# Patient Record
Sex: FEMALE | Race: WHITE | NOT HISPANIC OR LATINO | Employment: UNEMPLOYED | URBAN - METROPOLITAN AREA
[De-identification: names, ages, dates, MRNs, and addresses within clinical notes are randomized per-mention and may not be internally consistent; named-entity substitution may affect disease eponyms.]

---

## 2017-01-09 ENCOUNTER — AMBULATORY - HEALTHEAST (OUTPATIENT)
Dept: SURGERY | Facility: CLINIC | Age: 37
End: 2017-01-09

## 2017-01-10 ENCOUNTER — RECORDS - HEALTHEAST (OUTPATIENT)
Dept: ADMINISTRATIVE | Facility: OTHER | Age: 37
End: 2017-01-10

## 2017-01-10 ENCOUNTER — OFFICE VISIT (OUTPATIENT)
Dept: ENDOCRINOLOGY | Facility: CLINIC | Age: 37
End: 2017-01-10

## 2017-01-10 VITALS
DIASTOLIC BLOOD PRESSURE: 83 MMHG | HEART RATE: 105 BPM | HEIGHT: 60 IN | TEMPERATURE: 98.5 F | BODY MASS INDEX: 57.52 KG/M2 | SYSTOLIC BLOOD PRESSURE: 140 MMHG | WEIGHT: 293 LBS

## 2017-01-10 DIAGNOSIS — D35.2 PROLACTINOMA (H): ICD-10-CM

## 2017-01-10 DIAGNOSIS — D35.2 PITUITARY ADENOMA (H): Primary | ICD-10-CM

## 2017-01-10 DIAGNOSIS — E03.9 HYPOTHYROIDISM, UNSPECIFIED TYPE: ICD-10-CM

## 2017-01-10 DIAGNOSIS — D35.2 PITUITARY ADENOMA (H): ICD-10-CM

## 2017-01-10 LAB
FSH SERPL-ACNC: 3.9 IU/L
LH SERPL-ACNC: 4.1 IU/L
PROLACTIN SERPL-MCNC: 60 UG/L (ref 3–27)
T4 FREE SERPL-MCNC: 1.21 NG/DL (ref 0.76–1.46)
TSH SERPL DL<=0.05 MIU/L-ACNC: 3.09 MU/L (ref 0.4–4)

## 2017-01-10 PROCEDURE — 84146 ASSAY OF PROLACTIN: CPT | Performed by: INTERNAL MEDICINE

## 2017-01-10 PROCEDURE — 83002 ASSAY OF GONADOTROPIN (LH): CPT | Performed by: INTERNAL MEDICINE

## 2017-01-10 RX ORDER — CABERGOLINE 0.5 MG/1
0.5 TABLET ORAL DAILY
Qty: 8 TABLET | Refills: 5 | Status: SHIPPED | OUTPATIENT
Start: 2017-01-10 | End: 2017-05-10

## 2017-01-10 ASSESSMENT — PAIN SCALES - GENERAL: PAINLEVEL: NO PAIN (0)

## 2017-01-10 NOTE — Clinical Note
1/10/2017       RE: Bushra Buck  [address]    [place] [address]     Dear Colleague,    Thank you for referring your patient, Bushra Buck, to the [facility] ENDOCRINOLOGY at [facility]. Please see a copy of my visit note below.         - Endocrinology Initial Consultation -    Reason for visit/consult:  Hyperprolactinemia    Primary care provider: Tae Garcia    HPI: 37 yo female with let lymphedema, overweight, and hypothyroidism who was found to have pituitary tumor in 1997 at her ophthalmology office,. Her initial symptoms were HA and blurry vision and amenorrhea.  She was started on bromocriptin since 1998 till 8/2000, when [facility]  Post surgery, she mentioned she felt better for a while, and her menstrual cycles recovered.  She has been seem by Dr. Moran since 2002, when she was switched to cabergoline, se was taking 0.5 mg twice a day per patient. Around 2012, she was stopped cabergoline and now PRL levels gradually increasing.     No period for 2 years. After TSS, had periods but no more now.   No glaractorrhea. Appetite: OK, Sleep OK, Fatigue+, BW: 360 lb stable    Past Medical/Surgical History:  Past Medical History   Diagnosis Date     Pituitary adenoma (H)      S/P trans nasal resection in 2000     Bipolar disorder (H) 1992     Irritable bowel syndrome      GERD (gastroesophageal reflux disease)      Acne rosacea      Past Surgical History   Procedure Laterality Date     Septal plasty  1998     Tonsillectomy  1998     Pituitary tumor removal  2000     at the [facility]       Allergies:  Allergies   Allergen Reactions     Fiorinal [Butalbital-Aspirin-Caffeine] Anaphylaxis and Swelling     Ativan [Lorazepam]      Bee Venom      Ibuprofen Sodium      Eyes swell     Keflex [Cephalexin-Fd&C Yellow #6]      Eyes swell     Latex Swelling     Trileptal      numbness     Topamax Rash       Current Medications   Current Outpatient  Prescriptions   Medication     cabergoline (DOSTINEX) 0.5 MG tablet     levothyroxine (SYNTHROID, LEVOTHROID) 112 MCG tablet     divalproex (DEPAKOTE ER) 500 MG 24 hr tablet     doxycycline (VIBRAMYCIN) 100 MG capsule     clotrimazole (LOTRIMIN) 1 % cream     FENOFIBRATE PO     silver sulfADIAZINE (SILVADENE) 1 % cream     furosemide (LASIX) 40 MG tablet     Multiple Vitamins-Minerals (CERTAVITE/ANTIOXIDANTS PO)     cetirizine HCl 10 MG CAPS     omeprazole (PRILOSEC) 40 MG capsule     No current facility-administered medications for this visit.       Family History:  Family History   Problem Relation Age of Onset     Thyroid Disease Mother      hypothyroidism     DIABETES Mother      Coronary Artery Disease No family hx of      Hypertension No family hx of      Hyperlipidemia No family hx of      CEREBROVASCULAR DISEASE No family hx of      Breast Cancer No family hx of      Colon Cancer No family hx of      Prostate Cancer No family hx of      Other Cancer No family hx of      Depression No family hx of      Anxiety Disorder No family hx of      MENTAL ILLNESS No family hx of      Substance Abuse No family hx of      Anesthesia Reaction No family hx of      Asthma No family hx of      OSTEOPOROSIS No family hx of      Genetic Disorder No family hx of      Obesity No family hx of      Unknown/Adopted No family hx of    Mother, sister, other sister: hypothyroidism, weight issues, lymphedema    Social History:  Social History   Substance Use Topics     Smoking status: Never Smoker      Smokeless tobacco: Never Used     Alcohol Use: No   Drug: No, Lives with a cat, VNS visited her home to take care of lymphedema    ROS:  Full review of systems taken with the help of the intake sheet. Otherwise a complete 14 point review of systems was taken and is negative unless stated in the history above.    Physical Exam:   Blood pressure 140/83, pulse 105, temperature 98.5  F (36.9  C), temperature source Oral, height 1.524 m  (5'), weight 163.975 kg (361 lb 8 oz).    General: well appearing, no acute distress, pleasant and conversant,   Mental Status/neuro: alert and oriented  Face: symmetrical, normal facial color  Eyes: anicteric, PERRL,   Visual Field: intact  Neck: suppler, no lymphadenopahty  Thyroid: normal size and texture, no nodule palpable, no bruits  Heart: regular rhythm, S1S2, no murmur appreciated  Breast: no galactorrhea  Lung: clear to auscultation bilaterally  Abdomen: soft, NT/ND, no hepatomegaly  Legs: no swelling or edema  Feet: no deformities or ulcers, 2+ DP pulses, normal monofilament sensation      Labs (General):   NA      142   7/1/2015   POTASSIUM      3.9   7/1/2015  CHLORIDE      106   11/25/2011  TAWNYA      9.1   9/11/2012  CO2       22   11/25/2011  BUN       16   11/25/2011  CR     0.67   1/6/2016  GLC       82   1/6/2016  TSH     3.78   7/6/2016  T4     1.32   7/6/2016  A1C      5.7   1/6/2016 1/23/2014 11:31 1/2/2015 12:22 7/1/2015 13:43 1/6/2016 15:31 7/6/2016 17:09   Prolactin 53 (H) 46 (H) 64 (H) 47 (H) 50 (H)   FSH  5.9 4.5 4.3 4.5   TSH 3.59 4.63 (H) 3.86 5.69 (H) 3.78   T4 Free 1.09 1.25 1.24 1.23 1.32   Lutropin    5.3    Ins Growth Factor 1   78 (L) 121 125         MRI Brain:   Results for orders placed in visit on 01/14/16   MRI Brain-PITUITARY  w & w/o contrast    Narrative Brain/Pituitary MRI without and with contrast    History: Benign neoplasm of pituitary gland, Morbid (severe) obesity  due to excess calories, Other abnormal glucose.      Comparison:  MRI brain 9/12/2012     Technique: Axial diffusion and FLAIR images of the whole brain  obtained without intravenous contrast. Sagittal T1-weighted, coronal  T2-weighted, coronal T1-weighted images with focus on the sella were  obtained without intravenous contrast. Post intravenous contrast using  gadolinium coronal and sagittal T1-weighted images were obtained  focused on the sella.  Dynamic postcontrast coronal T1-weighted images  were  also obtained.    Contrast: 15 mL gadavist    Findings:  There are again postoperative changes of transnasal  transsphenoidal approach pituitary adenoma. There is thinning of the  right half of the pituitary gland. The pituitary stalk is deviated to  the left. No hypoenhancing lesion in the residual pituitary tissue on  the left of the midline. The optic chiasm appears intact and not  displaced. The major cavernous carotid vascular flow-voids appear  patent.        FLAIR images through the whole brain are unremarkable, and demonstrate  no mass effect, midline shift, or significant enlargement of the  ventricles. There is again normal variant cavum septum pellucidum. No  significant change in the T2 hyperintense focus in the right centrum  semiovale.      Impression Impression: Stable postoperative changes of pituitary adenoma  resection with thinning of the right side of the pituitary gland. No  evidence for residual adenoma in the homogenously enhancing pituitary  tissue in the left side of the sella.     I have personally reviewed the examination and initial interpretation  and I agree with the findings.    ANTONETTE GUILLORY MD                              1/2016        Assessment and Plan  36 year old female with hyperprolactinemia,    - Start cabergoline 0.5 mg weekly  - PRL, TSH, free T4, LH, FSH today  - MRI pituitary to set up  - 4 month follow up with prior blood work      I spent 45 minutes with this patient face to face and explained the conditions and plans (more than 50% of time was counseling/coordination of care) . The patient understood and is satisfied with today's visit. Return to clinic with me in 4 months.         Mandy Farfan MD  Staff Physician  Endocrinology and Metabolism  License: [id number]

## 2017-01-10 NOTE — NURSING NOTE
Chief Complaint   Patient presents with     RECHECK     f/u for pituitary adenoma- former Dr. Moran patient        Initial /83 mmHg  Pulse 105  Ht 1.524 m (5')  Wt 163.975 kg (361 lb 8 oz)  BMI 70.60 kg/m2 Estimated body mass index is 70.6 kg/(m^2) as calculated from the following:    Height as of this encounter: 1.524 m (5').    Weight as of this encounter: 163.975 kg (361 lb 8 oz).  BP completed using cuff size: leesa Ahumada CMA

## 2017-01-10 NOTE — PROGRESS NOTES
- Endocrinology Initial Consultation -    Reason for visit/consult:  Hyperprolactinemia    Primary care provider: Tae Garcia    HPI: 35 yo female with let lymphedema, overweight, and hypothyroidism who was found to have pituitary tumor in 1997 at her ophthalmology office,. Her initial symptoms were HA and blurry vision and amenorrhea.  She was started on bromocriptin since 1998 till 8/2000, when [facility]  Post surgery, she mentioned she felt better for a while, and her menstrual cycles recovered.  She has been seem by Dr. Moran since 2002, when she was switched to cabergoline, se was taking 0.5 mg twice a day per patient. Around 2012, she was stopped cabergoline and now PRL levels gradually increasing.     No period for 2 years. After TSS, had periods but no more now.   No glaractorrhea. Appetite: OK, Sleep OK, Fatigue+, BW: 360 lb stable    Past Medical/Surgical History:  Past Medical History   Diagnosis Date     Pituitary adenoma (H)      S/P trans nasal resection in 2000     Bipolar disorder (H) 1992     Irritable bowel syndrome      GERD (gastroesophageal reflux disease)      Acne rosacea      Past Surgical History   Procedure Laterality Date     Septal plasty  1998     Tonsillectomy  1998     Pituitary [facility]  2000     at [facility]       Allergies:  Allergies   Allergen Reactions     Fiorinal [Butalbital-Aspirin-Caffeine] Anaphylaxis and Swelling     Ativan [Lorazepam]      Bee Venom      Ibuprofen Sodium      Eyes swell     Keflex [Cephalexin-Fd&C Yellow #6]      Eyes swell     Latex Swelling     Trileptal      numbness     Topamax Rash       Current Medications   Current Outpatient Prescriptions   Medication     cabergoline (DOSTINEX) 0.5 MG tablet     levothyroxine (SYNTHROID, LEVOTHROID) 112 MCG tablet     divalproex (DEPAKOTE ER) 500 MG 24 hr tablet     doxycycline (VIBRAMYCIN) 100 MG capsule      clotrimazole (LOTRIMIN) 1 % cream     FENOFIBRATE PO     silver sulfADIAZINE (SILVADENE) 1 % cream     furosemide (LASIX) 40 MG tablet     Multiple Vitamins-Minerals (CERTAVITE/ANTIOXIDANTS PO)     cetirizine HCl 10 MG CAPS     omeprazole (PRILOSEC) 40 MG capsule     No current facility-administered medications for this visit.       Family History:  Family History   Problem Relation Age of Onset     Thyroid Disease Mother      hypothyroidism     DIABETES Mother      Coronary Artery Disease No family hx of      Hypertension No family hx of      Hyperlipidemia No family hx of      CEREBROVASCULAR DISEASE No family hx of      Breast Cancer No family hx of      Colon Cancer No family hx of      Prostate Cancer No family hx of      Other Cancer No family hx of      Depression No family hx of      Anxiety Disorder No family hx of      MENTAL ILLNESS No family hx of      Substance Abuse No family hx of      Anesthesia Reaction No family hx of      Asthma No family hx of      OSTEOPOROSIS No family hx of      Genetic Disorder No family hx of      Obesity No family hx of      Unknown/Adopted No family hx of    Mother, sister, other sister: hypothyroidism, weight issues, lymphedema    Social History:  Social History   Substance Use Topics     Smoking status: Never Smoker      Smokeless tobacco: Never Used     Alcohol Use: No   Drug: No, Lives with a cat, VNS visited her home to take care of lymphedema    ROS:  Full review of systems taken with the help of the intake sheet. Otherwise a complete 14 point review of systems was taken and is negative unless stated in the history above.    Physical Exam:   Blood pressure 140/83, pulse 105, temperature 98.5  F (36.9  C), temperature source Oral, height 1.524 m (5'), weight 163.975 kg (361 lb 8 oz).    General: well appearing, no acute distress, pleasant and conversant,   Mental Status/neuro: alert and oriented  Face: symmetrical, normal facial color  Eyes: anicteric, PERRL,   Visual  Field: intact  Neck: suppler, no lymphadenopahty  Thyroid: normal size and texture, no nodule palpable, no bruits  Heart: regular rhythm, S1S2, no murmur appreciated  Breast: no galactorrhea  Lung: clear to auscultation bilaterally  Abdomen: soft, NT/ND, no hepatomegaly  Legs: no swelling or edema  Feet: no deformities or ulcers, 2+ DP pulses, normal monofilament sensation      Labs (General):   NA      142   7/1/2015   POTASSIUM      3.9   7/1/2015  CHLORIDE      106   11/25/2011  TAWNYA      9.1   9/11/2012  CO2       22   11/25/2011  BUN       16   11/25/2011  CR     0.67   1/6/2016  GLC       82   1/6/2016  TSH     3.78   7/6/2016  T4     1.32   7/6/2016  A1C      5.7   1/6/2016 1/23/2014 11:31 1/2/2015 12:22 7/1/2015 13:43 1/6/2016 15:31 7/6/2016 17:09   Prolactin 53 (H) 46 (H) 64 (H) 47 (H) 50 (H)   FSH  5.9 4.5 4.3 4.5   TSH 3.59 4.63 (H) 3.86 5.69 (H) 3.78   T4 Free 1.09 1.25 1.24 1.23 1.32   Lutropin    5.3    Ins Growth Factor 1   78 (L) 121 125         MRI Brain:   Results for orders placed in visit on 01/14/16   MRI Brain-PITUITARY  w & w/o contrast    Narrative Brain/Pituitary MRI without and with contrast    History: Benign neoplasm of pituitary gland, Morbid (severe) obesity  due to excess calories, Other abnormal glucose.      Comparison:  MRI brain 9/12/2012     Technique: Axial diffusion and FLAIR images of the whole brain  obtained without intravenous contrast. Sagittal T1-weighted, coronal  T2-weighted, coronal T1-weighted images with focus on the sella were  obtained without intravenous contrast. Post intravenous contrast using  gadolinium coronal and sagittal T1-weighted images were obtained  focused on the sella.  Dynamic postcontrast coronal T1-weighted images  were also obtained.    Contrast: 15 mL gadavist    Findings:  There are again postoperative changes of transnasal  transsphenoidal approach pituitary adenoma. There is thinning of the  right half of the pituitary gland. The  pituitary stalk is deviated to  the left. No hypoenhancing lesion in the residual pituitary tissue on  the left of the midline. The optic chiasm appears intact and not  displaced. The major cavernous carotid vascular flow-voids appear  patent.        FLAIR images through the whole brain are unremarkable, and demonstrate  no mass effect, midline shift, or significant enlargement of the  ventricles. There is again normal variant cavum septum pellucidum. No  significant change in the T2 hyperintense focus in the right centrum  semiovale.      Impression Impression: Stable postoperative changes of pituitary adenoma  resection with thinning of the right side of the pituitary gland. No  evidence for residual adenoma in the homogenously enhancing pituitary  tissue in the left side of the sella.     I have personally reviewed the examination and initial interpretation  and I agree with the findings.    ANTONETTE GUILLORY MD                              1/2016        Assessment and Plan  36 year old female with hyperprolactinemia,    - Start cabergoline 0.5 mg weekly  - PRL, TSH, free T4, LH, FSH today  - MRI pituitary to set up  - 4 month follow up with prior blood work      I spent 45 minutes with this patient face to face and explained the conditions and plans (more than 50% of time was counseling/coordination of care) . The patient understood and is satisfied with today's visit. Return to clinic with me in 4 months.         Mandy Farfan MD  Staff Physician  Endocrinology and Metabolism  License: [id number]

## 2017-01-10 NOTE — MR AVS SNAPSHOT
After Visit Summary   1/10/2017    Bushra Buck    MRN: [id number]           Patient Information     Date Of Birth          1980        Visit Information        Provider Department      1/10/2017 2:30 PM Mandy Farfan MD [facility]        Today's Diagnoses     Pituitary adenoma (H)    -  1     Prolactinoma (H)         Hypothyroidism, unspecified type           Care Instructions    Blood work for prolactin and thyroid function  Start cabefgoline 0.5 mg once a week  Come back here in 4 months with blood test.        Follow-ups after your visit        Your next 10 appointments already scheduled     Ajay 10, 2017  3:00 PM   LAB with  LAB   [facility] ([facility])    [address] [place] [address] [phone]           Patient must bring picture ID.  Patient should be prepared to give a urine specimen  Please do not eat 10-12 hours before your appointment if you are coming in fasting for labs on lipids, cholesterol, or glucose (sugar).  Pregnant women should follow their Care Team instructions. Water with medications is okay. Do not drink coffee or other fluids.   If you have concerns about taking  your medications, please ask at office or if scheduling via [employer], send a message by clicking on Secure Messaging, Message Your Care Team.            Jan 14, 2017  4:00 PM   (Arrive by 3:45 PM)   MR BRAIN W/O & W CONTRAST with [address] [facility] ([facility])    [address] [place] [address] [phone]           Take your medicines as usual, unless your doctor tells you not to. Bring a list of your current medicines to your exam (including vitamins, minerals and over-the-counter drugs).  You will be given intravenous contrast for this exam. To prepare:   The day before your exam, drink extra fluids at least six 8-ounce glasses (unless your doctor tells  you to restrict your fluids).   Have a blood test (creatinine test) within 30 days of your exam. Go to your clinic or Diagnostic Imaging Department for this test.  The MRI machine uses a strong magnet. Please wear clothes without metal (snaps, zippers). A sweatsuit works well, or we may give you a hospital gown.  Please remove any body piercings and hair extensions before you arrive. You will also remove watches, jewelry, hairpins, wallets, dentures, partial dental plates and hearing aids. You may wear contact lenses, and you may be able to wear your rings. We have a safe place to keep your personal items, but it is safer to leave them at home.   **IMPORTANT** THE INSTRUCTIONS BELOW ARE ONLY FOR THOSE PATIENTS WHO HAVE BEEN TOLD THEY WILL RECEIVE SEDATION OR GENERAL ANESTHESIA DURING THEIR MRI PROCEDURE:  IF YOU WILL RECEIVE SEDATION (take medicine to help you relax during your exam):   You must get the medicine from your doctor before you arrive. Bring the medicine to the exam. Do not take it at home.   Arrive one hour early. Bring someone who can take you home after the test. Your medicine will make you sleepy. After the exam, you may not drive, take a bus or take a taxi by yourself.   No eating 8 hours before your exam. You may have clear liquids up until 4 hours before your exam. (Clear liquids include water, clear tea, black coffee and fruit juice without pulp.)  IF YOU WILL RECEIVE ANESTHESIA (be asleep for your exam):   Arrive 1 1/2 hours early. Bring someone who can take you home after the test. You may not drive, take a bus or take a taxi by yourself.   No eating 8 hours before your exam. You may have clear liquids up until 4 hours before your exam. (Clear liquids include water, clear tea, black coffee and fruit juice without pulp.)  Please call the Imaging Department at your exam site with any questions.            May 03, 2017  9:30 AM   LAB with [facility] ([facility])     [address] [id number] [phone]           Patient must bring picture ID.  Patient should be prepared to give a urine specimen  Please do not eat 10-12 hours before your appointment if you are coming in fasting for labs on lipids, cholesterol, or glucose (sugar).  Pregnant women should follow their Care Team instructions. Water with medications is okay. Do not drink coffee or other fluids.   If you have concerns about taking  your medications, please ask at office or if scheduling via [employer], send a message by clicking on Secure Messaging, Message Your Care Team.            May 10, 2017 10:30 AM   (Arrive by 10:00 AM)   RETURN ENDOCRINE with Mandy Farfan MD   [facility] ([facility])    [address] [place] [id number] [phone]              Future tests that were ordered for you today     Open Future Orders        Priority Expected Expires Ordered    TSH Routine  1/10/2018 1/10/2017    T4 free Routine  1/10/2018 1/10/2017    Prolactin Routine  1/10/2018 1/10/2017    Lutropin Routine  1/10/2018 1/10/2017    Follicle stimulating hormone Routine  1/10/2018 1/10/2017    MRI Brain-PITUITARY  w & w/o contrast Routine  8/8/2017 1/10/2017            Who to contact     Please call your clinic at [phone] to:    Ask questions about your health    Make or cancel appointments    Discuss your medicines    Learn about your test results    Speak to your doctor   If you have compliments or concerns about an experience at your clinic, or if you wish to file a complaint, please contact [facility] Patient Relations at [phone] or email us at [email or web address]         Additional Information About Your Visit        CloudBeds Information     [employer] gives you secure access to your electronic health record. If you see a primary care provider, you can also send messages to your  care team and make appointments. If you have questions, please call your primary care clinic.  If you do not have a primary care provider, please call [phone] and they will assist you.      [employer] is an electronic gateway that provides easy, online access to your medical records. With [employer], you can request a clinic appointment, read your test results, renew a prescription or communicate with your care team.     To access your existing account, please contact your [facility] or call [phone] for assistance.        Care EveryWhere ID     This is your Care EveryWhere ID. This could be used by other organizations to access your [place] medical records  [id number]        Your Vitals Were     Pulse Temperature Height BMI (Body Mass Index)          105 98.5  F (36.9  C) (Oral) 1.524 m (5') 70.60 kg/m2         Blood Pressure from Last 3 Encounters:   01/10/17 140/83   07/06/16 139/76   03/14/16 138/82    Weight from Last 3 Encounters:   01/10/17 163.975 kg (361 lb 8 oz)   07/06/16 172.095 kg (379 lb 6.4 oz)   03/14/16 173.365 kg (382 lb 3.2 oz)                 Today's Medication Changes          These changes are accurate as of: 1/10/17  2:55 PM.  If you have any questions, ask your nurse or doctor.               Start taking these medicines.        Dose/Directions    cabergoline 0.5 MG tablet   Commonly known as:  DOSTINEX   Used for:  Pituitary adenoma (H), Prolactinoma (H)   Started by:  Mandy Farfan MD        Dose:  0.5 mg   Take 1 tablet (0.5 mg) by mouth daily   Quantity:  8 tablet   Refills:  5         Stop taking these medicines if you haven't already. Please contact your care team if you have questions.     ARIPiprazole 5 MG tablet   Commonly known as:  ABILIFY   Stopped by:  Mandy Farfan MD                Where to get your medicines      These medications were sent to [facility] - [place], [place] - [address]  [address], [place]  [address]     Phone:  [phone]    - cabergoline 0.5 MG tablet             Primary Care Provider Office Phone # Fax #    Tae Garcia [phone]       [facility] [address] [address]        Thank you!     Thank you for choosing [facility]  for your care. Our goal is always to provide you with excellent care. Hearing back from our patients is one way we can continue to improve our services. Please take a few minutes to complete the written survey that you may receive in the mail after your visit with us. Thank you!             Your Updated Medication List - Protect others around you: Learn how to safely use, store and throw away your medicines at www.disposemymeds.org.          This list is accurate as of: 1/10/17  2:55 PM.  Always use your most recent med list.                   Brand Name Dispense Instructions for use    cabergoline 0.5 MG tablet    DOSTINEX    8 tablet    Take 1 tablet (0.5 mg) by mouth daily       CERTAVITE/ANTIOXIDANTS PO          cetirizine HCl 10 MG Caps          clotrimazole 1 % cream    LOTRIMIN    60 g    Apply to toes twice daily       divalproex 500 MG 24 hr tablet    DEPAKOTE ER    30 tablet    Take 2 tablets (1,000 mg) by mouth daily       doxycycline 100 MG capsule    VIBRAMYCIN     Take 1 capsule (100 mg) by mouth 2 times daily       FENOFIBRATE PO      Take 145 mg by mouth daily       furosemide 40 MG tablet    LASIX    60 tablet    Take 40 mg by mouth daily       levothyroxine 112 MCG tablet    SYNTHROID/LEVOTHROID    90 tablet    Take 1 tablet (112 mcg) by mouth daily       priLOSEC 40 MG capsule   Generic drug:  omeprazole      Take 1 capsule by mouth daily.       silver sulfADIAZINE 1 % cream    SILVADENE     Apply topically daily

## 2017-01-20 ENCOUNTER — COMMUNICATION - HEALTHEAST (OUTPATIENT)
Dept: SURGERY | Facility: CLINIC | Age: 37
End: 2017-01-20

## 2017-02-09 ENCOUNTER — OFFICE VISIT - HEALTHEAST (OUTPATIENT)
Dept: VASCULAR SURGERY | Facility: CLINIC | Age: 37
End: 2017-02-09

## 2017-02-09 ENCOUNTER — RECORDS - HEALTHEAST (OUTPATIENT)
Dept: ADMINISTRATIVE | Facility: OTHER | Age: 37
End: 2017-02-09

## 2017-02-09 DIAGNOSIS — I89.0 LYMPHEDEMA: ICD-10-CM

## 2017-02-09 DIAGNOSIS — M21.41 FLAT FEET, BILATERAL: ICD-10-CM

## 2017-02-09 DIAGNOSIS — L98.491 ULCER OF ABDOMEN WALL, LIMITED TO BREAKDOWN OF SKIN (H): ICD-10-CM

## 2017-02-09 DIAGNOSIS — L98.499 SKIN ULCER OF FEMALE BREAST (H): ICD-10-CM

## 2017-02-09 DIAGNOSIS — I87.303 VENOUS HYPERTENSION OF LOWER EXTREMITY, BILATERAL: ICD-10-CM

## 2017-02-09 DIAGNOSIS — M21.071 ACQUIRED VALGUS DEFORMITY OF BOTH ANKLES: ICD-10-CM

## 2017-02-09 DIAGNOSIS — M21.072 ACQUIRED VALGUS DEFORMITY OF BOTH ANKLES: ICD-10-CM

## 2017-02-09 DIAGNOSIS — M21.42 FLAT FEET, BILATERAL: ICD-10-CM

## 2017-02-09 DIAGNOSIS — M21.962 FOOT DEFORMITY, BILATERAL: ICD-10-CM

## 2017-02-09 DIAGNOSIS — M21.961 FOOT DEFORMITY, BILATERAL: ICD-10-CM

## 2017-02-13 ENCOUNTER — RECORDS - HEALTHEAST (OUTPATIENT)
Dept: ADMINISTRATIVE | Facility: OTHER | Age: 37
End: 2017-02-13

## 2017-03-09 ENCOUNTER — OFFICE VISIT - HEALTHEAST (OUTPATIENT)
Dept: VASCULAR SURGERY | Facility: CLINIC | Age: 37
End: 2017-03-09

## 2017-03-09 DIAGNOSIS — I87.303 VENOUS HYPERTENSION OF LOWER EXTREMITY, BILATERAL: ICD-10-CM

## 2017-03-09 DIAGNOSIS — L98.499 SKIN ULCER OF FEMALE BREAST (H): ICD-10-CM

## 2017-03-09 DIAGNOSIS — E66.01 MORBID OBESITY WITH BMI OF 70 AND OVER, ADULT (H): ICD-10-CM

## 2017-04-10 ENCOUNTER — OFFICE VISIT - HEALTHEAST (OUTPATIENT)
Dept: SURGERY | Facility: CLINIC | Age: 37
End: 2017-04-10

## 2017-04-10 DIAGNOSIS — Z71.3 WEIGHT LOSS COUNSELING, ENCOUNTER FOR: ICD-10-CM

## 2017-04-10 DIAGNOSIS — D35.2 PROLACTINOMA (H): ICD-10-CM

## 2017-04-10 DIAGNOSIS — I87.303 VENOUS HYPERTENSION OF LOWER EXTREMITY, BILATERAL: ICD-10-CM

## 2017-04-10 ASSESSMENT — MIFFLIN-ST. JEOR: SCORE: 2206.31

## 2017-04-20 ENCOUNTER — OFFICE VISIT - HEALTHEAST (OUTPATIENT)
Dept: VASCULAR SURGERY | Facility: CLINIC | Age: 37
End: 2017-04-20

## 2017-04-20 DIAGNOSIS — I89.0 LYMPHEDEMA: ICD-10-CM

## 2017-04-20 DIAGNOSIS — E66.01 MORBID OBESITY WITH BMI OF 70 AND OVER, ADULT (H): ICD-10-CM

## 2017-04-20 DIAGNOSIS — L98.499 SKIN ULCER OF FEMALE BREAST (H): ICD-10-CM

## 2017-04-20 DIAGNOSIS — I87.303 VENOUS HYPERTENSION OF LOWER EXTREMITY, BILATERAL: ICD-10-CM

## 2017-05-02 DIAGNOSIS — D35.2 PROLACTINOMA (H): Primary | ICD-10-CM

## 2017-05-03 DIAGNOSIS — D35.2 PROLACTINOMA (H): ICD-10-CM

## 2017-05-03 LAB — PROLACTIN SERPL-MCNC: 59 UG/L (ref 3–27)

## 2017-05-03 PROCEDURE — 84146 ASSAY OF PROLACTIN: CPT | Performed by: INTERNAL MEDICINE

## 2017-05-10 ENCOUNTER — OFFICE VISIT (OUTPATIENT)
Dept: ENDOCRINOLOGY | Facility: CLINIC | Age: 37
End: 2017-05-10

## 2017-05-10 ENCOUNTER — RECORDS - HEALTHEAST (OUTPATIENT)
Dept: ADMINISTRATIVE | Facility: OTHER | Age: 37
End: 2017-05-10

## 2017-05-10 VITALS
BODY MASS INDEX: 57.52 KG/M2 | HEIGHT: 60 IN | DIASTOLIC BLOOD PRESSURE: 82 MMHG | HEART RATE: 98 BPM | SYSTOLIC BLOOD PRESSURE: 133 MMHG | WEIGHT: 293 LBS

## 2017-05-10 DIAGNOSIS — D35.2 PROLACTINOMA (H): Primary | ICD-10-CM

## 2017-05-10 DIAGNOSIS — D35.2 PITUITARY ADENOMA (H): ICD-10-CM

## 2017-05-10 DIAGNOSIS — N91.2 AMENORRHEA: ICD-10-CM

## 2017-05-10 RX ORDER — CABERGOLINE 0.5 MG/1
0.5 TABLET ORAL
Qty: 8 TABLET | Refills: 5 | Status: SHIPPED | OUTPATIENT
Start: 2017-05-11 | End: 2018-06-27

## 2017-05-10 NOTE — PATIENT INSTRUCTIONS
-Take cabergolin 0.5 mg twice weekly (on Wednesday and Sunday)  -Check your prolactin in 3 months  -RTC in 6 months

## 2017-05-10 NOTE — MR AVS SNAPSHOT
After Visit Summary   5/10/2017    Bushra Buck    MRN: [id number]           Patient Information     Date Of Birth          1980        Visit Information        Provider Department      5/10/2017 10:30 AM Mandy Farfan MD [facility]        Today's Diagnoses     Prolactinoma (H)    -  1    Pituitary adenoma (H)          Care Instructions    -Take cabergolin 0.5 mg twice weekly (on Wednesday and Sunday)  -Check your prolactin in 3 months  -RTC in 6 months        Follow-ups after your visit        Follow-up notes from your care team     Return in about 6 months (around 11/10/2017).      Your next 10 appointments already scheduled     Aug 10, 2017 10:00 AM CDT   LAB with  LAB   [facility] ([facility])    [address] [place] [address] [phone]           Patient must bring picture ID.  Patient should be prepared to give a urine specimen  Please do not eat 10-12 hours before your appointment if you are coming in fasting for labs on lipids, cholesterol, or glucose (sugar).  Pregnant women should follow their Care Team instructions. Water with medications is okay. Do not drink coffee or other fluids.   If you have concerns about taking  your medications, please ask at office or if scheduling via [employer], send a message by clicking on Secure Messaging, Message Your Care Team.            Nov 14, 2017  1:30 PM CST   (Arrive by 1:15 PM)   RETURN ENDOCRINE with Mandy Farfan MD   [facility] ([facility])    [address] [place] [address] [phone]              Future tests that were ordered for you today     Open Future Orders        Priority Expected Expires Ordered    Prolactin Routine 8/10/2017 5/10/2018 5/10/2017            Who to contact     Please call your clinic at [phone] to:    Ask questions about your health    Make or cancel  appointments    Discuss your medicines    Learn about your test results    Speak to your doctor   If you have compliments or concerns about an experience at your clinic, or if you wish to file a complaint, please contact [facility] Patient Relations at [phone] or email us at [email or web address]         Additional Information About Your Visit        MyChart Information     [employer] gives you secure access to your electronic health record. If you see a primary care provider, you can also send messages to your care team and make appointments. If you have questions, please call your primary care clinic.  If you do not have a primary care provider, please call [phone] and they will assist you.      [employer] is an electronic gateway that provides easy, online access to your medical records. With [employer], you can request a clinic appointment, read your test results, renew a prescription or communicate with your care team.     To access your existing account, please contact your [facility] or call [phone] for assistance.        Care EveryWhere ID     This is your Care EveryWhere ID. This could be used by other organizations to access your [place] medical records  [id number]        Your Vitals Were     Pulse Height BMI (Body Mass Index)             98 1.524 m (5') 72.53 kg/m2          Blood Pressure from Last 3 Encounters:   05/10/17 133/82   01/10/17 140/83   07/06/16 139/76    Weight from Last 3 Encounters:   05/10/17 (!) 168.5 kg (371 lb 6.4 oz)   01/10/17 (!) 164 kg (361 lb 8 oz)   07/06/16 (!) 172.1 kg (379 lb 6.4 oz)                 Today's Medication Changes          These changes are accurate as of: 5/10/17 11:17 AM.  If you have any questions, ask your nurse or doctor.               These medicines have changed or have updated prescriptions.        Dose/Directions    cabergoline 0.5 MG tablet   Commonly known as:  DOSTINEX   This  may have changed:  when to take this   Used for:  Pituitary adenoma (H), Prolactinoma (H)        Dose:  0.5 mg   Start taking on:  5/11/2017   Take 1 tablet (0.5 mg) by mouth twice a week   Quantity:  8 tablet   Refills:  5         Stop taking these medicines if you haven't already. Please contact your care team if you have questions.     cetirizine HCl 10 MG Caps           clotrimazole 1 % cream   Commonly known as:  LOTRIMIN           silver sulfADIAZINE 1 % cream   Commonly known as:  SILVADENE                Where to get your medicines      These medications were sent to [facility] - [place], [place] - [address]  [address], [place] [address]     Phone:  [phone]     cabergoline 0.5 MG tablet                Primary Care Provider Office Phone # Fax #    Tae Garcia [phone]       [facility] [address] [facility] [address]        Thank you!     Thank you for choosing [facility]  for your care. Our goal is always to provide you with excellent care. Hearing back from our patients is one way we can continue to improve our services. Please take a few minutes to complete the written survey that you may receive in the mail after your visit with us. Thank you!             Your Updated Medication List - Protect others around you: Learn how to safely use, store and throw away your medicines at www.disposemymeds.org.          This list is accurate as of: 5/10/17 11:17 AM.  Always use your most recent med list.                   Brand Name Dispense Instructions for use    cabergoline 0.5 MG tablet   Start taking on:  5/11/2017    DOSTINEX    8 tablet    Take 1 tablet (0.5 mg) by mouth twice a week       CERTAVITE/ANTIOXIDANTS PO          divalproex 500 MG 24 hr tablet    DEPAKOTE ER    30 tablet    Take 2 tablets (1,000 mg) by mouth daily       doxycycline 100 MG capsule    VIBRAMYCIN     Take 1 capsule (100 mg) by mouth 2 times daily        FENOFIBRATE PO      Take 145 mg by mouth daily       furosemide 40 MG tablet    LASIX    60 tablet    Take 40 mg by mouth daily       levothyroxine 112 MCG tablet    SYNTHROID/LEVOTHROID    90 tablet    Take 1 tablet (112 mcg) by mouth daily       LOXAPINE SUCCINATE PO      Take by mouth 2 times daily       priLOSEC 40 MG capsule   Generic drug:  omeprazole      Take 1 capsule by mouth daily.

## 2017-05-10 NOTE — NURSING NOTE
Chief Complaint   Patient presents with     RECHECK     F/U PITUTARY ADENOMA       Initial /82 (BP Location: Right arm, Patient Position: Chair, Cuff Size: Adult Regular)  Pulse 98  Ht 1.524 m (5')  Wt (!) 168.5 kg (371 lb 6.4 oz)  BMI 72.53 kg/m2 Estimated body mass index is 72.53 kg/(m^2) as calculated from the following:    Height as of this encounter: 1.524 m (5').    Weight as of this encounter: 168.5 kg (371 lb 6.4 oz).  Medication Reconciliation: complete

## 2017-05-10 NOTE — LETTER
5/10/2017       RE: Bushra Buck  [address]    [place] [address]     Dear Colleague,    Thank you for referring your patient, Bushra Buck, to the [facility] ENDOCRINOLOGY at [facility]. Please see a copy of my visit note below.    Reason for visit/consult: F/u on prolactinoma.    Primary care provider: Tae Garcia    HPI:  36 year old female with history of pituitary tumor discovered in 1997. The patient had a surgical resection in 2000 and subsequently treated with cabergoline. However, he prolactin remained elevated at 59 on 5/3/17. Most recent brain MRI did not show recurrence of the disease. She is currently on cabergoline 0.5 mg once/week.   She also has primary hypothyroidism, which is being replaced with levothyroxine 112 mcg daily. She reports compliance with her medication and denies skipping doses.  She had not had a menstrual period in about 3 years. She had not experienced any galactorrhea. There was no history of palpitations, tremor or diarrhea. She reports significant fatigue. But she is trying to get out of her apartment to get groceries.   She has gained 10 lbs over the winter time. She admits to over eating behavior.     Past Medical/Surgical History:  Past Medical History:   Diagnosis Date     Acne rosacea      Bipolar disorder (H) 1992     GERD (gastroesophageal reflux disease)      Irritable bowel syndrome      Pituitary adenoma (H)     S/P trans nasal resection in 2000     Past Surgical History:   Procedure Laterality Date     pituitary tumor removal  2000    at the [facility]     septal plasty  1998     TONSILLECTOMY  1998       Allergies:  Allergies   Allergen Reactions     Fiorinal [Butalbital-Aspirin-Caffeine] Anaphylaxis and Swelling     Ativan [Lorazepam]      Bee Venom      Ibuprofen Sodium      Eyes swell     Keflex [Cephalexin-Fd&C Yellow #6]      Eyes swell     Latex Swelling     Trileptal      numbness     Topamax Rash        Current Medications   Current Outpatient Prescriptions   Medication     cabergoline (DOSTINEX) 0.5 MG tablet     levothyroxine (SYNTHROID, LEVOTHROID) 112 MCG tablet     divalproex (DEPAKOTE ER) 500 MG 24 hr tablet     doxycycline (VIBRAMYCIN) 100 MG capsule     clotrimazole (LOTRIMIN) 1 % cream     FENOFIBRATE PO     silver sulfADIAZINE (SILVADENE) 1 % cream     furosemide (LASIX) 40 MG tablet     Multiple Vitamins-Minerals (CERTAVITE/ANTIOXIDANTS PO)     cetirizine HCl 10 MG CAPS     omeprazole (PRILOSEC) 40 MG capsule     No current facility-administered medications for this visit.        Family History:  Family History   Problem Relation Age of Onset     Thyroid Disease Mother      hypothyroidism     DIABETES Mother      Coronary Artery Disease No family hx of      Hypertension No family hx of      Hyperlipidemia No family hx of      CEREBROVASCULAR DISEASE No family hx of      Breast Cancer No family hx of      Colon Cancer No family hx of      Prostate Cancer No family hx of      Other Cancer No family hx of      Depression No family hx of      Anxiety Disorder No family hx of      MENTAL ILLNESS No family hx of      Substance Abuse No family hx of      Anesthesia Reaction No family hx of      Asthma No family hx of      OSTEOPOROSIS No family hx of      Genetic Disorder No family hx of      Obesity No family hx of      Unknown/Adopted No family hx of        Social History:  Social History   Substance Use Topics     Smoking status: Never Smoker     Smokeless tobacco: Never Used     Alcohol use No       ROS:  10 point negative except as mentioned in HPI    Exam  There were no vitals taken for this visit.  Gen: morbidly obese female in NAD  HEENT: anicteric, EOMI, no proptosis or lid lag  Thyroid: no thyroid goiter or cervical LAD  Cardio: RRR, no m/r/g  Resp: CTAB. No wheezing or crackles  Abd: soft, NT/ND. Normal BS  Skin: Warm and dry. No rash or lesions.   Ext: no swelling or edema  Feet: no  deformities or ulcers, 2+ DP pulses  Neuro: A&Ox3, relaxation phase of reflexes normal, no tremor on outstretched arms  Psych: Normal affect and mood.    Labs/Imaging  Brain MRI 1/2017  Impression:   1. Postsurgical changes from transsphenoidal resection of pituitary  adenoma without evidence of recurrence.  2. Single hyperintensity on FLAIR in the right semiovale similar to  prior study of questionable clinical significance.  3. Stable mild dural thickening and enhancement over the convexities  which is most likely postsurgical in nature. This could also be  related to CSF leak or intracranial hypotension. Total  TSH   Date Value Ref Range Status   01/10/2017 3.09 0.40 - 4.00 mU/L Final   07/06/2016 3.78 0.40 - 4.00 mU/L Final   01/06/2016 5.69 (H) 0.40 - 4.00 mU/L Final   07/01/2015 3.86 0.40 - 4.00 mU/L Final   01/02/2015 4.63 (H) 0.40 - 4.00 mU/L Final     Comment:     Effective 7/30/2014, the reference range for this assay has changed to reflect   new instrumentation/methodology.       T4 Free   Date Value Ref Range Status   01/10/2017 1.21 0.76 - 1.46 ng/dL Final   07/06/2016 1.32 0.76 - 1.46 ng/dL Final   01/06/2016 1.23 0.76 - 1.46 ng/dL Final   07/01/2015 1.24 0.76 - 1.46 ng/dL Final   01/02/2015 1.25 0.76 - 1.46 ng/dL Final     Comment:     Effective 7/30/2014, the reference range for this assay has changed to reflect   new instrumentation/methodology.     ]  Lab Results   Component Value Date    A1C 5.7 01/06/2016         Assessment and Plan  37 year old female with history of prolactinoma s/p resection and subsequent treatment of cabergoline. She was off of cabergoline therapy for many years and was recently restarted on it. She still has elevated level of prolactin, and still compalins amenorrhea.     -We discussed about increasing the frequency of cabergoline 0.5 mg to twice/week  -We will repeat her prolactin in 3 months  -Continue with the same dose of levothyroxine  - Significant weight gain  (several pounds) since last visit, we discussed about weight loss  -RTC in 6 months    Patient seen and examined with staff endocrinologist Dr Adolph Thapa MD  Diabetes, Metabolism and Endocrinology Fellow  Pager: [phone]    --- Addendum----  I saw the patient with endocrine fellow Dr. Thapa and directly examined patient and discussed. Agree above note and plan. Impression, possible residual PRL-luz, PRL used to be normal range with cabergoline TIW, but off cabergoline, persitent mild elevation of PRL and amenorrhea     We spent 45 minutes with this patient face to face and explained the conditions and plans (more than 50% of time was counseling/coordination of care, treatment plan of elevated prolactin) . The patient understood and is satisfied with today's visit. Return to clinic with me in 6 months.     Mandy Farfan MD  Staff Physician  Endocrinology and Metabolism  [facility]  License: [id number]  Pager: [phone]

## 2017-05-10 NOTE — PROGRESS NOTES
Reason for visit/consult: F/u on prolactinoma.    Primary care provider: Tae Garcia    HPI:  36 year old female with history of pituitary tumor discovered in 1997. The patient had a surgical resection in 2000 and subsequently treated with cabergoline. However, he prolactin remained elevated at 60 on 5/3/17. Most recent brain MRI did not show recurrence of the disease. She is currently on cabergoline 0.5 mg once/week.   She also has primary hypothyroidism, which is being replaced with levothyroxine 112 mcg daily. She reports compliance with her medication and denies skipping doses.  She had not had a menstrual period in about 3 years. She had not experienced any galactorrhea. There was no history of palpitations, tremor or diarrhea. She reports significant fatigue. But she is trying to get out of her apartment to get groceries.   She has gained 10 lbs over the winter time. She admits to over eating behavior.     Past Medical/Surgical History:  Past Medical History:   Diagnosis Date     Acne rosacea      Bipolar disorder (H) 1992     GERD (gastroesophageal reflux disease)      Irritable bowel syndrome      Pituitary adenoma (H)     S/P trans nasal resection in 2000     Past Surgical History:   Procedure Laterality Date     pituitary tumor removal  2000    at the [facility]     septal plasty  1998     TONSILLECTOMY  1998       Allergies:  Allergies   Allergen Reactions     Fiorinal [Butalbital-Aspirin-Caffeine] Anaphylaxis and Swelling     Ativan [Lorazepam]      Bee Venom      Ibuprofen Sodium      Eyes swell     Keflex [Cephalexin-Fd&C Yellow #6]      Eyes swell     Latex Swelling     Trileptal      numbness     Topamax Rash       Current Medications   Current Outpatient Prescriptions   Medication     cabergoline (DOSTINEX) 0.5 MG tablet     levothyroxine (SYNTHROID, LEVOTHROID) 112 MCG tablet     divalproex (DEPAKOTE ER) 500 MG 24 hr tablet     doxycycline (VIBRAMYCIN) 100 MG capsule     clotrimazole (LOTRIMIN) 1 %  cream     FENOFIBRATE PO     silver sulfADIAZINE (SILVADENE) 1 % cream     furosemide (LASIX) 40 MG tablet     Multiple Vitamins-Minerals (CERTAVITE/ANTIOXIDANTS PO)     cetirizine HCl 10 MG CAPS     omeprazole (PRILOSEC) 40 MG capsule     No current facility-administered medications for this visit.        Family History:  Family History   Problem Relation Age of Onset     Thyroid Disease Mother      hypothyroidism     DIABETES Mother      Coronary Artery Disease No family hx of      Hypertension No family hx of      Hyperlipidemia No family hx of      CEREBROVASCULAR DISEASE No family hx of      Breast Cancer No family hx of      Colon Cancer No family hx of      Prostate Cancer No family hx of      Other Cancer No family hx of      Depression No family hx of      Anxiety Disorder No family hx of      MENTAL ILLNESS No family hx of      Substance Abuse No family hx of      Anesthesia Reaction No family hx of      Asthma No family hx of      OSTEOPOROSIS No family hx of      Genetic Disorder No family hx of      Obesity No family hx of      Unknown/Adopted No family hx of        Social History:  Social History   Substance Use Topics     Smoking status: Never Smoker     Smokeless tobacco: Never Used     Alcohol use No       ROS:  10 point negative except as mentioned in HPI    Exam  There were no vitals taken for this visit.  Gen: morbidly obese female in NAD  HEENT: anicteric, EOMI, no proptosis or lid lag  Thyroid: no thyroid goiter or cervical LAD  Cardio: RRR, no m/r/g  Resp: CTAB. No wheezing or crackles  Abd: soft, NT/ND. Normal BS  Skin: Warm and dry. No rash or lesions.   Ext: no swelling or edema  Feet: no deformities or ulcers, 2+ DP pulses  Neuro: A&Ox3, relaxation phase of reflexes normal, no tremor on outstretched arms  Psych: Normal affect and mood.    Labs/Imaging  Brain MRI 1/2017  Impression:   1. Postsurgical changes from transsphenoidal resection of pituitary  adenoma without evidence of  recurrence.  2. Single hyperintensity on FLAIR in the right semiovale similar to  prior study of questionable clinical significance.  3. Stable mild dural thickening and enhancement over the convexities  which is most likely postsurgical in nature. This could also be  related to CSF leak or intracranial hypotension. Total  TSH   Date Value Ref Range Status   01/10/2017 3.09 0.40 - 4.00 mU/L Final   07/06/2016 3.78 0.40 - 4.00 mU/L Final   01/06/2016 5.69 (H) 0.40 - 4.00 mU/L Final   07/01/2015 3.86 0.40 - 4.00 mU/L Final   01/02/2015 4.63 (H) 0.40 - 4.00 mU/L Final     Comment:     Effective 7/30/2014, the reference range for this assay has changed to reflect   new instrumentation/methodology.       T4 Free   Date Value Ref Range Status   01/10/2017 1.21 0.76 - 1.46 ng/dL Final   07/06/2016 1.32 0.76 - 1.46 ng/dL Final   01/06/2016 1.23 0.76 - 1.46 ng/dL Final   07/01/2015 1.24 0.76 - 1.46 ng/dL Final   01/02/2015 1.25 0.76 - 1.46 ng/dL Final     Comment:     Effective 7/30/2014, the reference range for this assay has changed to reflect   new instrumentation/methodology.     ]  Lab Results   Component Value Date    A1C 5.7 01/06/2016         Assessment and Plan  37 year old female with history of prolactinoma s/p resection and subsequent treatment of cabergoline. She was off of cabergoline therapy for many years and was recently restarted on it. She still has elevated level of prolactin, and still compalins amenorrhea.     -We discussed about increasing the frequency of cabergoline 0.5 mg to twice/week  -We will repeat her prolactin in 3 months  -Continue with the same dose of levothyroxine  - Significant weight gain (several pounds) since last visit, we discussed about weight loss  -RTC in 6 months    Patient seen and examined with staff endocrinologist Dr Adolph Thapa MD  Diabetes, Metabolism and Endocrinology Fellow  Pager: [phone]    --- Addendum----  I saw the patient with endocrine fellow  Dr. Thapa and directly examined patient and discussed. Agree above note and plan. Impression, possible residual PRL-luz, PRL used to be normal range with cabergoline TIW, but off cabergoline, persitent mild elevation of PRL and amenorrhea     We spent 45 minutes with this patient face to face and explained the conditions and plans (more than 50% of time was counseling/coordination of care, treatment plan of elevated prolactin) . The patient understood and is satisfied with today's visit. Return to clinic with me in 6 months.     Mandy Farfan MD  Staff Physician  Endocrinology and Metabolism  [facility]  License: [id number]  Pager: [phone]

## 2017-05-31 DIAGNOSIS — D35.2 PITUITARY ADENOMA (H): ICD-10-CM

## 2017-06-01 RX ORDER — LEVOTHYROXINE SODIUM 112 UG/1
112 TABLET ORAL DAILY
Qty: 90 TABLET | Refills: 3 | Status: SHIPPED | OUTPATIENT
Start: 2017-06-01 | End: 2018-06-27

## 2017-06-01 NOTE — TELEPHONE ENCOUNTER
levothyroxine       Last Written Prescription Date:  11/25/16  Last Fill Quantity: 90,   # refills: 1  Last Office Visit : 5/10/17  Future Office visit:  11/14/17

## 2017-06-10 ENCOUNTER — HEALTH MAINTENANCE LETTER (OUTPATIENT)
Age: 37
End: 2017-06-10

## 2017-06-19 ENCOUNTER — RECORDS - HEALTHEAST (OUTPATIENT)
Dept: LAB | Facility: CLINIC | Age: 37
End: 2017-06-19

## 2017-06-19 LAB
CHOLEST SERPL-MCNC: 197 MG/DL
FASTING STATUS PATIENT QL REPORTED: ABNORMAL
HDLC SERPL-MCNC: 28 MG/DL
LDLC SERPL CALC-MCNC: 109 MG/DL
LDLC SERPL CALC-MCNC: ABNORMAL MG/DL
TRIGL SERPL-MCNC: 520 MG/DL

## 2017-07-24 ENCOUNTER — OFFICE VISIT - HEALTHEAST (OUTPATIENT)
Dept: SURGERY | Facility: CLINIC | Age: 37
End: 2017-07-24

## 2017-07-24 DIAGNOSIS — R73.03 PREDIABETES: ICD-10-CM

## 2017-07-24 DIAGNOSIS — T30.0 BURN: ICD-10-CM

## 2017-07-24 ASSESSMENT — MIFFLIN-ST. JEOR: SCORE: 2210.84

## 2017-07-25 ENCOUNTER — AMBULATORY - HEALTHEAST (OUTPATIENT)
Dept: SURGERY | Facility: CLINIC | Age: 37
End: 2017-07-25

## 2017-07-25 DIAGNOSIS — E66.01 MORBID OBESITY WITH BMI OF 70 AND OVER, ADULT (H): ICD-10-CM

## 2017-08-10 DIAGNOSIS — D35.2 PROLACTINOMA (H): ICD-10-CM

## 2017-08-10 LAB — PROLACTIN SERPL-MCNC: 38 UG/L (ref 3–27)

## 2017-08-10 PROCEDURE — 84146 ASSAY OF PROLACTIN: CPT | Performed by: INTERNAL MEDICINE

## 2017-08-18 ENCOUNTER — OFFICE VISIT - HEALTHEAST (OUTPATIENT)
Dept: SURGERY | Facility: CLINIC | Age: 37
End: 2017-08-18

## 2017-08-18 ENCOUNTER — AMBULATORY - HEALTHEAST (OUTPATIENT)
Dept: SURGERY | Facility: CLINIC | Age: 37
End: 2017-08-18

## 2017-08-18 DIAGNOSIS — R73.03 PREDIABETES: ICD-10-CM

## 2017-08-18 DIAGNOSIS — E66.01 OBESITY, MORBID, BMI 50 OR HIGHER (H): ICD-10-CM

## 2017-08-18 DIAGNOSIS — Z71.3 NUTRITIONAL COUNSELING: ICD-10-CM

## 2017-08-18 ASSESSMENT — MIFFLIN-ST. JEOR: SCORE: 2210.84

## 2017-08-21 ENCOUNTER — OFFICE VISIT - HEALTHEAST (OUTPATIENT)
Dept: SURGERY | Facility: CLINIC | Age: 37
End: 2017-08-21

## 2017-08-21 DIAGNOSIS — R73.03 PREDIABETES: ICD-10-CM

## 2017-08-21 ASSESSMENT — MIFFLIN-ST. JEOR: SCORE: 2206.31

## 2017-09-08 ENCOUNTER — COMMUNICATION - HEALTHEAST (OUTPATIENT)
Dept: SURGERY | Facility: CLINIC | Age: 37
End: 2017-09-08

## 2017-09-08 ENCOUNTER — AMBULATORY - HEALTHEAST (OUTPATIENT)
Dept: SURGERY | Facility: CLINIC | Age: 37
End: 2017-09-08

## 2017-09-08 DIAGNOSIS — R73.03 PREDIABETES: ICD-10-CM

## 2017-09-18 ENCOUNTER — RECORDS - HEALTHEAST (OUTPATIENT)
Dept: LAB | Facility: CLINIC | Age: 37
End: 2017-09-18

## 2017-09-18 LAB
CHOLEST SERPL-MCNC: 207 MG/DL
FASTING STATUS PATIENT QL REPORTED: ABNORMAL
HDLC SERPL-MCNC: 25 MG/DL
LDLC SERPL CALC-MCNC: 137 MG/DL
LDLC SERPL CALC-MCNC: ABNORMAL MG/DL
TRIGL SERPL-MCNC: 405 MG/DL

## 2017-10-25 ENCOUNTER — OFFICE VISIT - HEALTHEAST (OUTPATIENT)
Dept: VASCULAR SURGERY | Facility: CLINIC | Age: 37
End: 2017-10-25

## 2017-10-25 DIAGNOSIS — M21.072 ACQUIRED VALGUS DEFORMITY OF BOTH ANKLES: ICD-10-CM

## 2017-10-25 DIAGNOSIS — I87.303 VENOUS HYPERTENSION OF LOWER EXTREMITY, BILATERAL: ICD-10-CM

## 2017-10-25 DIAGNOSIS — M21.41 FLAT FEET, BILATERAL: ICD-10-CM

## 2017-10-25 DIAGNOSIS — M21.962 FOOT DEFORMITY, BILATERAL: ICD-10-CM

## 2017-10-25 DIAGNOSIS — M21.42 FLAT FEET, BILATERAL: ICD-10-CM

## 2017-10-25 DIAGNOSIS — M21.071 ACQUIRED VALGUS DEFORMITY OF BOTH ANKLES: ICD-10-CM

## 2017-10-25 DIAGNOSIS — I87.2 VENOUS INSUFFICIENCY OF BOTH LOWER EXTREMITIES: ICD-10-CM

## 2017-10-25 DIAGNOSIS — E66.01 MORBID OBESITY WITH BMI OF 70 AND OVER, ADULT (H): ICD-10-CM

## 2017-10-25 DIAGNOSIS — I89.0 LYMPHEDEMA: ICD-10-CM

## 2017-10-25 DIAGNOSIS — M21.961 FOOT DEFORMITY, BILATERAL: ICD-10-CM

## 2017-10-25 ASSESSMENT — MIFFLIN-ST. JEOR: SCORE: 2206.31

## 2017-10-26 ENCOUNTER — OFFICE VISIT - HEALTHEAST (OUTPATIENT)
Dept: SURGERY | Facility: CLINIC | Age: 37
End: 2017-10-26

## 2017-10-26 DIAGNOSIS — Z71.3 WEIGHT LOSS COUNSELING, ENCOUNTER FOR: ICD-10-CM

## 2017-10-26 ASSESSMENT — MIFFLIN-ST. JEOR: SCORE: 2199.5

## 2017-10-30 ENCOUNTER — OFFICE VISIT - HEALTHEAST (OUTPATIENT)
Dept: SURGERY | Facility: CLINIC | Age: 37
End: 2017-10-30

## 2017-10-30 DIAGNOSIS — E66.01 OBESITY, MORBID, BMI 50 OR HIGHER (H): ICD-10-CM

## 2017-10-30 DIAGNOSIS — Z71.3 NUTRITIONAL COUNSELING: ICD-10-CM

## 2017-10-30 ASSESSMENT — MIFFLIN-ST. JEOR: SCORE: 2201.77

## 2017-11-13 ENCOUNTER — COMMUNICATION - HEALTHEAST (OUTPATIENT)
Dept: VASCULAR SURGERY | Facility: CLINIC | Age: 37
End: 2017-11-13

## 2017-11-14 ENCOUNTER — OFFICE VISIT (OUTPATIENT)
Dept: ENDOCRINOLOGY | Facility: CLINIC | Age: 37
End: 2017-11-14

## 2017-11-14 ENCOUNTER — RECORDS - HEALTHEAST (OUTPATIENT)
Dept: ADMINISTRATIVE | Facility: OTHER | Age: 37
End: 2017-11-14

## 2017-11-14 VITALS
DIASTOLIC BLOOD PRESSURE: 83 MMHG | HEART RATE: 98 BPM | WEIGHT: 293 LBS | SYSTOLIC BLOOD PRESSURE: 126 MMHG | HEIGHT: 60 IN | BODY MASS INDEX: 57.52 KG/M2

## 2017-11-14 DIAGNOSIS — E28.319 EARLY MENOPAUSE: Primary | ICD-10-CM

## 2017-11-14 DIAGNOSIS — E03.9 ACQUIRED HYPOTHYROIDISM: ICD-10-CM

## 2017-11-14 DIAGNOSIS — D35.2 PROLACTINOMA (H): ICD-10-CM

## 2017-11-14 DIAGNOSIS — D35.2 PITUITARY ADENOMA (H): ICD-10-CM

## 2017-11-14 ASSESSMENT — PAIN SCALES - GENERAL: PAINLEVEL: NO PAIN (0)

## 2017-11-14 NOTE — PROGRESS NOTES
Reason for visit/consult: F/u on prolactinoma.    Primary care provider: Tae Garcia    HPI:  A 37 year old female with follow up prolactinoma, original diagnosis was made in 1997. Today is the third visit. The patient had a surgical resection in 2000 and subsequently treated with cabergoline.   Cabergoline was discontinued and her PRL became mildly elevated, and she has had amenorrhea for 3 years. We assumed possible small residual, we resumed cabergoline 0.5 mg BIW, which she is currenlty taking. Most recent PRL is trending down to 38.     She also has psychiatry issues, however,currently has been improving, with tapering psychiatry medications. No recent  Hospitalization for a year. Last admission was in 9/2016 for 3 weeks, psychiatry unit, abiliy to depakote. Lexapine will be tapering now.  Dr. Mohamud Simpson [facility] has been close following her and she is quite happy for the care.     She also has primary hypothyroidism, which is being replaced with levothyroxine 112 mcg daily.     Of note, she lost 22 lb. She is on OT started 3 moth, switcehd breakfast cut down mild, 2 eggs, and fruits. Also she was started on victoza.   [facility] bariatric clinic. Non surgeical dieat for 2 3- years now. Recently sent back to nutrition twice.     ENDO VITALS-[facility] 11/14/2017 5/10/2017 1/10/2017 7/6/2016   Weight 349 lb 9.6 oz 371 lb 6.4 oz 361 lb 8 oz      [facility]ALS[facility] 7/6/2016 3/14/2016   Weight 379 lb 6.4 oz 382 lb 3.2 oz       Past Medical/Surgical History:  Past Medical History:   Diagnosis Date     Acne rosacea      Bipolar disorder (H) 1992     GERD (gastroesophageal reflux disease)      Irritable bowel syndrome      Pituitary adenoma (H)     S/P trans nasal resection in 2000     Past Surgical History:   Procedure Laterality Date     pituitary tumor removal  2000    at the [facility]     septal plasty  1998     TONSILLECTOMY  1998       Allergies:  Allergies   Allergen Reactions     Fiorinal  [Butalbital-Aspirin-Caffeine] Anaphylaxis and Swelling     Ativan [Lorazepam]      Bee Venom      Ibuprofen Sodium      Eyes swell     Keflex [Cephalexin-Fd&C Yellow #6]      Eyes swell     Latex Swelling     Trileptal      numbness     Topamax Rash       Current Medications   Current Outpatient Prescriptions   Medication     levothyroxine (SYNTHROID/LEVOTHROID) 112 MCG tablet     LOXAPINE SUCCINATE PO     cabergoline (DOSTINEX) 0.5 MG tablet     divalproex (DEPAKOTE ER) 500 MG 24 hr tablet     doxycycline (VIBRAMYCIN) 100 MG capsule     FENOFIBRATE PO     furosemide (LASIX) 40 MG tablet     Multiple Vitamins-Minerals (CERTAVITE/ANTIOXIDANTS PO)     omeprazole (PRILOSEC) 40 MG capsule     No current facility-administered medications for this visit.        Family History:  Family History   Problem Relation Age of Onset     Thyroid Disease Mother      hypothyroidism     DIABETES Mother      Coronary Artery Disease No family hx of      Hypertension No family hx of      Hyperlipidemia No family hx of      CEREBROVASCULAR DISEASE No family hx of      Breast Cancer No family hx of      Colon Cancer No family hx of      Prostate Cancer No family hx of      Other Cancer No family hx of      Depression No family hx of      Anxiety Disorder No family hx of      MENTAL ILLNESS No family hx of      Substance Abuse No family hx of      Anesthesia Reaction No family hx of      Asthma No family hx of      OSTEOPOROSIS No family hx of      Genetic Disorder No family hx of      Obesity No family hx of      Unknown/Adopted No family hx of        Social History:  Social History   Substance Use Topics     Smoking status: Never Smoker     Smokeless tobacco: Never Used     Alcohol use No       ROS:  10 point negative except as mentioned in HPI    Exam  Blood pressure 126/83, pulse 98, height 1.524 m (5'), weight (!) 158.6 kg (349 lb 9.6 oz).  Gen: morbidly obese female in NAD  HEENT: anicteric, EOMI, no proptosis or lid lag  Thyroid: no  thyroid goiter or cervical LAD  Cardio: RRR, no m/r/g  Resp: CTAB. No wheezing or crackles  Abd: soft, NT/ND. Normal BS  Skin: Warm and dry. No rash or lesions.   Ext: no swelling or edema  Feet: no deformities or ulcers, 2+ DP pulses  Neuro: A&Ox3, relaxation phase of reflexes normal, no tremor on outstretched arms  Psych: Normal affect and mood.    Labs/Imaging  Brain MRI 1/2017  Impression:   1. Postsurgical changes from transsphenoidal resection of pituitary  adenoma without evidence of recurrence.  2. Single hyperintensity on FLAIR in the right semiovale similar to  prior study of questionable clinical significance.  3. Stable mild dural thickening and enhancement over the convexities  which is most likely postsurgical in nature. This could also be  related to CSF leak or intracranial hypotension. Total  TSH   Date Value Ref Range Status   01/10/2017 3.09 0.40 - 4.00 mU/L Final   07/06/2016 3.78 0.40 - 4.00 mU/L Final   01/06/2016 5.69 (H) 0.40 - 4.00 mU/L Final   07/01/2015 3.86 0.40 - 4.00 mU/L Final   01/02/2015 4.63 (H) 0.40 - 4.00 mU/L Final     Comment:     Effective 7/30/2014, the reference range for this assay has changed to reflect   new instrumentation/methodology.       T4 Free   Date Value Ref Range Status   01/10/2017 1.21 0.76 - 1.46 ng/dL Final   07/06/2016 1.32 0.76 - 1.46 ng/dL Final   01/06/2016 1.23 0.76 - 1.46 ng/dL Final   07/01/2015 1.24 0.76 - 1.46 ng/dL Final   01/02/2015 1.25 0.76 - 1.46 ng/dL Final     Comment:     Effective 7/30/2014, the reference range for this assay has changed to reflect   new instrumentation/methodology.     ]  Lab Results   Component Value Date    A1C 5.7 01/06/2016         Assessment and Plan  37 year old female with history of prolactinoma s/p resection and subsequent treatment of cabergoline. She was off of cabergoline therapy for many years and was recently restarted on it. She still has elevated level of prolactin, and still compalins amenorrhea.     -  Continue current cabergloin 0.5 mg BIW  - Repeat PRL, TSH, free T4 in 2/2018  - Continue current LT4 112 mcg  - Agree with victoza for weight loss (continue)  - Exercise to continue  - Still amenorrhea, will start calcium citrate plus D (630 mg Ca, D 500ID) for bone health  - RTC with me in 6 monh    We spent 45 minutes with this patient face to face and explained the conditions and plans (more than 50% of time was counseling/coordination of care, treatment plan of elevated prolactin) . The patient understood and is satisfied with today's visit. Return to clinic with me in 6 months.     Mandy Farfan MD  Staff Physician  Endocrinology and Metabolism  [facility]  License: [id number]  Pager: [phone]

## 2017-11-14 NOTE — LETTER
11/14/2017       RE: Bushra Buck  [address]     [place] [address]     Dear Colleague,    Thank you for referring your patient, Bushra Buck, to the [facility]. Please see a copy of my visit note below.    Reason for visit/consult: F/u on prolactinoma.    Primary care provider: Tae Garcia    HPI:  A 37 year old female with follow up prolactinoma, original diagnosis was made in 1997. Today is the third visit. The patient had a surgical resection in 2000 and subsequently treated with cabergoline.   Cabergoline was discontinued and her PRL became mildly elevated, and she has had amenorrhea for 3 years. We assumed possible small residual, we resumed cabergoline 0.5 mg BIW, which she is currenlty taking. Most recent PRL is trending down to 38.     She also has psychiatry issues, however,currently has been improving, with tapering psychiatry medications. No recent  Hospitalization for a year. Last admission was in 9/2016 for 3 weeks, psychiatry unit, abilify to depakote. Lexapine will be tapering now.  [facility] her and she is quite happy for the care.     She also has primary hypothyroidism, which is being replaced with levothyroxine 112 mcg daily.     Of note, she lost 22 lb. She is on OT started 3 moth, switcehd breakfast cut down mild, 2 eggs, and fruits. Also she was started on victoza.   [facility] bariatric clinic. Non surgeical dieat for 2 3- years now. Recently sent back to nutrition twice.     ENDO VITALS-[facility] 11/14/2017 5/10/2017 1/10/2017 7/6/2016   Weight 349 lb 9.6 oz 371 lb 6.4 oz 361 lb 8 oz      ENDO V[facility]ALS-[facility] 7/6/2016 3/14/2016   Weight 379 lb 6.4 oz 382 lb 3.2 oz       Past Medical/Surgical History:  Past Medical History:   Diagnosis Date     Acne rosacea      Bipolar disorder (H) 1992     GERD (gastroesophageal reflux disease)      Irritable  bowel syndrome      Pituitary adenoma (H)     S/P trans nasal resection in 2000     Past Surgical History:   Procedure Laterality Date     pituitary tumor removal  2000    at the [facility]     septal plasty  1998     TONSILLECTOMY  1998       Allergies:  Allergies   Allergen Reactions     Fiorinal [Butalbital-Aspirin-Caffeine] Anaphylaxis and Swelling     Ativan [Lorazepam]      Bee Venom      Ibuprofen Sodium      Eyes swell     Keflex [Cephalexin-Fd&C Yellow #6]      Eyes swell     Latex Swelling     Trileptal      numbness     Topamax Rash       Current Medications   Current Outpatient Prescriptions   Medication     levothyroxine (SYNTHROID/LEVOTHROID) 112 MCG tablet     LOXAPINE SUCCINATE PO     cabergoline (DOSTINEX) 0.5 MG tablet     divalproex (DEPAKOTE ER) 500 MG 24 hr tablet     doxycycline (VIBRAMYCIN) 100 MG capsule     FENOFIBRATE PO     furosemide (LASIX) 40 MG tablet     Multiple Vitamins-Minerals (CERTAVITE/ANTIOXIDANTS PO)     omeprazole (PRILOSEC) 40 MG capsule     No current facility-administered medications for this visit.        Family History:  Family History   Problem Relation Age of Onset     Thyroid Disease Mother      hypothyroidism     DIABETES Mother      Coronary Artery Disease No family hx of      Hypertension No family hx of      Hyperlipidemia No family hx of      CEREBROVASCULAR DISEASE No family hx of      Breast Cancer No family hx of      Colon Cancer No family hx of      Prostate Cancer No family hx of      Other Cancer No family hx of      Depression No family hx of      Anxiety Disorder No family hx of      MENTAL ILLNESS No family hx of      Substance Abuse No family hx of      Anesthesia Reaction No family hx of      Asthma No family hx of      OSTEOPOROSIS No family hx of      Genetic Disorder No family hx of      Obesity No family hx of      Unknown/Adopted No family hx of        Social History:  Social History   Substance Use Topics     Smoking status: Never Smoker     Smokeless  tobacco: Never Used     Alcohol use No       ROS:  10 point negative except as mentioned in HPI    Exam  Blood pressure 126/83, pulse 98, height 1.524 m (5'), weight (!) 158.6 kg (349 lb 9.6 oz).  Gen: morbidly obese female in NAD  HEENT: anicteric, EOMI, no proptosis or lid lag  Thyroid: no thyroid goiter or cervical LAD  Cardio: RRR, no m/r/g  Resp: CTAB. No wheezing or crackles  Abd: soft, NT/ND. Normal BS  Skin: Warm and dry. No rash or lesions.   Ext: no swelling or edema  Feet: no deformities or ulcers, 2+ DP pulses  Neuro: A&Ox3, relaxation phase of reflexes normal, no tremor on outstretched arms  Psych: Normal affect and mood.    Labs/Imaging  Brain MRI 1/2017  Impression:   1. Postsurgical changes from transsphenoidal resection of pituitary  adenoma without evidence of recurrence.  2. Single hyperintensity on FLAIR in the right semiovale similar to  prior study of questionable clinical significance.  3. Stable mild dural thickening and enhancement over the convexities  which is most likely postsurgical in nature. This could also be  related to CSF leak or intracranial hypotension. Total  TSH   Date Value Ref Range Status   01/10/2017 3.09 0.40 - 4.00 mU/L Final   07/06/2016 3.78 0.40 - 4.00 mU/L Final   01/06/2016 5.69 (H) 0.40 - 4.00 mU/L Final   07/01/2015 3.86 0.40 - 4.00 mU/L Final   01/02/2015 4.63 (H) 0.40 - 4.00 mU/L Final     Comment:     Effective 7/30/2014, the reference range for this assay has changed to reflect   new instrumentation/methodology.       T4 Free   Date Value Ref Range Status   01/10/2017 1.21 0.76 - 1.46 ng/dL Final   07/06/2016 1.32 0.76 - 1.46 ng/dL Final   01/06/2016 1.23 0.76 - 1.46 ng/dL Final   07/01/2015 1.24 0.76 - 1.46 ng/dL Final   01/02/2015 1.25 0.76 - 1.46 ng/dL Final     Comment:     Effective 7/30/2014, the reference range for this assay has changed to reflect   new instrumentation/methodology.     ]  Lab Results   Component Value Date    A1C 5.7 01/06/2016          Assessment and Plan  37 year old female with history of prolactinoma s/p resection and subsequent treatment of cabergoline. She was off of cabergoline therapy for many years and was recently restarted on it. She still has elevated level of prolactin, and still compalins amenorrhea.     - Continue current cabergloin 0.5 mg BIW  - Repeat PRL, TSH, free T4 in 2/2018  - Continue current LT4 112 mcg  - Agree with victoza for weight loss (continue)  - Exercise to continue  - Still amenorrhea, will start calcium citrate plus D (630 mg Ca, D 500ID) for bone health  - RTC with me in 6 monh    We spent 45 minutes with this patient face to face and explained the conditions and plans (more than 50% of time was counseling/coordination of care, treatment plan of elevated prolactin) . The patient understood and is satisfied with today's visit. Return to clinic with me in 6 months.     Mandy Farfan MD  Staff Physician  Endocrinology and Metabolism  [facility]  License: [place] [id number]  Pager: [phone]

## 2017-11-14 NOTE — MR AVS SNAPSHOT
After Visit Summary   11/14/2017    Bushra Buck    MRN: [id number]           Patient Information     Date Of Birth          1980        Visit Information        Provider Department      11/14/2017 1:30 PM Mandy Farfan MD [facility]        Today's Diagnoses     Early menopause    -  1    Pituitary adenoma (H)        Prolactinoma (H)           Follow-ups after your visit        Your next 10 appointments already scheduled     Feb 05, 2018 10:00 AM CST   LAB with  LAB   [facility] ([facility])    [address] [place] [id number] [phone]           Please do not eat 10-12 hours before your appointment if you are coming in fasting for labs on lipids, cholesterol, or glucose (sugar). This does not apply to pregnant women. Water, hot tea and black coffee (with nothing added) are okay. Do not drink other fluids, diet soda or chew gum.            May 15, 2018  2:00 PM CDT   (Arrive by 1:45 PM)   RETURN ENDOCRINE with Mandy Farfan MD   [facility] ([facility])    [address] [place] [address] [phone]              Who to contact     Please call your clinic at [phone] to:    Ask questions about your health    Make or cancel appointments    Discuss your medicines    Learn about your test results    Speak to your doctor   If you have compliments or concerns about an experience at your clinic, or if you wish to file a complaint, please contact [facility] Patient Relations at [phone] or email us at [email or web address]         Additional Information About Your Visit        MyChart Information     [facility] [employer] gives you secure access to your electronic health record. If you see a primary care provider, you can also send messages to your care team and make appointments. If you have questions, please call your primary  [facility] clinic.  If you do not have a primary care provider, please call [phone] and they will assist you.      [employer] is an electronic gateway that provides easy, online access to your medical records. With [employer], you can request a clinic appointment, read your test results, renew a prescription or communicate with your care team.     To access your existing account, please contact your [facility] or call [phone] for assistance.        Care EveryWhere ID     This is your Care EveryWhere ID. This could be used by other organizations to access your [place] medical records  [id number]        Your Vitals Were     Pulse Height BMI (Body Mass Index)             98 1.524 m (5') 68.28 kg/m2          Blood Pressure from Last 3 Encounters:   11/14/17 126/83   05/10/17 133/82   01/10/17 140/83    Weight from Last 3 Encounters:   11/14/17 (!) 158.6 kg (349 lb 9.6 oz)   05/10/17 (!) 168.5 kg (371 lb 6.4 oz)   01/10/17 (!) 164 kg (361 lb 8 oz)              Today, you had the following     No orders found for display         Today's Medication Changes          These changes are accurate as of: 11/14/17  2:21 PM.  If you have any questions, ask your nurse or doctor.               Start taking these medicines.        Dose/Directions    calcium citrate-vitamin D 315-250 MG-UNIT Tabs per tablet   Commonly known as:  CALCIUM CITRATE + D   Used for:  Early menopause        Dose:  2 tablet   Take 2 tablets by mouth daily   Quantity:  180 tablet   Refills:  3            Where to get your medicines      These medications were sent to [facility] - [place], [place] - [address]  [address], [place] [address]     Phone:  [phone]     calcium citrate-vitamin D 315-250 MG-UNIT Tabs per tablet                Primary Care Provider Office Phone # Fax #    Tae Garcia [phone]       [facility] [address] [facility] [address]         Equal Access to Services     [facility]byterian[facility]ATM[facility] : Hadii aad ku hadoliviadeangelo Vernacatherine, wadonaldda luqthad, qaybta cliffyulianagi kapoor. So [facility] [phone].    ATENCIÓN: Si habla español, tiene a murdock disposición servicios gratuitos de asistencia lingüística. Windyame al [phone].    We comply with applicable federal civil rights laws and [place] laws. We do not discriminate on the basis of race, color, national origin, age, disability, sex, sexual orientation, or gender identity.            Thank you!     Thank you for choosing [facility]  for your care. Our goal is always to provide you with excellent care. Hearing back from our patients is one way we can continue to improve our services. Please take a few minutes to complete the written survey that you may receive in the mail after your visit with us. Thank you!             Your Updated Medication List - Protect others around you: Learn how to safely use, store and throw away your medicines at www.disposemymeds.org.          This list is accurate as of: 11/14/17  2:21 PM.  Always use your most recent med list.                   Brand Name Dispense Instructions for use Diagnosis    cabergoline 0.5 MG tablet    DOSTINEX    8 tablet    Take 1 tablet (0.5 mg) by mouth twice a week    Pituitary adenoma (H), Prolactinoma (H)       calcium citrate-vitamin D 315-250 MG-UNIT Tabs per tablet    CALCIUM CITRATE + D    180 tablet    Take 2 tablets by mouth daily    Early menopause       CERTAVITE/ANTIOXIDANTS PO       Prolactinoma (H), Obesity, Abnormal weight gain       divalproex 500 MG 24 hr tablet    DEPAKOTE ER    30 tablet    Take 2 tablets (1,000 mg) by mouth daily    Prolactinoma (H)       doxycycline 100 MG capsule    VIBRAMYCIN     Take 1 capsule (100 mg) by mouth 2 times daily        FENOFIBRATE PO      Take 145 mg by mouth daily    Pituitary adenoma (H), Obesity       furosemide 40 MG tablet    LASIX    60 tablet    Take  40 mg by mouth daily    Pituitary adenoma (H), Obesity       levothyroxine 112 MCG tablet    SYNTHROID/LEVOTHROID    90 tablet    Take 1 tablet (112 mcg) by mouth daily    Pituitary adenoma (H)       LOXAPINE SUCCINATE PO      Take by mouth 2 times daily        priLOSEC 40 MG capsule   Generic drug:  omeprazole      Take 1 capsule by mouth daily.

## 2017-11-27 ENCOUNTER — OFFICE VISIT - HEALTHEAST (OUTPATIENT)
Dept: SURGERY | Facility: CLINIC | Age: 37
End: 2017-11-27

## 2017-11-27 DIAGNOSIS — E66.01 OBESITY, MORBID, BMI 50 OR HIGHER (H): ICD-10-CM

## 2017-11-27 DIAGNOSIS — Z71.3 NUTRITIONAL COUNSELING: ICD-10-CM

## 2017-11-27 ASSESSMENT — MIFFLIN-ST. JEOR: SCORE: 2224.45

## 2017-12-04 ENCOUNTER — MEDICAL CORRESPONDENCE (OUTPATIENT)
Dept: HEALTH INFORMATION MANAGEMENT | Facility: CLINIC | Age: 37
End: 2017-12-04

## 2017-12-11 ENCOUNTER — OFFICE VISIT - HEALTHEAST (OUTPATIENT)
Dept: SURGERY | Facility: CLINIC | Age: 37
End: 2017-12-11

## 2017-12-11 DIAGNOSIS — E66.01 MORBID OBESITY WITH BMI OF 60.0-69.9, ADULT (H): ICD-10-CM

## 2017-12-11 DIAGNOSIS — I89.0 LYMPHEDEMA: ICD-10-CM

## 2017-12-11 DIAGNOSIS — Z71.3 WEIGHT LOSS COUNSELING, ENCOUNTER FOR: ICD-10-CM

## 2017-12-11 ASSESSMENT — MIFFLIN-ST. JEOR: SCORE: 2210.84

## 2017-12-21 ENCOUNTER — RECORDS - HEALTHEAST (OUTPATIENT)
Dept: LAB | Facility: CLINIC | Age: 37
End: 2017-12-21

## 2017-12-21 LAB
CHOLEST SERPL-MCNC: 153 MG/DL
FASTING STATUS PATIENT QL REPORTED: ABNORMAL
HDLC SERPL-MCNC: 27 MG/DL
LDLC SERPL CALC-MCNC: 76 MG/DL
TRIGL SERPL-MCNC: 248 MG/DL

## 2017-12-31 ENCOUNTER — COMMUNICATION - HEALTHEAST (OUTPATIENT)
Dept: INTERNAL MEDICINE | Facility: CLINIC | Age: 37
End: 2017-12-31

## 2018-01-04 ENCOUNTER — COMMUNICATION - HEALTHEAST (OUTPATIENT)
Dept: VASCULAR SURGERY | Facility: CLINIC | Age: 38
End: 2018-01-04

## 2018-01-08 ENCOUNTER — OFFICE VISIT - HEALTHEAST (OUTPATIENT)
Dept: VASCULAR SURGERY | Facility: CLINIC | Age: 38
End: 2018-01-08

## 2018-01-08 ENCOUNTER — COMMUNICATION - HEALTHEAST (OUTPATIENT)
Dept: SURGERY | Facility: CLINIC | Age: 38
End: 2018-01-08

## 2018-01-08 DIAGNOSIS — I89.0 LYMPHEDEMA: ICD-10-CM

## 2018-01-08 DIAGNOSIS — I87.2 VENOUS INSUFFICIENCY OF BOTH LOWER EXTREMITIES: ICD-10-CM

## 2018-01-08 DIAGNOSIS — E66.01 MORBID OBESITY WITH BMI OF 60.0-69.9, ADULT (H): ICD-10-CM

## 2018-01-08 DIAGNOSIS — L29.9 ITCHING: ICD-10-CM

## 2018-02-22 ENCOUNTER — OFFICE VISIT - HEALTHEAST (OUTPATIENT)
Dept: SURGERY | Facility: CLINIC | Age: 38
End: 2018-02-22

## 2018-02-22 DIAGNOSIS — R55 FAINTING SPELL: ICD-10-CM

## 2018-02-22 DIAGNOSIS — I10 ESSENTIAL HYPERTENSION: ICD-10-CM

## 2018-02-22 DIAGNOSIS — F31.2 BIPOLAR AFFECTIVE DISORDER, CURRENT EPISODE MANIC WITH PSYCHOTIC SYMPTOMS (H): ICD-10-CM

## 2018-02-22 ASSESSMENT — MIFFLIN-ST. JEOR: SCORE: 2011.26

## 2018-02-28 ENCOUNTER — RECORDS - HEALTHEAST (OUTPATIENT)
Dept: LAB | Facility: CLINIC | Age: 38
End: 2018-02-28

## 2018-02-28 LAB
PROLACTIN SERPL-MCNC: 27.9 NG/ML (ref 0–20)
PROLACTIN: 27.9 MCG/L (ref 0–20)
T4 FREE SERPL-MCNC: 1.4 NG/DL (ref 0.7–1.8)
T4 FREE SERPL-MCNC: 1.4 NG/DL (ref 0.7–1.8)
TSH SERPL DL<=0.005 MIU/L-ACNC: 2.8 UIU/ML (ref 0.3–5)
TSH SERPL-ACNC: 2.8 MCU/ML (ref 0.3–5)

## 2018-03-05 ENCOUNTER — COMMUNICATION - HEALTHEAST (OUTPATIENT)
Dept: SURGERY | Facility: CLINIC | Age: 38
End: 2018-03-05

## 2018-03-05 DIAGNOSIS — E11.9 DIABETES (H): ICD-10-CM

## 2018-03-08 ENCOUNTER — RECORDS - HEALTHEAST (OUTPATIENT)
Dept: LAB | Facility: CLINIC | Age: 38
End: 2018-03-08

## 2018-03-08 LAB — PROLACTIN SERPL-MCNC: 30.1 NG/ML (ref 0–20)

## 2018-03-21 ENCOUNTER — TRANSFERRED RECORDS (OUTPATIENT)
Dept: HEALTH INFORMATION MANAGEMENT | Facility: CLINIC | Age: 38
End: 2018-03-21

## 2018-03-21 ENCOUNTER — RECORDS - HEALTHEAST (OUTPATIENT)
Dept: LAB | Facility: CLINIC | Age: 38
End: 2018-03-21

## 2018-03-24 LAB — BACTERIA SPEC CULT: ABNORMAL

## 2018-04-05 ENCOUNTER — RECORDS - HEALTHEAST (OUTPATIENT)
Dept: LAB | Facility: CLINIC | Age: 38
End: 2018-04-05

## 2018-04-05 LAB
ANION GAP SERPL CALCULATED.3IONS-SCNC: 10 MMOL/L (ref 5–18)
BUN SERPL-MCNC: 23 MG/DL (ref 8–22)
CALCIUM SERPL-MCNC: 9.5 MG/DL (ref 8.5–10.5)
CHLORIDE BLD-SCNC: 108 MMOL/L (ref 98–107)
CO2 SERPL-SCNC: 22 MMOL/L (ref 22–31)
CREAT SERPL-MCNC: 0.85 MG/DL (ref 0.6–1.1)
GFR SERPL CREATININE-BSD FRML MDRD: >60 ML/MIN/1.73M2
GLUCOSE BLD-MCNC: 77 MG/DL (ref 70–125)
MAGNESIUM SERPL-MCNC: 1.8 MG/DL (ref 1.8–2.6)
POTASSIUM BLD-SCNC: 4.3 MMOL/L (ref 3.5–5)
SODIUM SERPL-SCNC: 140 MMOL/L (ref 136–145)

## 2018-04-11 ENCOUNTER — OFFICE VISIT - HEALTHEAST (OUTPATIENT)
Dept: VASCULAR SURGERY | Facility: CLINIC | Age: 38
End: 2018-04-11

## 2018-04-11 DIAGNOSIS — E66.01 MORBID OBESITY WITH BMI OF 60.0-69.9, ADULT (H): ICD-10-CM

## 2018-04-11 DIAGNOSIS — F25.9 SCHIZOPHRENIA, SCHIZOAFFECTIVE, CHRONIC WITH ACUTE EXACERBATION (H): ICD-10-CM

## 2018-04-11 DIAGNOSIS — I87.303 VENOUS HYPERTENSION OF LOWER EXTREMITY, BILATERAL: ICD-10-CM

## 2018-04-11 DIAGNOSIS — M21.072 ACQUIRED VALGUS DEFORMITY OF BOTH ANKLES: ICD-10-CM

## 2018-04-11 DIAGNOSIS — I89.0 LYMPHEDEMA: ICD-10-CM

## 2018-04-11 DIAGNOSIS — I87.2 VENOUS INSUFFICIENCY OF BOTH LOWER EXTREMITIES: ICD-10-CM

## 2018-04-11 DIAGNOSIS — M21.071 ACQUIRED VALGUS DEFORMITY OF BOTH ANKLES: ICD-10-CM

## 2018-04-17 ENCOUNTER — OFFICE VISIT (OUTPATIENT)
Dept: ENDOCRINOLOGY | Facility: CLINIC | Age: 38
End: 2018-04-17
Payer: MEDICARE

## 2018-04-17 ENCOUNTER — RECORDS - HEALTHEAST (OUTPATIENT)
Dept: ADMINISTRATIVE | Facility: OTHER | Age: 38
End: 2018-04-17

## 2018-04-17 VITALS
SYSTOLIC BLOOD PRESSURE: 137 MMHG | DIASTOLIC BLOOD PRESSURE: 63 MMHG | HEART RATE: 107 BPM | WEIGHT: 293 LBS | BODY MASS INDEX: 63.45 KG/M2

## 2018-04-17 DIAGNOSIS — D35.2 PROLACTINOMA (H): Primary | ICD-10-CM

## 2018-04-17 DIAGNOSIS — N91.2 AMENORRHEA: ICD-10-CM

## 2018-04-17 DIAGNOSIS — E03.9 HYPOTHYROIDISM, UNSPECIFIED TYPE: ICD-10-CM

## 2018-04-17 DIAGNOSIS — E66.01 MORBID OBESITY (H): ICD-10-CM

## 2018-04-17 RX ORDER — SOD CHLOR,BICARB/SQUEEZ BOTTLE
1 PACKET, WITH RINSE DEVICE NASAL DAILY
Qty: 200 EACH | Refills: 3 | Status: SHIPPED | OUTPATIENT
Start: 2018-04-17 | End: 2023-11-21

## 2018-04-17 ASSESSMENT — PAIN SCALES - GENERAL: PAINLEVEL: NO PAIN (0)

## 2018-04-17 NOTE — PATIENT INSTRUCTIONS
We will call you in May to schedule your follow up for November, 2018.        To expedite your medication refill(s), please contact your pharmacy and have them fax a refill request to: [phone].  *Please allow 3 business days for routine medication refills.  *Please allow 5 business days for controlled substance medication refills.  --------------------  For scheduling appointments (including lab work), please request an appointment through [employer], or call: [phone].    For questions for your provider or the endocrine nurse, please send a [employer] message.  For after-hours urgent issues, please dial [phone], and ask to speak with the Endocrinologist On-Call.  --------------------  Please Note: If you are active on [employer], all future test results will be sent by [employer] message only and will no longer be sent by mail. You may also receive communication directly from your physician.

## 2018-04-17 NOTE — PROGRESS NOTES
--------------   Endocrinology Follow up ------------    Reason for visit/consult: F/u on prolactinoma.    Primary care provider: Tae Garcia    HPI: A 38 year old female with follow up prolactinoma, original diagnosis was made in 1997. Today is the 4th visit. The patient had a surgical resection in 2000 and subsequently treated with cabergoline.   Patient had been seen by Dr. CLINTON Moran before (since 2002). In 2012, cabergoline was discontinued and her PRL became mildly elevated (PRL 50-60s), and she has had amenorrhea for 3 years. Although official radiology report did not mention residual tumor, we assumed possible small residual, we resumed cabergoline 0.5 mg BIW, which she is currenlty taking. Most recent PRL is trending down since then and most recent level was 27.9.     She also has psychiatry issues, however,currently has been improving, with tapering psychiatry medications. No recent  Hospitalization for a year.     She also has primary hypothyroidism, which is being replaced with levothyroxine 112 mcg daily.     Of note, she lost 35 lb since started Victoza, which was statrted at [facility] bariatric clinic. She is on OT started 3 moth, switcehd breakfast cut down mild, 2 eggs, and fruits.     Denied HA, dizziness, breast tenderness, galactorrhea.   She is still amenorrhea.     ENDO VITALS-[facility] 4/17/2018 11/14/2017 5/10/2017 1/10/2017   Weight 324 lb 14.4 oz 349 lb 9.6 oz 371 lb 6.4 oz 361 lb 8 oz           Past Medical/Surgical History:  Past Medical History:   Diagnosis Date     Acne rosacea      Bipolar disorder (H) 1992     GERD (gastroesophageal reflux disease)      Irritable bowel syndrome      Pituitary adenoma (H)     S/P trans nasal resection in 2000     Past Surgical History:   Procedure Laterality Date     pituitary tumor removal  2000    at the [facility]     septal plasty  1998     TONSILLECTOMY  1998       Allergies:  Allergies   Allergen Reactions      Fiorinal [Butalbital-Aspirin-Caffeine] Anaphylaxis and Swelling     Ativan [Lorazepam]      Bee Venom      Ibuprofen Sodium      Eyes swell     Keflex [Cephalexin-Fd&C Yellow #6]      Eyes swell     Latex Swelling     Trileptal      numbness     Topamax Rash       Current Medications   Current Outpatient Prescriptions   Medication     calcium citrate-vitamin D (CALCIUM CITRATE + D) 315-250 MG-UNIT TABS per tablet     levothyroxine (SYNTHROID/LEVOTHROID) 112 MCG tablet     LOXAPINE SUCCINATE PO     cabergoline (DOSTINEX) 0.5 MG tablet     divalproex (DEPAKOTE ER) 500 MG 24 hr tablet     doxycycline (VIBRAMYCIN) 100 MG capsule     FENOFIBRATE PO     furosemide (LASIX) 40 MG tablet     Multiple Vitamins-Minerals (CERTAVITE/ANTIOXIDANTS PO)     omeprazole (PRILOSEC) 40 MG capsule     No current facility-administered medications for this visit.        Family History:  Family History   Problem Relation Age of Onset     Thyroid Disease Mother      hypothyroidism     DIABETES Mother      Coronary Artery Disease No family hx of      Hypertension No family hx of      Hyperlipidemia No family hx of      CEREBROVASCULAR DISEASE No family hx of      Breast Cancer No family hx of      Colon Cancer No family hx of      Prostate Cancer No family hx of      Other Cancer No family hx of      Depression No family hx of      Anxiety Disorder No family hx of      MENTAL ILLNESS No family hx of      Substance Abuse No family hx of      Anesthesia Reaction No family hx of      Asthma No family hx of      OSTEOPOROSIS No family hx of      Genetic Disorder No family hx of      Obesity No family hx of      Unknown/Adopted No family hx of        Social History:  Social History   Substance Use Topics     Smoking status: Never Smoker     Smokeless tobacco: Never Used     Alcohol use No       ROS:  10 point negative except as mentioned in HPI    Exam    ENDO VITALS-[facility] 4/17/2018 11/14/2017 5/10/2017 1/10/2017   Weight 147.374 kg 158.578 kg  168.466 kg 163.975 kg     ENDO VITALS-[facility] 7/6/2016 7/6/2016 3/14/2016   Weight  172.095 kg 173.365 kg     Blood pressure 137/63, pulse 107, weight 147.4 kg (324 lb 14.4 oz).  Gen: morbidly obese female in NAD  HEENT: anicteric, EOMI, no proptosis or lid lag  Visual field: intact  Occular motor: full  Thyroid: no thyroid goiter or cervical LAD  Cardio: RRR, no m/r/g  Resp: CTAB. No wheezing or crackles  Abd: soft, NT/ND. Normal BS  Skin: Warm and dry. No rash or lesions.   Legs: bilateral swelling/enlarged, with soft skins, non-pitting.  Feet: no deformities or ulcers, 2+ DP pulses  Neuro: A&Ox3, relaxation phase of reflexes normal, no tremor on outstretched arms  Psych: Normal affect and mood.    Labs/Imaging  Brain MRI 1/2017  Impression:   1. Postsurgical changes from transsphenoidal resection of pituitary  adenoma without evidence of recurrence.  2. Single hyperintensity on FLAIR in the right semiovale similar to  prior study of questionable clinical significance.  3. Stable mild dural thickening and enhancement over the convexities  which is most likely postsurgical in nature. This could also be  related to CSF leak or intracranial hypotension. Total  TSH   Date Value Ref Range Status   02/28/2018 2.80 0.30 - 5.00 mcU/mL Final   01/10/2017 3.09 0.40 - 4.00 mU/L Final   07/06/2016 3.78 0.40 - 4.00 mU/L Final   01/06/2016 5.69 (H) 0.40 - 4.00 mU/L Final   07/01/2015 3.86 0.40 - 4.00 mU/L Final     T4 Free   Date Value Ref Range Status   02/28/2018 1.4 0.7 - 1.8 ng/dL Final   01/10/2017 1.21 0.76 - 1.46 ng/dL Final   07/06/2016 1.32 0.76 - 1.46 ng/dL Final   01/06/2016 1.23 0.76 - 1.46 ng/dL Final   07/01/2015 1.24 0.76 - 1.46 ng/dL Final   ]  Lab Results   Component Value Date    A1C 5.7 01/06/2016         Assessment and Plan  37 year old female with history of prolactinoma s/p resection 2000 and subsequent treatment of cabergoline, restarted in 2017 for persistent levels, amenorrhea, fatigue, weight gain.  Currently decreasing levels. Still has amenorrhea, but fatigue resolved, and losing weight.    # Prolactinoma  Restarted cabergoline, decreasing levels. Still has amenorrhhea but fatigue resolved and losing weight, not worsening psychiatric symptoms.     - Continue current cabergloin 0.5 mg BIW  - Repeat PRL, TSH, free T4 prior to next visit.    # Amenorrhea  Still not resolved for the past 3 years, will continue to watch with PRL level for now, since she does not have actual plan of fertility for now.     # Hypothyroidism  Currently euthyroid with LT4 112 mcg (TSH 2.8, free T4 1.4).  - Continue current LT4 112 mcg    # Obesity  Significantly decreasing weight, likely due to Victoza and amybe effects of improving high PRL.  - Continue Victoza   - Encouraged exercise/walking    # Bone health  - calcium citrate plus D (630 mg Ca, D 500ID) for bone health    - RTC with me in 7 monh    We spent 45 minutes with this patient face to face and explained the conditions and plans (more than 50% of time was counseling/coordination of care, treatment plan of elevated prolactin, went over the trends of prolactin levels and made summary for patient to understand) . The patient understood and is satisfied with today's visit. Return to clinic with me in 7 months.     Mandy Farfan MD  Staff Physician  Endocrinology and Metabolism  [facility]  License: [place] [id number]  Pager: [phone]

## 2018-04-17 NOTE — LETTER
4/17/2018       RE: Bushra Buck  [address]    [place] [address]     Dear Colleague,    Thank you for referring your patient, Bushra Buck, to the [facility] ENDOCRINOLOG[facility] at [facility]. Please see a copy of my visit note below.                                        --------------   Endocrinology Follow up ------------    Reason for visit/consult: F/u on prolactinoma.    Primary care provider: Tae Garcia    HPI: A 38 year old female with follow up prolactinoma, original diagnosis was made in 1997. Today is the 4th visit. The patient had a surgical resection in 2000 and subsequently treated with cabergoline.   Patient had been seen by Dr. CLINTON Moran before (since 2002). In 2012, cabergoline was discontinued and her PRL became mildly elevated (PRL 50-60s), and she has had amenorrhea for 3 years. Although official radiology report did not mention residual tumor, we assumed possible small residual, we resumed cabergoline 0.5 mg BIW, which she is currenlty taking. Most recent PRL is trending down since then and most recent level was 27.9.     She also has psychiatry issues, however,currently has been improving, with tapering psychiatry medications. No recent  Hospitalization for a year.     She also has primary hypothyroidism, which is being replaced with levothyroxine 112 mcg daily.     Of note, she lost 35 lb since started Victoza, which was statrted at [facility] bariatric clinic. She is on OT started 3 moth, switcehd breakfast cut down mild, 2 eggs, and fruits.     Denied HA, dizziness, breast tenderness, galactorrhea.   She is still amenorrhea.     ENDO VITALS-[facility] 4/17/2018 11/14/2017 5/10/2017 1/10/2017   Weight 324 lb 14.4 oz 349 lb 9.6 oz 371 lb 6.4 oz 361 lb 8 oz           Past Medical/Surgical History:  Past Medical History:   Diagnosis Date     Acne rosacea      Bipolar disorder (H) 1992     GERD (gastroesophageal reflux disease)       Irritable bowel syndrome      Pituitary adenoma (H)     S/P trans nasal resection in 2000     Past Surgical History:   Procedure Laterality Date     pituitary tumor removal  2000    at the [facility]     septal plasty  1998     TONSILLECTOMY  1998       Allergies:  Allergies   Allergen Reactions     Fiorinal [Butalbital-Aspirin-Caffeine] Anaphylaxis and Swelling     Ativan [Lorazepam]      Bee Venom      Ibuprofen Sodium      Eyes swell     Keflex [Cephalexin-Fd&C Yellow #6]      Eyes swell     Latex Swelling     Trileptal      numbness     Topamax Rash       Current Medications   Current Outpatient Prescriptions   Medication     calcium citrate-vitamin D (CALCIUM CITRATE + D) 315-250 MG-UNIT TABS per tablet     levothyroxine (SYNTHROID/LEVOTHROID) 112 MCG tablet     LOXAPINE SUCCINATE PO     cabergoline (DOSTINEX) 0.5 MG tablet     divalproex (DEPAKOTE ER) 500 MG 24 hr tablet     doxycycline (VIBRAMYCIN) 100 MG capsule     FENOFIBRATE PO     furosemide (LASIX) 40 MG tablet     Multiple Vitamins-Minerals (CERTAVITE/ANTIOXIDANTS PO)     omeprazole (PRILOSEC) 40 MG capsule     No current facility-administered medications for this visit.        Family History:  Family History   Problem Relation Age of Onset     Thyroid Disease Mother      hypothyroidism     DIABETES Mother      Coronary Artery Disease No family hx of      Hypertension No family hx of      Hyperlipidemia No family hx of      CEREBROVASCULAR DISEASE No family hx of      Breast Cancer No family hx of      Colon Cancer No family hx of      Prostate Cancer No family hx of      Other Cancer No family hx of      Depression No family hx of      Anxiety Disorder No family hx of      MENTAL ILLNESS No family hx of      Substance Abuse No family hx of      Anesthesia Reaction No family hx of      Asthma No family hx of      OSTEOPOROSIS No family hx of      Genetic Disorder No family hx of      Obesity No family hx of      Unknown/Adopted No family hx of        Social  History:  Social History   Substance Use Topics     Smoking status: Never Smoker     Smokeless tobacco: Never Used     Alcohol use No       ROS:  10 point negative except as mentioned in HPI    Exam    ENDO VITALS[facility] 4/17/2018 11/14/2017 5/10/2017 1/10/2017   Weight 147.374 kg 158.578 kg 168.466 kg 163.975 kg     ENDO VITALS[facility] 7/6/2016 7/6/2016 3/14/2016   Weight  172.095 kg 173.365 kg     Blood pressure 137/63, pulse 107, weight 147.4 kg (324 lb 14.4 oz).  Gen: morbidly obese female in NAD  HEENT: anicteric, EOMI, no proptosis or lid lag  Visual field: intact  Occular motor: full  Thyroid: no thyroid goiter or cervical LAD  Cardio: RRR, no m/r/g  Resp: CTAB. No wheezing or crackles  Abd: soft, NT/ND. Normal BS  Skin: Warm and dry. No rash or lesions.   Legs: bilateral swelling/enlarged, with soft skins, non-pitting.  Feet: no deformities or ulcers, 2+ DP pulses  Neuro: A&Ox3, relaxation phase of reflexes normal, no tremor on outstretched arms  Psych: Normal affect and mood.    Labs/Imaging  Brain MRI 1/2017  Impression:   1. Postsurgical changes from transsphenoidal resection of pituitary  adenoma without evidence of recurrence.  2. Single hyperintensity on FLAIR in the right semiovale similar to  prior study of questionable clinical significance.  3. Stable mild dural thickening and enhancement over the convexities  which is most likely postsurgical in nature. This could also be  related to CSF leak or intracranial hypotension. Total  TSH   Date Value Ref Range Status   02/28/2018 2.80 0.30 - 5.00 mcU/mL Final   01/10/2017 3.09 0.40 - 4.00 mU/L Final   07/06/2016 3.78 0.40 - 4.00 mU/L Final   01/06/2016 5.69 (H) 0.40 - 4.00 mU/L Final   07/01/2015 3.86 0.40 - 4.00 mU/L Final     T4 Free   Date Value Ref Range Status   02/28/2018 1.4 0.7 - 1.8 ng/dL Final   01/10/2017 1.21 0.76 - 1.46 ng/dL Final   07/06/2016 1.32 0.76 - 1.46 ng/dL Final   01/06/2016 1.23 0.76 - 1.46 ng/dL Final   07/01/2015 1.24 0.76 - 1.46  ng/dL Final   ]  Lab Results   Component Value Date    A1C 5.7 01/06/2016         Assessment and Plan  37 year old female with history of prolactinoma s/p resection 2000 and subsequent treatment of cabergoline, restarted in 2017 for persistent levels, amenorrhea, fatigue, weight gain. Currently decreasing levels. Still has amenorrhea, but fatigue resolved, and losing weight.    # Prolactinoma  Restarted cabergoline, decreasing levels. Still has amenorrhhea but fatigue resolved and losing weight, not worsening psychiatric symptoms.     - Continue current cabergloin 0.5 mg BIW  - Repeat PRL, TSH, free T4 prior to next visit.    # Amenorrhea  Still not resolved for the past 3 years, will continue to watch with PRL level for now, since she does not have actual plan of fertility for now.     # Hypothyroidism  Currently euthyroid with LT4 112 mcg (TSH 2.8, free T4 1.4).  - Continue current LT4 112 mcg    # Obesity  Significantly decreasing weight, likely due to Victoza and amybe effects of improving high PRL.  - Continue Victoza   - Encouraged exercise/walking    # Bone health  - calcium citrate plus D (630 mg Ca, D 500ID) for bone health    - RTC with me in 7 monh    We spent 45 minutes with this patient face to face and explained the conditions and plans (more than 50% of time was counseling/coordination of care, treatment plan of elevated prolactin, went over the trends of prolactin levels and made summary for patient to understand) . The patient understood and is satisfied with today's visit. Return to clinic with me in 7 months.     Mandy Farfan MD  Staff Physician  Endocrinology and Metabolism  [facility]  License: [place] [id number]  Pager: [phone]

## 2018-04-17 NOTE — MR AVS SNAPSHOT
After Visit Summary   4/17/2018    Bushra Buck    MRN: [id number]           Patient Information     Date Of Birth          1980        Visit Information        Provider Department      4/17/2018 4:30 PM Mandy Farfan MD [facility]        Today's Diagnoses     Chronic sinusitis, unspecified location    -  1    Prolactinoma (H)          Care Instructions    We will call you in May to schedule your follow up for November, 2018.        To expedite your medication refill(s), please contact your pharmacy and have them fax a refill request to: [phone].  *Please allow 3 business days for routine medication refills.  *Please allow 5 business days for controlled substance medication refills.  --------------------  For scheduling appointments (including lab work), please request an appointment through [employer], or call: [phone].    For questions for your provider or the endocrine nurse, please send a [employer] message.  For after-hours urgent issues, please dial [phone], and ask to speak with the Endocrinologist On-Call.  --------------------  Please Note: If you are active on [employer], all future test results will be sent by [employer] message only and will no longer be sent by mail. You may also receive communication directly from your physician.            Follow-ups after your visit        Your next 10 appointments already scheduled     May 15, 2018  2:00 PM CDT   (Arrive by 1:45 PM)   RETURN ENDOCRINE with Mandy Farfan MD   [facility] ([facility])    [address] [place] [id number] [phone]              Future tests that were ordered for you today     Open Future Orders        Priority Expected Expires Ordered    Prolactin Routine  4/17/2019 4/17/2018            Who to contact     Please call your clinic at [phone] to:    Ask questions about your health    Make or cancel appointments    Discuss  your medicines    Learn about your test results    Speak to your doctor            Additional Information About Your Visit        [email or web address] Information     [employer] gives you secure access to your electronic health record. If you see a primary care provider, you can also send messages to your care team and make appointments. If you have questions, please call your primary care clinic.  If you do not have a primary care provider, please call [phone] and they will assist you.      [employer] is an electronic gateway that provides easy, online access to your medical records. With [employer], you can request a clinic appointment, read your test results, renew a prescription or communicate with your care team.     To access your existing account, please contact your [facility] or call [phone] for assistance.        Care EveryWhere ID     This is your Care EveryWhere ID. This could be used by other organizations to access your [facility] medical records  [id number]        Your Vitals Were     Pulse BMI (Body Mass Index)                107 63.45 kg/m2           Blood Pressure from Last 3 Encounters:   04/17/18 137/63   11/14/17 126/83   05/10/17 133/82    Weight from Last 3 Encounters:   04/17/18 147.4 kg (324 lb 14.4 oz)   11/14/17 (!) 158.6 kg (349 lb 9.6 oz)   05/10/17 (!) 168.5 kg (371 lb 6.4 oz)                 Today's Medication Changes          These changes are accurate as of 4/17/18  5:20 PM.  If you have any questions, ask your nurse or doctor.               Start taking these medicines.        Dose/Directions    SINUS RINSE KIT Pack   Used for:  Chronic sinusitis, unspecified location   Started by:  Mandy Farfan MD        Dose:  1 packet   Spray 1 packet in nostril daily   Quantity:  200 each   Refills:  3            Where to get your medicines      These medications were sent to [address]  [address] [place] [address]  [address], [facility] [address]    Hours:  TRANSPLANT PHONE NUMBER [phone] Phone:  [phone]     SINUS RINSE KIT Pack                Primary Care Provider Office Phone # Fax #    Tae Garcia [phone]       [facility] [address] [facility] [address]        Equal Access to Services     WILL OCONNELL : Hadii aad ku hadasho Soomaali, waaxda luqadaha, qaybta kaalmada adeegyada, waxay katherinein hayjudahn ron navarrocoltjose a snow . So [facility] [phone].    ATENCIÓN: Si habla español, tiene a murdock disposición servicios gratuitos de asistencia lingüística. Vanda al [phone].    We comply with applicable federal civil rights laws and [place] laws. We do not discriminate on the basis of race, color, national origin, age, disability, sex, sexual orientation, or gender identity.            Thank you!     Thank you for choosing [facility]  for your care. Our goal is always to provide you with excellent care. Hearing back from our patients is one way we can continue to improve our services. Please take a few minutes to complete the written survey that you may receive in the mail after your visit with us. Thank you!             Your Updated Medication List - Protect others around you: Learn how to safely use, store and throw away your medicines at www.disposemymeds.org.          This list is accurate as of 4/17/18  5:20 PM.  Always use your most recent med list.                   Brand Name Dispense Instructions for use Diagnosis    cabergoline 0.5 MG tablet    DOSTINEX    8 tablet    Take 1 tablet (0.5 mg) by mouth twice a week    Pituitary adenoma (H), Prolactinoma (H)       calcium citrate-vitamin D 315-250 MG-UNIT Tabs per tablet    CALCIUM CITRATE + D    180 tablet    Take 2 tablets by mouth daily    Early menopause       CERTAVITE/ANTIOXIDANTS PO       Prolactinoma (H), Obesity, Abnormal weight gain       divalproex sodium extended-release 500 MG 24 hr tablet     DEPAKOTE ER    30 tablet    Take 2 tablets (1,000 mg) by mouth daily    Prolactinoma (H)       doxycycline 100 MG capsule    VIBRAMYCIN     Take 1 capsule (100 mg) by mouth 2 times daily        FENOFIBRATE PO      Take 145 mg by mouth daily    Pituitary adenoma (H), Obesity       furosemide 40 MG tablet    LASIX    60 tablet    Take 40 mg by mouth daily    Pituitary adenoma (H), Obesity       levothyroxine 112 MCG tablet    SYNTHROID/LEVOTHROID    90 tablet    Take 1 tablet (112 mcg) by mouth daily    Pituitary adenoma (H)       LOXAPINE SUCCINATE PO      Take by mouth 2 times daily        priLOSEC 40 MG capsule   Generic drug:  omeprazole      Take 1 capsule by mouth daily.        SINUS RINSE KIT Pack     200 each    Spray 1 packet in nostril daily    Chronic sinusitis, unspecified location

## 2018-05-04 ENCOUNTER — COMMUNICATION - HEALTHEAST (OUTPATIENT)
Dept: VASCULAR SURGERY | Facility: CLINIC | Age: 38
End: 2018-05-04

## 2018-05-04 ENCOUNTER — AMBULATORY - HEALTHEAST (OUTPATIENT)
Dept: VASCULAR SURGERY | Facility: CLINIC | Age: 38
End: 2018-05-04

## 2018-05-04 DIAGNOSIS — I87.2 VENOUS INSUFFICIENCY OF BOTH LOWER EXTREMITIES: ICD-10-CM

## 2018-05-07 ENCOUNTER — COMMUNICATION - HEALTHEAST (OUTPATIENT)
Dept: VASCULAR SURGERY | Facility: CLINIC | Age: 38
End: 2018-05-07

## 2018-05-07 ENCOUNTER — OFFICE VISIT - HEALTHEAST (OUTPATIENT)
Dept: SURGERY | Facility: CLINIC | Age: 38
End: 2018-05-07

## 2018-05-07 DIAGNOSIS — Z71.3 NUTRITIONAL COUNSELING: ICD-10-CM

## 2018-05-07 DIAGNOSIS — Z87.898 HISTORY OF PREDIABETES: ICD-10-CM

## 2018-05-07 DIAGNOSIS — I89.0 LYMPHEDEMA: ICD-10-CM

## 2018-05-07 DIAGNOSIS — I87.2 VENOUS INSUFFICIENCY OF BOTH LOWER EXTREMITIES: ICD-10-CM

## 2018-05-07 DIAGNOSIS — E66.01 MORBID OBESITY WITH BMI OF 60.0-69.9, ADULT (H): ICD-10-CM

## 2018-05-07 DIAGNOSIS — E66.01 OBESITY, MORBID, BMI 50 OR HIGHER (H): ICD-10-CM

## 2018-05-07 ASSESSMENT — MIFFLIN-ST. JEOR: SCORE: 2058.89

## 2018-05-31 ENCOUNTER — RECORDS - HEALTHEAST (OUTPATIENT)
Dept: LAB | Facility: CLINIC | Age: 38
End: 2018-05-31

## 2018-05-31 LAB — POTASSIUM BLD-SCNC: 4.1 MMOL/L (ref 3.5–5)

## 2018-06-04 ENCOUNTER — DOCUMENTATION ONLY (OUTPATIENT)
Dept: OTHER | Facility: CLINIC | Age: 38
End: 2018-06-04

## 2018-06-04 PROBLEM — Z71.89 ADVANCED DIRECTIVES, COUNSELING/DISCUSSION: Chronic | Status: ACTIVE | Noted: 2018-06-04

## 2018-06-06 ENCOUNTER — ANESTHESIA - HEALTHEAST (OUTPATIENT)
Dept: SURGERY | Facility: CLINIC | Age: 38
End: 2018-06-06

## 2018-06-07 ENCOUNTER — SURGERY - HEALTHEAST (OUTPATIENT)
Dept: SURGERY | Facility: CLINIC | Age: 38
End: 2018-06-07

## 2018-06-07 ASSESSMENT — MIFFLIN-ST. JEOR: SCORE: 2097.44

## 2018-06-27 ENCOUNTER — TELEPHONE (OUTPATIENT)
Dept: ENDOCRINOLOGY | Facility: CLINIC | Age: 38
End: 2018-06-27

## 2018-06-27 DIAGNOSIS — D35.2 PITUITARY ADENOMA (H): ICD-10-CM

## 2018-06-27 DIAGNOSIS — D35.2 PROLACTINOMA (H): ICD-10-CM

## 2018-06-27 NOTE — TELEPHONE ENCOUNTER
[facility]    Phone Message    May a detailed message be left on voicemail: yes    Reason for Call: Other: Other: Margaret from [facility] pharmacy needs clarification on Levothyroxine Sodium and Cabergoline.  Please follow up with Margaret at [phone].      Action Taken: Message routed to:  [facility] ([facility] Endo     Pharmacy called, reason for call to clinic unknown, rx's look ok, pharmacy will call back if there is still a question

## 2018-06-28 RX ORDER — LEVOTHYROXINE SODIUM 112 UG/1
112 TABLET ORAL DAILY
Qty: 90 TABLET | Refills: 3 | Status: SHIPPED | OUTPATIENT
Start: 2018-06-28 | End: 2019-07-20

## 2018-06-28 RX ORDER — CABERGOLINE 0.5 MG/1
0.5 TABLET ORAL
Qty: 8 TABLET | Refills: 5 | Status: SHIPPED | OUTPATIENT
Start: 2018-06-28 | End: 2018-06-28

## 2018-06-28 RX ORDER — CABERGOLINE 0.5 MG/1
0.5 TABLET ORAL
Qty: 8 TABLET | Refills: 5 | Status: SHIPPED | OUTPATIENT
Start: 2018-06-28 | End: 2018-12-23

## 2018-06-28 NOTE — TELEPHONE ENCOUNTER
cabergoline (DOSTINEX) 0.5 MG tablet   Last Written Prescription Date: 5/11/17  Last Fill Quantity: 8,   # refills: 5  Last Office Visit : 4/17/18  Future Office visit: none    Routing refill request to provider for review/approval because:   not on protocol. Med end date  6/27/18

## 2018-07-04 ENCOUNTER — COMMUNICATION - HEALTHEAST (OUTPATIENT)
Dept: SCHEDULING | Facility: CLINIC | Age: 38
End: 2018-07-04

## 2018-07-09 ENCOUNTER — RECORDS - HEALTHEAST (OUTPATIENT)
Dept: ADMINISTRATIVE | Facility: OTHER | Age: 38
End: 2018-07-09

## 2018-08-13 ENCOUNTER — OFFICE VISIT - HEALTHEAST (OUTPATIENT)
Dept: SURGERY | Facility: CLINIC | Age: 38
End: 2018-08-13

## 2018-08-13 DIAGNOSIS — E66.01 OBESITY, MORBID, BMI 50 OR HIGHER (H): ICD-10-CM

## 2018-08-13 DIAGNOSIS — I87.303 VENOUS HYPERTENSION OF LOWER EXTREMITY, BILATERAL: ICD-10-CM

## 2018-08-13 DIAGNOSIS — I89.0 LYMPHEDEMA: ICD-10-CM

## 2018-08-13 DIAGNOSIS — E66.01 MORBID OBESITY WITH BMI OF 60.0-69.9, ADULT (H): ICD-10-CM

## 2018-08-13 DIAGNOSIS — Z71.3 NUTRITIONAL COUNSELING: ICD-10-CM

## 2018-08-13 ASSESSMENT — MIFFLIN-ST. JEOR
SCORE: 2124.66
SCORE: 2124.66

## 2018-09-06 ENCOUNTER — AMBULATORY - HEALTHEAST (OUTPATIENT)
Dept: VASCULAR SURGERY | Facility: CLINIC | Age: 38
End: 2018-09-06

## 2018-09-06 ENCOUNTER — COMMUNICATION - HEALTHEAST (OUTPATIENT)
Dept: VASCULAR SURGERY | Facility: CLINIC | Age: 38
End: 2018-09-06

## 2018-09-06 DIAGNOSIS — I87.2 VENOUS INSUFFICIENCY OF BOTH LOWER EXTREMITIES: ICD-10-CM

## 2018-09-06 DIAGNOSIS — I89.0 LYMPHEDEMA: ICD-10-CM

## 2018-09-13 ENCOUNTER — RECORDS - HEALTHEAST (OUTPATIENT)
Dept: LAB | Facility: CLINIC | Age: 38
End: 2018-09-13

## 2018-09-13 LAB
ANION GAP SERPL CALCULATED.3IONS-SCNC: 11 MMOL/L (ref 5–18)
BUN SERPL-MCNC: 20 MG/DL (ref 8–22)
CALCIUM SERPL-MCNC: 9.7 MG/DL (ref 8.5–10.5)
CHLORIDE BLD-SCNC: 109 MMOL/L (ref 98–107)
CO2 SERPL-SCNC: 21 MMOL/L (ref 22–31)
CREAT SERPL-MCNC: 0.77 MG/DL (ref 0.6–1.1)
GFR SERPL CREATININE-BSD FRML MDRD: >60 ML/MIN/1.73M2
GLUCOSE BLD-MCNC: 102 MG/DL (ref 70–125)
POTASSIUM BLD-SCNC: 3.8 MMOL/L (ref 3.5–5)
SODIUM SERPL-SCNC: 141 MMOL/L (ref 136–145)

## 2018-09-14 LAB — 25(OH)D3 SERPL-MCNC: 22.8 NG/ML (ref 30–80)

## 2018-09-17 ENCOUNTER — COMMUNICATION - HEALTHEAST (OUTPATIENT)
Dept: VASCULAR SURGERY | Facility: CLINIC | Age: 38
End: 2018-09-17

## 2018-10-21 ENCOUNTER — COMMUNICATION - HEALTHEAST (OUTPATIENT)
Dept: SCHEDULING | Facility: CLINIC | Age: 38
End: 2018-10-21

## 2018-10-22 ENCOUNTER — RECORDS - HEALTHEAST (OUTPATIENT)
Dept: LAB | Facility: CLINIC | Age: 38
End: 2018-10-22

## 2018-10-23 LAB
VARICELLA ZOSTER DNA COMMENT: NORMAL
VZV DNA SPEC QL NAA+PROBE: NORMAL
VZV PCR SPECIMEN: NORMAL

## 2018-11-01 ENCOUNTER — OFFICE VISIT - HEALTHEAST (OUTPATIENT)
Dept: SURGERY | Facility: CLINIC | Age: 38
End: 2018-11-01

## 2018-11-01 DIAGNOSIS — Z71.3 NUTRITIONAL COUNSELING: ICD-10-CM

## 2018-11-01 DIAGNOSIS — I89.0 LYMPHEDEMA: ICD-10-CM

## 2018-11-01 DIAGNOSIS — E66.01 OBESITY, MORBID, BMI 50 OR HIGHER (H): ICD-10-CM

## 2018-11-01 DIAGNOSIS — R79.89 LOW VITAMIN D LEVEL: ICD-10-CM

## 2018-11-01 DIAGNOSIS — Z87.898 HISTORY OF PREDIABETES: ICD-10-CM

## 2018-11-01 DIAGNOSIS — E66.01 MORBID OBESITY WITH BMI OF 60.0-69.9, ADULT (H): ICD-10-CM

## 2018-11-01 DIAGNOSIS — M54.50 LOW BACK PAIN: ICD-10-CM

## 2018-11-01 ASSESSMENT — MIFFLIN-ST. JEOR
SCORE: 2156.41
SCORE: 2156.41

## 2018-11-06 ENCOUNTER — RECORDS - HEALTHEAST (OUTPATIENT)
Dept: LAB | Facility: CLINIC | Age: 38
End: 2018-11-06

## 2018-11-07 ENCOUNTER — OFFICE VISIT - HEALTHEAST (OUTPATIENT)
Dept: PHYSICAL THERAPY | Facility: REHABILITATION | Age: 38
End: 2018-11-07

## 2018-11-07 DIAGNOSIS — M54.50 LOW BACK PAIN: ICD-10-CM

## 2018-11-07 DIAGNOSIS — I89.0 LYMPHEDEMA: ICD-10-CM

## 2018-11-07 DIAGNOSIS — M54.50 ACUTE MIDLINE LOW BACK PAIN WITHOUT SCIATICA: ICD-10-CM

## 2018-11-07 DIAGNOSIS — E66.01 MORBID OBESITY (H): ICD-10-CM

## 2018-11-07 LAB
ALBUMIN SERPL-MCNC: 3.8 G/DL (ref 3.5–5)
ALP SERPL-CCNC: 35 U/L (ref 45–120)
ALT SERPL W P-5'-P-CCNC: 44 U/L (ref 0–45)
ANION GAP SERPL CALCULATED.3IONS-SCNC: 12 MMOL/L (ref 5–18)
AST SERPL W P-5'-P-CCNC: 32 U/L (ref 0–40)
BILIRUB SERPL-MCNC: 0.4 MG/DL (ref 0–1)
BUN SERPL-MCNC: 18 MG/DL (ref 8–22)
CALCIUM SERPL-MCNC: 9.4 MG/DL (ref 8.5–10.5)
CHLORIDE BLD-SCNC: 108 MMOL/L (ref 98–107)
CHOLEST SERPL-MCNC: 140 MG/DL
CO2 SERPL-SCNC: 21 MMOL/L (ref 22–31)
CREAT SERPL-MCNC: 0.76 MG/DL (ref 0.6–1.1)
FASTING STATUS PATIENT QL REPORTED: ABNORMAL
GFR SERPL CREATININE-BSD FRML MDRD: >60 ML/MIN/1.73M2
GLUCOSE BLD-MCNC: 103 MG/DL (ref 70–125)
HDLC SERPL-MCNC: 36 MG/DL
LDLC SERPL CALC-MCNC: 75 MG/DL
POTASSIUM BLD-SCNC: 3.8 MMOL/L (ref 3.5–5)
PROT SERPL-MCNC: 6.1 G/DL (ref 6–8)
SODIUM SERPL-SCNC: 141 MMOL/L (ref 136–145)
TRIGL SERPL-MCNC: 145 MG/DL
TSH SERPL DL<=0.005 MIU/L-ACNC: 3.01 UIU/ML (ref 0.3–5)

## 2018-11-08 ENCOUNTER — COMMUNICATION - HEALTHEAST (OUTPATIENT)
Dept: VASCULAR SURGERY | Facility: CLINIC | Age: 38
End: 2018-11-08

## 2018-11-08 LAB — 25(OH)D3 SERPL-MCNC: 24.2 NG/ML (ref 30–80)

## 2018-11-09 ENCOUNTER — OFFICE VISIT - HEALTHEAST (OUTPATIENT)
Dept: PHYSICAL THERAPY | Facility: REHABILITATION | Age: 38
End: 2018-11-09

## 2018-11-09 DIAGNOSIS — E66.01 MORBID OBESITY WITH BMI OF 60.0-69.9, ADULT (H): ICD-10-CM

## 2018-11-09 DIAGNOSIS — R53.81 PHYSICAL DECONDITIONING: ICD-10-CM

## 2018-11-09 DIAGNOSIS — Q87.11 PRADER-WILLI SYNDROME: ICD-10-CM

## 2018-11-09 DIAGNOSIS — F79 INTELLECTUAL DISABILITY: ICD-10-CM

## 2018-11-09 DIAGNOSIS — M62.81 MUSCLE WEAKNESS (GENERALIZED): ICD-10-CM

## 2018-11-09 DIAGNOSIS — I89.0 LYMPHEDEMA: ICD-10-CM

## 2018-11-09 DIAGNOSIS — I87.303 VENOUS HYPERTENSION OF LOWER EXTREMITY, BILATERAL: ICD-10-CM

## 2018-11-09 DIAGNOSIS — F31.2 BIPOLAR AFFECTIVE DISORDER, CURRENT EPISODE MANIC WITH PSYCHOTIC SYMPTOMS (H): ICD-10-CM

## 2018-11-12 ENCOUNTER — OFFICE VISIT - HEALTHEAST (OUTPATIENT)
Dept: PHYSICAL THERAPY | Facility: REHABILITATION | Age: 38
End: 2018-11-12

## 2018-11-12 DIAGNOSIS — R53.81 PHYSICAL DECONDITIONING: ICD-10-CM

## 2018-11-12 DIAGNOSIS — M54.50 CHRONIC BILATERAL LOW BACK PAIN WITHOUT SCIATICA: ICD-10-CM

## 2018-11-12 DIAGNOSIS — G89.29 CHRONIC BILATERAL LOW BACK PAIN WITHOUT SCIATICA: ICD-10-CM

## 2018-11-12 DIAGNOSIS — Q87.11 PRADER-WILLI SYNDROME: ICD-10-CM

## 2018-11-12 DIAGNOSIS — M62.81 MUSCLE WEAKNESS (GENERALIZED): ICD-10-CM

## 2018-11-14 ENCOUNTER — OFFICE VISIT - HEALTHEAST (OUTPATIENT)
Dept: PHYSICAL THERAPY | Facility: REHABILITATION | Age: 38
End: 2018-11-14

## 2018-11-14 DIAGNOSIS — I89.0 LYMPHEDEMA: ICD-10-CM

## 2018-11-14 DIAGNOSIS — I87.303 VENOUS HYPERTENSION OF LOWER EXTREMITY, BILATERAL: ICD-10-CM

## 2018-11-14 DIAGNOSIS — Q87.11 PRADER-WILLI SYNDROME: ICD-10-CM

## 2018-11-14 DIAGNOSIS — E66.01 MORBID OBESITY WITH BMI OF 60.0-69.9, ADULT (H): ICD-10-CM

## 2018-11-15 ENCOUNTER — OFFICE VISIT - HEALTHEAST (OUTPATIENT)
Dept: PHYSICAL THERAPY | Facility: REHABILITATION | Age: 38
End: 2018-11-15

## 2018-11-15 DIAGNOSIS — E66.01 MORBID OBESITY (H): ICD-10-CM

## 2018-11-15 DIAGNOSIS — I87.303 VENOUS HYPERTENSION OF LOWER EXTREMITY, BILATERAL: ICD-10-CM

## 2018-11-15 DIAGNOSIS — Q87.11 PRADER-WILLI SYNDROME: ICD-10-CM

## 2018-11-15 DIAGNOSIS — F31.2 BIPOLAR AFFECTIVE DISORDER, CURRENT EPISODE MANIC WITH PSYCHOTIC SYMPTOMS (H): ICD-10-CM

## 2018-11-15 DIAGNOSIS — R53.81 PHYSICAL DECONDITIONING: ICD-10-CM

## 2018-11-15 DIAGNOSIS — M54.50 CHRONIC BILATERAL LOW BACK PAIN WITHOUT SCIATICA: ICD-10-CM

## 2018-11-15 DIAGNOSIS — E66.01 MORBID OBESITY WITH BMI OF 60.0-69.9, ADULT (H): ICD-10-CM

## 2018-11-15 DIAGNOSIS — G89.29 CHRONIC BILATERAL LOW BACK PAIN WITHOUT SCIATICA: ICD-10-CM

## 2018-11-15 DIAGNOSIS — M62.81 MUSCLE WEAKNESS (GENERALIZED): ICD-10-CM

## 2018-11-15 DIAGNOSIS — F79 INTELLECTUAL DISABILITY: ICD-10-CM

## 2018-11-15 DIAGNOSIS — I89.0 LYMPHEDEMA: ICD-10-CM

## 2018-11-15 DIAGNOSIS — M54.50 LOW BACK PAIN: ICD-10-CM

## 2018-11-15 DIAGNOSIS — M54.50 ACUTE MIDLINE LOW BACK PAIN WITHOUT SCIATICA: ICD-10-CM

## 2018-11-30 ENCOUNTER — COMMUNICATION - HEALTHEAST (OUTPATIENT)
Dept: VASCULAR SURGERY | Facility: CLINIC | Age: 38
End: 2018-11-30

## 2018-12-03 ENCOUNTER — OFFICE VISIT - HEALTHEAST (OUTPATIENT)
Dept: PHYSICAL THERAPY | Facility: REHABILITATION | Age: 38
End: 2018-12-03

## 2018-12-03 ENCOUNTER — OFFICE VISIT - HEALTHEAST (OUTPATIENT)
Dept: VASCULAR SURGERY | Facility: CLINIC | Age: 38
End: 2018-12-03

## 2018-12-03 DIAGNOSIS — F31.2 BIPOLAR AFFECTIVE DISORDER, CURRENT EPISODE MANIC WITH PSYCHOTIC SYMPTOMS (H): ICD-10-CM

## 2018-12-03 DIAGNOSIS — R73.03 PREDIABETES: ICD-10-CM

## 2018-12-03 DIAGNOSIS — Q87.11 PRADER-WILLI SYNDROME: ICD-10-CM

## 2018-12-03 DIAGNOSIS — I89.0 LYMPHEDEMA: ICD-10-CM

## 2018-12-03 DIAGNOSIS — E66.01 MORBID OBESITY WITH BMI OF 60.0-69.9, ADULT (H): ICD-10-CM

## 2018-12-03 DIAGNOSIS — I87.303 VENOUS HYPERTENSION OF LOWER EXTREMITY, BILATERAL: ICD-10-CM

## 2018-12-03 DIAGNOSIS — I83.019 VENOUS STASIS ULCERS OF BOTH LOWER EXTREMITIES (H): ICD-10-CM

## 2018-12-03 DIAGNOSIS — I83.029 VENOUS STASIS ULCERS OF BOTH LOWER EXTREMITIES (H): ICD-10-CM

## 2018-12-03 DIAGNOSIS — I87.2 VENOUS INSUFFICIENCY OF BOTH LOWER EXTREMITIES: ICD-10-CM

## 2018-12-03 DIAGNOSIS — L97.929 VENOUS STASIS ULCERS OF BOTH LOWER EXTREMITIES (H): ICD-10-CM

## 2018-12-03 DIAGNOSIS — M21.072 ACQUIRED VALGUS DEFORMITY OF BOTH ANKLES: ICD-10-CM

## 2018-12-03 DIAGNOSIS — M21.071 ACQUIRED VALGUS DEFORMITY OF BOTH ANKLES: ICD-10-CM

## 2018-12-03 DIAGNOSIS — E28.319 EARLY MENOPAUSE: ICD-10-CM

## 2018-12-03 DIAGNOSIS — L97.919 VENOUS STASIS ULCERS OF BOTH LOWER EXTREMITIES (H): ICD-10-CM

## 2018-12-03 ASSESSMENT — MIFFLIN-ST. JEOR: SCORE: 2093.81

## 2018-12-03 NOTE — TELEPHONE ENCOUNTER
calcium citrate-vitamin D (CALCIUM CITRATE + D) 315-250 MG-UNIT TABS per tablet      Last Written Prescription Date:  11/14/17  Last Fill Quantity: 180,   # refills: 3  Last Office Visit : 4/17/18  Future Office visit:  12/31/18    Routing refill request to provider for review/approval because:  Drug not on the Endocrine refill protocol.

## 2018-12-05 ENCOUNTER — OFFICE VISIT - HEALTHEAST (OUTPATIENT)
Dept: PHYSICAL THERAPY | Facility: REHABILITATION | Age: 38
End: 2018-12-05

## 2018-12-05 DIAGNOSIS — M54.50 ACUTE MIDLINE LOW BACK PAIN WITHOUT SCIATICA: ICD-10-CM

## 2018-12-05 DIAGNOSIS — M54.50 CHRONIC BILATERAL LOW BACK PAIN WITHOUT SCIATICA: ICD-10-CM

## 2018-12-05 DIAGNOSIS — R53.81 PHYSICAL DECONDITIONING: ICD-10-CM

## 2018-12-05 DIAGNOSIS — I87.303 VENOUS HYPERTENSION OF LOWER EXTREMITY, BILATERAL: ICD-10-CM

## 2018-12-05 DIAGNOSIS — F31.2 BIPOLAR AFFECTIVE DISORDER, CURRENT EPISODE MANIC WITH PSYCHOTIC SYMPTOMS (H): ICD-10-CM

## 2018-12-05 DIAGNOSIS — M62.81 MUSCLE WEAKNESS (GENERALIZED): ICD-10-CM

## 2018-12-05 DIAGNOSIS — Q87.11 PRADER-WILLI SYNDROME: ICD-10-CM

## 2018-12-05 DIAGNOSIS — M54.50 LOW BACK PAIN: ICD-10-CM

## 2018-12-05 DIAGNOSIS — G89.29 CHRONIC BILATERAL LOW BACK PAIN WITHOUT SCIATICA: ICD-10-CM

## 2018-12-05 DIAGNOSIS — F79 INTELLECTUAL DISABILITY: ICD-10-CM

## 2018-12-05 DIAGNOSIS — I89.0 LYMPHEDEMA: ICD-10-CM

## 2018-12-05 DIAGNOSIS — E66.01 MORBID OBESITY (H): ICD-10-CM

## 2018-12-05 DIAGNOSIS — E66.01 MORBID OBESITY WITH BMI OF 60.0-69.9, ADULT (H): ICD-10-CM

## 2018-12-10 ENCOUNTER — OFFICE VISIT - HEALTHEAST (OUTPATIENT)
Dept: PHYSICAL THERAPY | Facility: REHABILITATION | Age: 38
End: 2018-12-10

## 2018-12-10 DIAGNOSIS — E66.01 MORBID OBESITY WITH BMI OF 60.0-69.9, ADULT (H): ICD-10-CM

## 2018-12-10 DIAGNOSIS — I89.0 LYMPHEDEMA: ICD-10-CM

## 2018-12-10 DIAGNOSIS — I87.303 VENOUS HYPERTENSION OF LOWER EXTREMITY, BILATERAL: ICD-10-CM

## 2018-12-10 DIAGNOSIS — M62.81 MUSCLE WEAKNESS (GENERALIZED): ICD-10-CM

## 2018-12-10 DIAGNOSIS — Q87.11 PRADER-WILLI SYNDROME: ICD-10-CM

## 2018-12-12 ENCOUNTER — OFFICE VISIT - HEALTHEAST (OUTPATIENT)
Dept: PHYSICAL THERAPY | Facility: REHABILITATION | Age: 38
End: 2018-12-12

## 2018-12-12 DIAGNOSIS — I87.2 VENOUS INSUFFICIENCY OF BOTH LOWER EXTREMITIES: ICD-10-CM

## 2018-12-12 DIAGNOSIS — M21.072 ACQUIRED VALGUS DEFORMITY OF BOTH ANKLES: ICD-10-CM

## 2018-12-12 DIAGNOSIS — M21.071 ACQUIRED VALGUS DEFORMITY OF BOTH ANKLES: ICD-10-CM

## 2018-12-12 DIAGNOSIS — I87.303 VENOUS HYPERTENSION OF LOWER EXTREMITY, BILATERAL: ICD-10-CM

## 2018-12-12 DIAGNOSIS — Q87.11 PRADER-WILLI SYNDROME: ICD-10-CM

## 2018-12-12 DIAGNOSIS — I89.0 LYMPHEDEMA: ICD-10-CM

## 2018-12-12 DIAGNOSIS — E66.01 MORBID OBESITY WITH BMI OF 60.0-69.9, ADULT (H): ICD-10-CM

## 2018-12-21 DIAGNOSIS — D35.2 PROLACTINOMA (H): ICD-10-CM

## 2018-12-21 DIAGNOSIS — D35.2 PITUITARY ADENOMA (H): ICD-10-CM

## 2018-12-23 RX ORDER — CABERGOLINE 0.5 MG/1
0.5 TABLET ORAL
Qty: 8 TABLET | Refills: 5 | Status: SHIPPED | OUTPATIENT
Start: 2018-12-24 | End: 2019-07-21

## 2018-12-23 NOTE — TELEPHONE ENCOUNTER
cabergoline (DOSTINEX) 0.5 MG tablet  Last Written Prescription Date:  6/28/18  Last Fill Quantity: 8 tab,   # refills: 5  Last Office Visit :4/17/18  Future Office visit:  12/31/18    Routing refill request to provider for review/approval because:  Not on protocol

## 2018-12-31 ENCOUNTER — OFFICE VISIT (OUTPATIENT)
Dept: ENDOCRINOLOGY | Facility: CLINIC | Age: 38
End: 2018-12-31
Payer: MEDICARE

## 2018-12-31 ENCOUNTER — RECORDS - HEALTHEAST (OUTPATIENT)
Dept: ADMINISTRATIVE | Facility: OTHER | Age: 38
End: 2018-12-31

## 2018-12-31 VITALS
DIASTOLIC BLOOD PRESSURE: 72 MMHG | HEIGHT: 60 IN | SYSTOLIC BLOOD PRESSURE: 130 MMHG | BODY MASS INDEX: 57.52 KG/M2 | WEIGHT: 293 LBS | HEART RATE: 79 BPM

## 2018-12-31 DIAGNOSIS — R73.03 PREDIABETES: ICD-10-CM

## 2018-12-31 DIAGNOSIS — D35.2 PROLACTINOMA (H): ICD-10-CM

## 2018-12-31 DIAGNOSIS — E66.01 CLASS 3 SEVERE OBESITY WITH BODY MASS INDEX (BMI) OF 60.0 TO 69.9 IN ADULT, UNSPECIFIED OBESITY TYPE, UNSPECIFIED WHETHER SERIOUS COMORBIDITY PRESENT (H): ICD-10-CM

## 2018-12-31 DIAGNOSIS — E66.813 CLASS 3 SEVERE OBESITY WITH BODY MASS INDEX (BMI) OF 60.0 TO 69.9 IN ADULT, UNSPECIFIED OBESITY TYPE, UNSPECIFIED WHETHER SERIOUS COMORBIDITY PRESENT (H): ICD-10-CM

## 2018-12-31 DIAGNOSIS — D35.2 PROLACTINOMA (H): Primary | ICD-10-CM

## 2018-12-31 DIAGNOSIS — E03.9 HYPOTHYROIDISM, UNSPECIFIED TYPE: ICD-10-CM

## 2018-12-31 LAB
ALBUMIN SERPL-MCNC: 3.4 G/DL (ref 3.4–5)
ALP SERPL-CCNC: 31 U/L (ref 40–150)
ALT SERPL W P-5'-P-CCNC: 42 U/L (ref 0–50)
ANION GAP SERPL CALCULATED.3IONS-SCNC: 8 MMOL/L (ref 3–14)
AST SERPL W P-5'-P-CCNC: 26 U/L (ref 0–45)
BILIRUB SERPL-MCNC: 0.4 MG/DL (ref 0.2–1.3)
BUN SERPL-MCNC: 20 MG/DL (ref 7–30)
CALCIUM SERPL-MCNC: 8.8 MG/DL (ref 8.5–10.1)
CHLORIDE SERPL-SCNC: 108 MMOL/L (ref 94–109)
CO2 SERPL-SCNC: 22 MMOL/L (ref 20–32)
CREAT SERPL-MCNC: 0.9 MG/DL (ref 0.52–1.04)
GFR SERPL CREATININE-BSD FRML MDRD: 81 ML/MIN/{1.73_M2}
GLUCOSE SERPL-MCNC: 98 MG/DL (ref 70–99)
HBA1C MFR BLD: 5.4 % (ref 0–5.6)
POTASSIUM SERPL-SCNC: 4 MMOL/L (ref 3.4–5.3)
PROLACTIN SERPL-MCNC: 45 UG/L (ref 3–27)
PROT SERPL-MCNC: 6.6 G/DL (ref 6.8–8.8)
SODIUM SERPL-SCNC: 138 MMOL/L (ref 133–144)
T4 FREE SERPL-MCNC: 1.37 NG/DL (ref 0.76–1.46)
TSH SERPL DL<=0.005 MIU/L-ACNC: 3.22 MU/L (ref 0.4–4)

## 2018-12-31 PROCEDURE — 84146 ASSAY OF PROLACTIN: CPT | Performed by: INTERNAL MEDICINE

## 2018-12-31 RX ORDER — MEDROXYPROGESTERONE ACETATE 10 MG
TABLET ORAL
Refills: 0 | COMMUNITY
Start: 2018-10-03

## 2018-12-31 ASSESSMENT — MIFFLIN-ST. JEOR: SCORE: 2154.75

## 2018-12-31 ASSESSMENT — PAIN SCALES - GENERAL: PAINLEVEL: NO PAIN (0)

## 2018-12-31 NOTE — PROGRESS NOTES
--------------   Endocrinology Follow up ------------    Reason for visit/consult: F/u on prolactinoma.    Primary care provider: Tae Garcia      Assessment and Plan  38 year old female with history of prolactinoma s/p resection 2000 and subsequent treatment of cabergoline, restarted in 2017 for persistent levels, amenorrhea, fatigue, weight gain. Currently decreasing levels. Still has amenorrhea, but fatigue resolved, and losing weight.    # Prolactinoma  Restarted cabergoline, decreasing levels. Still has amenorrhhea but fatigue resolved and losing weight, not worsening psychiatric symptoms.     - PRL level today  - Meanwhile ontinue current cabergloin 0.5 mg BIW      # Amenorrhea  Still not resolved for the past 3 years,  Started Provera 5 days a motnh by OB GYN Dr. Khan, and started to have flows    # Hypothyroidism  Last visit euthryoid  - TSH, free T4 today  - Continue current LT4 112 mcg    # Obesity BMI66  Significantly decreasing weight, likely due to Victoza and amybe effects of improving high PRL.  Switched from Victoza to Trulicity 1.5 mg weekly since summer 2018   - Continue Trulicity 1.5 mg once a week  - Encouraged exercise/walking (current OT twice a week)    # Bone health  - calcium citrate plus D (630 mg Ca, D 500ID) for bone health    # Pre DM  5.6 in 2016, fasting range .   - A1C to check, (may consider metformin)    - RTC with me in 7 month    We spent 30 minutes with this patient face to face and explained the conditions and plans (more than 50% of time was counseling/coordination of care, treatment plan of elevated prolactin, went over the trends of prolactin levels and made summary for patient to understand) . The patient understood and is satisfied with today's visit. Return to clinic with me in 7 months.     Mandy Farfan MD  Staff Physician  Endocrinology and Metabolism  [facility]  License: [id number]  Pager:  [phone]    Invretval History: Patient has been doing well.  Gained 12 pounds but is still better than before.  Switched from Victoza to Trulicity at weight management clinic in [facility].  Currently taking a.m. fasting glucose which is range from .  Compliant and tolerated to cabergoline 0.5 mg twice a week.  Went to OB started to Provera 5 days a months started to have menstrual  flow.   HPI: A 38 year old female with follow up prolactinoma, original diagnosis was made in 1997. Today is the 4th visit. The patient had a surgical resection in 2000 and subsequently treated with cabergoline.   Patient had been seen by Dr. CLINTON Moran before (since 2002). In 2012, cabergoline was discontinued and her PRL became mildly elevated (PRL 50-60s), and she has had amenorrhea for 3 years. Although official radiology report did not mention residual tumor, we assumed possible small residual, we resumed cabergoline 0.5 mg BIW, which she is currenlty taking. Most recent PRL is trending down since then and most recent level was 27.9.     She also has psychiatry issues, however,currently has been improving, with tapering psychiatry medications. No recent  Hospitalization for a year.     She also has primary hypothyroidism, which is being replaced with levothyroxine 112 mcg daily.     Of note, she lost 35 lb since started Victoza, which was statrted at [facility] bariatric clinic. She is on OT started 3 moth, switcehd breakfast cut down mild, 2 eggs, and fruits.     Denied HA, dizziness, breast tenderness, galactorrhea.   She is still amenorrhea.     ENDO VITALS-UMP 12/31/2018 4/17/2018 11/14/2017 5/10/2017   Weight 340 lb 8 oz 324 lb 14.4 oz 349 lb 9.6 oz 371 lb 6.4 oz     ENDO VITALS-UMP 1/10/2017 7/6/2016 7/6/2016 3/14/2016   Weight 361 lb 8 oz  379 lb 6.4 oz 382 lb 3.2 oz     ENDO VITALS-UMP 1/6/2016 1/6/2016   Weight  368 lb 11.2 oz         Past Medical/Surgical History:  Past Medical History:   Diagnosis Date     Acne  rosacea      Bipolar disorder (H) 1992     GERD (gastroesophageal reflux disease)      Irritable bowel syndrome      Pituitary adenoma (H)     S/P trans nasal resection in 2000     Past Surgical History:   Procedure Laterality Date     pituitary tumor removal  2000    at the [facility]     septal plasty  1998     TONSILLECTOMY  1998       Allergies:  Allergies   Allergen Reactions     Fiorinal [Butalbital-Aspirin-Caffeine] Anaphylaxis and Swelling     Ativan [Lorazepam]      Bee Venom      Ibuprofen Sodium      Eyes swell     Keflex [Cephalexin-Fd&C Yellow #6]      Eyes swell     Latex Swelling     Trileptal      numbness     Topamax Rash       Current Medications   Current Outpatient Medications   Medication     cabergoline (DOSTINEX) 0.5 MG tablet     calcium citrate-vitamin D (CALCIUM CITRATE + D) 315-250 MG-UNIT TABS per tablet     doxycycline (VIBRAMYCIN) 100 MG capsule     FENOFIBRATE PO     furosemide (LASIX) 40 MG tablet     Hypertonic Nasal Wash (SINUS RINSE KIT) PACK     levothyroxine (SYNTHROID/LEVOTHROID) 112 MCG tablet     medroxyPROGESTERone (PROVERA) 10 MG tablet     Multiple Vitamins-Minerals (CERTAVITE/ANTIOXIDANTS PO)     omeprazole (PRILOSEC) 40 MG capsule     No current facility-administered medications for this visit.        Family History:  Family History   Problem Relation Age of Onset     Thyroid Disease Mother         hypothyroidism     Diabetes Mother      Coronary Artery Disease No family hx of      Hypertension No family hx of      Hyperlipidemia No family hx of      Cerebrovascular Disease No family hx of      Breast Cancer No family hx of      Colon Cancer No family hx of      Prostate Cancer No family hx of      Other Cancer No family hx of      Depression No family hx of      Anxiety Disorder No family hx of      Mental Illness No family hx of      Substance Abuse No family hx of      Anesthesia Reaction No family hx of      Asthma No family hx of      Osteoporosis No family hx of       "Genetic Disorder No family hx of      Obesity No family hx of      Unknown/Adopted No family hx of        Social History:  Social History     Tobacco Use     Smoking status: Never Smoker     Smokeless tobacco: Never Used   Substance Use Topics     Alcohol use: No       ROS:  10 point negative except as mentioned in HPI    Exam    ENDO VITALS-UMP 4/17/2018 11/14/2017 5/10/2017 1/10/2017   Weight 147.374 kg 158.578 kg 168.466 kg 163.975 kg     ENDO VITALS-UMP 7/6/2016 7/6/2016 3/14/2016   Weight  172.095 kg 173.365 kg     Blood pressure 130/72, pulse 79, height 1.53 m (5' 0.24\"), weight (!) 154.4 kg (340 lb 8 oz).  Gen: morbidly obese female in NAD  HEENT: anicteric, EOMI, no proptosis or lid lag  Visual field: intact  Occular motor: full  Thyroid: no thyroid goiter or cervical LAD  Cardio: RRR, no m/r/g  Resp: CTAB. No wheezing or crackles  Abd: soft, NT/ND. Normal BS  Skin: Warm and dry. No rash or lesions.   Legs: bilateral swelling/enlarged, with soft skins, non-pitting.  Feet: no deformities or ulcers, 2+ DP pulses  Neuro: A&Ox3, relaxation phase of reflexes normal, no tremor on outstretched arms  Psych: Normal affect and mood.    Labs/Imaging  Brain MRI 1/2017  Impression:   1. Postsurgical changes from transsphenoidal resection of pituitary  adenoma without evidence of recurrence.  2. Single hyperintensity on FLAIR in the right semiovale similar to  prior study of questionable clinical significance.  3. Stable mild dural thickening and enhancement over the convexities  which is most likely postsurgical in nature. This could also be  related to CSF leak or intracranial hypotension. Total  TSH   Date Value Ref Range Status   02/28/2018 2.80 0.30 - 5.00 mcU/mL Final   01/10/2017 3.09 0.40 - 4.00 mU/L Final   07/06/2016 3.78 0.40 - 4.00 mU/L Final   01/06/2016 5.69 (H) 0.40 - 4.00 mU/L Final   07/01/2015 3.86 0.40 - 4.00 mU/L Final     T4 Free   Date Value Ref Range Status   02/28/2018 1.4 0.7 - 1.8 ng/dL Final "   01/10/2017 1.21 0.76 - 1.46 ng/dL Final   07/06/2016 1.32 0.76 - 1.46 ng/dL Final   01/06/2016 1.23 0.76 - 1.46 ng/dL Final   07/01/2015 1.24 0.76 - 1.46 ng/dL Final   ]  Lab Results   Component Value Date    A1C 5.7 01/06/2016

## 2018-12-31 NOTE — LETTER
12/31/2018       RE: Bushra Buck  [address]  [address]  [place] [address]     Dear Colleague,    Thank you for referring your patient, Bushra Buck, to the [facility] ENDOCRINOLOGY at [facility]. Please see a copy of my visit note below.                                        --------------   Endocrinology Follow up ------------    Reason for visit/consult: F/u on prolactinoma.    Primary care provider: Tae Garcia      Assessment and Plan  38 year old female with history of prolactinoma s/p resection 2000 and subsequent treatment of cabergoline, restarted in 2017 for persistent levels, amenorrhea, fatigue, weight gain. Currently decreasing levels. Still has amenorrhea, but fatigue resolved, and losing weight.    # Prolactinoma  Restarted cabergoline, decreasing levels. Still has amenorrhhea but fatigue resolved and losing weight, not worsening psychiatric symptoms.     - PRL level today  - Meanwhile ontinue current cabergloin 0.5 mg BIW      # Amenorrhea  Still not resolved for the past 3 years,  Started Provera 5 days a motnh by OB GYN Dr. Khan, and started to have flows    # Hypothyroidism  Last visit euthryoid  - TSH, free T4 today  - Continue current LT4 112 mcg    # Obesity BMI66  Significantly decreasing weight, likely due to Victoza and amybe effects of improving high PRL.  Switched from Victoza to Trulicity 1.5 mg weekly since summer 2018   - Continue Trulicity 1.5 mg once a week  - Encouraged exercise/walking (current OT twice a week)    # Bone health  - calcium citrate plus D (630 mg Ca, D 500ID) for bone health    # Pre DM  5.6 in 2016, fasting range .   - A1C to check, (may consider metformin)    - RTC with me in 7 month    We spent 30 minutes with this patient face to face and explained the conditions and plans (more than 50% of time was counseling/coordination of care, treatment plan of elevated prolactin, went over the trends of  prolactin levels and made summary for patient to understand) . The patient understood and is satisfied with today's visit. Return to clinic with me in 7 months.     Mandy Farfan MD  Staff Physician  Endocrinology and Metabolism  [facility]  License: [id number]  Pager: [phone]    Invretval History: Patient has been doing well.  Gained 12 pounds but is still better than before.  Switched from Victoza to Trulicity at weight management clinic in [facility].  Currently taking a.m. fasting glucose which is range from .  Compliant and tolerated to cabergoline 0.5 mg twice a week.  Went to OB started to Provera 5 days a months started to have menstrual  flow.   HPI: A 38 year old female with follow up prolactinoma, original diagnosis was made in 1997. Today is the 4th visit. The patient had a surgical resection in 2000 and subsequently treated with cabergoline.   Patient had been seen by Dr. CLINTON Moran before (since 2002). In 2012, cabergoline was discontinued and her PRL became mildly elevated (PRL 50-60s), and she has had amenorrhea for 3 years. Although official radiology report did not mention residual tumor, we assumed possible small residual, we resumed cabergoline 0.5 mg BIW, which she is currenlty taking. Most recent PRL is trending down since then and most recent level was 27.9.     She also has psychiatry issues, however,currently has been improving, with tapering psychiatry medications. No recent  Hospitalization for a year.     She also has primary hypothyroidism, which is being replaced with levothyroxine 112 mcg daily.     Of note, she lost 35 lb since started Victoza, which was statrted at [facility] bariatric clinic. She is on OT started 3 moth, switcehd breakfast cut down mild, 2 eggs, and fruits.     Denied HA, dizziness, breast tenderness, galactorrhea.   She is still amenorrhea.     ENDO VITALS-UMP 12/31/2018 4/17/2018 11/14/2017 5/10/2017   Weight 340 lb 8 oz 324 lb 14.4 oz  349 lb 9.6 oz 371 lb 6.4 oz     ENDO VITALS-UMP 1/10/2017 7/6/2016 7/6/2016 3/14/2016   Weight 361 lb 8 oz  379 lb 6.4 oz 382 lb 3.2 oz     ENDO VITALS-UMP 1/6/2016 1/6/2016   Weight  368 lb 11.2 oz         Past Medical/Surgical History:  Past Medical History:   Diagnosis Date     Acne rosacea      Bipolar disorder (H) 1992     GERD (gastroesophageal reflux disease)      Irritable bowel syndrome      Pituitary adenoma (H)     S/P trans nasal resection in 2000     Past Surgical History:   Procedure Laterality Date     pituitary tumor removal  2000    at the [facility]     septal plasty  1998     TONSILLECTOMY  1998       Allergies:  Allergies   Allergen Reactions     Fiorinal [Butalbital-Aspirin-Caffeine] Anaphylaxis and Swelling     Ativan [Lorazepam]      Bee Venom      Ibuprofen Sodium      Eyes swell     Keflex [Cephalexin-Fd&C Yellow #6]      Eyes swell     Latex Swelling     Trileptal      numbness     Topamax Rash       Current Medications   Current Outpatient Medications   Medication     cabergoline (DOSTINEX) 0.5 MG tablet     calcium citrate-vitamin D (CALCIUM CITRATE + D) 315-250 MG-UNIT TABS per tablet     doxycycline (VIBRAMYCIN) 100 MG capsule     FENOFIBRATE PO     furosemide (LASIX) 40 MG tablet     Hypertonic Nasal Wash (SINUS RINSE KIT) PACK     levothyroxine (SYNTHROID/LEVOTHROID) 112 MCG tablet     medroxyPROGESTERone (PROVERA) 10 MG tablet     Multiple Vitamins-Minerals (CERTAVITE/ANTIOXIDANTS PO)     omeprazole (PRILOSEC) 40 MG capsule     No current facility-administered medications for this visit.        Family History:  Family History   Problem Relation Age of Onset     Thyroid Disease Mother         hypothyroidism     Diabetes Mother      Coronary Artery Disease No family hx of      Hypertension No family hx of      Hyperlipidemia No family hx of      Cerebrovascular Disease No family hx of      Breast Cancer No family hx of      Colon Cancer No family hx of      Prostate Cancer No family hx of   "    Other Cancer No family hx of      Depression No family hx of      Anxiety Disorder No family hx of      Mental Illness No family hx of      Substance Abuse No family hx of      Anesthesia Reaction No family hx of      Asthma No family hx of      Osteoporosis No family hx of      Genetic Disorder No family hx of      Obesity No family hx of      Unknown/Adopted No family hx of        Social History:  Social History     Tobacco Use     Smoking status: Never Smoker     Smokeless tobacco: Never Used   Substance Use Topics     Alcohol use: No       ROS:  10 point negative except as mentioned in HPI    Exam    ENDO VITALS-UMP 4/17/2018 11/14/2017 5/10/2017 1/10/2017   Weight 147.374 kg 158.578 kg 168.466 kg 163.975 kg     ENDO VITALS-UMP 7/6/2016 7/6/2016 3/14/2016   Weight  172.095 kg 173.365 kg     Blood pressure 130/72, pulse 79, height 1.53 m (5' 0.24\"), weight (!) 154.4 kg (340 lb 8 oz).  Gen: morbidly obese female in NAD  HEENT: anicteric, EOMI, no proptosis or lid lag  Visual field: intact  Occular motor: full  Thyroid: no thyroid goiter or cervical LAD  Cardio: RRR, no m/r/g  Resp: CTAB. No wheezing or crackles  Abd: soft, NT/ND. Normal BS  Skin: Warm and dry. No rash or lesions.   Legs: bilateral swelling/enlarged, with soft skins, non-pitting.  Feet: no deformities or ulcers, 2+ DP pulses  Neuro: A&Ox3, relaxation phase of reflexes normal, no tremor on outstretched arms  Psych: Normal affect and mood.    Labs/Imaging  Brain MRI 1/2017  Impression:   1. Postsurgical changes from transsphenoidal resection of pituitary  adenoma without evidence of recurrence.  2. Single hyperintensity on FLAIR in the right semiovale similar to  prior study of questionable clinical significance.  3. Stable mild dural thickening and enhancement over the convexities  which is most likely postsurgical in nature. This could also be  related to CSF leak or intracranial hypotension. Total  TSH   Date Value Ref Range Status "   02/28/2018 2.80 0.30 - 5.00 mcU/mL Final   01/10/2017 3.09 0.40 - 4.00 mU/L Final   07/06/2016 3.78 0.40 - 4.00 mU/L Final   01/06/2016 5.69 (H) 0.40 - 4.00 mU/L Final   07/01/2015 3.86 0.40 - 4.00 mU/L Final     T4 Free   Date Value Ref Range Status   02/28/2018 1.4 0.7 - 1.8 ng/dL Final   01/10/2017 1.21 0.76 - 1.46 ng/dL Final   07/06/2016 1.32 0.76 - 1.46 ng/dL Final   01/06/2016 1.23 0.76 - 1.46 ng/dL Final   07/01/2015 1.24 0.76 - 1.46 ng/dL Final   ]  Lab Results   Component Value Date    A1C 5.7 01/06/2016         Mandy Farfan MD

## 2019-01-03 ENCOUNTER — OFFICE VISIT - HEALTHEAST (OUTPATIENT)
Dept: SURGERY | Facility: CLINIC | Age: 39
End: 2019-01-03

## 2019-01-03 DIAGNOSIS — Z71.3 NUTRITIONAL COUNSELING: ICD-10-CM

## 2019-01-03 DIAGNOSIS — K21.9 GASTROESOPHAGEAL REFLUX DISEASE, ESOPHAGITIS PRESENCE NOT SPECIFIED: ICD-10-CM

## 2019-01-03 DIAGNOSIS — E66.01 OBESITY, MORBID, BMI 50 OR HIGHER (H): ICD-10-CM

## 2019-01-03 ASSESSMENT — MIFFLIN-ST. JEOR: SCORE: 2100.16

## 2019-01-10 ENCOUNTER — OFFICE VISIT - HEALTHEAST (OUTPATIENT)
Dept: SURGERY | Facility: CLINIC | Age: 39
End: 2019-01-10

## 2019-01-10 DIAGNOSIS — R73.03 PREDIABETES: ICD-10-CM

## 2019-01-10 DIAGNOSIS — E66.01 MORBID OBESITY WITH BMI OF 60.0-69.9, ADULT (H): ICD-10-CM

## 2019-01-10 DIAGNOSIS — I87.2 VENOUS INSUFFICIENCY OF BOTH LOWER EXTREMITIES: ICD-10-CM

## 2019-01-10 ASSESSMENT — MIFFLIN-ST. JEOR: SCORE: 2079.3

## 2019-02-27 DIAGNOSIS — E28.319 EARLY MENOPAUSE: ICD-10-CM

## 2019-02-28 NOTE — TELEPHONE ENCOUNTER
calcium citrate-vitamin D (CALCIUM CITRATE + D) 315-250 MG-UNIT TABS per tablet  Take 2 tablets by mouth daily - Oral  Last Written Prescription Date:  12/4/2018  Last Fill Quantity: 180,   # refills: 0     Last Office Visit : 12/31/2018  Future Office visit:  None    Routing refill request to provider for review/approval because:  Drug not on refill protocol.

## 2019-04-08 ENCOUNTER — OFFICE VISIT - HEALTHEAST (OUTPATIENT)
Dept: VASCULAR SURGERY | Facility: CLINIC | Age: 39
End: 2019-04-08

## 2019-04-08 ENCOUNTER — OFFICE VISIT - HEALTHEAST (OUTPATIENT)
Dept: SURGERY | Facility: CLINIC | Age: 39
End: 2019-04-08

## 2019-04-08 DIAGNOSIS — I83.029 VENOUS STASIS ULCERS OF BOTH LOWER EXTREMITIES (H): ICD-10-CM

## 2019-04-08 DIAGNOSIS — L97.929 VENOUS STASIS ULCERS OF BOTH LOWER EXTREMITIES (H): ICD-10-CM

## 2019-04-08 DIAGNOSIS — E66.01 OBESITY, MORBID, BMI 50 OR HIGHER (H): ICD-10-CM

## 2019-04-08 DIAGNOSIS — I83.019 VENOUS STASIS ULCERS OF BOTH LOWER EXTREMITIES (H): ICD-10-CM

## 2019-04-08 DIAGNOSIS — M21.071 ACQUIRED VALGUS DEFORMITY OF BOTH ANKLES: ICD-10-CM

## 2019-04-08 DIAGNOSIS — Q87.11 PRADER-WILLI SYNDROME: ICD-10-CM

## 2019-04-08 DIAGNOSIS — I87.303 VENOUS HYPERTENSION OF LOWER EXTREMITY, BILATERAL: ICD-10-CM

## 2019-04-08 DIAGNOSIS — Z71.3 NUTRITIONAL COUNSELING: ICD-10-CM

## 2019-04-08 DIAGNOSIS — I89.0 LYMPHEDEMA: ICD-10-CM

## 2019-04-08 DIAGNOSIS — I87.2 VENOUS INSUFFICIENCY OF BOTH LOWER EXTREMITIES: ICD-10-CM

## 2019-04-08 DIAGNOSIS — L97.919 VENOUS STASIS ULCERS OF BOTH LOWER EXTREMITIES (H): ICD-10-CM

## 2019-04-08 DIAGNOSIS — E66.01 MORBID OBESITY WITH BMI OF 60.0-69.9, ADULT (H): ICD-10-CM

## 2019-04-08 DIAGNOSIS — M21.072 ACQUIRED VALGUS DEFORMITY OF BOTH ANKLES: ICD-10-CM

## 2019-04-08 ASSESSMENT — MIFFLIN-ST. JEOR
SCORE: 2263.01
SCORE: 2256.2

## 2019-04-09 ENCOUNTER — RECORDS - HEALTHEAST (OUTPATIENT)
Dept: LAB | Facility: CLINIC | Age: 39
End: 2019-04-09

## 2019-04-10 ENCOUNTER — COMMUNICATION - HEALTHEAST (OUTPATIENT)
Dept: VASCULAR SURGERY | Facility: CLINIC | Age: 39
End: 2019-04-10

## 2019-04-10 LAB
ANION GAP SERPL CALCULATED.3IONS-SCNC: 11 MMOL/L (ref 5–18)
BUN SERPL-MCNC: 19 MG/DL (ref 8–22)
CALCIUM SERPL-MCNC: 9.4 MG/DL (ref 8.5–10.5)
CHLORIDE BLD-SCNC: 111 MMOL/L (ref 98–107)
CO2 SERPL-SCNC: 20 MMOL/L (ref 22–31)
CREAT SERPL-MCNC: 1.05 MG/DL (ref 0.6–1.1)
GFR SERPL CREATININE-BSD FRML MDRD: 59 ML/MIN/1.73M2
GLUCOSE BLD-MCNC: 99 MG/DL (ref 70–125)
POTASSIUM BLD-SCNC: 4.3 MMOL/L (ref 3.5–5)
SODIUM SERPL-SCNC: 142 MMOL/L (ref 136–145)

## 2019-04-11 ENCOUNTER — COMMUNICATION - HEALTHEAST (OUTPATIENT)
Dept: OTHER | Facility: CLINIC | Age: 39
End: 2019-04-11

## 2019-04-11 LAB — HBA1C MFR BLD: 5.2 % (ref 4.2–6.1)

## 2019-04-12 ENCOUNTER — AMBULATORY - HEALTHEAST (OUTPATIENT)
Dept: OTHER | Facility: CLINIC | Age: 39
End: 2019-04-12

## 2019-04-18 ENCOUNTER — COMMUNICATION - HEALTHEAST (OUTPATIENT)
Dept: OTHER | Facility: CLINIC | Age: 39
End: 2019-04-18

## 2019-04-19 ENCOUNTER — COMMUNICATION - HEALTHEAST (OUTPATIENT)
Dept: OTHER | Facility: CLINIC | Age: 39
End: 2019-04-19

## 2019-04-22 ENCOUNTER — OFFICE VISIT - HEALTHEAST (OUTPATIENT)
Dept: VASCULAR SURGERY | Facility: CLINIC | Age: 39
End: 2019-04-22

## 2019-04-22 ENCOUNTER — COMMUNICATION - HEALTHEAST (OUTPATIENT)
Dept: VASCULAR SURGERY | Facility: CLINIC | Age: 39
End: 2019-04-22

## 2019-04-22 ENCOUNTER — AMBULATORY - HEALTHEAST (OUTPATIENT)
Dept: OTHER | Facility: CLINIC | Age: 39
End: 2019-04-22

## 2019-04-22 DIAGNOSIS — L03.90 CELLULITIS: ICD-10-CM

## 2019-04-25 ENCOUNTER — COMMUNICATION - HEALTHEAST (OUTPATIENT)
Dept: VASCULAR SURGERY | Facility: CLINIC | Age: 39
End: 2019-04-25

## 2019-05-06 ENCOUNTER — OFFICE VISIT - HEALTHEAST (OUTPATIENT)
Dept: VASCULAR SURGERY | Facility: CLINIC | Age: 39
End: 2019-05-06

## 2019-05-06 ENCOUNTER — RECORDS - HEALTHEAST (OUTPATIENT)
Dept: ADMINISTRATIVE | Facility: OTHER | Age: 39
End: 2019-05-06

## 2019-05-06 DIAGNOSIS — L03.119 CELLULITIS IN DIABETIC FOOT (H): ICD-10-CM

## 2019-05-06 DIAGNOSIS — M79.89 SWELLING OF LIMB: ICD-10-CM

## 2019-05-06 DIAGNOSIS — E11.628 CELLULITIS IN DIABETIC FOOT (H): ICD-10-CM

## 2019-05-08 ENCOUNTER — RECORDS - HEALTHEAST (OUTPATIENT)
Dept: LAB | Facility: CLINIC | Age: 39
End: 2019-05-08

## 2019-05-08 LAB
ANION GAP SERPL CALCULATED.3IONS-SCNC: 11 MMOL/L (ref 5–18)
BUN SERPL-MCNC: 18 MG/DL (ref 8–22)
CALCIUM SERPL-MCNC: 9.9 MG/DL (ref 8.5–10.5)
CHLORIDE BLD-SCNC: 107 MMOL/L (ref 98–107)
CO2 SERPL-SCNC: 25 MMOL/L (ref 22–31)
CREAT SERPL-MCNC: 0.86 MG/DL (ref 0.6–1.1)
GFR SERPL CREATININE-BSD FRML MDRD: >60 ML/MIN/1.73M2
GLUCOSE BLD-MCNC: 84 MG/DL (ref 70–125)
POTASSIUM BLD-SCNC: 3.8 MMOL/L (ref 3.5–5)
SODIUM SERPL-SCNC: 143 MMOL/L (ref 136–145)

## 2019-05-16 ENCOUNTER — COMMUNICATION - HEALTHEAST (OUTPATIENT)
Dept: VASCULAR SURGERY | Facility: CLINIC | Age: 39
End: 2019-05-16

## 2019-05-22 ENCOUNTER — RECORDS - HEALTHEAST (OUTPATIENT)
Dept: ADMINISTRATIVE | Facility: OTHER | Age: 39
End: 2019-05-22

## 2019-06-04 ENCOUNTER — DOCUMENTATION ONLY (OUTPATIENT)
Dept: OTHER | Facility: CLINIC | Age: 39
End: 2019-06-04

## 2019-06-04 ENCOUNTER — AMBULATORY - HEALTHEAST (OUTPATIENT)
Dept: OTHER | Facility: CLINIC | Age: 39
End: 2019-06-04

## 2019-06-04 PROBLEM — Z71.89 ADVANCED DIRECTIVES, COUNSELING/DISCUSSION: Chronic | Status: RESOLVED | Noted: 2018-06-04 | Resolved: 2019-06-04

## 2019-06-10 ENCOUNTER — OFFICE VISIT - HEALTHEAST (OUTPATIENT)
Dept: SURGERY | Facility: CLINIC | Age: 39
End: 2019-06-10

## 2019-06-10 DIAGNOSIS — E66.01 OBESITY, MORBID, BMI 50 OR HIGHER (H): ICD-10-CM

## 2019-06-10 DIAGNOSIS — E11.9 TYPE 2 DIABETES MELLITUS WITHOUT COMPLICATION, WITHOUT LONG-TERM CURRENT USE OF INSULIN (H): ICD-10-CM

## 2019-06-10 DIAGNOSIS — Z71.3 NUTRITIONAL COUNSELING: ICD-10-CM

## 2019-06-10 ASSESSMENT — MIFFLIN-ST. JEOR: SCORE: 2158.68

## 2019-06-11 ENCOUNTER — OFFICE VISIT - HEALTHEAST (OUTPATIENT)
Dept: VASCULAR SURGERY | Facility: CLINIC | Age: 39
End: 2019-06-11

## 2019-06-11 DIAGNOSIS — I83.893 SYMPTOMATIC VARICOSE VEINS OF BOTH LOWER EXTREMITIES: ICD-10-CM

## 2019-06-11 DIAGNOSIS — I87.2 VENOUS INSUFFICIENCY OF BOTH LOWER EXTREMITIES: ICD-10-CM

## 2019-06-11 ASSESSMENT — MIFFLIN-ST. JEOR: SCORE: 2170.02

## 2019-06-13 ENCOUNTER — AMBULATORY - HEALTHEAST (OUTPATIENT)
Dept: OTHER | Facility: CLINIC | Age: 39
End: 2019-06-13

## 2019-06-13 ENCOUNTER — COMMUNICATION - HEALTHEAST (OUTPATIENT)
Dept: VASCULAR SURGERY | Facility: CLINIC | Age: 39
End: 2019-06-13

## 2019-06-18 ENCOUNTER — AMBULATORY - HEALTHEAST (OUTPATIENT)
Dept: OTHER | Facility: CLINIC | Age: 39
End: 2019-06-18

## 2019-06-18 ENCOUNTER — DOCUMENTATION ONLY (OUTPATIENT)
Dept: OTHER | Facility: CLINIC | Age: 39
End: 2019-06-18

## 2019-06-20 ENCOUNTER — COMMUNICATION - HEALTHEAST (OUTPATIENT)
Dept: OTHER | Facility: CLINIC | Age: 39
End: 2019-06-20

## 2019-06-24 ENCOUNTER — OFFICE VISIT - HEALTHEAST (OUTPATIENT)
Dept: SURGERY | Facility: CLINIC | Age: 39
End: 2019-06-24

## 2019-06-24 DIAGNOSIS — E11.9 TYPE 2 DIABETES MELLITUS WITHOUT COMPLICATION, WITHOUT LONG-TERM CURRENT USE OF INSULIN (H): ICD-10-CM

## 2019-06-24 DIAGNOSIS — E66.01 MORBID OBESITY WITH BMI OF 60.0-69.9, ADULT (H): ICD-10-CM

## 2019-06-24 DIAGNOSIS — I87.2 VENOUS INSUFFICIENCY OF BOTH LOWER EXTREMITIES: ICD-10-CM

## 2019-06-24 ASSESSMENT — MIFFLIN-ST. JEOR: SCORE: 2083.83

## 2019-06-25 ENCOUNTER — COMMUNICATION - HEALTHEAST (OUTPATIENT)
Dept: OTHER | Facility: CLINIC | Age: 39
End: 2019-06-25

## 2019-06-26 ENCOUNTER — AMBULATORY - HEALTHEAST (OUTPATIENT)
Dept: OTHER | Facility: CLINIC | Age: 39
End: 2019-06-26

## 2019-07-03 ENCOUNTER — COMMUNICATION - HEALTHEAST (OUTPATIENT)
Dept: SCHEDULING | Facility: CLINIC | Age: 39
End: 2019-07-03

## 2019-07-09 ENCOUNTER — COMMUNICATION - HEALTHEAST (OUTPATIENT)
Dept: VASCULAR SURGERY | Facility: CLINIC | Age: 39
End: 2019-07-09

## 2019-07-18 DIAGNOSIS — D35.2 PITUITARY ADENOMA (H): ICD-10-CM

## 2019-07-18 DIAGNOSIS — D35.2 PROLACTINOMA (H): ICD-10-CM

## 2019-07-20 RX ORDER — LEVOTHYROXINE SODIUM 112 UG/1
112 TABLET ORAL DAILY
Qty: 90 TABLET | Refills: 1 | Status: SHIPPED | OUTPATIENT
Start: 2019-07-20 | End: 2020-01-03

## 2019-07-20 NOTE — TELEPHONE ENCOUNTER
cabergoline (DOSTINEX) 0.5 MG tablet     Last Written Prescription Date:  12/24/18  Last Fill Quantity: 8,   # refills: 5  Last Office Visit : 12/31/18  Future Office visit:  none    Routing refill request to provider for review/approval because:   cabergoline (DOSTINEX   Not on protocol

## 2019-07-21 RX ORDER — CABERGOLINE 0.5 MG/1
0.5 TABLET ORAL
Qty: 8 TABLET | Refills: 1 | Status: SHIPPED | OUTPATIENT
Start: 2019-07-22 | End: 2019-09-16

## 2019-08-15 ENCOUNTER — RECORDS - HEALTHEAST (OUTPATIENT)
Dept: LAB | Facility: CLINIC | Age: 39
End: 2019-08-15

## 2019-08-16 LAB — BACTERIA SPEC CULT: NO GROWTH

## 2019-08-19 ENCOUNTER — RECORDS - HEALTHEAST (OUTPATIENT)
Dept: LAB | Facility: CLINIC | Age: 39
End: 2019-08-19

## 2019-08-19 LAB
ANION GAP SERPL CALCULATED.3IONS-SCNC: 9 MMOL/L (ref 5–18)
BUN SERPL-MCNC: 17 MG/DL (ref 8–22)
CALCIUM SERPL-MCNC: 9.8 MG/DL (ref 8.5–10.5)
CHLORIDE BLD-SCNC: 107 MMOL/L (ref 98–107)
CHOLEST SERPL-MCNC: 163 MG/DL
CO2 SERPL-SCNC: 24 MMOL/L (ref 22–31)
CREAT SERPL-MCNC: 0.97 MG/DL (ref 0.6–1.1)
FASTING STATUS PATIENT QL REPORTED: ABNORMAL
GFR SERPL CREATININE-BSD FRML MDRD: >60 ML/MIN/1.73M2
GLUCOSE BLD-MCNC: 82 MG/DL (ref 70–125)
HDLC SERPL-MCNC: 33 MG/DL
LDLC SERPL CALC-MCNC: 77 MG/DL
POTASSIUM BLD-SCNC: 3.8 MMOL/L (ref 3.5–5)
SODIUM SERPL-SCNC: 140 MMOL/L (ref 136–145)
TRIGL SERPL-MCNC: 263 MG/DL
TSH SERPL DL<=0.005 MIU/L-ACNC: 1.91 UIU/ML (ref 0.3–5)

## 2019-08-20 LAB — 25(OH)D3 SERPL-MCNC: 26.1 NG/ML (ref 30–80)

## 2019-08-26 ENCOUNTER — AMBULATORY - HEALTHEAST (OUTPATIENT)
Dept: LAB | Facility: CLINIC | Age: 39
End: 2019-08-26

## 2019-08-26 ENCOUNTER — OFFICE VISIT - HEALTHEAST (OUTPATIENT)
Dept: VASCULAR SURGERY | Facility: CLINIC | Age: 39
End: 2019-08-26

## 2019-08-26 DIAGNOSIS — R73.03 PREDIABETES: ICD-10-CM

## 2019-08-26 DIAGNOSIS — I87.2 VENOUS INSUFFICIENCY OF BOTH LOWER EXTREMITIES: ICD-10-CM

## 2019-08-26 DIAGNOSIS — I89.0 LYMPHEDEMA: ICD-10-CM

## 2019-08-26 LAB — HBA1C MFR BLD: 5.4 % (ref 3.5–6)

## 2019-08-26 ASSESSMENT — MIFFLIN-ST. JEOR: SCORE: 2086.1

## 2019-09-16 DIAGNOSIS — D35.2 PROLACTINOMA (H): ICD-10-CM

## 2019-09-16 DIAGNOSIS — D35.2 PITUITARY ADENOMA (H): ICD-10-CM

## 2019-09-17 ENCOUNTER — COMMUNICATION - HEALTHEAST (OUTPATIENT)
Dept: SURGERY | Facility: CLINIC | Age: 39
End: 2019-09-17

## 2019-09-17 DIAGNOSIS — E66.01 MORBID OBESITY WITH BMI OF 60.0-69.9, ADULT (H): ICD-10-CM

## 2019-09-17 DIAGNOSIS — E11.9 TYPE 2 DIABETES MELLITUS WITHOUT COMPLICATION, WITHOUT LONG-TERM CURRENT USE OF INSULIN (H): ICD-10-CM

## 2019-09-18 RX ORDER — CABERGOLINE 0.5 MG/1
0.5 TABLET ORAL
Qty: 8 TABLET | Refills: 1 | Status: SHIPPED | OUTPATIENT
Start: 2019-09-19 | End: 2019-11-16

## 2019-09-18 NOTE — TELEPHONE ENCOUNTER
cabergoline (DOSTINEX) 0.5 MG tablet      Last Written Prescription Date:  7-22-19  Last Fill Quantity: 8,   # refills: 1  Last Office Visit : 12-31-18  Future Office visit:  none    Routing refill request to provider for review/approval because:  Not on protocol.      Kathleen M Doege RN

## 2019-09-30 ENCOUNTER — RECORDS - HEALTHEAST (OUTPATIENT)
Dept: LAB | Facility: CLINIC | Age: 39
End: 2019-09-30

## 2019-09-30 ENCOUNTER — RECORDS - HEALTHEAST (OUTPATIENT)
Dept: ADMINISTRATIVE | Facility: OTHER | Age: 39
End: 2019-09-30

## 2019-09-30 LAB
ANION GAP SERPL CALCULATED.3IONS-SCNC: 10 MMOL/L (ref 5–18)
BUN SERPL-MCNC: 18 MG/DL (ref 8–22)
CALCIUM SERPL-MCNC: 9.8 MG/DL (ref 8.5–10.5)
CHLORIDE BLD-SCNC: 105 MMOL/L (ref 98–107)
CO2 SERPL-SCNC: 24 MMOL/L (ref 22–31)
CREAT SERPL-MCNC: 0.87 MG/DL (ref 0.6–1.1)
GFR SERPL CREATININE-BSD FRML MDRD: >60 ML/MIN/1.73M2
GLUCOSE BLD-MCNC: 103 MG/DL (ref 70–125)
POTASSIUM BLD-SCNC: 3.6 MMOL/L (ref 3.5–5)
SODIUM SERPL-SCNC: 139 MMOL/L (ref 136–145)

## 2019-10-05 ENCOUNTER — COMMUNICATION - HEALTHEAST (OUTPATIENT)
Dept: SCHEDULING | Facility: CLINIC | Age: 39
End: 2019-10-05

## 2019-10-10 ASSESSMENT — MIFFLIN-ST. JEOR: SCORE: 2048.68

## 2019-10-13 ENCOUNTER — ANESTHESIA - HEALTHEAST (OUTPATIENT)
Dept: SURGERY | Facility: HOSPITAL | Age: 39
End: 2019-10-13

## 2019-10-14 ENCOUNTER — SURGERY - HEALTHEAST (OUTPATIENT)
Dept: SURGERY | Facility: HOSPITAL | Age: 39
End: 2019-10-14

## 2019-10-28 ENCOUNTER — OFFICE VISIT - HEALTHEAST (OUTPATIENT)
Dept: VASCULAR SURGERY | Facility: CLINIC | Age: 39
End: 2019-10-28

## 2019-10-28 DIAGNOSIS — E11.9 TYPE 2 DIABETES MELLITUS WITHOUT COMPLICATION, WITHOUT LONG-TERM CURRENT USE OF INSULIN (H): ICD-10-CM

## 2019-10-28 DIAGNOSIS — Q87.11 PRADER-WILLI SYNDROME: ICD-10-CM

## 2019-10-28 DIAGNOSIS — M79.89 SWELLING OF LIMB: ICD-10-CM

## 2019-10-28 DIAGNOSIS — M21.962 FOOT DEFORMITY, BILATERAL: ICD-10-CM

## 2019-10-28 DIAGNOSIS — I89.0 LYMPHEDEMA: ICD-10-CM

## 2019-10-28 DIAGNOSIS — E66.01 MORBID OBESITY WITH BMI OF 60.0-69.9, ADULT (H): ICD-10-CM

## 2019-10-28 DIAGNOSIS — I87.303 VENOUS HYPERTENSION OF LOWER EXTREMITY, BILATERAL: ICD-10-CM

## 2019-10-28 DIAGNOSIS — M21.41 FLAT FEET, BILATERAL: ICD-10-CM

## 2019-10-28 DIAGNOSIS — M21.072 ACQUIRED VALGUS DEFORMITY OF BOTH ANKLES: ICD-10-CM

## 2019-10-28 DIAGNOSIS — M21.961 FOOT DEFORMITY, BILATERAL: ICD-10-CM

## 2019-10-28 DIAGNOSIS — M21.071 ACQUIRED VALGUS DEFORMITY OF BOTH ANKLES: ICD-10-CM

## 2019-10-28 DIAGNOSIS — M21.42 FLAT FEET, BILATERAL: ICD-10-CM

## 2019-10-28 DIAGNOSIS — I83.893 SYMPTOMATIC VARICOSE VEINS OF BOTH LOWER EXTREMITIES: ICD-10-CM

## 2019-10-28 DIAGNOSIS — I87.2 VENOUS INSUFFICIENCY OF BOTH LOWER EXTREMITIES: ICD-10-CM

## 2019-10-28 ASSESSMENT — MIFFLIN-ST. JEOR: SCORE: 2084.97

## 2019-10-29 ENCOUNTER — COMMUNICATION - HEALTHEAST (OUTPATIENT)
Dept: OTHER | Facility: CLINIC | Age: 39
End: 2019-10-29

## 2019-11-11 ENCOUNTER — OFFICE VISIT - HEALTHEAST (OUTPATIENT)
Dept: SURGERY | Facility: CLINIC | Age: 39
End: 2019-11-11

## 2019-11-11 DIAGNOSIS — R06.09 DYSPNEA ON EXERTION: ICD-10-CM

## 2019-11-11 ASSESSMENT — MIFFLIN-ST. JEOR: SCORE: 2103.11

## 2019-11-16 DIAGNOSIS — D35.2 PROLACTINOMA (H): ICD-10-CM

## 2019-11-16 DIAGNOSIS — D35.2 PITUITARY ADENOMA (H): ICD-10-CM

## 2019-11-18 ENCOUNTER — SURGERY - HEALTHEAST (OUTPATIENT)
Dept: SURGERY | Facility: CLINIC | Age: 39
End: 2019-11-18

## 2019-11-18 ENCOUNTER — ANESTHESIA - HEALTHEAST (OUTPATIENT)
Dept: SURGERY | Facility: CLINIC | Age: 39
End: 2019-11-18

## 2019-11-18 RX ORDER — CABERGOLINE 0.5 MG/1
0.5 TABLET ORAL
Qty: 8 TABLET | Refills: 1 | Status: SHIPPED | OUTPATIENT
Start: 2019-11-18 | End: 2020-01-27

## 2019-11-18 ASSESSMENT — MIFFLIN-ST. JEOR: SCORE: 2094.89

## 2019-11-18 NOTE — TELEPHONE ENCOUNTER
cabergoline (DOSTINEX) 0.5 MG tablet      Last Written Prescription Date:  9/19/19  Last Fill Quantity: 8,   # refills: 1  Last Office Visit : 12/31/18  Future Office visit:  1/15/20    Routing refill request to provider for review/approval because:  Drug not on the [facility], [facility] or [facility] refill protocol or controlled substance

## 2019-11-24 ENCOUNTER — COMMUNICATION - HEALTHEAST (OUTPATIENT)
Dept: SCHEDULING | Facility: CLINIC | Age: 39
End: 2019-11-24

## 2019-12-02 ENCOUNTER — AMBULATORY - HEALTHEAST (OUTPATIENT)
Dept: OTHER | Facility: CLINIC | Age: 39
End: 2019-12-02

## 2019-12-23 ENCOUNTER — AMBULATORY - HEALTHEAST (OUTPATIENT)
Dept: OTHER | Facility: CLINIC | Age: 39
End: 2019-12-23

## 2019-12-27 ENCOUNTER — RECORDS - HEALTHEAST (OUTPATIENT)
Dept: ADMINISTRATIVE | Facility: OTHER | Age: 39
End: 2019-12-27

## 2019-12-30 DIAGNOSIS — D35.2 PITUITARY ADENOMA (H): Primary | ICD-10-CM

## 2020-01-03 RX ORDER — LEVOTHYROXINE SODIUM 112 UG/1
112 TABLET ORAL DAILY
Qty: 90 TABLET | Refills: 0 | Status: SHIPPED | OUTPATIENT
Start: 2020-01-03 | End: 2020-08-14

## 2020-01-06 ENCOUNTER — RECORDS - HEALTHEAST (OUTPATIENT)
Dept: LAB | Facility: CLINIC | Age: 40
End: 2020-01-06

## 2020-01-06 ENCOUNTER — AMBULATORY - HEALTHEAST (OUTPATIENT)
Dept: OTHER | Facility: CLINIC | Age: 40
End: 2020-01-06

## 2020-01-07 LAB
25(OH)D3 SERPL-MCNC: 32.3 NG/ML (ref 30–80)
HBA1C MFR BLD: 5 % (ref 4.2–6.1)
HPV SOURCE: NORMAL
HUMAN PAPILLOMA VIRUS 16 DNA: NEGATIVE
HUMAN PAPILLOMA VIRUS 18 DNA: NEGATIVE
HUMAN PAPILLOMA VIRUS FINAL DIAGNOSIS: NORMAL
HUMAN PAPILLOMA VIRUS OTHER HR: NEGATIVE
SPECIMEN DESCRIPTION: NORMAL

## 2020-01-13 LAB
BKR LAB AP ABNORMAL BLEEDING: NO
BKR LAB AP BIRTH CONTROL/HORMONES: NORMAL
BKR LAB AP CERVICAL APPEARANCE: NO
BKR LAB AP GYN ADEQUACY: NORMAL
BKR LAB AP GYN INTERPRETATION: NORMAL
BKR LAB AP HPV REFLEX: NORMAL
BKR LAB AP LMP: NORMAL
BKR LAB AP PATIENT STATUS: NO
BKR LAB AP PREVIOUS ABNORMAL: NO
BKR LAB AP PREVIOUS NORMAL: NORMAL
HIGH RISK?: NO
PATH REPORT.COMMENTS IMP SPEC: NORMAL
RESULT FLAG (HE HISTORICAL CONVERSION): NORMAL

## 2020-01-25 ENCOUNTER — COMMUNICATION - HEALTHEAST (OUTPATIENT)
Dept: SCHEDULING | Facility: CLINIC | Age: 40
End: 2020-01-25

## 2020-01-25 DIAGNOSIS — D35.2 PROLACTINOMA (H): ICD-10-CM

## 2020-01-25 DIAGNOSIS — D35.2 PITUITARY ADENOMA (H): ICD-10-CM

## 2020-01-28 NOTE — TELEPHONE ENCOUNTER
CABERGOLINE 0.5 MG TABS 0.5 TAB      Last Written Prescription Date:  11/18/19  Last Fill Quantity: 8 tab,   # refills: 1  Last Office Visit : 12/31/18 with recommended 7 month follow up  Future Office visit:  3/23/20    Routing refill request to provider for review/approval because:  Drug not on the [facility], [facility] or [facility] refill protocol or controlled substance

## 2020-01-29 RX ORDER — CABERGOLINE 0.5 MG/1
0.5 TABLET ORAL
Qty: 8 TABLET | Refills: 1 | Status: SHIPPED | OUTPATIENT
Start: 2020-01-30 | End: 2020-05-15

## 2020-03-15 ENCOUNTER — HEALTH MAINTENANCE LETTER (OUTPATIENT)
Age: 40
End: 2020-03-15

## 2020-03-16 ENCOUNTER — COMMUNICATION - HEALTHEAST (OUTPATIENT)
Dept: SCHEDULING | Facility: CLINIC | Age: 40
End: 2020-03-16

## 2020-03-23 ENCOUNTER — RECORDS - HEALTHEAST (OUTPATIENT)
Dept: ADMINISTRATIVE | Facility: OTHER | Age: 40
End: 2020-03-23

## 2020-03-23 ENCOUNTER — OFFICE VISIT (OUTPATIENT)
Dept: ENDOCRINOLOGY | Facility: CLINIC | Age: 40
End: 2020-03-23
Payer: MEDICARE

## 2020-03-23 VITALS
DIASTOLIC BLOOD PRESSURE: 82 MMHG | HEART RATE: 99 BPM | SYSTOLIC BLOOD PRESSURE: 128 MMHG | HEIGHT: 60 IN | BODY MASS INDEX: 57.52 KG/M2 | WEIGHT: 293 LBS

## 2020-03-23 DIAGNOSIS — D35.2 PROLACTINOMA (H): Primary | ICD-10-CM

## 2020-03-23 DIAGNOSIS — E03.9 HYPOTHYROIDISM, UNSPECIFIED TYPE: ICD-10-CM

## 2020-03-23 DIAGNOSIS — E66.01 MORBID OBESITY (H): ICD-10-CM

## 2020-03-23 DIAGNOSIS — R73.03 PRE-DIABETES: ICD-10-CM

## 2020-03-23 DIAGNOSIS — D35.2 PROLACTINOMA (H): ICD-10-CM

## 2020-03-23 LAB
ALBUMIN SERPL-MCNC: 3.5 G/DL (ref 3.4–5)
ALP SERPL-CCNC: 46 U/L (ref 40–150)
ALT SERPL W P-5'-P-CCNC: 37 U/L (ref 0–50)
ANION GAP SERPL CALCULATED.3IONS-SCNC: 5 MMOL/L (ref 3–14)
AST SERPL W P-5'-P-CCNC: 20 U/L (ref 0–45)
BILIRUB SERPL-MCNC: 0.3 MG/DL (ref 0.2–1.3)
BUN SERPL-MCNC: 16 MG/DL (ref 7–30)
CALCIUM SERPL-MCNC: 8.9 MG/DL (ref 8.5–10.1)
CHLORIDE SERPL-SCNC: 112 MMOL/L (ref 94–109)
CO2 SERPL-SCNC: 22 MMOL/L (ref 20–32)
CREAT SERPL-MCNC: 0.7 MG/DL (ref 0.52–1.04)
GFR SERPL CREATININE-BSD FRML MDRD: >90 ML/MIN/{1.73_M2}
GLUCOSE SERPL-MCNC: 102 MG/DL (ref 70–99)
HBA1C MFR BLD: 5.3 % (ref 0–5.6)
POTASSIUM SERPL-SCNC: 4 MMOL/L (ref 3.4–5.3)
PROLACTIN SERPL-MCNC: 61 UG/L (ref 3–27)
PROT SERPL-MCNC: 7.1 G/DL (ref 6.8–8.8)
SODIUM SERPL-SCNC: 140 MMOL/L (ref 133–144)
TSH SERPL DL<=0.005 MIU/L-ACNC: 2.96 MU/L (ref 0.4–4)

## 2020-03-23 PROCEDURE — 84146 ASSAY OF PROLACTIN: CPT | Performed by: INTERNAL MEDICINE

## 2020-03-23 RX ORDER — OLANZAPINE 20 MG/1
20 TABLET ORAL AT BEDTIME
COMMUNITY
End: 2021-07-12

## 2020-03-23 RX ORDER — ZOLPIDEM TARTRATE 10 MG/1
10 TABLET ORAL
COMMUNITY
End: 2021-07-12

## 2020-03-23 ASSESSMENT — PAIN SCALES - GENERAL: PAINLEVEL: NO PAIN (0)

## 2020-03-23 ASSESSMENT — MIFFLIN-ST. JEOR: SCORE: 2088.84

## 2020-03-23 NOTE — PROGRESS NOTES
--------------   Endocrinology Follow up ------------    Reason for visit/consult: F/u on prolactinoma.    Primary care provider: Tae Garcia      Assessment and Plan  A 39 year old female with history of prolactinoma s/p resection 2000 and subsequent treatment of cabergoline, restarted in 2017 for persistent levels, amenorrhea, fatigue, weight gain. Currently decreasing levels. Still has amenorrhea, but fatigue resolved, and losing weight.    # Prolactinoma  Post surgical no recurrence, last MRI 2017  Taking cabergoline 0.5 mg Wednesday and Saturday for almost 2 years, PRL level bordeline but stable.    - PRL level today  - Meanwhile ontinue current cabergloin 0.5 mg BIW      # Amenorrhea  Started Provera 5 days a motnh by OB GYN Dr. Khan since 2018, and started to have flows    # Hypothyroidism  Last visit euthryoid    - TSH, free T4 today  - Continue current LT4 112 mcg    # Obesity BMI66  Significantly decreasing weight, likely due to Victoza and amybe effects of improving high PRL.  Switched from Victoza to Trulicity 1.5 mg weekly since summer 2018   - Continue Trulicity 1.5 mg once a week  - Encouraged exercise/walking (current OT twice a week)    # Bone health  - calcium citrate plus D (630 mg Ca, D 500ID) for bone health    # Pre DM  5.4 in 2016, improving, fasting glucose 100-110 range     - A1C to check    # Communication    Call or send letter she prefers    - RTC with me in 1 year    We spent 30 minutes with this patient face to face and explained the conditions and plans (more than 50% of time was counseling/coordination of care, treatment plan of elevated prolactin, went over the trends of prolactin levels and made summary for patient to understand) . The patient understood and is satisfied with today's visit. Return to clinic with me in 1 year.     Mandy Farfan MD  Staff Physician  Endocrinology and Metabolism  [facility]  License: [place]  [id number]  Pager: [phone]    Invretval History: Patient has been doing well.  Gained 12 pounds but is still better than before.  Switched from Victoza to Trulicity at weight management clinic in [facility].  Currently taking a.m. fasting glucose which is range from .  Compliant and tolerated to cabergoline 0.5 mg twice a week.  Went to OB started to Provera 5 days a months started to have menstrual  flow.   HPI: A 38 year old female with follow up prolactinoma, original diagnosis was made in 1997. Today is the 4th visit. The patient had a surgical resection in 2000 and subsequently treated with cabergoline.   Patient had been seen by Dr. CLINTON Moran before (since 2002). In 2012, cabergoline was discontinued and her PRL became mildly elevated (PRL 50-60s), and she has had amenorrhea for 3 years. Although official radiology report did not mention residual tumor, we assumed possible small residual, we resumed cabergoline 0.5 mg BIW, which she is currenlty taking. Most recent PRL is trending down since then and most recent level was 27.9.     She also has psychiatry issues, however,currently has been improving, with tapering psychiatry medications. No recent  Hospitalization for a year.     She also has primary hypothyroidism, which is being replaced with levothyroxine 112 mcg daily.     Of note, she lost 35 lb since started Victoza, which was statrted at [facility] bariatric clinic. She is on OT started 3 moth, switcehd breakfast cut down mild, 2 eggs, and fruits.     Denied HA, dizziness, breast tenderness, galactorrhea.   She is still amenorrhea.     ENDO VITALS-UMP 12/31/2018 4/17/2018 11/14/2017 5/10/2017   Weight 340 lb 8 oz 324 lb 14.4 oz 349 lb 9.6 oz 371 lb 6.4 oz     ENDO VITALS-UMP 1/10/2017 7/6/2016 7/6/2016 3/14/2016   Weight 361 lb 8 oz  379 lb 6.4 oz 382 lb 3.2 oz     ENDO VITALS-UMP 1/6/2016 1/6/2016   Weight  368 lb 11.2 oz         Past Medical/Surgical History:  Past Medical History:   Diagnosis  Date     Acne rosacea      Bipolar disorder (H) 1992     GERD (gastroesophageal reflux disease)      Irritable bowel syndrome      Pituitary adenoma (H)     S/P trans nasal resection in 2000     Past Surgical History:   Procedure Laterality Date     pituitary tumor removal  2000    at the [facility]     septal plasty  1998     TONSILLECTOMY  1998       Allergies:  Allergies   Allergen Reactions     Fiorinal [Butalbital-Aspirin-Caffeine] Anaphylaxis and Swelling     Ativan [Lorazepam]      Bee Venom      Ibuprofen Sodium      Eyes swell     Keflex [Cephalexin-Fd&C Yellow #6]      Eyes swell     Latex Swelling     Trileptal      numbness     Topamax Rash       Current Medications   Current Outpatient Medications   Medication     cabergoline (DOSTINEX) 0.5 MG tablet     calcium citrate-vitamin D (CALCIUM CITRATE + D) 315-250 MG-UNIT TABS per tablet     doxycycline (VIBRAMYCIN) 100 MG capsule     FENOFIBRATE PO     furosemide (LASIX) 40 MG tablet     Hypertonic Nasal Wash (SINUS RINSE KIT) PACK     levothyroxine (SYNTHROID/LEVOTHROID) 112 MCG tablet     medroxyPROGESTERone (PROVERA) 10 MG tablet     Multiple Vitamins-Minerals (CERTAVITE/ANTIOXIDANTS PO)     OLANZapine (ZYPREXA) 20 MG tablet     omeprazole (PRILOSEC) 40 MG capsule     zolpidem (AMBIEN) 10 MG tablet     No current facility-administered medications for this visit.        Family History:  Family History   Problem Relation Age of Onset     Thyroid Disease Mother         hypothyroidism     Diabetes Mother      Coronary Artery Disease No family hx of      Hypertension No family hx of      Hyperlipidemia No family hx of      Cerebrovascular Disease No family hx of      Breast Cancer No family hx of      Colon Cancer No family hx of      Prostate Cancer No family hx of      Other Cancer No family hx of      Depression No family hx of      Anxiety Disorder No family hx of      Mental Illness No family hx of      Substance Abuse No family hx of      Anesthesia Reaction  No family hx of      Asthma No family hx of      Osteoporosis No family hx of      Genetic Disorder No family hx of      Obesity No family hx of      Unknown/Adopted No family hx of        Social History:  Social History     Tobacco Use     Smoking status: Never Smoker     Smokeless tobacco: Never Used   Substance Use Topics     Alcohol use: No       ROS:  10 point negative except as mentioned in HPI    Exam    ENDO VITALS-UMP 4/17/2018 11/14/2017 5/10/2017 1/10/2017   Weight 147.374 kg 158.578 kg 168.466 kg 163.975 kg     ENDO VITALS-UMP 7/6/2016 7/6/2016 3/14/2016   Weight  172.095 kg 173.365 kg     Blood pressure 128/82, pulse 99, height 1.524 m (5'), weight 148.7 kg (327 lb 14.4 oz).  Gen: morbidly obese female in NAD  HEENT: anicteric, EOMI, no proptosis or lid lag  Visual field: intact  Occular motor: full  Thyroid: no thyroid goiter or cervical LAD  Cardio: RRR, no m/r/g  Resp: CTAB. No wheezing or crackles  Abd: soft, NT/ND. Normal BS  Skin: Warm and dry. No rash or lesions.   Legs: bilateral swelling/enlarged, with soft skins, non-pitting.  Feet: no deformities or ulcers, 2+ DP pulses  Neuro: A&Ox3, relaxation phase of reflexes normal, no tremor on outstretched arms  Psych: Normal affect and mood.    Labs/Imaging  Brain MRI 1/2017  Impression:   1. Postsurgical changes from transsphenoidal resection of pituitary  adenoma without evidence of recurrence.  2. Single hyperintensity on FLAIR in the right semiovale similar to  prior study of questionable clinical significance.  3. Stable mild dural thickening and enhancement over the convexities  which is most likely postsurgical in nature. This could also be  related to CSF leak or intracranial hypotension. Total  TSH   Date Value Ref Range Status   12/31/2018 3.22 0.40 - 4.00 mU/L Final   02/28/2018 2.80 0.30 - 5.00 mcU/mL Final   01/10/2017 3.09 0.40 - 4.00 mU/L Final   07/06/2016 3.78 0.40 - 4.00 mU/L Final   01/06/2016 5.69 (H) 0.40 - 4.00 mU/L Final     T4  Free   Date Value Ref Range Status   12/31/2018 1.37 0.76 - 1.46 ng/dL Final   02/28/2018 1.4 0.7 - 1.8 ng/dL Final   01/10/2017 1.21 0.76 - 1.46 ng/dL Final   07/06/2016 1.32 0.76 - 1.46 ng/dL Final   01/06/2016 1.23 0.76 - 1.46 ng/dL Final   ]  Lab Results   Component Value Date    A1C 5.7 01/06/2016

## 2020-03-23 NOTE — LETTER
3/23/2020       RE: Bushra Buck  [address]  [place] [address]     Dear Colleague,    Thank you for referring your patient, Bushra Buck, to the [facility] ENDOCRINOLOGY at [facility]. Please see a copy of my visit note below.                                        --------------   Endocrinology Follow up ------------    Reason for visit/consult: F/u on prolactinoma.    Primary care provider: Tae Garcia      Assessment and Plan  A 39 year old female with history of prolactinoma s/p resection 2000 and subsequent treatment of cabergoline, restarted in 2017 for persistent levels, amenorrhea, fatigue, weight gain. Currently decreasing levels. Still has amenorrhea, but fatigue resolved, and losing weight.    # Prolactinoma  Post surgical no recurrence, last MRI 2017  Taking cabergoline 0.5 mg Wednesday and Saturday for almost 2 years, PRL level bordeline but stable.    - PRL level today  - Meanwhile ontinue current cabergloin 0.5 mg BIW      # Amenorrhea  Started Provera 5 days a motnh by OB GYN Dr. Khan since 2018, and started to have flows    # Hypothyroidism  Last visit euthryoid    - TSH, free T4 today  - Continue current LT4 112 mcg    # Obesity BMI66  Significantly decreasing weight, likely due to Victoza and amybe effects of improving high PRL.  Switched from Victoza to Trulicity 1.5 mg weekly since summer 2018   - Continue Trulicity 1.5 mg once a week  - Encouraged exercise/walking (current OT twice a week)    # Bone health  - calcium citrate plus D (630 mg Ca, D 500ID) for bone health    # Pre DM  5.4 in 2016, improving, fasting glucose 100-110 range     - A1C to check    # Communication    Call or send letter she prefers    - RTC with me in 1 year    We spent 30 minutes with this patient face to face and explained the conditions and plans (more than 50% of time was counseling/coordination of care, treatment plan of elevated prolactin, went  over the trends of prolactin levels and made summary for patient to understand) . The patient understood and is satisfied with today's visit. Return to clinic with me in 1 year.     Mandy Farfan MD  Staff Physician  Endocrinology and Metabolism  [facility]  License: [id number]  Pager: [phone]    Invretval History: Patient has been doing well.  Gained 12 pounds but is still better than before.  Switched from Victoza to Trulicity at weight management clinic in [facility].  Currently taking a.m. fasting glucose which is range from .  Compliant and tolerated to cabergoline 0.5 mg twice a week.  Went to OB started to Provera 5 days a months started to have menstrual  flow.   HPI: A 38 year old female with follow up prolactinoma, original diagnosis was made in 1997. Today is the 4th visit. The patient had a surgical resection in 2000 and subsequently treated with cabergoline.   Patient had been seen by Dr. CLINTON Moran before (since 2002). In 2012, cabergoline was discontinued and her PRL became mildly elevated (PRL 50-60s), and she has had amenorrhea for 3 years. Although official radiology report did not mention residual tumor, we assumed possible small residual, we resumed cabergoline 0.5 mg BIW, which she is currenlty taking. Most recent PRL is trending down since then and most recent level was 27.9.     She also has psychiatry issues, however,currently has been improving, with tapering psychiatry medications. No recent  Hospitalization for a year.     She also has primary hypothyroidism, which is being replaced with levothyroxine 112 mcg daily.     Of note, she lost 35 lb since started Victoza, which was statrted at [facility] bariatric clinic. She is on OT started 3 moth, switcehd breakfast cut down mild, 2 eggs, and fruits.     Denied HA, dizziness, breast tenderness, galactorrhea.   She is still amenorrhea.     ENDO VITALS-[facility] 12/31/2018 4/17/2018 11/14/2017 5/10/2017   Weight 340 lb 8  oz 324 lb 14.4 oz 349 lb 9.6 oz 371 lb 6.4 oz     [facility] 1/10/2017 7/6/2016 7/6/2016 3/14/2016   Weight 361 lb 8 oz  379 lb 6.4 oz 382 lb 3.2 oz     ENDO VITALS-[facility] 1/6/2016 1/6/2016   Weight  368 lb 11.2 oz         Past Medical/Surgical History:  Past Medical History:   Diagnosis Date     Acne rosacea      Bipolar disorder (H) 1992     GERD (gastroesophageal reflux disease)      Irritable bowel syndrome      Pituitary adenoma (H)     S/P trans nasal resection in 2000     Past Surgical History:   Procedure Laterality Date     pituitary tumor removal  2000    at [facility]     septal plasty  1998     TONSILLECTOMY  1998       Allergies:  Allergies   Allergen Reactions     Fiorinal [Butalbital-Aspirin-Caffeine] Anaphylaxis and Swelling     Ativan [Lorazepam]      Bee Venom      Ibuprofen Sodium      Eyes swell     Keflex [Cephalexin-Fd&C Yellow #6]      Eyes swell     Latex Swelling     Trileptal      numbness     Topamax Rash       Current Medications   Current Outpatient Medications   Medication     cabergoline (DOSTINEX) 0.5 MG tablet     calcium citrate-vitamin D (CALCIUM CITRATE + D) 315-250 MG-UNIT TABS per tablet     doxycycline (VIBRAMYCIN) 100 MG capsule     FENOFIBRATE PO     furosemide (LASIX) 40 MG tablet     Hypertonic Nasal Wash (SINUS RINSE KIT) PACK     levothyroxine (SYNTHROID/LEVOTHROID) 112 MCG tablet     medroxyPROGESTERone (PROVERA) 10 MG tablet     Multiple Vitamins-Minerals (CERTAVITE/ANTIOXIDANTS PO)     OLANZapine (ZYPREXA) 20 MG tablet     omeprazole (PRILOSEC) 40 MG capsule     zolpidem (AMBIEN) 10 MG tablet     No current facility-administered medications for this visit.        Family History:  Family History   Problem Relation Age of Onset     Thyroid Disease Mother         hypothyroidism     Diabetes Mother      Coronary Artery Disease No family hx of      Hypertension No family hx of      Hyperlipidemia No family hx of      Cerebrovascular Disease No family hx of      Breast  Cancer No family hx of      Colon Cancer No family hx of      Prostate Cancer No family hx of      Other Cancer No family hx of      Depression No family hx of      Anxiety Disorder No family hx of      Mental Illness No family hx of      Substance Abuse No family hx of      Anesthesia Reaction No family hx of      Asthma No family hx of      Osteoporosis No family hx of      Genetic Disorder No family hx of      Obesity No family hx of      Unknown/Adopted No family hx of        Social History:  Social History     Tobacco Use     Smoking status: Never Smoker     Smokeless tobacco: Never Used   Substance Use Topics     Alcohol use: No       ROS:  10 point negative except as mentioned in HPI    Exam    [facility] VITALS-[facility] 4/17/2018 11/14/2017 5/10/2017 1/10/2017   Weight 147.374 kg 158.578 kg 168.466 kg 163.975 kg     [facility] V[facility]ALS[facility] 7/6/2016 7/6/2016 3/14/2016   Weight  172.095 kg 173.365 kg     Blood pressure 128/82, pulse 99, height 1.524 m (5'), weight 148.7 kg (327 lb 14.4 oz).  Gen: morbidly obese female in NAD  HEENT: anicteric, EOMI, no proptosis or lid lag  Visual field: intact  Occular motor: full  Thyroid: no thyroid goiter or cervical LAD  Cardio: RRR, no m/r/g  Resp: CTAB. No wheezing or crackles  Abd: soft, NT/ND. Normal BS  Skin: Warm and dry. No rash or lesions.   Legs: bilateral swelling/enlarged, with soft skins, non-pitting.  Feet: no deformities or ulcers, 2+ DP pulses  Neuro: A&Ox3, relaxation phase of reflexes normal, no tremor on outstretched arms  Psych: Normal affect and mood.    Labs/Imaging  Brain MRI 1/2017  Impression:   1. Postsurgical changes from transsphenoidal resection of pituitary  adenoma without evidence of recurrence.  2. Single hyperintensity on FLAIR in the right semiovale similar to  prior study of questionable clinical significance.  3. Stable mild dural thickening and enhancement over the convexities  which is most likely postsurgical in nature. This could also be  related to  CSF leak or intracranial hypotension. Total  TSH   Date Value Ref Range Status   12/31/2018 3.22 0.40 - 4.00 mU/L Final   02/28/2018 2.80 0.30 - 5.00 mcU/mL Final   01/10/2017 3.09 0.40 - 4.00 mU/L Final   07/06/2016 3.78 0.40 - 4.00 mU/L Final   01/06/2016 5.69 (H) 0.40 - 4.00 mU/L Final     T4 Free   Date Value Ref Range Status   12/31/2018 1.37 0.76 - 1.46 ng/dL Final   02/28/2018 1.4 0.7 - 1.8 ng/dL Final   01/10/2017 1.21 0.76 - 1.46 ng/dL Final   07/06/2016 1.32 0.76 - 1.46 ng/dL Final   01/06/2016 1.23 0.76 - 1.46 ng/dL Final   ]  Lab Results   Component Value Date    A1C 5.7 01/06/2016         Again, thank you for allowing me to participate in the care of your patient.      Sincerely,    Mandy Farfan MD

## 2020-04-10 DIAGNOSIS — E28.319 EARLY MENOPAUSE: ICD-10-CM

## 2020-05-13 ENCOUNTER — COMMUNICATION - HEALTHEAST (OUTPATIENT)
Dept: SURGERY | Facility: CLINIC | Age: 40
End: 2020-05-13

## 2020-05-13 DIAGNOSIS — D35.2 PROLACTINOMA (H): ICD-10-CM

## 2020-05-13 DIAGNOSIS — E11.9 TYPE 2 DIABETES MELLITUS WITHOUT COMPLICATION, WITHOUT LONG-TERM CURRENT USE OF INSULIN (H): ICD-10-CM

## 2020-05-13 DIAGNOSIS — D35.2 PITUITARY ADENOMA (H): ICD-10-CM

## 2020-05-13 DIAGNOSIS — E66.01 MORBID OBESITY WITH BMI OF 60.0-69.9, ADULT (H): ICD-10-CM

## 2020-05-15 RX ORDER — CABERGOLINE 0.5 MG/1
0.5 TABLET ORAL
Qty: 8 TABLET | Refills: 1 | Status: SHIPPED | OUTPATIENT
Start: 2020-05-18 | End: 2020-07-07

## 2020-05-15 NOTE — TELEPHONE ENCOUNTER
cabergoline (DOSTINEX) 0.5 MG tablet   Take 1 tablet (0.5 mg) by mouth twice a week      Last Written Prescription Date:  1/30/20  Last Fill Quantity: 8,   # refills: 1  Last Office Visit : 3/23/20  Future Office visit:  none    Routing refill request to provider for review/approval because:  Drug not on refill protocol

## 2020-05-29 ENCOUNTER — COMMUNICATION - HEALTHEAST (OUTPATIENT)
Dept: VASCULAR SURGERY | Facility: CLINIC | Age: 40
End: 2020-05-29

## 2020-06-01 ENCOUNTER — HOME CARE/HOSPICE - HEALTHEAST (OUTPATIENT)
Dept: HOME HEALTH SERVICES | Facility: HOME HEALTH | Age: 40
End: 2020-06-01

## 2020-06-01 ENCOUNTER — OFFICE VISIT - HEALTHEAST (OUTPATIENT)
Dept: VASCULAR SURGERY | Facility: CLINIC | Age: 40
End: 2020-06-01

## 2020-06-01 DIAGNOSIS — E66.01 MORBID OBESITY WITH BMI OF 60.0-69.9, ADULT (H): ICD-10-CM

## 2020-06-01 DIAGNOSIS — I87.2 VENOUS INSUFFICIENCY OF BOTH LOWER EXTREMITIES: ICD-10-CM

## 2020-06-01 DIAGNOSIS — I89.0 LYMPHEDEMA: ICD-10-CM

## 2020-06-01 DIAGNOSIS — E11.9 TYPE 2 DIABETES MELLITUS WITHOUT COMPLICATION, WITHOUT LONG-TERM CURRENT USE OF INSULIN (H): ICD-10-CM

## 2020-06-01 DIAGNOSIS — I87.303 VENOUS HYPERTENSION OF LOWER EXTREMITY, BILATERAL: ICD-10-CM

## 2020-06-01 DIAGNOSIS — L97.221 ULCER OF LEFT CALF, LIMITED TO BREAKDOWN OF SKIN (H): ICD-10-CM

## 2020-06-01 DIAGNOSIS — L97.211 ULCER OF RIGHT CALF, LIMITED TO BREAKDOWN OF SKIN (H): ICD-10-CM

## 2020-06-01 ASSESSMENT — MIFFLIN-ST. JEOR: SCORE: 2170.02

## 2020-06-03 ENCOUNTER — HOME CARE/HOSPICE - HEALTHEAST (OUTPATIENT)
Dept: HOME HEALTH SERVICES | Facility: HOME HEALTH | Age: 40
End: 2020-06-03

## 2020-06-03 ENCOUNTER — COMMUNICATION - HEALTHEAST (OUTPATIENT)
Dept: HOME HEALTH SERVICES | Facility: HOME HEALTH | Age: 40
End: 2020-06-03

## 2020-06-08 ENCOUNTER — RECORDS - HEALTHEAST (OUTPATIENT)
Dept: LAB | Facility: CLINIC | Age: 40
End: 2020-06-08

## 2020-06-09 LAB — TSH SERPL DL<=0.005 MIU/L-ACNC: 3.75 UIU/ML (ref 0.3–5)

## 2020-06-10 ENCOUNTER — COMMUNICATION - HEALTHEAST (OUTPATIENT)
Dept: SURGERY | Facility: CLINIC | Age: 40
End: 2020-06-10

## 2020-06-10 DIAGNOSIS — E11.9 TYPE 2 DIABETES MELLITUS WITHOUT COMPLICATION, WITHOUT LONG-TERM CURRENT USE OF INSULIN (H): ICD-10-CM

## 2020-06-10 DIAGNOSIS — E66.01 MORBID OBESITY WITH BMI OF 60.0-69.9, ADULT (H): ICD-10-CM

## 2020-06-11 ENCOUNTER — OFFICE VISIT - HEALTHEAST (OUTPATIENT)
Dept: SURGERY | Facility: CLINIC | Age: 40
End: 2020-06-11

## 2020-06-11 DIAGNOSIS — E11.9 TYPE 2 DIABETES MELLITUS WITHOUT COMPLICATION, WITHOUT LONG-TERM CURRENT USE OF INSULIN (H): ICD-10-CM

## 2020-06-11 DIAGNOSIS — E66.01 MORBID OBESITY WITH BMI OF 60.0-69.9, ADULT (H): ICD-10-CM

## 2020-06-11 ASSESSMENT — MIFFLIN-ST. JEOR: SCORE: 2191.5

## 2020-06-13 LAB — ANA SER QL: 2.2 U

## 2020-07-06 ENCOUNTER — NURSE TRIAGE (OUTPATIENT)
Dept: NURSING | Facility: CLINIC | Age: 40
End: 2020-07-06

## 2020-07-07 ENCOUNTER — TELEPHONE (OUTPATIENT)
Dept: ENDOCRINOLOGY | Facility: CLINIC | Age: 40
End: 2020-07-07

## 2020-07-07 DIAGNOSIS — D35.2 PITUITARY ADENOMA (H): ICD-10-CM

## 2020-07-07 DIAGNOSIS — D35.2 PROLACTINOMA (H): ICD-10-CM

## 2020-07-07 RX ORDER — CABERGOLINE 0.5 MG/1
0.5 TABLET ORAL
Qty: 8 TABLET | Refills: 5 | Status: SHIPPED | OUTPATIENT
Start: 2020-07-09 | End: 2020-09-18

## 2020-07-07 NOTE — TELEPHONE ENCOUNTER
Patient says she was seen by her doctor at [facility] today for cellulitis in right leg.  Patient says her doctor told her to go to the ED if swelling goes pass the area where he rehan a line.  Patient says she was started on Augmentin and says she started feeling nauseas after eating and had  diarrhea twice.  First dose was tonight at 5 pm.  FNA advised if her doctor told her to go to the ED then she should.  Patient says she will check to see if she can get a ride there if not then will go tomorrow.   Reviewed care advice with caller per RN triage protocol guideline.  Caller verbalized understanding and agrees with plan.          Reason for Disposition    [1] Taking antibiotic < 24 hours AND [2] cellulitis symptoms are WORSE (e.g., spreading redness, pain, swelling, drainage) AND [3] no fever    Additional Information    Negative: Shock suspected (e.g., cold/pale/clammy skin, too weak to stand, low BP, rapid pulse)    Negative: Sounds like a life-threatening emergency to the triager    Negative: SEVERE pain    Negative: [1] SEVERE pain with bending of finger (or toe) AND [2] cellulitis on hand (or foot)    Negative: Fever > 104 F (40 C)    Negative: [1] Widespread rash AND [2] bright red, sunburn-like    Negative: Black (necrotic), dark purple, or blisters develop in area of cellulitis    Negative: Patient sounds very sick or weak to the triager    Negative: [1] Taking antibiotic > 24 hours AND [2] fever > 100.5 F (38.1 C)    Negative: [1] Taking antibiotic > 24 hours AND [2] red streak (or line) runs from area of infection    Negative: [1] Fever > 100.0 F (37.8 C) AND [2] new onset    Negative: [1] Red streak runs from area of infection AND [2] new onset    Negative: [1] Caller has URGENT question AND [2] triager unable to answer question    Negative: [1] Taking antibiotic > 24 hours AND [2] cellulitis symptoms are WORSE (e.g., spreading redness, pain, swelling)    Negative: [1] Finished taking antibiotic AND [2]  cellulitis symptoms are WORSE (e.g., redness, pain  drainage, swelling)    Negative: [1] Red streak (or line) runs from area of infection AND [2] longer than 4 inches (10 cm)    Protocols used: CELLULITIS ON ANTIBIOTIC FOLLOW-UP CALL-A-AH

## 2020-07-07 NOTE — TELEPHONE ENCOUNTER
[facility] Call Center    Phone Message    May a detailed message be left on voicemail: yes     Reason for Call: Medication Refill Request    Has the patient contacted the pharmacy for the refill? Yes   Name of medication being requested: cabergoline (DOSTINEX) 0.5 MG tablet   Provider who prescribed the medication: Adolph  Pharmacy: [facility] [phone]  Date medication is needed: 7/2020     Pt needing next months Rx      Action Taken: Message routed to:  [facility] ([facility]): jill Shea    Travel Screening: Not Applicable

## 2020-07-07 NOTE — TELEPHONE ENCOUNTER
Centralized Medication Refill Team note:   cabergoline (DOSTINEX) 0.5 MG tablet     Last Written Prescription Date:  5/18/20  Last Fill Quantity: 8,   # refills: 1  Last Office Visit : 3/23/20  Future Office visit:  None/ recommended one year    Routing refill request to provider for review/approval because:  Drug not on the [facility], [facility] or [facility] refill protocol             Notified per MyChart: Your refill request has been entered and sent to your physician for authorization. Please call your pharmacy prior to going in to pick it up.

## 2020-07-10 ENCOUNTER — COMMUNICATION - HEALTHEAST (OUTPATIENT)
Dept: VASCULAR SURGERY | Facility: CLINIC | Age: 40
End: 2020-07-10

## 2020-07-13 ENCOUNTER — OFFICE VISIT - HEALTHEAST (OUTPATIENT)
Dept: VASCULAR SURGERY | Facility: CLINIC | Age: 40
End: 2020-07-13

## 2020-07-13 DIAGNOSIS — E11.9 TYPE 2 DIABETES MELLITUS WITHOUT COMPLICATION, WITHOUT LONG-TERM CURRENT USE OF INSULIN (H): ICD-10-CM

## 2020-07-13 DIAGNOSIS — M79.89 SWELLING OF LIMB: ICD-10-CM

## 2020-07-13 DIAGNOSIS — I89.0 LYMPHEDEMA: ICD-10-CM

## 2020-07-13 DIAGNOSIS — I87.2 VENOUS INSUFFICIENCY OF BOTH LOWER EXTREMITIES: ICD-10-CM

## 2020-07-13 DIAGNOSIS — L97.221 ULCER OF LEFT CALF, LIMITED TO BREAKDOWN OF SKIN (H): ICD-10-CM

## 2020-07-13 DIAGNOSIS — L97.211 ULCER OF RIGHT CALF, LIMITED TO BREAKDOWN OF SKIN (H): ICD-10-CM

## 2020-07-13 DIAGNOSIS — E66.01 MORBID OBESITY WITH BMI OF 60.0-69.9, ADULT (H): ICD-10-CM

## 2020-07-13 DIAGNOSIS — Q87.11 PRADER-WILLI SYNDROME: ICD-10-CM

## 2020-07-13 DIAGNOSIS — I87.303 VENOUS HYPERTENSION OF LOWER EXTREMITY, BILATERAL: ICD-10-CM

## 2020-07-13 ASSESSMENT — MIFFLIN-ST. JEOR: SCORE: 2224.45

## 2020-07-15 ENCOUNTER — COMMUNICATION - HEALTHEAST (OUTPATIENT)
Dept: OTHER | Facility: CLINIC | Age: 40
End: 2020-07-15

## 2020-07-22 ENCOUNTER — COMMUNICATION - HEALTHEAST (OUTPATIENT)
Dept: OTHER | Facility: CLINIC | Age: 40
End: 2020-07-22

## 2020-07-29 ENCOUNTER — AMBULATORY - HEALTHEAST (OUTPATIENT)
Dept: OTHER | Facility: CLINIC | Age: 40
End: 2020-07-29

## 2020-07-29 ENCOUNTER — COMMUNICATION - HEALTHEAST (OUTPATIENT)
Dept: VASCULAR SURGERY | Facility: CLINIC | Age: 40
End: 2020-07-29

## 2020-08-11 DIAGNOSIS — D35.2 PITUITARY ADENOMA (H): ICD-10-CM

## 2020-08-14 RX ORDER — LEVOTHYROXINE SODIUM 112 UG/1
112 TABLET ORAL DAILY
Qty: 90 TABLET | Refills: 1 | Status: SHIPPED | OUTPATIENT
Start: 2020-08-14 | End: 2021-01-29

## 2020-08-26 ENCOUNTER — COMMUNICATION - HEALTHEAST (OUTPATIENT)
Dept: OTHER | Facility: CLINIC | Age: 40
End: 2020-08-26

## 2020-09-02 ENCOUNTER — RECORDS - HEALTHEAST (OUTPATIENT)
Dept: LAB | Facility: CLINIC | Age: 40
End: 2020-09-02

## 2020-09-03 LAB
ALBUMIN SERPL-MCNC: 4.2 G/DL (ref 3.5–5)
ALP SERPL-CCNC: 64 U/L (ref 45–120)
ALT SERPL W P-5'-P-CCNC: 57 U/L (ref 0–45)
ANION GAP SERPL CALCULATED.3IONS-SCNC: 13 MMOL/L (ref 5–18)
AST SERPL W P-5'-P-CCNC: 53 U/L (ref 0–40)
BILIRUB SERPL-MCNC: 0.3 MG/DL (ref 0–1)
BUN SERPL-MCNC: 14 MG/DL (ref 8–22)
CALCIUM SERPL-MCNC: 9.5 MG/DL (ref 8.5–10.5)
CHLORIDE BLD-SCNC: 107 MMOL/L (ref 98–107)
CHOLEST SERPL-MCNC: 178 MG/DL
CO2 SERPL-SCNC: 19 MMOL/L (ref 22–31)
CREAT SERPL-MCNC: 0.83 MG/DL (ref 0.6–1.1)
FASTING STATUS PATIENT QL REPORTED: ABNORMAL
GFR SERPL CREATININE-BSD FRML MDRD: >60 ML/MIN/1.73M2
GLUCOSE BLD-MCNC: 135 MG/DL (ref 70–125)
HDLC SERPL-MCNC: 31 MG/DL
LDLC SERPL CALC-MCNC: 106 MG/DL
LDLC SERPL CALC-MCNC: ABNORMAL MG/DL
POTASSIUM BLD-SCNC: 3.9 MMOL/L (ref 3.5–5)
PROT SERPL-MCNC: 7.6 G/DL (ref 6–8)
SODIUM SERPL-SCNC: 139 MMOL/L (ref 136–145)
TRIGL SERPL-MCNC: 509 MG/DL

## 2020-09-08 DIAGNOSIS — D35.2 PROLACTINOMA (H): ICD-10-CM

## 2020-09-08 DIAGNOSIS — D35.2 PITUITARY ADENOMA (H): ICD-10-CM

## 2020-09-12 RX ORDER — CABERGOLINE 0.5 MG/1
TABLET ORAL
Qty: 10 TABLET | Refills: 1 | OUTPATIENT
Start: 2020-09-12

## 2020-09-15 DIAGNOSIS — D35.2 PROLACTINOMA (H): ICD-10-CM

## 2020-09-15 DIAGNOSIS — D35.2 PITUITARY ADENOMA (H): ICD-10-CM

## 2020-09-18 RX ORDER — CABERGOLINE 0.5 MG/1
0.5 TABLET ORAL
Qty: 24 TABLET | Refills: 3 | Status: SHIPPED | OUTPATIENT
Start: 2020-09-21 | End: 2021-08-13

## 2020-09-18 NOTE — TELEPHONE ENCOUNTER
CABERGOLINE 0.5 MG TABS 0.5 TAB    Last Written Prescription Date:  7/9/2020  Last Fill Quantity: 8,   # refills: 5  Last Office Visit : 3/23/2020  Future Office visit:  None    Routing refill request to provider for review/approval because:  Drug not on the [facility], [facility] or [facility] refill protocol or controlled substance      Maribel Pat RN  Central Triage Red Flags/Med Refills

## 2020-10-14 ENCOUNTER — RECORDS - HEALTHEAST (OUTPATIENT)
Dept: LAB | Facility: CLINIC | Age: 40
End: 2020-10-14

## 2020-10-14 LAB
ANION GAP SERPL CALCULATED.3IONS-SCNC: 11 MMOL/L (ref 5–18)
BUN SERPL-MCNC: 13 MG/DL (ref 8–22)
CALCIUM SERPL-MCNC: 9.1 MG/DL (ref 8.5–10.5)
CHLORIDE BLD-SCNC: 108 MMOL/L (ref 98–107)
CO2 SERPL-SCNC: 22 MMOL/L (ref 22–31)
CREAT SERPL-MCNC: 0.79 MG/DL (ref 0.6–1.1)
GFR SERPL CREATININE-BSD FRML MDRD: >60 ML/MIN/1.73M2
GLUCOSE BLD-MCNC: 124 MG/DL (ref 70–125)
POTASSIUM BLD-SCNC: 4.1 MMOL/L (ref 3.5–5)
SODIUM SERPL-SCNC: 141 MMOL/L (ref 136–145)

## 2020-10-20 ENCOUNTER — COMMUNICATION - HEALTHEAST (OUTPATIENT)
Dept: VASCULAR SURGERY | Facility: CLINIC | Age: 40
End: 2020-10-20

## 2020-10-21 ENCOUNTER — OFFICE VISIT - HEALTHEAST (OUTPATIENT)
Dept: SURGERY | Facility: CLINIC | Age: 40
End: 2020-10-21

## 2020-10-21 DIAGNOSIS — E11.9 TYPE 2 DIABETES MELLITUS WITHOUT COMPLICATION, WITHOUT LONG-TERM CURRENT USE OF INSULIN (H): ICD-10-CM

## 2020-10-21 ASSESSMENT — MIFFLIN-ST. JEOR: SCORE: 2206.31

## 2020-10-30 ENCOUNTER — COMMUNICATION - HEALTHEAST (OUTPATIENT)
Dept: SCHEDULING | Facility: CLINIC | Age: 40
End: 2020-10-30

## 2020-11-24 ENCOUNTER — COMMUNICATION - HEALTHEAST (OUTPATIENT)
Dept: SCHEDULING | Facility: CLINIC | Age: 40
End: 2020-11-24

## 2020-12-03 ENCOUNTER — OFFICE VISIT - HEALTHEAST (OUTPATIENT)
Dept: VASCULAR SURGERY | Facility: CLINIC | Age: 40
End: 2020-12-03

## 2020-12-03 DIAGNOSIS — M21.071 ACQUIRED VALGUS DEFORMITY OF BOTH ANKLES: ICD-10-CM

## 2020-12-03 DIAGNOSIS — I87.333 CHRONIC VENOUS HYPERTENSION (IDIOPATHIC) WITH ULCER AND INFLAMMATION OF BILATERAL LOWER EXTREMITY (H): ICD-10-CM

## 2020-12-03 DIAGNOSIS — Q87.11 PRADER-WILLI SYNDROME: ICD-10-CM

## 2020-12-03 DIAGNOSIS — M21.962 FOOT DEFORMITY, BILATERAL: ICD-10-CM

## 2020-12-03 DIAGNOSIS — E11.622: ICD-10-CM

## 2020-12-03 DIAGNOSIS — L97.909: ICD-10-CM

## 2020-12-03 DIAGNOSIS — E66.01 MORBID OBESITY WITH BMI OF 60.0-69.9, ADULT (H): ICD-10-CM

## 2020-12-03 DIAGNOSIS — E11.9 TYPE 2 DIABETES MELLITUS WITHOUT COMPLICATION, WITHOUT LONG-TERM CURRENT USE OF INSULIN (H): ICD-10-CM

## 2020-12-03 DIAGNOSIS — M21.072 ACQUIRED VALGUS DEFORMITY OF BOTH ANKLES: ICD-10-CM

## 2020-12-03 DIAGNOSIS — M79.89 SWELLING OF LIMB: ICD-10-CM

## 2020-12-03 DIAGNOSIS — I87.2 VENOUS INSUFFICIENCY OF BOTH LOWER EXTREMITIES: ICD-10-CM

## 2020-12-03 DIAGNOSIS — M21.961 FOOT DEFORMITY, BILATERAL: ICD-10-CM

## 2020-12-03 ASSESSMENT — MIFFLIN-ST. JEOR: SCORE: 2219.91

## 2020-12-21 ENCOUNTER — AMBULATORY - HEALTHEAST (OUTPATIENT)
Dept: OTHER | Facility: CLINIC | Age: 40
End: 2020-12-21

## 2021-01-22 ENCOUNTER — COMMUNICATION - HEALTHEAST (OUTPATIENT)
Dept: SURGERY | Facility: CLINIC | Age: 41
End: 2021-01-22

## 2021-01-22 DIAGNOSIS — E66.01 MORBID OBESITY WITH BMI OF 60.0-69.9, ADULT (H): ICD-10-CM

## 2021-01-22 DIAGNOSIS — E11.9 TYPE 2 DIABETES MELLITUS WITHOUT COMPLICATION, WITHOUT LONG-TERM CURRENT USE OF INSULIN (H): ICD-10-CM

## 2021-01-26 DIAGNOSIS — D35.2 PITUITARY ADENOMA (H): ICD-10-CM

## 2021-01-29 RX ORDER — LEVOTHYROXINE SODIUM 112 UG/1
TABLET ORAL
Qty: 90 TABLET | Refills: 0 | Status: SHIPPED | OUTPATIENT
Start: 2021-01-29 | End: 2021-04-29

## 2021-03-03 ENCOUNTER — RECORDS - HEALTHEAST (OUTPATIENT)
Dept: LAB | Facility: CLINIC | Age: 41
End: 2021-03-03

## 2021-03-03 LAB
C DIFF TOX B STL QL: NEGATIVE
RIBOTYPE 027/NAP1/BI: NORMAL
WBC STL QL MICRO: NORMAL

## 2021-03-04 LAB
O+P STL MICRO: NORMAL
SHIGA TOXIN 1: NEGATIVE
SHIGA TOXIN 2: NEGATIVE

## 2021-03-06 LAB — BACTERIA SPEC CULT: NORMAL

## 2021-03-15 ENCOUNTER — RECORDS - HEALTHEAST (OUTPATIENT)
Dept: LAB | Facility: CLINIC | Age: 41
End: 2021-03-15

## 2021-03-16 LAB
ANION GAP SERPL CALCULATED.3IONS-SCNC: 12 MMOL/L (ref 5–18)
BUN SERPL-MCNC: 18 MG/DL (ref 8–22)
CALCIUM SERPL-MCNC: 9.5 MG/DL (ref 8.5–10.5)
CHLORIDE BLD-SCNC: 107 MMOL/L (ref 98–107)
CO2 SERPL-SCNC: 22 MMOL/L (ref 22–31)
CREAT SERPL-MCNC: 0.66 MG/DL (ref 0.6–1.1)
GFR SERPL CREATININE-BSD FRML MDRD: >60 ML/MIN/1.73M2
GLUCOSE BLD-MCNC: 97 MG/DL (ref 70–125)
POTASSIUM BLD-SCNC: 3.7 MMOL/L (ref 3.5–5)
SARS-COV-2 PCR COMMENT: NORMAL
SARS-COV-2 RNA SPEC QL NAA+PROBE: NEGATIVE
SARS-COV-2 VIRUS SPECIMEN SOURCE: NORMAL
SODIUM SERPL-SCNC: 141 MMOL/L (ref 136–145)

## 2021-03-17 ENCOUNTER — VIRTUAL VISIT (OUTPATIENT)
Dept: ENDOCRINOLOGY | Facility: CLINIC | Age: 41
End: 2021-03-17
Payer: MEDICARE

## 2021-03-17 ENCOUNTER — RECORDS - HEALTHEAST (OUTPATIENT)
Dept: ADMINISTRATIVE | Facility: OTHER | Age: 41
End: 2021-03-17

## 2021-03-17 ENCOUNTER — COMMUNICATION - HEALTHEAST (OUTPATIENT)
Dept: VASCULAR SURGERY | Facility: CLINIC | Age: 41
End: 2021-03-17

## 2021-03-17 DIAGNOSIS — E11.65 TYPE 2 DIABETES MELLITUS WITH HYPERGLYCEMIA, WITHOUT LONG-TERM CURRENT USE OF INSULIN (H): Primary | ICD-10-CM

## 2021-03-17 DIAGNOSIS — D35.2 PROLACTINOMA (H): ICD-10-CM

## 2021-03-17 DIAGNOSIS — E03.9 HYPOTHYROIDISM, UNSPECIFIED TYPE: ICD-10-CM

## 2021-03-17 DIAGNOSIS — E66.01 MORBID OBESITY (H): ICD-10-CM

## 2021-03-17 PROCEDURE — 99215 OFFICE O/P EST HI 40 MIN: CPT | Mod: 95 | Performed by: INTERNAL MEDICINE

## 2021-03-17 RX ORDER — GLIMEPIRIDE 2 MG/1
2 TABLET ORAL
Qty: 90 TABLET | Refills: 3 | Status: SHIPPED | OUTPATIENT
Start: 2021-03-17 | End: 2023-11-21

## 2021-03-17 NOTE — LETTER
3/17/2021       RE: Bushra Buck  [address]  [place] [address]     Dear Colleague,    Thank you for referring your patient, Bushra Buck, to the [facility] [place] [facility]. Please see a copy of my visit note below.    Error.     acquried hypothyroidism  Kj Ruelas, ELSA    Bushra is a 40 year old who is being evaluated via a billable telephone visit.      What phone number would you like to be contacted at? [phone]  How would you like to obtain your AVS? Mail a copy      Visit completed via [employer].   Start: 12:00pm  End: 12:32pm  Total time for this session including review lab, arranging test with cordinating with nursing staffs: 45 minutes      Endocrinology follow up      Subjective:   A 38 year old female with follow up prolactinoma, original diagnosis was made in 1997. The patient had a surgical resection in 2000 and subsequently treated with cabergoline.   Patient had been seen by Dr. CLINTON Moran before (since 2002). In 2012, cabergoline was discontinued and her PRL became mildly elevated (PRL 50-60s), and she has had amenorrhea for 4-5 years. She is taking Provera, but this is no longer causing bleeding. Although official radiology report did not mention residual tumor, we assumed possible small residual, we resumed cabergoline 0.5 mg BIW, which she is currenlty taking. Most recent PRL level was 61 (3/2020).      She also has psychiatry issues, however, currently has been improving, with tapering psychiatry medications. No recent hospitalization.      She also has primary hypothyroidism, which is being replaced with levothyroxine 112 mcg daily.      Interval history   She was diagnosed with type 2 diabetes in the summer of 2020, and she is currently taking Trulicity 3mg once a week. She has also gained about 50 pounds since last March and currently weighs 376. She is having daily diarrhea  for the last four months, but she is seeing her primary doctor about this condition tomorrow. She is experiencing no breast tenderness, no galactorrhea, no headaches, but is experiencing ongoing amenorrhea despite Provera therapy. She has tried to incorporate more walking into her life, but she admits to still overeating at times.     Active diagnoses this visit:  Data Unavailable    Review Of Systems  Current Outpatient Medications   Medication     cabergoline (DOSTINEX) 0.5 MG tablet     calcium citrate-vitamin D (CALCIUM CITRATE + D) 315-250 MG-UNIT TABS per tablet     doxycycline (VIBRAMYCIN) 100 MG capsule     dulaglutide (TRULICITY) 0.75 MG/0.5ML pen     FENOFIBRATE PO     furosemide (LASIX) 40 MG tablet     Hypertonic Nasal Wash (SINUS RINSE KIT) PACK     levothyroxine (SYNTHROID/LEVOTHROID) 112 MCG tablet     medroxyPROGESTERone (PROVERA) 10 MG tablet     Multiple Vitamins-Minerals (CERTAVITE/ANTIOXIDANTS PO)     omeprazole (PRILOSEC) 40 MG capsule     zolpidem (AMBIEN) 10 MG tablet     OLANZapine (ZYPREXA) 20 MG tablet     No current facility-administered medications for this visit.      Family history: mother has history of diabetes and thyroid disease    Objective:  Exam:  Exam completed via video visit.   GENERAL: Patient is actively engaged in conversation, alert, no acute distress    ASSESSMENT / PLAN:  No diagnosis found.  A 40 year old female with history of prolactinoma s/p resection 2000 and subsequent treatment of cabergoline, restarted in 2017 for persistent levels, amenorrhea, fatigue, weight gain. She is still having amenorrhea and significant weight gain and has also developed type 2 diabetes.     # Prolactinoma  Post surgical no recurrence, last MRI 2017. Not currently experiencing any headaches or galactorrhea, is experiencing amenorrhea refractory to hormone stimulation  Taking cabergoline 0.5 mg Wednesday and Saturday for almost 3 years, last PRL 61 (3/2020).  - PRL level today  -  Meanwhile ontinue current cabergoline 0.5 mg BIW     # Amenorrhea  Started Provera 5 days a month by OB GYN Dr. Khan since 2018, and initially had flows, but now has not for last year.   -Recommend follow-up with OB/GYN     # Hypothyroidism  On levothyroxine 112mcg. Last visit euthryoid. Not experiencing any hypo or hyperthyroid symptoms other than weight gain.  -TSH/T4 today  -Continue levothyroxine     # Obesity BMI 71  #Type 2 diabetes  Has gained a significant amount of weight and has now been diagnosed with type 2 diabetes. Is currently taking Trulicity 3mg/week and is trying to increase exercise, but has still gained a large amount of weight. Although our records do not have a recent A1C, recent blood sugars indicate that she needs more help with her diabetes management.   -Continue Trulicity 3mg weekly  -Start glimepiride 2mg daily  -Check A1C  -Comprehensive metabolic panel  -Continue to watch diet and increase exercise     # Bone health  - calcium citrate plus D (630 mg Ca, D 500ID) for bone health    #Hypertriglyceridemia  Triglyceride levels >500 in September. Has started taking fenofibrate.  -Continue fenofibrate  -Check lipid panel   - RTC with me in 1 year    Elis Palomino, MS4                                        --------------   Endocrinology Follow up ------------    Reason for visit/consult: F/u on prolactinoma.    Primary care provider: Tae Garcia      Assessment and Plan  A 39 year old female with history of prolactinoma s/p resection 2000 and subsequent treatment of cabergoline, restarted in 2017 for persistent levels, amenorrhea, fatigue, weight gain. Currently decreasing levels. Still has amenorrhea, but fatigue resolved, and losing weight.    # Prolactinoma  Post surgical no recurrence, last MRI 2017. Not currently experiencing any headaches or galactorrhea, is experiencing amenorrhea refractory to hormone stimulation  Taking cabergoline 0.5 mg Wednesday and Saturday for almost 3  years, last PRL 61 (3/2020).  - PRL level to repeat this year    - Meanwhile ontinue current cabergoline 0.5 mg BIW     # Amenorrhea  Started Provera 5 days a month by OB GYN Dr. Khan since 2018, and initially had flows, but now has not for last year.        # Hypothyroidism  On levothyroxine 112mcg. Last visit euthryoid. Not experiencing any hypo or hyperthyroid symptoms other than weight gain.  -TSH/T4 to repeat   -Continue levothyroxine 112 mcg       # Obesity BMI 71    #Type 2 diabetes (New)     Has gained a significant amount of weight and has now been diagnosed with type 2 diabetes. Is currently taking Trulicity 3mg/week and is trying to increase exercise, but has still gained a large amount of weight. Although our records do not have a recent A1C, recent blood sugars indicate that she needs more help with her diabetes management.     -Continue Trulicity 3mg weekly  -Start glimepiride 2mg daily (I will communicate with her PMD who is managing her DM)  -Check A1C  -Comprehensive metabolic panel  -Continue to watch diet and increase exercise     # Bone health  - calcium citrate plus D (630 mg Ca, D 500ID) for bone health    #Hypertriglyceridemia  Triglyceride levels >500 in September. Has started taking fenofibrate.  -Continue fenofibrate  -Check lipid panel      RTC with me in 1 year      Mandy Farfan MD  Staff Physician  Endocrinology and Metabolism  [facility] [employer]  License: [id number]  Pager: [phone]    Invretval History: Patient has been doing well.  Gained 12 pounds but is still better than before.  Switched from Victoza to Trulicity at weight management clinic in [facility].  Currently taking a.m. fasting glucose which is range from .  Compliant and tolerated to cabergoline 0.5 mg twice a week.  Went to OB started to Provera 5 days a months started to have menstrual  flow.   HPI: A 38 year old female with follow up prolactinoma, original diagnosis was made in 1997. Today is the  4th visit. The patient had a surgical resection in 2000 and subsequently treated with cabergoline.   Patient had been seen by Dr. CLINTON Moran before (since 2002). In 2012, cabergoline was discontinued and her PRL became mildly elevated (PRL 50-60s), and she has had amenorrhea for 3 years. Although official radiology report did not mention residual tumor, we assumed possible small residual, we resumed cabergoline 0.5 mg BIW, which she is currenlty taking. Most recent PRL is trending down since then and most recent level was 27.9.     She also has psychiatry issues, however,currently has been improving, with tapering psychiatry medications. No recent  Hospitalization for a year.     She also has primary hypothyroidism, which is being replaced with levothyroxine 112 mcg daily.     Of note, she lost 35 lb since started Victoza, which was statrted at [facility] bariatric clinic. She is on OT started 3 moth, switcehd breakfast cut down mild, 2 eggs, and fruits.     Denied HA, dizziness, breast tenderness, galactorrhea.   She is still amenorrhea.     [facility]ALS[facility] 12/31/2018 4/17/2018 11/14/2017 5/10/2017   Weight 340 lb 8 oz 324 lb 14.4 oz 349 lb 9.6 oz 371 lb 6.4 oz     [facility] 1/10/2017 7/6/2016 7/6/2016 3/14/2016   Weight 361 lb 8 oz  379 lb 6.4 oz 382 lb 3.2 oz     [facility]ALS[facility] 1/6/2016 1/6/2016   Weight  368 lb 11.2 oz         Past Medical/Surgical History:  Past Medical History:   Diagnosis Date     Acne rosacea      Bipolar disorder (H) 1992     GERD (gastroesophageal reflux disease)      Irritable bowel syndrome      Pituitary adenoma (H)     S/P trans nasal resection in 2000     Past Surgical History:   Procedure Laterality Date     pituitary tumor removal  2000    at the [facility]     septal plasty  1998     TONSILLECTOMY  1998       Allergies:  Allergies   Allergen Reactions     Fiorinal [Butalbital-Aspirin-Caffeine] Anaphylaxis and Swelling     Ativan [Lorazepam]      Bee Venom      Ibuprofen Sodium       Eyes swell     Keflex [Cephalexin-Fd&C Yellow #6]      Eyes swell     Latex Swelling     Trileptal      numbness     Topamax Rash       Current Medications   Current Outpatient Medications   Medication     cabergoline (DOSTINEX) 0.5 MG tablet     calcium citrate-vitamin D (CALCIUM CITRATE + D) 315-250 MG-UNIT TABS per tablet     doxycycline (VIBRAMYCIN) 100 MG capsule     dulaglutide (TRULICITY) 0.75 MG/0.5ML pen     FENOFIBRATE PO     furosemide (LASIX) 40 MG tablet     glimepiride (AMARYL) 2 MG tablet     Hypertonic Nasal Wash (SINUS RINSE KIT) PACK     levothyroxine (SYNTHROID/LEVOTHROID) 112 MCG tablet     medroxyPROGESTERone (PROVERA) 10 MG tablet     Multiple Vitamins-Minerals (CERTAVITE/ANTIOXIDANTS PO)     omeprazole (PRILOSEC) 40 MG capsule     zolpidem (AMBIEN) 10 MG tablet     OLANZapine (ZYPREXA) 20 MG tablet     No current facility-administered medications for this visit.        Family History:  Family History   Problem Relation Age of Onset     Thyroid Disease Mother         hypothyroidism     Diabetes Mother      Coronary Artery Disease No family hx of      Hypertension No family hx of      Hyperlipidemia No family hx of      Cerebrovascular Disease No family hx of      Breast Cancer No family hx of      Colon Cancer No family hx of      Prostate Cancer No family hx of      Other Cancer No family hx of      Depression No family hx of      Anxiety Disorder No family hx of      Mental Illness No family hx of      Substance Abuse No family hx of      Anesthesia Reaction No family hx of      Asthma No family hx of      Osteoporosis No family hx of      Genetic Disorder No family hx of      Obesity No family hx of      Unknown/Adopted No family hx of        Social History:  Social History     Tobacco Use     Smoking status: Never Smoker     Smokeless tobacco: Never Used   Substance Use Topics     Alcohol use: No       ROS:  10 point negative except as mentioned in HPI    Exam    [facility] 4/17/2018  11/14/2017 5/10/2017 1/10/2017   Weight 147.374 kg 158.578 kg 168.466 kg 163.975 kg     [facility]ALS[facility] 7/6/2016 7/6/2016 3/14/2016   Weight  172.095 kg 173.365 kg     There were no vitals taken for this visit.  Gen: morbidly obese female in NAD  HEENT: anicteric, no proptosis or lid lag  Visual field: intact  Resp: no acute distress  Neuro: A&Ox3, relaxation phase of reflexes normal, no tremor on outstretched arms  Psych: Normal affect and mood.    Labs/Imaging  Brain MRI 1/2017  Impression:   1. Postsurgical changes from transsphenoidal resection of pituitary  adenoma without evidence of recurrence.  2. Single hyperintensity on FLAIR in the right semiovale similar to  prior study of questionable clinical significance.  3. Stable mild dural thickening and enhancement over the convexities  which is most likely postsurgical in nature. This could also be  related to CSF leak or intracranial hypotension. Total  TSH   Date Value Ref Range Status   03/23/2020 2.96 0.40 - 4.00 mU/L Final   12/31/2018 3.22 0.40 - 4.00 mU/L Final   02/28/2018 2.80 0.30 - 5.00 mcU/mL Final   01/10/2017 3.09 0.40 - 4.00 mU/L Final   07/06/2016 3.78 0.40 - 4.00 mU/L Final     T4 Free   Date Value Ref Range Status   12/31/2018 1.37 0.76 - 1.46 ng/dL Final   02/28/2018 1.4 0.7 - 1.8 ng/dL Final   01/10/2017 1.21 0.76 - 1.46 ng/dL Final   07/06/2016 1.32 0.76 - 1.46 ng/dL Final   01/06/2016 1.23 0.76 - 1.46 ng/dL Final   ]  Lab Results   Component Value Date    A1C 5.7 01/06/2016

## 2021-03-17 NOTE — PROGRESS NOTES
acquried hypothyroidism  Kj RuelasELSA is a 40 year old who is being evaluated via a billable telephone visit.      What phone number would you like to be contacted at? [phone]  How would you like to obtain your AVS? Mail a copy      Visit completed via [employer] [facility].   Start: 12:00pm  End: 12:32pm  Total time for this session including review lab, arranging test with cordinating with nursing staffs: 45 minutes      Endocrinology follow up      Subjective:   A 38 year old female with follow up prolactinoma, original diagnosis was made in 1997. The patient had a surgical resection in 2000 and subsequently treated with cabergoline.   Patient had been seen by Dr. CLINOTN Moran before (since 2002). In 2012, cabergoline was discontinued and her PRL became mildly elevated (PRL 50-60s), and she has had amenorrhea for 4-5 years. She is taking Provera, but this is no longer causing bleeding. Although official radiology report did not mention residual tumor, we assumed possible small residual, we resumed cabergoline 0.5 mg BIW, which she is currenlty taking. Most recent PRL level was 61 (3/2020).      She also has psychiatry issues, however, currently has been improving, with tapering psychiatry medications. No recent hospitalization.      She also has primary hypothyroidism, which is being replaced with levothyroxine 112 mcg daily.      Interval history   She was diagnosed with type 2 diabetes in the summer of 2020, and she is currently taking Trulicity 3mg once a week. She has also gained about 50 pounds since last March and currently weighs 376. She is having daily diarrhea for the last four months, but she is seeing her primary doctor about this condition tomorrow. She is experiencing no breast tenderness, no galactorrhea, no headaches, but is experiencing ongoing amenorrhea despite Provera therapy. She has tried to incorporate more walking into her life, but she admits to still overeating at times.      Active diagnoses this visit:  Data Unavailable    Review Of Systems  Current Outpatient Medications   Medication     cabergoline (DOSTINEX) 0.5 MG tablet     calcium citrate-vitamin D (CALCIUM CITRATE + D) 315-250 MG-UNIT TABS per tablet     doxycycline (VIBRAMYCIN) 100 MG capsule     dulaglutide (TRULICITY) 0.75 MG/0.5ML pen     FENOFIBRATE PO     furosemide (LASIX) 40 MG tablet     Hypertonic Nasal Wash (SINUS RINSE KIT) PACK     levothyroxine (SYNTHROID/LEVOTHROID) 112 MCG tablet     medroxyPROGESTERone (PROVERA) 10 MG tablet     Multiple Vitamins-Minerals (CERTAVITE/ANTIOXIDANTS PO)     omeprazole (PRILOSEC) 40 MG capsule     zolpidem (AMBIEN) 10 MG tablet     OLANZapine (ZYPREXA) 20 MG tablet     No current facility-administered medications for this visit.      Family history: mother has history of diabetes and thyroid disease    Objective:  Exam:  Exam completed via video visit.   GENERAL: Patient is actively engaged in conversation, alert, no acute distress    ASSESSMENT / PLAN:  No diagnosis found.  A 40 year old female with history of prolactinoma s/p resection 2000 and subsequent treatment of cabergoline, restarted in 2017 for persistent levels, amenorrhea, fatigue, weight gain. She is still having amenorrhea and significant weight gain and has also developed type 2 diabetes.     # Prolactinoma  Post surgical no recurrence, last MRI 2017. Not currently experiencing any headaches or galactorrhea, is experiencing amenorrhea refractory to hormone stimulation  Taking cabergoline 0.5 mg Wednesday and Saturday for almost 3 years, last PRL 61 (3/2020).  - PRL level today  - Meanwhile ontinue current cabergoline 0.5 mg BIW     # Amenorrhea  Started Provera 5 days a month by OB GYN Dr. Khan since 2018, and initially had flows, but now has not for last year.   -Recommend follow-up with OB/GYN     # Hypothyroidism  On levothyroxine 112mcg. Last visit euthryoid. Not experiencing any hypo or hyperthyroid symptoms  other than weight gain.  -TSH/T4 today  -Continue levothyroxine     # Obesity BMI 71  #Type 2 diabetes  Has gained a significant amount of weight and has now been diagnosed with type 2 diabetes. Is currently taking Trulicity 3mg/week and is trying to increase exercise, but has still gained a large amount of weight. Although our records do not have a recent A1C, recent blood sugars indicate that she needs more help with her diabetes management.   -Continue Trulicity 3mg weekly  -Start glimepiride 2mg daily  -Check A1C  -Comprehensive metabolic panel  -Continue to watch diet and increase exercise     # Bone health  - calcium citrate plus D (630 mg Ca, D 500ID) for bone health    #Hypertriglyceridemia  Triglyceride levels >500 in September. Has started taking fenofibrate.  -Continue fenofibrate  -Check lipid panel   - RTC with me in 1 year    Elis Palomino, MS4                                        --------------   Endocrinology Follow up ------------    Reason for visit/consult: F/u on prolactinoma.    Primary care provider: Tae Garcia      Assessment and Plan  A 39 year old female with history of prolactinoma s/p resection 2000 and subsequent treatment of cabergoline, restarted in 2017 for persistent levels, amenorrhea, fatigue, weight gain. Currently decreasing levels. Still has amenorrhea, but fatigue resolved, and losing weight.    # Prolactinoma  Post surgical no recurrence, last MRI 2017. Not currently experiencing any headaches or galactorrhea, is experiencing amenorrhea refractory to hormone stimulation  Taking cabergoline 0.5 mg Wednesday and Saturday for almost 3 years, last PRL 61 (3/2020).  - PRL level to repeat this year    - Meanwhile ontinue current cabergoline 0.5 mg BIW     # Amenorrhea  Started Provera 5 days a month by OB GYN Dr. Khan since 2018, and initially had flows, but now has not for last year.        # Hypothyroidism  On levothyroxine 112mcg. Last visit euthryoid. Not experiencing  any hypo or hyperthyroid symptoms other than weight gain.  -TSH/T4 to repeat   -Continue levothyroxine 112 mcg       # Obesity BMI 71    #Type 2 diabetes ([place])     Has gained a significant amount of weight and has now been diagnosed with type 2 diabetes. Is currently taking Trulicity 3mg/week and is trying to increase exercise, but has still gained a large amount of weight. Although our records do not have a recent A1C, recent blood sugars indicate that she needs more help with her diabetes management.     -Continue Trulicity 3mg weekly  -Start glimepiride 2mg daily (I will communicate with her PMD who is managing her DM)  -Check A1C  -Comprehensive metabolic panel  -Continue to watch diet and increase exercise     # Bone health  - calcium citrate plus D (630 mg Ca, D 500ID) for bone health    #Hypertriglyceridemia  Triglyceride levels >500 in September. Has started taking fenofibrate.  -Continue fenofibrate  -Check lipid panel      RTC with me in 1 year      Mandy Farfan MD  Staff Physician  Endocrinology and Metabolism  [facility]  License: [place] [id number]  Pager: [phone]    Invretval History: Patient has been doing well.  Gained 12 pounds but is still better than before.  Switched from Victoza to Trulicity at weight management clinic in [facility].  Currently taking a.m. fasting glucose which is range from .  Compliant and tolerated to cabergoline 0.5 mg twice a week.  Went to OB started to Provera 5 days a months started to have menstrual  flow.   HPI: A 38 year old female with follow up prolactinoma, original diagnosis was made in 1997. Today is the 4th visit. The patient had a surgical resection in 2000 and subsequently treated with cabergoline.   Patient had been seen by Dr. CLINTON Moran before (since 2002). In 2012, cabergoline was discontinued and her PRL became mildly elevated (PRL 50-60s), and she has had amenorrhea for 3 years. Although official radiology report did not mention  residual tumor, we assumed possible small residual, we resumed cabergoline 0.5 mg BIW, which she is currenlty taking. Most recent PRL is trending down since then and most recent level was 27.9.     She also has psychiatry issues, however,currently has been improving, with tapering psychiatry medications. No recent  Hospitalization for a year.     She also has primary hypothyroidism, which is being replaced with levothyroxine 112 mcg daily.     Of note, she lost 35 lb since started Victoza, which was statrted at [facility]. She is on OT started 3 moth, switcehd breakfast cut down mild, 2 eggs, and fruits.     Denied HA, dizziness, breast tenderness, galactorrhea.   She is still amenorrhea.     ENDO VITALS-[facility] 12/31/2018 4/17/2018 11/14/2017 5/10/2017   Weight 340 lb 8 oz 324 lb 14.4 oz 349 lb 9.6 oz 371 lb 6.4 oz     ENDO VITALS-[facility] 1/10/2017 7/6/2016 7/6/2016 3/14/2016   Weight 361 lb 8 oz  379 lb 6.4 oz 382 lb 3.2 oz     ENDO VITALS-[facility] 1/6/2016 1/6/2016   Weight  368 lb 11.2 oz         Past Medical/Surgical History:  Past Medical History:   Diagnosis Date     Acne rosacea      Bipolar disorder (H) 1992     GERD (gastroesophageal reflux disease)      Irritable bowel syndrome      Pituitary adenoma (H)     S/P trans nasal resection in 2000     Past Surgical History:   Procedure Laterality Date     pituitary tumor removal  2000    at the [facility]     septal plasty  1998     TONSILLECTOMY  1998       Allergies:  Allergies   Allergen Reactions     Fiorinal [Butalbital-Aspirin-Caffeine] Anaphylaxis and Swelling     Ativan [Lorazepam]      Bee Venom      Ibuprofen Sodium      Eyes swell     Keflex [Cephalexin-Fd&C Yellow #6]      Eyes swell     Latex Swelling     Trileptal      numbness     Topamax Rash       Current Medications   Current Outpatient Medications   Medication     cabergoline (DOSTINEX) 0.5 MG tablet     calcium citrate-vitamin D (CALCIUM CITRATE + D) 315-250 MG-UNIT TABS per tablet      doxycycline (VIBRAMYCIN) 100 MG capsule     dulaglutide (TRULICITY) 0.75 MG/0.5ML pen     FENOFIBRATE PO     furosemide (LASIX) 40 MG tablet     glimepiride (AMARYL) 2 MG tablet     Hypertonic Nasal Wash (SINUS RINSE KIT) PACK     levothyroxine (SYNTHROID/LEVOTHROID) 112 MCG tablet     medroxyPROGESTERone (PROVERA) 10 MG tablet     Multiple Vitamins-Minerals (CERTAVITE/ANTIOXIDANTS PO)     omeprazole (PRILOSEC) 40 MG capsule     zolpidem (AMBIEN) 10 MG tablet     OLANZapine (ZYPREXA) 20 MG tablet     No current facility-administered medications for this visit.        Family History:  Family History   Problem Relation Age of Onset     Thyroid Disease Mother         hypothyroidism     Diabetes Mother      Coronary Artery Disease No family hx of      Hypertension No family hx of      Hyperlipidemia No family hx of      Cerebrovascular Disease No family hx of      Breast Cancer No family hx of      Colon Cancer No family hx of      Prostate Cancer No family hx of      Other Cancer No family hx of      Depression No family hx of      Anxiety Disorder No family hx of      Mental Illness No family hx of      Substance Abuse No family hx of      Anesthesia Reaction No family hx of      Asthma No family hx of      Osteoporosis No family hx of      Genetic Disorder No family hx of      Obesity No family hx of      Unknown/Adopted No family hx of        Social History:  Social History     Tobacco Use     Smoking status: Never Smoker     Smokeless tobacco: Never Used   Substance Use Topics     Alcohol use: No       ROS:  10 point negative except as mentioned in HPI    Exam    ENDO VITALS-[facility] 4/17/2018 11/14/2017 5/10/2017 1/10/2017   Weight 147.374 kg 158.578 kg 168.466 kg 163.975 kg     ENDO VITALS-[facility] 7/6/2016 7/6/2016 3/14/2016   Weight  172.095 kg 173.365 kg     There were no vitals taken for this visit.  Gen: morbidly obese female in NAD  HEENT: anicteric, no proptosis or lid lag  Visual field: intact  Resp: no acute  distress  Neuro: A&Ox3, relaxation phase of reflexes normal, no tremor on outstretched arms  Psych: Normal affect and mood.    Labs/Imaging  Brain MRI 1/2017  Impression:   1. Postsurgical changes from transsphenoidal resection of pituitary  adenoma without evidence of recurrence.  2. Single hyperintensity on FLAIR in the right semiovale similar to  prior study of questionable clinical significance.  3. Stable mild dural thickening and enhancement over the convexities  which is most likely postsurgical in nature. This could also be  related to CSF leak or intracranial hypotension. Total  TSH   Date Value Ref Range Status   03/23/2020 2.96 0.40 - 4.00 mU/L Final   12/31/2018 3.22 0.40 - 4.00 mU/L Final   02/28/2018 2.80 0.30 - 5.00 mcU/mL Final   01/10/2017 3.09 0.40 - 4.00 mU/L Final   07/06/2016 3.78 0.40 - 4.00 mU/L Final     T4 Free   Date Value Ref Range Status   12/31/2018 1.37 0.76 - 1.46 ng/dL Final   02/28/2018 1.4 0.7 - 1.8 ng/dL Final   01/10/2017 1.21 0.76 - 1.46 ng/dL Final   07/06/2016 1.32 0.76 - 1.46 ng/dL Final   01/06/2016 1.23 0.76 - 1.46 ng/dL Final   ]  Lab Results   Component Value Date    A1C 5.7 01/06/2016

## 2021-03-17 NOTE — LETTER
March 18, 2021      Bushra KONSTANTIN Buck  [address]  [place] [address]        Dear Dr. Azul,    I hope everything is well with you. I would like to update our mutual patient. Her A1C is 7.4, I agree with your plan with Trulicity, I also recommended patient to add on glimepiride 2 mg daily.     Regarding her prolactinoma, seems stable.         If you have any problems or concerns, please call myself or my nurse at the number above.    Sincerely,      Mandy Farfan MD  Staff Physician  Endocrinology and Metabolism  [facility]

## 2021-03-19 ENCOUNTER — TELEPHONE (OUTPATIENT)
Dept: ENDOCRINOLOGY | Facility: CLINIC | Age: 41
End: 2021-03-19

## 2021-03-19 NOTE — TELEPHONE ENCOUNTER
Geo of 03/17/2021 from [place] mailed to LES Jackson via [employer].  Erasto Azul MD  [facility]  [address] [place] [address]  Appointments:  [phone]    Shell Howell RN on 3/19/2021 at 9:06 AM       RE    Clinical Letter Summary    Letter from: Mandy Farfan      Could you send this letter to LES Daley his office is in [place].

## 2021-03-22 ENCOUNTER — OFFICE VISIT - HEALTHEAST (OUTPATIENT)
Dept: VASCULAR SURGERY | Facility: CLINIC | Age: 41
End: 2021-03-22

## 2021-03-22 DIAGNOSIS — I87.333 CHRONIC VENOUS HYPERTENSION (IDIOPATHIC) WITH ULCER AND INFLAMMATION OF BILATERAL LOWER EXTREMITY (H): ICD-10-CM

## 2021-03-22 DIAGNOSIS — M79.89 SWELLING OF LIMB: ICD-10-CM

## 2021-03-22 DIAGNOSIS — I89.0 LYMPHEDEMA: ICD-10-CM

## 2021-03-22 DIAGNOSIS — I87.2 VENOUS INSUFFICIENCY OF BOTH LOWER EXTREMITIES: ICD-10-CM

## 2021-03-22 DIAGNOSIS — L03.116 LEFT LEG CELLULITIS: ICD-10-CM

## 2021-03-22 DIAGNOSIS — L97.922 CHRONIC ULCER OF LEFT LOWER EXTREMITY WITH FAT LAYER EXPOSED (H): ICD-10-CM

## 2021-03-22 DIAGNOSIS — E66.01 MORBID OBESITY WITH BMI OF 70 AND OVER, ADULT (H): ICD-10-CM

## 2021-03-22 DIAGNOSIS — E11.622: ICD-10-CM

## 2021-03-22 DIAGNOSIS — L97.909: ICD-10-CM

## 2021-03-25 ENCOUNTER — COMMUNICATION - HEALTHEAST (OUTPATIENT)
Dept: VASCULAR SURGERY | Facility: CLINIC | Age: 41
End: 2021-03-25

## 2021-03-25 ENCOUNTER — AMBULATORY - HEALTHEAST (OUTPATIENT)
Dept: VASCULAR SURGERY | Facility: CLINIC | Age: 41
End: 2021-03-25

## 2021-03-25 DIAGNOSIS — L03.116 LEFT LEG CELLULITIS: ICD-10-CM

## 2021-03-25 LAB
BACTERIA SPEC CULT: ABNORMAL
BACTERIA SPEC CULT: ABNORMAL
GRAM STAIN RESULT: ABNORMAL

## 2021-04-07 DIAGNOSIS — D35.2 PROLACTINOMA (H): ICD-10-CM

## 2021-04-07 DIAGNOSIS — E11.65 TYPE 2 DIABETES MELLITUS WITH HYPERGLYCEMIA, WITHOUT LONG-TERM CURRENT USE OF INSULIN (H): ICD-10-CM

## 2021-04-07 LAB
CHOLEST SERPL-MCNC: 115 MG/DL
HDLC SERPL-MCNC: 35 MG/DL
LDLC SERPL CALC-MCNC: 40 MG/DL
NONHDLC SERPL-MCNC: 80 MG/DL
PROLACTIN SERPL-MCNC: 54 UG/L (ref 3–27)
T4 FREE SERPL-MCNC: 1.39 NG/DL (ref 0.76–1.46)
TRIGL SERPL-MCNC: 199 MG/DL
TSH SERPL DL<=0.005 MIU/L-ACNC: 3.51 MU/L (ref 0.4–4)

## 2021-04-07 PROCEDURE — 84443 ASSAY THYROID STIM HORMONE: CPT | Performed by: INTERNAL MEDICINE

## 2021-04-07 PROCEDURE — 36415 COLL VENOUS BLD VENIPUNCTURE: CPT

## 2021-04-07 PROCEDURE — 84146 ASSAY OF PROLACTIN: CPT | Performed by: INTERNAL MEDICINE

## 2021-04-07 PROCEDURE — 80061 LIPID PANEL: CPT | Performed by: INTERNAL MEDICINE

## 2021-04-07 PROCEDURE — 84439 ASSAY OF FREE THYROXINE: CPT | Performed by: INTERNAL MEDICINE

## 2021-04-14 ENCOUNTER — TELEPHONE (OUTPATIENT)
Dept: ENDOCRINOLOGY | Facility: CLINIC | Age: 41
End: 2021-04-14

## 2021-04-14 NOTE — TELEPHONE ENCOUNTER
[facility]    Phone Message    May a detailed message be left on voicemail: no- not set up yet, per pt    Reason for Call: Requesting Results   Name/type of test: Bloodwork  Date of test: 4/7/21  Was test done at a location other than [facility] (Please fill in the location if not [facility])?: No    Pt would like a call back to review test results.  Please call.     Action Taken: Message routed to:  [facility] endo    Travel Screening: Not Applicable

## 2021-04-14 NOTE — TELEPHONE ENCOUNTER
"Spoke w/ Pt: States \"this is good news\" has been working with a friend,\" walking every day, celebrating her birthday today.   Confirms understanding of lab review.   Shell Howell RN on 4/14/2021 at 12:33 PM       Mandy Guzmán MD   4/9/2021  2:53 PM CDT      Dear Bushra      Here is your results. Sable prpolactin. Triglyceride improved.       Please call nursing line, if you are [facility] patient at [phone], if you are [facility] patient at [phone],  if you have any questions.     Please take care  Mandy Farfan MD       "

## 2021-04-19 ENCOUNTER — OFFICE VISIT - HEALTHEAST (OUTPATIENT)
Dept: SURGERY | Facility: CLINIC | Age: 41
End: 2021-04-19

## 2021-04-19 ENCOUNTER — DOCUMENTATION ONLY (OUTPATIENT)
Dept: OTHER | Facility: CLINIC | Age: 41
End: 2021-04-19

## 2021-04-19 ENCOUNTER — OFFICE VISIT - HEALTHEAST (OUTPATIENT)
Dept: VASCULAR SURGERY | Facility: CLINIC | Age: 41
End: 2021-04-19

## 2021-04-19 ENCOUNTER — COMMUNICATION - HEALTHEAST (OUTPATIENT)
Dept: VASCULAR SURGERY | Facility: CLINIC | Age: 41
End: 2021-04-19

## 2021-04-19 ENCOUNTER — AMBULATORY - HEALTHEAST (OUTPATIENT)
Dept: OTHER | Facility: CLINIC | Age: 41
End: 2021-04-19

## 2021-04-19 DIAGNOSIS — Q87.11 PRADER-WILLI SYNDROME: ICD-10-CM

## 2021-04-19 DIAGNOSIS — E11.622: ICD-10-CM

## 2021-04-19 DIAGNOSIS — E66.01 MORBID OBESITY WITH BMI OF 70 AND OVER, ADULT (H): ICD-10-CM

## 2021-04-19 DIAGNOSIS — I87.333 CHRONIC VENOUS HYPERTENSION (IDIOPATHIC) WITH ULCER AND INFLAMMATION OF BILATERAL LOWER EXTREMITY (H): ICD-10-CM

## 2021-04-19 DIAGNOSIS — I89.0 LYMPHEDEMA: ICD-10-CM

## 2021-04-19 DIAGNOSIS — I87.2 VENOUS INSUFFICIENCY OF BOTH LOWER EXTREMITIES: ICD-10-CM

## 2021-04-19 DIAGNOSIS — M79.89 SWELLING OF LIMB: ICD-10-CM

## 2021-04-19 DIAGNOSIS — L97.909: ICD-10-CM

## 2021-04-19 DIAGNOSIS — E11.9 TYPE 2 DIABETES MELLITUS WITHOUT COMPLICATION, WITHOUT LONG-TERM CURRENT USE OF INSULIN (H): ICD-10-CM

## 2021-04-21 ENCOUNTER — COMMUNICATION - HEALTHEAST (OUTPATIENT)
Dept: SCHEDULING | Facility: CLINIC | Age: 41
End: 2021-04-21

## 2021-04-27 DIAGNOSIS — D35.2 PITUITARY ADENOMA (H): ICD-10-CM

## 2021-04-28 ENCOUNTER — TELEPHONE (OUTPATIENT)
Dept: ENDOCRINOLOGY | Facility: CLINIC | Age: 41
End: 2021-04-28

## 2021-04-28 NOTE — TELEPHONE ENCOUNTER
[facility] Call Center    Phone Message    May a detailed message be left on voicemail: yes     Reason for Call:    pt requesting current lab results by Dr Farfan fax to Dr Erasto Azul. Fax . Provider clinic # [phone]     Action Taken: Message routed to:  [facility] endo    Travel Screening: Not Applicable

## 2021-04-29 RX ORDER — LEVOTHYROXINE SODIUM 112 UG/1
TABLET ORAL
Qty: 90 TABLET | Refills: 3 | Status: SHIPPED | OUTPATIENT
Start: 2021-04-29 | End: 2023-11-21

## 2021-04-29 NOTE — TELEPHONE ENCOUNTER
3/17/2021Last visit Dr Farfan with thyroid function labs 4/7/21 reviewed by MD.  [facility] [place]

## 2021-05-03 ENCOUNTER — AMBULATORY - HEALTHEAST (OUTPATIENT)
Dept: NURSING | Facility: CLINIC | Age: 41
End: 2021-05-03

## 2021-05-03 ENCOUNTER — OFFICE VISIT - HEALTHEAST (OUTPATIENT)
Dept: VASCULAR SURGERY | Facility: CLINIC | Age: 41
End: 2021-05-03

## 2021-05-03 DIAGNOSIS — I89.0 LYMPHEDEMA: ICD-10-CM

## 2021-05-03 DIAGNOSIS — M79.89 SWELLING OF LIMB: ICD-10-CM

## 2021-05-03 DIAGNOSIS — I87.333 CHRONIC VENOUS HYPERTENSION (IDIOPATHIC) WITH ULCER AND INFLAMMATION OF BILATERAL LOWER EXTREMITY (H): ICD-10-CM

## 2021-05-03 DIAGNOSIS — E66.01 MORBID OBESITY WITH BMI OF 60.0-69.9, ADULT (H): ICD-10-CM

## 2021-05-03 DIAGNOSIS — Q87.11 PRADER-WILLI SYNDROME: ICD-10-CM

## 2021-05-03 DIAGNOSIS — E11.9 TYPE 2 DIABETES MELLITUS WITHOUT COMPLICATION, WITHOUT LONG-TERM CURRENT USE OF INSULIN (H): ICD-10-CM

## 2021-05-09 ENCOUNTER — HEALTH MAINTENANCE LETTER (OUTPATIENT)
Age: 41
End: 2021-05-09

## 2021-05-11 ENCOUNTER — AMBULATORY - HEALTHEAST (OUTPATIENT)
Dept: OTHER | Facility: CLINIC | Age: 41
End: 2021-05-11

## 2021-05-18 DIAGNOSIS — E28.319 EARLY MENOPAUSE: ICD-10-CM

## 2021-05-19 RX ORDER — THIAMINE HCL 100 MG
TABLET ORAL
Qty: 60 TABLET | Refills: 10 | Status: SHIPPED | OUTPATIENT
Start: 2021-05-19 | End: 2023-11-21

## 2021-05-19 NOTE — TELEPHONE ENCOUNTER
CALCIUM CIT-VIT D 315/6.25 315-6.25 Tablet      Last Written Prescription Date:  4-14-20  Last Fill Quantity: 180,   # refills: 3  Last Office Visit : 3-17-21  Future Office visit:  none    Routing refill request to provider for review/approval because:  Med not on Endo protocol

## 2021-05-21 ENCOUNTER — COMMUNICATION - HEALTHEAST (OUTPATIENT)
Dept: VASCULAR SURGERY | Facility: CLINIC | Age: 41
End: 2021-05-21

## 2021-05-24 ENCOUNTER — AMBULATORY - HEALTHEAST (OUTPATIENT)
Dept: NURSING | Facility: CLINIC | Age: 41
End: 2021-05-24

## 2021-05-25 ENCOUNTER — RECORDS - HEALTHEAST (OUTPATIENT)
Dept: ADMINISTRATIVE | Facility: CLINIC | Age: 41
End: 2021-05-25

## 2021-05-27 NOTE — TELEPHONE ENCOUNTER
Sharon called clinic wondering what she should do. Her short stretch bandanging on her right leg is starting to fall down and she is wondering if she should take it off. She has appointment for a velcro compression wrap fitting tomorrow. Her old velcro compression is worn out. She thinks she has it pulled up adequately, but she will remove it if it falls down again and use her old velcros until fitting tomorrow. Reviewed with Bushra why it is important she not leave the short stretch bunched up on her legs as this can cause a tourniquet effect and create more wounds. She is understanding and will call clinic with further questions.

## 2021-05-27 NOTE — PROGRESS NOTES
S: Patient seen in [place] to be measured for custom Medi Circaid Juxtafit Premium calf pieces that come along with custom Juxtafit Ankle Foot Wraps.  She has an Rx from Dr. Odell to treat her bilateral Lymphedema, Venous Hypertension, Venous Insufficiency, and Venous Stasis Ulcers (all of both lower extremities).  She reports her swelling to be increased.    O: Goal is to evaluate and measure patient for a compression garment that will help control her Lymphedema and maintain reduction levels achieved by therapy sessions. Observations show there is swelling present from lymphatic fluid. The expected outcome with compliant use is to reduce swelling and control the patient's condition.      A: I took measurements for a custom Medi Circaid Juxtafit Premium calf pieces. These are what Bushra presented in which she received from [employer] awhile back. They were much too tight now as she admits she has had a significant increase of fluid in her legs. She did not have any foot wraps on her feet or compression anklets. She says she typically does not wear anything on her feet but it is visible that she has swelling to both feet, more so to her left than right. The patient requires custom Juxtafit Premium calf pieces because her significant sizes will not fit into any [employer] Circaid Juxtafit Premium calf pieces. This strong Velcro will be best for her significant swelling and size of her legs. They will help to maintain volume and shape of both limbs, as well as help to inhibit further fluid accumulation.  Order submitted for fabrication to [employer].    P: The patient is scheduled for one and a half weeks out. I told her I would give her a call if her Velcro pieces still are not in by that time.

## 2021-05-27 NOTE — TELEPHONE ENCOUNTER
Sharon called me on 4/18/19 asking if her compression Velcro pieces were in. I called her back and let her know that her Velcro pieces are not in yet. I let her know I would call her on Monday morning to let her know if they are in or not since we set up a delivery appnt for that afternoon. She likes this plan.

## 2021-05-27 NOTE — PROGRESS NOTES
Date of Service:  04/08/19    Date last seen:  12/03/18    PCP: Erasto Azul MD    Impression:   1. Leg swelling bilaterally-increased on the right most likely due to ill fitting compression garments.  2. Venous stasis/insufficiency with history of ulcerations-doing well small ulcer right ant shin/excoriation  3. Acquired lymphedema   4. Recurrent cellulitis of legs- (3/5/15, 2/17/15, 5/15, 12/14/15, 3/16, 7/12/16, 8/18/2016)-no recurrence.  5. Bilateral foot deformities  6. Complicated by morbid obesity   7. Prediabetic  8. Leg itching    Plan:   1. Questions were answered.   2. Continue Flexitouch.  Doing very well.    3. Continue compression.  Now not fitting well.  Weight gain.  New Velcro compression ordered.  4. Lymphedema exercises to continue.  5. Continue to work with bariatrics.     6. Will use Kenalog ointment for itching.  Discussed with her need to use this sparingly and not put it on open ulcers.  7. Follow up 6 months, or when needed.      Time spent with patient 15 minutes with greater than 50% time in consultation, education and coordination of care.   ---------------------------------------------------------------------------------------------------------------------     Chief Complaint: Bilateral leg swelling     History of Present Illness:   Bushra Buck returns to the [facility] for follow up of her bilateral leg swelling due to severe venous hypertension, insufficiency with secondary lymphedema and recurrent cellulitis resulting from morbid obesity.  She does the flexitouch pump 5/7 weekdays and it continues to help greatly.  She continues to work with Dr. Bowling of bariatrics.  She wears her velcro compression daily.  She has a PCA helping her 5 days a week.  She has not had an infection since August 2016. She continues on doxycycline daily. There has been no new numbness, tingling, weakness, masses, rashes, shortness of breath or  chest pain.   She has not had any pain.  She has not run a fever.  She is wearing her new insoles and shoes now.  These are working well.  Her velcro compression is wearing down, not staying secured and falling down.  Her right leg swelling was going up.  She went to the emergency room and ultrasound was done which was negative for blood clot..  She sees Dr. Azul as her PCP.   She continues to work with her counselor and psychiatric professionals for her schizophrenia.        Past Medical History:   Diagnosis Date     Allergic rhinitis      Bipolar 1 disorder (H)     followed by psych     Cellulitis, leg 2016     Dyslipidemia      Ganglion, left wrist      GERD (gastroesophageal reflux disease)      Herpes zoster      Hypertension      Hypothyroid      Lymphedema of lower extremity 2008     Morbid obesity (H)      Nightmares      Post traumatic stress disorder (PTSD)      Prader-Willi syndrome      Prolactinoma (H) transsphenoid approach 2000.     PTSD (post-traumatic stress disorder)      Schizoaffective disorder, bipolar type (H)      Venous stasis      Vitamin D deficiency        Past Surgical History:   Procedure Laterality Date     ABDOMINAL SURGERY  2000    fat removed tp plug dura when had a pituitary surgery.     anorectal fissure repair  2004, 2005     BRAIN SURGERY  2000    pituitary surgery     CARPAL TUNNEL RELEASE Bilateral      GANGLION CYST EXCISION  2010    left arm     GANGLION CYST EXCISION Left 6/7/2018    Procedure: REMOVE RECURRENT GANGLION CYST LEFT VOLAR RADIAL WRIST;  Surgeon: Angel Lopez MD;  Location: [facility] OR;  Service:      PITUITARY SURGERY  2014     SEPTOPLASTY      x 2     SINUS SURGERY Right 8/21/2015    Procedure: RIGHT MICHELLE BULLOSA;  Surgeon: Daniel Landeros MD;  Location: [facility] OR;  Service:      TONSILLECTOMY         Current Outpatient Medications   Medication Sig Dispense Refill     ARIPiprazole (ABILIFY) 15 MG tablet Take 15 mg by mouth at  "bedtime.       blood glucose meter (GLUCOMETER) Use 1 each As Directed as needed. Dispense glucometer brand per patient's insurance at pharmacy discretion.  0     blood glucose test strips Use 1 each As Directed as needed. Dispense brand per patient's insurance at pharmacy discretion.  0     cabergoline (DOSTINEX) 0.5 mg tablet Take 0.5 mg by mouth 2 (two) times a week.       calcium citrate-vitamin D3 (CITRACAL + D) 315-250 mg-unit per tablet Take 2 tablets by mouth daily.  3     CHOLECALCIFEROL 1,000 unit tablet 2,000 Units.  1     doxycycline (VIBRAMYCIN) 100 MG capsule Take 100 mg by mouth 2 (two) times a day.       dulaglutide (TRULICITY) 1.5 mg/0.5 mL PnIj Inject 1.5 mg under the skin every 7 days. 4 Syringe 6     EPINEPHrine (EPIPEN) 0.3 mg/0.3 mL atIn Inject 0.6 mL into the shoulder, thigh, or buttocks as needed.   0     fenofibrate (TRICOR) 145 MG tablet Take 1 tablet by mouth daily.  3     fluticasone (FLONASE) 50 mcg/actuation nasal spray Apply 2 sprays into each nostril daily.  6     fluticasone-vilanterol (BREO ELLIPTA) 100-25 mcg/dose DsDv inhaler Inhale 1 puff.       furosemide (LASIX) 40 MG tablet Take 1 tablet (40 mg total) by mouth daily. For chronic edema  0     generic lancets Use 1 each As Directed as needed. Dispense brand per patient's insurance at pharmacy discretion.  0     gentamicin (GARAMYCIN) 0.1 % ointment APPLY TO WOUND DAILY WITH DRESSING CHANGES  0     levothyroxine (SYNTHROID, LEVOTHROID) 112 MCG tablet Take 1 tablet (112 mcg total) by mouth daily. 30 tablet 0     meclizine (ANTIVERT) 12.5 mg tablet Take 1-2 tablets by mouth as needed.  0     MULTIVITAMIN tablet Take 1 tablet by mouth daily.  3     mupirocin (BACTROBAN) 2 % ointment APPLY TO WOUND EVERY DAY AFTER DRESSING CHANGES  0     NYAMYC powder Apply 1 application topically 2 (two) times a day.  2     omeprazole (PRILOSEC) 40 MG capsule Take 40 mg by mouth daily.  5     pen needle, diabetic 31 gauge x 5/16\" Ndle Use 1 each As " Directed daily. With daily Victoza injections. 100 each PRN     valACYclovir (VALTREX) 1000 MG tablet TAKE 2 TABLETS BY MOUTH TWICE DAILY FOR ONE DAY AT ONSET OF COLD SORE  2     dulaglutide 1.5 mg/0.5 mL PnIj Inject 1.5 mg under the skin once a week.       triamcinolone (KENALOG) 0.5 % cream Apply topically 2 (two) times a day as needed. Do not put on open wounds.  Use for itching as needed. 15 g 2     No current facility-administered medications for this visit.        Allergies   Allergen Reactions     Fiorinal [Butalbital-Aspirin-Caffeine] Anaphylaxis and Swelling     Clindamycin Rash     Rash on neck - not raised . Heartburn     Ancef [Cefazolin] Swelling     Arm swells     Aspirin (Tartrazine Only) Other (See Comments)     Eyes swell shut     Ativan [Lorazepam] Swelling     arms     Carbamazepine Other (See Comments)      (tegretol) Arm swelling     Codeine Swelling     Delsym Hives     Ibuprofen Other (See Comments)     Swelling, eyes swell shut     Latex Swelling     Lithium Analogues Swelling     Red/swollen arms     Oxcarbazepine Other (See Comments)     (trileptal) Arm swelling     Venom-Honey Bee Swelling     Unknown     Augmentin [Amoxicillin-Pot Clavulanate] Rash     Rash on neck - not raised     Cefdinir Swelling            Topiramate Swelling and Rash     topamax       Social History     Socioeconomic History     Marital status: Single     Spouse name: Not on file     Number of children: 0     Years of education: Not on file     Highest education level: Not on file   Occupational History     Occupation: Disability   Social Needs     Financial resource strain: Not on file     Food insecurity:     Worry: Not on file     Inability: Not on file     Transportation needs:     Medical: Not on file     Non-medical: Not on file   Tobacco Use     Smoking status: Never Smoker     Smokeless tobacco: Never Used   Substance and Sexual Activity     Alcohol use: No     Drug use: No     Sexual activity: Never      Partners: Male     Birth control/protection: Abstinence   Lifestyle     Physical activity:     Days per week: Not on file     Minutes per session: Not on file     Stress: Not on file   Relationships     Social connections:     Talks on phone: Not on file     Gets together: Not on file     Attends Nondenominational service: Not on file     Active member of club or organization: Not on file     Attends meetings of clubs or organizations: Not on file     Relationship status: Not on file     Intimate partner violence:     Fear of current or ex partner: Not on file     Emotionally abused: Not on file     Physically abused: Not on file     Forced sexual activity: Not on file   Other Topics Concern     Not on file   Social History Narrative        .  Not working.   No kids.       Family History   Problem Relation Age of Onset     Diabetes Mother      Hypothyroidism Mother      Cancer Mother      Sleep apnea Mother      Snoring Mother      Heart disease Father      Diabetes Sister      Edema Sister      No Medical Problems Sister      Diabetes Maternal Grandfather        Review of Systems:    Bushra Buck no new numbess, tingling or weakness, redness or rashes, fevers, new masses, abdominal bloating or discomfort, unexplained weight loss, increased pain, new ulcers, shortness of breath and chest pain  Full 12 point review of systems was completed.    Imaging:    I personally reviewed the following imaging today and those on care everywhere, if indicated    EXAM: US VENOUS LEG RIGHT  LOCATION: [facility]  DATE/TIME: 4/6/2019 11:18 PM     INDICATION: Leg swelling, right  COMPARISON: None.  TECHNIQUE: Routine exam without and with compression, augmentation, and duplex utilizing 2D gray-scale imaging, Doppler interrogation with color-flow and spectral waveform analysis.     FINDINGS: The common femoral, femoral, popliteal, and segmentally visualized calf veins were evaluated. The opposite CFV was also included  in the evaluation.     Right leg veins are negative for deep venous thrombosis. No popliteal cysts.     IMPRESSION:   CONCLUSION:  1.  Right leg veins are negative for DVT.      Labs:    I personally reviewed the following labs today and those on care everywhere, if indicated    Lab Results   Component Value Date    SEDRATE 54 (H) 08/01/2015         Lab Results   Component Value Date    CRP 0.5 08/18/2016           Lab Results   Component Value Date    CREATININE 0.76 11/06/2018      Lab Results   Component Value Date    HGBA1C 5.4 09/20/2016           Lab Results   Component Value Date    BUN 18 11/06/2018              Lab Results   Component Value Date    ALBUMIN 3.8 11/06/2018       Vitamin D, Total (25-Hydroxy)   Date Value Ref Range Status   11/06/2018 24.2 (L) 30.0 - 80.0 ng/mL Final       Lab Results   Component Value Date    TSH 3.01 11/06/2018     Lab Results   Component Value Date    WBC 12.2 (H) 02/05/2018    HGB 14.0 02/05/2018    HCT 41.6 02/05/2018     (H) 02/05/2018     02/05/2018       Physical Exam:  Vitals:    04/08/19 1408   BP: 110/88   Pulse: 76   Resp: 12   Temp: 98.9  F (37.2  C)     BMI 71.67 (increased) weight 367 pounds    Circumferential measures:    Vasc Edema 10/25/2017 1/8/2018 4/11/2018 12/3/2018 4/8/2019   Right just above MTP 27.5 27 26 27.5 26   Right Ankle 36 36 36 34.5 35.7   Right Widest Calf 65 64.5 63.5 63.5 73.8   Right Thigh Up 10cm 74 74 - 72.7 73.2   Left - just above MTP 32 30.5 34 30.5 27.8   Left Ankle 37 36.5 62.5 35 38   Left Widest Calf 69 65 67.5 65 76.3   Left Thigh Up 10cm 74 74 - 72 71     Measures are increased with weight gain.  It is noted that measures are extremely variable due to shape of legs.    General:  38 y.o. female in no apparent distress.     Psych:  Alert and oriented x 3.  Cooperative.     HEENT: Atraumatic, normocephalic    Integumentary: Legs show normal skin with significant continued softening and decreased fibrosis. No rubor or  calor.  Small excoriation on anterior upper right shin.  This is superficial and there is no periwound maceration.  No discharge or odor.   No pain to palpation. + Stemmer's sign in the feet bilaterally.      Sensation: Intact to pinprick and light touch in the legs bilaterally.    Strength:  Normal strength in knee flexion/knee extension, ankle dorsiflexion and great toe extension bilaterally.     Vascular: Dorsalis pedis and posterior tib pulses strong and equal bilaterally.  Slight venous varicosities noted in both anterior shins.    Temitope Odell MD, ABWMS, FACCWS, [facility]  Medical Director Wound Care and Lymphedema  [facility]  [phone]

## 2021-05-27 NOTE — PROGRESS NOTES
Non-surgical Weight Loss Follow Up Diet Evaluation    Assessment:  This patient is a 38 y.o. female is being seen today for follow-up non-surgical nutritional evaluation. Today we reviewed the patients current eating habits and level of physical activity, and instructed on the changes that are required for successful weight loss outcomes.    Pt's Initial Weight: 350 lbs  Weight: (!) 368 lb 8 oz (167.2 kg)  Weight loss from initial: -18.5  % Weight loss: -5.29 %    BMI: Body mass index is 71.97 kg/m .  IBW: 105 lbs    Personal goal weight: 150 lb      Pt Active Problem List Diagnosis:     Patient Active Problem List   Diagnosis     Hypertension     History of pituitary surgery     Schizophrenia, schizoaffective, chronic with acute exacerbation (H)     Lymphedema     Venous hypertension of lower extremity, bilateral     Hypothyroidism, unspecified hypothyroidism type     GERD (gastroesophageal reflux disease)     Prolactinoma (H)     Psychosis (H)     Perianal irritation     Acquired valgus deformity of both ankles     Flat feet, bilateral     Foot deformity, bilateral     Venous insufficiency of both lower extremities     Itching     Morbid obesity with BMI of 60.0-69.9, adult (H)     Prader-Willi syndrome     Intellectual disability     Nightmares     Bipolar affective disorder, current episode manic with psychotic symptoms (H)     Venous stasis ulcers of both lower extremities (H)     Prediabetes     Estimated RMR (Harlan-St Jeor equation): 2233 calories  Protein requirements (.5grams to .9grams per pound IBW, 20-30% of calories, minimum of 60-80gm per day):  50-90 grams    Progress made since last visit: 40 lb weight gain since last RD visit. Pt went into ED two days ago for ulcer off the right lower extremity and increased lymphedema. Pt has scheduled appointment with vascular today. Pt states weight loss has been difficult due change in health, recent purchased of a new puppy, and family stress of having to  "help take care of her mom.     Concerns: Large portions, limited movement, high sodium intake from meals and soda.     Diabetes  Pt brought in glucometer.  Average blood sugar: 110-209 mg/dL     Diet Recall/Time: Pt wakes up 5:00am  Breakfast: 8:00am- 2 eggs, fruit (14g)   Am Snack: none   Lunch: 11am- [employer] meal- sandwich w/ fruit (20g)   Pm snack:cheese crackers   Dinner: 3 cups chili, sour cream (40g)   HS Snack: \"not that I know of\"     Protein: 60-80 grams    Beverages (Type/Oz. per day)  Water: 43-48 oz   Diet soda: 1-2 cans   Juice: 4 oz from [employer]     Discussed the importance of adequate hydration and the goal of 64+ oz of fluid daily.   The patient understands the importance of  avoiding all sweetened and alcoholic drinks, and instead choosing 64 oz plain water.    Exercise  Finished OT in December. Nothing now routine established.     Pt's understands that 45-60 minutes of daily activity is an important part of weight loss success.   Encouraged pt to incorporate  strength training exercise in addition to cardiovascular exercise most days of the week.    PES statement:     1. (NI-1.3)Excessive energy intake related to Not ready for diet/lifestyle change as evidenced by Intake of high caloric density foods at meals and/or snacks; large portion; frequent grazing; Estimated intake that exceeds estimated daily energy intake; and BMI 71.         Intervention:  Discussion:  1. Educated pt on portion sizes and importance of documenting food intake.   2. Educated pt on hydration; sticking with water and cutting out soda.   3. Encouraged daily exercise.   4. Carbohydrate Countin carbohydrate choice/serving = 15-20gm total carbohydrate   5. Gave food journal homework, to be completed for f/u appointment.  6. Plate Method: The patient and I discussed the importance of including lean/low  fat protein at each meal and limiting carbohydrate intake to less  than 25% of plate volume.  Instructions/Goals:   1. Include "  protein at each meal.  2. Increase vegetable/fruit intake, by having a vegetable or fruit with each meal daily. Recommended pt to increase vegetable/fruit intake to 4-5 servings daily.  3. Increase fluid intake to 64oz daily: choose plain or calorie/alcohol-free beverages.  4. Incorporate daily structured activity.  5. Fill out food journal and bring to next month's visit.  6. Practice plate method: 1/2 plate lean/low fat protein source, vegetable/fruit, <25% of plate complex carbohydrates.  7. Carbohydrates from grain sources at meal times to be no more than 1 Carb Choice, ie: 15-20 gm total carbohydrate per serving  8. Practice eating off of smaller plates/bowls, chewing to applesauce consistency, taking 20-30 minutes to eat in a calm/relaxed environment without distractions of tv/email/cell phone.    Handouts Provided:  Plate Method  Food journal      Monitor/Evaluation:    Pt will f/u in one month with bariatrician and RD    Plan for next visit with RD:    Review plate method and food journal homework.  Review carbohydrates/fiber  Exercise      Time In: 10:30am  Time Out: 11:00am      ABN signed: Yes

## 2021-05-28 NOTE — PROGRESS NOTES
Vascular Medicine Progress note    Dr David Woo MD, Vascular Medicine      Bushra Buck    Medical Record #:  [id number]    Date of Service: 04/22/2019     Date last seen:  Visit date not found    PRIMARY CARE PROVIDER: Erasto Azul MD      IMPRESSION/PLAN: Patient was upstairs for trial of a new compression stockings, concern was raised regarding redness in patient's legs bilaterally more pronounced on the right lower extremity, patient was complaining of increased pain which started over the weekend pain was accompanied by tenderness, redness, and pain to touch    Probably the beginning or start of leg cellulitis, patient has been on doxycycline for the past year on a daily basis    We will start the patient on Levaquin 750 mg p.o. daily for the coming 10 days could not start the patient on Augmentin as the patient is allergic to amoxicillin    DISPOSITION: Patient will be discharged home and will follow-up in 2 weeks from now      ______________________________________________________________________    Subjective:    ALLERGIES:  Fiorinal [butalbital-aspirin-caffeine]; Clindamycin; Ancef [cefazolin]; Aspirin (tartrazine only); Ativan [lorazepam]; Carbamazepine; Codeine; Delsym; Ibuprofen; Latex; Lithium analogues; Oxcarbazepine; Venom-honey bee; Augmentin [amoxicillin-pot clavulanate]; Cefdinir; and Topiramate    MEDS:    Current Outpatient Medications:      ARIPiprazole (ABILIFY) 15 MG tablet, Take 15 mg by mouth at bedtime., Disp: , Rfl:      blood glucose meter (GLUCOMETER), Use 1 each As Directed as needed. Dispense glucometer brand per patient's insurance at pharmacy discretion., Disp: , Rfl: 0     blood glucose test strips, Use 1 each As Directed as needed. Dispense brand per patient's insurance at pharmacy discretion., Disp: , Rfl: 0     cabergoline (DOSTINEX) 0.5 mg tablet, Take 0.5 mg by mouth 2 (two) times a week., Disp: , Rfl:      calcium citrate-vitamin D3 (CITRACAL + D) 949-535  "mg-unit per tablet, Take 2 tablets by mouth daily., Disp: , Rfl: 3     CHOLECALCIFEROL 1,000 unit tablet, 2,000 Units., Disp: , Rfl: 1     dulaglutide (TRULICITY) 1.5 mg/0.5 mL PnIj, Inject 1.5 mg under the skin every 7 days., Disp: 4 Syringe, Rfl: 6     EPINEPHrine (EPIPEN) 0.3 mg/0.3 mL atIn, Inject 0.6 mL into the shoulder, thigh, or buttocks as needed. , Disp: , Rfl: 0     fenofibrate (TRICOR) 145 MG tablet, Take 1 tablet by mouth daily., Disp: , Rfl: 3     fluticasone (FLONASE) 50 mcg/actuation nasal spray, Apply 2 sprays into each nostril daily., Disp: , Rfl: 6     fluticasone-vilanterol (BREO ELLIPTA) 100-25 mcg/dose DsDv inhaler, Inhale 1 puff., Disp: , Rfl:      furosemide (LASIX) 40 MG tablet, Take 1 tablet (40 mg total) by mouth daily. For chronic edema, Disp: , Rfl: 0     generic lancets, Use 1 each As Directed as needed. Dispense brand per patient's insurance at pharmacy discretion., Disp: , Rfl: 0     gentamicin (GARAMYCIN) 0.1 % ointment, APPLY TO WOUND DAILY WITH DRESSING CHANGES, Disp: , Rfl: 0     levothyroxine (SYNTHROID, LEVOTHROID) 112 MCG tablet, Take 1 tablet (112 mcg total) by mouth daily., Disp: 30 tablet, Rfl: 0     meclizine (ANTIVERT) 12.5 mg tablet, Take 1-2 tablets by mouth as needed., Disp: , Rfl: 0     MULTIVITAMIN tablet, Take 1 tablet by mouth daily., Disp: , Rfl: 3     mupirocin (BACTROBAN) 2 % ointment, APPLY TO WOUND EVERY DAY AFTER DRESSING CHANGES, Disp: , Rfl: 0     NYAMYC powder, Apply 1 application topically 2 (two) times a day., Disp: , Rfl: 2     omeprazole (PRILOSEC) 40 MG capsule, Take 40 mg by mouth daily., Disp: , Rfl: 5     pen needle, diabetic 31 gauge x 5/16\" Ndle, Use 1 each As Directed daily. With daily Victoza injections., Disp: 100 each, Rfl: PRN     triamcinolone (KENALOG) 0.5 % cream, Apply topically 2 (two) times a day as needed. Do not put on open wounds.  Use for itching as needed., Disp: 15 g, Rfl: 2     valACYclovir (VALTREX) 1000 MG tablet, TAKE 2 " TABLETS BY MOUTH TWICE DAILY FOR ONE DAY AT ONSET OF COLD SORE, Disp: , Rfl: 2     dulaglutide 1.5 mg/0.5 mL PnIj, Inject 1.5 mg under the skin once a week., Disp: , Rfl:      levoFLOXacin (LEVAQUIN) 500 MG tablet, Take 1.5 tablets (750 mg total) by mouth daily for 11 doses. Take 2 tablets day one the one tablet for 9 days., Disp: 11 tablet, Rfl: 0    REVIEW OF SYSTEMS:    A 12 point ROS was reviewed and except for what is listed in the HPI above, all others are negative    Objective:    PE:  BP 98/62   Pulse 87   Temp 98.6  F (37  C)   Resp 24   Wt (!) 390 lb 9.6 oz (177.2 kg)   BMI 76.28 kg/m    Wt Readings from Last 1 Encounters:   04/22/19 (!) 390 lb 9.6 oz (177.2 kg)     Body mass index is 76.28 kg/m .    EXAM:  GENERAL: This is a well-developed 39 y.o. female who appears her stated age  EYES: Grossly normal.  MOUTH: Buccal mucosa normal   CARDIAC:  Not assessed  CHEST/LUNG:  Not Assessed  GASTROINTESINAL (ABDOMEN):Not Assessed     MUSCULOSKELETAL: Grossly normal and both lower extremities are intact.  HEME/LYMPH: No lymphedema  NEUROLOGIC: Focally intact, Alert and oriented x 3.   PSYCH: appropriate affect  INTEGUMENT: No open lesions or ulcers  Pulse Exam: Regular  Circumferential measures:  Vasc Edema 1/8/2018 4/11/2018 12/3/2018 4/8/2019 4/22/2019   Right just above MTP 27 26 27.5 26 31.4   Right Ankle 36 36 34.5 35.7 62.5   Right Widest Calf 64.5 63.5 63.5 73.8 81.4   Right Thigh Up 10cm 74 - 72.7 73.2 -   Left - just above MTP 30.5 34 30.5 27.8 28.2   Left Ankle 36.5 62.5 35 38 53.4   Left Widest Calf 65 67.5 65 76.3 89.4   Left Thigh Up 10cm 74 - 72 71 -     Wound 02/09/17 L breast (Active)       Wound Non-pressure related ulcer Breast Left (Active)       Wound Non-pressure related ulcer Abdomen Mid (Active)       Wound Non-pressure related ulcer Abdomen Left (Active)       Incision 08/21/15 Nose Bilateral (Active)       Incision 06/07/18 Hand Left (Active)        DIAGNOSTIC STUDIES:     Us Venous  Leg Right    Result Date: 4/6/2019  EXAM: US VENOUS LEG RIGHT LOCATION: [facility] DATE/TIME: 4/6/2019 11:18 PM INDICATION: Leg swelling, right COMPARISON: None. TECHNIQUE: Routine exam without and with compression, augmentation, and duplex utilizing 2D gray-scale imaging, Doppler interrogation with color-flow and spectral waveform analysis. FINDINGS: The common femoral, femoral, popliteal, and segmentally visualized calf veins were evaluated. The opposite CFV was also included in the evaluation. Right leg veins are negative for deep venous thrombosis. No popliteal cysts.     CONCLUSION: 1.  Right leg veins are negative for DVT.    I personally reviewed the following imaging today and those on care everywhere, if indicated    LABS:      Sodium   Date Value Ref Range Status   04/09/2019 142 136 - 145 mmol/L Final   11/06/2018 141 136 - 145 mmol/L Final   09/13/2018 141 136 - 145 mmol/L Final     Potassium   Date Value Ref Range Status   04/09/2019 4.3 3.5 - 5.0 mmol/L Final   11/06/2018 3.8 3.5 - 5.0 mmol/L Final   09/13/2018 3.8 3.5 - 5.0 mmol/L Final     Chloride   Date Value Ref Range Status   04/09/2019 111 (H) 98 - 107 mmol/L Final   11/06/2018 108 (H) 98 - 107 mmol/L Final   09/13/2018 109 (H) 98 - 107 mmol/L Final     BUN   Date Value Ref Range Status   04/09/2019 19 8 - 22 mg/dL Final   11/06/2018 18 8 - 22 mg/dL Final   09/13/2018 20 8 - 22 mg/dL Final     Creatinine   Date Value Ref Range Status   04/09/2019 1.05 0.60 - 1.10 mg/dL Final   11/06/2018 0.76 0.60 - 1.10 mg/dL Final   09/13/2018 0.77 0.60 - 1.10 mg/dL Final     Hemoglobin   Date Value Ref Range Status   02/05/2018 14.0 12.0 - 16.0 g/dL Final   01/22/2018 13.6 12.0 - 16.0 g/dL Final   01/09/2018 14.8 12.0 - 16.0 g/dL Final     Platelets   Date Value Ref Range Status   02/05/2018 291 140 - 440 thou/uL Final   02/01/2018 280 140 - 440 thou/uL Final   01/22/2018 289 140 - 440 thou/uL Final     BNP   Date Value Ref Range Status    04/03/2016 <10 0 - 64 pg/mL Final     INR   Date Value Ref Range Status   04/03/2016 0.99 0.90 - 1.10 Final   05/25/2015 1.02 0.90 - 1.10 Final   01/27/2013 0.95 0.90 - 1.10 Final     Comment:                                                     INR Therapeutic Ranges                                                  Mech. Valve 2.5-3.5                                                  Post surg.  2.0-3.0                                                  DVT/PE      2.0-3.0       Total time spent 15 (15,25,35) minutes face to face with patient with more than 50% time spent in counseling and coordination of care.    David Woo MD  VASCULAR MEDICINE

## 2021-05-28 NOTE — PROGRESS NOTES
S: I have received Caitie's custom [employer] Juxtafit Premium calf pieces with foot pieces. Rx on file is current.    A: I assisted Bushra in donning her compression garments. Once donned, the garments appeared to be fitting on their last stretch but were still able to give the adequate compression level ordered per the compression guide barrett. Bushra said she has had a significant increase in swelling over the weekend to the point where she was planning to go into the ER. She also pointed out the redness to the bottom of both her legs above her ankles. The left side felt slightly warm to the touch and she appears to have new open sores on both legs that were not here last time that I saw her. For all these above reasons, I recommended she call her primary care provider or Dr. Odell's office to see if she can be seen. She was able to get into Dr. Odell's office to be seen by one of her partners on the second level of the [facility]. Bushra has been wearing custom Juxtafit Premium calf pieces so is familiar with the use and care of the items, as well as how they should look and feel on her legs. We discussed that as her swelling goes down, her Velcro straps should be able to pull tighter. She was happy with this plan.     P: She is to call with any further needs.  Goal is to maintain a home program.

## 2021-05-28 NOTE — PROGRESS NOTES
Vascular Medicine Progress note    Dr David Woo MD, Vascular Medicine      Bushra Buck    Medical Record #:  [id number]    Date of Service: 05/06/2019     Date last seen:  4/22/2019    PRIMARY CARE PROVIDER: Erasto Azul MD      IMPRESSION/PLAN: Bilateral lower extremity lymphedema, status post discharge from the hospital for bilateral leg cellulitis  Patient was in hospital for IV antibiotics also she was on Bumex IV total weight loss was around 40 pounds  Patient is using her compression treatment/lymphedema treatment  No evidence of residual cellulitis    DISPOSITION: Patient is to be discharged home,  Patient is to continue with compression treatment,  Patient is to continue with Bumex for now  Just for short.  Then decision has to be made regarding continuous chronic use of diuretics      ______________________________________________________________________    Subjective:    ALLERGIES:  Fiorinal [butalbital-aspirin-caffeine]; Clindamycin; Ancef [cefazolin]; Aspirin (tartrazine only); Ativan [lorazepam]; Carbamazepine; Codeine; Delsym; Ibuprofen; Latex; Lithium analogues; Oxcarbazepine; Venom-honey bee; Augmentin [amoxicillin-pot clavulanate]; Cefdinir; and Topiramate    MEDS:    Current Outpatient Medications:      ARIPiprazole (ABILIFY) 15 MG tablet, Take 15 mg by mouth at bedtime., Disp: , Rfl:      blood glucose meter (GLUCOMETER), Use 1 each As Directed as needed. Dispense glucometer brand per patient's insurance at pharmacy discretion., Disp: , Rfl: 0     blood glucose test strips, Use 1 each As Directed as needed. Dispense brand per patient's insurance at pharmacy discretion., Disp: , Rfl: 0     bumetanide (BUMEX) 2 MG tablet, Take 1 tablet (2 mg total) by mouth 2 (two) times a day at 9am and 6pm., Disp: 60 tablet, Rfl: 0     cabergoline (DOSTINEX) 0.5 mg tablet, Take 0.5 mg by mouth 2 (two) times a week., Disp: , Rfl:      calcium citrate-vitamin D3 (CITRACAL + D) 315-250 mg-unit per  "tablet, Take 2 tablets by mouth daily., Disp: , Rfl: 3     CHOLECALCIFEROL 1,000 unit tablet, Take 2,000 Units by mouth daily.    , Disp: , Rfl: 1     dulaglutide (TRULICITY) 1.5 mg/0.5 mL PnIj, Inject 1.5 mg under the skin every 7 days., Disp: 4 Syringe, Rfl: 6     EPINEPHrine (EPIPEN) 0.3 mg/0.3 mL atIn, Inject 0.6 mL into the shoulder, thigh, or buttocks as needed. , Disp: , Rfl: 0     fenofibrate (TRICOR) 145 MG tablet, Take 1 tablet by mouth daily., Disp: , Rfl: 3     fluticasone (FLONASE) 50 mcg/actuation nasal spray, Apply 2 sprays into each nostril daily., Disp: , Rfl: 6     generic lancets, Use 1 each As Directed as needed. Dispense brand per patient's insurance at pharmacy discretion., Disp: , Rfl: 0     gentamicin (GARAMYCIN) 0.1 % ointment, APPLY TO WOUND DAILY WITH DRESSING CHANGES, Disp: , Rfl: 0     levothyroxine (SYNTHROID, LEVOTHROID) 112 MCG tablet, Take 1 tablet (112 mcg total) by mouth daily., Disp: 30 tablet, Rfl: 0     loperamide (IMODIUM) 2 mg capsule, Take 1 capsule (2 mg total) by mouth 4 (four) times a day as needed for diarrhea., Disp: 10 capsule, Rfl: 0     meclizine (ANTIVERT) 12.5 mg tablet, Take 1-2 tablets by mouth as needed., Disp: , Rfl: 0     MULTIVITAMIN tablet, Take 1 tablet by mouth daily., Disp: , Rfl: 3     mupirocin (BACTROBAN) 2 % ointment, APPLY TO WOUND EVERY DAY AFTER DRESSING CHANGES, Disp: , Rfl: 0     nitrofurantoin, macrocrystal-monohydrate, (MACROBID) 100 MG capsule, Take 1 capsule (100 mg total) by mouth 2 (two) times a day for 5 days., Disp: 10 capsule, Rfl: 0     NYAMYC powder, Apply 1 application topically 2 (two) times a day., Disp: , Rfl: 2     omeprazole (PRILOSEC) 40 MG capsule, Take 40 mg by mouth daily., Disp: , Rfl: 5     pen needle, diabetic 31 gauge x 5/16\" Ndle, Use 1 each As Directed daily. With daily Victoza injections., Disp: 100 each, Rfl: PRN     potassium chloride (K-DUR,KLOR-CON) 20 MEQ tablet, Take 1 tablet (20 mEq total) by mouth daily., Disp: " 20 tablet, Rfl: 0     triamcinolone (KENALOG) 0.5 % cream, Apply topically 2 (two) times a day as needed. Do not put on open wounds.  Use for itching as needed., Disp: 15 g, Rfl: 2     TRULICITY 1.5 mg/0.5 mL PnIj, INJECT 1 PEN (1.5 MG) UNDER THE SKIN ONCE EVERY 7 DAYS., Disp: , Rfl: 6     valACYclovir (VALTREX) 1000 MG tablet, TAKE 2 TABLETS BY MOUTH TWICE DAILY FOR ONE DAY AT ONSET OF COLD SORE, Disp: , Rfl: 2    REVIEW OF SYSTEMS:    A 12 point ROS was reviewed and except for what is listed in the HPI above, all others are negative    Objective:    PE:  Pulse 96   Temp 98.5  F (36.9  C) (Oral)   Resp 18   Wt (!) 341 lb 9.6 oz (154.9 kg)   BMI 66.71 kg/m    Wt Readings from Last 1 Encounters:   05/06/19 (!) 341 lb 9.6 oz (154.9 kg)     Body mass index is 66.71 kg/m .    EXAM:  GENERAL: This is a well-developed 39 y.o. female who appears her stated age  EYES: Grossly normal.  MOUTH: Buccal mucosa normal   CARDIAC:  Normal S1 and S2, no Murmur  CHEST/LUNG:  Clear lung fields bilaterally  GASTROINTESINAL (ABDOMEN):Soft, non-tender, B/S present, no pulsatile mass     MUSCULOSKELETAL: Grossly normal and both lower extremities are intact.  HEME/LYMPH: No lymphedema  NEUROLOGIC: Focally intact, Alert and oriented x 3.   PSYCH: appropriate affect  INTEGUMENT: No open lesions or ulcers  Pulse Exam: Intact  Circumferential measures:  Vasc Edema 4/11/2018 12/3/2018 4/8/2019 4/22/2019 5/6/2019   Right just above MTP 26 27.5 26 31.4 27   Right Ankle 36 34.5 35.7 62.5 37   Right Widest Calf 63.5 63.5 73.8 81.4 63.8   Right Thigh Up 10cm - 72.7 73.2 - 81   Left - just above MTP 34 30.5 27.8 28.2 28.6   Left Ankle 62.5 35 38 53.4 36.9   Left Widest Calf 67.5 65 76.3 89.4 69.5   Left Thigh Up 10cm - 72 71 - 79.2     Wound 02/09/17 L breast (Active)       Wound Non-pressure related ulcer Breast Left (Active)       Wound Non-pressure related ulcer Abdomen Mid (Active)       Wound Non-pressure related ulcer Abdomen Left (Active)        Incision 08/21/15 Nose Bilateral (Active)       Incision 06/07/18 Hand Left (Active)        DIAGNOSTIC STUDIES:     Xr Chest 2 Views    Result Date: 4/27/2019  EXAM: XR CHEST 2 VIEWS LOCATION: [facility] DATE/TIME: 4/27/2019 9:14 AM INDICATION: Dyspnea on exertion COMPARISON: 03/03/2016 FINDINGS: No pleural fluid or pneumothorax. No airspace disease. Vascular engorgement and interstitial edema are present. The cardiomediastinal silhouette is normal in size. Degenerative osseous changes are present.     Us Venous Legs Bilateral    Result Date: 4/25/2019  EXAM: US VENOUS LEGS BILATERAL LOCATION: [facility] DATE/TIME: 4/25/2019 7:52 PM INDICATION: Bilateral leg swelling COMPARISON: Right 04/06/2019, no comparison for the left TECHNIQUE: Routine exam with compression, augmentation, and duplex utilizing 2D gray-scale imaging, Doppler interrogation with color-flow and spectral waveform analysis. FINDINGS: The common femoral, femoral, popliteal, and segmentally visualized calf veins were evaluated. Right leg veins are negative for deep venous thrombosis. Left leg veins are negative for deep venous thrombosis. No popliteal cysts. Nonspecific subcutaneous edema right medial ankle.     CONCLUSION: 1.  Bilateral leg veins are negative for DVT.    Us Venous Leg Right    Result Date: 4/6/2019  EXAM: US VENOUS LEG RIGHT LOCATION: [facility] DATE/TIME: 4/6/2019 11:18 PM INDICATION: Leg swelling, right COMPARISON: None. TECHNIQUE: Routine exam without and with compression, augmentation, and duplex utilizing 2D gray-scale imaging, Doppler interrogation with color-flow and spectral waveform analysis. FINDINGS: The common femoral, femoral, popliteal, and segmentally visualized calf veins were evaluated. The opposite CFV was also included in the evaluation. Right leg veins are negative for deep venous thrombosis. No popliteal cysts.     CONCLUSION: 1.  Right leg veins are negative for DVT.    Ct  Abdomen Pelvis Without Oral With Without Iv Contrast    Result Date: 4/30/2019  EXAM: CT ABDOMEN PELVIS WO ORAL W WO IV CONTRAST LOCATION: [facility] DATE/TIME: 4/30/2019 3:45 PM INDICATION: Hematuria. COMPARISON: None. TECHNIQUE: Helical thin-section CT scan of the abdomen and pelvis was performed without contrast. Images were then obtained during and after injection of IV contrast, with delayed images through the renal collecting systems. Multiplanar reformats were obtained. Dose reduction techniques were used.? CONTRAST: Iohexol (Omni) 100 mL FINDINGS: LUNG BASES: Negative. ABDOMEN: Both kidneys are negative with no renal stones and no hydronephrosis. Tiny cysts mid right kidney laterally. Kidneys and renal collecting systems otherwise negative. No significant findings in the liver, spleen, pancreas, and adrenal glands. No lymphadenopathy. PELVIS: Negative. No adnexal masses or free fluid. MUSCULOSKELETAL: Negative.     CONCLUSION: 1.  No significant findings in the kidneys or renal collecting systems. 2.  No significant incidental findings.    I personally reviewed the following imaging today and those on care everywhere, if indicated    LABS:      Sodium   Date Value Ref Range Status   05/01/2019 139 136 - 145 mmol/L Final   04/30/2019 139 136 - 145 mmol/L Final   04/29/2019 140 136 - 145 mmol/L Final     Potassium   Date Value Ref Range Status   05/01/2019 3.7 3.5 - 5.0 mmol/L Final   04/30/2019 3.7 3.5 - 5.0 mmol/L Final   04/29/2019 3.6 3.5 - 5.0 mmol/L Final     Chloride   Date Value Ref Range Status   05/01/2019 106 98 - 107 mmol/L Final   04/30/2019 102 98 - 107 mmol/L Final   04/29/2019 104 98 - 107 mmol/L Final     BUN   Date Value Ref Range Status   05/01/2019 19 8 - 22 mg/dL Final   04/30/2019 19 8 - 22 mg/dL Final   04/29/2019 16 8 - 22 mg/dL Final     Creatinine   Date Value Ref Range Status   05/01/2019 0.74 0.60 - 1.10 mg/dL Final   04/30/2019 0.90 0.60 - 1.10 mg/dL Final   04/29/2019  0.81 0.60 - 1.10 mg/dL Final     Hemoglobin   Date Value Ref Range Status   04/30/2019 14.1 12.0 - 16.0 g/dL Final   04/28/2019 12.5 12.0 - 16.0 g/dL Final   04/26/2019 11.9 (L) 12.0 - 16.0 g/dL Final     Platelets   Date Value Ref Range Status   05/01/2019 312 140 - 440 thou/uL Final   04/30/2019 346 140 - 440 thou/uL Final   04/29/2019 315 140 - 440 thou/uL Final     BNP   Date Value Ref Range Status   04/25/2019 16 0 - 64 pg/mL Final   04/03/2016 <10 0 - 64 pg/mL Final     INR   Date Value Ref Range Status   04/30/2019 0.95 0.90 - 1.10 Final   04/03/2016 0.99 0.90 - 1.10 Final   05/25/2015 1.02 0.90 - 1.10 Final       Total time spent 20  minutes face to face with patient with more than 50% time spent in counseling and coordination of care.    David Woo MD  VASCULAR MEDICINE

## 2021-05-28 NOTE — TELEPHONE ENCOUNTER
I called Bushra on 4/19/19 and left a VM letting her know that I got her Velcro calf pieces in so we are all set for our appnt on Monday.

## 2021-05-28 NOTE — TELEPHONE ENCOUNTER
Pt wanted to let the [facility] know her weight is back up to 370lb since her office visit weight of 341 lb.  Pt is not short of breath, she is taking her BUMEX daily.  She has not had her lymphedema therapy for several days due to scheduling conflicts between her and the therapist.  The therapist is scheduled to come to her house on 5/17/19.  The patient has been informed to call 911 or go directly to the hospital if she starts to feel short of breath.    Patient stated understanding and had no additional questions.

## 2021-05-28 NOTE — PROGRESS NOTES
Pt was upstairs for new compression stockings and they called with concerns with redness in pts legs bilaterally. Pt complaining of increased pain that started over the weekend and that if feels like she is walking something on her feet. Pt legs are slightly reddended and weight is up 23lb and measurements are up bilaterally.

## 2021-05-29 ENCOUNTER — RECORDS - HEALTHEAST (OUTPATIENT)
Dept: ADMINISTRATIVE | Facility: CLINIC | Age: 41
End: 2021-05-29

## 2021-05-29 NOTE — PROGRESS NOTES
Former patient who had an endovenous closure of bilateral lower extremity done 12/2014. Having more issues with her left leg, pain rated 5 out of 10.  Current pt of Dr. Woo and has had recurrent cellulitis infections of left leg. Just finished antibiotic yesterday. Wears compression stockings daily and is elevating when able. Woking with bariatrics.

## 2021-05-29 NOTE — PATIENT INSTRUCTIONS - HE
"We are prescribing some compression garments for you. Below are some locations where they can help you get measured and fitting to ensure proper fitting. You should wear compression socks as much as you can. It is especially important to wear them with long periods of sitting/standing, long car rides or if you will be flying. Compression socks should get refilled every 4-6 months. They do not need to be worn at night while in bed. We can refill your sock prescription for 1 year otherwise your primary is able to refill them for you. Call us with any problems or questions.If you do a lot of standing it is good to do calf raises to help keep the blood pumping. If you sit a lot at work it is good to get up periodically to walk around. Elevation of the foot of your bed 4-6\" helps the blood return back to where it is needed.       [facility]    [facility]                                             [address]                              [place] [address]  Phone: [phone]                                                     [facility]   [address] ([facility])  [place] [address]  Phone: [phone]    [facility]  [address]   [place] [address]   Phone: [phone]    [facility]  [address]              [address]  [place] [address]                    [place] [address]  Phone: [phone]                      Phone: [phone]  Fax: [phone]    [facility]   [address]             [address]                 [address]     [place] [address]      [place] [address]         [place] [address]  Phone: [phone]      Phone: [phone]            Phone: [phone]  Fax: [phone]          Fax:[phone]                 Fax: " [phone]                                     Mary Hinkle  [phone]  [email or web address]

## 2021-05-29 NOTE — TELEPHONE ENCOUNTER
Payton calling in stating that she is with patient for compression stockings. Pt has a small wound that had just opened up overnight. Pt does not know what to do. PCP has prescribed her antibiotics. Dior states that it does not appear infected. Writer advised to cover wound up with bandage and to continue applying compression to legs. Advised to have pt make appt with provider-has seen Dr. Woo and Mehreen in past.

## 2021-05-29 NOTE — PATIENT INSTRUCTIONS - HE
" 1.Diet: continue aiming for 3 meals daily with at least 20-30g protein. Mindful avoidance of treats too often.   2. Exercise: continue walking  3. Medication: Trulicity ongoing and well tolerated. Will look to use Plenity if affordable this fall  4.  Stress Reduction:  Doing well.   5. Goals:  Next goal is under 300 lbs.   6. Diabetes, continue Trulicity and weight loss for improved control.   7. Continue low sodium focus as you appear to have removed the fluid weight off from earlier this year.      What makes a person succeed with dramatic and sustained weight loss?    It's being at the right point in your life where you feel the need to lose the weight, not because anyone told you so but because of a voice inside of you that says, \"I am ready for this\".  You're now at a point where you may be feeling anxious, irritable and when you look in the mirror you do not recognize the person looking back.  Your true self is buried somewhere in that reflexion and you want to free it again.  This is the sort of motivation that leads to success, and it comes from you.    Because the only person that can lose that extra weight is you, I like my patients to focus on the mindset of being in Weight Loss Season.  This gives you permission to make the changes necessary to be consistent with the diet/activity and behavior changes that lead to successful, healthy weight loss.  Nearly any diet plan can work for weight loss, but keeping it healthy and nutrient based to prevent deficiencies/hair loss/fatigue or irritability is vital.  If you have a plan you want to try, we'll work with you to make sure no adjustments are needed to keep you healthy through your weight loss season and working with our Bariatric Nutritionists you'll be given expert guidance to customize your diet plan to suit your particular needs. If you don't have your own ideas in mind, we are always happy to suggest well researched and validated plans that provide " "enough food to prevent hunger but still tap into your excess fat reserves and lose weight in a sustainable fashion.  There is great evidence that lean protein/healthy fat intake with good fiber intake while minimizing simple starches/carbs produces reliable/sustainable weight loss in most people. But some feel more connected to an intermittent fasting/fast mimicking or ketogenic diet.  These protocols can be hard for many to stick with and that's why we prefer the protein/healthy fat focused diets but if these alternative strategies appeal to you, we can work with you to optimize your knowledge and results with these tools.    Losing weight is a temporary commitment, but you need to be \"All In\" to have a good weight loss season.  To avoid frustration, you have to be willing to be nearly perfect 19 out of 20 days or even better than that. But, weight loss season is generally only 4-8 months in length. After that length of time, it can be hard to maintain a negative calorie balance and our brain, motivations and metabolism will usually bring you to a plateau that cannot be broken in this modern world where other commitments start to take priority. That's when we look to stabilize the weight loss you've achieved.  If you've reached your goal by that time, fantastic, and job well done.  If there is more to go, then after a few months of stabilization, we can usually attack that previous plateau and break into new territory.    Because of this time limitation, we want to really get to work right away and get into a sustainable routine ASAP.  One of the best predictors of how much weight you're going to lose throughout the season is how much you lose in the first 6 weeks, so prepare well and jump in with both feet.      Occasionally, people may feel like they cannot commit fully to the changes necessary and may want to change one thing at a time and \"get used to\" the idea of losing weight.  That is OK because that is " "where they are in their life, and they cannot fully commit for any number of reasons.  It's part of that internal motivation and they just haven't reached PRIORTY NUMBER ONE status yet. It's possible that what they need is more time to reach that point and I am always willing to work with people that want to \"dabble\", but understand, the amount of success obtained with half measures, is much less than half results. But Behavior Change cannot occur until a person goes through the contemplation and preparation phases necessary to have successful action and we are more than willing to give you targets to move along the spectrum and get to that point where you feel ready to  commit fully to the weight loss season.      As you go through your plan, look for things to keep your motivation rolling.  The most successful people have a goal or target/reward that they are working towards.  Having a reward that celebrates your new fitness, mobility and energy is the best sort because it will encourage you to do well with the weight maintenance phase and long term lifestyle changes that promotes keeping the weight off for the long term.  Usually, \"getting healthier\" or improving blood tests or losing weight so your clothes fit better is not as internally motivating as having a tangible reward.  A more motivating reward is one that isn't food based, is affordable, but something special:  Something you won't be getting unless you achieve your goal.   It s important to keep to the rule of success:  in order to get the reward, your goal MUST be achieved. Write this reward down, where you can look at it daily and keep it in the front of your mind as you go through your weight loss season and it will help keep you on track.    Tools that help change behavior are vital for success. The most studied and most supported tool for weight loss is nothing more than writing down your food and weight every day.  Every Day.  Accurately and " completely.  When you commit to weight loss season, this information tells you whether you're getting ENOUGH food to fuel your weight loss properly as well as teaches you the interaction between different foods you eat and how your body responds with weight loss.  You'll see that sometimes after a heavy workout you don't see the scale move until 2-3 days later.  How saltier meals (chili for example) may make you retain water for 4-5 days before you see the weight come off, you'll get used to the mini-plateaus that develop after a good 3-8 lb drop in weight as well as how you break through if you keep working the diet as you should.    Weight loss is not a linear process, there are mini ups and downs.  Learning how your body loses weight and getting comfortable with that is very rewarding. The act of writing words on paper also solidifies your will power and commitment to the season of weight loss and that by itself changes your brain chemistry/appetite, motivation and prepares you for maximal success.      Behavior change is all about getting into a new routine.  The old habits and routine have to change because without changing the circumstances of how you gained your weight, it's unlikely you'll enjoy satisfying results. If you have snacking habits, like every time you walk through the kitchen you grab a little something, well, that habit has to change and be replaced by a new habit.  It can be something as simple as keeping a doodle pad on the counter that you make a few scribbles and then walk through the kitchen having not opened the cupboard, or starting with a glass of water and leaving the kitchen without anything else, or checking your food journal to see how many calories you have left for the day.  Boredom is the enemy as are the old habits. Break new ground and try to push those old habits into a deep hole.      Finally, exercise always helps.  While not mandatory to lose weight, every little bit helps  "and exercise has so many other benefits that to not work it into your plan is to miss out on all the mood, sleep, stress and general health benefits that come from making yourself a little short of breath and sweaty at least 3-4 days out of the week.  The metabolism and calorie burn benefits aside, almost every chronic ailment in medicine gets better with proper, aerobic exercise.  Allow yourself to start slow and let your body prepare itself to accept harder training 4-6 weeks down the road, but start now and commit to a plan.  Whether you have the means to hire a , join a gym or just walk out your front door or go down to your basement for a video workout, get into a exercise routine and  after 3 weeks of at least 3 times a week exercise you should be at a point where you can slowly start ramping up 10% each week to our goal of at least 150-300minutes weekly of aerobic exercise and at least twice weekly resistance training/strenghtening with weights/bands or body weight exercises.     I am a big fan of modifying the free training plan, \"Couch to 5k\" for almost all of my patients. Just type it into [employer] or look it up on your smart phone sandip store.  To modify the plan,  you can use the training plan for whatever aerobic activity you do (bike/treadmill/elliptical/rower/pool/etc). During the \"jog\" intervals you just move a little faster or harder, or increase the tension or incline.  You use those little intervals to switch up the workout and recruit more muscles and pump the blood a little more and then recover again in the \"walk\" intervals by slowing down, decreasing the incline or turning down the tension.  3-4 days a week is not that much to ask and the benefits are enormous.  Start slow and develop the base from which you can then build on and reduce the risk of injury.  It's much more important 2-4 months from now to be enjoying your exercise then it is to over exert yourself at the start and hurting " "yourself.  Starting slowly allows your body to accept the training better down the road when the exercise becomes crucial for weight maintenance.  Without exercise down the road during your maintenance phase, all this hard work you are about to put in can be undone. It usually takes about 100-300 calories a day of exercise to maintain a weight loss and our focus during weight loss season is to generate the routine/activities and hobbies that make that enjoyable/sustainable.    Thanks for taking this first and most important step in your weight loss season.  Commit to it and we will cheerlead you all the way to success.  When things get tough or off track we'll offer guidance and analysis and when you reach your goal we'll celebrate your success.  In the end, it is all about your success and what you do with it.      Carlos Bowling MD  [facility]  [phone]      Weight Loss Shopping list:    Having the proper food on hand and ready to go is very important in losing weight.  Everybody has their own preferences/tastes so modify this list as you need but the following are foods that have been found to be helpful in following a calorie restricted diet.   If you are a person that doesn't \"like to eat my vegetables\", I recommend making smoothies and throwing the veggies into the  with some fruit and protein powder.      Fruits:  Generally limit to 2-3 whole fruits a day.  Do not buy Juice.  We depend on the fiber and chewing to slow us down and get phytonutrients/antioxidants available in the skins of fruits for health benefits.  Chewing also signals our stomach to send less hunger signals to our brains:    Apples (1-2 daily), Bananas (no more than one full banana a day), Grapes (2 handfuls a day), Clementines/oranges (one daily), berries (blue/rasp/straw:  2 handfuls a day). Watermelon (cubed for easy access, small bowl).    Vegetables:  Eating raw gets most nutrient and fiber " "benefits but cooked veggies are fine as well, using frozen for cooking or in smoothies is fine as well.  The more chewing/crunchiness the better the hunger control.  No limit to how many a day, but you should at least get 4 handfuls a day or more minimum.  Wash and cut up your vegetables into easy to carry, on the go sizes that are easy to put in plastic bags/pyrex and carry to work/school/etc.  Frozen veggies are just fine as well.     Broccoli, Carrots (no more than 3 a day large carrots or 2 handfuls of baby carrots, as they are very sweet), celery, cucumbers, green peppers, green beans (canned or fresh/frozen), Lettuce(all types), Beets(for roasting, will likely turn urine and stool a bit purple), asparagus.  Go crazy with the veggies, just wash well prior to eating.    Protein:  Women need at least  grams of protein a day and men at least  grams a day, more is fine.  Protein is our \"brain food\" and hunger suppressor and getting enough ensures maintenance of muscle mass during weight loss.  Vegetarians should look to balance their protein intake to get all essential amino acids and discuss with dietician if help is needed (mix of beans/soy/etc).  Protein powders or drinks count and can be a great way to control appetite during the day and easy to grab and go/carry to work/etc.  Premier Protein, BiPro, TarasWhey are all brands with high quality whey protein. For whey sensitive people, rice protein is an option as well.    Canned tuna/sardines or anchovies.  Fresh fish/salmon the day you plan to make it.  Chicken breasts/thighs (skinless while losing weight), filet mignon steak (leaner but ) or marinade sirloin (wipe off before cooking). Eggs cooked in olive oil for breakfast is a good way to get protein and good fat in the a.m. (cook on low heat to prevent transformation of olive oil into less healthy fat).  Greek Yogurt with at least 20 grams of protein per serving and less than 11 grams " of carbs/sugars.  String Cheese  Cottage Cheese: 2% or fat free per your preference.

## 2021-05-29 NOTE — PROGRESS NOTES
Medical  Weight Loss Follow-Up Diet Evaluation  Assessment:  Bushra is presenting today for a follow up weight management nutrition consultation. Pt has had an initial appointment with Bariatrician Dr. Bowling.  Pt's Initial Weight: 350 lbs  Weight: (!) 345 lb 8 oz (156.7 kg)  Weight loss from initial: 4.5  % Weight loss: 1.29 %    BMI: Body mass index is 67.48 kg/m .  IBW: 105 lbs    Estimated RMR (Kincaid-St Jeor equation): 2233 kcals   Estimated protein needs (0.6-0.8 grams per IBW): 50-90 grams  Patient Active Problem List:     Patient Active Problem List   Diagnosis     Hypertension     History of pituitary surgery     Schizophrenia, schizoaffective, chronic with acute exacerbation (H)     Lymphedema     Venous hypertension of lower extremity, bilateral     Hypothyroidism, unspecified hypothyroidism type     Hyperlipidemia, unspecified hyperlipidemia type     GERD (gastroesophageal reflux disease)     Prolactinoma (H)     Psychosis (H)     Perianal irritation     Acquired valgus deformity of both ankles     Flat feet, bilateral     Foot deformity, bilateral     Venous insufficiency of both lower extremities     Itching     Morbid obesity with BMI of 60.0-69.9, adult (H)     Prader-Willi syndrome     Intellectual disability     Nightmares     Bipolar affective disorder, current episode manic with psychotic symptoms (H)     Venous stasis ulcers of both lower extremities (H)     Prediabetes     Cellulitis     Dyspnea on exertion     Type 2 diabetes mellitus without complication, without long-term current use of insulin (H)     Acute pulmonary edema (H)     Obstructive sleep apnea     Gross hematuria     Diabetes: Yes     Progress on goals from last visit: Pt reports increasing her water intake, but struggles eating lower sodium foods. Pt did not keep a food journal. Pt desires to want to know about low salt food and fluid options. Pt's living skill coordinator present at visit, whom reports going through bushra's  kitchen to take out high sodium foods.     Dietary Recall:  Breakfast: 2 eggs w/ mrs.Abrahan seasoning, juice, apple (14g)   Snack: none   Lunch:[employer]- baked chicken, mashed potatoes, vegetable, milk (30g)   Snack: none   Dinner: 2 tacos, salsa (15g)   Snack: ice cream sandwich   Overnight eating: No  Eating out (frequency/week): 0-1x/week   Hydration (type/oz. per day):  Water: 24 oz   Caffeine:soda 16 oz/day   Carbonation: none   Juice: 4 oz daily   Alcohol : none   Exercise:  Routine exercise established: No  Pt tries to go walking.      Nutrition Diagnosis:    NI-5.10.2 Excessive mineral intake (sodium) related to Food and nutrition related knowledge deficit concerning food and supplemental sources of minerals as evidenced by diet recall reporting high sodium sources daily.      Intervention:  1. Recommend pt to follow <2,000 mg sodium diet  2. Reviewed high sodium food sources, with healthier options.  3. Discussed choosing fresh/frozen foods versus pre-packed and canned goods.  4. Encouraged cutting out juice and soda and sticking with water.  5. Discussed healthier meals to order from [employer] meal program.     Monitoring/Evaluation:    Goals:  1. Read food label, choose foods with less than 5% DV of salt.  2. Cut out soda/juice     Follow up:  Pt will follow up in 1 month(s) with bariatrician and PRN with dietitian.     Time spent with patient: 15 minutes  Roxanne Castellano RD     ABN signed: Yes

## 2021-05-29 NOTE — TELEPHONE ENCOUNTER
I called Bushra on 6/20/19 and left a VM to let her know that her Velcro calf pieces are in. I asked her to call me back and let me know if she wants me to ship them out to her home address on Monday when I am back, or if she would rather pick them up and have me ready them for that on Monday. I left my phone number.

## 2021-05-29 NOTE — PROGRESS NOTES
S: Patient was seen in [place] to be measured for. Bushra has an RX from Dr. Verde for knee high stockings 20-30 mmHg.    O: Goal is to evaluate and measure patient for a compression garment that will help control her lymphedema. Observations show there is swelling present from lymphatic fluid, as well as a new open sore to her left lateral calf. Bushra and her PCA present let me know that this opened up last night. She let her primary care provider know today and she was prescribed Levaquin. Bushra let me know that she has not been wearing her compression garments.     A: I explained to Bushra that even if we do custom compression knee highs in a flat knit material, I am very nervous that her leg shape will not support the garments and that they will cut off circulation at her ankles. Bushar has narrow feet and ankles with overhanging soft tissue right above her malleoli. I explained that the stockings would definitely have a tendency to bind into that area right below her calves. We decided that the best option for her is to continue with her custom [employer] Juxtafit Premium calf pieces, especially now that she has an open sore on her calf. [facility] and spoke with the triage RN, Pat, who let me know that there are no availabilities this afternoon for Bushra to have her leg looked at. The advice provided was to put a bandage on over her sore, then wear her compression Velcro pieces as long as she can. I passed this along to Bushra and told her to set up an appnt with the [facility] if she wants her leg to be looked at in the next week or so, especially if the wound does not heal, or if it gets worse. She understands to wear her Velcro compression until she is told otherwise, or unless she experiences pain with the Velcro. I took measurements of her legs to compare to the last measurements obtained on 4/11/19. Her size has slightly increased (likely due to her open sore and not wearing  her compression garments). Because the increase in size is not significant (over 3 cm at any given circumference), we will use the same measurements as last time to reorder a second pair of custom Juxtafit Premium calf pieces. I will see if there is an order for a second pair from Dr. Odell, or else I will request an order from Dr. Verde.    P: I will call Bushra once her garments are in and most likely ship the Velcro out to her home address which was verified.

## 2021-05-29 NOTE — PROGRESS NOTES
Bariatric Clinic Follow-Up Visit:    Bushra Buck is a 39 y.o.  female with Body mass index is 64.25 kg/m .  presenting here today for follow-up on non-surgical efforts for weight loss. Original Intake visit occurred on 9/17/15 with a weight of 350 lbs (max of 401 lbs) lbs.  Along with diet and behavior changes, she has been using Trulicity to assist her weight loss goals.  See her intake visit notes for details on identified contributors to weight gain in the past.    Weight:   Wt Readings from Last 2 Encounters:   06/24/19 (!) 329 lb (149.2 kg)   06/11/19 (!) 348 lb (157.9 kg)    pounds  Height: 5' (1.524 m) (6/24/2019 12:29 PM)  Initial Weight: 350 lbs (6/10/2019 11:00 AM)  Weight: (!) 329 lb (149.2 kg) (6/24/2019 12:29 PM)  Weight loss from initial: 4.5 (6/10/2019 11:00 AM)  % Weight loss: 1.29 % (6/10/2019 11:00 AM)  BMI (Calculated): 64.3 (6/24/2019 12:29 PM)  SpO2: 96 % (6/3/2019  2:48 PM)      Comorbidities:  Patient Active Problem List   Diagnosis     Hypertension     History of pituitary surgery     Schizophrenia, schizoaffective, chronic with acute exacerbation (H)     Lymphedema     Venous hypertension of lower extremity, bilateral     Hypothyroidism, unspecified hypothyroidism type     Hyperlipidemia, unspecified hyperlipidemia type     GERD (gastroesophageal reflux disease)     Prolactinoma (H)     Psychosis (H)     Perianal irritation     Acquired valgus deformity of both ankles     Flat feet, bilateral     Foot deformity, bilateral     Venous insufficiency of both lower extremities     Itching     Morbid obesity with BMI of 60.0-69.9, adult (H)     Prader-Willi syndrome     Intellectual disability     Nightmares     Bipolar affective disorder, current episode manic with psychotic symptoms (H)     Venous stasis ulcers of both lower extremities (H)     Prediabetes     Cellulitis     Dyspnea on exertion     Type 2 diabetes mellitus without complication, without long-term current use of insulin  (H)     Acute pulmonary edema (H)     Obstructive sleep apnea     Gross hematuria       Current Outpatient Medications:      ARIPiprazole (ABILIFY) 15 MG tablet, Take 15 mg by mouth at bedtime., Disp: , Rfl:      blood glucose meter (GLUCOMETER), Use 1 each As Directed as needed. Dispense glucometer brand per patient's insurance at pharmacy discretion., Disp: , Rfl: 0     blood glucose test strips, Use 1 each As Directed as needed. Dispense brand per patient's insurance at pharmacy discretion., Disp: , Rfl: 0     bumetanide (BUMEX) 2 MG tablet, Take 1 tablet (2 mg total) by mouth 2 (two) times a day at 9am and 6pm., Disp: 60 tablet, Rfl: 0     cabergoline (DOSTINEX) 0.5 mg tablet, Take 0.5 mg by mouth 2 (two) times a week., Disp: , Rfl:      calcium citrate-vitamin D3 (CITRACAL + D) 315-250 mg-unit per tablet, Take 2 tablets by mouth daily., Disp: , Rfl: 3     CHOLECALCIFEROL 1,000 unit tablet, Take 2,000 Units by mouth daily.    , Disp: , Rfl: 1     dulaglutide (TRULICITY) 1.5 mg/0.5 mL PnIj, Inject 1.5 mg under the skin every 7 days., Disp: 4 Syringe, Rfl: 6     EPINEPHrine (EPIPEN) 0.3 mg/0.3 mL atIn, Inject 0.6 mL into the shoulder, thigh, or buttocks as needed. , Disp: , Rfl: 0     fenofibrate (TRICOR) 145 MG tablet, Take 1 tablet by mouth daily., Disp: , Rfl: 3     fluticasone (FLONASE) 50 mcg/actuation nasal spray, Apply 2 sprays into each nostril daily., Disp: , Rfl: 6     generic lancets, Use 1 each As Directed as needed. Dispense brand per patient's insurance at pharmacy discretion., Disp: , Rfl: 0     gentamicin (GARAMYCIN) 0.1 % ointment, APPLY TO WOUND DAILY WITH DRESSING CHANGES, Disp: , Rfl: 0     levothyroxine (SYNTHROID, LEVOTHROID) 112 MCG tablet, Take 1 tablet (112 mcg total) by mouth daily., Disp: 30 tablet, Rfl: 0     loperamide (IMODIUM) 2 mg capsule, Take 1 capsule (2 mg total) by mouth 4 (four) times a day as needed for diarrhea., Disp: 10 capsule, Rfl: 0     meclizine (ANTIVERT) 12.5 mg  "tablet, Take 1-2 tablets by mouth as needed., Disp: , Rfl: 0     MULTIVITAMIN tablet, Take 1 tablet by mouth daily., Disp: , Rfl: 3     mupirocin (BACTROBAN) 2 % ointment, APPLY TO WOUND EVERY DAY AFTER DRESSING CHANGES, Disp: , Rfl: 0     NYAMYC powder, Apply 1 application topically 2 (two) times a day., Disp: , Rfl: 2     omeprazole (PRILOSEC) 40 MG capsule, Take 40 mg by mouth daily., Disp: , Rfl: 5     pen needle, diabetic 31 gauge x 5/16\" Ndle, Use 1 each As Directed daily. With daily Victoza injections., Disp: 100 each, Rfl: PRN     potassium chloride (K-DUR,KLOR-CON) 20 MEQ tablet, Take 1 tablet (20 mEq total) by mouth daily., Disp: 20 tablet, Rfl: 0     triamcinolone (KENALOG) 0.5 % cream, Apply topically 2 (two) times a day as needed. Do not put on open wounds.  Use for itching as needed., Disp: 15 g, Rfl: 2     TRULICITY 1.5 mg/0.5 mL PnIj, INJECT 1 PEN (1.5 MG) UNDER THE SKIN ONCE EVERY 7 DAYS., Disp: , Rfl: 6     valACYclovir (VALTREX) 1000 MG tablet, TAKE 2 TABLETS BY MOUTH TWICE DAILY FOR ONE DAY AT ONSET OF COLD SORE, Disp: , Rfl: 2      Interim: Since our last visit, she has followed up with the dietician, had a CT this spring of the abdomen and pelvis on chart review with no pathology.  Her Trulicity use has been good, blood sugars running in mid 100s on her report. Her weight is stable since our last visit in January (328 lbs).  She'd had a large weight gain from January to April and since has lost that 20 lbs that she'd gained. Future plan to trial Plenity when available this fall was discussed. Snacking behaviors that were problematic at her last dietician visit (ice cream sandwiches/soda and juice) are decreasing. Discussed avoiding juices.   Has a new PCA this month and is \"determined to get me up more\". Going out a few times weekly w/ her worker and may walk up to a block.  Plan:   1.  Diet: continue aiming for 3 meals daily with at least 20-30g protein. Mindful avoidance of treats too often. "   2. Exercise: continue walking  3. Medication: Trulicity ongoing and well tolerated. Will look to use Plenity if affordable this fall  4.  Stress Reduction:  Doing well.   5. Goals:  Next goal is under 300 lbs.   6. Diabetes, continue Trulicity and weight loss for improved control.   7. Lymphedema and venous insufficiency followed by Dr. Odell, compressive therapy and weight loss along w/ diuretic use to improve.           We discussed [facility] Bariatric Basics including:  -eating 3 meals daily  -eating protein first  -eating slowly, chewing food well  -avoiding/limiting calorie containing beverages  -We discussed the importance of restorative sleep and stress management in maintaining a healthy weight.  -We discussed the National Weight Control Registry healthy weight maintenance strategies and ways to optimize metabolism.  -We discussed the importance of physical activity including cardiovascular and strength training in maintaining a healthier weight and explored viable options.    Most recent labs:  Lab Results   Component Value Date    WBC 10.4 06/03/2019    HGB 12.7 06/03/2019    HCT 37.8 06/03/2019     (H) 06/03/2019     06/03/2019     Lab Results   Component Value Date    CHOL 140 11/06/2018     Lab Results   Component Value Date    HDL 36 (L) 11/06/2018     Lab Results   Component Value Date    LDLCALC 75 11/06/2018     Lab Results   Component Value Date    TRIG 145 11/06/2018     No components found for: CHOLHDL  Lab Results   Component Value Date    ALT 60 (H) 04/30/2019    AST 47 (H) 04/30/2019    ALKPHOS 37 (L) 04/30/2019    BILITOT 0.6 04/30/2019     Lab Results   Component Value Date    HGBA1C 5.2 04/09/2019     Lab Results   Component Value Date    [id number] 623 09/17/2015     Lab Results   Component Value Date    UOCFQWOX44UW 24.2 (L) 11/06/2018     No results found for: FERRITIN  No results found for: PTH  Lab Results   Component Value Date    58967 218 (H) 09/17/2015     No  results found for: 7597  Lab Results   Component Value Date    TSH 3.01 11/06/2018     No results found for: TESTOSTERONE    DIETARY HISTORY    Positive Changes Since Last Visit: weight down back to levels in January  Struggling With: snacking some    Knowledgeable in Reading Food Labels: improving  Getting Adequate Protein: more often  Sleeping 7-8 hours/day good  Stress management good    PHYSICAL ACTIVITY PATTERNS:  Cardiovascular: walking some,   Strength Training: none.     REVIEW OF SYSTEMS  GENERAL/CONSTITUTIONAL:  No illness lately. bumex working well  HEENT:   na  CARDIOVASCULAR:   no pain  PULMONARY:   no cough  GASTROINTESTINAL:  No pain  UROLOGIC:  Urinates well, had UTI this spring.  NEUROLOGIC:  na  PSYCHIATRIC:  Stable mood, no med changes  MUSCULOSKELETAL/RHEUMATOLOGIC   no new leg issues, lymphedema stable.   ENDOCRINE:  Blood sugars  DERMATOLOGIC:  No cellulitis.    PHYSICAL EXAM:  Vitals: /79   Pulse (!) 101   Temp 98.1  F (36.7  C)   Resp 16   Ht 5' (1.524 m)   Wt (!) 329 lb (149.2 kg)   BMI 64.25 kg/m    Height: 5' (1.524 m) (6/24/2019 12:29 PM)  Initial Weight: 350 lbs (6/10/2019 11:00 AM)  Weight: (!) 329 lb (149.2 kg) (6/24/2019 12:29 PM)  Weight loss from initial: 4.5 (6/10/2019 11:00 AM)  % Weight loss: 1.29 % (6/10/2019 11:00 AM)  BMI (Calculated): 64.3 (6/24/2019 12:29 PM)  SpO2: 96 % (6/3/2019  2:48 PM)      GEN: Pleasant, well groomed, in no acute distress  HEENT: PEERL, EOMI, airway clear .  NECK: No swelling.  HEART: Rhythm regular, rate regular, no murmur   LUNGS: Clear without crackles or wheezes. No cough.  ABDOMEN: super morbid obesity, non distended.  EXTREMITIES: 2 plus palpable peripheral pulses, radial. Small scratch without infection left forearm. .  NEURO: Alert and Oriented X3, normal gait and speech.  SKIN: No visible rashes/pallor or jaundice..        25 minutes was spent in direct consultation, with over 50% of it spent in counseling regarding their plan for  excess weight reduction and health modification.  Carlos Bowling MD  [facility]  12:23 PM

## 2021-05-29 NOTE — PROGRESS NOTES
[facility] Surgery Follow up    HPI:    39 y.o. year old female who returns for a follow up.  She has severe lymphedema issues and morbid obesity.  Her BMI is 67.9 she is followed by Dr. Odell and bariatric clinic..  Visit today about her concern for a vein on her lateral leg and left side.  She can see a small little branch she is worried about this and here for us to be get it    Allergies:Fiorinal [butalbital-aspirin-caffeine]; Clindamycin; Ancef [cefazolin]; Aspirin (tartrazine only); Ativan [lorazepam]; Carbamazepine; Codeine; Delsym; Ibuprofen; Latex; Lithium analogues; Oxcarbazepine; Venom-honey bee; Augmentin [amoxicillin-pot clavulanate]; Cefdinir; and Topiramate    Past Medical History:   Diagnosis Date     Allergic rhinitis      Bipolar 1 disorder (H)     followed by psych     Cellulitis, leg 2016     Dyslipidemia      Ganglion, left wrist      GERD (gastroesophageal reflux disease)      Herpes zoster      Hypertension      Hypothyroid      Lymphedema of lower extremity 2008     Morbid obesity (H)      Nightmares      Obstructive sleep apnea      Post traumatic stress disorder (PTSD)      Prader-Willi syndrome      Prolactinoma (H) transsphenoid approach 2000.     PTSD (post-traumatic stress disorder)      Schizoaffective disorder, bipolar type (H)      Type 2 diabetes mellitus without complication, with long-term current use of insulin (H)      Venous stasis      Vitamin D deficiency        Past Surgical History:   Procedure Laterality Date     ABDOMINAL SURGERY  2000    fat removed tp plug dura when had a pituitary surgery.     anorectal fissure repair  2004, 2005     BRAIN SURGERY  2000    pituitary surgery     CARPAL TUNNEL RELEASE Bilateral      GANGLION CYST EXCISION  2010    left arm     GANGLION CYST EXCISION Left 6/7/2018    Procedure: REMOVE RECURRENT GANGLION CYST LEFT VOLAR RADIAL WRIST;  Surgeon: Angel Lopez MD;  Location: [facility];  Service:      PITUITARY SURGERY  2014      SEPTOPLASTY      x 2     SINUS SURGERY Right 8/21/2015    Procedure: RIGHT MICHELLE BULLOSA;  Surgeon: Daniel Landeros MD;  Location: [facility];  Service:      TONSILLECTOMY         CURRENT MEDS:  Current Outpatient Medications on File Prior to Visit   Medication Sig Dispense Refill     ARIPiprazole (ABILIFY) 15 MG tablet Take 15 mg by mouth at bedtime.       blood glucose meter (GLUCOMETER) Use 1 each As Directed as needed. Dispense glucometer brand per patient's insurance at pharmacy discretion.  0     blood glucose test strips Use 1 each As Directed as needed. Dispense brand per patient's insurance at pharmacy discretion.  0     bumetanide (BUMEX) 2 MG tablet Take 1 tablet (2 mg total) by mouth 2 (two) times a day at 9am and 6pm. 60 tablet 0     cabergoline (DOSTINEX) 0.5 mg tablet Take 0.5 mg by mouth 2 (two) times a week.       calcium citrate-vitamin D3 (CITRACAL + D) 315-250 mg-unit per tablet Take 2 tablets by mouth daily.  3     CHOLECALCIFEROL 1,000 unit tablet Take 2,000 Units by mouth daily.         1     EPINEPHrine (EPIPEN) 0.3 mg/0.3 mL atIn Inject 0.6 mL into the shoulder, thigh, or buttocks as needed.   0     fenofibrate (TRICOR) 145 MG tablet Take 1 tablet by mouth daily.  3     fluticasone (FLONASE) 50 mcg/actuation nasal spray Apply 2 sprays into each nostril daily.  6     generic lancets Use 1 each As Directed as needed. Dispense brand per patient's insurance at pharmacy discretion.  0     levoFLOXacin (LEVAQUIN) 500 MG tablet Take 1 tablet (500 mg total) by mouth daily for 10 days. 7 tablet 0     levothyroxine (SYNTHROID, LEVOTHROID) 112 MCG tablet Take 1 tablet (112 mcg total) by mouth daily. 30 tablet 0     loperamide (IMODIUM) 2 mg capsule Take 1 capsule (2 mg total) by mouth 4 (four) times a day as needed for diarrhea. 10 capsule 0     meclizine (ANTIVERT) 12.5 mg tablet Take 1-2 tablets by mouth as needed.  0     MULTIVITAMIN tablet Take 1 tablet by mouth daily.  3      "NYAMYC powder Apply 1 application topically 2 (two) times a day.  2     omeprazole (PRILOSEC) 40 MG capsule Take 40 mg by mouth daily.  5     pen needle, diabetic 31 gauge x 5/16\" Ndle Use 1 each As Directed daily. With daily Victoza injections. 100 each PRN     potassium chloride (K-DUR,KLOR-CON) 20 MEQ tablet Take 1 tablet (20 mEq total) by mouth daily. 20 tablet 0     TRULICITY 1.5 mg/0.5 mL PnIj INJECT 1 PEN (1.5 MG) UNDER THE SKIN ONCE EVERY 7 DAYS.  6     valACYclovir (VALTREX) 1000 MG tablet TAKE 2 TABLETS BY MOUTH TWICE DAILY FOR ONE DAY AT ONSET OF COLD SORE  2     dulaglutide (TRULICITY) 1.5 mg/0.5 mL PnIj Inject 1.5 mg under the skin every 7 days. 4 Syringe 6     gentamicin (GARAMYCIN) 0.1 % ointment APPLY TO WOUND DAILY WITH DRESSING CHANGES  0     mupirocin (BACTROBAN) 2 % ointment APPLY TO WOUND EVERY DAY AFTER DRESSING CHANGES  0     triamcinolone (KENALOG) 0.5 % cream Apply topically 2 (two) times a day as needed. Do not put on open wounds.  Use for itching as needed. 15 g 2     No current facility-administered medications on file prior to visit.        Family History   Problem Relation Age of Onset     Diabetes Mother      Hypothyroidism Mother      Cancer Mother      Sleep apnea Mother      Snoring Mother      Heart disease Father      Diabetes Sister      Edema Sister      No Medical Problems Sister      Diabetes Maternal Grandfather         reports that she has never smoked. She has never used smokeless tobacco. She reports that she does not drink alcohol or use drugs.    Review of Systems:  Negative except localized branch in the left lateral to posterior thigh region, severe leg swelling severe issues with lymphedema    OBJECTIVE:  Vitals:    06/11/19 1000   BP: 124/78   Pulse: 84   Resp: 18   Temp: 98  F (36.7  C)   TempSrc: Oral   Weight: (!) 348 lb (157.9 kg)   Height: 5' (1.524 m)     Body mass index is 67.96 kg/m .    EXAM:  GENERAL: This is a well-developed 39 y.o. female who appears her " stated age  HEAD: normocephalic  HEENT: Pupils equal and reactive bilaterally  CARDIAC: RRR without murmur  CHEST/LUNG:  Clear to auscultation  ABDOMEN: Soft, nontender, nondistended, no masses    NEUROLOGIC: Focally intact, nonfocal  VASCULAR: Pulses intact, symmetrical upper and lower extremities.  Small vein left lateral leg.        LABS:  Lab Results   Component Value Date    WBC 10.4 06/03/2019    WBC 10.7 09/17/2015    HGB 12.7 06/03/2019    HCT 37.8 06/03/2019     (H) 06/03/2019     06/03/2019     INR/Prothrombin Time      Lab Results   Component Value Date    HGBA1C 5.2 04/09/2019     Lab Results   Component Value Date    ALT 60 (H) 04/30/2019    AST 47 (H) 04/30/2019    ALKPHOS 37 (L) 04/30/2019    BILITOT 0.6 04/30/2019            Assessment/Plan:   1. Venous insufficiency of both lower extremities  Is a small vein about 2 cm in length I do not think this is a major issue for her I would not recommend any imaging or work-up she is being cared for from lymphedema standpoint working on weight loss which is leg is a biggest thing she can do to help herself.  She wears compression on a regular basis and from my standpoint I would not recommend any other further work-up  - Compression stockings    2. Symptomatic varicose veins of both lower extremities  same  - Compression stockings      Return if symptoms worsen or fail to improve.     Shiva Verde MD  [facility] Department of Surgery

## 2021-05-30 VITALS — HEIGHT: 60 IN | WEIGHT: 293 LBS | BODY MASS INDEX: 57.52 KG/M2

## 2021-05-30 NOTE — PROGRESS NOTES
S: I have received Bushra's custom [employer]i [employer] Juxtafit Premium calf pieces. Rx on file is current.    A: No assessment was made. I will be shipping the garments to her home address, along with proper compliance paperwork to be signed, dated, and returned.    P: She is to call with any further needs.  Goal is to maintain a home program.

## 2021-05-30 NOTE — TELEPHONE ENCOUNTER
Patient calling - states she had 2-3 episodes of diarrhea today and then she vomited after dinner.  No fever.  Last temperature = 97.4 oral temperature.    No chest pain. No abdominal pain. No headache.    Triaged to disposition of Home Care.  Home care advice given per protocol:  Reassurance and education, try to sip small amounts (1 tablespoon) of liquid frequently for 8 hours, after 4 hours without vomiting increase the amount, begin eating bland foods after 8 hours without vomiting, start with saltine crackers, white bread, rice, mashed potatoes or cereal.  Discussed expected course.      Advised to call back if vomiting lasts more than 2 days, vomit contains bile, diarrhea lasts more than 7 days, constant stomach pain lats more than 2 hours, signs of dehydration occur or she becomes worse.    Leonila Simmons RN  Triage Nurse Advisor    Reason for Disposition    MILD vomiting with diarrhea    Protocols used: VOMITING-A-AH

## 2021-05-30 NOTE — TELEPHONE ENCOUNTER
Bushra and I spoke and she would like for me to ship her Velcro pieces out to her. I let her know I would ship them at the latest by this Thursday and I told her I would call her when I do ship them out.

## 2021-05-30 NOTE — TELEPHONE ENCOUNTER
Patient called with questions. She has recently been in hospital for sepsis related cellulitis of lower extremity. She wants to have test to prove she doesn't have MRSA. I explained we will see here in f/u 8/5/19 with Dr Woo. Patient is at home and has no open wounds. I explained we will not treat her any differently.I will forward this to Dr Woo's team.

## 2021-05-31 VITALS — HEIGHT: 60 IN | WEIGHT: 293 LBS | BODY MASS INDEX: 57.52 KG/M2

## 2021-05-31 VITALS — BODY MASS INDEX: 57.52 KG/M2 | WEIGHT: 293 LBS | HEIGHT: 60 IN

## 2021-05-31 VITALS — WEIGHT: 293 LBS | BODY MASS INDEX: 57.52 KG/M2 | HEIGHT: 60 IN

## 2021-05-31 VITALS — BODY MASS INDEX: 57.52 KG/M2 | HEIGHT: 60 IN | WEIGHT: 293 LBS

## 2021-05-31 VITALS — WEIGHT: 293 LBS | HEIGHT: 60 IN | BODY MASS INDEX: 57.52 KG/M2

## 2021-05-31 NOTE — PROGRESS NOTES
Vascular Medicine Progress note    Dr David Woo MD, Vascular Medicine      Bushra Buck    Medical Record #:  [id number]    Date of Service: 08/26/2019     Date last seen:  5/6/2019    PRIMARY CARE PROVIDER: Erasto Azul MD      IMPRESSION/PLAN: Bilateral lower extremity venous insufficiency, secondary lymphedema/lipedema    Patient is wearing her Velcro compression garment, swelling is much better, patient is capable of ambulating more, her weight went down from 342 pounds to 329, she is more engaged and she is willing to exercise more    DISPOSITION:  1- continue with Velcro compression garment  2- encourage walking and to be involved more and exercise program if possible  3-will renew the order for compression garments  4-follow-up with the patient in 6 months      ______________________________________________________________________    Subjective:    ALLERGIES:  Fiorinal [butalbital-aspirin-caffeine]; Clindamycin; Ancef [cefazolin]; Aspirin (tartrazine only); Ativan [lorazepam]; Carbamazepine; Codeine; Delsym; Ibuprofen; Latex; Lithium analogues; Oxcarbazepine; Venom-honey bee; Augmentin [amoxicillin-pot clavulanate]; Cefdinir; and Topiramate    MEDS:    Current Outpatient Medications:      acetaminophen (TYLENOL) 500 MG tablet, TAKE 2 TABLETS BY MOUTH TWICE DAILY; MAY USE ANOTHER 1000MG DAILY AS NEEDED IF PAIN, Disp: , Rfl: 11     ARIPiprazole (ABILIFY) 15 MG tablet, Take 15 mg by mouth at bedtime., Disp: , Rfl:      azelastine (ASTELIN) 137 mcg (0.1 %) nasal spray, Apply 1 spray into each nostril every evening. Use in each nostril as directed, Disp: , Rfl:      blood glucose meter (GLUCOMETER), Use 1 each As Directed as needed. Dispense glucometer brand per patient's insurance at pharmacy discretion., Disp: , Rfl: 0     blood glucose test strips, Use 1 each As Directed as needed. Dispense brand per patient's insurance at pharmacy discretion., Disp: , Rfl: 0     bumetanide (BUMEX) 2 MG tablet,  "Take 1 tablet (2 mg total) by mouth 2 (two) times a day at 9am and 6pm., Disp: 60 tablet, Rfl: 0     cabergoline (DOSTINEX) 0.5 mg tablet, Take 0.5 mg by mouth 2 (two) times a week. Takes on Wednesday and Sunday   , Disp: , Rfl:      calcium citrate-vitamin D3 (CITRACAL + D) 315-250 mg-unit per tablet, Take 2 tablets by mouth daily., Disp: , Rfl: 3     CHOLECALCIFEROL 1,000 unit tablet, Take 3,000 Units by mouth daily.    , Disp: , Rfl: 1     EPINEPHrine (EPIPEN) 0.3 mg/0.3 mL atIn, Inject 0.6 mL into the shoulder, thigh, or buttocks as needed. , Disp: , Rfl: 0     fenofibrate (TRICOR) 145 MG tablet, Take 145 mg by mouth daily.    , Disp: , Rfl: 3     fluticasone (FLONASE) 50 mcg/actuation nasal spray, Apply 2 sprays into each nostril every evening.    , Disp: , Rfl: 6     generic lancets, Use 1 each As Directed as needed. Dispense brand per patient's insurance at pharmacy discretion., Disp: , Rfl: 0     levothyroxine (SYNTHROID, LEVOTHROID) 112 MCG tablet, Take 1 tablet (112 mcg total) by mouth daily., Disp: 30 tablet, Rfl: 0     loperamide (IMODIUM) 2 mg capsule, Take 1 capsule (2 mg total) by mouth 4 (four) times a day as needed for diarrhea., Disp: 10 capsule, Rfl: 0     meclizine (ANTIVERT) 12.5 mg tablet, Take 1-2 tablets (12.5-25 mg total) by mouth 3 (three) times a day as needed for dizziness., Disp: , Rfl: 0     medroxyPROGESTERone (PROVERA) 10 MG tablet, TAKE 1 TABLET BY MOUTH ONCE DAILY FOR 5 DAYS EACH MONTH, Disp: , Rfl: 3     MULTIVITAMIN tablet, Take 1 tablet by mouth daily., Disp: , Rfl: 3     NYAMYC powder, Apply 1 application topically 2 (two) times a day as needed. Under breasts for rash   , Disp: , Rfl: 2     omeprazole (PRILOSEC) 40 MG capsule, Take 40 mg by mouth daily., Disp: , Rfl: 5     pen needle, diabetic 31 gauge x 5/16\" Ndle, Use 1 each As Directed daily. With daily Victoza injections., Disp: 100 each, Rfl: PRN     potassium chloride (K-DUR,KLOR-CON) 20 MEQ tablet, Take 1 tablet (20 mEq " total) by mouth daily., Disp: 20 tablet, Rfl: 0     sodium chloride (OCEAN) 0.65 % nasal spray, Apply 4 sprays into each nostril every evening. Prior to Flonase, Disp: , Rfl:      triamcinolone (KENALOG) 0.5 % cream, Apply topically 2 (two) times a day as needed. Do not put on open wounds.  Use for itching as needed., Disp: 15 g, Rfl: 2     TRULICITY 1.5 mg/0.5 mL PnIj, INJECT 1 PEN (1.5 MG) UNDER THE SKIN ONCE EVERY 7 DAYS., Disp: , Rfl: 6     valACYclovir (VALTREX) 1000 MG tablet, TAKE 2 TABLETS BY MOUTH TWICE DAILY FOR ONE DAY AT ONSET OF COLD SORE, Disp: , Rfl: 2     dulaglutide (TRULICITY) 1.5 mg/0.5 mL PnIj, Inject 1.5 mg under the skin every 7 days., Disp: 4 Syringe, Rfl: 6    REVIEW OF SYSTEMS:    A 12 point ROS was reviewed and except for what is listed in the HPI above, all others are negative    Objective:    PE:  /78 (Patient Site: Right Arm, Patient Position: Sitting, Cuff Size: Adult Regular)   Pulse 88   Temp 99  F (37.2  C) (Oral)   Resp 18   Ht 5' (1.524 m)   Wt (!) 329 lb 8 oz (149.5 kg)   BMI 64.35 kg/m    Wt Readings from Last 1 Encounters:   08/26/19 (!) 329 lb 8 oz (149.5 kg)     Body mass index is 64.35 kg/m .    EXAM:  GENERAL: This is a well-developed 39 y.o. female who appears her stated age  EYES: Grossly normal.  MOUTH: Buccal mucosa normal   CARDIAC:  Normal S1 and S2, no Murmur  CHEST/LUNG:  Clear lung fields bilaterally  GASTROINTESINAL (ABDOMEN):Soft, non-tender, B/S present, no pulsatile mass     MUSCULOSKELETAL: Grossly normal and both lower extremities are intact.  HEME/LYMPH: No lymphedema  NEUROLOGIC: Focally intact, Alert and oriented x 3.   PSYCH: appropriate affect  INTEGUMENT: No open lesions or ulcers  Pulse Exam: Intact  No evidence of cellulitis  Leg swelling is much better  Evidence of lipedema  Circumferential measures:  Vasc Edema 12/3/2018 4/8/2019 4/22/2019 5/6/2019 8/26/2019   Right just above MTP 27.5 26 31.4 27 27.1   Right Ankle 34.5 35.7 62.5 37 32.7    Right Widest Calf 63.5 73.8 81.4 63.8 66   Right Thigh Up 10cm 72.7 73.2 - 81 76.5   Left - just above MTP 30.5 27.8 28.2 28.6 28   Left Ankle 35 38 53.4 36.9 33   Left Widest Calf 65 76.3 89.4 69.5 71.1   Left Thigh Up 10cm 72 71 - 79.2 38     Wound 02/09/17 L breast (Active)       Wound Non-pressure related ulcer Breast Left (Active)       Wound Non-pressure related ulcer Abdomen Mid (Active)       Wound Non-pressure related ulcer Abdomen Left (Active)       Incision 08/21/15 Nose Bilateral (Active)       Incision 06/07/18 Hand Left (Active)        DIAGNOSTIC STUDIES:     No results found.  I personally reviewed the following imaging today and those on care everywhere, if indicated    LABS:      Sodium   Date Value Ref Range Status   08/19/2019 140 136 - 145 mmol/L Final   07/06/2019 137 136 - 145 mmol/L Final   06/03/2019 141 136 - 145 mmol/L Final     Potassium   Date Value Ref Range Status   08/19/2019 3.8 3.5 - 5.0 mmol/L Final   07/06/2019 3.4 (L) 3.5 - 5.0 mmol/L Final   06/03/2019 3.5 3.5 - 5.0 mmol/L Final     Chloride   Date Value Ref Range Status   08/19/2019 107 98 - 107 mmol/L Final   07/06/2019 103 98 - 107 mmol/L Final   06/03/2019 108 (H) 98 - 107 mmol/L Final     BUN   Date Value Ref Range Status   08/19/2019 17 8 - 22 mg/dL Final   07/06/2019 15 8 - 22 mg/dL Final   06/03/2019 12 8 - 22 mg/dL Final     Creatinine   Date Value Ref Range Status   08/19/2019 0.97 0.60 - 1.10 mg/dL Final   07/06/2019 0.87 0.60 - 1.10 mg/dL Final   06/03/2019 0.80 0.60 - 1.10 mg/dL Final     Hemoglobin   Date Value Ref Range Status   07/07/2019 12.3 12.0 - 16.0 g/dL Final   07/06/2019 12.6 12.0 - 16.0 g/dL Final   06/03/2019 12.7 12.0 - 16.0 g/dL Final     Platelets   Date Value Ref Range Status   07/08/2019 300 140 - 440 thou/uL Final   07/07/2019 303 140 - 440 thou/uL Final   07/06/2019 317 140 - 440 thou/uL Final     BNP   Date Value Ref Range Status   04/25/2019 16 0 - 64 pg/mL Final   04/03/2016 <10 0 - 64 pg/mL  Final     INR   Date Value Ref Range Status   04/30/2019 0.95 0.90 - 1.10 Final   04/03/2016 0.99 0.90 - 1.10 Final   05/25/2015 1.02 0.90 - 1.10 Final       Total time spent 20  minutes face to face with patient with more than 50% time spent in counseling and coordination of care.    David Woo MD  VASCULAR MEDICINE

## 2021-05-31 NOTE — PATIENT INSTRUCTIONS - HE
Swelling and Compression Therapy    Swelling in the legs can be caused by many reasons. No matter what the reason, treatment usually includes some type of compression. This may be done with a support sock, dressing, ace wrap, or layered wraps.     It is important to treat the swelling for many reasons. If the swelling is not treated you may develop blisters that can lead to ulcerations. This is caused when extra fluid goes into tissue causing damage and blocking blood flow to the tissue.     It is important that you wear your compression every day, including days that you will be seen in clinic.     Compression is often the most important part of treating leg wounds. Without controlling the swelling it is often not possible to heal wounds.     Going without compression for even brief periods of time can be damaging to your legs and your health.  Your compression should be put on first thing in the morning. Take the compression off at night only when instructed by your care provider to do so. Sometimes wearing compression 24 hours a day will be recommended.       If you are having difficulty wearing your compression it is important to notify your care provider so that other options may be reviewed.    Please call us if you have any questions [phone].    Thank you for choosing [facility].
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
## 2021-06-01 VITALS — WEIGHT: 293 LBS | HEIGHT: 60 IN | BODY MASS INDEX: 57.52 KG/M2

## 2021-06-01 VITALS — HEIGHT: 60 IN | BODY MASS INDEX: 57.52 KG/M2 | WEIGHT: 293 LBS

## 2021-06-01 VITALS — WEIGHT: 293 LBS | BODY MASS INDEX: 57.52 KG/M2 | HEIGHT: 60 IN

## 2021-06-01 VITALS — HEIGHT: 60 IN | WEIGHT: 293 LBS | BODY MASS INDEX: 57.52 KG/M2

## 2021-06-01 VITALS — BODY MASS INDEX: 57.52 KG/M2 | HEIGHT: 60 IN | WEIGHT: 293 LBS

## 2021-06-02 VITALS — BODY MASS INDEX: 57.52 KG/M2 | WEIGHT: 293 LBS | HEIGHT: 60 IN

## 2021-06-02 VITALS — WEIGHT: 293 LBS | HEIGHT: 60 IN | BODY MASS INDEX: 57.52 KG/M2

## 2021-06-02 VITALS — BODY MASS INDEX: 57.52 KG/M2 | HEIGHT: 60 IN | WEIGHT: 293 LBS

## 2021-06-02 VITALS — WEIGHT: 293 LBS | BODY MASS INDEX: 57.52 KG/M2 | HEIGHT: 60 IN

## 2021-06-02 NOTE — ANESTHESIA CARE TRANSFER NOTE
Last vitals:   Vitals:    10/14/19 1423   BP: 130/60   Pulse: 73   Resp: 16   Temp: 36.6  C (97.8  F)   SpO2: 100%     Patient's level of consciousness is drowsy  Spontaneous respirations: yes  Maintains airway independently: yes  Dentition unchanged: yes  Oropharynx: oropharynx clear of all foreign objects    QCDR Measures:  ASA# 20 - Surgical Safety Checklist: WHO surgical safety checklist completed prior to induction    PQRS# 430 - Adult PONV Prevention: 4558F - Pt received => 2 anti-emetic agents (different classes) preop & intraop  ASA# 8 - Peds PONV Prevention: NA - Not pediatric patient, not GA or 2 or more risk factors NOT present  PQRS# 424 - [place]-op Temp Management: 4559F - At least one body temp DOCUMENTED => 35.5C or 95.9F within required timeframe  PQRS# 426 - PACU Transfer Protocol: - Transfer of care checklist used  ASA# 14 - Acute Post-op Pain: ASA14B - Patient did NOT experience pain >= 7 out of 10

## 2021-06-02 NOTE — ANESTHESIA POSTPROCEDURE EVALUATION
Patient: Bushra Buck  COLONOSCOPY  Anesthesia type: MAC    Patient location: Phase II Recovery  Last vitals:   Vitals Value Taken Time   /58 10/14/2019  2:58 PM   Temp 36.7  C (98.1  F) 10/14/2019  2:58 PM   Pulse 75 10/14/2019  3:00 PM   Resp 18 10/14/2019  2:58 PM   SpO2 93 % 10/14/2019  3:00 PM     Post vital signs: stable  Level of consciousness: awake, alert and responds to simple questions  Post-anesthesia pain: pain controlled  Post-anesthesia nausea and vomiting: no  Pulmonary: unassisted, return to baseline  Cardiovascular: stable and blood pressure at baseline  Hydration: adequate  Anesthetic events: no    QCDR Measures:  ASA# 11 - [place]-op Cardiac Arrest: ASA11B - Patient did NOT experience unanticipated cardiac arrest  ASA# 12 - [place]-op Mortality Rate: ASA12B - Patient did NOT die  ASA# 13 - PACU Re-Intubation Rate: NA - No ETT / LMA used for case  ASA# 10 - Composite Anes Safety: ASA10A - No serious adverse event    Additional Notes:

## 2021-06-02 NOTE — PATIENT INSTRUCTIONS - HE
Please call one of the [place] locations below to schedule an appointment. If you received a prescription please bring it with you to your appointment. Some locations are limited to what they carry.    Office Locations    [facility]  [address] [place] [id number]  [facility], [address]   Phone: [phone]   Orthotics and Prosthetics, [address]   Phone: [phone]

## 2021-06-02 NOTE — PROGRESS NOTES
Pt continues to wear bilateral velcro compression. No pain or new concern. She is requesting prescription for  Diabetic shoes. Pt is in therapy twice week through [facility].

## 2021-06-02 NOTE — PROGRESS NOTES
Date of Service:  10/28/19    Date last seen:  04/18/19    PCP: Erasto Azul MD    Impression:   1. Leg swelling bilaterally-doing well  2. Venous stasis/insufficiency with history of ulcerations-doing well  3. Acquired lymphedema   4. Recurrent cellulitis of legs- (3/5/15, 2/17/15, 5/15, 12/14/15, 3/16, 7/12/16, 8/18/2016)-no recurrence.  5. Bilateral foot deformities with valgus ankles  6. Complicated by morbid obesity Prader Willi.   7. Type 2 DM    Plan:   1. Questions were answered.   2. Continue Flexitouch.  Doing very well.    3. Continue compression and exercise.  New compression written for.  4. Lymphedema exercises to continue.  5. Continue to work with bariatrics.     6. Needs new diabetic shoes and insoles.  Written for.  7. Follow up 6 months, or when needed.      Time spent with patient 15 minutes with greater than 50% time in consultation, education and coordination of care.   ---------------------------------------------------------------------------------------------------------------------     Chief Complaint: Bilateral leg swelling     History of Present Illness:   Bushra Buck returns to the [facility] for follow up of her bilateral leg swelling due to severe venous hypertension, insufficiency with secondary lymphedema and recurrent cellulitis resulting from morbid obesity.  She uses the flexitouch pump 5/7 weekdays wears her compression daily.  She has a newer compression which fits much better.  It is Velcro.  She has not had an infection since August 2016. She has been discontinued on the doxycycline daily. There has been no new numbness, tingling, weakness, masses, rashes, shortness of breath or chest pain.   She has not had any pain.  She has not run a fever.  She needs new insoles and shoes now and is also interested in getting new Velcro compression before it wears out too much as it has done previously.  She has not had any new  ulcerations.  She sees Dr. Azul as her PCP.   She continues to work with her counselor and psychiatric professionals for her schizophrenia.  Continues to work on her diet and exercise.  Vitamin D replacement for deficiency.    Past Medical History:   Diagnosis Date     Allergic rhinitis      Bipolar 1 disorder (H)     followed by psych     Cellulitis, leg 2016     Dyslipidemia      Ganglion, left wrist      GERD (gastroesophageal reflux disease)      Herpes zoster      Hypertension      Hypothyroid      Lymphedema of lower extremity 2008     Morbid obesity (H)      Nightmares      Obstructive sleep apnea      Post traumatic stress disorder (PTSD)      Prader-Willi syndrome      Prolactinoma (H) transsphenoid approach 2000.     PTSD (post-traumatic stress disorder)      Schizoaffective disorder, bipolar type (H)      Type 2 diabetes mellitus without complication, with long-term current use of insulin (H)      Venous stasis      Vitamin D deficiency        Past Surgical History:   Procedure Laterality Date     ABDOMINAL SURGERY  2000    fat removed tp plug dura when had a pituitary surgery.     anorectal fissure repair  2004, 2005     BRAIN SURGERY  2000    pituitary surgery     CARPAL TUNNEL RELEASE Bilateral      COLONOSCOPY N/A 10/14/2019    Procedure: COLONOSCOPY;  Surgeon: Hugo Mattson MD;  Location: [facility] OR;  Service: Gastroenterology     GANGLION CYST EXCISION  2010    left arm     GANGLION CYST EXCISION Left 6/7/2018    Procedure: REMOVE RECURRENT GANGLION CYST LEFT VOLAR RADIAL WRIST;  Surgeon: Angel Lopez MD;  Location: [facility] OR;  Service:      PITUITARY SURGERY  2014     SEPTOPLASTY      x 2     SINUS SURGERY Right 8/21/2015    Procedure: RIGHT MICHELLE BULLOSA;  Surgeon: Daniel Landeros MD;  Location: [facility] OR;  Service:      TONSILLECTOMY         Current Outpatient Medications   Medication Sig Dispense Refill     acetaminophen (TYLENOL) 500 MG tablet Take 500  mg by mouth 2 (two) times a day. May use another 500mg if needed for pain.        11     ARIPiprazole (ABILIFY) 15 MG tablet Take 15 mg by mouth at bedtime.       azelastine (ASTELIN) 137 mcg (0.1 %) nasal spray Apply 1 spray into each nostril every evening. Use in each nostril as directed       blood glucose meter (GLUCOMETER) Use 1 each As Directed as needed. Dispense glucometer brand per patient's insurance at pharmacy discretion.  0     blood glucose test strips Use 1 each As Directed as needed. Dispense brand per patient's insurance at pharmacy discretion.  0     bumetanide (BUMEX) 2 MG tablet Take 1 tablet (2 mg total) by mouth 2 (two) times a day at 9am and 6pm. (Patient taking differently: Take 2 mg by mouth daily.       ) 60 tablet 0     cabergoline (DOSTINEX) 0.5 mg tablet Take 0.5 mg by mouth 2 (two) times a week. Takes on Wednesday and Sunday             calcium citrate-vitamin D3 (CITRACAL + D) 315-250 mg-unit per tablet Take 3 tablets by mouth daily.         3     CHOLECALCIFEROL 1,000 unit tablet Take 3,000 Units by mouth daily.         1     diphenhydrAMINE (BENADRYL) 25 mg tablet Take 25 mg by mouth every 6 (six) hours as needed for itching.       EPINEPHrine (EPIPEN) 0.3 mg/0.3 mL atIn Inject 0.6 mL into the shoulder, thigh, or buttocks as needed.   0     fenofibrate (TRICOR) 145 MG tablet Take 145 mg by mouth daily.         3     fluticasone (FLONASE) 50 mcg/actuation nasal spray Apply 2 sprays into each nostril every evening.         6     generic lancets Use 1 each As Directed as needed. Dispense brand per patient's insurance at pharmacy discretion.  0     hydrocortisone 2.5 % cream Apply 1 application topically 3 (three) times a day as needed.       levothyroxine (SYNTHROID, LEVOTHROID) 112 MCG tablet Take 1 tablet (112 mcg total) by mouth daily. 30 tablet 0     loperamide (IMODIUM) 2 mg capsule Take 1 capsule (2 mg total) by mouth 4 (four) times a day as needed for diarrhea. 10 capsule 0      "meclizine (ANTIVERT) 12.5 mg tablet Take 1-2 tablets (12.5-25 mg total) by mouth 3 (three) times a day as needed for dizziness.  0     medroxyPROGESTERone (PROVERA) 10 MG tablet TAKE 1 TABLET BY MOUTH ONCE DAILY FOR 5 DAYS EACH MONTH  3     MULTIVITAMIN tablet Take 1 tablet by mouth daily.  3     omeprazole (PRILOSEC) 40 MG capsule Take 40 mg by mouth daily.  5     ondansetron (ZOFRAN-ODT) 4 MG disintegrating tablet Take 4 mg by mouth every 4 (four) hours as needed for nausea.       pen needle, diabetic 31 gauge x 5/16\" Ndle Use 1 each As Directed daily. With daily Victoza injections. 100 each PRN     potassium chloride (K-DUR,KLOR-CON) 20 MEQ tablet Take 1 tablet (20 mEq total) by mouth daily. 20 tablet 0     sodium chloride (OCEAN) 0.65 % nasal spray Apply 4 sprays into each nostril every morning.              TRULICITY 1.5 mg/0.5 mL PnIj Inject 1.5 mg under the skin every 7 days. (Patient taking differently: Inject 1.5 mg under the skin every 7 days. On wednesday      ) 4 Syringe 6     valACYclovir (VALTREX) 1000 MG tablet TAKE 2 TABLETS BY MOUTH TWICE DAILY FOR ONE DAY AT ONSET OF COLD SORE  2     No current facility-administered medications for this visit.        Allergies   Allergen Reactions     Fiorinal [Butalbital-Aspirin-Caffeine] Anaphylaxis and Swelling     Clindamycin Rash     Rash on neck - not raised . Heartburn     Ancef [Cefazolin] Swelling     Arm swells     Aspirin (Tartrazine Only) Other (See Comments)     Eyes swell shut     Ativan [Lorazepam] Swelling     arms     Butalbital      Caffeine      Carbamazepine Other (See Comments)      (tegretol) Arm swelling     Codeine Swelling     Delsym Hives     Ibuprofen Other (See Comments)     Swelling, eyes swell shut     Latex Swelling     Lithium Analogues Diarrhea, Nausea And Vomiting and Swelling     Red/swollen arms     Oxcarbazepine Other (See Comments)     (trileptal) Arm swelling     Venom-Honey Bee Swelling     Unknown     Augmentin " [Amoxicillin-Pot Clavulanate] Rash     Rash on neck - not raised     Cefdinir Swelling            Topiramate Swelling and Rash     topamax       Social History     Socioeconomic History     Marital status: Single     Spouse name: Not on file     Number of children: 0     Years of education: Not on file     Highest education level: Not on file   Occupational History     Occupation: Disability   Social Needs     Financial resource strain: Not on file     Food insecurity:     Worry: Not on file     Inability: Not on file     Transportation needs:     Medical: Not on file     Non-medical: Not on file   Tobacco Use     Smoking status: Never Smoker     Smokeless tobacco: Never Used   Substance and Sexual Activity     Alcohol use: No     Drug use: No     Sexual activity: Never     Partners: Male     Birth control/protection: Abstinence   Lifestyle     Physical activity:     Days per week: Not on file     Minutes per session: Not on file     Stress: Not on file   Relationships     Social connections:     Talks on phone: Not on file     Gets together: Not on file     Attends Protestant service: Not on file     Active member of club or organization: Not on file     Attends meetings of clubs or organizations: Not on file     Relationship status: Not on file     Intimate partner violence:     Fear of current or ex partner: Not on file     Emotionally abused: Not on file     Physically abused: Not on file     Forced sexual activity: Not on file   Other Topics Concern     Not on file   Social History Narrative        .  Not working.   No kids.       Family History   Problem Relation Age of Onset     Diabetes Mother      Hypothyroidism Mother      Cancer Mother      Sleep apnea Mother      Snoring Mother      Heart disease Father      Diabetes Sister      Edema Sister      No Medical Problems Sister      Diabetes Maternal Grandfather        Review of Systems:    Bushra Buck no new numbess, tingling or weakness,  redness or rashes, fevers, new masses, abdominal bloating or discomfort, unexplained weight loss, increased pain, new ulcers, shortness of breath and chest pain  Full 12 point review of systems was completed.    Imaging:    I personally reviewed the following imaging today and those on care everywhere, if indicated    EXAM: US VENOUS LEG RIGHT  LOCATION: [facility]  DATE/TIME: 4/6/2019 11:18 PM     INDICATION: Leg swelling, right  COMPARISON: None.  TECHNIQUE: Routine exam without and with compression, augmentation, and duplex utilizing 2D gray-scale imaging, Doppler interrogation with color-flow and spectral waveform analysis.     FINDINGS: The common femoral, femoral, popliteal, and segmentally visualized calf veins were evaluated. The opposite CFV was also included in the evaluation.     Right leg veins are negative for deep venous thrombosis. No popliteal cysts.     IMPRESSION:   CONCLUSION:  1.  Right leg veins are negative for DVT.    EXAM: CT ABDOMEN PELVIS WO ORAL W WO IV CONTRAST  LOCATION: [facility]  DATE/TIME: 4/30/2019 3:45 PM     INDICATION: Hematuria.  COMPARISON: None.  TECHNIQUE: Helical thin-section CT scan of the abdomen and pelvis was performed without contrast. Images were then obtained during and after injection of IV contrast, with delayed images through the renal collecting systems. Multiplanar reformats were   obtained. Dose reduction techniques were used.?  CONTRAST: Iohexol (Omni) 100 mL     FINDINGS:   LUNG BASES: Negative.     ABDOMEN: Both kidneys are negative with no renal stones and no hydronephrosis. Tiny cysts mid right kidney laterally. Kidneys and renal collecting systems otherwise negative.      No significant findings in the liver, spleen, pancreas, and adrenal glands. No lymphadenopathy.     PELVIS: Negative. No adnexal masses or free fluid.     MUSCULOSKELETAL: Negative.     IMPRESSION:   CONCLUSION:  1.  No significant findings in the kidneys or renal  collecting systems.     2.  No significant incidental findings.    Labs:    I personally reviewed the following labs today and those on care everywhere, if indicated    Lab Results   Component Value Date    SEDRATE 54 (H) 08/01/2015         Lab Results   Component Value Date    CRP 0.4 04/26/2019           Lab Results   Component Value Date    CREATININE 0.87 09/30/2019      Lab Results   Component Value Date    HGBA1C 5.4 08/26/2019           Lab Results   Component Value Date    BUN 18 09/30/2019              Lab Results   Component Value Date    ALBUMIN 4.2 04/30/2019       Vitamin D, Total (25-Hydroxy)   Date Value Ref Range Status   08/19/2019 26.1 (L) 30.0 - 80.0 ng/mL Final       Lab Results   Component Value Date    TSH 1.91 08/19/2019     Lab Results   Component Value Date    WBC 12.0 (H) 07/07/2019    HGB 12.3 07/07/2019    HCT 37.6 07/07/2019     (H) 07/07/2019     07/08/2019       Physical Exam:  Vitals:    10/28/19 1505   BP: 142/70   Pulse: 84   Resp: 24   Temp: 98.4  F (36.9  C)     BMI 65.74(increased) weight 331 (-36) pounds    Circumferential measures:    Vasc Edema 4/8/2019 4/22/2019 5/6/2019 8/26/2019 10/28/2019   Right just above MTP 26 31.4 27 27.1 26.5   Right Ankle 35.7 62.5 37 32.7 36.5   Right Widest Calf 73.8 81.4 63.8 66 68.2   Right Thigh Up 10cm 73.2 - 81 76.5 72.5   Left - just above MTP 27.8 28.2 28.6 28 28.5   Left Ankle 38 53.4 36.9 33 36.5   Left Widest Calf 76.3 89.4 69.5 71.1 71.3   Left Thigh Up 10cm 71 - 79.2 38 79.5     Measures are increased with weight gain.  It is noted that measures are extremely variable due to shape of legs.    General:  39 y.o. female in no apparent distress.     Psych:  Alert and oriented x 3.  Cooperative.     HEENT: Atraumatic, normocephalic    Integumentary: Legs show skin with softening and minimal fibrosis now. No rubor or calor.  Excoriations near healed. No discharge or odor.   No pain to palpation. + Stemmer's sign in the feet  bilaterally.      Sensation: Intact to pinprick and light touch in the legs bilaterally.    Strength:  Normal strength in hip flexion, knee flexion/knee extension, ankle dorsiflexion and great toe extension bilaterally.     Vascular: Dorsalis pedis and posterior tib pulses strong and equal bilaterally.  Venous varicosities noted in both anterior shins.    Temitope Odell MD, ABWMS, FACCWS, [facility]  Medical Director Wound Care and Lymphedema  [facility]  [phone]

## 2021-06-02 NOTE — ANESTHESIA PREPROCEDURE EVALUATION
Anesthesia Evaluation        Airway   Mallampati: III  Neck ROM: full   Pulmonary     breath sounds clear to auscultation  (+) sleep apnea,                          Cardiovascular   (+) hypertension, , hypercholesterolemia,     ECG reviewed  Rhythm: regular  Rate: normal,      ROS comment: Lymphedema of lower extremity     Neuro/Psych      Comments: PTSD  Schizoaffective disorder, bipolar type   Prader-Willi Syndrome  Mild mental retardation    Endo/Other    (+) diabetes mellitus type 2, hypothyroidism, obesity (BMI 64),      Comments: Prolactinoma  H/o pituitary surgery    GI/Hepatic/Renal    (+) GERD well controlled,        Other findings: 4/27/19 Echo  Summary       Normal left ventricular size and systolic function.The calculated left ventricular ejection fraction is 60%. This represents a normal ejection fraction. The left ventricular wall thickness is normal. Normal left ventricle diastolic function.    Normal right ventricular size and systolic function.    Left atrium of normal size.    No pulmonary hypertension present. The estimated systolic pulmonary artery pressure is 38 mmhg    Estimated central venous pressure equal to 15 mmHg.    No hemodynamically significant valvular heart abnormalities.    No previous study for comparison.          Dental                         Anesthesia Plan  Planned anesthetic: MAC  Midazolam premed. Precedex infusion. Gentle fentanyl.     ASA 4   Induction: intravenous   Anesthetic plan and risks discussed with: patient and parent/guardian  Anesthesia plan special considerations: dexmedetomidine  Post-op plan: routine recovery

## 2021-06-02 NOTE — TELEPHONE ENCOUNTER
Rash on left rib cage, left hip, left butt cheek, one on back  Tiny welts and blisters     Hurts, itches and stings     Feels better when they break open    Seen primary and he gave her keflex    Possibly has a fever, found thermometer 97.1    Worried that it is due to her abilify prescription. Read online that it can cause rashes and skin conditions.     Reason for Disposition    [1] Shingles rash AND [2] spots start appearing other places on body    Protocols used: SHINGLMIK-A-JOHN Butcher RN Triage Nurse Advisor

## 2021-06-03 VITALS — BODY MASS INDEX: 57.52 KG/M2 | HEIGHT: 60 IN | WEIGHT: 293 LBS

## 2021-06-03 VITALS
RESPIRATION RATE: 24 BRPM | BODY MASS INDEX: 57.52 KG/M2 | TEMPERATURE: 98.4 F | SYSTOLIC BLOOD PRESSURE: 142 MMHG | HEART RATE: 84 BPM | DIASTOLIC BLOOD PRESSURE: 70 MMHG | WEIGHT: 293 LBS | HEIGHT: 60 IN

## 2021-06-03 VITALS — WEIGHT: 293 LBS | HEIGHT: 60 IN | BODY MASS INDEX: 57.52 KG/M2

## 2021-06-03 VITALS — HEIGHT: 60 IN | BODY MASS INDEX: 57.52 KG/M2 | WEIGHT: 293 LBS

## 2021-06-03 VITALS — WEIGHT: 293 LBS | BODY MASS INDEX: 76.28 KG/M2

## 2021-06-03 VITALS — WEIGHT: 293 LBS | BODY MASS INDEX: 57.52 KG/M2 | HEIGHT: 60 IN

## 2021-06-03 VITALS — BODY MASS INDEX: 66.71 KG/M2 | WEIGHT: 293 LBS

## 2021-06-03 NOTE — PROGRESS NOTES
"Bariatric Clinic Follow-Up Visit:    Bushra Buck is a 39 y.o.  female with Body mass index is 66.53 kg/m .  presenting here today for follow-up on non-surgical efforts for weight loss. Original Intake visit occurred on 9/17/15 with a weight of 350 lbs and BMI of 68.4.  Along with diet and behavior changes, she has been using Trulicity to assist her weight loss goals.  See her intake visit notes for details on identified contributors to weight gain in the past.    Weight:   Wt Readings from Last 2 Encounters:   11/11/19 (!) 335 lb (152 kg)   10/28/19 (!) 331 lb (150.1 kg)    pounds  Height: 4' 11.5\" (1.511 m) (11/11/2019  1:58 PM)  Initial Weight: 350 lbs (6/10/2019 11:00 AM)  Weight: (!) 335 lb (152 kg) (11/11/2019  1:58 PM)  Weight loss from initial: 4.5 (6/10/2019 11:00 AM)  % Weight loss: 1.29 % (6/10/2019 11:00 AM)  BMI (Calculated): 66.6 (11/11/2019  1:58 PM)  SpO2: 93 % (10/14/2019  3:00 PM)      Comorbidities:  Patient Active Problem List   Diagnosis     Hypertension     History of pituitary surgery     Schizophrenia, schizoaffective, chronic with acute exacerbation (H)     Lymphedema     Swelling of limb     Venous hypertension of lower extremity, bilateral     Hypothyroidism, unspecified hypothyroidism type     Hyperlipidemia, unspecified hyperlipidemia type     GERD (gastroesophageal reflux disease)     Prolactinoma (H)     Psychosis (H)     Perianal irritation     Acquired valgus deformity of both ankles     Flat feet, bilateral     Foot deformity, bilateral     Venous insufficiency of both lower extremities     Itching     Morbid obesity with BMI of 60.0-69.9, adult (H)     Prader-Willi syndrome     Intellectual disability     Nightmares     Bipolar affective disorder, current episode manic with psychotic symptoms (H)     Venous stasis ulcers of both lower extremities (H)     Prediabetes     Cellulitis     Dyspnea on exertion     Type 2 diabetes mellitus without complication, without long-term " current use of insulin (H)     Acute pulmonary edema (H)     Obstructive sleep apnea     Gross hematuria     Symptomatic varicose veins of both lower extremities       Current Outpatient Medications:      acetaminophen (TYLENOL) 500 MG tablet, Take 500 mg by mouth 2 (two) times a day. May use another 500mg if needed for pain.   , Disp: , Rfl: 11     ARIPiprazole (ABILIFY) 15 MG tablet, Take 15 mg by mouth at bedtime., Disp: , Rfl:      azelastine (ASTELIN) 137 mcg (0.1 %) nasal spray, Apply 1 spray into each nostril every evening. Use in each nostril as directed, Disp: , Rfl:      blood glucose meter (GLUCOMETER), Use 1 each As Directed as needed. Dispense glucometer brand per patient's insurance at pharmacy discretion., Disp: , Rfl: 0     blood glucose test strips, Use 1 each As Directed as needed. Dispense brand per patient's insurance at pharmacy discretion., Disp: , Rfl: 0     bumetanide (BUMEX) 2 MG tablet, Take 1 tablet (2 mg total) by mouth 2 (two) times a day at 9am and 6pm. (Patient taking differently: Take 2 mg by mouth daily.    ), Disp: 60 tablet, Rfl: 0     cabergoline (DOSTINEX) 0.5 mg tablet, Take 0.5 mg by mouth 2 (two) times a week. Takes on Wednesday and Sunday   , Disp: , Rfl:      calcium citrate-vitamin D3 (CITRACAL + D) 315-250 mg-unit per tablet, Take 3 tablets by mouth daily.    , Disp: , Rfl: 3     cetirizine (ZYRTEC) 10 MG tablet, Take 10 mg by mouth daily. As directed, Disp: , Rfl: 6     CHOLECALCIFEROL 1,000 unit tablet, Take 3,000 Units by mouth daily.    , Disp: , Rfl: 1     diphenhydrAMINE (BENADRYL) 25 mg tablet, Take 25 mg by mouth every 6 (six) hours as needed for itching., Disp: , Rfl:      EPINEPHrine (EPIPEN) 0.3 mg/0.3 mL atIn, Inject 0.6 mL into the shoulder, thigh, or buttocks as needed. , Disp: , Rfl: 0     fenofibrate (TRICOR) 145 MG tablet, Take 145 mg by mouth daily.    , Disp: , Rfl: 3     fluticasone (FLONASE) 50 mcg/actuation nasal spray, Apply 2 sprays into each  "nostril every evening.    , Disp: , Rfl: 6     generic lancets, Use 1 each As Directed as needed. Dispense brand per patient's insurance at pharmacy discretion., Disp: , Rfl: 0     hydrocortisone 2.5 % cream, Apply 1 application topically 3 (three) times a day as needed., Disp: , Rfl:      levothyroxine (SYNTHROID, LEVOTHROID) 112 MCG tablet, Take 1 tablet (112 mcg total) by mouth daily., Disp: 30 tablet, Rfl: 0     loperamide (IMODIUM) 2 mg capsule, Take 1 capsule (2 mg total) by mouth 4 (four) times a day as needed for diarrhea., Disp: 10 capsule, Rfl: 0     meclizine (ANTIVERT) 12.5 mg tablet, Take 1-2 tablets (12.5-25 mg total) by mouth 3 (three) times a day as needed for dizziness., Disp: , Rfl: 0     medroxyPROGESTERone (PROVERA) 10 MG tablet, TAKE 1 TABLET BY MOUTH ONCE DAILY FOR 5 DAYS EACH MONTH, Disp: , Rfl: 3     MULTIVITAMIN tablet, Take 1 tablet by mouth daily., Disp: , Rfl: 3     omeprazole (PRILOSEC) 40 MG capsule, Take 40 mg by mouth daily., Disp: , Rfl: 5     ondansetron (ZOFRAN-ODT) 4 MG disintegrating tablet, Take 4 mg by mouth every 4 (four) hours as needed for nausea., Disp: , Rfl:      pen needle, diabetic 31 gauge x 5/16\" Ndle, Use 1 each As Directed daily. With daily Victoza injections., Disp: 100 each, Rfl: PRN     potassium chloride (K-DUR,KLOR-CON) 20 MEQ tablet, Take 1 tablet (20 mEq total) by mouth daily., Disp: 20 tablet, Rfl: 0     sodium chloride (OCEAN) 0.65 % nasal spray, Apply 4 sprays into each nostril every morning.    , Disp: , Rfl:      TRULICITY 1.5 mg/0.5 mL PnIj, Inject 1.5 mg under the skin every 7 days. (Patient taking differently: Inject 1.5 mg under the skin every 7 days. On wednesday   ), Disp: 4 Syringe, Rfl: 6     valACYclovir (VALTREX) 1000 MG tablet, TAKE 2 TABLETS BY MOUTH TWICE DAILY FOR ONE DAY AT ONSET OF COLD SORE, Disp: , Rfl: 2      Interim: Since our last visit, she has been very steady in weight, down 15 lbs from starting weight, 65 lbs from heaviest " weight. Stand up/sit down w/ PT and kicks.  Walking hallway. Will be moving to a new apartment without a clear ramp. Will be moving to a first or second floor apartment. Blood sugars reviewed from log: max of 202, low 88. Highests was during sinus infection. otherw sugars show trend between 90 and 120mg/dl. Excellent control.     Plan:   1.  Continue the good progress and keep aiming for 20-30 grams of protein per meal. Eggs/tuna/chicken.  1-2 servings of skim milk is preferable to the orange juice.  2. Continue working with PT on your walking/strength [place] continuing your home exercises.  3. Continue Trulicity, blood sugars looking excellent continue the 1.5 mg/week dose.  We'll consider Plenity when available this next year (not yet due for release due to manufacturing issues).         We discussed [facility] Bariatric Basics including:  -eating 3 meals daily  -eating protein first  -eating slowly, chewing food well  -avoiding/limiting calorie containing beverages  -We discussed the importance of restorative sleep and stress management in maintaining a healthy weight.  -We discussed the National Weight Control Registry healthy weight maintenance strategies and ways to optimize metabolism.  -We discussed the importance of physical activity including cardiovascular and strength training in maintaining a healthier weight and explored viable options.    Most recent labs:  Lab Results   Component Value Date    WBC 12.0 (H) 07/07/2019    HGB 12.3 07/07/2019    HCT 37.6 07/07/2019     (H) 07/07/2019     07/08/2019     Lab Results   Component Value Date    CHOL 163 08/19/2019     Lab Results   Component Value Date    HDL 33 (L) 08/19/2019     Lab Results   Component Value Date    LDLCALC 77 08/19/2019     Lab Results   Component Value Date    TRIG 263 (H) 08/19/2019     No components found for: CHOLHDL  Lab Results   Component Value Date    ALT 60 (H)  "04/30/2019    AST 47 (H) 04/30/2019    ALKPHOS 37 (L) 04/30/2019    BILITOT 0.6 04/30/2019     Lab Results   Component Value Date    HGBA1C 5.4 08/26/2019     Lab Results   Component Value Date    LUYXKAHC10 623 09/17/2015     Lab Results   Component Value Date    PIQIMBZR68SM 26.1 (L) 08/19/2019     No results found for: FERRITIN  No results found for: PTH  Lab Results   Component Value Date    24493 218 (H) 09/17/2015     No results found for: 7597  Lab Results   Component Value Date    TSH 1.91 08/19/2019     No results found for: TESTOSTERONE    DIETARY HISTORY    Positive Changes Since Last Visit: continuing PT. Will be moving out of somewhat toxic apartment soon  Struggling With: depends on food shelf but shopping more there w/ higher quality protein sources recently.    Knowledgeable in [place] [employer]: often  Getting Adequate Protein: often,   Sleeping 7-8 hours/day often  Stress management good    PHYSICAL ACTIVITY PATTERNS:  Cardiovascular: PT twice weekly and home exercises/walking but low volume. New walker.  Strength Training: PT    REVIEW OF SYSTEMS  GENERAL/CONSTITUTIONAL:  Doing well mentally  HEENT:   na  CARDIOVASCULAR:   no chest pains  PULMONARY:   no cough  GASTROINTESTINAL:  No pain  UROLOGIC:  na  NEUROLOGIC:  No headaches  PSYCHIATRIC:  Stable mood \"most days\".   MUSCULOSKELETAL/RHEUMATOLOGIC   using new walker regularly  ENDOCRINE:  Diabetes log reviewed today: most sugars looking excellent. Only one above 150 (202) when she was sick.   DERMATOLOGIC:  No rash issues    PHYSICAL EXAM:  Vitals: /77   Pulse 87   Temp 98.7  F (37.1  C)   Resp 20   Ht 4' 11.5\" (1.511 m)   Wt (!) 335 lb (152 kg)   BMI 66.53 kg/m    Height: 4' 11.5\" (1.511 m) (11/11/2019  1:58 PM)  Initial Weight: 350 lbs (6/10/2019 11:00 AM)  Weight: (!) 335 lb (152 kg) (11/11/2019  1:58 PM)  Weight loss from initial: 4.5 (6/10/2019 11:00 AM)  % Weight loss: 1.29 % (6/10/2019 11:00 AM)  BMI (Calculated): 66.6 " (11/11/2019  1:58 PM)  SpO2: 93 % (10/14/2019  3:00 PM)      GEN: Pleasant, well groomed, in no acute distress  HEENT: PEERL, EOMI, airway clear .  NECK: No swelling.  HEART: Rhythm regular, rate regular, no murmur   LUNGS: Clear without crackles or wheezes. No cough..  ABDOMEN: obese.  EXTREMITIES: 2 plus palpable peripheral pulses, radail.  NEURO: Alert and Oriented X3, normal gait and speech.  SKIN: No visible rashes.        25 minutes was spent in direct consultation, with over 50% of it spent in counseling regarding their plan for excess weight reduction and health modification.  Carlos Bowling MD  [facility]  2:17 PM

## 2021-06-03 NOTE — PROGRESS NOTES
S: Patient was seen in [place] to be measured for a new pair of custom Medi Circaid Jutafit Premium calf pieces. RX on-file is current.    O: Goal is to re-measure Bushra for new Velcro pieces. She informed me today that she lost her last pair during a recent move so has not been wearing anything on her legs.    A: I took new measurements for the same custom garments she has been wearing. Even though she has been going without compression, her circumferences are down significantly. She says she has been walking more so this may be why. She asked if I could make them a little bit longer so I added 2 cm on each side in length. Order submitted for fabrication to [employer].    P: I have the patient scheduled for a fitting appointment on Monday, 12/23 at 3:00 pm in [place].

## 2021-06-03 NOTE — TELEPHONE ENCOUNTER
"She is having significant pain under the splint on her left wrist.  It feels too tight  She has tried unwrapping the dressing and even removing the splint for a while.  She did not realize that [facility] has on-call doctors.  She is going to call there now.  Transportation is an issue.  She does have transportation set up for her Monday appointment at [place].  Going to the ED today would be problematic.  She will call back if further issues.  Joy Aj RN, BAN, Nurse Advisor, [place] 11p-7a    Reason for Disposition    [1] Post-op pain AND [2] not controlled with pain medications    Answer Assessment - Initial Assessment Questions  1. SYMPTOM: \"What's the main symptom you're concerned about?\" (e.g., pain, fever, vomiting)      There is significant pain, bruising and swelling under the splint on her left wrist from her surgery on 11/18 to remove a ganglion cyst  2. ONSET: \"When did 11/21 or 11/22  start?\"      She has tried loosing the wrapping but that did not help  3. SURGERY: \"What surgery was performed?\"  Removal of a ganglion cyst  4. DATE of SURGERY: \"When was surgery performed?\"      11/18  5. ANESTHESIA: \" What type of anesthesia did you have?\" (e.g., general, spinal, epidural, local)      Not asked  6. PAIN: \"Is there any pain?\" If so, ask: \"How bad is it?\"  (Scale 1-10; or mild, moderate, severe)      Yes, moderate to severe depending on position  7. FEVER: \"Do you have a fever?\" If so, ask: \"What is your temperature, how was it measured, and when did it start?\"      No  8. VOMITING: \"Is there any vomiting?\" If yes, ask: \"How many times?\"      No  9. BLEEDING: \"Is there any bleeding?\" If so, ask: \"How much?\" and \"Where?\"      No  10. OTHER SYMPTOMS: \"Do you have any other symptoms?\" (e.g., drainage from wound, painful urination, constipation)        No other symptoms    Answer Assessment - Initial Assessment Questions  1. SYMPTOM: \"What's the main symptom you're concerned about?\" (e.g., redness, " "pain, drainage)      Pain, swelling and bruising under the splint  2. ONSET: \"When did  Pain start?\"      About two days ago  3. SURGERY: \"What surgery was performed?\"      Removal of a ganglion cyst left wrist  4. DATE of SURGERY: \"When was surgery performed?\"       11/18  5. INCISION SITE: \"Where is the incision located?\"       Left wrist  6. REDNESS: \"Is there any redness at the incision site?\" If yes, ask: \"How wide across is the redness?\" (Inches, centimeters)       She noted some redness when she unwrapped it but there is more bruising than redness.  She would guess that the redness is about an inch  7. PAIN: \"Is there any pain?\" If so, ask: \"How bad is it?\"  (Scale 1-10; or mild, moderate, severe)      Yes; moderate to severe.  Enough to make her take the splint off for some relief  8. BLEEDING: \"Is there any bleeding?\" If so, ask: \"How much?\" and \"Where?\"      No  9. DRAINAGE: \"Is there any drainage from the incision site?\" If yes, ask: \"What color and how much?\" (e.g., red, cloudy, pus; drops, teaspoon)      No  10. FEVER: \"Do you have a fever?\" If so, ask: \"What is your temperature, how was it measured, and when did it start?\"        No  11. OTHER SYMPTOMS: \"Do you have any other symptoms?\" (e.g., shaking chills, weakness, rash elsewhere on body)        No other symptoms    Protocols used: POST-OP INCISION SYMPTOMS-A-AH, POST-OP SYMPTOMS AND DUBYTXPHS-V-AY    "

## 2021-06-03 NOTE — ANESTHESIA PROCEDURE NOTES
Peripheral Block    Patient location during procedure: pre-op  Start time: 11/18/2019 12:20 PM  End time: 11/18/2019 12:27 PM  surgical anesthesia  Staffing:  Performing  Anesthesiologist: Dao Hudson MD  Preanesthetic Checklist  Completed: patient identified, site marked, risks, benefits, and alternatives discussed, timeout performed, consent obtained, airway assessed, oxygen available, suction available, emergency drugs available and hand hygiene performed  Peripheral Block  Block type: brachial plexus, axillary  Prep: ChloraPrep  Patient position: supine  Patient monitoring: cardiac monitor, continuous pulse oximetry, heart rate and blood pressure  Laterality: left  Injection technique: ultrasound guided    Ultrasound used to visualize needle placement in proximity to nerve being blocked: yes   US used to visualize anesthetic spread  Visualized anatomic structures normal  No Pathological Findings  Permanent ultrasound image captured for medical record    Needle  Needle type: Stimuplex   Needle gauge: 20G  Needle length: 4 in  no peripheral nerve catheter placed  Assessment  Injection assessment: no difficulty with injection, negative aspiration for heme, no paresthesia on injection and incremental injection

## 2021-06-03 NOTE — ANESTHESIA CARE TRANSFER NOTE
Last vitals:   Vitals:    11/18/19 1419   BP: 116/56   Pulse: 84   Resp: 16   Temp:    SpO2: 100%     Patient's level of consciousness is awake and drowsy  Spontaneous respirations: yes  Maintains airway independently: yes  Dentition unchanged: yes  Oropharynx: oropharynx clear of all foreign objects    QCDR Measures:  ASA# 20 - Surgical Safety Checklist: WHO surgical safety checklist completed prior to induction    PQRS# 430 - Adult PONV Prevention: NA - Not adult patient, not GA or 3 or more risk factors NOT present  ASA# 8 - Peds PONV Prevention: NA - Not pediatric patient, not GA or 2 or more risk factors NOT present  PQRS# 424 - Laverne-op Temp Management: NA - MAC anesthesia or case < 60 minutes  PQRS# 426 - PACU Transfer Protocol: - Transfer of care checklist used  ASA# 14 - Acute Post-op Pain: ASA14B - Patient did NOT experience pain >= 7 out of 10

## 2021-06-03 NOTE — ANESTHESIA POSTPROCEDURE EVALUATION
Patient: Buhsra Buck  EXCISION LEFT WRIST RECURRENT GANGLION CYST  Anesthesia type: regional    Patient location: Phase II Recovery  Last vitals:   Vitals Value Taken Time   /56 11/18/2019  2:19 PM   Temp  11/18/2019  2:45 PM   Pulse 89 11/18/2019  2:43 PM   Resp 22 11/18/2019  2:43 PM   SpO2 97 % 11/18/2019  2:43 PM   Vitals shown include unvalidated device data.  Post vital signs: stable  Level of consciousness: awake and responds to simple questions  Post-anesthesia pain: pain controlled  Post-anesthesia nausea and vomiting: no  Pulmonary: unassisted, return to baseline  Cardiovascular: stable and blood pressure at baseline  Hydration: adequate  Anesthetic events: no    QCDR Measures:  ASA# 11 - Laverne-op Cardiac Arrest: ASA11B - Patient did NOT experience unanticipated cardiac arrest  ASA# 12 - Laverne-op Mortality Rate: ASA12B - Patient did NOT die  ASA# 13 - PACU Re-Intubation Rate: NA - No ETT / LMA used for case  ASA# 10 - Composite Anes Safety: ASA10A - No serious adverse event    Additional Notes:

## 2021-06-03 NOTE — ANESTHESIA PREPROCEDURE EVALUATION
Anesthesia Evaluation      Patient summary reviewed   No history of anesthetic complications     Airway   Mallampati: III  Neck ROM: full   Pulmonary - normal exam    breath sounds clear to auscultation  (+) sleep apnea,                          Cardiovascular - normal exam  (+) hypertension, , hypercholesterolemia,     ECG reviewed  Rhythm: regular  Rate: normal,      ROS comment: Lymphedema of lower extremity     Neuro/Psych      Comments: PTSD  Schizoaffective disorder, bipolar type   Prader-Willi Syndrome  Mild mental retardation    Endo/Other    (+) diabetes mellitus type 2, hypothyroidism, obesity (BMI 64),      Comments: Prolactinoma  H/o pituitary surgery    GI/Hepatic/Renal    (+) GERD well controlled,        Other findings: 4/27/19 Echo  Summary       Normal left ventricular size and systolic function.The calculated left ventricular ejection fraction is 60%. This represents a normal ejection fraction. The left ventricular wall thickness is normal. Normal left ventricle diastolic function.    Normal right ventricular size and systolic function.    Left atrium of normal size.    No pulmonary hypertension present. The estimated systolic pulmonary artery pressure is 38 mmhg    Estimated central venous pressure equal to 15 mmHg.    No hemodynamically significant valvular heart abnormalities.    No previous study for comparison.          Dental - normal exam                          Anesthesia Plan  Planned anesthetic: peripheral nerve block  Axillary block as primary anesthetic.   Minimal fent/versed/propofol for sedation.   Ketamine PRN.  ASA 4   Induction: intravenous   Anesthetic plan and risks discussed with: patient and parent/guardian  Anesthesia plan special considerations: antiemetics,   Post-op plan: routine recovery         Results for orders placed or performed during the hospital encounter of 11/18/19   Pregnancy, urine   Result Value Ref Range    Pregnancy Test, Urine Negative Negative   POCT  Glucose   Result Value Ref Range    Glucose 89 70 - 139 mg/dL       Chemistry        Component Value Date/Time     09/30/2019 1031    K 3.6 09/30/2019 1031     09/30/2019 1031    CO2 24 09/30/2019 1031    BUN 18 09/30/2019 1031    CREATININE 0.87 09/30/2019 1031     09/30/2019 1031        Component Value Date/Time    CALCIUM 9.8 09/30/2019 1031    ALKPHOS 37 (L) 04/30/2019 1219    AST 47 (H) 04/30/2019 1219    ALT 60 (H) 04/30/2019 1219    BILITOT 0.6 04/30/2019 1219        Lab Results   Component Value Date    WBC 12.0 (H) 07/07/2019    HGB 12.3 07/07/2019    HCT 37.6 07/07/2019     (H) 07/07/2019     07/08/2019

## 2021-06-03 NOTE — PROGRESS NOTES
"S: Patient is a 38 y/o female, 4'11\", 333 lbs, seen at our [facility] for the evaluation, measurement and casting for custom diabetic foot orthotics and diabetic shoes on orders from Dr. 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    O: Patient arrived wearing new balance shoes that are ill-fitting and reports that her previous diabetic shoes are worn out to the point of being unusable. Patient has rather severe lymphedema bilaterally in her lower limbs and feet. Patient displays severe ankle valgus and pes planus bilaterally upon standing that worsens with gait. Patient ROM and MMT scores for the foot and ankle are all WNL.    G: Patient's diabetic shoes will provide support and protection of the diabetic feet minimizing the risk of ulceration development in both of her feet. Patient will require extra depth shoes due to the lymphedema present in her lower legs and feet. Patient can also benefit from the custom diabetic foot orthoses because it will provide support of the medial longitudinal arches through total contact of patient s foot and neutralizing of the heel with intrinsic posting. Patient requires custom option due to the need to correct angular deformities of foot and ankle.    A: I took bilateral biofoam casts of the patient's feet for the fabrication of custom diabetic FOs. Patient's FO swill be fabricated using a base of cork and top cover of diabetic trilam. Patient was also measured using a female Qwaq device and found to have size 10 XW feet. Patient chose the Spirit x design from [facility] in black.     P: Patient was asked to schedule her appointment for two weeks time as she left our office.   "

## 2021-06-04 VITALS
WEIGHT: 293 LBS | DIASTOLIC BLOOD PRESSURE: 60 MMHG | HEART RATE: 108 BPM | TEMPERATURE: 99 F | BODY MASS INDEX: 57.52 KG/M2 | RESPIRATION RATE: 28 BRPM | HEIGHT: 60 IN | SYSTOLIC BLOOD PRESSURE: 148 MMHG

## 2021-06-04 VITALS
SYSTOLIC BLOOD PRESSURE: 138 MMHG | TEMPERATURE: 98.7 F | BODY MASS INDEX: 57.52 KG/M2 | HEIGHT: 60 IN | HEART RATE: 87 BPM | RESPIRATION RATE: 20 BRPM | WEIGHT: 293 LBS | DIASTOLIC BLOOD PRESSURE: 77 MMHG

## 2021-06-04 VITALS
RESPIRATION RATE: 28 BRPM | HEART RATE: 92 BPM | BODY MASS INDEX: 57.52 KG/M2 | WEIGHT: 293 LBS | DIASTOLIC BLOOD PRESSURE: 88 MMHG | TEMPERATURE: 97.9 F | SYSTOLIC BLOOD PRESSURE: 112 MMHG | HEIGHT: 60 IN

## 2021-06-04 VITALS — HEIGHT: 60 IN | WEIGHT: 293 LBS | BODY MASS INDEX: 57.52 KG/M2

## 2021-06-04 VITALS — WEIGHT: 293 LBS | HEIGHT: 60 IN | BODY MASS INDEX: 57.52 KG/M2

## 2021-06-04 NOTE — PROGRESS NOTES
S: I have been hanging onto Bushra's new custom Medi Circaid Juxtafit Premium calf pieces. RX is on-file from Dr. Odell.    A: I assisted Bushra in donning her compression garments. Once donned, the garments appeared to be fitting well and she stated they were comfortable. She was instructed on the donning and doffing process, the uses of the garments, and how to care for the items. She went home with two custom Juxtafit Premium calf pieces.    P: She is to call with any further needs. She will call me in a few days of washing and wearing to let me know if they are working out so I can order a second pair.  Goal is to maintain a home program.

## 2021-06-05 VITALS
BODY MASS INDEX: 72.46 KG/M2 | SYSTOLIC BLOOD PRESSURE: 110 MMHG | OXYGEN SATURATION: 97 % | RESPIRATION RATE: 24 BRPM | TEMPERATURE: 98 F | HEART RATE: 94 BPM | DIASTOLIC BLOOD PRESSURE: 80 MMHG | WEIGHT: 293 LBS

## 2021-06-05 VITALS
WEIGHT: 293 LBS | TEMPERATURE: 98.1 F | SYSTOLIC BLOOD PRESSURE: 104 MMHG | BODY MASS INDEX: 68.94 KG/M2 | DIASTOLIC BLOOD PRESSURE: 62 MMHG | RESPIRATION RATE: 22 BRPM | HEART RATE: 94 BPM

## 2021-06-05 VITALS
RESPIRATION RATE: 20 BRPM | HEART RATE: 76 BPM | TEMPERATURE: 97.2 F | BODY MASS INDEX: 71.44 KG/M2 | DIASTOLIC BLOOD PRESSURE: 70 MMHG | WEIGHT: 293 LBS | SYSTOLIC BLOOD PRESSURE: 120 MMHG

## 2021-06-05 VITALS
TEMPERATURE: 96.2 F | RESPIRATION RATE: 32 BRPM | HEART RATE: 104 BPM | SYSTOLIC BLOOD PRESSURE: 120 MMHG | WEIGHT: 293 LBS | BODY MASS INDEX: 57.52 KG/M2 | DIASTOLIC BLOOD PRESSURE: 80 MMHG | HEIGHT: 60 IN

## 2021-06-05 VITALS — BODY MASS INDEX: 57.52 KG/M2 | WEIGHT: 293 LBS | HEIGHT: 60 IN

## 2021-06-05 NOTE — TELEPHONE ENCOUNTER
Patient called and states that when she got home tonight she Took off her lymphedema compression stockings and is concerned about the coloring of her right foot.     States she had socks on for 48 hours +. States she knows she is not supposed to wear them for that long, but that it is difficult for her to get them off and on and so she often times will leave them on for a few days.     On the top of her right foot it is purple (almost black), and the tips of her toes are black.     Top of foot and toes are cold to the touch    That whole foot is not painful, numb or tingling but she states that it Doesn't feel right    Patient is pre-diabetic, checks blood sugar once daily. Blood sugar today 112    Triaged to a disposition of Go to ED Now. Patient is agreeable. Is going to try and call the [employer] mobile transport team for a ride, but thinks she will likely need to call EMS     Reason for Disposition    Entire foot is cool or blue in comparison to other side    Protocols used: LEG SWELLING AND EDEMA-A-JOHN Butcher RN Triage Nurse Advisor

## 2021-06-05 NOTE — PROGRESS NOTES
"S: Patient is a 38 y/o female, 4'11\", 333 lbs, seen at our [facility] for the fitting and delivery of her custom diabetic foot orthotics and diabetic shoes on orders from Dr. 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    O: Patient arrived wearing new balance shoes that are ill-fitting and reports that her previous diabetic shoes are worn out to the point of being unusable. Patient has rather severe lymphedema bilaterally in her lower limbs and feet. Patient displays severe ankle valgus and pes planus bilaterally upon standing that worsens with gait. Patient ROM and MMT scores for the foot and ankle are all WNL.    G: Patient's diabetic shoes will provide support and protection of the diabetic feet minimizing the risk of ulceration development in both of her feet. Patient will require extra depth shoes due to the lymphedema present in her lower legs and feet. Patient can also benefit from the custom diabetic foot orthoses because it will provide support of the medial longitudinal arches through total contact of patient s foot and neutralizing of the heel with intrinsic posting. Patient requires custom option due to the need to correct angular deformities of foot and ankle.    A: I fit the patient with her new shoes and her custom FOs that were previously ground to fit at her last appointment. patient fit well into the surgical opening shoes during our appointment and reported that all of her orthotic items felt comfortable. Patient and I discussed care, use, and break in period necessary for her new shoe and FOs. Patient reported she understood all instructions and had no further questions at the end of our appointment.     P: " Patient was asked to call our office if any issues arise with her orthotic items in the future.

## 2021-06-06 NOTE — TELEPHONE ENCOUNTER
Was in the hospital at the beginning of the month and now is vomiting and has nausea. Advised that she should call her pcp at [facility]    States that she will do that.    Renetta Monique, RN   [facility] Medication Refill and Triage Nurse  3:56 AM  3/16/2020    Reason for Disposition    Nursing judgment    Protocols used: NO GUIDELINE OR REFERENCE [id number]

## 2021-06-08 NOTE — TELEPHONE ENCOUNTER
Wellness Screening Tool  Symptom Screening:  Do you have a:    Fever?  no    Cough?  no    Shortness of breath?  no  ? If yes, is this a change? NA    Skin rash?  no  Within the past 14 days, have you been in contact with someone who:    Is currently being ruled out for COVID-19 (novel coronavirus)?  no    Has tested positive for COVID-19?  no    Has symptoms of a respiratory illness (fever, cough, shortness of breath)?  no  Have you recently traveled to an area with COVID-19 areas: no    Refer to the [facility] Coronavirus webpage for COVID-19 areas:    Within the past 3 weeks, have you been exposed to the following:    Pertussis?  no    Chicken pox?  no    Measles? no  Patient's appointment status: Pt to come to clinic for visit  Patient reminded that no visitors are allowed at this time due to COVID-19 concerns. If determined pt has symptoms and is still needed to be seen pt notified they must wear a mask upon entering the clinic.

## 2021-06-08 NOTE — PROGRESS NOTES
Pt called today, notes she never was contacted by [facility] for endoluminal balloon therapy for her super morbid obesity. Will place [facility] referral as we have no endoluminal program here at [facility] at this time and she is not a candidate for bariatric surgery as she has significant mental health issues. A temporary and removable therapy such as endoluminal therapy may have safety benefits that would be better suited for her mental illness should the interventionalist be comfortable with her functional state.  Carlos Bowling MD  [facility] Bariatric care center.

## 2021-06-08 NOTE — TELEPHONE ENCOUNTER
Verified that [facility] has lymphedema therapy. Faxed over orders for therapy and wound care.

## 2021-06-08 NOTE — PATIENT INSTRUCTIONS - HE
Plan:  1. Follow up with dietician to help get back on track. Stop drinking juice and increase water intake to 80 oz dailiy.  2. Aiming for 25-30 grams of lean protein at 3 meals daily, about every 4-5 hours. Add to vegetables/salads and keep fruit to 1-2 servings daily.  3. Use your arms/strength exercises daily until legs healing well enough for PT again.   4. When Plenity becomes available I'll let you know about availability/cost and if you're interested we'll set a follow up then.  5. Trulicity refilled, once weekly injections as long as no mid abdominal pains/nausea/vomiting.        Example Meal Plan for a 5952-0459 Calorie Diet:    In order to fuel your weight loss properly and avoid hunger-induced overeating later in the day, for your height and weight, you will enjoy the most success by following the diet below or similar with adjustments based on your particular tastes and preferences.  Exercise may influence speed, amount of weight loss further.     I recommend getting into a meal routine and keeping it similar day to day in the beginning so you don t have to think too hard about what you re going to make/eat.  Keep snacks healthy, ideally containing protein and some vegetables.  Non-processed food is preferable to packaged items.  Eat at least a few crunchy green vegetables if having a snack, which should be 2-3 hours after your mealtimes(prepare these ahead of time for ease of use).  Drink 64 oz -80 oz of water daily for most, some of you will need more and we'll discuss it at your visit if that is the case.  When changing our diet and reducing our intake,  we can often mistake thirst for hunger or just have some grazing habits we have to break ('bored/mindless eating') and a glass of water and reconsideration of our hunger is often all that is needed.  Having the urge is not the problem, but watching it pass by without acting on it is the goal.    If you re having hunger problems, add a protein  drink/snack to your morning hours or afternoon snack with at least 20grams of protein and not too much sugar (under 10g).  A carton of higher protein/low sugar yogurt can work as well.  If the urge to snack is overwhelming and not satiated, try going for a 10 minute walk/exercise, come home and drink a glass of water and if still hungry, have a  calorie snack (handful of raw/sprouted nuts, veggies and string cheese, protein bar, etc).  Savor it.    It is better to have a large breakfast, a moderate lunch and a smaller dinner to fuel your day.  People lose 10-15% more weight during their weight loss season with this strategy. Optimizing your protein intake at each meal will further keep you more satisfied while eating less food overall.  Getting exercise in early has also been shown to offer the best results (before breakfast ideally but anytime is the right time to exercise if that is not an option for you).    To make sure you re getting adequate vitamins and minerals during weight loss, I recommend one complete multivitamin a day of your choice.  Consider a probiotic and taking some vitamin D 2000 IU daily.    Let supper be your last meal of the day and ideally try to have at least 12 hours between supper and breakfast the next day to tap into some beneficial overnight fasting dynamics.  Water in the evening is fine, unsweetened, non caffeinated herbal tea is helpful as well.  Consolidating your meals within a 8-12 hour period of your day will help tap into these additional metabolic benefits and tends to keep your appetite up for breakfast, further helping to stay on track.  For most of my patients I don't recommend an intermittent fasting style diet (many find it hard to fit in their lifestyle) but an overnight fast is very doable for most patients and helps regulate our hunger drives a little better.  This makes it very important to nail good intake at all three meals to feel satisfied/energized and still  lose weight.  If evening snacking desires are high, consider a glass of fiber supplement for some additional fullness (metamucil or similar). Most of us don't get the 25-30 grams per day of fiber that promotes good gut health/satiety.  Benefiber, metamucil, citrucel are reasonable/affordable options for most people.  Inulin, chicory, psyllium husk are reasonable options but start slow and low in the dose to avoid gas/bloating until your gut gets acclimated (ramping up to 5-10 grams per day of supplemental fiber after 3-4 weeks if needed).      Example Meal Plan:  Breakfast: 450-475 Calories  1 egg cooked on low in olive oil:   calories.  5oz Greek Yogurt (Fage plain classic: ~150 alexsandra)  Handful of Berries of your choice (about a calorie per berry or 20-40cal per handful)    cup(cooked) of  old fashioned oatmeal or 1/2 cup(cooked) steel cut oats. (150 alexsandra)  Sprinkle amount of brown sugar and a pat of butter. (40 alexsandra)  Glass of  Water  Black coffee or unsweetened Tea (0calories).      2-3 hours Later Snack: (195 calories).  Glass of water  One string Cheese (80 calories) or 4 oz creamed cottage cheese (115 calories) with  Crunchy Celery sticks (less than 10 calories per large stalk) 2 stalks. (20 calories)    of a  Large Banana or   of a Large Apple (60 calories):  eat second half at lunch or afternoon snack.     Lunch:300 -350 calories   Chicken Breast  (baked/broiled/roasted/grilled)  4-6 oz.  (125-180 alexsandra), BBQ sauce/hot sauce/mustard/seasoning is free. Just use a reasonable amount. Or a can of tuna with 1 tablespoon mayonnaise.  Salad: lettuce, any other veggies (cucumbers, green peppers/celery you like and a small drizzle of dressing to just flavor.  Go as big on the veggies as you like,  as they are practically calorie free.   A whole, 8 inch cucumber is 45 calories, a whole green pepper is 23 calories, a stalk of celery is 9 calories.  [place] Dressing is 60 calories per tablespoon..so moderate  your desired dressing or do a drizzle of olive oil and splash of balsamic vinegar on top,  Total calories unlikely to be over 150 even with dressing.  Glass of Water.    Option for lunch is meal replacement protein drink/smoothie.  Need at least 20 grams of protein and eat the rest of your apple/banana from the morning snack.      Afternoon Snack: 150-200 calories   Cheese Stick or cottage cheese again  and a fresh fruit OR  Granola Bar (protein Bar acceptable if under 200 calories OR  Homemade smoothies:  8oz skim milk,  a handful of berries (fresh or frozen and a serving of protein powder such as BiPro or Sofia sWhey for example.  If you don't like dairy, make with 8oz water, one small banana, handful of berries and the protein powder, add any veggies you want as well:  roughly 200 calories.   Glass of Water    Dinner: 325 calories  4oz of fresh, [place] salmon.  Broiled (salt/pepper/dill) for about 8-8.5 minutes (200calories) or  4oz filet mignon steak or sirloin steak  Salad or vegetable sautéed lightly in olive oil or   Broccoli 1.5  cups chopped and steamed  or micro-waved in a little water (75 calories)  Glass of Water,    Cup of herbal tea (unsweetened, caffeine free)      Herbs and seasonings are encouraged to flavor your foods/vegetables.  Make your food delicious.      Tips for Success:  1.  Prepare proteins ahead of time (broil chicken breasts in bulk so you can grab and go), steel cut oats/lentils can be stored in casserole dish/bowl in the fridge for quick scoop in the morning and rewarm in microwave, make use of crock pot recipes (watch salt content).  Making meals that cover 3-4 future meals is an easy way to stay on track.  2.  Drink a 8-12 oz glass of water every 2-3 hours when awake.  We often mistake hunger for thirst, especially when losing weight.  3. Remember your Reward and Motivation when things get hard.  4.  Weigh yourself every morning and record, you'll stay on track better and learn how  "our biorhythms, diet and elimination patterns show up on the scale. Don't worry about 1 or 2 day patterns, but when on track you'll notice good trend downward of weight over 3-4 day segments.  Plateaus tend to resolve after 4-8 days in most cases if you stay consistent with your plan.  These are natural and part of weight loss, even if you're perfect with your plan execution.  5. Call if problems/concerns.  [employer] is a great tool to stay in touch and provide weekly outside accountability. Check in with questions or if you want to brag.  6.  Find a handful of meals/foods that keep you on track and feeling good and get into a routine that is sustainable for you.  It's OK to have a routine that works for you.  7.  Consider taking a complete multivitamin just to make sure all micronutrients are adequate during weight loss.  8. If losing hair/brittle nails it usually means you are not taking enough protein.  Minimum goal is 60 grams daily of protein for smaller women, 80 grams a day for men. Consider taking Biotin as supplement or a \"Hair and Nail\" multivitamin.    Weight Loss Shopping list:    Having the proper food on hand and ready to go is very important in losing weight.  Everybody has their own preferences/tastes so modify this list as you need but the following are foods that have been found to be helpful in following a calorie restricted diet.   If you are a person that doesn't \"like to eat my vegetables\", I recommend making smoothies and throwing the veggies into the  with some fruit and protein powder.      Fruits:  Generally limit to 2-3 whole fruits a day.  Do not buy Juice.  We depend on the fiber and chewing to slow us down and get phytonutrients/antioxidants available in the skins of fruits for health benefits.  Chewing also signals our stomach to send less hunger signals to our brains:    Apples (1-2 daily), Bananas (no more than one full banana a day), Grapes (2 handfuls a day), " "Clementines/oranges (one daily), berries (blue/rasp/straw:  2 handfuls a day). Watermelon (cubed for easy access, small bowl).    Vegetables:  Eating raw gets most nutrient and fiber benefits but cooked veggies are fine as well, using frozen for cooking or in smoothies is fine as well.  The more chewing/crunchiness the better the hunger control.  No limit to how many a day, but you should at least get 4 handfuls a day or more minimum.  Wash and cut up your vegetables into easy to carry, on the go sizes that are easy to put in plastic bags/pyrex and carry to work/school/etc.  Frozen veggies are just fine as well.     Broccoli, Carrots (no more than 3 a day large carrots or 2 handfuls of baby carrots, as they are very sweet), celery, cucumbers, green peppers, green beans (canned or fresh/frozen), Lettuce(all types), Beets(for roasting, will likely turn urine and stool a bit purple), asparagus.  Go crazy with the veggies, just wash well prior to eating.    Protein:  Women need at least  grams of protein a day and men at least  grams a day, more is fine.  Protein is our \"brain food\" and hunger suppressor and getting enough ensures maintenance of muscle mass during weight loss.  Vegetarians should look to balance their protein intake to get all essential amino acids and discuss with dietician if help is needed (mix of beans/soy/etc).  Protein powders or drinks count and can be a great way to control appetite during the day and easy to grab and go/carry to work/etc.  Premier Protein, BiPro, TarasWhey are all brands with high quality whey protein. For whey sensitive people, rice protein is an option as well.    Canned tuna/sardines or anchovies.  Fresh fish/salmon the day you plan to make it.  Chicken breasts/thighs (skinless while losing weight), filet mignon steak (leaner but ) or marinade sirloin (wipe off before cooking). Eggs cooked in olive oil for breakfast is a good way to get protein and " "good fat in the a.m. (cook on low heat to prevent transformation of olive oil into less healthy fat).  Greek Yogurt with at least 20 grams of protein per serving and less than 11 grams of carbs/sugars.  String Cheese  Cottage Cheese: 2% or fat free per your preference.        The Frozen Food Diet  For those on the go who may have relied heavily on fast food in the past, we want to break away from that routine.  But life sometimes may limit our time for shopping/cooking or perhaps your skills in the kitchen are limited.  To help get on the right food, here are some options for using frozen food/reheatable food that may keep you on track with protein/caloric goals and keep things simple as you first jump into weight loss season or look to a break from your current meal routine.     The listed foods are examples of what may help keep most on track but your stores may carry different options.  Start reading labels!  As long as your frozen meals have 18-30 grams of protein per meal they should do the job. Experience suggests most of these meals will be around 280-400 calories.   The protein content is the most improtant. For those who are salt sensitive, looking at sodium content is important as well and most of us would do well to keep our daily sodium intake under 2000-2300mg anyway.   My suggestion is to find your favorite 2 frozen meals that fit the protein goal, load up, keep it simple and use them regularly 2 meals daily. A piece of fruit (apple/berries/banana) in the AM between breakfast and lunch and trying not to go more than about 5 hours between your meals should help keep things on track. For heavy exercisers or people with higher resting metabolic rates, you may feel better w/ a protein supplement in mid afternoon.  Try to let supper be your last food of the day and stick to water otherwise. If you absolutely need bubbly beverages, carbonated thomas such as [place]/Bubbly/etc (\"essenced water\" " without sugar/artificial sweetener) may be refreshing options as well (much better if cold).     Your grocery may carry better/different options then what is listed,  most of those are decent options, just look at the labels to make sure.      EXAMPLES of quick/frozen meals/sides:  Healthy Choice Power Bowls:  Chicken Feta and Farro:  ~$4.00. 310 kcal. Protein 23 grams. 600mg sodium, Fiber 6g. Sugar 2g.   Evol Fire Grilled steak bowls: $4.49. 400 kcal, 20g protein, 520mg sodium  fiber 8, sugar 3.    Evol Chicken Enchilada bake bowl: $4.49. 350 kcal, 21 g protein, 610mg sodium, fiber 7, sugar 3    Frontera Chicken Fajita bowl: $4.99 on sale $3.59. 260 kcal, 22 g protein, 700mg sodium, fiber 8, sugar 8.    Frontera, Green chili grilled chicken taco bowl $4.99 on sale $3.59. 240 kcal, protein 20g, sodium 710, fiber 6g, sugar 6g.    Frontera chicken and chorizo taco bowl. $4.99 on sale $3.59. 290 kcal, 19g protein. 660 mg sodium, 7g fiber, 6 g sugar.    Zo Formanzarina, Chicken: soy based food. $4.99 for 4 patties. If using for protein source, encourage 2 patties, 280 kcal, 24 g protein 740mg sodium, fiber 6g, sugar 4g. (add to veggie mix for meal gets 310 kcal if 3/4 cup of mediterranean veggie mix).  Feel free to use condiments like ketchup/mustard or salsa.    Lu Alonso, chicken ware: $4.99, $3.59 on sale. 320 kcal, 20g protein, 750mg sodium, 3g fiber, 4 g sugar    eggs: $2.99-$6.99 per dozen. 70 kcal per egg, 6 -7g protein per egg.    365 [place] blend frozen veggies (5 servings of 3/4 cup): $2.69. 25kcal    365 organic broccoli florets. $2.69 4.5 servings of 1 cup. 30 kcal.      Mozarella cheese: sticks 1 oz (6 pack is $3.60 on sale if organic) 80 kcal, 8 g protein,     Salsa: 365 salsa campbell méndez chil: $2.99 per bottle, 15 servings) 2 tablespoons 10 kcal, 0 protein, 200mg sodium. fiber 0, sugar 1    Water, coffee/unsweetened tea only.  Get at least 64 oz daily. Maximum safe amount is generally up to one half  of your body weight in pounds in most cases (unless you're on water restriction for cardiac issues).  Fruit: apples, berries, no more than one banana daily, grapes, watermelon/cantaloupe. Keep it to 1-2 servings daily. Serving of berries is a generous handful. Same with grapes.  Melon:  2 good sized wedges.  Wash your berries/grapes/apples before eating.  Depending on your protein and caloric needs, a person could use frozen meals 2-5 times daily. If you're 6 feet 300 lb man,  you'll likely need an extra serving or two compared to a 5 foot, 200 lb women.  Follow your appetite, hunger and energy levels and how they relate to your intake and timing of meals. Weigh yourself daily if possible.  Have a great season!    50 Things to do Instead of Snacking  We'll all have urges to snack sometimes. Hunger, mood, stress, boredom and distraction are common triggers so being mindful and thinking about what is driving a particular urge to snack at a particular time can be helpful for reducing the urge in the future.  Remember, the urge to snack doesn't have to be obeyed. The urge exists, but you can watch it pass. Over time, you will get good at the exercise of experiencing an urge, acknowledging it, but letting it pass you by and float away.  Until you get there, here are some activities to pass the 3-5 minutes that most urges last. Good hydration is always helpful.  If you do struggle with impulse/urge control, consider some therapy based on cognitive behavioral therapy or ACT (Acceptance and Commitment Therapy).  Apps/subscriptions like [employer] emphasize some of these tools and can be of help for those able to afford the sandip/subscription.  1. Imagine the new healthier you   2. Walk around the block   3. Call a friend   4. Make a list of your Top Ten Reasons to Lose Weight   5. Make a To Do list   6. Turn on music and dance   7. Jot a thank you note to someone   8. Go to bed early or take a nap  9. Read a book   10. Blog or  journal  11. Give yourself a manicure or pedicure   12. Plan a healthy meal for your family   13. Surf the Internet   14. Finish an unfinished project   15. Walk your dog, pet your cat, feed your fish  16. Brush your teeth   17. Balance your checkbook   18. Say a prayer   19. Chop veggies for later use.  20. Give a massage   21. Clean out a junk drawer   22. Play a game with your kids   23. Try a new route on your walk     24. Drink a glass of water   25. Kiss someone   26. Try on some of your clothes   27. Look at old pictures   28. Rent a video   29. Wash your car   30. Take a hot, soothing bath   31. Update your calendar   32. Work in your yard   33. Start your holiday shopping list   34. Count your blessings   35. Write a letter   36. Fold some laundry   37. Check your e-mail   38. Give your dog a bath   39. Send a birthday card   40. Meditate   41. Hug someone   42. Rearrange some furniture   43. Light a fire or some candles   44. Put your pictures in an album   45. Plan a trip (real or imaginary)  46. Straighten a closet   47. Clean out a files   48. Visit a friend  49. Clean out your trunk  50. Do something nice for someone       LEAN PROTEIN SOURCES  Getting 20-30 grams of protein, 3 meals daily, is appropriate for most people, some need more but more than about 40 grams per meal is not useful.  General rule is drinking one ounce of water per gram of protein eaten over the course of the day:  70 grams of protein each day, drink 70 oz of water.  Protein Source Portion Calories Grams of Protein                           Nonfat, plain Greek yogurt    (10 grams sugar or less) 3/4 cup (6 oz)  12-17   Light Yogurt (10 grams sugar or less) 3/4 cup (6 oz)  6-8   Protein Shake 1 shake 110-180 15-30   Skim/1% Milk or lactose-free milk 1 cup ( 8 oz)  8   Plain or light, flavored soymilk 1 cup  7-8   Plain or light, hemp milk 1 cup 110 6   Fat Free or 1% Cottage Cheese 1/2 cup 90 15   Part skim  ricotta cheese 1/2 cup 100 14   Part skim or reduced fat cheese slices 1 ounce 65-80 8     Mozzarella String Cheese 1 80 8   Canned tuna, chicken, crab or salmon  (canned in water)  1/2 cup 100 15-20   White fish (broiled, grilled, baked) 3 ounces 100 21   [place]/Tuna (broiled, grilled, baked) 3 ounces 150-180 21   Shrimp, Scallops, Lobster, Crab 3 ounces 100 21   Pork loin, Pork Tenderloin 3 ounces 150 21   Boneless, skinless chicken /turkey breast                          (broiled, grilled, baked) 3 ounces 120 21   [place], [place], [place], and Venison 3 ounces 120 21   Lean cuts of red meat and pork (sirloin,   round, tenderloin, flank, ground 93%-96%) 3 ounces 170 21   Lean or Extra Lean Ground Turkey 1/2 cup 150 20   90-95% Lean [place] Burger 1 abby 140-180 21   Low-fat casserole with lean meat 3/4 cup 200 17   Luncheon Meats                                                        (turkey, lean ham, roast beef, chicken) 3 ounces 100 21   Egg (boiled, poached, scrambled) 1 Egg 60 7   Egg Substitute 1/2 cup 70 10   Nuts (limit to 1 serving per day)  3 Tbsp. 150 7   Nut [place] (peanut, almond)  Limit to 1 serving or less daily 1 Tbsp. 90 4   Soy Burger (varies) 1  15   Garbanzo, Black, [place] Beans 1/2 cup 110 7   Refried Beans 1/2 cup 100 7   Kidney and Lima beans 1/2 cup 110 7   Tempeh 3 oz 175 18   Vegan crumbles 1/2 cup 100 14   Tofu 1/2 cup 110 14   Chili (beans and extra lean beef or turkey) 1 cup 200 23   Lentil Stew/Soup 1 cup 150 12   Black Bean Soup 1 cup 175 12

## 2021-06-08 NOTE — TELEPHONE ENCOUNTER
Pt called regarding weight went up 15 lbs within a week; she is 353.6 lbs , and left leg more swollen and she has blisters/ weepping . Pt states that she has not been able to wear her compression stocking, because she does not have a helper. Writer asked Pt if she does have fever,burning sensation or redness or feels hot to touch left leg; she denied. Writer advice Pt to call her primary doctor and updated them on gained 15 lbs and weakness. Pt was adviced also to go to the ER if she does have any of this symptoms, redness, fever and increasing I pain and fever . Pt is schedule to come to clinic Monday June 1st at 1115 to see Dr Odell.

## 2021-06-08 NOTE — TELEPHONE ENCOUNTER
"FYI:  Home care attempted to do assessment for wound care and therapy as per referral from Dr Odell today 6/3/20.  Pt indicated within a few minutes of assessment that she has another home care agency already with a nurse and PCA services.  SN spoke to the RN \"Carrie\" with [facility] Tel: [phone] [phone].  She indicated that they plan to perform the wound care and have PT/OT that can also perform the lymph cares, and requested that [facility]/Dr Odell call/fax order to them to begin.   informed the other home care RN and pt re the orders from [facility] note for woundcares and leg wraps/lymph therapy, and told them she would need a visit this week to address the leg wraps that were placed at OV on 6/1/20.  [facility] did not admit pt to service today as she currently has another home care agency involved in her care and requests that they continue.  Please let me know if you have any other additional questions.  Thank you,  Jessi RN-Clinical   [facility]  "
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
## 2021-06-08 NOTE — PATIENT INSTRUCTIONS - HE
Wound care: hibiclens wash then alginate with silver then abd then two layer.  Change twice a week.   Therapy can incorporate their wraps when they start. Home care ordered.    You should expect a call from [facility] within the next 2 business days. They will want to schedule a time with you to come out to your home. If you need to reach [facility] please call them at [phone].      - Please call your home care if your compression wraps fall more than 1-2 inches below the bend of the knee. Remove the wraps if you experience any shortness of breathe or notice your toes turning blue/purple and then give us a call. Call if they are too painful. Call if they get wet. If it is a weekend and the wraps fall down, are too painful, or get wet take the wraps off and put on another form of compression. Compression such as velcro wraps, compression stockings, short stretch bandages, or tubular compression. Apply one of these until you can be seen in clinic. Please call us if you have any questions [phone].    - Treatment:  Layered Compression Bandaging (2-layer)    What is it?  The layered compression bandaging has a layer of absorbent material that will soak up drainage.     Why we do it.   This is done to treat swelling, wounds, or both.  This will in turn help circulation and healing.    How to care for your bandages.  The wraps need to be kept dry. If  the wraps become wet, remove them and call the clinic to have another wrap applied.    What to expect.  It is common for the wraps to be uncomfortable at the beginning. The first two days are usually the hardest; then they will become more comfortable.       Elevating your legs will help the discomfort. Try to elevate your legs as much as possible.    If rest and elevation does not help your discomfort, call your provider.  If your provider is not available you can remove the wrap and leave a message for further instructions.    - Swelling and  Compression Therapy    Swelling in the legs can be caused by many reasons. No matter what the reason, treatment usually includes some type of compression. This may be done with a support sock, dressing, ace wrap, or layered wraps.     It is important to treat the swelling for many reasons. If the swelling is not treated you may develop blisters that can lead to ulcerations. This is caused when extra fluid goes into tissue causing damage and blocking blood flow to the tissue.     It is important that you wear your compression every day, including days that you will be seen in clinic.     Compression is often the most important part of treating leg wounds. Without controlling the swelling it is often not possible to heal wounds.     Going without compression for even brief periods of time can be damaging to your legs and your health.  Your compression should be put on first thing in the morning. Take the compression off at night only when instructed by your care provider to do so. Sometimes wearing compression 24 hours a day will be recommended.       If you are having difficulty wearing your compression it is important to notify your care provider so that other options may be reviewed.    Thank you for choosing [facility]. Please call us if you have any questions [phone].

## 2021-06-08 NOTE — PROGRESS NOTES
"Bushra Buck is a 40 y.o. female who is being evaluated via a billable telephone visit.      The patient has been notified of following:     \"This telephone visit will be conducted via a call between you and your physician/provider. We have found that certain health care needs can be provided without the need for a physical exam.  This service lets us provide the care you need with a short phone conversation.  If a prescription is necessary we can send it directly to your pharmacy.  If lab work is needed we can place an order for that and you can then stop by our lab to have the test done at a later time.    Telephone visits are billed at different rates depending on your insurance coverage. During this emergency period, for some insurers they may be billed the same as an in-person visit.  Please reach out to your insurance provider with any questions.    If during the course of the call the physician/provider feels a telephone visit is not appropriate, you will not be charged for this service.\"    Patient has given verbal consent to a Telephone visit? Yes    What phone number would you like to be contacted at? [phone]    Patient would like to receive their AVS by AVS Preference: Mail a copy.  Follow up   Grocery shoppping.  Some relapse in juice drinking (cranberry).  Legs acted up again.  chornic super morbid obesity complicated by psychiatric illness and medications that can affect appetite and satiety, currently on Trulicity to help metabolic disease and appetite w/ recent mental health relapse that now has her on high dose injectable Abilify with some mild sedation sounding voice on phone call today.  Has a medical power of  now.      Plan:  1. Follow up with dietician to help get back on track. Stop drinking juice and increase water intake to 80 oz dailiy.  2. Aiming for 25-30 grams of lean protein at 3 meals daily, about every 4-5 hours. Add to vegetables/salads and keep fruit to 1-2 " servings daily.  3. Use your arms/strength exercises daily until legs healing well enough for PT again.   4. When Plenity becomes available I'll let you know about availability/cost and if you're interested we'll set a follow up then.  5. Trulicity refilled, once weekly injections as long as no mid abdominal pains/nausea/vomiting.  Phone call duration: 14 minutes    ELSA Kwan MD

## 2021-06-09 NOTE — PATIENT INSTRUCTIONS - HE
"[facility] (Please call to make an appointment)    [facility]  [address], [address]  [place], [place].  Phone: [phone]    [facility]  [address]  [place] [address] [phone]      Swelling in the legs can be caused by many reasons. No matter what the reason, treatment usually includes some type of compression. You should wear your compression socks as much as you can. Your compression should be put on first thing in the morning. Take the compression off at night. It is especially important to wear them with long periods of sitting/standing, long car rides or if you will be flying. Going without compression for even brief periods of time can be damaging to your legs and your health.  Compression socks should get replaced usually every 4-6 months. They do not need to be worn at night while in bed. If we have seen you in the last year, we can refill your sock prescription, otherwise your primary care provider is able to refill them for you. Call us with any problems or questions.   Compression Velcro may need to be replaced every 9-12 months.  Often the liners and socks need to be replaced every three or four months.  The neoprene velcro may last up to 2 years.  If the velcro becomes worn a tailor may be able to repair it for you inexpensively.    If you do a lot of standing, it is good to do calf raises to help keep the blood pumping. If you sit a lot at work, it is good to get up periodically to walk around. Elevation of the foot of your bed 4-6\" helps the blood return back to where it is needed.   Please call us if you have any questions [phone]    Thank you for choosing [facility].          "

## 2021-06-09 NOTE — PROGRESS NOTES
"Date of Service:  07/13/20    Date last seen:  06/01/2020    PCP: Erasto Azul MD    Impression:   1. Leg swelling bilaterally-improved  2. Venous stasis/insufficiency with history of ulcerations-closed  3. Acquired lymphedema   4. Recurrent cellulitis of legs- (3/5/15, 2/17/15, 5/15, 12/14/15, 3/16, 7/12/16, 8/18/2016, 7/2020)  5. Bilateral foot deformities with valgus ankles  6. Complicated by morbid obesity Prader Willi.   7. Type 2 DM    Plan:   1. Questions were answered.   2. Continue compression and exercise. .  3. Continue to use diabetic shoes and insoles.    4. New compression written for.   5. Follow up in 6 months, or when needed.  The patient will watch for any increased redness, pain, temperature, drainage and/or any new ulcerations in the leg(s).  If they have any questions or concerns they are to contact the clinic.    Time spent with patient 15 minutes with greater than 50% time in consultation, education and coordination of care.   ---------------------------------------------------------------------------------------------------------------------     Chief Complaint: Bilateral leg swelling     History of Present Illness:   Bushra Buck returns to the [facility] for follow up of her bilateral leg swelling due to severe venous hypertension, insufficiency with secondary lymphedema and recurrent cellulitis resulting from morbid obesity with care is complicated by underlying schizophrenia and recurrent hospitalizations.  At her last visit she developed some wounds and her swelling was up.  The wounds were debrided.  Wound care ordered was \" hibiclens wash then alginate with silver then abd then two layer.  Change twice a week.   Therapy can incorporate their wraps when they start. \"  She was to continue doing her exercises, wearing her diabetic shoes and insoles and using the Flexitouch as needed.  She started following bariatrics again.  She sees " .  Therapy was ordered to better control her swelling.  She returns today saying she is doing well.  She had a recent cellulitis with pain in the right leg and started Augmentin with excellent results.  There is no new numbness, tingling or weakness.  She has not had any fevers.  She has had no new shortness of breath or chest pain.  There is been no irregular bleeding.    Past Medical History:   Diagnosis Date     Allergic rhinitis      Bipolar 1 disorder (H)     followed by psych     Cellulitis, leg 2016     Dyslipidemia      Ganglion, left wrist      GERD (gastroesophageal reflux disease)      Growth retardation, mild developmental delay, and chronic hepatitis syndrome (H)      Herpes zoster      Hypertension      Hypothyroid      Lymphedema of lower extremity 2008     Morbid obesity (H)      Nightmares      Obstructive sleep apnea      Post traumatic stress disorder      Post traumatic stress disorder      Post traumatic stress disorder (PTSD)      Prader-Willi syndrome      Prolactinoma (H) transsphenoid approach 2000.     PTSD (post-traumatic stress disorder)      Schizoaffective disorder, bipolar type (H)      Type 2 diabetes mellitus without complication, with long-term current use of insulin (H)      Venous stasis      Vitamin D deficiency        Past Surgical History:   Procedure Laterality Date     ABDOMINAL SURGERY  2000    fat removed tp plug dura when had a pituitary surgery.     anorectal fissure repair  2004, 2005     BRAIN SURGERY  2000    pituitary surgery     CARPAL TUNNEL RELEASE Bilateral      COLONOSCOPY N/A 10/14/2019    Procedure: COLONOSCOPY;  Surgeon: Hugo Mattson MD;  Location: [facility];  Service: Gastroenterology     GANGLION CYST EXCISION  2010    left arm     GANGLION CYST EXCISION Left 6/7/2018    Procedure: REMOVE RECURRENT GANGLION CYST LEFT VOLAR RADIAL WRIST;  Surgeon: Angel Lopez MD;  Location: Health system OR;  Service:      GANGLION CYST  EXCISION Left 11/18/2019    Procedure: EXCISION LEFT WRIST RECURRENT GANGLION CYST;  Surgeon: Angel Lopez MD;  Location: [facility];  Service: Hand     pansinus revision Bilateral      PITUITARY SURGERY  2014     SEPTOPLASTY      x 2     SINUS SURGERY Right 8/21/2015    Procedure: RIGHT MICHELLE BULLOSA;  Surgeon: Daniel Landeros MD;  Location: [facility];  Service:      TONSILLECTOMY         Current Outpatient Medications   Medication Sig Dispense Refill     amoxicillin-clavulanate (AUGMENTIN) 875-125 mg per tablet Take 1 tablet by mouth 2 (two) times a day.       ARIPiprazole (ABILIFY MAINTENA) 400 mg SERR Inject 400 mg under the skin every 30 (thirty) days.       blood glucose meter (GLUCOMETER) Use 1 each As Directed as needed. Dispense glucometer brand per patient's insurance at pharmacy discretion.  0     bumetanide (BUMEX) 2 MG tablet Take 1 tablet (2 mg total) by mouth daily. 30 tablet 0     cabergoline (DOSTINEX) 0.5 mg tablet Take 0.5 mg by mouth 2 (two) times a week. WED, SAT        calcium citrate-vitamin D3 (CITRACAL + D) 315 mg- 250 unit per tablet Take 2 tablets by mouth daily. 60 tablet 3     carBAMazepine (TEGRETOL  XR) 200 MG 12 hr tablet Take 1 tablet (200 mg total) by mouth 2 (two) times a day with meals. 60 tablet 0     cetirizine (ZYRTEC) 10 MG tablet Take 10 mg by mouth daily.       CHOLECALCIFEROL 25 mcg (1,000 unit) tablet Take 3 tablets (3,000 Units total) by mouth daily. 90 tablet 1     EPINEPHrine (EPIPEN/ADRENACLICK/AUVI-Q) 0.3 mg/0.3 mL injection Inject 0.6 mL (0.6 mg total) into the shoulder, thigh, or buttocks as needed. 2 Pre-filled Pen Syringe 0     fenofibrate (TRICOR) 145 MG tablet Take 1 tablet (145 mg total) by mouth daily. 30 tablet 3     hydrOXYzine pamoate (VISTARIL) 25 MG capsule Take 1 capsule (25 mg total) by mouth 2 (two) times a day as needed for itching or other (additional pain control). Anxiety 30 capsule 0     levothyroxine (SYNTHROID,  LEVOTHROID) 125 MCG tablet Take 1 tablet (125 mcg total) by mouth Daily at 6:00 am. 30 tablet 0     medroxyPROGESTERone (PROVERA) 10 MG tablet Take 10 mg by mouth daily. Take for the first five days of each month  3     melatonin 3 mg Tab tablet Take 1 tablet (3 mg total) by mouth at bedtime as needed. 30 tablet 0     MULTIVITAMIN tablet Take 1 tablet by mouth daily.  3     OLANZapine (ZYPREXA) 20 MG tablet Take 1 tablet (20 mg total) by mouth at bedtime. 30 tablet 0     omeprazole (PRILOSEC) 40 MG capsule Take 40 mg by mouth daily.  5     polyethylene glycol (MIRALAX) 17 gram packet Take 1 packet (17 g total) by mouth daily as needed.  0     potassium chloride 20 mEq TbER Take 20 mEq by mouth daily. 30 tablet 0     TRULICITY 1.5 mg/0.5 mL PnIj Inject 1.5 mg under the skin every 7 days. 4 Syringe 6     No current facility-administered medications for this visit.        Allergies   Allergen Reactions     Fiorinal [Butalbital-Aspirin-Caffeine] Anaphylaxis and Swelling     Clindamycin Rash     Rash on neck - not raised . Heartburn     Ancef [Cefazolin] Swelling     Arm swells     Aspirin (Tartrazine Only) Other (See Comments)     Eyes swell shut     Ativan [Lorazepam] Swelling     arms     Butalbital      Caffeine      Carbamazepine Other (See Comments)      (tegretol) Arm swelling     Codeine Swelling     Delsym Hives     Ibuprofen Other (See Comments)     Swelling, eyes swell shut     Latex Swelling     Lithium Analogues Diarrhea, Nausea And Vomiting and Swelling     Red/swollen arms     Oxcarbazepine Other (See Comments)     (trileptal) Arm swelling     Venom-Honey Bee Swelling     Unknown     Augmentin [Amoxicillin-Pot Clavulanate] Rash     Rash on neck - not raised     Cefdinir Swelling            Topiramate Swelling and Rash     topamax       Social History     Socioeconomic History     Marital status: Single     Spouse name: Not on file     Number of children: 0     Years of education: Not on file     Highest  education level: Not on file   Occupational History     Occupation: Disability   Social Needs     Financial resource strain: Not on file     Food insecurity     Worry: Not on file     Inability: Not on file     Transportation needs     Medical: Not on file     Non-medical: Not on file   Tobacco Use     Smoking status: Never Smoker     Smokeless tobacco: Never Used   Substance and Sexual Activity     Alcohol use: No     Drug use: No     Sexual activity: Never     Partners: Male     Birth control/protection: Abstinence   Lifestyle     Physical activity     Days per week: Not on file     Minutes per session: Not on file     Stress: Not on file   Relationships     Social connections     Talks on phone: Not on file     Gets together: Not on file     Attends Quaker service: Not on file     Active member of club or organization: Not on file     Attends meetings of clubs or organizations: Not on file     Relationship status: Not on file     Intimate partner violence     Fear of current or ex partner: Not on file     Emotionally abused: Not on file     Physically abused: Not on file     Forced sexual activity: Not on file   Other Topics Concern     Not on file   Social History Narrative        .  Not working.   No kids.       Family History   Problem Relation Age of Onset     Diabetes Mother      Hypothyroidism Mother      Cancer Mother      Sleep apnea Mother      Snoring Mother      Heart disease Father      Diabetes Sister      Edema Sister      No Medical Problems Sister      Diabetes Maternal Grandfather        Review of Systems:    See HPI    Imaging:    I personally reviewed the following imaging results today and those on care everywhere, if indicated    EXAM: US VENOUS LEGS BILATERAL  LOCATION: [facility]  DATE/TIME: 2/16/2020 12:43 PM     INDICATION: bilaterl leg pain and edema  COMPARISON: 04/25/2019  TECHNIQUE: Venous Duplex ultrasound of bilateral lower extremities with and without  compression, augmentation and duplex. Color flow and spectral Doppler with waveform analysis performed.     FINDINGS: Exam includes the common femoral, femoral, popliteal veins as well as segmentally visualized deep calf veins and greater saphenous vein.      RIGHT: No deep vein thrombosis. No superficial thrombophlebitis. No popliteal cyst.     LEFT: No deep vein thrombosis. No superficial thrombophlebitis. No popliteal cyst.     IMPRESSION:   1.  No deep venous thrombosis in the bilateral lower extremities.    EXAM: CT ABDOMEN PELVIS WO ORAL W WO IV CONTRAST  LOCATION: [facility]  DATE/TIME: 4/30/2019 3:45 PM     INDICATION: Hematuria.  COMPARISON: None.  TECHNIQUE: Helical thin-section CT scan of the abdomen and pelvis was performed without contrast. Images were then obtained during and after injection of IV contrast, with delayed images through the renal collecting systems. Multiplanar reformats were   obtained. Dose reduction techniques were used.?  CONTRAST: Iohexol (Omni) 100 mL     FINDINGS:   LUNG BASES: Negative.     ABDOMEN: Both kidneys are negative with no renal stones and no hydronephrosis. Tiny cysts mid right kidney laterally. Kidneys and renal collecting systems otherwise negative.      No significant findings in the liver, spleen, pancreas, and adrenal glands. No lymphadenopathy.     PELVIS: Negative. No adnexal masses or free fluid.     MUSCULOSKELETAL: Negative.     IMPRESSION:   CONCLUSION:  1.  No significant findings in the kidneys or renal collecting systems.     2.  No significant incidental findings.    Nothing new to review    Labs:    I personally reviewed the following lab results today and those on care everywhere, if indicated    Lab Results   Component Value Date    SEDRATE 54 (H) 08/01/2015         Lab Results   Component Value Date    CRP 0.4 04/26/2019           Lab Results   Component Value Date    CREATININE 0.83 04/13/2020      Lab Results   Component Value Date     HGBA1C 5.0 01/06/2020           Lab Results   Component Value Date    BUN 17 04/13/2020              Lab Results   Component Value Date    ALBUMIN 3.1 (L) 02/28/2020       Vitamin D, Total (25-Hydroxy)   Date Value Ref Range Status   01/06/2020 32.3 30.0 - 80.0 ng/mL Final       Lab Results   Component Value Date    TSH 3.75 06/08/2020     Lab Results   Component Value Date    WBC 5.5 03/02/2020    HGB 11.8 (L) 03/02/2020    HCT 36.4 03/02/2020     (H) 03/02/2020     03/02/2020     Nothing new to review.    Physical Exam:  Vitals:    07/13/20 1408   BP: 112/88   Pulse: 92   Resp: 28   Temp: 97.9  F (36.6  C)     BMI 70.31 (increased) weight 360 (+12) pounds    Circumferential measures:    Vasc Edema 5/6/2019 8/26/2019 10/28/2019 6/1/2020 7/13/2020   Right just above MTP 27 27.1 26.5 26.5 27   Right Ankle 37 32.7 36.5 39.5 37   Right Widest Calf 63.8 66 68.2 67.3 69.5   Right Thigh Up 10cm 81 76.5 72.5 77.6 77   Left - just above MTP 28.6 28 28.5 29.6 30.4   Left Ankle 36.9 33 36.5 39 38   Left Widest Calf 69.5 71.1 71.3 71 75   Left Thigh Up 10cm 79.2 38 79.5 77 76     Measures are stable.    General:  40 y.o. female in no apparent distress.     Psych:  Alert and oriented x 3.  Cooperative.     HEENT: Atraumatic, normocephalic    Integumentary: Legs with resolution of erythema and ulcerations.  + Stemmer's sign bilaterally continues with distal calf ledges.  Skin is much softer with no odor, pain to palpation or drainage.     Vascular: Dorsalis pedis and posterior tib pulses strong and equal bilaterally.  Venous varicosities noted in both anterior shins.    Temitope Odell MD, Diplomate ABWMS, FACCWS, FAAPMR  Medical Director Wound Care and Lymphedema  [facility]  [phone]

## 2021-06-09 NOTE — TELEPHONE ENCOUNTER
In the past 14 days have you been in contact with anyone with Covid 19 or Suspected of Covid 19?  no    Have you been experiencing any of the following symptoms?     Fever?  no    Cough?  no    Shortness of breath?  no  ? If yes, is this a change? no    Skin rash?  no  In the past 30 days have you  traveled internationally: no

## 2021-06-10 NOTE — TELEPHONE ENCOUNTER
Patient called in with new left leg swelling concern. Left leg has been swelling more than the right for the last week.  It is hard to get her compression on. She denies pain, redness, or warmth, but states it is swollen and firm, with a couple blisters that are not draining.    [facility] new velcro compression. She is wondering if she should start using short stretch on the left leg.

## 2021-06-10 NOTE — PROGRESS NOTES
Here for f/u non-surgical weight loss.  See flowsheet.    Ayesha Martinez RN, CBN  [facility]  P [phone]  F [phone]

## 2021-06-10 NOTE — PROGRESS NOTES
Date of Service:  04/20/17    Date last seen:   03/09/17    PCP: Tae Garcia MD    Impression:   1. Leg swelling bilaterally   2. Venous stasis/insufficiency (ulcerations healed)  3. Acquired lymphedema   4. Recurrent cellulitis of legs- (3/5/15, 2/17/15, 5/15, 12/14/15, 3/16, 7/12/16, 8/18/2016)  5. Bilateral foot deformities  6. Complicated by morbid obesity   7.  Small abdominal skin wall ulceration-healed  8.  Left breast ulcer-resolved/healed    Plan:   1.  Questions were answered.   2.  Continue Flexitouch.  Doing very well.  3.  Continue compression.  Doing well.   4.  Lymphedema exercises reviewed.   5.  Follow up 6 months or when needed.    Time spent with patient 15 minutes with greater than 50% time in consultation, education and coordination of care.   ---------------------------------------------------------------------------------------------------------------------     Chief Complaint: Bilateral leg swelling     History of Present Illness:   Bushra Buck returns to the [facility] [place] for follow up of left breast ulcer.  I have previously seeing her for bilateral leg swelling due to severe venous hypertension, insufficieny with secondary lymphedema and recurrent cellulitis resulting from morbid obesity.  She developed a recurring left breast ulcer.  I started her on new dressing with bacitracin and foam with silicone border.  She is pleased to report the left breast ulcer has healed.   She does the flexitouch pump 5/7 weekdays and it has helped greatly.  She has lost closed to 50 pounds.  She continues to work with bariatrics.  There has been no new numbness, tingling, weakness, masses, rashes, shortness of breath or chest pain. She continues on the daily doxycycline.   Pain is rated a 0 on a 10 point scale. She has not run a fever.  She has had no new ulcerations on her legs and no signs of infections.  She is looking at working on increasing her exercises.      Past Medical  History:   Diagnosis Date     Bipolar 1 disorder      Dyslipidemia      GERD (gastroesophageal reflux disease)      Hypothyroid      Lymphedema of lower extremity 2008     Post traumatic stress disorder (PTSD)      Prolactinoma transsphenoid approach 2000.     Schizoaffective disorder, bipolar type        Past Surgical History:   Procedure Laterality Date     ABDOMINAL SURGERY  2000    fat removed tp plug dura when had a pituitary surgery.     anorectal fissure repair  2004, 2005     BRAIN SURGERY  2000    pituitary surgery     CARPAL TUNNEL RELEASE Bilateral      GANGLION CYST EXCISION      left arm     PITUITARY SURGERY  2014     SEPTOPLASTY      x 2     SINUS SURGERY Right 8/21/2015    Procedure: RIGHT MICHELLE BULLOSA;  Surgeon: Daniel Landeros MD;  Location: [facility];  Service:      TONSILLECTOMY         Current Outpatient Prescriptions   Medication Sig Dispense Refill     cabergoline (DOSTINEX) 0.5 mg tablet Take 0.5 mg by mouth once a week.        divalproex (DEPAKOTE) 500 MG 24 hr tablet Take 1,000 mg by mouth daily.   2     doxycycline (VIBRA-TABS) 100 MG tablet Take 1 tablet by mouth daily.  1     fenofibrate (TRICOR) 145 MG tablet Take 1 tablet by mouth daily.  3     furosemide (LASIX) 40 MG tablet Take 1 tablet (40 mg total) by mouth daily. For chronic edema  0     levothyroxine (SYNTHROID, LEVOTHROID) 125 MCG tablet Take 1 tablet (125 mcg total) by mouth bedtime. For hypothyroidism  0     loxapine (LOXITANE) 25 MG capsule Take 1 capsule (25 mg total) by mouth 2 (two) times a day. 60 capsule 0     MULTIVITAMIN tablet Take 1 tablet by mouth daily.  3     omeprazole (PRILOSEC) 20 MG capsule Take 1 capsule (20 mg total) by mouth Daily before breakfast. For GERD  0     OYSTER SHELL CALCIUM 500 500 mg calcium (1,250 mg) tablet Take 1 tablet by mouth daily.  11     No current facility-administered medications for this visit.        Allergies   Allergen Reactions     Fiorinal  [Butalbital-Aspirin-Caffeine] Anaphylaxis and Swelling     Clindamycin Rash     Rash on neck - not raised      Ancef [Cefazolin]      Aspirin (Tartrazine Only) Other (See Comments)     Eyes swell shut     Ativan [Lorazepam] Swelling     arms     Carbamazepine Other (See Comments)      (tegretol) Arm swelling     Codeine Swelling     Delsym Hives     Ibuprofen Other (See Comments)     Swelling, eyes swell shut     Latex Swelling     Lithium Analogues Swelling     Red/swollen arms     Oxcarbazepine Other (See Comments)     (trileptal) Arm swelling     Venom-Honey Bee Swelling     Unknown     Augmentin [Amoxicillin-Pot Clavulanate] Rash     Rash on neck - not raised     Cefdinir Swelling     (omnicef) Arm swelling  Tolerated cefazolin 5/25/15, and tolerated a course of Keflex on discharge       Topiramate Swelling and Rash     topamax       Social History     Social History     Marital status: Single     Spouse name: N/A     Number of children: N/A     Years of education: N/A     Occupational History     Not on file.     Social History Main Topics     Smoking status: Never Smoker     Smokeless tobacco: Never Used     Alcohol use No     Drug use: No     Sexual activity: No     Other Topics Concern     Not on file     Social History Narrative        .  Not working.   No kids.       Family History   Problem Relation Age of Onset     Diabetes Mother      Hypothyroidism Mother      Cancer Mother      Sleep apnea Mother      Snoring Mother      Heart disease Father      Diabetes Sister      Edema Sister      No Medical Problems Sister      Diabetes Maternal Grandfather        Review of Systems:  Bushra Buck no new numbess, tingling or weakness, redness or rashes, fevers, new masses, abdominal bloating or discomfort, unexplained weight loss, increased pain, new ulcers, shortness of breath and chest pain  Full 12 point review of systems was completed.    Imaging:  US VENOUS LEG RIGHT  4/3/2016 1:58  AM  INDICATION: Increasing peripheral leg pain and edema  TECHNIQUE: Routine exam without and with compression, augmentation, and duplex utilizing 2D gray-scale imaging, Doppler interrogation with color-flow and spectral waveform analysis.  COMPARISON: None.  FINDINGS: The common femoral, femoral, popliteal, and segmentally visualized calf veins were evaluated. The opposite CFV was also included in the evaluation.  Right leg veins are negative for deep venous thrombosis. No popliteal cysts.  IMPRESSION:   CONCLUSION:  1. Right leg veins are negative for DVT although exam is limited due to body habitus and edema.    XR FOOT RIGHT 3 OR MORE VWS  4/3/2016 2:04 AM  INDICATION: Foot pain.   COMPARISON: None.  FINDINGS: Hammertoe deformity. Plantar heel spur. No fracture.    Physical Exam:  Vitals:    04/20/17 1327   BP: 120/67   Pulse: 68   Resp: 20   Temp: 98.6  F (37  C)    There is no height or weight on file to calculate BMI.    Circumferential measures:    Vasc Edema 8/17/2016 8/29/2016 11/10/2016 2/9/2017 4/20/2017   Right just above MTP 27.5 27.2 27 26.8 27.5   Right Ankle 45 38.3 36.4 36.4 37.5   Right Widest Calf 90 78.8 71 66 66   Right Thigh Up 10cm - 74 74 74 74   Left - just above MTP 31.5 29.7 31.5 30.5 32.6   Left Ankle 42.5 41.5 39 36 35   Left Widest Calf 85 75 73 65 65   Left Thigh Up 10cm - 74.5 74 74 74     Measures are stable.     General:  37 y.o. female in no apparent distress.  Alert and oriented x 3.  Cooperative. Affect normal.     Integumentary: The ulceration on the medial upper quadrant of the left breast has healed.  Legs show normal skin with significant softening and decreased fibrosis. No rubor or calor.  No open ulcerations.  No pain to palpation.       Temitope Odell MD, ABWMS, FACCWS, [facility]  Medical Director Wound Care and Lymphedema  [facility]  [phone]

## 2021-06-10 NOTE — TELEPHONE ENCOUNTER
I called Bushra on 08/26 to check in and see how her legs were doing. She let me know that her legs are red again and feel hot to the touch. She is going to call her doctor to get it checked out because she believes she has another infection. An in-home lymphedema therapist is stopping by as well today that she will show her legs to. I let her know I would follow up with her again in a few weeks to see how she is doing then.

## 2021-06-10 NOTE — TELEPHONE ENCOUNTER
Updated [facility] ED that Dr. Estrella recommended patient go to the ED due to increased leg swelling and shortness of breath. Patient showed up to the clinic lobby after seeing Dior at [facility]. Dior said her left leg is measuring 7 inches bigger than the last time.

## 2021-06-10 NOTE — PROGRESS NOTES
History of present illness:  Bushra Buck presents for follow-up on her weight loss assistance.  She had entered our program last year and was not deemed a good bariatric surgical candidate due to her recurrent psychoses/mental health problems that have required periodic hospitalization and heavy medication.  Her lymphedema was such that her specialist was hopeful that additional weight loss would be helpful, she's been working very diligently over the last several months with her compression pneumatic device, using it 2 hours daily and combined with getting off some of her previous psychiatric medications last fall she successfully dropped 45 pounds over the last several months with these interventions.  She is at the mercy of her sister who prepares the food for her but she's been trying to get good protein sources, reaching for chicken mostly along with vegetables and trying to limit her sweets whenever possible.  She is pretty limited in her exercise capacity at this point in time with her lymphedema problems but notes that recently she is no longer needed cane or walker to get around as the weight has come off.  This far as medications to assist or weight loss she's not a good candidate for most things either because of her underlying mental illness or due to potential drug interactions.  At her last visit we had mentioned following up with bariatric programs that offer endoscopic balloon therapies but she was not contacted as expected by either the [facility] program or [place] [facility] and she requested phone numbers today so she could call them to investigate further.      Plan:  1.  She was applauded on her successful weight loss of 45 pounds which I suspect is mostly related to her increased compliance with her lymphedema care.  Dietary goals were reviewed with her and her basal metabolic rate of roughly 2300 alexsandra daily should allow her to lose good amounts of weight with a  [id number] diet.    2. Follow up if interested with [facility] or [facility] to see if endoscopic balloon interventions may be appropriate.    3. If not following up with outside program can recheck with me in 3 months.    4. Continuing to follow up with her prolactinoma history outside [facility]. Follow up brain MRI 1/17 showed:Impression:    1. Postsurgical changes from transsphenoidal resection of pituitary  adenoma without evidence of recurrence.  2. Single hyperintensity on FLAIR in the right semiovale similar to  prior study of questionable clinical significance.  3. Stable mild dural thickening and enhancement over the convexities  which is most likely postsurgical in nature. This could also be  related to CSF leak or intracranial hypotension.   From [facility].      Past Medical History:   Diagnosis Date     Bipolar 1 disorder      Dyslipidemia      GERD (gastroesophageal reflux disease)      Hypothyroid      Lymphedema of lower extremity 2008     Post traumatic stress disorder (PTSD)      Prolactinoma transsphenoid approach 2000.     Schizoaffective disorder, bipolar type        Current Outpatient Prescriptions:      cabergoline (DOSTINEX) 0.5 mg tablet, Take 0.5 mg by mouth once a week. , Disp: , Rfl:      divalproex (DEPAKOTE) 500 MG 24 hr tablet, Take 1,000 mg by mouth daily. , Disp: , Rfl: 2     doxycycline (VIBRA-TABS) 100 MG tablet, Take 1 tablet by mouth daily., Disp: , Rfl: 1     fenofibrate (TRICOR) 145 MG tablet, Take 1 tablet by mouth daily., Disp: , Rfl: 3     furosemide (LASIX) 40 MG tablet, Take 1 tablet (40 mg total) by mouth daily. For chronic edema, Disp: , Rfl: 0     levothyroxine (SYNTHROID, LEVOTHROID) 125 MCG tablet, Take 1 tablet (125 mcg total) by mouth bedtime. For hypothyroidism, Disp: , Rfl: 0     loxapine (LOXITANE) 25 MG capsule, Take 1 capsule (25 mg total) by mouth 2 (two) times a day., Disp: 60 capsule, Rfl: 0     MULTIVITAMIN tablet, Take 1 tablet by mouth  daily., Disp: , Rfl: 3     omeprazole (PRILOSEC) 20 MG capsule, Take 1 capsule (20 mg total) by mouth Daily before breakfast. For GERD, Disp: , Rfl: 0     OYSTER SHELL CALCIUM 500 500 mg calcium (1,250 mg) tablet, Take 1 tablet by mouth daily., Disp: , Rfl: 11     /59  Pulse 94  Temp 98.8  F (37.1  C)  Resp 20  Ht 5' (1.524 m)  Wt (!) 356 lb (161.5 kg)  BMI 69.53 kg/m2  EXAM:  In general she is ambulating much more easily than on previous visits, no longer using a cane or walker.  She has her compression wraps on her lower extremities.  These were not removed.  Cardiovascular exam reveals regular rate and rhythm without audible murmurs, lung exam is clear to auscultation with good excursion.  Her abdomen is nondistended.  Extremities are without tremor.  Her psychological exam reveals a pleasant happy and cooperative, energetic affect without manic symptoms.    Review of systems:, No recent illnesses, no headaches or chest pain, she is following up with endocrinology regarding her hyperprolactinemia and has restarted her Dostinex in the last several months.

## 2021-06-10 NOTE — PROGRESS NOTES
Bushra came in on 7/29/2020 after Luciana Luke in the [facility] told me she would not be coming in today and needed to reschedule due to the swelling in her left leg. Bushra wanted to show me the swelling. I took a measurement and her leg is 88 cm around the widest calf which is about 20cm larger than the last time I measured her leg. There is significant edema to this leg and her Velcro is bunched at her ankles because it cannot fit at the top. I recommended she stop in the vascular center to see if they have short stretch for her to try to wear for now. I told her if they don't have any other advice or recommendations that she should go to the urgent care on the first floor due to her reported SOB that she says happened to her last time she had cellulitis. There is no redness or warmth to the area though unlike her previous cellulitis infections.    I will follow up with her in a few weeks to check in and see if her swelling is down so we can reschedule for new measurements.

## 2021-06-10 NOTE — TELEPHONE ENCOUNTER
Advised patient to monitor leg closely for signs of infection and call the clinic right away if leg worsens, or go to urgent care if after hours. Patient will do short stretch bandaging on leg for now and reschedule her appointment with [facility].

## 2021-06-12 NOTE — PROGRESS NOTES
"Non-surgical Weight Loss Initial Diet Evaluation     Assessment:  Pt is a 37 y.o. female being seen today for non-surgical RD nutritional evaluation. Today we reviewed current eating habits and level of physical activity, and instructed on the changes that are required for successful weight loss outcomes.    Personal Goals: Pt is pre-diabetic and desires to prevent DM prevent from happening. Pt's cousin present during visit. Pt originally interested in bariatric surgery, however not deemed a good candidate from psychology. Pt reports having thyroid issues, which she attributes to weight gain. Pt doesn't want to gain the amount of weight, like her mother, to the point where she can't walk.   Personal goal weight: 160 lb.     Phentermine: Pt uses victoza    Pt's initial weight: 350 lb  Today's weight: 357 lb  IBW: 102 lbs    Estimated RMR ([place]-St Jeor equation): 2233 calories  Protein requirements (.5grams to .9grams per pound IBW, 20-30% of calories, minimum of 60-80gm per day):  50-90 grams     Food allergies, intolerances, Hinduism customs: none     Vitamins/Mineral Supplementation:  MVI, calcium.      Biggest struggle with weight loss: lack of movement.     Who does the grocery shopping for your household? Pt's cousin and wife   Who prepares your meals at home? Pt's cousin and wife  -Pt reports struggling with finances and can't afford \"healthy\" food.     Diet Recall/Time:   Breakfast: bowl of sugared cereal, glass of chocolate milk  Am Snack: none   Lunch: [employer] meal delivered to home (meal, vegetable, bread, fruit, milk)   Pm snack: pear   Dinner: pizza, salad (at restaurant)    HS Snack: none     Fried Foods: 1 times per week    Meals per week away from home: 0-2x/week    Sit down: occassionally   Fast Food: a few times a week   Take Out: pizza  Delivery: none  Buffet: none   Cafeteria: none  Specialty Coffee: none   Ice Cream/FrozenYogurt/Bakery: doughnut 1x/week   Comments: none     Recommended limiting " eating out to no more than 2x/week.  Patient and I reviewed the importance of eating three consistent meals per day; as well as meal timing to be spaced 4-5 hours apart.  Snack choices: 100-150 calories (1-2x/day if physically hungry), incorporating a fruit/vegetable w/ protein source.    Portion Sizes problematic? yes per patient/diet recall  Encouraged slowing meal times down, 20-30 minutes, chewing to applesauce consistency.   To aid in proper portion control and slow meal time down discussed consuming meals off smaller plates, use toddler/children utensils and set utensils down after each bite.    Protein, vegetables/fruits, carbohydrates:   Reviewed lean protein sources today. Recommended consuming 20gm protein at 3 meals daily.  The patient and I discussed the importance of including lean/low fat protein at each meal and limiting carbohydrate intake to less than 25% of plate volume.     Beverages (Type/Oz. per day)  Water: 0-8 oz (pt only likes purified water- not from faucet)  Coffee: none   Tea: none   Milk: skim, chocolate (1-2 cups)   Regular soda: 1 bottle/day   Diet soda: 1 bottle/day   Juice: none   Chirag-Aid/lemonade/etc: none   Alcohol: none     Discussed the importance of adequate hydration and the goal of 64+ oz of fluid daily.   The patient understands the importance of avoiding all alcoholic and sweetened drinks, and instead choosing 64 oz plain water.    Exercise  Pt is trying to get up and walk.     Pt's understands that 45-60 minutes of daily activity is an important part of weight loss success.   Encouraged pt to incorporate upper body strength training exercise, even if its lifting soup cans while watching tv at night, doing push ups/sit-ups, and abdominal work.    PES statement:    1. (NI-1.3)Excessive energy intake related to Food and nutrition related knowledge deficit concerning excessive energy/oral intake as evidenced by Intake of high caloric density foods/beverages (juice, soda) at  meals and/or snacks; Estimated intake that exceeds estimated daily energy intake;  and BMI 69.     Intervention  Discussion:  1. Educated pt on Eat Better, Move More, Live Well: Non-surgical Weight Loss Handout  2. Recommended to consume 20-30 gm protein at 3 meals daily. 60-80 grams daily total.  3. Educated pt on food labels: keeping total fat grams <10, total sugar grams <10, fiber >3gm per serving.   4. Reviewed Plate Method   5. Discussed the benefits of staying hydrated and aiming for 64 oz/da.y  6. Discussed the benefits of exercise for extra weight loss.     Instructions/Goals:   1. Include 20-30 gm protein at each meal.  2. Increase vegetable/fruit intake, by having a vegetable or fruit with each meal daily. Recommended pt to increase vegetable/fruit intake to 4-5 servings daily.  3. Increase fluid intake to 64oz daily: choose plain or calorie/alcohol-free beverages.  4. Incorporate daily structured activity, 45-60 minutes most days of the week  5. Read food labels more consistently: keeping total fat grams <10, total sugar grams <10, fiber >3gm per serving.  6. Practice plate method: 1/2 plate lean/low fat protein source, vegetable/fruit, <25% of plate complex carbohydrates.  7. Practice eating off of smaller plates/bowls, chewing to applesauce consistency, taking 20-30 minutes to eat in a calm/relaxed environment without distractions of tv/email/cell phone.    Handouts Provided:  Eat Better, Move More, Live Well: Non-surgical Weight Loss Handout    Monitor/Evaluation:    Pt will f/u in one month with bariatrician and RD.    Plan for next visit with RD:  Review protein sources  Discuss supplement use  Review carbohydrates/fiber  Exercise      Time In: 2:00pm  Time Out: 3:00pm      ABN signed: Yes

## 2021-06-12 NOTE — PROGRESS NOTES
"Bushra Buck is a 40 y.o. female who is being evaluated via a billable telephone visit.      The patient has been notified of following:     \"This telephone visit will be conducted via a call between you and your physician/provider. We have found that certain health care needs can be provided without the need for a physical exam.  This service lets us provide the care you need with a short phone conversation.  If a prescription is necessary we can send it directly to your pharmacy.  If lab work is needed we can place an order for that and you can then stop by our lab to have the test done at a later time.    Telephone visits are billed at different rates depending on your insurance coverage. During this emergency period, for some insurers they may be billed the same as an in-person visit.  Please reach out to your insurance provider with any questions.    If during the course of the call the physician/provider feels a telephone visit is not appropriate, you will not be charged for this service.\"    Patient has given verbal consent to a Telephone visit? Yes    What phone number would you like to be contacted at? [phone]    Patient would like to receive their AVS by AVS Preference: Mail a copy.    Follow up visit: non surgical weight loss.  Dx w/ type II diabetes 1-2 months ago, working with diabetic dietician.  Reports blood sugars around 200mg/dl. Started lipitor, metformin, baby asa. Still using the Trulicity every week.  She reports the metformin. Noticed pandemic weight gain of 30-40 lbs.     Plan:  1. Aiming for 25 grams of protein at 3 meals daily, eating a meal about every 5 hours and let your supper be your last food of the day.     2. To reduce some of the loose stools with your metformin, take your metformin at the end of the your breakfast and supper.     3. Continue following up with your diabetes educator and remember that food can be medicine or food can be poison. Aiming for good protein " intake and fibrous vegetables at 2 meals or more daily will help your blood sugars. Eat your protein first, vegetables second and starches at the end of the meal and that may further help reduce your after meal blood sugars.        Phone call duration: 15 minutes    Georgina Brandt, CMA

## 2021-06-12 NOTE — TELEPHONE ENCOUNTER
Pt called in ask medication question.  The question is answered.  No there concern at this time.      Romeo Gomes RN, [facility] Triage/Med Refill 10/30/2020 9:17 PM

## 2021-06-12 NOTE — PROGRESS NOTES
Here for non-surgical f/u and med check.  See flowsheet.    Ayesha Martinez RN, CBN  [facility]  P [phone]  F [phone]

## 2021-06-12 NOTE — PROGRESS NOTES
Bariatric Clinic Follow-Up Visit:    Bushra Buck is a 37 y.o.  female with Body mass index is 69.53 kg/(m^2).  presenting here today for follow-up on non-surgical efforts for weight loss. Original Intake visit occurred on 7/25/16 with a weight of 350 lbs.  Along with diet and behavior changes, she has been using Victoza since last month to assist her weight loss goals.  See her intake visit notes for details on identified contributors to weight gain in the past.    Weight:   Wt Readings from Last 2 Encounters:   08/21/17 (!) 356 lb (161.5 kg)   07/24/17 (!) 357 lb (161.9 kg)    pounds  Height: 5' (1.524 m) (8/21/2017 12:36 PM)  Initial Weight: 250 lbs (8/18/2017  2:00 PM)  Weight: 356 lb (161.5 kg) (8/21/2017 12:36 PM)  Weight loss from initial: -7 (7/24/2017  1:35 PM)  % Weight loss: -2 % (7/24/2017  1:35 PM)  BMI (Calculated): 69.5 (8/21/2017 12:36 PM)  SpO2: 96 % (10/4/2016  8:10 AM)    Comorbidities:  Patient Active Problem List   Diagnosis     Severe bipolar affective disorder with psychosis     Hypertension     History of pituitary surgery     Schizophrenia, schizoaffective, chronic with acute exacerbation     Schizoaffective disorder, bipolar type     Morbid obesity with BMI of 70 and over, adult     Lymphedema     Recurrent cellulitis of lower leg     Venous hypertension of lower extremity, bilateral     Hypothyroidism, unspecified hypothyroidism type     Complex care coordination     GERD (gastroesophageal reflux disease)     Prolactinoma     Delusional disorder     Psychosis     Perianal irritation     Acquired valgus deformity of both ankles     Flat feet, bilateral     Foot deformity, bilateral     Ulcer of abdomen wall, limited to breakdown of skin       Current Outpatient Prescriptions:      cabergoline (DOSTINEX) 0.5 mg tablet, Take 0.5 mg by mouth 2 (two) times a week., Disp: , Rfl:      diphenhydrAMINE (BENADRYL) 25 mg capsule, Take 25 mg by mouth as needed., Disp: , Rfl:      divalproex  "(DEPAKOTE) 500 MG 24 hr tablet, Take 1,000 mg by mouth daily. , Disp: , Rfl: 2     doxycycline (VIBRA-TABS) 100 MG tablet, Take 1 tablet by mouth daily., Disp: , Rfl: 1     EPINEPHrine (EPIPEN) 0.3 mg/0.3 mL atIn, Inject 0.6 mL into the shoulder, thigh, or buttocks as needed. , Disp: , Rfl: 0     fenofibrate (TRICOR) 145 MG tablet, Take 1 tablet by mouth daily., Disp: , Rfl: 3     furosemide (LASIX) 40 MG tablet, Take 1 tablet (40 mg total) by mouth daily. For chronic edema, Disp: , Rfl: 0     levothyroxine (SYNTHROID, LEVOTHROID) 112 MCG tablet, Take 1 tablet by mouth daily., Disp: , Rfl: 3     liraglutide (VICTOZA) 0.6 mg/0.1 mL (18 mg/3 mL) PnIj injection, Start 0.1ml injected daily for one week, then increase to 0.2ml (1.2mg) daily for a week and then 0.3 ml daily (1.8mg) daily if tolerated., Disp: 3 mL, Rfl: 3     loxapine (LOXITANE) 10 MG capsule, Take 1 capsule by mouth at bedtime., Disp: , Rfl: 2     MULTIVITAMIN tablet, Take 1 tablet by mouth daily., Disp: , Rfl: 3     omeprazole (PRILOSEC) 20 MG capsule, Take 1 capsule (20 mg total) by mouth Daily before breakfast. For GERD, Disp: , Rfl: 0     OYSTER SHELL CALCIUM 500 500 mg calcium (1,250 mg) tablet, Take 1 tablet by mouth daily., Disp: , Rfl: 11     pen needle, diabetic 33 gauge x 5/32\" Ndle, Inject 1 each under the skin daily. For use with Victoza injections, Disp: 90 each, Rfl: 0      Interim: Since our last visit, she has essentially maintained her weight. She's tolerating the Victoza without ill effects, met with our dietician but is still having problems finding quality protein and timing of meals.     Plan:   1.  Diet: [id number] kcal per day goal, try to optmize protein intake to about 100 oz daily  2. Exercise: limited capacity due to lymphedema and transportation options  3. Medication: victoza going well, refilled Rx 3 days ago, will check on progress/efficacy in 2 months.  4.  Stress Reduction:  Doing well.   5. Goals: 10% weight loss is 35 " lbs, 315 lbs.      We discussed [facility] Bariat[facility] Basics including:  -eating 3 meals daily  -eating protein first  -eating slowly, chewing food well  -avoiding/limiting calorie containing beverages  -We discussed the importance of restorative sleep and stress management in maintaining a healthy weight.  -We discussed the National Weight Control Registry healthy weight maintenance strategies and ways to optimize metabolism.  -We discussed the importance of physical activity including cardiovascular and strength training in maintaining a healthier weight and explored viable options.    Most recent labs:  Lab Results   Component Value Date    WBC 10.7 09/16/2016    HGB 14.1 09/16/2016    HCT 42.9 09/16/2016     (H) 09/16/2016     09/16/2016     Lab Results   Component Value Date    CHOL 197 06/19/2017     Lab Results   Component Value Date    HDL 28 (L) 06/19/2017     Lab Results   Component Value Date    LDLCALC  06/19/2017      Comment:      Invalid, Triglycerides >400     Lab Results   Component Value Date    TRIG 520 (H) 06/19/2017     No components found for: CHOLHDL  Lab Results   Component Value Date    ALT 81 (H) 06/19/2017    AST 73 (H) 06/19/2017    ALKPHOS 63 06/19/2017    BILITOT 0.4 06/19/2017     Lab Results   Component Value Date    HGBA1C 5.4 09/20/2016     Lab Results   Component Value Date    ZPYGMYMZ33 623 09/17/2015     Lab Results   Component Value Date    KERHTVWM57TY 10.2 (L) 09/20/2016     No results found for: FERRITIN  No results found for: PTH  Lab Results   Component Value Date    34372 218 (H) 09/17/2015     No results found for: 7597  Lab Results   Component Value Date    TSH 5.66 (H) 12/27/2016     No results found for: TESTOSTERONE    DIETARY HISTORY    Positive Changes Since Last Visit: met with dietician  Struggling With: consistency in meals, stability in her care staff (cousin now cooking for her)    Knowledgeable in [place] [employer]: slowly improving, not able to  afford a lot of quality proteins  Getting Adequate Protein: no  Sleeping 7-8 hours/day na  Stress management na    PHYSICAL ACTIVITY PATTERNS:  Cardiovascular: no  Strength Training: no    REVIEW OF SYSTEMS  GENERAL/CONSTITUTIONAL:  No illness recent  HEENT:   na  CARDIOVASCULAR:   no pain  PULMONARY:   no medina  GASTROINTESTINAL:  No abdominal pains  UROLOGIC:  na  NEUROLOGIC:    PSYCHIATRIC:  Stable moods  MUSCULOSKELETAL/RHEUMATOLOGIC   lymphedema wraps getting a little loose  ENDOCRINE:  na  DERMATOLOGIC:  na    PHYSICAL EXAM:  Vitals: Ht 5' (1.524 m)  Wt (!) 356 lb (161.5 kg)  BMI 69.53 kg/m2  Height: 5' (1.524 m) (8/21/2017 12:36 PM)  Initial Weight: 250 lbs (8/18/2017  2:00 PM)  Weight: 356 lb (161.5 kg) (8/21/2017 12:36 PM)  Weight loss from initial: -7 (7/24/2017  1:35 PM)  % Weight loss: -2 % (7/24/2017  1:35 PM)  BMI (Calculated): 69.5 (8/21/2017 12:36 PM)  SpO2: 96 % (10/4/2016  8:10 AM)    GEN: Pleasant, well groomed, in no acute distress  HEENT: PEERLA, EOMI, airway patent .  NECK:  no anterior/supraclavicular lymphadenopathy, thyroid normal   HEART: Rhythm regular, rate regular, no murmur   LUNGS: Clear without crackles or wheezes. No cough.  ABDOMEN: morbid obesity.  EXTREMITIES: lymphedema wraps..  NEURO: Alert and Oriented X3, normal gait and speech.  SKIN: No visible rashes.        25 minutes was spent in direct consultation, with over 50% of it spent in counseling regarding their plan for excess weight reduction and health modification.  Carlos Bowling MD  [facility]  12:39 PM

## 2021-06-12 NOTE — PROGRESS NOTES
Here for f/u non-surgical weight loss.  See flowsheet.    Ayesha Martinez RN, CBN  [facility]  P [phone]  F [phone]

## 2021-06-12 NOTE — PROGRESS NOTES
Bariatric Clinic Follow-Up Visit:    Bushra Buck is a 37 y.o.  female with Body mass index is 69.72 kg/(m^2).  presenting here today for follow-up on non-surgical efforts for weight loss. Original Intake visit occurred on 9/17/15 when she entered the surgical weight loss program, she was not able to pass the psychosocial aspects of the program, she had an intake with a weight of 365 lbs and BMI of 68.3 and hit her maximum of 401 lbs, last year.  She's maintaining 44 lbs of weight loss but her weight has crept up a few pounds since our last visit and she's now open to a trial of weight loss medication.  Given her psychiatric history, there are not many options but there is some research showing mitigation of some of the weight gain potential of psychiatric medications with 3mg of qhs melatonin and GLP 1 agonists.  Along with diet and behavior changes, she has been using no medication at this time to assist her weight loss goals.  See her intake visit notes for details on identified contributors to weight gain in the past.    Weight:   Wt Readings from Last 2 Encounters:   07/24/17 (!) 357 lb (161.9 kg)   04/10/17 (!) 356 lb (161.5 kg)    pounds  Height: 5' (1.524 m) (7/24/2017  1:35 PM)  Initial Weight: 350 lbs (7/24/2017  1:35 PM)  Weight: 357 lb (161.9 kg) (7/24/2017  1:35 PM)  Weight loss from initial: -7 (7/24/2017  1:35 PM)  % Weight loss: -2 % (7/24/2017  1:35 PM)  BMI (Calculated): 69.7 (7/24/2017  1:35 PM)  SpO2: 96 % (10/4/2016  8:10 AM)    Comorbidities:  Patient Active Problem List   Diagnosis     Severe bipolar affective disorder with psychosis     Hypertension     History of pituitary surgery     Schizophrenia, schizoaffective, chronic with acute exacerbation     Schizoaffective disorder, bipolar type     Morbid obesity with BMI of 70 and over, adult     Lymphedema     Recurrent cellulitis of lower leg     Venous hypertension of lower extremity, bilateral     Hypothyroidism, unspecified  hypothyroidism type     Complex care coordination     GERD (gastroesophageal reflux disease)     Prolactinoma     Delusional disorder     Psychosis     Perianal irritation     Acquired valgus deformity of both ankles     Flat feet, bilateral     Foot deformity, bilateral     Ulcer of abdomen wall, limited to breakdown of skin       Current Outpatient Prescriptions:      cabergoline (DOSTINEX) 0.5 mg tablet, Take 0.5 mg by mouth 2 (two) times a week., Disp: , Rfl:      diphenhydrAMINE (BENADRYL) 25 mg capsule, Take 25 mg by mouth as needed., Disp: , Rfl:      divalproex (DEPAKOTE) 500 MG 24 hr tablet, Take 1,000 mg by mouth daily. , Disp: , Rfl: 2     doxycycline (VIBRA-TABS) 100 MG tablet, Take 1 tablet by mouth daily., Disp: , Rfl: 1     fenofibrate (TRICOR) 145 MG tablet, Take 1 tablet by mouth daily., Disp: , Rfl: 3     furosemide (LASIX) 40 MG tablet, Take 1 tablet (40 mg total) by mouth daily. For chronic edema, Disp: , Rfl: 0     levothyroxine (SYNTHROID, LEVOTHROID) 112 MCG tablet, Take 1 tablet by mouth daily., Disp: , Rfl: 3     loxapine (LOXITANE) 10 MG capsule, Take 1 capsule by mouth at bedtime., Disp: , Rfl: 2     MULTIVITAMIN tablet, Take 1 tablet by mouth daily., Disp: , Rfl: 3     omeprazole (PRILOSEC) 20 MG capsule, Take 1 capsule (20 mg total) by mouth Daily before breakfast. For GERD, Disp: , Rfl: 0     OYSTER SHELL CALCIUM 500 500 mg calcium (1,250 mg) tablet, Take 1 tablet by mouth daily., Disp: , Rfl: 11     EPINEPHrine (EPIPEN) 0.3 mg/0.3 mL atIn, Inject 0.6 mL into the shoulder, thigh, or buttocks as needed. , Disp: , Rfl: 0      Interim: Since our last visit, she has gained a few lobs, is dealing with a hot potato burn to her right breast (ointment rx'd today as wound looks mildly irritated). She investigated endoluminal balloon therapy but she couldn't afford the $8000 pricetag.  She hasn't been hospitalized since last September.    Plan:   1.  Diet: 1500-1600kcal per day goal  2. Exercise:  walking some  3. Medication: we'll see if we can get GLP1 agonist approved for her, rx for Victoza provided given her prediabetes (BS 150s on nonfasting level last month) and BMI of nearly 70 with contraindications for most other weight loss drugs. She has come down in dose recently with some of her psychiatric drugs which is helpful.  4.  Stress Reduction:  Doing well.  5. Goals: Under 325 lbs would be about a 10% weight loss.  6. Bacitracin rx sent for right breast burn, advised to follow up with PCP if any worsening/cellulitis. Takes prophylactic doxycycline already.  7. Portion control continues to be a struggle. Will refer her to dietician to work on identifying portion sizes.      We discussed [facility]ariat[facility] Basics including:  -eating 3 meals daily  -eating protein first  -eating slowly, chewing food well  -avoiding/limiting calorie containing beverages  -We discussed the importance of restorative sleep and stress management in maintaining a healthy weight.  -We discussed the National Weight Control Registry healthy weight maintenance strategies and ways to optimize metabolism.  -We discussed the importance of physical activity including cardiovascular and strength training in maintaining a healthier weight and explored viable options.    Most recent labs:  Lab Results   Component Value Date    WBC 10.7 09/16/2016    HGB 14.1 09/16/2016    HCT 42.9 09/16/2016     (H) 09/16/2016     09/16/2016     Lab Results   Component Value Date    CHOL 197 06/19/2017     Lab Results   Component Value Date    HDL 28 (L) 06/19/2017     Lab Results   Component Value Date    LDLCALC  06/19/2017      Comment:      Invalid, Triglycerides >400     Lab Results   Component Value Date    TRIG 520 (H) 06/19/2017     No components found for: CHOLHDL  Lab Results   Component Value Date    ALT 81 (H) 06/19/2017    AST 73 (H) 06/19/2017    ALKPHOS 63 06/19/2017    BILITOT 0.4 06/19/2017     Lab Results   Component  Value Date    HGBA1C 5.4 09/20/2016     Lab Results   Component Value Date    NCGBPBBZ82 623 09/17/2015     Lab Results   Component Value Date    ABKHGGTI57PR 10.2 (L) 09/20/2016     No results found for: FERRITIN  No results found for: PTH  Lab Results   Component Value Date    91915 218 (H) 09/17/2015     No results found for: 7597  Lab Results   Component Value Date    TSH 5.66 (H) 12/27/2016     No results found for: TESTOSTERONE    DIETARY HISTORY    Positive Changes Since Last Visit: reaching point where she knows she's struggling with portion control, open to visit with dietician.  Struggling With: portion control    Knowledgeable in [place] [employer]: no  Getting Adequate Protein: no  Sleeping 7-8 hours/day often  Stress management doing well    PHYSICAL ACTIVITY PATTERNS:  Cardiovascular: some walking  Strength Training: na    REVIEW OF SYSTEMS  GENERAL/CONSTITUTIONAL:  No illness  HEENT:   na  CARDIOVASCULAR:   no chest pain  PULMONARY:   na  GASTROINTESTINAL:  na  UROLOGIC:  na  NEUROLOGIC:  na  PSYCHIATRIC:  stable  MUSCULOSKELETAL/RHEUMATOLOGIC   na  ENDOCRINE:  Long fam hx of DMII, last nonfasting glucose in prediabetic range.  DERMATOLOGIC:  Burn after hot mashed potatoes fell on right breast a few days ago.    PHYSICAL EXAM:  Vitals: /56  Pulse 92  Resp 18  Ht 5' (1.524 m)  Wt (!) 357 lb (161.9 kg)  BMI 69.72 kg/m2  Height: 5' (1.524 m) (7/24/2017  1:35 PM)  Initial Weight: 350 lbs (7/24/2017  1:35 PM)  Weight: 357 lb (161.9 kg) (7/24/2017  1:35 PM)  Weight loss from initial: -7 (7/24/2017  1:35 PM)  % Weight loss: -2 % (7/24/2017  1:35 PM)  BMI (Calculated): 69.7 (7/24/2017  1:35 PM)  SpO2: 96 % (10/4/2016  8:10 AM)    GEN: Pleasant, well groomed, in no acute distress  HEENT: PEERLA, EOMI, airway patient. .  NECK:  no anterior/supraclavicular lymphadenopathy, thyroid normal   HEART: Rhythm regular, rate regular, no murmur. Right breast has quarter sized burn with granulation tissue  centrally c/w recent superficial 2nd degree burn.  LUNGS: Clear without crackles or wheezes. No cough.  ABDOMEN: obese..  EXTREMITIES: walks slowly under her own power..  NEURO: Alert and Oriented X3, normal gait and baseline slightly halting speech..  SKIN: No visible rash other than small burn noted above.        25 minutes was spent in direct consultation, with over 50% of it spent in counseling regarding their plan for excess weight reduction and health modification.  Carlos Bowling MD  [facility]  1:43 PM

## 2021-06-12 NOTE — TELEPHONE ENCOUNTER
Patient called to update Dr. Odell that she was recently diagnosed with Type II Diabetes by PCP Dr. Azul. She has been placed on Metformin and atorvastatin.

## 2021-06-12 NOTE — PATIENT INSTRUCTIONS - HE
Plan:  1. Aiming for 25 grams of protein at 3 meals daily, eating a meal about every 5 hours and let your supper be your last food of the day.     2. To reduce some of the loose stools with your metformin, take your metformin at the end of the your breakfast and supper.     3. Continue following up with your diabetes educator and remember that food can be medicine or food can be poison. Aiming for good protein intake and fibrous vegetables at 2 meals or more daily will help your blood sugars. Eat your protein first, vegetables second and starches at the end of the meal and that may further help reduce your after meal blood sugars.        LEAN PROTEIN SOURCES  Getting 20-30 grams of protein, 3 meals daily, is appropriate for most people, some need more but more than about 40 grams per meal is not useful.  General rule is drinking one ounce of water per gram of protein eaten over the course of the day:  70 grams of protein each day, drink 70 oz of water.  Protein Source Portion Calories Grams of Protein                           Nonfat, plain Greek yogurt    (10 grams sugar or less) 3/4 cup (6 oz)  12-17   Light Yogurt (10 grams sugar or less) 3/4 cup (6 oz)  6-8   Protein Shake 1 shake 110-180 15-30   Skim/1% Milk or lactose-free milk 1 cup ( 8 oz)  8   Plain or light, flavored soymilk 1 cup  7-8   Plain or light, hemp milk 1 cup 110 6   Fat Free or 1% Cottage Cheese 1/2 cup 90 15   Part skim ricotta cheese 1/2 cup 100 14   Part skim or reduced fat cheese slices 1 ounce 65-80 8     Mozzarella String Cheese 1 80 8   Canned tuna, chicken, crab or salmon  (canned in water)  1/2 cup 100 15-20   White fish (broiled, grilled, baked) 3 ounces 100 21   [place]/Tuna (broiled, grilled, baked) 3 ounces 150-180 21   Shrimp, Scallops, Lobster, Crab 3 ounces 100 21   Pork loin, Pork Tenderloin 3 ounces 150 21   Boneless, skinless chicken /turkey breast                          (broiled, grilled, baked) 3  ounces 120 21   [place], [place], [place], and Venison 3 ounces 120 21   Lean cuts of red meat and pork (sirloin,   round, tenderloin, flank, ground 93%-96%) 3 ounces 170 21   Lean or Extra Lean Ground Turkey 1/2 cup 150 20   90-95% Lean [place] Burger 1 abby 140-180 21   Low-fat casserole with lean meat 3/4 cup 200 17   Luncheon Meats                                                        (turkey, lean ham, roast beef, chicken) 3 ounces 100 21   Egg (boiled, poached, scrambled) 1 Egg 60 7   Egg Substitute 1/2 cup 70 10   Nuts (limit to 1 serving per day)  3 Tbsp. 150 7   Nut [place] (peanut, almond)  Limit to 1 serving or less daily 1 Tbsp. 90 4   Soy Burger (varies) 1  15   Garbanzo, Black, [place] Beans 1/2 cup 110 7   Refried Beans 1/2 cup 100 7   Kidney and Lima beans 1/2 cup 110 7   Tempeh 3 oz 175 18   Vegan crumbles 1/2 cup 100 14   Tofu 1/2 cup 110 14   Chili (beans and extra lean beef or turkey) 1 cup 200 23   Lentil Stew/Soup 1 cup 150 12   Black Bean Soup 1 cup 175 12         Example Meal Plan for a 2731-8229 Calorie Diet:    In order to fuel your weight loss properly and avoid hunger-induced overeating later in the day, for your height and weight, you will enjoy the most success by following the diet below or similar with adjustments based on your particular tastes and preferences.  Exercise may influence speed, amount of weight loss further.     I recommend getting into a meal routine and keeping it similar day to day in the beginning so you don t have to think too hard about what you re going to make/eat.  Keep snacks healthy, ideally containing protein and some vegetables.  Non-processed food is preferable to packaged items.  Eat at least a few crunchy green vegetables if having a snack, which should be 2-3 hours after your mealtimes(prepare these ahead of time for ease of use).  Drink 64 oz -80 oz of water daily for most, some of you will need more and we'll discuss it at your visit if that is the  case.  When changing our diet and reducing our intake,  we can often mistake thirst for hunger or just have some grazing habits we have to break ('bored/mindless eating') and a glass of water and reconsideration of our hunger is often all that is needed.  Having the urge is not the problem, but watching it pass by without acting on it is the goal.    If you re having hunger problems, add a protein drink/snack to your morning hours or afternoon snack with at least 20grams of protein and not too much sugar (under 10g).  A carton of higher protein/low sugar yogurt can work as well.  If the urge to snack is overwhelming and not satiated, try going for a 10 minute walk/exercise, come home and drink a glass of water and if still hungry, have a  calorie snack (handful of raw/sprouted nuts, veggies and string cheese, protein bar, etc).  Savor it.    It is better to have a large breakfast, a moderate lunch and a smaller dinner to fuel your day.  People lose 10-15% more weight during their weight loss season with this strategy. Optimizing your protein intake at each meal will further keep you more satisfied while eating less food overall.  Getting exercise in early has also been shown to offer the best results (before breakfast ideally but anytime is the right time to exercise if that is not an option for you).    To make sure you re getting adequate vitamins and minerals during weight loss, I recommend one complete multivitamin a day of your choice.  Consider a probiotic and taking some vitamin D 2000 IU daily.    Let supper be your last meal of the day and ideally try to have at least 12 hours between supper and breakfast the next day to tap into some beneficial overnight fasting dynamics.  Water in the evening is fine, unsweetened, non caffeinated herbal tea is helpful as well.  Consolidating your meals within a 8-12 hour period of your day will help tap into these additional metabolic benefits and tends to keep your  appetite up for breakfast, further helping to stay on track.  For most of my patients I don't recommend an intermittent fasting style diet (many find it hard to fit in their lifestyle) but an overnight fast is very doable for most patients and helps regulate our hunger drives a little better.  This makes it very important to nail good intake at all three meals to feel satisfied/energized and still lose weight.  If evening snacking desires are high, consider a glass of fiber supplement for some additional fullness (metamucil or similar). Most of us don't get the 25-30 grams per day of fiber that promotes good gut health/satiety.  Benefiber, metamucil, citrucel are reasonable/affordable options for most people.  Inulin, chicory, psyllium husk are reasonable options but start slow and low in the dose to avoid gas/bloating until your gut gets acclimated (ramping up to 5-10 grams per day of supplemental fiber after 3-4 weeks if needed).      Example Meal Plan:  Breakfast: 450-475 Calories  1 egg cooked on low in olive oil:   calories.  5oz Greek Yogurt ([place] plain classic: ~150 alexsandra)  Handful of Berries of your choice (about a calorie per berry or 20-40cal per handful)    cup(cooked) of  old fashioned oatmeal or 1/2 cup(cooked) steel cut oats. (150 alexsandra)  Sprinkle amount of brown sugar and a pat of butter. (40 alexsandra)  Glass of  Water  Black coffee or unsweetened Tea (0calories).      2-3 hours Later Snack: (195 calories).  Glass of water  One string Cheese (80 calories) or 4 oz creamed cottage cheese (115 calories) with  Crunchy Celery sticks (less than 10 calories per large stalk) 2 stalks. (20 calories)    of a  Large Banana or   of a Large Apple (60 calories):  eat second half at lunch or afternoon snack.     Lunch:300 -350 calories   Chicken Breast  (baked/broiled/roasted/grilled)  4-6 oz.  (125-180 alexsandra), BBQ sauce/hot sauce/mustard/seasoning is free. Just use a reasonable amount. Or a can of tuna with 1 tablespoon  mayonnaise.  Salad: lettuce, any other veggies (cucumbers, green peppers/celery you like and a small drizzle of dressing to just flavor.  Go as big on the veggies as you like,  as they are practically calorie free.   A whole, 8 inch cucumber is 45 calories, a whole green pepper is 23 calories, a stalk of celery is 9 calories.  [place] Dressing is 60 calories per tablespoon..so moderate your desired dressing or do a drizzle of olive oil and splash of balsamic vinegar on top,  Total calories unlikely to be over 150 even with dressing.  Glass of Water.    Option for lunch is meal replacement protein drink/smoothie.  Need at least 20 grams of protein and eat the rest of your apple/banana from the morning snack.      Afternoon Snack: 150-200 calories   Cheese Stick or cottage cheese again  and a fresh fruit OR  Granola Bar (protein Bar acceptable if under 200 calories OR  Homemade smoothies:  8oz skim milk,  a handful of berries (fresh or frozen and a serving of protein powder such as BiPro or Sofia sWhey for example.  If you don't like dairy, make with 8oz water, one small banana, handful of berries and the protein powder, add any veggies you want as well:  roughly 200 calories.   Glass of Water    Dinner: 325 calories  4oz of fresh, [place] salmon.  Broiled (salt/pepper/dill) for about 8-8.5 minutes (200calories) or  4oz filet mignon steak or sirloin steak  Salad or vegetable sautéed lightly in olive oil or   Broccoli 1.5  cups chopped and steamed  or micro-waved in a little water (75 calories)  Glass of Water,    Cup of herbal tea (unsweetened, caffeine free)      Herbs and seasonings are encouraged to flavor your foods/vegetables.  Make your food delicious.      Tips for Success:  1.  Prepare proteins ahead of time (broil chicken breasts in bulk so you can grab and go), steel cut oats/lentils can be stored in casserole dish/bowl in the fridge for quick scoop in the morning and rewarm in microwave, make use of  "crock pot recipes (watch salt content).  Making meals that cover 3-4 future meals is an easy way to stay on track.  2.  Drink a 8-12 oz glass of water every 2-3 hours when awake.  We often mistake hunger for thirst, especially when losing weight.  3. Remember your Reward and Motivation when things get hard.  4.  Weigh yourself every morning and record, you'll stay on track better and learn how our biorhythms, diet and elimination patterns show up on the scale. Don't worry about 1 or 2 day patterns, but when on track you'll notice good trend downward of weight over 3-4 day segments.  Plateaus tend to resolve after 4-8 days in most cases if you stay consistent with your plan.  These are natural and part of weight loss, even if you're perfect with your plan execution.  5. Call if problems/concerns.  [employer] is a great tool to stay in touch and provide weekly outside accountability. Check in with questions or if you want to brag.  6.  Find a handful of meals/foods that keep you on track and feeling good and get into a routine that is sustainable for you.  It's OK to have a routine that works for you.  7.  Consider taking a complete multivitamin just to make sure all micronutrients are adequate during weight loss.  8. If losing hair/brittle nails it usually means you are not taking enough protein.  Minimum goal is 60 grams daily of protein for smaller women, 80 grams a day for men. Consider taking Biotin as supplement or a \"Hair and Nail\" multivitamin.    50 Things to do Instead of Snacking  We'll all have urges to snack sometimes. Hunger, mood, stress, boredom and distraction are common triggers so being mindful and thinking about what is driving a particular urge to snack at a particular time can be helpful for reducing the urge in the future.  Remember, the urge to snack doesn't have to be obeyed. The urge exists, but you can watch it pass. Over time, you will get good at the exercise of experiencing an urge, " acknowledging it, but letting it pass you by and float away.  Until you get there, here are some activities to pass the 3-5 minutes that most urges last. Good hydration is always helpful.  If you do struggle with impulse/urge control, consider some therapy based on cognitive behavioral therapy or ACT (Acceptance and Commitment Therapy).  Apps/subscriptions like [employer] emphasize some of these tools and can be of help for those able to afford the sandip/subscription.  1. Imagine the new healthier you   2. Walk around the block   3. Call a friend   4. Make a list of your Top Ten Reasons to Lose Weight   5. Make a To Do list   6. Turn on music and dance   7. Jot a thank you note to someone   8. Go to bed early or take a nap  9. Read a book   10. Blog or journal  11. Give yourself a manicure or pedicure   12. Plan a healthy meal for your family   13. Surf the Internet   14. Finish an unfinished project   15. Walk your dog, pet your cat, feed your fish  16. Brush your teeth   17. Balance your checkbook   18. Say a prayer   19. Chop veggies for later use.  20. Give a massage   21. Clean out a junk drawer   22. Play a game with your kids   23. Try a new route on your walk     24. Drink a glass of water   25. Kiss someone   26. Try on some of your clothes   27. Look at old pictures   28. Rent a video   29. Wash your car   30. Take a hot, soothing bath   31. Update your calendar   32. Work in your yard   33. Start your holiday shopping list   34. Count your blessings   35. Write a letter   36. Fold some laundry   37. Check your e-mail   38. Give your dog a bath   39. Send a birthday card   40. Meditate   41. Hug someone   42. Rearrange some furniture   43. Light a fire or some candles   44. Put your pictures in an album   45. Plan a trip (real or imaginary)  46. Straighten a closet   47. Clean out a files   48. Visit a friend  49. Clean out your trunk  50. Do something nice for someone       Process for obtaining Plenity as of  Fall of 2020:    Currently, Plenity is only available through a specialty pharmacy and there are a few steps to go through before you can obtain the prescription. Here's the flowsheet provided to me by the supplier, so you can have the proper expectations for how to obtain/refill/cancel your Plenity.    Initial fill    Once we have received the Plentiy fax form from you with your patients' prescription it begins a journey through our workflow process as follows:      The pharmacy technician enters the prescription into our software system.      Our Pharmacists will review the prescription and reach out to you if there are any issues.      Your patient can expect to receive a notification that we have received their prescription within 24 to 48 hours after the prescription has been verified. We will send a text and/or an email to your patient containing a link. To ensure that there is no delay in processing the prescription you must provide an accurate/valid email and/or cell phone number to us via the Plenity fax form.       Please encourage your patient to respond to the text and/or email as soon as possible so that there is no delay in getting their prescription filled and shipped. The link will direct your patient to create an account, review the prescription order, and then continue through the checkout process by providing payment and shipping information.       If the electronic attempt(s) to reach your patient are unsuccessful we will manually call your patient and place their prescription order for them.     Please let your patients know they can contact us at [phone] or email us at [email or web address] if they have any questions or would need any further assistance.    Refills    Your patients will be enrolled in an automatic refill program for their prescription, but they have the option to opt out or be contacted before generating a refill order. We will send a text and/or email reminder letting them  know that the refill is being processed and the amount that is being charge to the credit card we have on file. We strongly encourage patients to remain on the automatic refill program through the entirety of their prescription. This will ensure there is no lapse in medication coverage.    Cancellation    Patients can cancel anytime by calling [phone] or emailing [email or web address]

## 2021-06-13 NOTE — PROGRESS NOTES
S: Bushra came in on 12/21/2020 to have new measurements taken for custom Medi Circaid Juxtafit Premium calf pieces. RX on-file is current from Dr. Odell.    A: New measurements were taken compared to last time. She has gone up significantly in size and confirms she has gained weight over the past year and had more fluid retention due to a more sedentary lifestyle during the COVID-19 pandemic. New measurements will be submitted to [employer] for fabrication. Today is the last cut-off date for the items to ship out by 12/31/2020 and they will be drop shipped directly to her home address (which she verified today).    P: She is to call with any further needs.  Goal is to maintain a home program.

## 2021-06-13 NOTE — PROGRESS NOTES
"Date of Service:  10/25/17    Date last seen:   04/20/17    PCP: Tae Garcia MD    Impression:   1. Leg swelling bilaterally   2. Venous stasis/insufficiency with history of ulcerations-slight ulcerations still present  3. Acquired lymphedema   4. Recurrent cellulitis of legs- (3/5/15, 2/17/15, 5/15, 12/14/15, 3/16, 7/12/16, 8/18/2016)  5. Bilateral foot deformities  6. Complicated by morbid obesity     Plan:   1.  Questions were answered.   2.  Continue Flexitouch.  Doing very well.  3.  Continue compression.  Doing well.  Needs new compression-written for.  4.  Lymphedema exercises to continue.  5.  New shoes and insoles written for.  6.  Follow up 8 months or when needed.    Time spent with patient 15 minutes with greater than 50% time in consultation, education and coordination of care.   ---------------------------------------------------------------------------------------------------------------------     Chief Complaint: Bilateral leg swelling     History of Present Illness:   Bushra Buck returns to the [facility] for follow up of her bilateral leg swelling due to severe venous hypertension, insufficieny with secondary lymphedema and recurrent cellulitis resulting from morbid obesity.  She does the flexitouch pump 5/7 weekdays and it has helped greatly.  She continues to work with Dr. Bowling of bariatrics.  Her velcro compression is wearing out and she needs new compression.  She is having similar problems with her flexitouch pump. There has been no new numbness, tingling, weakness, masses, rashes, shortness of breath or chest pain. She continues on the daily doxycycline.   She has not had any pain.  She has not run a fever.  She has had very small ulcerations on her legs and she admits she scratches them as they \"itch\", but no signs of infections. Her shoes are wearing down and she needs new ones.     Past Medical History:   Diagnosis Date     Bipolar 1 disorder      " Dyslipidemia      GERD (gastroesophageal reflux disease)      Hypothyroid      Lymphedema of lower extremity 2008     Post traumatic stress disorder (PTSD)      Prolactinoma transsphenoid approach 2000.     Schizoaffective disorder, bipolar type        Past Surgical History:   Procedure Laterality Date     ABDOMINAL SURGERY  2000    fat removed tp plug dura when had a pituitary surgery.     anorectal fissure repair  2004, 2005     BRAIN SURGERY  2000    pituitary surgery     CARPAL TUNNEL RELEASE Bilateral      GANGLION CYST EXCISION      left arm     PITUITARY SURGERY  2014     SEPTOPLASTY      x 2     SINUS SURGERY Right 8/21/2015    Procedure: RIGHT MICHELLE BULLOSA;  Surgeon: Daniel Landeros MD;  Location: [facility];  Service:      TONSILLECTOMY         Current Outpatient Prescriptions   Medication Sig Dispense Refill     cabergoline (DOSTINEX) 0.5 mg tablet Take 0.5 mg by mouth 2 (two) times a week.       diphenhydrAMINE (BENADRYL) 25 mg capsule Take 25 mg by mouth as needed.       divalproex (DEPAKOTE) 500 MG 24 hr tablet Take 1,000 mg by mouth daily.   2     doxycycline (VIBRA-TABS) 100 MG tablet Take 1 tablet by mouth daily.  1     EPINEPHrine (EPIPEN) 0.3 mg/0.3 mL atIn Inject 0.6 mL into the shoulder, thigh, or buttocks as needed.   0     fenofibrate (TRICOR) 145 MG tablet Take 1 tablet by mouth daily.  3     furosemide (LASIX) 40 MG tablet Take 1 tablet (40 mg total) by mouth daily. For chronic edema  0     levothyroxine (SYNTHROID, LEVOTHROID) 112 MCG tablet Take 1 tablet by mouth daily.  3     liraglutide (VICTOZA) 0.6 mg/0.1 mL (18 mg/3 mL) PnIj injection 0.3 ml injected daily (1.8mg). 9 mL 3     loxapine (LOXITANE) 10 MG capsule Take 1 capsule by mouth daily.   2     MULTIVITAMIN tablet Take 1 tablet by mouth daily.  3     omeprazole (PRILOSEC) 20 MG capsule Take 1 capsule (20 mg total) by mouth Daily before breakfast. For GERD  0     OYSTER SHELL CALCIUM 500 500 mg calcium (1,250 mg)  tablet Take 1 tablet by mouth daily.  11     No current facility-administered medications for this visit.        Allergies   Allergen Reactions     Fiorinal [Butalbital-Aspirin-Caffeine] Anaphylaxis and Swelling     Clindamycin Rash     Rash on neck - not raised      Ancef [Cefazolin]      Aspirin (Tartrazine Only) Other (See Comments)     Eyes swell shut     Ativan [Lorazepam] Swelling     arms     Carbamazepine Other (See Comments)      (tegretol) Arm swelling     Codeine Swelling     Delsym Hives     Ibuprofen Other (See Comments)     Swelling, eyes swell shut     Latex Swelling     Lithium Analogues Swelling     Red/swollen arms     Oxcarbazepine Other (See Comments)     (trileptal) Arm swelling     Venom-Honey Bee Swelling     Unknown     Augmentin [Amoxicillin-Pot Clavulanate] Rash     Rash on neck - not raised     Cefdinir Swelling     (omnicef) Arm swelling  Tolerated cefazolin 5/25/15, and tolerated a course of Keflex on discharge       Topiramate Swelling and Rash     topamax       Social History     Social History     Marital status: Single     Spouse name: N/A     Number of children: 0     Years of education: N/A     Occupational History     Disability      Social History Main Topics     Smoking status: Never Smoker     Smokeless tobacco: Never Used     Alcohol use No     Drug use: No     Sexual activity: No     Other Topics Concern     Not on file     Social History Narrative        .  Not working.   No kids.       Family History   Problem Relation Age of Onset     Diabetes Mother      Hypothyroidism Mother      Cancer Mother      Sleep apnea Mother      Snoring Mother      Heart disease Father      Diabetes Sister      Edema Sister      No Medical Problems Sister      Diabetes Maternal Grandfather        Review of Systems:  Bushra Buck no new numbess, tingling or weakness, redness or rashes, fevers, new masses, abdominal bloating or discomfort, unexplained weight loss, increased pain,  new ulcers, shortness of breath and chest pain  Full 12 point review of systems was completed.    Imaging:  US VENOUS LEG RIGHT  4/3/2016 1:58 AM  INDICATION: Increasing peripheral leg pain and edema  TECHNIQUE: Routine exam without and with compression, augmentation, and duplex utilizing 2D gray-scale imaging, Doppler interrogation with color-flow and spectral waveform analysis.  COMPARISON: None.  FINDINGS: The common femoral, femoral, popliteal, and segmentally visualized calf veins were evaluated. The opposite CFV was also included in the evaluation.  Right leg veins are negative for deep venous thrombosis. No popliteal cysts.  IMPRESSION:   CONCLUSION:  1. Right leg veins are negative for DVT although exam is limited due to body habitus and edema.    XR FOOT RIGHT 3 OR MORE VWS  4/3/2016 2:04 AM  INDICATION: Foot pain.   COMPARISON: None.  FINDINGS: Hammertoe deformity. Plantar heel spur. No fracture.    Physical Exam:  Vitals:    10/25/17 1223   BP: 129/75   Pulse: 89   Resp: 20   Temp: 98.4  F (36.9  C)    Body mass index is 69.53 kg/(m^2).    Circumferential measures:    Vasc Edema 8/29/2016 11/10/2016 2/9/2017 4/20/2017 10/25/2017   Right just above MTP 27.2 27 26.8 27.5 27.5   Right Ankle 38.3 36.4 36.4 37.5 36   Right Widest Calf 78.8 71 66 66 65   Right Thigh Up 10cm 74 74 74 74 74   Left - just above MTP 29.7 31.5 30.5 32.6 32   Left Ankle 41.5 39 36 35 37   Left Widest Calf 75 73 65 65 69   Left Thigh Up 10cm 74.5 74 74 74 74     Measures are stable.     General:  37 y.o. female in no apparent distress.  Alert and oriented x 3.  Cooperative. Affect normal.     Integumentary: Legs show normal skin with significant softening and decreased fibrosis. No rubor or calor.  No open ulcerations.  Slight areas of excoriations on both legs with eschar.  No discharge or odor.   No pain to palpation.       Temitope Odell MD, ABWMS, FACCWS, [facility]  Medical Director Wound Care and Lymphedema  [facility]  [place]  [phone]

## 2021-06-13 NOTE — PROGRESS NOTES
"Non-surgical Weight Loss Follow Up Diet Evaluation    Assessment:  This patient is a 37 y.o. female is being seen today for follow-up non-surgical nutritional evaluation. Today we reviewed the patients current eating habits and level of physical activity, and instructed on the changes that are required for successful weight loss outcomes.  *Pt shows signs of a learning/cognitive disability     Phentermine:Taking victoza daily     Pt's Initial Weight: 350 lbs  Weight: 355 lb (161 kg)  Weight loss from initial: -5  % Weight loss: -1.43 %  BMI: Body mass index is 69.33 kg/(m^2).  IBW: 102 lbs    Personal goal weight: 160 lb    Estimated RMR ([place]-St Jeor equation): 2233 calories  Protein requirements (.5grams to .9grams per pound IBW, 20-30% of calories, minimum of 60-80gm per day):  50-90 grams    Progress made since last visit: Pt choosing \"plainer\" types of cereal and limiting portion sizes.   Concerns:  Low protein intake at breakfast, poor fluid intake, and no exercise routine established.       Diet Recall/Time:   Breakfast: bowl of rice crispies (0g)   Am Snack: none   Lunch: packaged meal with meat, vegetable, and carbohydrate from optage meal delivered (20-30g)   Pm snack:none   Dinner: rib, macaroni cheese, carrots - PCA helps. (10-20g)   HS Snack: none     Per Diet recall estimated protein: 40-50 grams    Typical Snacks: Pt may eat the dessert that comes with meal later in the evening   Meals per week away from home: none     Recommended limiting eating out to no more than 2x/week.  Patient and I reviewed the importance of eating three consistent meals per day; as well as meal timing to be spaced 4-5 hours apart.  Snack choices: 100-150 calories (1-2x/day if physically hungry), incorporating a fruit/vegetable w/ protein source.    Meal Duration: not discussed    Portion Sizes problematic? YES per patient/diet recall  Encouraged slowing meal times down, 20-30 minutes, chewing to applesauce consistency. "   To aid in proper portion control and slow meal time down discussed consuming meals off smaller plates, use toddler/children utensils and set utensils down after each bite.    Protein, vegetables/fruits, carbohydrates:   The patient and I discussed the importance of including lean/low fat protein at each meal and limiting carbohydrate intake to less than 25% of plate volume.       Vitamins/Mineral Supplementation: not discussed at this vist     Beverages (Type/Oz. per day)  Water: 32 oz  Coffee: none   Tea: plain tea   Milk: w/ cereal   Regular soda: none   Diet soda: none   Juice: none   Chirag-Aid/lemonade/etc: none   Alcohol: none     Discussed the importance of adequate hydration and the goal of 64+ oz of fluid daily.   The patient understands the importance of  avoiding all sweetened and alcoholic drinks, and instead choosing 64 oz plain water.    Exercise  Pt walks down to apartment complex, however nothing routine established.     Pt's understands that 45-60 minutes of daily activity is an important part of weight loss success.   Encouraged pt to incorporate  strength training exercise in addition to cardiovascular exercise most days of the week.    PES statement:     1. (NI-1.3)Excessive energy intake related to Not ready for diet/lifestyle change as evidenced by Intake of high caloric density foods at meals and/or snacks; large portion;  Estimated intake that exceeds estimated daily energy intake; and BMI 69.      Intervention:  Discussion:  1. Reviewed lean protein sources.  20-30gm protein at 3 meals daily. 60-80 grams daily total.  2. Reviewed Plate Method   3. Discussed portion sizes of foods  4. Encouraged exercise for increased weight loss   5. Plate Method: The patient and I discussed the importance of including lean/low  fat protein at each meal and limiting carbohydrate intake to less  than 25% of plate volume.    Instructions/Goals:   1. Include 20-30gm protein at each meal.  2. Increase  vegetable/fruit intake, by having a vegetable or fruit with each meal daily. Recommended pt to increase vegetable/fruit intake to 4-5 servings daily.  3. Increase fluid intake to 64oz daily: choose plain or calorie/alcohol-free beverages.  4. Incorporate daily structured activity, 45-60 minutes most days of the week  5. Read food labels more consistently: keeping total fat grams <10, total sugar grams <10, fiber >3gm per serving.   6. Practice plate method: 1/2 plate lean/low fat protein source, vegetable/fruit, <25% of plate complex carbohydrates.  7. Carbohydrates from grain sources at meal times to be no more than 1 Carb Choice, ie: 15-20 gm total carbohydrate per serving  8. Practice eating off of smaller plates/bowls, chewing to applesauce consistency, taking 20-30 minutes to eat in a calm/relaxed environment without distractions of tv/email/cell phone.    Goals set by patient:  1. Go walking in the afternoon for 15 minutes 2x/week   2. Cut out cereal with two eggs and piece of fruit   3. Use protein shake as a meal 1x/week     Handouts Provided:  Plate method    Monitor/Evaluation:    Pt will f/u in one month with bariatrician and RD    Plan for next visit with RD:    Review plate method   Educate pt on good sources of carbohydrate   Exercise      Time In: 1:50am  Time Out: 2:05am      ABN signed: Yes

## 2021-06-13 NOTE — PROGRESS NOTES
Reviewed instructions with patient and taught wound care, sent supplies. She will call if wounds do not heal in 1 month.

## 2021-06-13 NOTE — PATIENT INSTRUCTIONS - HE
Continue compression, Flexitouch and exercise.    Continue to use diabetic shoes and insoles.  New insoles, shoes and compression orders send to:  [facility] (Please call to make an appointment)    formerly [facility]  [facility], [address]  [place], [place].  Phone: [phone]      Continue to work with bariatrics.     Wound Care:    Both legs - Wash with hibiclens wash (30mls hibiclens mixed with 500mls normal saline)  Rinse with saline and pat dry.  Apply comfeel to open areas.  Change every 2-3 days until closed.    Continue velcro for compression.       Follow up in 6 months.  Watch for any increased redness, pain, temperature, drainage and/or any new ulcerations in the leg(s).   If wounds don't heal in about 1 month then call the clinic at [phone].     If any questions or concerns they are to contact the clinic.

## 2021-06-13 NOTE — PROGRESS NOTES
Bariatric Clinic Follow-Up Visit:    Bushra Buck is a 37 y.o.  female with Body mass index is 69.23 kg/(m^2).  presenting here today for follow-up on non-surgical efforts for weight loss. Original Intake visit occurred on 9/17/15 with a weight of 358lbs  and BMI of 68.35 and briefly gained up to 401lb max weight..  Along with diet and behavior changes, she has been using Victoza most recently to assist her weight loss goals.  See her intake visit notes for details on identified contributors to weight gain in the past.  Her weight last visit in August was 356 lbs.     Weight:   Wt Readings from Last 2 Encounters:   10/26/17 (!) 354 lb 8 oz (160.8 kg)   10/25/17 (!) 356 lb (161.5 kg)    pounds  Height: 5' (1.524 m) (10/26/2017  1:58 PM)  Initial Weight: 350 lbs (8/21/2017 12:36 PM)  Weight: 354 lb 8 oz (160.8 kg) (10/26/2017  1:58 PM)  Weight loss from initial: -6 (8/21/2017 12:36 PM)  % Weight loss: -1.71 % (8/21/2017 12:36 PM)  BMI (Calculated): 69.2 (10/26/2017  1:58 PM)    Comorbidities:  Patient Active Problem List   Diagnosis     Severe bipolar affective disorder with psychosis     Hypertension     History of pituitary surgery     Schizophrenia, schizoaffective, chronic with acute exacerbation     Schizoaffective disorder, bipolar type     Morbid obesity with BMI of 70 and over, adult     Lymphedema     Recurrent cellulitis of lower leg     Venous hypertension of lower extremity, bilateral     Hypothyroidism, unspecified hypothyroidism type     Complex care coordination     GERD (gastroesophageal reflux disease)     Prolactinoma     Delusional disorder     Psychosis     Perianal irritation     Acquired valgus deformity of both ankles     Flat feet, bilateral     Foot deformity, bilateral     Venous insufficiency of both lower extremities     Chronic venous hypertension w ulceration, bilateral       Current Outpatient Prescriptions:      cabergoline (DOSTINEX) 0.5 mg tablet, Take 0.5 mg by mouth 2 (two)  times a week., Disp: , Rfl:      diphenhydrAMINE (BENADRYL) 25 mg capsule, Take 25 mg by mouth as needed., Disp: , Rfl:      divalproex (DEPAKOTE) 500 MG 24 hr tablet, Take 1,000 mg by mouth daily. , Disp: , Rfl: 2     doxycycline (VIBRA-TABS) 100 MG tablet, Take 1 tablet by mouth daily., Disp: , Rfl: 1     EPINEPHrine (EPIPEN) 0.3 mg/0.3 mL atIn, Inject 0.6 mL into the shoulder, thigh, or buttocks as needed. , Disp: , Rfl: 0     fenofibrate (TRICOR) 145 MG tablet, Take 1 tablet by mouth daily., Disp: , Rfl: 3     furosemide (LASIX) 40 MG tablet, Take 1 tablet (40 mg total) by mouth daily. For chronic edema, Disp: , Rfl: 0     levothyroxine (SYNTHROID, LEVOTHROID) 112 MCG tablet, Take 1 tablet by mouth daily., Disp: , Rfl: 3     liraglutide (VICTOZA) 0.6 mg/0.1 mL (18 mg/3 mL) PnIj injection, 0.3 ml injected daily (1.8mg)., Disp: 9 mL, Rfl: 3     loxapine (LOXITANE) 5 MG capsule, Take 5 mg by mouth daily., Disp: , Rfl:      MULTIVITAMIN tablet, Take 1 tablet by mouth daily., Disp: , Rfl: 3     omeprazole (PRILOSEC) 20 MG capsule, Take 1 capsule (20 mg total) by mouth Daily before breakfast. For GERD, Disp: , Rfl: 0     OYSTER SHELL CALCIUM 500 500 mg calcium (1,250 mg) tablet, Take 1 tablet by mouth daily., Disp: , Rfl: 11      Interim: Since our last visit, she has lost 2 lbs, tolerating her Victoza.     Plan:   1.  Diet: 1500kcal   2. Exercise: demonstrated body weight exercises she could start  3. Medication: victoza going well.  4.  Stress Reduction:  Doing well  5. Goals: looking to get under 300 lbs over the long term. Steady in the 350s for awhile now but down from her max of 401 lbs.      We discussed [facility] Bariatric Basics including:  -eating 3 meals daily  -eating protein first  -eating slowly, chewing food well  -avoiding/limiting calorie containing beverages  -We discussed the importance of restorative sleep and stress management in maintaining a healthy weight.  -We discussed the National Weight  Control Registry healthy weight maintenance strategies and ways to optimize metabolism.  -We discussed the importance of physical activity including cardiovascular and strength training in maintaining a healthier weight and explored viable options.    Most recent labs:  Lab Results   Component Value Date    WBC 10.7 09/16/2016    HGB 14.1 09/16/2016    HCT 42.9 09/16/2016     (H) 09/16/2016     09/16/2016     Lab Results   Component Value Date    CHOL 207 (H) 09/18/2017     Lab Results   Component Value Date    HDL 25 (L) 09/18/2017     Lab Results   Component Value Date    LDLCALC  09/18/2017      Comment:      Invalid, Triglycerides >400     Lab Results   Component Value Date    TRIG 405 (H) 09/18/2017     No components found for: CHOLHDL  Lab Results   Component Value Date    ALT 66 (H) 09/18/2017    AST 55 (H) 09/18/2017    ALKPHOS 57 09/18/2017    BILITOT 0.4 09/18/2017     Lab Results   Component Value Date    HGBA1C 5.4 09/20/2016     Lab Results   Component Value Date    QFNIGNGW45 623 09/17/2015     Lab Results   Component Value Date    LNNNHTNA35WZ 10.2 (L) 09/20/2016     No results found for: FERRITIN  No results found for: PTH  Lab Results   Component Value Date    63763 218 (H) 09/17/2015     No results found for: 7597  Lab Results   Component Value Date    TSH 3.36 09/18/2017     No results found for: TESTOSTERONE    DIETARY HISTORY    Positive Changes Since Last Visit: eating breakfast  Struggling With: protein intak    Knowledgeable in Reading Food Labels: no  Getting Adequate Protein: no  Sleeping 7-8 hours/day yes  Stress management good    PHYSICAL ACTIVITY PATTERNS:  Cardiovascular: walking some  Strength Training: none    REVIEW OF SYSTEMS  GENERAL/CONSTITUTIONAL:  No illness  HEENT:   no  CARDIOVASCULAR:   no pain  PULMONARY:   no cough  GASTROINTESTINAL:  No pain/vomiting.  UROLOGIC:  na  NEUROLOGIC:  No headaches  PSYCHIATRIC:  Coming down on cymbalta planning to be off in  November.  MUSCULOSKELETAL/RHEUMATOLOGIC   na  ENDOCRINE:  na  DERMATOLOGIC:  Edema doing better. Few sores but no cellulitis.    PHYSICAL EXAM:  Vitals: /61  Pulse 86  Temp 98.3  F (36.8  C)  Resp 18  Ht 5' (1.524 m)  Wt (!) 354 lb 8 oz (160.8 kg)  BMI 69.23 kg/m2  Height: 5' (1.524 m) (10/26/2017  1:58 PM)  Initial Weight: 350 lbs (8/21/2017 12:36 PM)  Weight: 354 lb 8 oz (160.8 kg) (10/26/2017  1:58 PM)  Weight loss from initial: -6 (8/21/2017 12:36 PM)  % Weight loss: -1.71 % (8/21/2017 12:36 PM)  BMI (Calculated): 69.2 (10/26/2017  1:58 PM)    GEN: Pleasant, well groomed, in no acute distress  HEENT: PEERLA, EOMI, airway clear. .  NECK:  no anterior/supraclavicular lymphadenopathy, thyroid normal   HEART: Rhythm regular, rate regular, no murmur   LUNGS: Clear without crackles or wheezes. No cough.  ABDOMEN: obese. Non tender..  EXTREMITIES: legs stable, no cellulitis.  NEURO: Alert and Oriented X3.  SKIN: No visible rashes. Few sores on shins without signs of infection..        25 minutes was spent in direct consultation, with over 50% of it spent in counseling regarding their plan for excess weight reduction and health modification.  Carlos Bowling MD  [facility]  1:59 PM

## 2021-06-13 NOTE — TELEPHONE ENCOUNTER
Pt called in states her left leg is swelling.  The swelling started tonight.  It is on her calf.  It is red the area..  Tender to touch.  The pain 5/10 on the scale.  No chest pain.  Pt states she feel shortness of breath when she walk.  The disposition is to be seen with in the next 4 hours.  Care advice given per protocol.  Patient agrees with care advice given.   Agreed to call back if he has additional symptoms or questions.      Romeo Gomes RN, [facility] Triage/Med Refill 11/24/2020 9:39 PM      Additional Information    Negative: Severe difficulty breathing (e.g., struggling for each breath, speaks in single words)    Negative: Looks like a broken bone or dislocated joint (e.g., crooked or deformed)    Negative: Sounds like a life-threatening emergency to the triager    Negative: Chest pain    Negative: Followed a leg injury    Negative: [1] Small area of swelling AND [2] followed an insect bite to the area    Negative: Swelling of one ankle joint    Negative: Swelling of knee is main symptom    Negative: Pregnant    Negative: Postpartum (from 0 to 6 weeks after delivery)    Negative: Difficulty breathing at rest    Negative: Entire foot is cool or blue in comparison to other side    Negative: [1] Can't walk or can barely walk AND [2] new onset    Negative: [1] Difficulty breathing with exertion (e.g., walking) AND [2] new onset or worsening    Negative: [1] Red area or streak AND [2] fever    Negative: [1] Swelling is painful to touch AND [2] fever    Negative: [1] Cast on leg or ankle AND [2] now increased pain    Negative: Patient sounds very sick or weak to the triager    [1] Thigh or calf pain AND [2] only 1 side AND [3] present > 1 hour    Negative: [1] Red area or streak [2] large (> 2 in. or 5 cm)    Negative: SEVERE leg swelling (e.g., swelling extends above knee, entire leg is swollen, weeping fluid)    Protocols used: LEG SWELLING AND EDEMA-A-AH

## 2021-06-14 NOTE — PROGRESS NOTES
Non-surgical Weight Loss Follow Up Diet Evaluation    Assessment:  This patient is a 37 y.o. female is being seen today for follow-up non-surgical nutritional evaluation. Today we reviewed the patients current eating habits and level of physical activity, and instructed on the changes that are required for successful weight loss outcomes.    Pt's Initial Weight: 350 lbs  Weight: 360 lb (163.3 kg)  Weight loss from initial: -10  % Weight loss: -2.86 %  BMI: Body mass index is 70.31 kg/(m^2).  IBW: 102 lb    Personal goal weight: 160 lb    Estimated RMR (Monterey-St Jeor equation): 2233 calories  Protein requirements (.5grams to .9grams per pound IBW, 20-30% of calories, minimum of 60-80gm per day):  50-90 grams    Progress made since last visit: Pt reports holiday eating affected weight gain, in addition to lymphedema. Pt switched to eggs instead of cereal for breakfast and reports being more mindful of limiting dessert intake. Pt tried protein shakes, but reports it was too expensive.     Concerns: High salt intake, soda intake.     Diet Recall/Time:   Breakfast: 2 eggs, fruit, 1 glass milk (20g)    Am Snack: none   Lunch: optage meal- meat, vegetable, rice/potato (20g)   Pm snack:none   Dinner: turkey, gravy, fruit salad, vegetable salad (20g)   HS Snack: none or pie (from holiday)     Per Diet recall estimated protein: 80 grams    Meals per week away from home: none      Recommended limiting eating out to no more than 2x/week.  Patient and I reviewed the importance of eating three consistent meals per day; as well as meal timing to be spaced 4-5 hours apart.  Snack choices: 100-150 calories (1-2x/day if physically hungry), incorporating a fruit/vegetable w/ protein source.    Meal Duration: 20 minutes    Portion Sizes problematic? YES per patient/diet recall  Encouraged slowing meal times down, 20-30 minutes, chewing to applesauce consistency.   To aid in proper portion control and slow meal time down discussed  consuming meals off smaller plates, use toddler/children utensils and set utensils down after each bite.    Protein, vegetables/fruits, carbohydrates:   The patient and I discussed the importance of including lean/low fat protein at each meal and limiting carbohydrate intake to less than 25% of plate volume.       Vitamins/Mineral Supplementation:not discussed     Beverages (Type/Oz. per day)  Water: pt doesn't like taste of city water   Coffee: none   Tea: none   Milk: 1 glass   Regular soda: 1 can of coke in the morning   Diet soda: none   Juice: none   Chirag-Aid/lemonade/etc: none   Alcohol: none     Discussed the importance of adequate hydration and the goal of 64+ oz of fluid daily.   The patient understands the importance of  avoiding all sweetened and alcoholic drinks, and instead choosing 64 oz plain water.    Exercise  Pt uses stationary bike and walks more.     Pt's understands that 45-60 minutes of daily activity is an important part of weight loss success.   Encouraged pt to incorporate  strength training exercise in addition to cardiovascular exercise most days of the week.    PES statement:     1. (NI-1.3)Excessive energy intake related to Food and nutrition related knowledge deficit concerning excessive energy/oral intake as evidenced by Intake of high caloric density foods/beverages (soda) at meals and/or snacks; large portion; Estimated intake that exceeds estimated daily energy intake; and BMI 70.      Intervention:  Discussion:  1. Educated pt on low salt diet <1800 mg/day   2. Reviewed lean protein sources.  20-30gm protein at 3 meals daily. 60-80 grams daily total.  3. Discussed benefits of eliminating soda from diet.  4. Plate Method: The patient and I discussed the importance of including lean/low  fat protein at each meal and limiting carbohydrate intake to less  than 25% of plate volume.    Instructions/Goals:   1. Include 20-30 gm protein at each meal.  2. Increase vegetable/fruit intake, by  having a vegetable or fruit with each meal daily. Recommended pt to increase vegetable/fruit intake to 4-5 servings daily.  3. Increase fluid intake to 64oz daily: choose plain or calorie/alcohol-free beverages.  4. Incorporate daily structured activity, 45-60 minutes most days of the week  5. Read food labels more consistently: keeping total fat grams <10, total sugar grams <10, fiber >3gm per serving.   6. Practice plate method: 1/2 plate lean/low fat protein source, vegetable/fruit, <25% of plate complex carbohydrates.  7. Carbohydrates from grain sources at meal times to be no more than 1 Carb Choice, ie: 15-20 gm total carbohydrate per serving  8. Practice eating off of smaller plates/bowls, chewing to applesauce consistency, taking 20-30 minutes to eat in a calm/relaxed environment without distractions of tv/email/cell phone.    Goals set by patient:  1. Purchase protein drinks and use to replace lunch meal.   2. High protein ensure from doctor?   3. Cut out soda and drink tea instead in the morning.     Handouts Provided:  Shake the Salt diet handout     Monitor/Evaluation:    Pt will f/u in one month with bariatrician, and f/u in two months with RD.    Plan for next visit with RD:    Review plate method   Educated pt on food labels  Review carbohydrates/fiber  Exercise      Time In: 1:50pm  Time Out: 2:15pm      ABN signed: Yes

## 2021-06-14 NOTE — PROGRESS NOTES
S: Patient is a 39 y/o female, 5', 359 lbs, seen at our [facility] for the evaluation, measurement and casting for custom diabetic foot orthotics and diabetic shoes on orders from Dr. Temitope Odell MD for the treatment of Dx: Type 2 diabetes mellitus without complication, without long-term current use of insulin (H) [E11.9], Prader-Willi syndrome [Q87.11], Acquired valgus deformity of both ankles [M21.071, M21.072], and Foot deformity, bilateral [M21.961, M21.962].    O: Patient arrived wearing her previously provided diabetic shoes and FOs to her appointment. Patient has rather severe lymphedema bilaterally in her lower limbs and feet. Patient displays severe ankle valgus and pes planus bilaterally upon standing that worsens with gait. Patient ROM and MMT scores for the foot and ankle are all WNL.    G: Patient's diabetic shoes will provide support and protection of the diabetic feet minimizing the risk of ulceration development in both of her feet. Patient will require extra depth shoes due to the lymphedema present in her lower legs and feet. Patient can also benefit from the custom diabetic foot orthoses because it will provide support of the medial longitudinal arches through total contact of patient s foot and neutralizing of the heel with intrinsic posting. Patient requires custom option due to the need to correct angular deformities of foot and ankle.    A: I took bilateral biofoam casts of the patient's feet for the fabrication of custom diabetic FOs. Patient's FO swill be fabricated using a base of cork and top cover of diabetic trilam. Patient was also measured using a female Edtrips device and found to have size 10 XW feet. Patient chose the Spirit x design from [facility] in black.     P: Patient was asked to schedule her appointment for two weeks time as she left our office.

## 2021-06-14 NOTE — PROGRESS NOTES
Bariatric Clinic Follow-Up Visit:    Bushra Buck is a 37 y.o.  female with Body mass index is 69.72 kg/(m^2).  presenting here today for follow-up on non-surgical efforts for weight loss. Original Intake visit occurred on 9/17/15 with a weight of  358 lbs and BMI of 68.3.  Along with diet and behavior changes, she has been using Victoza recently to assist her weight loss goals.  See her intake visit notes for details on identified contributors to weight gain in the past.    Weight:   Wt Readings from Last 2 Encounters:   12/11/17 (!) 357 lb (161.9 kg)   11/27/17 (!) 360 lb (163.3 kg)    pounds  Height: 5' (1.524 m) (12/11/2017  1:40 PM)  Initial Weight: 350 lbs (12/11/2017  1:40 PM)  Weight: 357 lb (161.9 kg) (12/11/2017  1:40 PM)  Weight loss from initial: -7 (12/11/2017  1:40 PM)  % Weight loss: -2 % (12/11/2017  1:40 PM)  BMI (Calculated): 69.7 (12/11/2017  1:40 PM)  SpO2: 100 % (12/11/2017  1:40 PM)    Comorbidities:  Patient Active Problem List   Diagnosis     Severe bipolar affective disorder with psychosis     Hypertension     History of pituitary surgery     Schizophrenia, schizoaffective, chronic with acute exacerbation     Schizoaffective disorder, bipolar type     Lymphedema     Recurrent cellulitis of lower leg     Venous hypertension of lower extremity, bilateral     Hypothyroidism, unspecified hypothyroidism type     Complex care coordination     GERD (gastroesophageal reflux disease)     Prolactinoma     Delusional disorder     Psychosis     Perianal irritation     Acquired valgus deformity of both ankles     Flat feet, bilateral     Foot deformity, bilateral     Venous insufficiency of both lower extremities     Chronic venous hypertension w ulceration, bilateral       Current Outpatient Prescriptions:      cabergoline (DOSTINEX) 0.5 mg tablet, Take 0.5 mg by mouth 2 (two) times a week., Disp: , Rfl:      diphenhydrAMINE (BENADRYL) 25 mg capsule, Take 25 mg by mouth as needed., Disp: , Rfl:       divalproex (DEPAKOTE) 500 MG 24 hr tablet, Take 1,000 mg by mouth daily. , Disp: , Rfl: 2     doxycycline (VIBRA-TABS) 100 MG tablet, Take 1 tablet by mouth 2 (two) times a day. , Disp: , Rfl: 1     EPINEPHrine (EPIPEN) 0.3 mg/0.3 mL atIn, Inject 0.6 mL into the shoulder, thigh, or buttocks as needed. , Disp: , Rfl: 0     fenofibrate (TRICOR) 145 MG tablet, Take 1 tablet by mouth daily., Disp: , Rfl: 3     furosemide (LASIX) 40 MG tablet, Take 1 tablet (40 mg total) by mouth daily. For chronic edema, Disp: , Rfl: 0     levothyroxine (SYNTHROID, LEVOTHROID) 112 MCG tablet, Take 1 tablet by mouth daily., Disp: , Rfl: 3     liraglutide (VICTOZA) 0.6 mg/0.1 mL (18 mg/3 mL) PnIj injection, 0.3 ml injected daily (1.8mg)., Disp: 9 mL, Rfl: 3     loxapine (LOXITANE) 5 MG capsule, Take 5 mg by mouth daily., Disp: , Rfl:      MULTIVITAMIN tablet, Take 1 tablet by mouth daily., Disp: , Rfl: 3     omeprazole (PRILOSEC) 20 MG capsule, Take 1 capsule (20 mg total) by mouth Daily before breakfast. For GERD, Disp: , Rfl: 0     OYSTER SHELL CALCIUM 500 500 mg calcium (1,250 mg) tablet, Take 1 tablet by mouth daily., Disp: , Rfl: 11      Interim: Since our last visit, she has maintained weight, doing OT once weekly and riding the bike during those session for 5 minutes, She's making a footstool and has been sanding it a lot.    Plan:   1.  Diet: continue with dietician. Higher protein diet would be helpful, working with her PCP to get her protein drinks covered.  2. Exercise: OT once weekly and doing some crafting.  3. Medication: victoza going well for her no sx/side effects. Is having her physical on the 28th of December and plans to get fasting labs done. Other meds stable.  4.  Stress Reduction:  Doing well.  5. Goals: stuck in the 350s for awhile now.        We discussed [facility] Bariatric Basics including:  -eating 3 meals daily  -eating protein first  -eating slowly, chewing food well  -avoiding/limiting calorie  containing beverages  -We discussed the importance of restorative sleep and stress management in maintaining a healthy weight.  -We discussed the National Weight Control Registry healthy weight maintenance strategies and ways to optimize metabolism.  -We discussed the importance of physical activity including cardiovascular and strength training in maintaining a healthier weight and explored viable options.    Most recent labs:  Lab Results   Component Value Date    WBC 10.7 09/16/2016    HGB 14.1 09/16/2016    HCT 42.9 09/16/2016     (H) 09/16/2016     09/16/2016     Lab Results   Component Value Date    CHOL 207 (H) 09/18/2017     Lab Results   Component Value Date    HDL 25 (L) 09/18/2017     Lab Results   Component Value Date    LDLCALC  09/18/2017      Comment:      Invalid, Triglycerides >400     Lab Results   Component Value Date    TRIG 405 (H) 09/18/2017     No components found for: CHOLHDL  Lab Results   Component Value Date    ALT 66 (H) 09/18/2017    AST 55 (H) 09/18/2017    ALKPHOS 57 09/18/2017    BILITOT 0.4 09/18/2017     Lab Results   Component Value Date    HGBA1C 5.4 09/20/2016     Lab Results   Component Value Date    VTVXDHLN54 623 09/17/2015     Lab Results   Component Value Date    GFEIPMDA74RB 10.2 (L) 09/20/2016     No results found for: FERRITIN  No results found for: PTH  Lab Results   Component Value Date    96438 218 (H) 09/17/2015     No results found for: 7597  Lab Results   Component Value Date    TSH 3.36 09/18/2017     No results found for: TESTOSTERONE    DIETARY HISTORY    Positive Changes Since Last Visit: doing OT weekly/exercise bike and making a foot stool, now nearly done.  Struggling With: weight loss.    Knowledgeable in Reading Food Labels: continuing to work with dietician.  Getting Adequate Protein: not always.  Sleeping 7-8 hours/day often  Stress management doing well    PHYSICAL ACTIVITY PATTERNS:  Cardiovascular: exercise bike.  Strength Training:  minimal.    REVIEW OF SYSTEMS  GENERAL/CONSTITUTIONAL:  No illness, feeling well. Tolerating victoza  HEENT:   na  CARDIOVASCULAR:   no chest pains  PULMONARY:   no SANDS  GASTROINTESTINAL:  No abdominal pains.  UROLOGIC:  No dysuria  NEUROLOGIC:  No headaches  PSYCHIATRIC:  Stable moods  MUSCULOSKELETAL/RHEUMATOLOGIC   is getting new lymphedema wraps in a couple weeks, not using currently.   ENDOCRINE:  No low blood sugar sx. Doesn't check, doesn't want a meter. Victoza should not produce hypoglycemia.  Last A1c was 5.4%.  Discussed only continuing Victoza in the Winter if weight is demonstrating help with appetite control, thus far pretty limited result.  DERMATOLOGIC:  No cellulitic changes recently.     PHYSICAL EXAM:  Vitals: /57  Pulse 91  Resp 19  Ht 5' (1.524 m)  Wt (!) 357 lb (161.9 kg)  SpO2 100%  BMI 69.72 kg/m2  Height: 5' (1.524 m) (12/11/2017  1:40 PM)  Initial Weight: 350 lbs (12/11/2017  1:40 PM)  Weight: 357 lb (161.9 kg) (12/11/2017  1:40 PM)  Weight loss from initial: -7 (12/11/2017  1:40 PM)  % Weight loss: -2 % (12/11/2017  1:40 PM)  BMI (Calculated): 69.7 (12/11/2017  1:40 PM)  SpO2: 100 % (12/11/2017  1:40 PM)    GEN: Pleasant, well groomed, in no acute distress  HEENT: PEERL, EOMI, airway clear .  NECK: No swelling.  HEART: Rhythm regular, rate regular, no murmur   LUNGS: Clear without crackles or wheezes. No cough.  ABDOMEN: obese.  EXTREMITIES: 2 plus palpable peripheral pulses, radial..  NEURO: Alert and Oriented X3, normal gait and speech.  SKIN: No visible rashes. Lymphedema/redundant skin in legs, no active cellulitis evident.        25 minutes was spent in direct consultation, with over 50% of it spent in counseling regarding their plan for excess weight reduction and health modification.  Carlos Bowling MD  [facility]  2:14 PM

## 2021-06-15 NOTE — PROGRESS NOTES
Date of Service:  01/08/18    Date last seen:   10/25/17    PCP: Erasto Azul MD    Impression:   1. Leg swelling bilaterally-looks great   2. Venous stasis/insufficiency with history of ulcerations-slight ulcerations present on arms and legs  3. Acquired lymphedema   4. Recurrent cellulitis of legs- (3/5/15, 2/17/15, 5/15, 12/14/15, 3/16, 7/12/16, 8/18/2016)-no recurrence  5. Bilateral foot deformities  6. Complicated by morbid obesity   7.  Itching generalized    Plan:   1.  Questions were answered.   2.  Continue Flexitouch.  Doing very well.  3.  Continue compression.  Doing well.    4.  Lymphedema exercises to continue.  5.  Much improved with PCA and cares with no recurrent infections and has been able to significantly decrease hospitalizations.  6.  Has good shoes and insoles. Continue to use.    7.  With generalized itching and with small papular erythematous pruritic lesions in arms and legs will ask Dr. Azul to address when she sees him on Wednesday.  Will prescribe Kenalog to get her more comfortable fornow.    8.  Follow up in 3 months or when needed.    Time spent with patient 15 minutes with greater than 50% time in consultation, education and coordination of care.   ---------------------------------------------------------------------------------------------------------------------     Chief Complaint: Bilateral leg swelling     History of Present Illness:   Bushra Buck returns to the [facility] for follow up of her bilateral leg swelling due to severe venous hypertension, insufficieny with secondary lymphedema and recurrent cellulitis resulting from morbid obesity.  She does the flexitouch pump 5/7 weekdays and it has helped greatly.  She continues to work with Dr. Bowling of bariatrics.  She wears her  velcro compression is wearing o.  This is working well. She has a PCA helping her 5 days a week.  She has not had an infection for over 1.5 years.  She had not been  "hospitalized for over two years.  There has been no new numbness, tingling, weakness, masses, rashes, shortness of breath or chest pain. She continues on the daily doxycycline.   She has not had any pain.  She has not run a fever.  She has had very small ulcerations on her legs and arms now  and she admits she scratches them as they \"itch\", but no signs of infections. This continues to be a problem.  She is wearing her new shoes and insoles.  These are working well.  She is scheduled to see Dr. Azul on Wednesday.   She continues to work with her counselor and psychiatric professionals and states she recently saw them.      Past Medical History:   Diagnosis Date     Bipolar 1 disorder      Dyslipidemia      GERD (gastroesophageal reflux disease)      Hypothyroid      Lymphedema of lower extremity 2008     Post traumatic stress disorder (PTSD)      Prolactinoma transsphenoid approach 2000.     Schizoaffective disorder, bipolar type        Past Surgical History:   Procedure Laterality Date     ABDOMINAL SURGERY  2000    fat removed tp plug dura when had a pituitary surgery.     anorectal fissure repair  2004, 2005     BRAIN SURGERY  2000    pituitary surgery     CARPAL TUNNEL RELEASE Bilateral      GANGLION CYST EXCISION      left arm     PITUITARY SURGERY  2014     SEPTOPLASTY      x 2     SINUS SURGERY Right 8/21/2015    Procedure: RIGHT MICHELLE BULLOSA;  Surgeon: Daniel Landeros MD;  Location: [facility];  Service:      TONSILLECTOMY         Current Outpatient Prescriptions   Medication Sig Dispense Refill     cabergoline (DOSTINEX) 0.5 mg tablet Take 0.5 mg by mouth 2 (two) times a week.       diphenhydrAMINE (BENADRYL) 25 mg capsule Take 25 mg by mouth as needed.       divalproex (DEPAKOTE) 500 MG 24 hr tablet Take 1,000 mg by mouth daily.   2     doxycycline (VIBRA-TABS) 100 MG tablet Take 1 tablet by mouth 2 (two) times a day.   1     EPINEPHrine (EPIPEN) 0.3 mg/0.3 mL atIn Inject 0.6 mL into the " shoulder, thigh, or buttocks as needed.   0     fenofibrate (TRICOR) 145 MG tablet Take 1 tablet by mouth daily.  3     furosemide (LASIX) 40 MG tablet Take 1 tablet (40 mg total) by mouth daily. For chronic edema  0     levothyroxine (SYNTHROID, LEVOTHROID) 112 MCG tablet Take 1 tablet by mouth daily.  3     liraglutide (VICTOZA) 0.6 mg/0.1 mL (18 mg/3 mL) PnIj injection 0.3 ml injected daily (1.8mg). 9 mL 3     loxapine (LOXITANE) 5 MG capsule Take 5 mg by mouth daily.       MULTIVITAMIN tablet Take 1 tablet by mouth daily.  3     omeprazole (PRILOSEC) 20 MG capsule Take 1 capsule (20 mg total) by mouth Daily before breakfast. For GERD  0     OYSTER SHELL CALCIUM 500 500 mg calcium (1,250 mg) tablet Take 1 tablet by mouth daily.  11     No current facility-administered medications for this visit.        Allergies   Allergen Reactions     Fiorinal [Butalbital-Aspirin-Caffeine] Anaphylaxis and Swelling     Clindamycin Rash     Rash on neck - not raised      Ancef [Cefazolin]      Aspirin (Tartrazine Only) Other (See Comments)     Eyes swell shut     Ativan [Lorazepam] Swelling     arms     Carbamazepine Other (See Comments)      (tegretol) Arm swelling     Codeine Swelling     Delsym Hives     Ibuprofen Other (See Comments)     Swelling, eyes swell shut     Latex Swelling     Lithium Analogues Swelling     Red/swollen arms     Oxcarbazepine Other (See Comments)     (trileptal) Arm swelling     Venom-Honey Bee Swelling     Unknown     Augmentin [Amoxicillin-Pot Clavulanate] Rash     Rash on neck - not raised     Cefdinir Swelling     (omnicef) Arm swelling  Tolerated cefazolin 5/25/15, and tolerated a course of Keflex on discharge       Topiramate Swelling and Rash     topamax       Social History     Social History     Marital status: Single     Spouse name: N/A     Number of children: 0     Years of education: N/A     Occupational History     Disability      Social History Main Topics     Smoking status: Never  Smoker     Smokeless tobacco: Never Used     Alcohol use No     Drug use: No     Sexual activity: No     Other Topics Concern     Not on file     Social History Narrative        .  Not working.   No kids.       Family History   Problem Relation Age of Onset     Diabetes Mother      Hypothyroidism Mother      Cancer Mother      Sleep apnea Mother      Snoring Mother      Heart disease Father      Diabetes Sister      Edema Sister      No Medical Problems Sister      Diabetes Maternal Grandfather        Review of Systems:  Bushra Buck no new numbess, tingling or weakness, redness or rashes, fevers, new masses, abdominal bloating or discomfort, unexplained weight loss, increased pain, new ulcers, shortness of breath and chest pain  Full 12 point review of systems was completed.    Imaging:  US VENOUS LEG RIGHT  4/3/2016 1:58 AM  INDICATION: Increasing peripheral leg pain and edema  TECHNIQUE: Routine exam without and with compression, augmentation, and duplex utilizing 2D gray-scale imaging, Doppler interrogation with color-flow and spectral waveform analysis.  COMPARISON: None.  FINDINGS: The common femoral, femoral, popliteal, and segmentally visualized calf veins were evaluated. The opposite CFV was also included in the evaluation.  Right leg veins are negative for deep venous thrombosis. No popliteal cysts.  IMPRESSION:   CONCLUSION:  1. Right leg veins are negative for DVT although exam is limited due to body habitus and edema.    XR FOOT RIGHT 3 OR MORE VWS  4/3/2016 2:04 AM  INDICATION: Foot pain.   COMPARISON: None.  FINDINGS: Hammertoe deformity. Plantar heel spur. No fracture.    Physical Exam:  Vitals:    01/08/18 0903   BP: 126/70   Pulse: (!) 103   Temp: 99  F (37.2  C)   SpO2: 96%     BMI 69.72    Circumferential measures:    Vasc Edema 11/10/2016 2/9/2017 4/20/2017 10/25/2017 1/8/2018   Right just above MTP 27 26.8 27.5 27.5 27   Right Ankle 36.4 36.4 37.5 36 36   Right Widest Calf 71 66  66 65 64.5   Right Thigh Up 10cm 74 74 74 74 74   Left - just above MTP 31.5 30.5 32.6 32 30.5   Left Ankle 39 36 35 37 36.5   Left Widest Calf 73 65 65 69 65   Left Thigh Up 10cm 74 74 74 74 74     Measures are decreasing nicely.     General:  37 y.o. female in no apparent distress.  Alert and oriented x 3.  Cooperative.   More garrulous today.       Integumentary: Legs show normal skin with significant continued softening and decreased fibrosis. No rubor or calor.   Slight areas of excoriations on both legs with eschar also noted on arms.  These areas appear to be in areas of papular small erythematous lesions on arms and legs.  No discharge or odor.   No pain to palpation. + Stemmer's sign in the feet bilaterally.      Sensation: Intact to pinprick and light touch in the legs bilaterally.    Strength:  Normal knee flexion/knee extension, ankle dorsiflexion and great toe extension bilaterally.     Temitope Odell MD, ABWMS, FACCWS, [facility]  Medical Director Wound Care and Lymphedema  [facility]  [phone]

## 2021-06-16 NOTE — PATIENT INSTRUCTIONS - HE
Plan:  1. Great work with more mindful eating and walking.  You're down about 36 lbs from your heaviest weight.  2. Aiming for 30grams of lean protein at 3 meals daily, every 4.5-5.5 hours to reduce the risk of low blood sugar. Avoid sugary snacks/treats and hydrate well with water, 64-80oz/day is a good goal.  3. Continue to follow up with your lymphedema and endocrine clinic as usual.         LEAN PROTEIN SOURCES  Getting 20-30 grams of protein, 3 meals daily, is appropriate for most people, some need more but more than about 40 grams per meal is not useful.  General rule is drinking one ounce of water per gram of protein eaten over the course of the day:  70 grams of protein each day, drink 70 oz of water.  Protein Source Portion Calories Grams of Protein                           Nonfat, plain Greek yogurt    (10 grams sugar or less) 3/4 cup (6 oz)  12-17   Light Yogurt (10 grams sugar or less) 3/4 cup (6 oz)  6-8   Protein Shake 1 shake 110-180 15-30   Skim/1% Milk or lactose-free milk 1 cup ( 8 oz)  8   Plain or light, flavored soymilk 1 cup  7-8   Plain or light, hemp milk 1 cup 110 6   Fat Free or 1% Cottage Cheese 1/2 cup 90 15   Part skim ricotta cheese 1/2 cup 100 14   Part skim or reduced fat cheese slices 1 ounce 65-80 8     Mozzarella String Cheese 1 80 8   Canned tuna, chicken, crab or salmon  (canned in water)  1/2 cup 100 15-20   White fish (broiled, grilled, baked) 3 ounces 100 21   [place]/Tuna (broiled, grilled, baked) 3 ounces 150-180 21   Shrimp, Scallops, Lobster, Crab 3 ounces 100 21   Pork loin, Pork Tenderloin 3 ounces 150 21   Boneless, skinless chicken /turkey breast                          (broiled, grilled, baked) 3 ounces 120 21   [place], [place], [place], and Venison 3 ounces 120 21   Lean cuts of red meat and pork (sirloin,   round, tenderloin, flank, ground 93%-96%) 3 ounces 170 21   Lean or Extra Lean Ground Turkey 1/2 cup 150 20   90-95% Lean Turkey Burger 1  abby 140-180 21   Low-fat casserole with lean meat 3/4 cup 200 17   Luncheon Meats                                                        (turkey, lean ham, roast beef, chicken) 3 ounces 100 21   Egg (boiled, poached, scrambled) 1 Egg 60 7   Egg Substitute 1/2 cup 70 10   Nuts (limit to 1 serving per day)  3 Tbsp. 150 7   Nut [place] (peanut, almond)  Limit to 1 serving or less daily 1 Tbsp. 90 4   Soy Burger (varies) 1  15   Garbanzo, Black, [place] Beans 1/2 cup 110 7   Refried Beans 1/2 cup 100 7   Kidney and Lima beans 1/2 cup 110 7   Tempeh 3 oz 175 18   Vegan crumbles 1/2 cup 100 14   Tofu 1/2 cup 110 14   Chili (beans and extra lean beef or turkey) 1 cup 200 23   Lentil Stew/Soup 1 cup 150 12   Black Bean Soup 1 cup 175 12             Example Meal Plan for a 6906-8752 Calorie Diet:    In order to fuel your weight loss properly and avoid hunger-induced overeating later in the day, for your height and weight, you will enjoy the most success by following the diet below or similar with adjustments based on your particular tastes and preferences.  Exercise may influence speed, amount of weight loss further.     I recommend getting into a meal routine and keeping it similar day to day in the beginning so you don t have to think too hard about what you re going to make/eat.  Keep snacks healthy, ideally containing protein and some vegetables.  Non-processed food is preferable to packaged items.  Eat at least a few crunchy green vegetables if having a snack, which should be 2-3 hours after your mealtimes(prepare these ahead of time for ease of use).  Drink 64 oz -80 oz of water daily for most, some of you will need more and we'll discuss it at your visit if that is the case.  When changing our diet and reducing our intake,  we can often mistake thirst for hunger or just have some grazing habits we have to break ('bored/mindless eating') and a glass of water and reconsideration of our hunger is often all that is  needed.  Having the urge is not the problem, but watching it pass by without acting on it is the goal.    If you re having hunger problems, add a protein drink/snack to your morning hours or afternoon snack with at least 20grams of protein and not too much sugar (under 10g).  A carton of higher protein/low sugar yogurt can work as well.  If the urge to snack is overwhelming and not satiated, try going for a 10 minute walk/exercise, come home and drink a glass of water and if still hungry, have a  calorie snack (handful of raw/sprouted nuts, veggies and string cheese, protein bar, etc).  Savor it.    It is better to have a large breakfast, a moderate lunch and a smaller dinner to fuel your day.  People lose 10-15% more weight during their weight loss season with this strategy. Optimizing your protein intake at each meal will further keep you more satisfied while eating less food overall.  Getting exercise in early has also been shown to offer the best results (before breakfast ideally but anytime is the right time to exercise if that is not an option for you).    To make sure you re getting adequate vitamins and minerals during weight loss, I recommend one complete multivitamin a day of your choice.  Consider a probiotic and taking some vitamin D 2000 IU daily.    Let supper be your last meal of the day and ideally try to have at least 12 hours between supper and breakfast the next day to tap into some beneficial overnight fasting dynamics.  Water in the evening is fine, unsweetened, non caffeinated herbal tea is helpful as well.  Consolidating your meals within a 8-12 hour period of your day will help tap into these additional metabolic benefits and tends to keep your appetite up for breakfast, further helping to stay on track.  For most of my patients I don't recommend an intermittent fasting style diet (many find it hard to fit in their lifestyle) but an overnight fast is very doable for most patients and  helps regulate our hunger drives a little better.  This makes it very important to nail good intake at all three meals to feel satisfied/energized and still lose weight.  If evening snacking desires are high, consider a glass of fiber supplement for some additional fullness (metamucil or similar). Most of us don't get the 25-30 grams per day of fiber that promotes good gut health/satiety.  Benefiber, metamucil, citrucel are reasonable/affordable options for most people.  Inulin, chicory, psyllium husk are reasonable options but start slow and low in the dose to avoid gas/bloating until your gut gets acclimated (ramping up to 5-10 grams per day of supplemental fiber after 3-4 weeks if needed).      Example Meal Plan:  Breakfast: 450-475 Calories  1 egg cooked on low in olive oil:   calories.  5oz Greek Yogurt ([place]age plain classic: ~150 alexsandra)  Handful of Berries of your choice (about a calorie per berry or 20-40cal per handful)    cup(cooked) of  old fashioned oatmeal or 1/2 cup(cooked) steel cut oats. (150 alexsandra)  Sprinkle amount of brown sugar and a pat of butter. (40 alexsandra)  Glass of  Water  Black coffee or unsweetened Tea (0calories).      2-3 hours Later Snack: (195 calories).  Glass of water  One string Cheese (80 calories) or 4 oz creamed cottage cheese (115 calories) with  Crunchy Celery sticks (less than 10 calories per large stalk) 2 stalks. (20 calories)    of a  Large Banana or   of a Large Apple (60 calories):  eat second half at lunch or afternoon snack.     Lunch:300 -350 calories   Chicken Breast  (baked/broiled/roasted/grilled)  4-6 oz.  (125-180 alexsandra), BBQ sauce/hot sauce/mustard/seasoning is free. Just use a reasonable amount. Or a can of tuna with 1 tablespoon mayonnaise.  Salad: lettuce, any other veggies (cucumbers, green peppers/celery you like and a small drizzle of dressing to just flavor.  Go as big on the veggies as you like,  as they are practically calorie free.   A whole, 8 inch cucumber  is 45 calories, a whole green pepper is 23 calories, a stalk of celery is 9 calories.  [place] Dressing is 60 calories per tablespoon..so moderate your desired dressing or do a drizzle of olive oil and splash of balsamic vinegar on top,  Total calories unlikely to be over 150 even with dressing.  Glass of Water.    Option for lunch is meal replacement protein drink/smoothie.  Need at least 20 grams of protein and eat the rest of your apple/banana from the morning snack.      Afternoon Snack: 150-200 calories   Cheese Stick or cottage cheese again  and a fresh fruit OR  Granola Bar (protein Bar acceptable if under 200 calories OR  Homemade smoothies:  8oz skim milk,  a handful of berries (fresh or frozen and a serving of protein powder such as BiPro or Sofia sWhey for example.  If you don't like dairy, make with 8oz water, one small banana, handful of berries and the protein powder, add any veggies you want as well:  roughly 200 calories.   Glass of Water    Dinner: 325 calories  4oz of fresh, [place] salmon.  Broiled (salt/pepper/dill) for about 8-8.5 minutes (200calories) or  4oz filet mignon steak or sirloin steak  Salad or vegetable sautéed lightly in olive oil or   Broccoli 1.5  cups chopped and steamed  or micro-waved in a little water (75 calories)  Glass of Water,    Cup of herbal tea (unsweetened, caffeine free)      Herbs and seasonings are encouraged to flavor your foods/vegetables.  Make your food delicious.      Tips for Success:  1.  Prepare proteins ahead of time (broil chicken breasts in bulk so you can grab and go), steel cut oats/lentils can be stored in casserole dish/bowl in the fridge for quick scoop in the morning and rewarm in microwave, make use of crock pot recipes (watch salt content).  Making meals that cover 3-4 future meals is an easy way to stay on track.  2.  Drink a 8-12 oz glass of water every 2-3 hours when awake.  We often mistake hunger for thirst, especially when losing  "weight.  3. Remember your Reward and Motivation when things get hard.  4.  Weigh yourself every morning and record, you'll stay on track better and learn how our biorhythms, diet and elimination patterns show up on the scale. Don't worry about 1 or 2 day patterns, but when on track you'll notice good trend downward of weight over 3-4 day segments.  Plateaus tend to resolve after 4-8 days in most cases if you stay consistent with your plan.  These are natural and part of weight loss, even if you're perfect with your plan execution.  5. Call if problems/concerns.  [employer] is a great tool to stay in touch and provide weekly outside accountability. Check in with questions or if you want to brag.  6.  Find a handful of meals/foods that keep you on track and feeling good and get into a routine that is sustainable for you.  It's OK to have a routine that works for you.  7.  Consider taking a complete multivitamin just to make sure all micronutrients are adequate during weight loss.  8. If losing hair/brittle nails it usually means you are not taking enough protein.  Minimum goal is 60 grams daily of protein for smaller women, 80 grams a day for men. Consider taking Biotin as supplement or a \"Hair and Nail\" multivitamin.      "

## 2021-06-16 NOTE — TELEPHONE ENCOUNTER
Caller: Patient    Provider: MD Temitope Odell    Detailed reason for call: Patient has an increase in swelling of the left leg along with some drainage    Best phone number to contact: [phone]    Best time to contact: Any time    Ok to leave a detailed message: Yes    Ok to speak to authorized person if needed: N/A      (Noted to patient if reason is related to wound or incision, to please send a photo via email or [employer].)

## 2021-06-16 NOTE — PROGRESS NOTES
"Compression Applied to Bilateral  Tubigrip Size J/K  Compression Applied to Bilateral  Velcro\", Compression Stockings    "

## 2021-06-16 NOTE — PROGRESS NOTES
"Bushra Buck is a 41 y.o. female who is being evaluated via a billable telephone visit.      The patient has been notified of following:     \"This telephone visit will be conducted via a call between you and your physician/provider. We have found that certain health care needs can be provided without the need for a physical exam.  This service lets us provide the care you need with a short phone conversation.  If a prescription is necessary we can send it directly to your pharmacy.  If lab work is needed we can place an order for that and you can then stop by our lab to have the test done at a later time.    Telephone visits are billed at different rates depending on your insurance coverage. During this emergency period, for some insurers they may be billed the same as an in-person visit.  Please reach out to your insurance provider with any questions.    If during the course of the call the physician/provider feels a telephone visit is not appropriate, you will not be charged for this service.\"    Patient has given verbal consent to a Telephone visit? Yes    What phone number  would you like to be contacted at? [phone]     Patient would like to receive their AVS by AVS Preference: Mail a copy.    Additional provider notes: some weight gain in March to 370, down to 365 lbs and some inflammatory changes and edema retention again.Doing some walks, some fluctuating blood sugars. On amaryl now. Trulicity now up to 3.0mg/week. Friend Elizabeth is helping her out more, may be moving in the future.Walking.    Plan:  1. Great work with more mindful eating and walking.  You're down about 36 lbs from your heaviest weight.  2. Aiming for 30grams of lean protein at 3 meals daily, every 4.5-5.5 hours to reduce the risk of low blood sugar. Avoid sugary snacks/treats and hydrate well with water, 64-80oz/day is a good goal.  3. Continue to follow up with your lymphedema and endocrine clinic as usual.         Phone call " duration: 20 minutes    Gabby Herrera LPN

## 2021-06-16 NOTE — TELEPHONE ENCOUNTER
Dr. Odell had cancellation on Monday at 8:45am. Left message for Bushra we are saving the time slot for her, and to call back if this time does not work for her.
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
## 2021-06-16 NOTE — PATIENT INSTRUCTIONS - HE
Please follow up with Bariatric Care [phone].    Wound Care:  Left leg-hibiclens wash then NS rinse, then alginate with silver then ABD then 4 layer wrap.  Change twice a week with your home care.    - Please call your homecare if your compression wraps fall more than 1-2 inches below the bend of the knee. Remove the wraps if you experience any shortness of breathe or notice your toes turning blue/purple and then give us a call. Call if they are too painful. Call if they get wet. If it is a weekend and the wraps fall down, are too painful, or get wet take the wraps off and put on another form of compression. Compression such as velcro wraps, compression stockings, short stretch bandages, or tubular compression. Apply one of these until you can be seen in clinic. Please call us if you have any questions [phone].    - Treatment:  Layered Compression Bandaging (4-layer)    What is it?  The layered compression bandaging has a layer of absorbent material that will soak up drainage.     Why we do it.   This is done to treat swelling, wounds, or both.  This will in turn help circulation and healing.    How to care for your bandages.  The wraps need to be kept dry. If  the wraps become wet, remove them and call the clinic to have another wrap applied.    What to expect.  It is common for the wraps to be uncomfortable at the beginning. The first two days are usually the hardest; then they will become more comfortable.       Elevating your legs will help the discomfort. Try to elevate your legs as much as possible.    If rest and elevation does not help your discomfort, call your provider.  If your provider is not available you can remove the wrap and leave a message for further instructions.    - Swelling and Compression Therapy    Swelling in the legs can be caused by many reasons. No matter what the reason, treatment usually includes some type of compression. This may be done with a support sock, dressing, ace  wrap, or layered wraps.     It is important to treat the swelling for many reasons. If the swelling is not treated you may develop blisters that can lead to ulcerations. This is caused when extra fluid goes into tissue causing damage and blocking blood flow to the tissue.     It is important that you wear your compression every day, including days that you will be seen in clinic.     Compression is often the most important part of treating leg wounds. Without controlling the swelling it is often not possible to heal wounds.     Going without compression for even brief periods of time can be damaging to your legs and your health.  Your compression should be put on first thing in the morning. Take the compression off at night only when instructed by your care provider to do so. Sometimes wearing compression 24 hours a day will be recommended.       If you are having difficulty wearing your compression it is important to notify your care provider so that other options may be reviewed.    Thank you for choosing [facility]. Please call us if you have any questions [phone].

## 2021-06-16 NOTE — TELEPHONE ENCOUNTER
Called and spoke with patient regarding changing abx sulfa to Doxyxycline. Patient did not have any question and was going to  new antibiotic.    ----- Message from Temitope Odell MD sent at 3/25/2021  5:15 PM CDT -----  Please call patient and let her know that based on her cultures she should stop the Sulfa medication and I want her to switch to doxycycline.  This script has been sent.

## 2021-06-16 NOTE — PATIENT INSTRUCTIONS - HE
Sent prescription for doxycyline 100mg two times a day for 10 days; avoid dairy when taking this medication    Sent referral for new shoes and insoles    Sent referral for new velcro wraps    Sent order for biotab lymphedema pump      Continue to wear your compression stockings or velcro wraps every day; put them on first thing in the morning and remove at bedtime    Replace your compression stockings every 3-4 months; these garments will lose their elasticity and become ineffective    Replace velcro wraps every 1-2 years    Elevate your legs periodically throughout the day, 30-60 minutes 1-3 times per day    Apply lotion to your legs 1-2 times per day; some good name brands are Cetaphil, Sarna, Aveeno, VaniCream, CeraVe    Continue to walk and exercise    If you are taking a diuretic continue to do so at the direction of your primary care provider    Make an appointment at the vascular clinic again if you have worsening swelling, need a prescription for new compression garments; and/or develop new wounds

## 2021-06-17 NOTE — PATIENT INSTRUCTIONS - HE
Patient Instructions by Luciana Luke RN at 4/8/2019  2:00 PM     Author: Luciana Luke RN Service: -- Author Type: Registered Nurse    Filed: 4/8/2019  3:27 PM Encounter Date: 4/8/2019 Status: Addendum    : Luciana Luke RN (Registered Nurse)    Related Notes: Original Note by Luciana Luke RN (Registered Nurse) filed at 4/8/2019  2:58 PM       For Velcro Compression:  [facility]   [facility]  [facility], [address]  [place], [place].  Phone: [phone]    Swelling in the legs can be caused by many reasons.  Treatment usually includes some type of compression. We are prescribing some velcro compression garments. Your compression should be put on first thing in the morning and taken off at night. It is  important to wear them with long periods of sitting/standing, long car rides or if you will be flying. Going without compression for even brief periods of time can be damaging to your legs and your health.  Compression Velcro may need to be replaced every 9-12 months.  Often the liners and socks need to be replaced every three or four months.  The neoprene velcro may last up to 2 years.  If the velcro becomes worn a tailor may be able to repair it for you inexpensively.  They do not need to be worn at night while in bed.     If we have seen you within the year we can refill your compression.  Otherwise, your primary care provider can refill them. Call us with any problems or questions.     Velcro Compression Wraps Instructions    1) Slide leg liner or Tubigrip onto the leg before applying the velcro warp.     2) Apply the velcro wrap over the leg liner. Pull bands to tighten for compression.     3) Start at the tightening from bottom of the velcro wrap.  It should start just above the ankle bone and the top should reach to just below the knee crease.    4) Angle each band individually to achieve a snug and wrinkle-free fit. Do  not tuck the bands and the velcro should never touch the skin.    5) Lastly apply the anklet sock over the velcro wrap if instructed to do so. This should be worn over the velcro wrap due to sensitivity from the ribbed edge.    6) To remove the velcro wrap, undo each band and fold it back on itself. If done properly the garment should be ready for easy application.    7) To extend the life of velcro wraps, hand wash and hang dry.         DO NOT STRAP VELCRO TO LEG LINER OR TUBRIGRIP!!!  1.2.        3.4.        Patient Education     Triamcinolone skin cream, ointment, lotion, or aerosol  Brand Names: Aristocort, Aristocort A, Aristocort HP, Cinalog, Cinolar, DERMASORB TA Complete, Flutex, Kenalog, Pediaderm TA, Triacet, Trianex, Triderm  What is this medicine?  TRIAMCINOLONE (trye am SIN oh lone) is a corticosteroid. It is used on the skin to reduce swelling, redness, itching, and allergic reactions.  How should I use this medicine?  This medicine is for external use only. Do not take by mouth. Follow the directions on the prescription label. Wash your hands before and after use. Apply a thin film of medicine to the affected area. Do not cover with a bandage or dressing unless your doctor or health care professional tells you to. Do not use on healthy skin or over large areas of skin. Do not get this medicine in your eyes. If you do, rinse out with plenty of cool tap water. It is important not to use more medicine than prescribed. Do not use your medicine more often than directed.  Talk to your pediatrician regarding the use of this medicine in children. Special care may be needed.  Elderly patients are more likely to have damaged skin through aging, and this may increase side effects. This medicine should only be used for brief periods and infrequently in older patients.  What side effects may I notice from receiving this medicine?  Side effects that you should report to your doctor or health care professional as  soon as possible:    burning or itching of the skin    dark red spots on the skin    infection    painful, red, pus filled blisters in hair follicles    thinning of the skin, sunburn more likely especially on the face  Side effects that usually do not require medical attention (report to your doctor or health care professional if they continue or are bothersome):    dry skin, irritation    unusual increased growth of hair on the face or body  What may interact with this medicine?  Interactions are not expected.  What if I miss a dose?  If you miss a dose, use it as soon as you can. If it is almost time for your next dose, use only that dose. Do not use double or extra doses.  Where should I keep my medicine?  Keep out of the reach of children.  Store at room temperature between 15 and 30 degrees C (59 and 86 degrees F). Do not freeze. Throw away any unused medicine after the expiration date.  What should I tell my health care provider before I take this medicine?  They need to know if you have any of these conditions:    diabetes    infection, like tuberculosis, herpes, or fungal infection    large areas of burned or damaged skin    skin wasting or thinning    an unusual or allergic reaction to triamcinolone, corticosteroids, other medicines, foods, dyes, or preservatives    pregnant or trying to get pregnant    breast-feeding  What should I watch for while using this medicine?  Tell your doctor or health care professional if your symptoms do not start to get better within one week. Do not use for more than 14 days. Do not use on healthy skin or over large areas of skin. Tell your doctor or health care professional if you are exposed to anyone with measles or chickenpox, or if you develop sores or blisters that do not heal properly.  Do not use an airtight bandage to cover the affected area unless your doctor or health care professional tells you to. If you are to cover the area, follow the instructions carefully.  Covering the area where the medicine is applied can increase the amount that passes through the skin and increases the risk of side effects.  If treating the diaper area of a child, avoid covering the treated area with tight-fitting diapers or plastic pants. This may increase the amount of medicine that passes through the skin and increase the risk of serious side effects.  NOTE:This sheet is a summary. It may not cover all possible information. If you have questions about this medicine, talk to your doctor, pharmacist, or health care provider. Copyright  2018 [employer]

## 2021-06-17 NOTE — TELEPHONE ENCOUNTER
Bushra states she does not have a tape , wanting to know if company will come out to measure her. Writer will call [employer]

## 2021-06-17 NOTE — TELEPHONE ENCOUNTER
Bushra states that she has a friend who might have a tapemeasure this Wednesday and will call the vascular clinic if she shows up with one and we can direct them on how to measure then.

## 2021-06-17 NOTE — PROGRESS NOTES
S: Patient was seen in [place] to be remeasured for a new pair of custom Medi Circaid Juxtafit Premium calf pieces. RX on-file is current.    O: Goal is to remeasure Bushra for a compression garment that will help control her lymphedema. Observations show there is swelling present from lymphatic fluid. The expected outcome with compliant use is to reduce swelling and control the patient s condition.     A: New measurements were taken for custom Medi Circaid Juxtafit Premium calf pieces like she has been wearing. Her measurements have gone down slightly. The new garments will drop ship directly from [facility] to her home address like has been done for previous orders.    P: Bushra is to call with any further needs.  Goal is to maintain a home program.

## 2021-06-17 NOTE — PATIENT INSTRUCTIONS - HE
Patient Instructions by Kelly Barrera RN at 4/22/2019  1:40 PM     Author: Kelly Barrera RN Service: -- Author Type: --    Filed: 4/22/2019  2:12 PM Encounter Date: 4/22/2019 Status: Signed    : Kelly Barrera RN (Registered Nurse)       Patient Education     Discharge Instructions for Cellulitis  You have been diagnosed with cellulitis. This is an infection in the deepest layer of the skin. In some cases, the infection also affects the muscle. Cellulitis is caused by bacteria. The bacteria can enter the body through broken skin. This can happen with a cut, scratch, animal bite, or an insect bite that has been scratched. You may have been treated in the hospital with antibiotics and fluids. You will likely be given a prescription for antibiotics to take at home. This sheet will help you take care of yourself at home.  Home care  When you are home:    Take the prescribed antibiotic medicine you are given as directed until it is gone. Take it even if you feel better. It treats the infection and stops it from returning. Not taking all the medicine can make future infections hard to treat.    Keep the infected area clean.    When possible, raise the infected area above the level of your heart. This helps keep swelling down.    Talk with your healthcare provider if you are in pain. Ask what kind of over-the-counter medicine you can take for pain.    Apply clean bandages as advised.    Take your temperature once a day for a week.    Wash your hands often to prevent spreading the infection.  In the future, wash your hands before and after you touch cuts, scratches, or bandages. This will help prevent infection.   When to call your healthcare provider  Call your healthcare provider immediately if you have any of the following:    Difficulty or pain when moving the joints above or below the infected area    Discharge or pus draining from the area    Fever of 100.4 F (38 C) or higher, or as directed by your  healthcare provider    Pain that gets worse in or around the infected     Redness that gets worse in or around the infected area, particularly if the area of redness expands to a wider area    Shaking chills    Swelling of the infected area    Vomiting   Date Last Reviewed: 8/1/2016 2000-2017 [employer]. [address] [place] [address]. All rights reserved. This information is not intended as a substitute for professional medical care. Always follow your healthcare professional's instructions.

## 2021-06-17 NOTE — PATIENT INSTRUCTIONS - HE
Wound Care:  For leg excoriations use bleach baths dilute and then comfeel then Velcro compression. After healed, just continue Velcro.      - Dilute Bleach Baths    TO MAKE  Use household bleach (such as Clorox ). Check the bottle to make sure that the concentration of bleach is about 6%. The bleach concentration may be labeled as sodium hypochlorite.   Fill up the tub with lukewarm water (about 40 gallons in a normal bathtub).  Pour   to   cup of bleach into the bath water for a normal full bathtub.  For smaller tubs, use two teaspoons of bleach per gallon of water.  Completely mix the added bleach in the water.    TO USE  For adults and children, soak in the water for about 10 minutes.  Thoroughly rinse the skin with fresh, clean, lukewarm water after the bleach bath.   Pat the skin dry gently and then put on medicine and/or moisturizer.   Repeat bleach baths two to three times a week.     PRECAUTIONS  Do not use concentrated bleach directly on the skin.  Do not let bleach mixture come in contact with eyes or scalp.  Do not drink dilute bleach mixture.  Drain unused portion right after each use.  Keep bleach bottle out of reach of children.

## 2021-06-17 NOTE — PROGRESS NOTES
Here for f/u non-surgical weight loss.  See flowsheet.     Ayesha Martinez RN, CBN  [facility]  P [phone]  F [phone]

## 2021-06-17 NOTE — PROGRESS NOTES
Bariatric Clinic Follow-Up Visit:    Bushra Buck is a 38 y.o.  female with Body mass index is 63.18 kg/(m^2).  presenting here today for follow-up on non-surgical efforts for weight loss. Original Intake visit occurred on 9/17/15 with a weight of 350lbs.  Along with diet and behavior changes, she has been using Victoza to assist her weight loss goals.  See her intake visit notes for details on identified contributors to weight gain in the past.    Weight:   Wt Readings from Last 2 Encounters:   05/07/18 (!) 323 lb 8 oz (146.7 kg)   02/22/18 (!) 313 lb (142 kg)    pounds  Height: 5' (1.524 m) (5/7/2018 12:50 PM)  Initial Weight: 350 lbs (5/7/2018 12:50 PM)  Weight: 323 lb 8 oz (146.7 kg) (5/7/2018 12:50 PM)  Weight loss from initial: 26.5 (5/7/2018 12:50 PM)  % Weight loss: 7.57 % (5/7/2018 12:50 PM)  BMI (Calculated): 63.2 (5/7/2018 12:50 PM)  SpO2: 100 % (2/22/2018  2:12 PM)    Comorbidities:  Patient Active Problem List   Diagnosis     Hypertension     History of pituitary surgery     Schizophrenia, schizoaffective, chronic with acute exacerbation     Lymphedema     Venous hypertension of lower extremity, bilateral     Hypothyroidism, unspecified hypothyroidism type     GERD (gastroesophageal reflux disease)     Prolactinoma     Psychosis     Perianal irritation     Acquired valgus deformity of both ankles     Flat feet, bilateral     Foot deformity, bilateral     Venous insufficiency of both lower extremities     Itching     Morbid obesity with BMI of 60.0-69.9, adult     Prader-Willi syndrome     Intellectual disability     Nightmares     Bipolar affective disorder, current episode manic with psychotic symptoms       Current Outpatient Prescriptions:      ARIPiprazole (ABILIFY) 15 MG tablet, Take 15 mg by mouth at bedtime., Disp: , Rfl:      blood glucose meter (GLUCOMETER), Use 1 each As Directed as needed. Dispense glucometer brand per patient's insurance at pharmacy discretion., Disp: , Rfl: 0      "blood glucose test strips, Use 1 each As Directed as needed. Dispense brand per patient's insurance at pharmacy discretion., Disp: , Rfl: 0     cabergoline (DOSTINEX) 0.5 mg tablet, Take 0.5 mg by mouth 2 (two) times a week., Disp: , Rfl:      calcium citrate-vitamin D3 (CITRACAL + D) 315-250 mg-unit per tablet, Take 2 tablets by mouth daily., Disp: , Rfl: 3     fenofibrate (TRICOR) 145 MG tablet, Take 1 tablet by mouth daily., Disp: , Rfl: 3     furosemide (LASIX) 40 MG tablet, Take 1 tablet (40 mg total) by mouth daily. For chronic edema, Disp: , Rfl: 0     generic lancets, Use 1 each As Directed as needed. Dispense brand per patient's insurance at pharmacy discretion., Disp: , Rfl: 0     levothyroxine (SYNTHROID, LEVOTHROID) 112 MCG tablet, Take 1 tablet (112 mcg total) by mouth daily., Disp: 30 tablet, Rfl: 0     MULTIVITAMIN tablet, Take 1 tablet by mouth daily., Disp: , Rfl: 3     omeprazole (PRILOSEC) 20 MG capsule, Take 1 capsule (20 mg total) by mouth Daily before breakfast. For GERD, Disp: , Rfl: 0     pen needle, diabetic 31 gauge x 5/16\" Ndle, Use 1 each As Directed daily. With daily Victoza injections., Disp: 100 each, Rfl: PRN     QUEtiapine (SEROQUEL) 100 MG tablet, Take 100 mg by mouth at bedtime., Disp: , Rfl:      dulaglutide (TRULICITY) 1.5 mg/0.5 mL PnIj, Inject 1.5 mg under the skin every 7 days., Disp: 4 Syringe, Rfl: 6     EPINEPHrine (EPIPEN) 0.3 mg/0.3 mL atIn, Inject 0.6 mL into the shoulder, thigh, or buttocks as needed. , Disp: , Rfl: 0      Interim: Since our last visit, she has gained 10 lbs, she's been tapering down on her Seroquel.   No other new psych meds that she recalls and is interested in switching her Victoza to Trulicity.  She's had one or two blood sugars in the high 100s (170-185mg/dl). Grandfather  last week. Working with OT once weekly, exercise bike.  Plan:   1.  Diet: following up with her dietician today.  2. Exercise: continue to work with OT and will look into " [facility] discounted membership.  3. Medication: will try to get Trulicity approved  4.  Stress Reduction:  Had very stressful/busy month  5. Goals:  Is right around 10% weight loss reduction, will continue working towards 300 lbs.  6. Insulin resistant:  Continue GLP1 agonist. Will try Trulicity. Discussed warning signs of pancreatitis.  7. Mental health: much more stable and tapering down on seroquel should help appetite control.  8. Lymphedema, stable. Some excoriations without cellulitis today to right leg.      We discussed [facility] Bariatric Basics including:  -eating 3 meals daily  -eating protein first  -eating slowly, chewing food well  -avoiding/limiting calorie containing beverages  -We discussed the importance of restorative sleep and stress management in maintaining a healthy weight.  -We discussed the National Weight Control Registry healthy weight maintenance strategies and ways to optimize metabolism.  -We discussed the importance of physical activity including cardiovascular and strength training in maintaining a healthier weight and explored viable options.    Most recent labs:  Lab Results   Component Value Date    WBC 12.2 (H) 02/05/2018    HGB 14.0 02/05/2018    HCT 41.6 02/05/2018     (H) 02/05/2018     02/05/2018     Lab Results   Component Value Date    CHOL 153 12/21/2017     Lab Results   Component Value Date    HDL 27 (L) 12/21/2017     Lab Results   Component Value Date    LDLCALC 76 12/21/2017     Lab Results   Component Value Date    TRIG 248 (H) 12/21/2017     No components found for: CHOLHDL  Lab Results   Component Value Date    ALT 42 02/05/2018    AST 38 02/05/2018    ALKPHOS 34 (L) 02/05/2018    BILITOT 0.5 02/05/2018     Lab Results   Component Value Date    HGBA1C 5.4 09/20/2016     Lab Results   Component Value Date    GEKFAXWJ57 623 09/17/2015     Lab Results   Component Value Date    DBZUZKWW64AC 10.2 (L) 09/20/2016     No results found for: FERRITIN  No results  found for: PTH  Lab Results   Component Value Date    28645 218 (H) 2015     No results found for: 7597  Lab Results   Component Value Date    TSH 2.80 2018     No results found for: TESTOSTERONE    DIETARY HISTORY    Positive Changes Since Last Visit: continuing with therapy and coming down in Seroquel dose.  Struggling With: exercise and consistency w/ diet.    Knowledgeable in Reading Food Labels: continuing to work with dietician  Getting Adequate Protein: errratically  Sleeping 7-8 hours/day na  Stress management , grandfather just , she's handling it well    PHYSICAL ACTIVITY PATTERNS:  Cardiovascular: some OT once weekly on exercise bike  Strength Training: na    REVIEW OF SYSTEMS  GENERAL/CONSTITUTIONAL:  No fever  HEENT:   na  CARDIOVASCULAR:   no chest pain  PULMONARY:   no SANDS  GASTROINTESTINAL:  No pain  UROLOGIC:  na  NEUROLOGIC:  na  PSYCHIATRIC:  Improved mental health.  MUSCULOSKELETAL/RHEUMATOLOGIC   na  ENDOCRINE:  na  DERMATOLOGIC:  Some picking spots on legs    PHYSICAL EXAM:  Vitals: /63  Pulse 97  Resp 20  Ht 5' (1.524 m)  Wt (!) 323 lb 8 oz (146.7 kg)  BMI 63.18 kg/m2  Height: 5' (1.524 m) (2018 12:50 PM)  Initial Weight: 350 lbs (2018 12:50 PM)  Weight: 323 lb 8 oz (146.7 kg) (2018 12:50 PM)  Weight loss from initial: 26.5 (2018 12:50 PM)  % Weight loss: 7.57 % (2018 12:50 PM)  BMI (Calculated): 63.2 (2018 12:50 PM)  SpO2: 100 % (2018  2:12 PM)    GEN: Pleasant, well groomed, in no acute distress  HEENT: PEERL, EOMI, airway clear .  NECK: No swelling.  HEART: Rhythm regular, rate regular, no murmur   LUNGS: Clear without crackles or wheezes. No cough.  ABDOMEN: obese.  EXTREMITIES: 2 plus palpable peripheral pulses, radial. Lymphedema wraps on, she took off the right one to show me some picking/scratching type wounds. Superficial.no erythema/cellultis.   NEURO: Alert and Oriented X3, normal gait and speech..  SKIN: no pallor or jaundice,  see above re: right leg.        25 minutes was spent in direct consultation, with over 50% of it spent in counseling regarding their plan for excess weight reduction and health modification.  Carlos Bowling MD  [facility]  12:58 PM

## 2021-06-17 NOTE — PROGRESS NOTES
Date of Service:  04/11/18    Date last seen:  01/08/18    PCP: Erasto Azul MD    Impression:   1. Leg swelling bilaterally-looks great   2. Venous stasis/insufficiency with history of ulcerations-doing very well  3. Acquired lymphedema   4. Recurrent cellulitis of legs- (3/5/15, 2/17/15, 5/15, 12/14/15, 3/16, 7/12/16, 8/18/2016)-no recurrence-infections under excellent control.  5. Bilateral foot deformities  6. Complicated by morbid obesity   7. Prediabetic    Plan:   1.  Questions were answered.   2.  Continue Flexitouch.  Doing very well.    3.  Continue compression.  Doing well.    4.  Lymphedema exercises to continue.  5.  Has good shoes and insoles. Continue to use.  6.  Continue to work with bariatrics.     7.  Follow up 8 months or when needed.      Time spent with patient 15 minutes with greater than 50% time in consultation, education and coordination of care.   ---------------------------------------------------------------------------------------------------------------------     Chief Complaint: Bilateral leg swelling     History of Present Illness:   Bushra Buck returns to the [facility] for follow up of her bilateral leg swelling due to severe venous hypertension, insufficieny with secondary lymphedema and recurrent cellulitis resulting from morbid obesity.  She does the flexitouch pump 5/7 weekdays and it continues to help greatly.  She continues to work with Dr. Bowling of bariatrics. She continues to slowly loose weight. She wears her velcro compression daily.  This is working well. She has a PCA helping her 5 days a week.  She has not had an infection since August 2016.  There has been no new numbness, tingling, weakness, masses, rashes, shortness of breath or chest pain.  She has been off the daily doxycycline since February 2018.   She has not had any pain.  She has not run a fever.  She is wearing her new shoes and insoles.  These are working well.  She sees   Latha as her PCP.   She continues to work with her counselor and psychiatric professionals for her schizophrenia.        Past Medical History:   Diagnosis Date     Bipolar 1 disorder      Dyslipidemia      GERD (gastroesophageal reflux disease)      Hypothyroid      Lymphedema of lower extremity 2008     Post traumatic stress disorder (PTSD)      Prolactinoma transsphenoid approach 2000.     Schizoaffective disorder, bipolar type        Past Surgical History:   Procedure Laterality Date     ABDOMINAL SURGERY  2000    fat removed tp plug dura when had a pituitary surgery.     anorectal fissure repair  2004, 2005     BRAIN SURGERY  2000    pituitary surgery     CARPAL TUNNEL RELEASE Bilateral      GANGLION CYST EXCISION      left arm     PITUITARY SURGERY  2014     SEPTOPLASTY      x 2     SINUS SURGERY Right 8/21/2015    Procedure: RIGHT MICHELLE BULLOSA;  Surgeon: Daniel Landeros MD;  Location: [facility];  Service:      TONSILLECTOMY         Current Outpatient Prescriptions   Medication Sig Dispense Refill     ARIPiprazole (ABILIFY) 15 MG tablet Take 15 mg by mouth at bedtime.       blood glucose meter (GLUCOMETER) Use 1 each As Directed as needed. Dispense glucometer brand per patient's insurance at pharmacy discretion.  0     blood glucose test strips Use 1 each As Directed as needed. Dispense brand per patient's insurance at pharmacy discretion.  0     cabergoline (DOSTINEX) 0.5 mg tablet Take 0.5 mg by mouth 2 (two) times a week.       calcium-vitamin D (CALCIUM-VITAMIN D) 500 mg(1,250mg) -200 unit per tablet Take 1 tablet by mouth 2 (two) times a day with meals.       fenofibrate (TRICOR) 145 MG tablet Take 1 tablet by mouth daily.  3     furosemide (LASIX) 40 MG tablet Take 1 tablet (40 mg total) by mouth daily. For chronic edema  0     generic lancets Use 1 each As Directed as needed. Dispense brand per patient's insurance at pharmacy discretion.  0     levothyroxine (SYNTHROID, LEVOTHROID) 112  "MCG tablet Take 1 tablet (112 mcg total) by mouth daily. 30 tablet 0     liraglutide (VICTOZA 2-DELANEY) 0.6 mg/0.1 mL (18 mg/3 mL) PnIj injection Inject 1.8 mg under the skin daily.       MULTIVITAMIN tablet Take 1 tablet by mouth daily.  3     omeprazole (PRILOSEC) 20 MG capsule Take 1 capsule (20 mg total) by mouth Daily before breakfast. For GERD  0     pen needle, diabetic 31 gauge x 5/16\" Ndle Use 1 each As Directed daily. With daily Victoza injections. 100 each PRN     QUEtiapine (SEROQUEL) 300 MG tablet Take 200 mg by mouth at bedtime.        EPINEPHrine (EPIPEN) 0.3 mg/0.3 mL atIn Inject 0.6 mL into the shoulder, thigh, or buttocks as needed.   0     hydrOXYzine (VISTARIL) 50 MG capsule Take 50 mg by mouth daily as needed.       triamcinolone (KENALOG) 0.5 % cream Apply topically 2 (two) times a day. Use as needed for itching. (Patient taking differently: Apply 1 application topically 2 (two) times a day. Use as needed for itching on legs) 15 g 2     No current facility-administered medications for this visit.        Allergies   Allergen Reactions     Fiorinal [Butalbital-Aspirin-Caffeine] Anaphylaxis and Swelling     Clindamycin Rash     Rash on neck - not raised      Ancef [Cefazolin]      Aspirin (Tartrazine Only) Other (See Comments)     Eyes swell shut     Ativan [Lorazepam] Swelling     arms     Carbamazepine Other (See Comments)      (tegretol) Arm swelling     Codeine Swelling     Delsym Hives     Ibuprofen Other (See Comments)     Swelling, eyes swell shut     Latex Swelling     Lithium Analogues Swelling     Red/swollen arms     Oxcarbazepine Other (See Comments)     (trileptal) Arm swelling     Venom-Honey Bee Swelling     Unknown     Augmentin [Amoxicillin-Pot Clavulanate] Rash     Rash on neck - not raised     Cefdinir Swelling     (omnicef) Arm swelling  Tolerated cefazolin 5/25/15, and tolerated a course of Keflex on discharge       Topiramate Swelling and Rash     topamax       Social History "     Social History     Marital status: Single     Spouse name: N/A     Number of children: 0     Years of education: N/A     Occupational History     Disability      Social History Main Topics     Smoking status: Never Smoker     Smokeless tobacco: Never Used     Alcohol use No     Drug use: No     Sexual activity: No     Other Topics Concern     Not on file     Social History Narrative        .  Not working.   No kids.       Family History   Problem Relation Age of Onset     Diabetes Mother      Hypothyroidism Mother      Cancer Mother      Sleep apnea Mother      Snoring Mother      Heart disease Father      Diabetes Sister      Edema Sister      No Medical Problems Sister      Diabetes Maternal Grandfather        Review of Systems:  Bushra Buck no new numbess, tingling or weakness, redness or rashes, fevers, new masses, abdominal bloating or discomfort, unexplained weight loss, increased pain, new ulcers, shortness of breath and chest pain  Full 12 point review of systems was completed.    Imaging:  US VENOUS LEG RIGHT  4/3/2016 1:58 AM  INDICATION: Increasing peripheral leg pain and edema  TECHNIQUE: Routine exam without and with compression, augmentation, and duplex utilizing 2D gray-scale imaging, Doppler interrogation with color-flow and spectral waveform analysis.  COMPARISON: None.  FINDINGS: The common femoral, femoral, popliteal, and segmentally visualized calf veins were evaluated. The opposite CFV was also included in the evaluation.  Right leg veins are negative for deep venous thrombosis. No popliteal cysts.  IMPRESSION:   CONCLUSION:  1. Right leg veins are negative for DVT although exam is limited due to body habitus and edema.    XR FOOT RIGHT 3 OR MORE VWS  4/3/2016 2:04 AM  INDICATION: Foot pain.   COMPARISON: None.  FINDINGS: Hammertoe deformity. Plantar heel spur. No fracture.    Physical Exam:  Vitals:    04/11/18 1228   BP: 118/74   Pulse: 100   Resp: 18   Temp: 98.6  F (37   C)     BMI 69.72    Circumferential measures:    Vasc Edema 2/9/2017 4/20/2017 10/25/2017 1/8/2018 4/11/2018   Right just above MTP 26.8 27.5 27.5 27 26   Right Ankle 36.4 37.5 36 36 36   Right Widest Calf 66 66 65 64.5 63.5   Right Thigh Up 10cm 74 74 74 74 -   Left - just above MTP 30.5 32.6 32 30.5 34   Left Ankle 36 35 37 36.5 62.5   Left Widest Calf 65 65 69 65 67.5   Left Thigh Up 10cm 74 74 74 74 -     Measures are continuing to decrease nicely.     General:  37 y.o. female in no apparent distress.  Alert and oriented x 3.  Cooperative.      Integumentary: Legs show normal skin with significant continued softening and decreased fibrosis. No rubor or calor.   No excoriations on skin of arms or legs. No discharge or odor.   No pain to palpation. + Stemmer's sign in the feet bilaterally.      Sensation: Intact to pinprick and light touch in the legs bilaterally.    Strength:  Normal hip flexion, knee flexion/knee extension, ankle dorsiflexion and great toe extension bilaterally.     Temitope Odell MD, ABWMS, FACCWS, [facility]  Medical Director Wound Care and Lymphedema  [facility]  [phone]

## 2021-06-17 NOTE — PROGRESS NOTES
Non-surgical Weight Loss Follow Up Diet Evaluation    Assessment:  This patient is a 38 y.o. female is being seen today for follow-up non-surgical nutritional evaluation. Today we reviewed the patients current eating habits and level of physical activity, and instructed on the changes that are required for successful weight loss outcomes.    Pt's Initial Weight: 350 lbs  Weight: 323 lb 8 oz (146.7 kg)  Weight loss from initial: 26.5  % Weight loss: 7.57 %  BMI: There is no height or weight on file to calculate BMI.  IBW: 102 lbs    Personal goal weight: 160 lb    Estimated RMR ([place]-St Jeor equation): 2233 calories  Protein requirements (.5grams to .9grams per pound IBW, 20-30% of calories, minimum of 60-80gm per day):  50-90 grams    Progress made since last visit: Pt drinking protein shake once a week for lunch meal. Pt reports cutting back on portion sizes, however is concerned her weight is going up.   Concerns: High caloric intake predicted from meal delivery program in addition to large portion sizes at dinner meal. Furthermore, poor fluid intake and carbonation intake.     Diet Recall/Time:   Breakfast: 2 eggs, apple, glass of skim milk (20g)   Am Snack: none   Lunch: Optage meal- lasagna, mixed, (15g)   Pm snack: SF pudding   Dinner: shrimp, french fries, 1 slice of bread (20g)   HS Snack: none     Per Diet recall estimated protein: 55-65 grams    Meals per week away from home: 1x/month      Recommended limiting eating out to no more than 2x/week.  Patient and I reviewed the importance of eating three consistent meals per day; as well as meal timing to be spaced 4-5 hours apart.  Snack choices: 100-150 calories (1-2x/day if physically hungry), incorporating a fruit/vegetable w/ protein source.    Meal Duration: 30 minutes    Portion Sizes problematic? YES per patient/diet recall - Pt reports dinner meal is the hardest.   Encouraged slowing meal times down, 20-30 minutes, chewing to applesauce consistency.    To aid in proper portion control and slow meal time down discussed consuming meals off smaller plates, use toddler/children utensils and set utensils down after each bite.    Protein, vegetables/fruits, carbohydrates:   The patient and I discussed the importance of including lean/low fat protein at each meal and limiting carbohydrate intake to less than 25% of plate volume.       Vitamins/Mineral Supplementation: MVI, calcium     Beverages (Type/Oz. per day)  Water: 32 oz   Coffee: none   Tea: none   Milk: 1 glass   Regular soda: none   Diet soda: 1-2 cans diet mountain dew   Juice: none   Chirag-Aid/lemonade/etc: none   Alcohol: none     Discussed the importance of adequate hydration and the goal of 64+ oz of fluid daily.   The patient understands the importance of  avoiding all sweetened and alcoholic drinks, and instead choosing 64 oz plain water.    Exercise  Pt goes on exercise bike 1x/week with OT, however no exercise routine established.     Pt's understands that 45-60 minutes of daily activity is an important part of weight loss success.   Encouraged pt to incorporate  strength training exercise in addition to cardiovascular exercise most days of the week.    PES statement:     1. (NC-3.3.5) Morbid Obese, class IV, BMI ?50 related to physical inactivity as evidenced by Infrequent, low-duration and or low intensity physical activity; and Large amounts of sedentary activities; no structured physical activity regimen.     Intervention:  Discussion:  1. Discussed the benefits of exercise for weight loss.   2. Reviewed lean protein sources. Aim for 3 oz of meat at lunch and dinner.   3. Educated pt on how to use hand to determine portion size   4. Plate Method: The patient and I discussed the importance of including lean/low  fat protein at each meal and limiting carbohydrate intake to less  than 25% of plate volume.    Instructions/Goals:   1. Include 20-30 gm protein at each meal.  2. Increase vegetable/fruit  intake, by having a vegetable or fruit with each meal daily. Recommended pt to increase vegetable/fruit intake to 4-5 servings daily.  3. Increase fluid intake to 64oz daily: choose plain or calorie/alcohol-free beverages.  4. Incorporate daily structured activity, 45-60 minutes most days of the week  5. Practice plate method: 1/2 plate lean/low fat protein source, vegetable/fruit, <25% of plate complex carbohydrates.  6. Carbohydrates from grain sources at meal times to be no more than 1 Carb Choice, ie: 15-20 gm total carbohydrate per serving  7. Practice eating off of smaller plates/bowls, chewing to applesauce consistency, taking 20-30 minutes to eat in a calm/relaxed environment without distractions of tv/email/cell phone.    Goals set by patient:  1. Walk park 2 times a week   2. Cut down on portion of pasta.     Handouts Provided:  Plate method  Goal sheet  Hand for portion size    Monitor/Evaluation:    Pt will f/u in 3 months with bariatrician and RD     Plan for next visit with RD:    Review plate method   Discuss hydration   Review carbohydrates/fiber  Exercise      Time In: 1:30pm  Time Out: 1:45pm      ABN signed: Yes

## 2021-06-17 NOTE — TELEPHONE ENCOUNTER
CHICHITCJANINE to see if she can get ABD measures.     ----- Message -----  From: Luciana Neal NP  Sent: 5/21/2021   8:02 AM CDT  To: [facility] Support Pool  Subject: abdominal measures                               Can you call the patient and get abdominal measures; you could mail her a measuring device is she does not have a tape measure needs to be 5cm above the navel and 25cm below the navel; let me know the measures and I can add to my note thanks    in order to get the upgraded pump sleeves they need abdominal measures as well. Just spoke to the [employer] rep today.     BORA

## 2021-06-18 NOTE — ANESTHESIA PREPROCEDURE EVALUATION
Anesthesia Evaluation      Patient summary reviewed   No history of anesthetic complications     Airway   Mallampati: III  Neck ROM: full   Pulmonary - normal exam    breath sounds clear to auscultation                         Cardiovascular - normal exam  Exercise tolerance: > or = 4 METS  (+) hypertension, , hypercholesterolemia,     Rhythm: regular  Rate: normal,         Neuro/Psych      Comments: Multiple.  See below    Endo/Other    (+) hypothyroidism, obesity,      GI/Hepatic/Renal    (+) GERD,        Other findings: Hypertension     History of pituitary surgery    Schizophrenia, schizoaffective, chronic with acute exacerbation    Lymphedema    Venous hypertension of lower extremity, bilateral    Hypothyroidism, unspecified hypothyroidism type    GERD (gastroesophageal reflux disease)    Prolactinoma    Psychosis    Perianal irritation    Acquired valgus deformity of both ankles    Flat feet, bilateral    Foot deformity, bilateral    Venous insufficiency of both lower extremities    Itching    Morbid obesity with BMI of 60.0-69.9, adult    Prader-Willi syndrome    Intellectual disability    Nightmares    Bipolar affective disorder, current episode manic with psychotic symptoms             Dental    (+) poor dentition and chipped                       Anesthesia Plan  Planned anesthetic: general mask  pnb for post op pain  ASA 3     Anesthetic plan and risks discussed with: patient    Post-op plan: routine recovery

## 2021-06-18 NOTE — ANESTHESIA CARE TRANSFER NOTE
Last vitals:   Vitals:    06/07/18 0851   BP: 114/66   Pulse: 70   Resp: 22   Temp: 36.4  C (97.6  F)   SpO2: 100%     Patient's level of consciousness is awake and drowsy  Spontaneous respirations: yes  Maintains airway independently: yes  Dentition unchanged: yes  Oropharynx: oropharynx clear of all foreign objects    QCDR Measures:  ASA# 20 - Surgical Safety Checklist: WHO surgical safety checklist completed prior to induction  PQRS# 430 - Adult PONV Prevention: NA - Not adult patient, not GA or 3 or more risk factors NOT present  ASA# 8 - Peds PONV Prevention: NA - Not pediatric patient, not GA or 2 or more risk factors NOT present  PQRS# 424 - [place]ne-op Temp Management: 4559F - At least one body temp DOCUMENTED => 35.5C or 95.9F within required timeframe  PQRS# 426 - PACU Transfer Protocol: - Transfer of care checklist used  ASA# 14 - Acute Post-op Pain: ASA14B - Patient did NOT experience pain >= 7 out of 10

## 2021-06-18 NOTE — ANESTHESIA POSTPROCEDURE EVALUATION
Patient: Bushra Buck  REMOVE RECURRENT GANGLION CYST LEFT VOLAR RADIAL WRIST  Anesthesia type: general    Patient location: phase 2  Last vitals:   Vitals:    06/07/18 0930   BP: 130/66   Pulse: 68   Resp: 10   Temp:    SpO2: 97%     Post vital signs: stable  Level of consciousness: awake and responds to simple questions  Post-anesthesia pain: pain controlled  Post-anesthesia nausea and vomiting: no  Pulmonary: unassisted, return to baseline  Cardiovascular: stable and blood pressure at baseline  Hydration: adequate  Anesthetic events: no    QCDR Measures:  ASA# 11 - [place]-op Cardiac Arrest: ASA11B - Patient did NOT experience unanticipated cardiac arrest  ASA# 12 - [place]-op Mortality Rate: ASA12B - Patient did NOT die  ASA# 13 - PACU Re-Intubation Rate: NA - No ETT / LMA used for case  ASA# 10 - Composite Anes Safety: ASA10A - No serious adverse event    Additional Notes:

## 2021-06-18 NOTE — ANESTHESIA PROCEDURE NOTES
Peripheral Block    Patient location during procedure: pre-op  Start time: 6/7/2018 7:38 AM  End time: 6/7/2018 7:41 AM  post-op analgesia per surgeon order as noted in medical record  Staffing:  Performing  Anesthesiologist: LINH PRICE  Performing CRNA: PADMAJA DIXON  Preanesthetic Checklist  Completed: patient identified, site marked, risks, benefits, and alternatives discussed, timeout performed, consent obtained, at patient's request, airway assessed, oxygen available, suction available, emergency drugs available and hand hygiene performed  Peripheral Block  Block type: brachial plexus, supraclavicular  Prep: ChloraPrep  Patient position: right lateral decubitus  Patient monitoring: cardiac monitor, heart rate, blood pressure and continuous pulse oximetry  Laterality: left  Injection technique: ultrasound guided    Ultrasound used to visualize needle placement in proximity to nerve being blocked: yes   Permanent ultrasound image captured for medical record  Sterile gel and probe cover used for ultrasound.    Needle  Needle type: Stimuplex   Needle gauge: 20G  Needle length: 4 in  no peripheral nerve catheter placed  Assessment  Injection assessment: negative aspiration for heme, no paresthesia on injection, incremental injection and no difficulty with injection  Additional Notes  No issues.  Block setting up nicely.  No signs of LAST.

## 2021-06-19 NOTE — PROGRESS NOTES
Here for f/u non-surgical weight loss.  See flowsheet.    Ayesha Martinez RN, CBN  [facility]  P [phone]  F [phone]

## 2021-06-19 NOTE — LETTER
Letter by Shiva Verde MD at      Author: Shiva Verde MD Service: -- Author Type: --    Filed:  Encounter Date: 6/11/2019 Status: (Other)         Erasto Azul MD  [address] [facility] [place] [address]                                  June 11, 2019    Patient: Bushra Buck   MR Number: [id number]   YOB: 1980   Date of Visit: 6/11/2019     Dear Dr. Latha MD:    Thank you for referring Bushra Buck to me for evaluation. Below are the relevant portions of my assessment and plan of care.    If you have questions, please do not hesitate to call me. I look forward to following Bushra along with you.    Sincerely,        Shiva Verde MD          CC  No Recipients  Shiva Verde MD  6/11/2019 10:52 AM  Sign at close encounter  [facility] Follow up    HPI:    39 y.o. year old female who returns for a follow up.  She has severe lymphedema issues and morbid obesity.  Her BMI is 67.9 she is followed by Dr. Odell and bariatric clinic..  Visit today about her concern for a vein on her lateral leg and left side.  She can see a small little branch she is worried about this and here for us to be get it    Allergies:Fiorinal [butalbital-aspirin-caffeine]; Clindamycin; Ancef [cefazolin]; Aspirin (tartrazine only); Ativan [lorazepam]; Carbamazepine; Codeine; Delsym; Ibuprofen; Latex; Lithium analogues; Oxcarbazepine; Venom-honey bee; Augmentin [amoxicillin-pot clavulanate]; Cefdinir; and Topiramate    Past Medical History:   Diagnosis Date   ? Allergic rhinitis    ? Bipolar 1 disorder (H)     followed by psych   ? Cellulitis, leg 2016   ? Dyslipidemia    ? Ganglion, left wrist    ? GERD (gastroesophageal reflux disease)    ? Herpes zoster    ? Hypertension    ? Hypothyroid    ? Lymphedema of lower extremity 2008   ? Morbid obesity (H)    ? Nightmares    ? Obstructive sleep apnea    ? Post traumatic stress disorder (PTSD)    ? Prader-Willi syndrome    ?  Prolactinoma (H) transsphenoid approach 2000.   ? PTSD (post-traumatic stress disorder)    ? Schizoaffective disorder, bipolar type (H)    ? Type 2 diabetes mellitus without complication, with long-term current use of insulin (H)    ? Venous stasis    ? Vitamin D deficiency        Past Surgical History:   Procedure Laterality Date   ? ABDOMINAL SURGERY  2000    fat removed tp plug dura when had a pituitary surgery.   ? anorectal fissure repair  2004, 2005   ? BRAIN SURGERY  2000    pituitary surgery   ? CARPAL TUNNEL RELEASE Bilateral    ? GANGLION CYST EXCISION  2010    left arm   ? GANGLION CYST EXCISION Left 6/7/2018    Procedure: REMOVE RECURRENT GANGLION CYST LEFT VOLAR RADIAL WRIST;  Surgeon: Angel Lopez MD;  Location: [facility];  Service:    ? PITUITARY SURGERY  2014   ? SEPTOPLASTY      x 2   ? SINUS SURGERY Right 8/21/2015    Procedure: RIGHT MICHELLE BULLOSA;  Surgeon: Daniel Landeros MD;  Location: [facility];  Service:    ? TONSILLECTOMY         CURRENT MEDS:  Current Outpatient Medications on File Prior to Visit   Medication Sig Dispense Refill   ? ARIPiprazole (ABILIFY) 15 MG tablet Take 15 mg by mouth at bedtime.     ? blood glucose meter (GLUCOMETER) Use 1 each As Directed as needed. Dispense glucometer brand per patient's insurance at pharmacy discretion.  0   ? blood glucose test strips Use 1 each As Directed as needed. Dispense brand per patient's insurance at pharmacy discretion.  0   ? bumetanide (BUMEX) 2 MG tablet Take 1 tablet (2 mg total) by mouth 2 (two) times a day at 9am and 6pm. 60 tablet 0   ? cabergoline (DOSTINEX) 0.5 mg tablet Take 0.5 mg by mouth 2 (two) times a week.     ? calcium citrate-vitamin D3 (CITRACAL + D) 315-250 mg-unit per tablet Take 2 tablets by mouth daily.  3   ? CHOLECALCIFEROL 1,000 unit tablet Take 2,000 Units by mouth daily.         1   ? EPINEPHrine (EPIPEN) 0.3 mg/0.3 mL atIn Inject 0.6 mL into the shoulder, thigh, or buttocks as  "needed.   0   ? fenofibrate (TRICOR) 145 MG tablet Take 1 tablet by mouth daily.  3   ? fluticasone (FLONASE) 50 mcg/actuation nasal spray Apply 2 sprays into each nostril daily.  6   ? generic lancets Use 1 each As Directed as needed. Dispense brand per patient's insurance at pharmacy discretion.  0   ? levoFLOXacin (LEVAQUIN) 500 MG tablet Take 1 tablet (500 mg total) by mouth daily for 10 days. 7 tablet 0   ? levothyroxine (SYNTHROID, LEVOTHROID) 112 MCG tablet Take 1 tablet (112 mcg total) by mouth daily. 30 tablet 0   ? loperamide (IMODIUM) 2 mg capsule Take 1 capsule (2 mg total) by mouth 4 (four) times a day as needed for diarrhea. 10 capsule 0   ? meclizine (ANTIVERT) 12.5 mg tablet Take 1-2 tablets by mouth as needed.  0   ? MULTIVITAMIN tablet Take 1 tablet by mouth daily.  3   ? NYAMYC powder Apply 1 application topically 2 (two) times a day.  2   ? omeprazole (PRILOSEC) 40 MG capsule Take 40 mg by mouth daily.  5   ? pen needle, diabetic 31 gauge x 5/16\" Ndle Use 1 each As Directed daily. With daily Victoza injections. 100 each PRN   ? potassium chloride (K-DUR,KLOR-CON) 20 MEQ tablet Take 1 tablet (20 mEq total) by mouth daily. 20 tablet 0   ? TRULICITY 1.5 mg/0.5 mL PnIj INJECT 1 PEN (1.5 MG) UNDER THE SKIN ONCE EVERY 7 DAYS.  6   ? valACYclovir (VALTREX) 1000 MG tablet TAKE 2 TABLETS BY MOUTH TWICE DAILY FOR ONE DAY AT ONSET OF COLD SORE  2   ? dulaglutide (TRULICITY) 1.5 mg/0.5 mL PnIj Inject 1.5 mg under the skin every 7 days. 4 Syringe 6   ? gentamicin (GARAMYCIN) 0.1 % ointment APPLY TO WOUND DAILY WITH DRESSING CHANGES  0   ? mupirocin (BACTROBAN) 2 % ointment APPLY TO WOUND EVERY DAY AFTER DRESSING CHANGES  0   ? triamcinolone (KENALOG) 0.5 % cream Apply topically 2 (two) times a day as needed. Do not put on open wounds.  Use for itching as needed. 15 g 2     No current facility-administered medications on file prior to visit.        Family History   Problem Relation Age of Onset   ? Diabetes " Mother    ? Hypothyroidism Mother    ? Cancer Mother    ? Sleep apnea Mother    ? Snoring Mother    ? Heart disease Father    ? Diabetes Sister    ? Edema Sister    ? No Medical Problems Sister    ? Diabetes Maternal Grandfather         reports that she has never smoked. She has never used smokeless tobacco. She reports that she does not drink alcohol or use drugs.    Review of Systems:  Negative except localized branch in the left lateral to posterior thigh region, severe leg swelling severe issues with lymphedema    OBJECTIVE:  Vitals:    06/11/19 1000   BP: 124/78   Pulse: 84   Resp: 18   Temp: 98  F (36.7  C)   TempSrc: Oral   Weight: (!) 348 lb (157.9 kg)   Height: 5' (1.524 m)     Body mass index is 67.96 kg/m .    EXAM:  GENERAL: This is a well-developed 39 y.o. female who appears her stated age  HEAD: normocephalic  HEENT: Pupils equal and reactive bilaterally  CARDIAC: RRR without murmur  CHEST/LUNG:  Clear to auscultation  ABDOMEN: Soft, nontender, nondistended, no masses    NEUROLOGIC: Focally intact, nonfocal  VASCULAR: Pulses intact, symmetrical upper and lower extremities.  Small vein left lateral leg.        LABS:  Lab Results   Component Value Date    WBC 10.4 06/03/2019    WBC 10.7 09/17/2015    HGB 12.7 06/03/2019    HCT 37.8 06/03/2019     (H) 06/03/2019     06/03/2019     INR/Prothrombin Time      Lab Results   Component Value Date    HGBA1C 5.2 04/09/2019     Lab Results   Component Value Date    ALT 60 (H) 04/30/2019    AST 47 (H) 04/30/2019    ALKPHOS 37 (L) 04/30/2019    BILITOT 0.6 04/30/2019            Assessment/Plan:   1. Venous insufficiency of both lower extremities  Is a small vein about 2 cm in length I do not think this is a major issue for her I would not recommend any imaging or work-up she is being cared for from lymphedema standpoint working on weight loss which is leg is a biggest thing she can do to help herself.  She wears compression on a regular basis and from  my standpoint I would not recommend any other further work-up  - Compression stockings    2. Symptomatic varicose veins of both lower extremities  same  - Compression stockings      Return if symptoms worsen or fail to improve.     Shiva Verde MD  [facility] Department of Surgery

## 2021-06-19 NOTE — PROGRESS NOTES
Non-surgical Weight Loss Follow Up Diet Evaluation    Assessment:  This patient is a 38 y.o. female is being seen today for follow-up non-surgical nutritional evaluation. Today we reviewed the patients current eating habits and level of physical activity, and instructed on the changes that are required for successful weight loss outcomes.    Pt's Initial Weight: 350 lbs  Weight: 338 lb (153.3 kg)  Weight loss from initial: 12  % Weight loss: 3.43 %  BMI: Body mass index is 66.01 kg/(m^2).  IBW: 102 lbs    Personal goal weight: 150 lb      Pt Active Problem List Diagnosis:     Patient Active Problem List   Diagnosis     Hypertension     History of pituitary surgery     Schizophrenia, schizoaffective, chronic with acute exacerbation (H)     Lymphedema     Venous hypertension of lower extremity, bilateral     Hypothyroidism, unspecified hypothyroidism type     GERD (gastroesophageal reflux disease)     Prolactinoma (H)     Psychosis     Perianal irritation     Acquired valgus deformity of both ankles     Flat feet, bilateral     Foot deformity, bilateral     Venous insufficiency of both lower extremities     Itching     Morbid obesity with BMI of 60.0-69.9, adult (H)     Prader-Willi syndrome     Intellectual disability     Nightmares     Bipolar affective disorder, current episode manic with psychotic symptoms (H)       Estimated RMR (Challis-St Jeor equation): 2233 calories  Protein requirements (.5grams to .9grams per pound IBW, 20-30% of calories, minimum of 60-80gm per day):  50-90 grams    Progress made since last visit: Pt reports having multiple infections (one leading to hospitalization), which led to unintentional weight loss. Pt reports eating a lot of cabbage soup, but being aware of protein. Pt switched to diabetic meals for optage to help reduce sugar intake.     Concerns: high carbohydrate intake from juice/soda intake.     Diet Recall/Time:   Breakfast: 2 eggs, 1 piece of fruit (15g)   Am Snack: none or  SF jello  Lunch: hamburger with bun, glass of low sugar Chirag-aid (20g)   Pm snack: cucumbers   Dinner: chicken, potato, vegetable (20g)   HS Snack: none     Protein: 55 grams    Meals per week away from home: 1x/week      Recommended limiting eating out to no more than 2x/week.  Patient and I reviewed the importance of eating three consistent meals per day; as well as meal timing to be spaced 4-5 hours apart.  Snack choices: 100-150 calories (1-2x/day if physically hungry), incorporating a fruit/vegetable w/ protein source.    Meal Duration: 30 minutes    Portion Sizes problematic? YES per patient/diet recall  Encouraged slowing meal times down, 20-30 minutes, chewing to applesauce consistency.   To aid in proper portion control and slow meal time down discussed consuming meals off smaller plates, use toddler/children utensils and set utensils down after each bite.    Protein, vegetables/fruits, carbohydrates:   The patient and I discussed the importance of including lean/low fat protein at each meal and limiting carbohydrate intake to less than 25% of plate volume.       Vitamins/Mineral Supplementation: MVI, calcium.     Beverages (Type/Oz. per day)  Water: 48 oz   Coffee: none   Tea: none   Milk: 1 carton skim   Regular soda: none   Diet soda: periodically   Juice: none   Chirag-Aid/lemonade/etc: low sugar Chirag-aid  Alcohol: none     Discussed the importance of adequate hydration and the goal of 64+ oz of fluid daily.   The patient understands the importance of  avoiding all sweetened and alcoholic drinks, and instead choosing 64 oz plain water.    Exercise  Walking a few days a week.     Pt's understands that 45-60 minutes of daily activity is an important part of weight loss success.   Encouraged pt to incorporate  strength training exercise in addition to cardiovascular exercise most days of the week.    PES statement:     1.  (NB-1.7) Undesirable food choices related to lack of motivation and/or readiness to  apply change as evidenced by Intake of high caloric density foods/beverages (juice, soda) at meals and/or snacks; large portion; frequent grazing; Estimated intake that exceeds estimated daily energy intake; and BMI 66.       Intervention:  Discussion:  1. Educated pt on benefit of switching soda and juice for sugar free crystal light   2. Reviewed ways to incorporate strength exercises into workout routine.  3. Carbohydrate Countin carbohydrate choice/serving = 15-20gm total carbohydrate   4. Plate Method: The patient and I discussed the importance of including lean/low  fat protein at each meal and limiting carbohydrate intake to less  than 25% of plate volume.    Instructions/Goals:   1. Include 20-30 gm protein at each meal.  2. Increase vegetable/fruit intake, by having a vegetable or fruit with each meal daily. Recommended pt to increase vegetable/fruit intake to 4-5 servings daily.  3. Increase fluid intake to 64oz daily: choose plain or calorie/alcohol-free beverages.  4. Incorporate daily structured activity, 45-60 minutes most days of the week  5. Read food labels more consistently: keeping total fat grams <10, total sugar grams <10, fiber >3gm per serving.   6. Practice plate method: 1/2 plate lean/low fat protein source, vegetable/fruit, <25% of plate complex carbohydrates.  7. Carbohydrates from grain sources at meal times to be no more than 1 Carb Choice, ie: 15-20 gm total carbohydrate per serving  8. Practice eating off of smaller plates/bowls, chewing to applesauce consistency, taking 20-30 minutes to eat in a calm/relaxed environment without distractions of tv/email/cell phone.    Goals set by patient:  1. Cut out soda and juice, switch to crystal light  2. Have protein supplement as lunch meal replacement 2x/week  3. Try home exercises.     Handouts Provided:  Home strength exercises  Goal sheet     Monitor/Evaluation:    Pt will f/u in one month with bariatrician, and f/u in 3 months with  RD.    Plan for next visit with RD:    Review plate method and goals  Review carbohydrates/fiber  Exercise      Time In: 1:30pm  Time Out: 2:00pm      ABN signed: Yes

## 2021-06-19 NOTE — PROGRESS NOTES
Bariatric Clinic Follow-Up Visit:    Bushra Buck is a 38 y.o.  female with Body mass index is 66.01 kg/(m^2).  presenting here today for follow-up on non-surgical efforts for weight loss. Original Intake visit occurred on 9/17/15 with a weight of 350 lbslbs and BMI of 68.35.  Along with diet and behavior changes, she has been using no meds but is on Trulicity to assist her weight loss goals.  See her intake visit notes for details on identified contributors to weight gain in the past.    Weight:   Wt Readings from Last 2 Encounters:   08/13/18 (!) 338 lb (153.3 kg)   07/08/18 (!) 330 lb (149.7 kg)    pounds  Height: 5' (1.524 m) (8/13/2018 12:56 PM)  Initial Weight: 350 lbs (8/13/2018 12:56 PM)  Weight: 338 lb (153.3 kg) (8/13/2018 12:56 PM)  Weight loss from initial: 12 (8/13/2018 12:56 PM)  % Weight loss: 3.43 % (8/13/2018 12:56 PM)  BMI (Calculated): 66 (8/13/2018 12:56 PM)  SpO2: 97 % (8/11/2018 10:38 PM)    Comorbidities:  Patient Active Problem List   Diagnosis     Hypertension     History of pituitary surgery     Schizophrenia, schizoaffective, chronic with acute exacerbation (H)     Lymphedema     Venous hypertension of lower extremity, bilateral     Hypothyroidism, unspecified hypothyroidism type     GERD (gastroesophageal reflux disease)     Prolactinoma (H)     Psychosis     Perianal irritation     Acquired valgus deformity of both ankles     Flat feet, bilateral     Foot deformity, bilateral     Venous insufficiency of both lower extremities     Itching     Morbid obesity with BMI of 60.0-69.9, adult (H)     Prader-Willi syndrome     Intellectual disability     Nightmares     Bipolar affective disorder, current episode manic with psychotic symptoms (H)       Current Outpatient Prescriptions:      ARIPiprazole (ABILIFY) 15 MG tablet, Take 15 mg by mouth at bedtime., Disp: , Rfl:      blood glucose meter (GLUCOMETER), Use 1 each As Directed as needed. Dispense glucometer brand per patient's  "insurance at pharmacy discretion., Disp: , Rfl: 0     blood glucose test strips, Use 1 each As Directed as needed. Dispense brand per patient's insurance at pharmacy discretion., Disp: , Rfl: 0     cabergoline (DOSTINEX) 0.5 mg tablet, Take 0.5 mg by mouth 2 (two) times a week., Disp: , Rfl:      calcium citrate-vitamin D3 (CITRACAL + D) 315-250 mg-unit per tablet, Take 2 tablets by mouth daily., Disp: , Rfl: 3     doxycycline (VIBRAMYCIN) 100 MG capsule, Take 100 mg by mouth 2 (two) times a day., Disp: , Rfl:      dulaglutide (TRULICITY) 1.5 mg/0.5 mL PnIj, Inject 1.5 mg under the skin every 7 days., Disp: 4 Syringe, Rfl: 6     fenofibrate (TRICOR) 145 MG tablet, Take 1 tablet by mouth daily., Disp: , Rfl: 3     fluticasone (FLONASE) 50 mcg/actuation nasal spray, Apply 2 sprays into each nostril daily., Disp: , Rfl: 6     furosemide (LASIX) 40 MG tablet, Take 1 tablet (40 mg total) by mouth daily. For chronic edema, Disp: , Rfl: 0     generic lancets, Use 1 each As Directed as needed. Dispense brand per patient's insurance at pharmacy discretion., Disp: , Rfl: 0     levothyroxine (SYNTHROID, LEVOTHROID) 112 MCG tablet, Take 1 tablet (112 mcg total) by mouth daily., Disp: 30 tablet, Rfl: 0     meclizine (ANTIVERT) 12.5 mg tablet, Take 1-2 tablets by mouth as needed., Disp: , Rfl: 0     MULTIVITAMIN tablet, Take 1 tablet by mouth daily., Disp: , Rfl: 3     NYAMYC powder, Apply 1 application topically 2 (two) times a day., Disp: , Rfl: 2     omeprazole (PRILOSEC) 40 MG capsule, Take 40 mg by mouth daily., Disp: , Rfl: 5     pen needle, diabetic 31 gauge x 5/16\" Ndle, Use 1 each As Directed daily. With daily Victoza injections., Disp: 100 each, Rfl: PRN     QUEtiapine (SEROQUEL) 100 MG tablet, Take 100 mg by mouth at bedtime., Disp: , Rfl:      EPINEPHrine (EPIPEN) 0.3 mg/0.3 mL atIn, Inject 0.6 mL into the shoulder, thigh, or buttocks as needed. , Disp: , Rfl: 0     hydrOXYzine pamoate (VISTARIL) 25 MG capsule, Take 1 " capsule (25 mg total) by mouth 3 (three) times a day as needed for itching., Disp: 20 capsule, Rfl: 1      Interim: Since our last visit, she has gone up a bit it weight, had sinus infection and human bite wound that both required antibiotics and had post antibiotics yeast infections.    Plan:   1. Continue working on getting good protein at meals, 3 times daily. Aiming for about 20-25 grams of protein per meal, 3 times daily. Hydrate well with 64-80 oz of water daily. Recheck in 3 months.  2. Continue your OT/PT and walking/strengthening exercises.  3. Continue Trulicity to help blood sugar/appetite issues.  4. Follow up with Roxanne as planned .    We discussed [facility] Bariatric Basics including:  -eating 3 meals daily  -eating protein first  -eating slowly, chewing food well  -avoiding/limiting calorie containing beverages  -We discussed the importance of restorative sleep and stress management in maintaining a healthy weight.  -We discussed the National Weight Control Registry healthy weight maintenance strategies and ways to optimize metabolism.  -We discussed the importance of physical activity including cardiovascular and strength training in maintaining a healthier weight and explored viable options.    Most recent labs:  Lab Results   Component Value Date    WBC 12.2 (H) 02/05/2018    HGB 14.0 02/05/2018    HCT 41.6 02/05/2018     (H) 02/05/2018     02/05/2018     Lab Results   Component Value Date    CHOL 153 12/21/2017     Lab Results   Component Value Date    HDL 27 (L) 12/21/2017     Lab Results   Component Value Date    LDLCALC 76 12/21/2017     Lab Results   Component Value Date    TRIG 248 (H) 12/21/2017     No components found for: CHOLHDL  Lab Results   Component Value Date    ALT 42 02/05/2018    AST 38 02/05/2018    ALKPHOS 34 (L) 02/05/2018    BILITOT 0.5 02/05/2018     Lab Results   Component Value Date    HGBA1C 5.4 09/20/2016     Lab Results   Component Value Date    KOYKOZEC63  623 09/17/2015     Lab Results   Component Value Date    UJIJBJNN15PZ 10.2 (L) 09/20/2016     No results found for: FERRITIN  No results found for: PTH  Lab Results   Component Value Date    03381 218 (H) 09/17/2015     No results found for: 7597  Lab Results   Component Value Date    TSH 2.80 02/28/2018     No results found for: TESTOSTERONE    DIETARY HISTORY    Positive Changes Since Last Visit: improved mood.  Struggling With: loss of mindful eating often    Knowledgeable in Reading Food Labels: yes  Getting Adequate Protein: some days. Relying on cabbage soup too much perhaps and not enough protein  Sleeping 7-8 hours/day often  Stress management good    PHYSICAL ACTIVITY PATTERNS:  Cardiovascular: PT/OT  Strength Training: PT/OT    REVIEW OF SYSTEMS  GENERAL/CONSTITUTIONAL:  No illness  HEENT:   na  CARDIOVASCULAR:   no pain  PULMONARY:   no SANDS  GASTROINTESTINAL:  No issues post abx  UROLOGIC:  Had some vaginitis treated w/ diflucan in July following abx for human bite and sinus infection  NEUROLOGIC:  na  PSYCHIATRIC:  stable  MUSCULOSKELETAL/RHEUMATOLOGIC   bite wound healing right forearm  ENDOCRINE:  na  DERMATOLOGIC:  Healing bite. Recommended she apply vaseline as it looks like she's been scratching at it a bit lately/excoriated.    PHYSICAL EXAM:  Vitals: /64  Pulse 95  Resp 20  Ht 5' (1.524 m)  Wt (!) 338 lb (153.3 kg)  BMI 66.01 kg/m2  Height: 5' (1.524 m) (8/13/2018 12:56 PM)  Initial Weight: 350 lbs (8/13/2018 12:56 PM)  Weight: 338 lb (153.3 kg) (8/13/2018 12:56 PM)  Weight loss from initial: 12 (8/13/2018 12:56 PM)  % Weight loss: 3.43 % (8/13/2018 12:56 PM)  BMI (Calculated): 66 (8/13/2018 12:56 PM)  SpO2: 97 % (8/11/2018 10:38 PM)    GEN: Pleasant, well groomed, in no acute distress  HEENT: PEERL, EOMI, airway clear .  NECK: No swelling.  HEART: Rhythm regular, rate regular, no murmur   LUNGS: Clear without crackles or wheezes. No cough.  ABDOMEN: obese.  EXTREMITIES: 2 plus palpable  peripheral pulses, radial. Bite wound right forearm nearly healed, some excoriation/scab..wraps in place on legs (went down a size recently).  NEURO: Alert and Oriented X3, normal gait and speech.  SKIN: No visible rashes, no celllultis evident.        25 minutes was spent in direct consultation, with over 50% of it spent in counseling regarding their plan for excess weight reduction and health modification.  Carlos Bowling MD  [facility]  1:12 PM

## 2021-06-21 NOTE — LETTER
Letter by Estefania Coffey RN at      Author: Estefania Coffey RN Service: -- Author Type: --    Filed:  Encounter Date: 4/19/2021 Status: (Other)       [facility]  [address]   [place] [address]        Bushra Buck  [address]  [place] [address]      4/19/2021    We were recently notified that you requested removal of Ellis from your emergency contact list. This person is named as a health care agent in your health care directive dated 04/16/2018. Because this is a legal document, we cannot remove this person as a contact.    In order to remove someone who is named as a health care agent you will need to either provide us with a more recent health care directive or create a new one.      We greatly value the opportunity to assist you in documenting your choices and to honor your wishes. We have several options to assist you in updating your document:       Health Care Directives and Advance Care Planning resources can be viewed and printed  for free at our web site:[email or web address].       Free group classes on Advance Care Planning and completing a Health Care Directive are available at multiple locations and times. These classes are led by trained staff who will provide information and guide you through a Health Care Directive. They can also review, notarize and add your Health Care Directive to your medical record. [place] for a class at [email or web address] or by calling [phone].      COPIES of completed Health Care Directives can be brought or mailed to any of our locations, including the address listed below. You can also email a copy to [email or web address] .       For additional assistance or questions you can email us at [email or web address] or call [phone].      Sincerely,     [facility] [employer]  [address]  [address]   [place] [address]  [email or web address]  [phone]

## 2021-06-21 NOTE — LETTER
Letter by Temitope Odell MD at      Author: Temitope Odell MD Service: -- Author Type: --    Filed:  Encounter Date: 5/3/2021 Status: (Other)       5/3/2021    [facility]  Fax: [phone] Wound Dressing Rx and Order Form  Customer Service: [phone] Order Status: New Order   Verbal: Luciana Luke RN            Patient Info:  Name: Bushra Buck  : 1980  Address:   [address] [place] [address]  Phone: [phone]      Insurance Info:  Primary: Payor: MEDICA / Plan: MEDICA ACCESSIBILITY / Product Type: PMAP /    Secondary: N/A N/A  [id number] - ([facility])    Physician Info:   Name:  Temitope Odell MD   Dept Address/Phones:   [address] [place] [address], [facility] [address]  [facility] [address] [phone]  Fax: [phone]    Lymphedema circumferential measurements (in cm):  Right just above MTP: 26.7    Right Ankle: 46    Right Widest Calf: 64    Right Thigh Up 10cm: 78.2    Left - just above MTP: 30.2    Left Ankle: 62    Left Widest Calf: 74    Left Thigh Up 10cm: 75.3      Wound info:  Encounter Diagnoses   Name Primary?   ? Swelling of limb Yes   ? Chronic venous hypertension (idiopathic) with ulcer and inflammation of bilateral lower extremity (H)    ? Lymphedema    ? Morbid obesity with BMI of 60.0-69.9, adult (H)    ? Type 2 diabetes mellitus without complication, without long-term current use of insulin (H)    ? Prader-Willi syndrome      VASC Wound 20 Right shin (Active)   Pre Size Length 0.4 21 0900   Pre Size Width 0.6 21 0900   Pre Size Depth 0.1 21 0900   Pre Total Sq cm 0.24 21 0900       VASC Wound 20 Left shin (Active)   Pre Size Length 5.5 21 0900   Pre Size Width 5 21 0900   Pre Size Depth 0.1 21 0900   Pre Total Sq cm 27.5 21   Description scattered wounds 21     Drainage: Light  Thickness:  Partial  Duration of Need: 30  Days Supply:  30  Start Date: 5/3/21  Starter Kit: Ancillary Kit (saline, gloves, gauze)  Qualifying wound/Debridement Yes      Dressing Type Brand Size Number of pieces Frequency of change    Primary Comfeel  4x4 30 3 times a week    4x4 gauze   1 loaf 3 times a week    Saline bullets   1 box 3 times a week                           Secondary                                        Tape                            Note: If total out of pocket is more than $50.00 please contact the patient before processing order.     OK to forward to covered supplier.    Electronically Signed Physician: Temitope Odell MD Date: 5/3/2021

## 2021-06-21 NOTE — PROGRESS NOTES
"[facility] Daily Progress     Patient Name: Bushra Buck  Date: 11/9/2018  Visit #: 2  PTA visit #:   Referral Diagnosis: lymphedema, low back pain, obesity  Referring provider: Carlos Bowling MD  Visit Diagnosis:     ICD-10-CM    1. Lymphedema I89.0    2. Muscle weakness (generalized) M62.81    3. Prader-Willi syndrome Q87.1    4. Intellectual disability F79    5. Bipolar affective disorder, current episode manic with psychotic symptoms (H) F31.2    6. Venous hypertension of lower extremity, bilateral I87.303    7. Morbid obesity with BMI of 60.0-69.9, adult (H) E66.01     Z68.44    8. Physical deconditioning R53.81          Assessment:     Patient is benefitting from skilled physical therapy and is making steady progress toward functional goals.  Patient is appropriate to continue with skilled physical therapy intervention, as indicated by initial plan of care.    Goal Status:  Pt. will demonstrate/verbalize independence in self-management of condition in : 12 weeks  Pt. will be independent with home exercise program in : 12 weeks  Pt will: decrease LLIS by 5 points to demonstrate decreased impact of lymphedema on function    Plan / Patient Education:     Continue with initial plan of care.  Patient to work with PCA on proper application of Juxtafit velcro lower leg compression devices. Continue to instruct patient in and have patient demonstrate donning of velcro units.   Focus on upgrading current lymphedema home program to promote wound healing and decrease risk of infection and progress with exercise program (aerobic and strengthening) for bariatric weight loss.  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  Monitor for Low back pain.    Subjective:     Pain Rating: pt reports some discomfort with wounds on legs;new onset \"blisters that itch\" noted  10/7/2018; h/o cellulitis and states on prophylactic " "antibiotics;  Works with OT 2x/week \"some arm weights and crafts    Patients states she lives alone in apartment but in same building as her mom.  Receives PCA assistance 5x/week and dons independently Sat and Sun, assist in donning velcro compression but Inependent in doffing.  Does walk in building \"maybe 5minutes\"  \"back sometimes hurst when I sit too long\"    Patient states she starts [employer] through [employer] which starts Thanksgiving week M-S 8am-5pm and may be unable to attend therapy once this starts.      Objective:     Compression:  Juxtafit Premium custom velcro compression received September 2018    Skin:  Several small < 5cm open wounds; mild errythema L distal lower leg >10 each lower leg; mild errythema L distal lower leg    Prophylatic antibiotics - Doxycycline    Sit<>Stand (17\" height; 30\") - 9 x - moderate risk of falls    LLIS = 14/72; 20%    Volume to 50cm:    L LE:     R LE:  10cm= 29.5    10cm= 28cm  20cm= 50.5    20cm= 45.5  30cm= 59.5    30cm= 60.4  40cm= 66.5    40cm = 67.3  50cm= 63.4cm   50cm= 66  Total volume = [id number]   = 16182lq  L>R=1%    Treatment Today   11/9/2018  TREATMENT MINUTES COMMENTS   Evaluation     Self-care/ Home management 30 Legs with velcro units donned by PCA and noted pooled edema below distal edge of velcro unit L>R lower leg.  Further instructed pt in and pt demonstrating self donning of velcro units and instructed to educated PCA as well.     Instructed patient to follow up with vascular clinic re;  New onset skin \"pimples\" that itch.  Educated pt to gently cleanse with wet cloth and avoid scratching to decrease risk of infection. instructed in use of lotion to minimize itching. Further educated in signs of infection and to monitor for worsening errythema, wounds, discomfort, increased swelling or fever and to contact MD immediately.   Manual therapy     Neuromuscular Re-education 10 Initiated education in falls prevention as patient at moderate risk of " falls at this time.     Therapeutic Activity     Therapeutic Exercises 30 Initiated education on increased aerobic conditioning and strengthening to assist in weight loss.  Instructed in sit<>stand exs x 10; instructed in progressive walking program in apartment and to time walks   also instructed in standing hip abduction, slow marching, minisquats, and heel/toe raises x 10 1-2x/day; and in diaphragmatic breathing and ankle pumps throughout day.  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   Gait training     Modality__________________                Total 70    Blank areas are intentional and mean the treatment did not include these items.       Roxannejudith Helton  11/9/2018  I certify that I've evaluated the patient, reviewed the PT plan as noted above and agree with the intended therapy.  Carlos Bowling MD

## 2021-06-21 NOTE — PROGRESS NOTES
[facility] Certification Request    November 7, 2018      Patient: Bushra Buck  MR Number: [id number]  YOB: 1980  Date of Visit: 11/7/2018      Dear Dr. Bowling,    Thank you for this referral.   We are seeing Bushra Buck for Physical Therapy of lymphedema.  Pt was first seen at [facility].  She will follow-up at [place] on Friday with a therapist who specializes in lymphedema.     Medicare and/or Medicaid requires physician review and approval of the treatment plan. Please review the plan of care and verify that you agree with the therapy plan of care by co-signing this note.      Plan of Care  Authorization / Certification Start Date: 11/07/18  Authorization / Certification End Date: 01/30/19  Authorization / Certification Number of Visits: up to 12  Communication with: Referral Source  Patient Related Instruction: Nature of Condition;Treatment plan and rationale;Next steps  Times per Week: 2  Number of Weeks: 6  Number of Visits: 12  Discharge Planning: self-management of symptoms   Therapeutic Exercise: ROM;Stretching;Strengthening;Lymphedema  Neuromuscular Reeducation: core  Manual Therapy: soft tissue mobilization;myofascial release;lymphatic drainage massage  Modalities: cold pack  Equipment: theraband;compression bandages    Goals:  Pt. will demonstrate/verbalize independence in self-management of condition in : 12 weeks  Pt. will be independent with home exercise program in : 12 weeks  Lymphedema therapist will add additional goals after reevaluation.     If you have any questions or concerns, please don't hesitate to call.    Sincerely,      Alma Lauren, PT, DPT        Physician recommendation:     ___ Follow therapist's recommendation        ___ Modify therapy      *Physician co-signature indicates they certify the need for these services furnished within this plan and while under their care.      [facility]   [place] Initial  Evaluation    Patient Name: Bushra Buck  Date of evaluation: 11/7/2018  Referral Diagnosis:   Morbid obesity with BMI of 60.0-69.9, adult (H) [E66.01, Z68.44]  - Primary       Low back pain [M54.5]       Lymphedema [I89.0]       Referring provider: Carlos Bowling MD  Visit Diagnosis:     ICD-10-CM    1. Morbid obesity (H) E66.01    2. Lymphedema I89.0    3. Low back pain M54.5      Precautions: Schizophrenia,, venous hypertension of LE, GERD, acquired valgus deformity of both ankles, flat feet bilateral, venous insufficiency of both LE, intellectual disability, bipolar affective disorder, pre-diabetes     Assessment:   Pt is a 38 y.o. year old female with moderately severe lymphedema.  Pt reports being hospitalized for cellulitis in the past.  Pt has had PT for lymphedema in the past and reports her symptoms have been fairly stable for 2-3 years.  Patient has difficulty with transitional movements, grooming and ADLs secondary to bilateral LE lymphedema and morbid obesity.   Patient appears motivated to participate in Physical Therapy and present with a good Physical Therapy prognosis for resolution of activities limitations.     Pt. is appropriate for skilled PT intervention as outlined in the Plan of Care (POC).  Pt. is a good candidate for skilled PT services to improve pain levels and function.     Plan of care and goals were established in collaboration with patient.     Goals:  Pt. will demonstrate/verbalize independence in self-management of condition in : 12 weeks  Pt. will be independent with home exercise program in : 12 weeks  Please add lymphedema goals.    Patient goals: prevent hospitalization from lymphedema, weight loss    Patient's expectations/goals are realistic.    Barriers to Learning or Achieving Goals:  Mental illness or emotional factors.  including mental health diagnosis; see above under precautions        Plan / Patient Instructions:        Plan of Care:   Authorization /  Certification Start Date: 18  Authorization / Certification End Date: 19  Authorization / Certification Number of Visits: up to 12  Communication with: Referral Source  Patient Related Instruction: Nature of Condition;Treatment plan and rationale;Next steps  Times per Week: 2  Number of Weeks: 6  Number of Visits: 12  Discharge Planning: self-management of symptoms   Therapeutic Exercise: ROM;Stretching;Strengthening;Lymphedema  Neuromuscular Reeducation: core  Manual Therapy: soft tissue mobilization;myofascial release;lymphatic drainage massage  Modalities: cold pack  Equipment: theraband;compression bandages    Plan for next visit: pt will be reevaluated by Lymphedema specialist      Subjective:         Social information:   Living Situation: in an apartment - has a PCA   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 female who presents to therapy today with complaints of lymphedema, occasional low back and concerns of obesity.   Date of onset/duration of lymphedema symptoms is a few years ago.   Onset was gradual. Symptoms are constant. She reports  a constant  history of similar symptoms. She describes their previous level of function as not limited    Pt reports she is getting occupational therapy twice weekly with some weights but no organized exercise, is open to getting help w/ fitness w/ PT (also has occ low back pain issues).     Pain Ratin  Pain rating at best: 0  Pain rating at worst: 0  Pain description: Pt reports occasional low back pain but no pain with lymphedema    Functional limitations are described as occurring with:     Patient reports benefit from:  PT in the past      Objective:      Note: Items left blank indicates the item was not performed or not indicated at the time of the evaluation.    Patient Outcome Measures :    Modified  Oswestry Low Back Pain Disablity Questionnaire  in %: 0   Scores range from 0-100%, where a score of 0% represents minimal pain and maximal function. The minimal clinically important difference is a score reduction of 12%.    Examination  1. Morbid obesity (H)     2. Lymphedema     3. Low back pain       Involved side: Bilateral  Posture Observation:      General sitting posture is  fair.  Integumentary: moderate/severe lymphedema in bilateral LE (L>R).  Small scab on L LE came off when pt took off wrappings.  Small amount of blood and clear fluid observed.     Treatment Today     TREATMENT MINUTES COMMENTS   Evaluation 25 -lumbar spine   Self-care/ Home management     Manual therapy 10 Manual therapy: manual lymph drainage for bilateral lower extremities lymphedema   Bilateral axilla, bilateral inguinal, anterior inguinal to axilla anastomosis on right side and left side, left lower extremity evacuation, right lower extremity evacuation, abdomen, neck effleurage.    Observed pt take off and reapply compression garments. Garments include Velcro compression garments which pt reports are new (got 1 month ago) and black stocking to place over garments.     Discussed possibly returning to compression wraps, pt reports she has been unable to apply on her own in the past.         Neuromuscular Re-education     Therapeutic Activity     Therapeutic Exercises 5   -educated on POC, diagnosis, relevant anatomy, basis for treatment    Gait training     Modality__________________                Total 40    Blank areas are intentional and mean the treatment did not include these items.     PT Evaluation Code: (Please list factors)  Patient History/Comorbidities: see above  Examination: see above  Clinical Presentation: stable  Clinical Decision Making: low    Patient History/  Comorbidities Examination  (body structures and functions, activity limitations, and/or participation restrictions) Clinical Presentation Clinical Decision  Making (Complexity)   No documented Comorbidities or personal factors 1-2 Elements Stable and/or uncomplicated Low   1-2 documented comorbidities or personal factor 3 Elements Evolving clinical presentation with changing characteristics Moderate   3-4 documented comorbidities or personal factors 4 or more Unstable and unpredictable High         Alma Lauren, PT, DPT  11/7/2018  1:10 PM               Agree with treatment plan.  11/9/2018  5:02 PM  Carlos Bowling MD

## 2021-06-21 NOTE — PROGRESS NOTES
"[facility] Daily Progress     Patient Name: Bushra Buck  Date: 2018  Visit #: 3  PTA visit #: -  Referral Diagnosis: lymphedema, low back pain, obesity  Referring provider: Carlos Bowling MD  Visit Diagnosis:     ICD-10-CM    1. Muscle weakness (generalized) M62.81    2. Prader-Willi syndrome Q87.1    3. Physical deconditioning R53.81    4. Chronic bilateral low back pain without sciatica M54.5     G89.29          Assessment:     Legs wounds are healed, no drainage, skin is very dry.  Patient did a good job with the donning of velcro garments.  Patient needs to increase her physical activity.    Patient is benefitting from skilled physical therapy and is making steady progress toward functional goals.  Patient is appropriate to continue with skilled physical therapy intervention, as indicated by initial plan of care.    Goal Status:  Pt. will demonstrate/verbalize independence in self-management of condition in : Progressing toward  Pt. will be independent with home exercise program in : Progressing toward    Pt will: decrease LLIS by 5 points to demonstrate decreased impact of lymphedema on function      Plan / Patient Education:     Continue with initial plan of care.  Patient to work with PCA on proper application of Juxtafit velcro lower leg compression devices. Continue to instruct patient in and have patient demonstrate donning of velcro units.   Focus on upgrading current lymphedema home program to promote wound healing and decrease risk of infection and progress with exercise program (aerobic and strengthening) for bariatric weight loss.  Patient states she will be unable to continue therapy once she begins her seasonal job as a  at [employer].  Monitor for Low back pain.    Subjective:     Pain Ratin/10.  Goes to OT 2x/week \"some arm weights and crafts    Patient reports:\" legs feel better\".  Patient states she starts [employer] through [employer] which " "starts Thanksgiving week M-S 8am-5pm and may be unable to attend therapy once this starts.      Objective:     PCA did not come to clinic.    Compression:  Juxtafit Premium custom velcro compression received September 2018    Skin:  Several small scabs on distal lower leg.    Prophylatic antibiotics - Doxycycline    Sit<>Stand (17\" height; 30\") - 9 x - moderate risk of falls    LLIS = 14/72; 20%    Volume to 50cm:    L LE:     R LE:  10cm= 29.5    10cm= 28cm  20cm= 50.5    20cm= 45.5  30cm= 59.5    30cm= 60.4  40cm= 66.5    40cm = 67.3  50cm= 63.4cm   50cm= 66  Total volume = 33951cg   = 13905fp  L>R=1%    Treatment Today   11/9/2018  TREATMENT MINUTES COMMENTS   Evaluation     Self-care/ Home management 30 Legs with velcro units donned by patient .  Further instructed pt in and pt demonstrating self donning of velcro units and .     Instructed patient to follow up with vascular clinic re;  New onset skin \"pimples\" that itch.  Educated patient on skin care.   Manual therapy     Neuromuscular Re-education  .     Therapeutic Activity     Therapeutic Exercises 30 Initiated education on increased aerobic conditioning and strengthening to assist in weight loss.  Instructed in sit<>stand exs x 10; instructed in progressive walking program in apartment and to time walks   also instructed in standing hip abduction, slow marching, minisquats, and heel/toe raises x 10 1-2x/day; and in diaphragmatic breathing and ankle pumps throughout day.  Instructed and performed standing lymphedema exercises, provided instructions with illustrations.  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   Gait training     Modality__________________                Total 60    Blank areas are intentional and mean the treatment did not include these items.       Nusrat Langley PT, CLT-DILEEP  11/12/2018    "

## 2021-06-21 NOTE — PROGRESS NOTES
Non-surgical Weight Loss Follow Up Diet Evaluation    Assessment:  This patient is a 38 y.o. female is being seen today for follow-up non-surgical nutritional evaluation. Today we reviewed the patients current eating habits and level of physical activity, and instructed on the changes that are required for successful weight loss outcomes.    Pt's Initial Weight: 350 lbs  Weight: 345 lb (156.5 kg)  Weight loss from initial: 5  % Weight loss: 1.43 %  BMI: Body mass index is 67.38 kg/(m^2).  IBW: 102 lbs    Personal goal weight: 150 lb     Pt Active Problem List Diagnosis:  Patient Active Problem List   Diagnosis     Hypertension     History of pituitary surgery     Schizophrenia, schizoaffective, chronic with acute exacerbation (H)     Lymphedema     Venous hypertension of lower extremity, bilateral     Hypothyroidism, unspecified hypothyroidism type     GERD (gastroesophageal reflux disease)     Prolactinoma (H)     Psychosis (H)     Perianal irritation     Acquired valgus deformity of both ankles     Flat feet, bilateral     Foot deformity, bilateral     Venous insufficiency of both lower extremities     Itching     Morbid obesity with BMI of 60.0-69.9, adult (H)     Prader-Willi syndrome     Intellectual disability     Nightmares     Bipolar affective disorder, current episode manic with psychotic symptoms (H)         Estimated RMR (Paulding-St Jeor equation): 2233 calories  Protein requirements (.5grams to .9grams per pound IBW, 20-30% of calories, minimum of 60-80gm per day):  50-90 grams    Progress made since last visit: Pt gained 15 lbs since last RD visit, which pt attributes to her menstrual cycle and late night snacking. Pt still using [employer] meal delivery program.     Concerns: High carbohydrate intake, large portion sizes, high sodium diet, low water intake.     Diet Recall/Time:   Breakfast: 2 eggs, 1 fruit, orange juice (14g)   Am Snack: none   Lunch: [employer] lasagna, mixed vegetables, 1% milk (20g)   Pm  snack:none   Dinner: pork chop, 2-3 cups mashed potatoes, squash (20g)   HS Snack: 1 can of soup, doughnuts, etc.     Protein: 50-60 grams    Meals per week away from home: none      Recommended limiting eating out to no more than 2x/week.  Patient and I reviewed the importance of eating three consistent meals per day; as well as meal timing to be spaced 4-5 hours apart.  Snack choices: 100-150 calories (1-2x/day if physically hungry), incorporating a fruit/vegetable w/ protein source.    Portion Sizes problematic? YES per patient/diet recall  Encouraged slowing meal times down, 20-30 minutes, chewing to applesauce consistency.   To aid in proper portion control and slow meal time down discussed consuming meals off smaller plates, use toddler/children utensils and set utensils down after each bite.    Protein, vegetables/fruits, carbohydrates:   The patient and I discussed the importance of including lean/low fat protein at each meal and limiting carbohydrate intake to less than 25% of plate volume.       Vitamins/Mineral Supplementation: see previous note.     Beverages (Type/Oz. per day)  Water: 30-50 oz   Coffee: none   Milk: 1% milk at lunch (via meal delivery)   Regular soda: none   Diet soda: diet mountain dew 12-20 oz   Juice: orange juice  Chirag-Aid/lemonade/etc: none   Alcohol: none     Discussed the importance of adequate hydration and the goal of 64+ oz of fluid daily.   The patient understands the importance of  avoiding all sweetened and alcoholic drinks, and instead choosing 64 oz plain water.    Exercise  OT exercises 2-3x/week.     Pt's understands that 45-60 minutes of daily activity is an important part of weight loss success.   Encouraged pt to incorporate  strength training exercise in addition to cardiovascular exercise most days of the week.    PES statement:     1. (NI-1.3)Excessive energy intake related to Not ready for diet/lifestyle change as evidenced by Intake of high caloric density foods  "at meals and/or snacks; large portion; frequent grazing; Estimated intake that exceeds estimated daily energy intake; Binge eating patterns;  and BMI 67.       Intervention:  Discussion:  1. Carbohydrate Countin carbohydrate choice/serving = 15-20gm total carbohydrate   2. Discussed healthier night time snack options  3. Discussed the importance of hydration, with emphasis on 64 oz/day of water.   4. Reviewed Plate Method and gave food journal homework.  5. Gave food journal homework, to be completed for f/u appointment.  6. Plate Method: The patient and I discussed the importance of including lean/low  fat protein at each meal and limiting carbohydrate intake to less  than 25% of plate volume.  Instructions/Goals:   1. Include protein at each meal.  2. Increase vegetable/fruit intake, by having a vegetable or fruit with each meal daily. Recommended pt to increase vegetable/fruit intake to 4-5 servings daily.  3. Increase fluid intake to 64oz daily: choose plain or calorie/alcohol-free beverages.  4. Incorporate daily structured activity, 45-60 minutes most days of the week  5. Read food labels more consistently: keeping total fat grams <10, total sugar grams <10, fiber >3gm per serving.   6. Practice plate method: 1/2 plate lean/low fat protein source, vegetable/fruit, <25% of plate complex carbohydrates.  7. Carbohydrates from grain sources at meal times to be no more than 1 Carb Choice, ie: 15-20 gm total carbohydrate per serving  8. Practice eating off of smaller plates/bowls, chewing to applesauce consistency, taking 20-30 minutes to eat in a calm/relaxed environment without distractions of tv/email/cell phone.    Goals set by patient:  1. Cut out juice at breakfast; replace with water.  2. Replace soup for greek yogurt at night  3. Reduce portion size of potato;  \"fist size\" (1 cup)     Handouts Provided:  Goal sheet     Monitor/Evaluation:    Pt will f/u in one month with bariatrician and RD    Plan for " next visit with RD:    Review goals and food journal homework.  Review carbohydrates/fiber  Exercise      Time In: 3:00pm  Time Out: 3:30pm      ABN signed: Yes

## 2021-06-21 NOTE — PROGRESS NOTES
[facility] Daily Progress     Patient Name: Bushra Buck  Date: 11/15/2018  Visit #: 5  PTA visit #: -1  Referral Diagnosis: lymphedema, low back pain, obesity  Referring provider: Carlos Bowling MD  Visit Diagnosis:     ICD-10-CM    1. Prader-Willi syndrome Q87.1    2. Lymphedema I89.0    3. Venous hypertension of lower extremity, bilateral I87.303    4. Morbid obesity with BMI of 60.0-69.9, adult (H) E66.01     Z68.44    5. Muscle weakness (generalized) M62.81    6. Physical deconditioning R53.81    7. Chronic bilateral low back pain without sciatica M54.5     G89.29    8. Intellectual disability F79    9. Bipolar affective disorder, current episode manic with psychotic symptoms (H) F31.2    10. Morbid obesity (H) E66.01    11. Low back pain M54.5    12. Acute midline low back pain without sciatica M54.5          Assessment:     Patient gets fatigue easily, needs to continue with daily ambulation. Pt tolerated 2 walks today 3-5 minutes with rest breaks.  Pt has been compliant with her HEP and should do well with an independent home program.    Patient is benefitting from skilled physical therapy and is making steady progress toward functional goals.  Patient is appropriate to continue with skilled physical therapy intervention, as indicated by initial plan of care.    Goal Status:  Pt. will demonstrate/verbalize independence in self-management of condition in : Progressing toward  Pt. will be independent with home exercise program in : Progressing toward    Pt will: decrease LLIS by 5 points to demonstrate decreased impact of lymphedema on function      Plan / Patient Education:     Continue with initial plan of care. .  Patient states she will be unable to continue therapy once she begins her seasonal job as a  at [employer]   Pt was instructed to try to walk for a second time each day.  Pt does not have appointments scheduled beyond today. 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  Pt instructed to walk 2x/day.   Pt to follow up with Dr. Odell on 2019.  Subjective:   Pt has been walking every day for 10-15 minutes. She has been compliant with her HEP.  Pt states her PCA ws with her this morning.  PCA is with Bushra 5 days a week.  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  Pain Ratin/10.      Objective:     PCA did not come to clinic today.    Compression:  Juxtafit Premium custom velcro compression received 2018  Re adjusted velcro straps on garment,     Skin:  Several small scabs on distal lower legs,. A small amount of drainage noted today on the tube on her R lower leg.   Pt was advised to continue to monitor these sore for signs of infection.    Prophylatic antibiotics - Doxycycline    Sit<>Stand (17\" height; 30\") - 10x - mi-mod risk of falls    LLIS = ; 20%    Volume to 50cm:    L LE:     R LE:  10cm= 29.5    10cm= 28cm  20cm= 50.5    20cm= 45.5  30cm= 59.5    30cm= 60.4  40cm= 66.5    40cm = 67.3  50cm= 63.4cm   50cm= 66  Total volume = 37810bh   = [id number]  L>R=1%    Treatment Today   2018  TREATMENT MINUTES COMMENTS   Evaluation     Self-care/ Home management 25   Further instructed  demonstrating self donning of velcro units and .      Educated patient on skin care, signs of infection  Re adjusted straps   Manual therapy     Neuromuscular Re-education  .     Therapeutic Activity             Therapeutic Exercises 25 Initiated education on increased aerobic conditioning and strengthening to assist in weight loss.  Pt to do 2 walks a day. Can do one longer (10-20 minutes and one shorter walk 5-10 minutes)   sit<>stand exs x 10; Reviewed  in HEP:  -standing   -hip AB x20  -hip ext x10  -hip flexion x10   -marching x20  -mini squats x20  -heel/toe raises x20  -lymph ex's x10 reps 2x/day   Gait training     Modality__________________                Total 50    Blank areas are intentional and mean the treatment did not include these items.       Mona REICH" Ashley HARRIS,CLT  11/15/2018

## 2021-06-21 NOTE — PROGRESS NOTES
[facility] Daily Progress     Patient Name: Bushra Buck  Date: 2018  Visit #: 4  PTA visit #: -  Referral Diagnosis: lymphedema, low back pain, obesity  Referring provider: Carlos Bowling MD  Visit Diagnosis:     ICD-10-CM    1. Prader-Willi syndrome Q87.1    2. Lymphedema I89.0    3. Venous hypertension of lower extremity, bilateral I87.303    4. Morbid obesity with BMI of 60.0-69.9, adult (H) E66.01     Z68.44          Assessment:     Velcro Medi wraps were not placed properly, straps were in the wrong order  Patient gets fatigue easily, needs to continue with daily ambulation.  Encourage to patient to bring her PCA to continue with instruction of velcro garments.    Patient is benefitting from skilled physical therapy and is making steady progress toward functional goals.  Patient is appropriate to continue with skilled physical therapy intervention, as indicated by initial plan of care.    Goal Status:  Pt. will demonstrate/verbalize independence in self-management of condition in : Progressing toward  Pt. will be independent with home exercise program in : Progressing toward    Pt will: decrease LLIS by 5 points to demonstrate decreased impact of lymphedema on function      Plan / Patient Education:     Continue with initial plan of care.  Patient to work with PCA on proper application of Juxtafit velcro lower leg compression devices. Continue to instruct patient in and have patient demonstrate donning of velcro units.   Focus on upgrading current lymphedema home program to promote wound healing and decrease risk of infection and progress with exercise program (aerobic and strengthening) for bariatric weight loss.  Patient states she will be unable to continue therapy once she begins her seasonal job as a  at [employer].  Monitor for Low back pain.    Subjective:     Pain Ratin/10.  Patient reports she is unable to perform donning of velcro garments, needs assistance  "from PCA.    Objective:     PCA did not come to clinic.    Compression:  Juxtafit Premium custom velcro compression received September 2018  velcro straps in the wrong place.    Skin:  Several small scabs on distal lower leg.    Prophylatic antibiotics - Doxycycline    Sit<>Stand (17\" height; 30\") - 9 x - moderate risk of falls    LLIS = 14/72; 20%    Volume to 50cm:    L LE:     R LE:  10cm= 29.5    10cm= 28cm  20cm= 50.5    20cm= 45.5  30cm= 59.5    30cm= 60.4  40cm= 66.5    40cm = 67.3  50cm= 63.4cm   50cm= 66  Total volume = 50558tw   = [id number]  L>R=1%    Treatment Today   11/9/2018  TREATMENT MINUTES COMMENTS   Evaluation     Self-care/ Home management 30   Further instructed  demonstrating self donning of velcro units and .      Educated patient on skin care.   Manual therapy     Neuromuscular Re-education  .     Therapeutic Activity     Therapeutic Exercises 30 Initiated education on increased aerobic conditioning and strengthening to assist in weight loss.  Instructed in sit<>stand exs x 10; instructed in progressive walking program in apartment and to time walks   also instructed in standing hip abduction, slow marching, minisquats, and heel/toe raises x 10 1-2x/day; and in diaphragmatic breathing and ankle pumps throughout day.  Instructed and performed standing lymphedema exercises, provided instructions with illustrations.  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   Gait training     Modality__________________                Total 60    Blank areas are intentional and mean the treatment did not include these items.       Nusrat Langley PT, CLT-DILEEP  11/14/2018    "

## 2021-06-21 NOTE — PROGRESS NOTES
Bariatric Clinic Follow-Up Visit:    Bushra Buck is a 38 y.o.  female with Body mass index is 67.38 kg/(m^2).  presenting here today for follow-up on non-surgical efforts for weight loss. Original Intake visit occurred on 9/17/15 with a weight of 350 lbs and BMI of 68.  Along with diet and behavior changes, she has been using trulicity to assist her weight loss goals.  See her intake visit notes for details on identified contributors to weight gain in the past.    Weight:   Wt Readings from Last 2 Encounters:   11/01/18 (!) 345 lb (156.5 kg)   11/01/18 (!) 345 lb (156.5 kg)    pounds  Height: 5' (1.524 m) (11/1/2018  3:32 PM)  Initial Weight: 350 lbs (11/1/2018  3:00 PM)  Weight: 345 lb (156.5 kg) (11/1/2018  3:32 PM)  Weight loss from initial: 5 (11/1/2018  3:00 PM)  % Weight loss: 1.43 % (11/1/2018  3:00 PM)  BMI (Calculated): 67.4 (11/1/2018  3:32 PM)  SpO2: 97 % (11/1/2018  3:32 PM)    Comorbidities:  Patient Active Problem List   Diagnosis     Hypertension     History of pituitary surgery     Schizophrenia, schizoaffective, chronic with acute exacerbation (H)     Lymphedema     Venous hypertension of lower extremity, bilateral     Hypothyroidism, unspecified hypothyroidism type     GERD (gastroesophageal reflux disease)     Prolactinoma (H)     Psychosis (H)     Perianal irritation     Acquired valgus deformity of both ankles     Flat feet, bilateral     Foot deformity, bilateral     Venous insufficiency of both lower extremities     Itching     Morbid obesity with BMI of 60.0-69.9, adult (H)     Prader-Willi syndrome     Intellectual disability     Nightmares     Bipolar affective disorder, current episode manic with psychotic symptoms (H)       Current Outpatient Prescriptions:      ARIPiprazole (ABILIFY) 15 MG tablet, Take 15 mg by mouth at bedtime., Disp: , Rfl:      blood glucose meter (GLUCOMETER), Use 1 each As Directed as needed. Dispense glucometer brand per patient's insurance at pharmacy  "discretion., Disp: , Rfl: 0     blood glucose test strips, Use 1 each As Directed as needed. Dispense brand per patient's insurance at pharmacy discretion., Disp: , Rfl: 0     cabergoline (DOSTINEX) 0.5 mg tablet, Take 0.5 mg by mouth 2 (two) times a week., Disp: , Rfl:      calcium citrate-vitamin D3 (CITRACAL + D) 315-250 mg-unit per tablet, Take 2 tablets by mouth daily., Disp: , Rfl: 3     doxycycline (VIBRAMYCIN) 100 MG capsule, Take 100 mg by mouth 2 (two) times a day., Disp: , Rfl:      dulaglutide (TRULICITY) 1.5 mg/0.5 mL PnIj, Inject 1.5 mg under the skin every 7 days., Disp: 4 Syringe, Rfl: 6     dulaglutide 1.5 mg/0.5 mL PnIj, Inject 1.5 mg under the skin once a week., Disp: , Rfl:      EPINEPHrine (EPIPEN) 0.3 mg/0.3 mL atIn, Inject 0.6 mL into the shoulder, thigh, or buttocks as needed. , Disp: , Rfl: 0     fenofibrate (TRICOR) 145 MG tablet, Take 1 tablet by mouth daily., Disp: , Rfl: 3     fluticasone (FLONASE) 50 mcg/actuation nasal spray, Apply 2 sprays into each nostril daily., Disp: , Rfl: 6     furosemide (LASIX) 40 MG tablet, Take 1 tablet (40 mg total) by mouth daily. For chronic edema, Disp: , Rfl: 0     generic lancets, Use 1 each As Directed as needed. Dispense brand per patient's insurance at pharmacy discretion., Disp: , Rfl: 0     hydrOXYzine pamoate (VISTARIL) 25 MG capsule, Take 1 capsule (25 mg total) by mouth 3 (three) times a day as needed for itching., Disp: 20 capsule, Rfl: 1     levothyroxine (SYNTHROID, LEVOTHROID) 112 MCG tablet, Take 1 tablet (112 mcg total) by mouth daily., Disp: 30 tablet, Rfl: 0     meclizine (ANTIVERT) 12.5 mg tablet, Take 1-2 tablets by mouth as needed., Disp: , Rfl: 0     MULTIVITAMIN tablet, Take 1 tablet by mouth daily., Disp: , Rfl: 3     NYAMYC powder, Apply 1 application topically 2 (two) times a day., Disp: , Rfl: 2     omeprazole (PRILOSEC) 40 MG capsule, Take 40 mg by mouth daily., Disp: , Rfl: 5     pen needle, diabetic 31 gauge x 5/16\" Ndle, " Use 1 each As Directed daily. With daily Victoza injections., Disp: 100 each, Rfl: PRN     QUEtiapine (SEROQUEL) 100 MG tablet, Take 100 mg by mouth at bedtime., Disp: , Rfl:       Interim: Since our last visit, she has had nasal surgery, recovered and is tasting/smelling better/breathing better (followed by Dr. Landeros). Is measuring blood sugars and routinely 100-125mg/dl (reviewed log) w/ occasional levels in 130-160 mg/dl range.  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  Plan:   1.  Diet: just saw dietician earlier today, will get off OJ and reduce sodium diet.  2. Exercise: Referral for PT to work on fitness regimen given low back pain/lymphedema and super morbid obesity  3. Medication: Trulicity renewed. One low blood sugar likely related to poor intake vs poor sample (trouble getting blood out for that test) reviewed her log and 90% of readings in the 100-130mg/dl range.  October's high was 140s (AM fasting).  4.  Stress Reduction:  Doing well.  5. Goals:  Weight gain since the summer, will continue to try to get weight under 300 lbs. Mental health limits access to surgical tool given intermittent instability in mood/psychosis in the past 1-2 years.  6. Lymphedema: followed by vascular clinic, wraps in place today, lower sodium diet/higher protein diet should help.  7. Low vitamin D on last month's testing:   She reports her PCP has written recommendations for supplementation. Typically 3000-5000IUs of D3 daily should correct over the winter.          We discussed [facility] Bariatric Basics including:  -eating 3 meals daily  -eating protein first  -eating slowly, chewing food well  -avoiding/limiting calorie containing beverages  -We discussed the importance of restorative sleep and stress management in maintaining a healthy weight.  -We  discussed the National Weight Control Registry healthy weight maintenance strategies and ways to optimize metabolism.  -We discussed the importance of physical activity including cardiovascular and strength training in maintaining a healthier weight and explored viable options.    Most recent labs:  Lab Results   Component Value Date    WBC 12.2 (H) 02/05/2018    HGB 14.0 02/05/2018    HCT 41.6 02/05/2018     (H) 02/05/2018     02/05/2018     Lab Results   Component Value Date    CHOL 153 12/21/2017     Lab Results   Component Value Date    HDL 27 (L) 12/21/2017     Lab Results   Component Value Date    LDLCALC 76 12/21/2017     Lab Results   Component Value Date    TRIG 248 (H) 12/21/2017     No components found for: CHOLHDL  Lab Results   Component Value Date    ALT 42 02/05/2018    AST 38 02/05/2018    ALKPHOS 34 (L) 02/05/2018    BILITOT 0.5 02/05/2018     Lab Results   Component Value Date    HGBA1C 5.4 09/20/2016     Lab Results   Component Value Date    AQYGVEZN47 623 09/17/2015     Lab Results   Component Value Date    ZXGTUDIH00DL 22.8 (L) 09/13/2018     No results found for: FERRITIN  No results found for: PTH  Lab Results   Component Value Date    32240 218 (H) 09/17/2015     No results found for: 7597  Lab Results   Component Value Date    TSH 2.80 02/28/2018     No results found for: TESTOSTERONE    DIETARY HISTORY    Positive Changes Since Last Visit: Didn't splurge on candy this Halloween (typically).  Struggling With: some increased soup consumption/portion control. Tolerates Trulicity.    Knowledgeable in Reading Food Labels: improving  Getting Adequate Protein: improving  Sleeping 7-8 hours/day often  Stress management good    PHYSICAL ACTIVITY PATTERNS:  Cardiovascular: some OT,   Strength Training: rare 2 lb weights    REVIEW OF SYSTEMS  GENERAL/CONSTITUTIONAL:  nl  HEENT:   na  CARDIOVASCULAR:   normal  PULMONARY:   no  GASTROINTESTINAL:  No issues w/ pain w/  Trulicity.  UROLOGIC:  na  NEUROLOGIC:  No headaches  PSYCHIATRIC:   stable moods  MUSCULOSKELETAL/RHEUMATOLOGIC   occ low back pain and leg pains  ENDOCRINE:  na  DERMATOLOGIC:  No rash.    PHYSICAL EXAM:  Vitals: /65 (Patient Site: Right Arm, Patient Position: Sitting, Cuff Size: Adult Large)  Pulse 87  Ht 5' (1.524 m)  Wt (!) 345 lb (156.5 kg)  SpO2 97%  BMI 67.38 kg/m2  Height: 5' (1.524 m) (11/1/2018  3:32 PM)  Initial Weight: 350 lbs (11/1/2018  3:00 PM)  Weight: 345 lb (156.5 kg) (11/1/2018  3:32 PM)  Weight loss from initial: 5 (11/1/2018  3:00 PM)  % Weight loss: 1.43 % (11/1/2018  3:00 PM)  BMI (Calculated): 67.4 (11/1/2018  3:32 PM)  SpO2: 97 % (11/1/2018  3:32 PM)    GEN: Pleasant, well groomed, in no acute distress  HEENT: PEERL, EOMI, airway clear .  EXTREMITIES: velcro wraps in place bilateral lower extremities. .  NEURO: Alert and Oriented X3, normal gait and speech. Rises from chair quickly and ambulates without assistance to chair..  SKIN: No visible rashes, pallor or jaundice.  PSYCH: stable mood today, no psychotic features/agitation..        25 minutes was spent in direct consultation, with over 50% of it spent in counseling regarding their plan for excess weight reduction and health modification.  Carlos Bowling MD  [facility]  3:44 PM

## 2021-06-22 NOTE — PROGRESS NOTES
Date of Service:  12/03/18    Date last seen:  04/11/18    PCP: Erasto Azul MD    Impression:   1. Leg swelling bilaterally-looks great   2. Venous stasis/insufficiency with history of ulcerations-doing very well  3. Acquired lymphedema   4. Recurrent cellulitis of legs- (3/5/15, 2/17/15, 5/15, 12/14/15, 3/16, 7/12/16, 8/18/2016)-no recurrence-infections under excellent control.  5. Bilateral foot deformities  6. Complicated by morbid obesity   7. Prediabetic    Plan:   1. Questions were answered.   2. Continue Flexitouch.  Doing very well.    3. Continue compression.  Now not fitting well.  New Velcro compression ordered.  4. Lymphedema exercises to continue.  5. Needs new shoes and insoles.  The ones she has wearing down and in poor repair.  New ones were ordered.  6. Continue to work with bariatrics.     7. Follow up 1 year, or when needed.      Time spent with patient 15 minutes with greater than 50% time in consultation, education and coordination of care.   ---------------------------------------------------------------------------------------------------------------------     Chief Complaint: Bilateral leg swelling     History of Present Illness:   Bushra Buck returns to the [facility] for follow up of her bilateral leg swelling due to severe venous hypertension, insufficiency with secondary lymphedema and recurrent cellulitis resulting from morbid obesity.  She does the flexitouch pump 5/7 weekdays and it continues to help greatly.  She continues to work with Dr. Bowling of bariatrics.  She wears her velcro compression daily.  This is working well. She has a PCA helping her 5 days a week.  She has not had an infection since August 2016. She continues on doxycycline. There has been no new numbness, tingling, weakness, masses, rashes, shortness of breath or chest pain.   She has not had any pain.  She has not run a fever.  She is wearing her shoes  and insoles, but as her swelling has decreased her shoes are no longer fitting.  Shoes and insoles are also in disrepair.  Her velcro compression is wearing down and she also needs these renewed.  She sees Dr. Azul as her PCP.   She continues to work with her counselor and psychiatric professionals for her schizophrenia.        Past Medical History:   Diagnosis Date     Allergic rhinitis      Bipolar 1 disorder (H)     followed by psych     Cellulitis, leg 2016     Dyslipidemia      Ganglion, left wrist      GERD (gastroesophageal reflux disease)      Herpes zoster      Hypertension      Hypothyroid      Lymphedema of lower extremity 2008     Morbid obesity (H)      Nightmares      Post traumatic stress disorder (PTSD)      Prolactinoma (H) transsphenoid approach 2000.     PTSD (post-traumatic stress disorder)      Schizoaffective disorder, bipolar type (H)      Venous stasis      Vitamin D deficiency        Past Surgical History:   Procedure Laterality Date     ABDOMINAL SURGERY  2000    fat removed tp plug dura when had a pituitary surgery.     anorectal fissure repair  2004, 2005     BRAIN SURGERY  2000    pituitary surgery     CARPAL TUNNEL RELEASE Bilateral      GANGLION CYST EXCISION  2010    left arm     GANGLION CYST EXCISION Left 6/7/2018    Procedure: REMOVE RECURRENT GANGLION CYST LEFT VOLAR RADIAL WRIST;  Surgeon: Angel Lopez MD;  Location: [facility];  Service:      PITUITARY SURGERY  2014     SEPTOPLASTY      x 2     SINUS SURGERY Right 8/21/2015    Procedure: RIGHT MICHELLE BULLOSA;  Surgeon: Daniel Landeros MD;  Location: [facility];  Service:      TONSILLECTOMY         Current Outpatient Medications   Medication Sig Dispense Refill     ARIPiprazole (ABILIFY) 15 MG tablet Take 15 mg by mouth at bedtime.       blood glucose meter (GLUCOMETER) Use 1 each As Directed as needed. Dispense glucometer brand per patient's insurance at pharmacy discretion.  0     blood glucose test  "strips Use 1 each As Directed as needed. Dispense brand per patient's insurance at pharmacy discretion.  0     cabergoline (DOSTINEX) 0.5 mg tablet Take 0.5 mg by mouth 2 (two) times a week.       calcium citrate-vitamin D3 (CITRACAL + D) 315-250 mg-unit per tablet Take 2 tablets by mouth daily.  3     doxycycline (VIBRAMYCIN) 100 MG capsule Take 100 mg by mouth 2 (two) times a day.       dulaglutide (TRULICITY) 1.5 mg/0.5 mL PnIj Inject 1.5 mg under the skin every 7 days. 4 Syringe 6     EPINEPHrine (EPIPEN) 0.3 mg/0.3 mL atIn Inject 0.6 mL into the shoulder, thigh, or buttocks as needed.   0     fenofibrate (TRICOR) 145 MG tablet Take 1 tablet by mouth daily.  3     fluticasone (FLONASE) 50 mcg/actuation nasal spray Apply 2 sprays into each nostril daily.  6     fluticasone-vilanterol (BREO ELLIPTA) 100-25 mcg/dose DsDv inhaler Inhale 1 puff.       furosemide (LASIX) 40 MG tablet Take 1 tablet (40 mg total) by mouth daily. For chronic edema  0     generic lancets Use 1 each As Directed as needed. Dispense brand per patient's insurance at pharmacy discretion.  0     hydrOXYzine pamoate (VISTARIL) 25 MG capsule Take 1 capsule (25 mg total) by mouth 3 (three) times a day as needed for itching. 20 capsule 1     levothyroxine (SYNTHROID, LEVOTHROID) 112 MCG tablet Take 1 tablet (112 mcg total) by mouth daily. 30 tablet 0     meclizine (ANTIVERT) 12.5 mg tablet Take 1-2 tablets by mouth as needed.  0     medroxyPROGESTERone (PROVERA) 10 MG tablet 1 tab(s)       MULTIVITAMIN tablet Take 1 tablet by mouth daily.  3     NYAMYC powder Apply 1 application topically 2 (two) times a day.  2     omeprazole (PRILOSEC) 40 MG capsule Take 40 mg by mouth daily.  5     pen needle, diabetic 31 gauge x 5/16\" Ndle Use 1 each As Directed daily. With daily Victoza injections. 100 each PRN     QUEtiapine (SEROQUEL) 100 MG tablet Take 100 mg by mouth at bedtime.       valACYclovir (VALTREX) 1000 MG tablet TAKE 2 TABLETS BY MOUTH TWICE " DAILY FOR ONE DAY AT ONSET OF COLD SORE  2     CHOLECALCIFEROL 1,000 unit tablet 2,000 Units.  1     dulaglutide 1.5 mg/0.5 mL PnIj Inject 1.5 mg under the skin once a week.       No current facility-administered medications for this visit.        Allergies   Allergen Reactions     Fiorinal [Butalbital-Aspirin-Caffeine] Anaphylaxis and Swelling     Clindamycin Rash     Rash on neck - not raised . Heartburn     Ancef [Cefazolin] Swelling     Arm swells     Aspirin (Tartrazine Only) Other (See Comments)     Eyes swell shut     Ativan [Lorazepam] Swelling     arms     Carbamazepine Other (See Comments)      (tegretol) Arm swelling     Codeine Swelling     Delsym Hives     Ibuprofen Other (See Comments)     Swelling, eyes swell shut     Latex Swelling     Lithium Analogues Swelling     Red/swollen arms     Oxcarbazepine Other (See Comments)     (trileptal) Arm swelling     Venom-Honey Bee Swelling     Unknown     Augmentin [Amoxicillin-Pot Clavulanate] Rash     Rash on neck - not raised     Cefdinir Swelling            Topiramate Swelling and Rash     topamax       Social History     Socioeconomic History     Marital status: Single     Spouse name: Not on file     Number of children: 0     Years of education: Not on file     Highest education level: Not on file   Social Needs     Financial resource strain: Not on file     Food insecurity - worry: Not on file     Food insecurity - inability: Not on file     Transportation needs - medical: Not on file     Transportation needs - non-medical: Not on file   Occupational History     Occupation: Disability   Tobacco Use     Smoking status: Never Smoker     Smokeless tobacco: Never Used   Substance and Sexual Activity     Alcohol use: No     Drug use: No     Sexual activity: No     Partners: Male     Birth control/protection: Abstinence   Other Topics Concern     Not on file   Social History Narrative        .  Not working.   No kids.       Family History   Problem  Relation Age of Onset     Diabetes Mother      Hypothyroidism Mother      Cancer Mother      Sleep apnea Mother      Snoring Mother      Heart disease Father      Diabetes Sister      Edema Sister      No Medical Problems Sister      Diabetes Maternal Grandfather        Review of Systems:    Bushra Buck no new numbess, tingling or weakness, redness or rashes, fevers, new masses, abdominal bloating or discomfort, unexplained weight loss, increased pain, new ulcers, shortness of breath and chest pain  Full 12 point review of systems was completed.    Imaging:    I personally reviewed the following imaging today and those on care everywhere, if indicated    US VENOUS LEG RIGHT  4/3/2016 1:58 AM  INDICATION: Increasing peripheral leg pain and edema  TECHNIQUE: Routine exam without and with compression, augmentation, and duplex utilizing 2D gray-scale imaging, Doppler interrogation with color-flow and spectral waveform analysis.  COMPARISON: None.  FINDINGS: The common femoral, femoral, popliteal, and segmentally visualized calf veins were evaluated. The opposite CFV was also included in the evaluation.  Right leg veins are negative for deep venous thrombosis. No popliteal cysts.  IMPRESSION:   CONCLUSION:  1. Right leg veins are negative for DVT although exam is limited due to body habitus and edema.    Labs:    I personally reviewed the following labs today and those on care everywhere, if indicated    Lab Results   Component Value Date    SEDRATE 54 (H) 08/01/2015         Lab Results   Component Value Date    CRP 0.5 08/18/2016           Lab Results   Component Value Date    CREATININE 0.76 11/06/2018      Lab Results   Component Value Date    HGBA1C 5.4 09/20/2016           Lab Results   Component Value Date    BUN 18 11/06/2018              Lab Results   Component Value Date    ALBUMIN 3.8 11/06/2018       Vitamin D, Total (25-Hydroxy)   Date Value Ref Range Status   11/06/2018 24.2 (L) 30.0 - 80.0 ng/mL  Final       Lab Results   Component Value Date    TSH 3.01 11/06/2018     Lab Results   Component Value Date    WBC 12.2 (H) 02/05/2018    HGB 14.0 02/05/2018    HCT 41.6 02/05/2018     (H) 02/05/2018     02/05/2018       Physical Exam:  Vitals:    12/03/18 0938   BP: 140/68   Pulse: 80   Resp: 20   Temp: 98.9  F (37.2  C)     BMI 64.68 (significant decrease)    Circumferential measures:    Vasc Edema 4/20/2017 10/25/2017 1/8/2018 4/11/2018 12/3/2018   Right just above MTP 27.5 27.5 27 26 27.5   Right Ankle 37.5 36 36 36 34.5   Right Widest Calf 66 65 64.5 63.5 63.5   Right Thigh Up 10cm 74 74 74 - 72.7   Left - just above MTP 32.6 32 30.5 34 30.5   Left Ankle 35 37 36.5 62.5 35   Left Widest Calf 65 69 65 67.5 65   Left Thigh Up 10cm 74 74 74 - 72     Measures are continuing to decrease.     General:  38 y.o. female in no apparent distress.     Psych:  Alert and oriented x 3.  Cooperative.     HEENT: Atraumatic, normocephalic    Integumentary: Legs show normal skin with significant continued softening and decreased fibrosis. No rubor or calor.   Minimal excoriations on distal legs.  This has improved greatly.  No discharge or odor.   No pain to palpation. + Stemmer's sign in the feet bilaterally.      Sensation: Intact to pinprick and light touch in the legs bilaterally.    Strength:  Normal strength in knee flexion/knee extension, ankle dorsiflexion and great toe extension bilaterally.     Vascular: Dorsalis pedis and posterior tib pulses strong and equal bilaterally.    Temitope Odell MD, ABWMS, FACCWS, [facility]  Medical Director Wound Care and Lymphedema  [facility]  [phone]

## 2021-06-22 NOTE — PROGRESS NOTES
[facility] Daily Progress     Patient Name: Bushra Buck  Date: 12/10/2018  Visit #: 8  PTA visit #: -2  Referral Diagnosis: lymphedema, low back pain, obesity  Referring provider: Carlos Bowling MD  Visit Diagnosis:     ICD-10-CM    1. Prader-Willi syndrome Q87.1    2. Lymphedema I89.0    3. Venous hypertension of lower extremity, bilateral I87.303    4. Morbid obesity with BMI of 60.0-69.9, adult (H) E66.01     Z68.44    5. Muscle weakness (generalized) M62.81          Assessment:     Bilateral lower extremities are smaller.  Compression velcro garments needed to be re adjusted and added a new piece of velcro to the outer sleeve.  Gait sequence and lower extremities strength is improving.  Patient is benefitting from skilled physical therapy and is making steady progress toward functional goals.  Patient is appropriate to continue with skilled physical therapy intervention, as indicated by initial plan of care.    Goal Status:  Pt. will demonstrate/verbalize independence in self-management of condition in : Progressing toward  Pt. will be independent with home exercise program in : Progressing toward    Pt will: decrease LLIS by 5 points to demonstrate decreased impact of lymphedema on function      Plan / Patient Education:     Continue with initial plan of care. .     Pt was instructed to try to walk for a second time each day.  Patient will try to get smaller shoes.  Pt to follow up with Dr. Odell on 2019.  Subjective:     Pain Ratin/10.   Pt was not able to go for walk, was busy with [employer]  Pt states she went to [facility] and they ordered new shoes.      Objective:        Gait trainin feet in 4min 10 sec. , no assistive device.  Shoes are too big for her now. New shoes have been ordered    PCA did not come to clinic today.    Compression:  Juxtafit Premium custom velcro compression received 2018  Re adjusted velcro straps on garment, and outer sleeve added new  "velcro pieces.    Skin:  Intact, wrinkling of skin.    Prophylatic antibiotics - Doxycycline    Sit<>Stand (17\" height; 30\") - 20x - mi-mod risk of falls      Volume to 50cm:    L LE:     R LE:  10cm= 29.5    10cm= 28cm  20cm= 50.5    20cm= 45.5  30cm= 59.5    30cm= 60.4  40cm= 66.5    40cm = 67.3  50cm= 63.4cm   50cm= 66  Total volume = [id number]   = [id number]  L>R=1%      Exercises:  Exercise #1: standing lymphedema exercises with 10 repetitions of each able to follow written instructions  Comment #1: standing holding to paralellel bars: marching, hip extension, abduction/adductions, mini squats  Exercise #2: nu step seat : 8, level 1,arms 8 x 7 minutes  Comment #2: stairs: 4 steps x 4 times  Exercise #3: gait training 5 times around hallways, 4 minutes, 168 feet, gets SOB.  Comment #3: sit to stand x 20 times      Treatment Today     TREATMENT MINUTES COMMENTS   Evaluation     Self-care/ Home management     Manual therapy     Neuromuscular Re-education  .     Therapeutic Activity             Therapeutic Exercises 55  Reviewed home aerobic conditioning and strengthening to assist in weight loss.  Pt to do 2 walks a day. Can do one longer (10-20 minutes and one shorter walk 5-10 minutes)   sit<>stand exs x 10; Reviewed  in HEP:  -standing   -hip AB x20  -hip ext x10  -hip flexion x10   -marching x20  -mini squats x20  -heel/toe raises x20  -lymph ex's x10 reps 2x/day  -5'walk x 2  -bicep curls 2# x10 B  -over head press 2# x10   Gait training     Modality__________________                Total 55    Blank areas are intentional and mean the treatment did not include these items.       Nusrat Langley PT,CLT-DILEEP  12/10/2018    "

## 2021-06-22 NOTE — PROGRESS NOTES
"[facility] Daily Progress -Discharge    Patient Name: Bushra Buck  Date: 2018  Visit #: 9  PTA visit #: -2  Referral Diagnosis: lymphedema, low back pain, obesity  Referring provider: Carlos Bowling MD  Visit Diagnosis:     ICD-10-CM    1. Prader-Willi syndrome Q87.1    2. Lymphedema I89.0    3. Venous hypertension of lower extremity, bilateral I87.303    4. Morbid obesity with BMI of 60.0-69.9, adult (H) E66.01     Z68.44          Assessment:     Patient was seen for lymphedema and strengthening exercises, gait training.  Patient was able to increased ambulation to 300 feet without shortens of breast.  Patient has a home exercise program.  Patient is still awaiting to receive her new shoes from [facility].   Patient will go to MD for follow up.    Goal Status:  Pt. will demonstrate/verbalize independence in self-management of condition in : Met  Pt. will be independent with home exercise program in : Met    Pt will: decrease LLIS by 5 points to demonstrate decreased impact of lymphedema on function      Plan / Patient Education:     Discharge from physical therapy all goals met.    Subjective:     Pain Ratin/10.   Pt was  able to go for walks.  Pt states she went to [facility] and they don't have her new shoes.      Objective:        Gait trainin feet in 8 min . , no assistive device.  Shoes are too big for her now. New shoes have been ordered    PCA did not come to clinic today.    Compression:  Juxtafit Premium custom velcro compression received 2018  Re adjusted velcro straps on garment, and outer sleeve added new velcro pieces.    Skin:  Intact, wrinkling of skin.    Prophylatic antibiotics - Doxycycline    Sit<>Stand (17\" height; 30\") - 20x - mi-mod risk of falls      Volume to 50cm:    L LE:     R LE:  10cm= 29.5    10cm= 28cm  20cm= 50.5    20cm= 45.5  30cm= 59.5    30cm= 60.4  40cm= 66.5    40cm = 67.3  50cm= 63.4cm   50cm= 66  Total volume = [id number]   = " [id number]  L>R=1%      Exercises:  Exercise #1: standing lymphedema exercises with 10 repetitions of each able to follow written instructions  Comment #1: standing holding to paralellel bars: marching, hip extension, abduction/adductions, mini squats  Exercise #2: nu step seat : 9, level 1,arms 8 x 8 :15 minutes  Comment #2: stairs: 4 steps x 4 times  Exercise #3: gait training 5 times around hallways, 8 minutes,  334feet, gets SOB.  Comment #3: sit to stand x 20 times      Treatment Today     TREATMENT MINUTES COMMENTS   Evaluation     Self-care/ Home management     Manual therapy     Neuromuscular Re-education  .     Therapeutic Activity             Therapeutic Exercises 55  Reviewed home aerobic conditioning and strengthening to assist in weight loss.  Pt to do 2 walks a day. Can do one longer (10-20 minutes and one shorter walk 5-10 minutes)   sit<>stand exs x 10; Reviewed  in HEP:  -standing   -hip AB x20  -hip ext x10  -hip flexion x10   -marching x20  -mini squats x20  -heel/toe raises x20  -lymph ex's x10 reps 2x/day  -5'walk x 2  -bicep curls 2# x10 B  -over head press 2# x10   Gait training     Modality__________________                Total 55    Blank areas are intentional and mean the treatment did not include these items.       Nusrat Langley PT,CLT-DILEEP  12/13/2018

## 2021-06-22 NOTE — PROGRESS NOTES
[facility] Daily Progress     Patient Name: Bushra Buck  Date: 2018  Visit #: 7  PTA visit #: -2  Referral Diagnosis: lymphedema, low back pain, obesity  Referring provider: Carlos Bowling MD  Visit Diagnosis:     ICD-10-CM    1. Prader-Willi syndrome Q87.1    2. Lymphedema I89.0    3. Venous hypertension of lower extremity, bilateral I87.303    4. Morbid obesity with BMI of 60.0-69.9, adult (H) E66.01     Z68.44    5. Muscle weakness (generalized) M62.81    6. Physical deconditioning R53.81    7. Chronic bilateral low back pain without sciatica M54.5     G89.29    8. Intellectual disability F79    9. Bipolar affective disorder, current episode manic with psychotic symptoms (H) F31.2    10. Morbid obesity (H) E66.01    11. Low back pain M54.5    12. Acute midline low back pain without sciatica M54.5          Assessment:     Patient is able to walk longer distances with less shortness of breath.  Bilateral lower extremities edema is decreased and skin has more wrinkling.  Pt did well with all the exercises. Added UE strengthening today.  Patient is benefitting from skilled physical therapy and is making steady progress toward functional goals.  Patient is appropriate to continue with skilled physical therapy intervention, as indicated by initial plan of care.    Goal Status:  Pt. will demonstrate/verbalize independence in self-management of condition in : Progressing toward  Pt. will be independent with home exercise program in : Progressing toward    Pt will: decrease LLIS by 5 points to demonstrate decreased impact of lymphedema on function      Plan / Patient Education:     Continue with initial plan of care. .     Pt was instructed to try to walk for a second time each day.  Patient will try to get smaller shoes.  Pt to follow up with Dr. Odell on 2019.  Subjective:     Pain Ratin/10.   Pt has been walking 2x/day for 15 minutes in her apartment.  Pt states she went to [facility]  "and they ordered new shoes.      Objective:        Gait trainin feet in 4min 15 sec. , no assistive device.  Shoes are too big for her now. New shoes have been ordered    PCA did not come to clinic today.    Compression:  Juxtafit Premium custom velcro compression received 2018  Re adjusted velcro straps on garment,     Skin:  Intact, wrinkling of skin.    Prophylatic antibiotics - Doxycycline    Sit<>Stand (17\" height; 30\") - 10x - mi-mod risk of falls      Volume to 50cm:    L LE:     R LE:  10cm= 29.5    10cm= 28cm  20cm= 50.5    20cm= 45.5  30cm= 59.5    30cm= 60.4  40cm= 66.5    40cm = 67.3  50cm= 63.4cm   50cm= 66  Total volume = 23472co   = [id number]  L>R=1%      Exercises:  Exercise #1: standing lymphedema exercises with 5 repetitions of each able to follow written instructions  Comment #1: standing holding to paralellel bars: marching, hip extension, abduction/adductions, mini squats  Exercise #2: nu step seat : 8, level 1,arms 8 x 6 minutes  Comment #2: stairs: 4 steps x 4 times  Exercise #3: gait training 3 times around hallways, 3 minutes, gets SOB.      Treatment Today     TREATMENT MINUTES COMMENTS   Evaluation     Self-care/ Home management     Manual therapy     Neuromuscular Re-education  .     Therapeutic Activity             Therapeutic Exercises 45  Reviewed home aerobic conditioning and strengthening to assist in weight loss.  Pt to do 2 walks a day. Can do one longer (10-20 minutes and one shorter walk 5-10 minutes)   sit<>stand exs x 10; Reviewed  in HEP:  -standing   -hip AB x20  -hip ext x10  -hip flexion x10   -marching x20  -mini squats x20  -heel/toe raises x20  -lymph ex's x10 reps 2x/day  -5'walk x 2  -bicep curls 2# x10 B  -over head press 2# x10   Gait training     Modality__________________                Total 50    Blank areas are intentional and mean the treatment did not include these items.       Mona Ramirez PTA,CLT  2018    "

## 2021-06-22 NOTE — PROGRESS NOTES
"[facility] Daily Progress     Patient Name: Bushra Buck  Date: 12/3/2018  Visit #: 6  PTA visit #: -1  Referral Diagnosis: lymphedema, low back pain, obesity  Referring provider: Carlos Bowling MD  Visit Diagnosis:     ICD-10-CM    1. Prader-Willi syndrome Q87.1    2. Lymphedema I89.0    3. Venous hypertension of lower extremity, bilateral I87.303    4. Morbid obesity with BMI of 60.0-69.9, adult (H) E66.01     Z68.44          Assessment:     Patient is able to walk longer distances with less shortness of breath.  Bilateral lower extremities edema is decreased and skin has more wrinkling.    Patient is benefitting from skilled physical therapy and is making steady progress toward functional goals.  Patient is appropriate to continue with skilled physical therapy intervention, as indicated by initial plan of care.    Goal Status:  Pt. will demonstrate/verbalize independence in self-management of condition in : Progressing toward  Pt. will be independent with home exercise program in : Progressing toward    Pt will: decrease LLIS by 5 points to demonstrate decreased impact of lymphedema on function      Plan / Patient Education:     Continue with initial plan of care. .     Pt was instructed to try to walk for a second time each day.  Patient will try to get smaller shoes.  Pt to follow up with Dr. Odell on 2019.  Subjective:   Patient reports not working as a .  Pain Ratin/10.      Objective:        Gait trainin feet in 4min 15 sec. , no assistive device.  Shoes are too big for her now.    PCA did not come to clinic today.    Compression:  Juxtafit Premium custom velcro compression received 2018  Re adjusted velcro straps on garment,     Skin:  Intact, wrinkling of skin.    Prophylatic antibiotics - Doxycycline    Sit<>Stand (17\" height; 30\") - 10x - mi-mod risk of falls      Volume to 50cm:    L LE:     R LE:  10cm= 29.5    10cm= 28cm  20cm= 50.5    20cm= " 45.5  30cm= 59.5    30cm= 60.4  40cm= 66.5    40cm = 67.3  50cm= 63.4cm   50cm= 66  Total volume = 32026mb   = 55226kf  L>R=1%      Exercises:  Exercise #1: standing lymphedema exercises with 5 repetitions of each able to follow written instructions  Comment #1: standing holding to paralellel bars: marching, hip extension, abduction/adductions, mini squats  Exercise #2: nu step seat : 8, level 1,arms 8 x 6 minutes  Comment #2: stairs: 4 steps x 4 times  Exercise #3: gait training 3 times around hallways, 3 minutes, gets SOB.      Treatment Today     TREATMENT MINUTES COMMENTS   Evaluation     Self-care/ Home management 25 Patient was able to perform donning/doffing of compression stockings.  Further instructed  demonstrating self donning of velcro units and .      Educated patient on skin care, signs of infection  Re adjusted straps.   Manual therapy     Neuromuscular Re-education  .     Therapeutic Activity             Therapeutic Exercises 25  education on increased aerobic conditioning and strengthening to assist in weight loss.  Pt to do 2 walks a day. Can do one longer (10-20 minutes and one shorter walk 5-10 minutes)   sit<>stand exs x 10; Reviewed  in HEP:  -standing   -hip AB x20  -hip ext x10  -hip flexion x10   -marching x20  -mini squats x20  -heel/toe raises x20  -lymph ex's x10 reps 2x/day   Gait training     Modality__________________                Total 50    Blank areas are intentional and mean the treatment did not include these items.       Nusrat Langley PT,CLT-DILEEP  12/3/2018

## 2021-06-22 NOTE — PROGRESS NOTES
Non-surgical Weight Loss Follow Up Diet Evaluation    Assessment:  This patient is a 38 y.o. female is being seen today for follow-up non-surgical nutritional evaluation. Today we reviewed the patients current eating habits and level of physical activity, and instructed on the changes that are required for successful weight loss outcomes.    Pt's Initial Weight: 350 lbs  Weight: (!) 332 lb 9.6 oz (150.9 kg)  Weight loss from initial: 17.4  % Weight loss: 4.97 %    BMI: Body mass index is 64.96 kg/m .  IBW: 102 lbs    Personal goal weight: 150 lb      Pt Active Problem List Diagnosis:     Patient Active Problem List   Diagnosis     Hypertension     History of pituitary surgery     Schizophrenia, schizoaffective, chronic with acute exacerbation (H)     Lymphedema     Venous hypertension of lower extremity, bilateral     Hypothyroidism, unspecified hypothyroidism type     GERD (gastroesophageal reflux disease)     Prolactinoma (H)     Psychosis (H)     Perianal irritation     Acquired valgus deformity of both ankles     Flat feet, bilateral     Foot deformity, bilateral     Venous insufficiency of both lower extremities     Itching     Morbid obesity with BMI of 60.0-69.9, adult (H)     Prader-Willi syndrome     Intellectual disability     Nightmares     Bipolar affective disorder, current episode manic with psychotic symptoms (H)     Venous stasis ulcers of both lower extremities (H)     Prediabetes     Estimated RMR ([place]evieve-St Jeor equation): 2233 calories  Protein requirements (.5grams to .9grams per pound IBW, 20-30% of calories, minimum of 60-80gm per day):  50-90 grams    Progress made since last visit: Pt reports increasing her water intake, and reducing her juice consumption. Pt reports short funding has made grocery shopping difficult. Portion sizes also has been reduced.    Concerns: Poor ability to comprehend nutrition education. Nutrition concerns include snacking between meals, no exercise routine  established, and large portions.     Diabetes  Testing Blood Sugars: Yes   If so, how often? 1x/day     Diet Recall/Time:   Breakfast: 2 eggs, 1 fruit (14g)   Am Snack: SF pudding cup    Lunch: [employer] meal- kennedy and meat sandwich, fruit (20g)   Pm snack:none   Dinner: 3 Italian sausage, 1 cup peas, 2 hot dog buns (50g)   HS Snack: peanut butter, crackers,fruit cup (10g)     Protein:  grams    Meals per week away from home: none      Recommended limiting eating out to no more than 2x/week.  Patient and I reviewed the importance of eating three consistent meals per day; as well as meal timing to be spaced 4-5 hours apart.  Snack choices: 100-150 calories (1-2x/day if physically hungry), incorporating a fruit/vegetable w/ protein source.    Meal Duration: not discussed    Portion Sizes problematic? YES per patient/diet recall  Encouraged slowing meal times down, 20-30 minutes, chewing to applesauce consistency.   To aid in proper portion control and slow meal time down discussed consuming meals off smaller plates, use toddler/children utensils and set utensils down after each bite.    Protein, vegetables/fruits, carbohydrates:   The patient and I discussed the importance of including lean/low fat protein at each meal and limiting carbohydrate intake to less than 25% of plate volume.     Beverages (Type/Oz. per day)  Water: 32-64 oz   Milk: none   Diet soda: periodically   Juice: none   Chirag-Aid/lemonade/etc: none   Alcohol: none     Discussed the importance of adequate hydration and the goal of 64+ oz of fluid daily.   The patient understands the importance of  avoiding all sweetened and alcoholic drinks, and instead choosing 64 oz plain water.    Exercise  OT therapy 1x/week. Pt also do lymphedema.     Pt's understands that 45-60 minutes of daily activity is an important part of weight loss success.   Encouraged pt to incorporate  strength training exercise in addition to cardiovascular exercise most days of the  week.    PES statement:     1.  (NI-1.3)Excessive energy intake related to Not ready for diet/lifestyle change as evidenced by Intake of high caloric density foods at meals and/or snacks; large portion; frequent grazing; Estimated intake that exceeds estimated daily energy intake; Binge eating patterns;  and BMI 64.       Intervention:  Discussion:  1. Educated pt on arm exercises to perform with soup cans  2. Discussed portion sizes and how to measure using hand  3. Educated pt on sources of carbohydrates; with limiting to 1 serving (15 gm) per meal.     Instructions/Goals:   1. Include protein at each meal.  2. Increase vegetable/fruit intake, by having a vegetable or fruit with each meal daily. Recommended pt to increase vegetable/fruit intake to 4-5 servings daily.  3. Increase fluid intake to 64oz daily: choose plain or calorie/alcohol-free beverages.  4. Incorporate daily structured activity, 45-60 minutes most days of the week  5. Carbohydrates from grain sources at meal times to be no more than 1 Carb Choice, ie: 15-20 gm total carbohydrate per serving  6. Practice eating off of smaller plates/bowls, chewing to applesauce consistency, taking 20-30 minutes to eat in a calm/relaxed environment without distractions of tv/email/cell phone.    Handouts Provided:  Portion sizes using hand  Carbohydrates     Monitor/Evaluation:    Pt will f/u in one month with bariatrician, and f/u in two months with RD.    Plan for next visit with RD:  Review goals and exercises  Educate on reading food labels       Time In: 10:30am  Time Out: 11:00am      ABN signed: Yes

## 2021-06-23 NOTE — PROGRESS NOTES
Bariatric Clinic Follow-Up Visit:    Bushra Buck is a 38 y.o.  female with Body mass index is 64.06 kg/m .  presenting here today for follow-up on non-surgical efforts for weight loss. Original Intake visit occurred on 9/17/15 with a weight of 350 lbs w/ a max of 401 lbs shortly afterlbs and BMI of 68.35.  Along with diet and behavior changes, she has been using trulicity to assist her weight loss goals.  See her intake visit notes for details on identified contributors to weight gain in the past.    Weight:   Wt Readings from Last 2 Encounters:   01/10/19 (!) 328 lb (148.8 kg)   01/03/19 (!) 332 lb 9.6 oz (150.9 kg)    pounds  Height: 5' (1.524 m) (1/10/2019  1:53 PM)  Initial Weight: 350 lbs (1/3/2019 10:00 AM)  Weight: (!) 328 lb (148.8 kg) (1/10/2019  1:53 PM)  Weight loss from initial: 17.4 (1/3/2019 10:00 AM)  % Weight loss: 4.97 % (1/3/2019 10:00 AM)  BMI (Calculated): 64.1 (1/10/2019  1:53 PM)  SpO2: 98 % (1/10/2019  1:53 PM)      Comorbidities:  Patient Active Problem List   Diagnosis     Hypertension     History of pituitary surgery     Schizophrenia, schizoaffective, chronic with acute exacerbation (H)     Lymphedema     Venous hypertension of lower extremity, bilateral     Hypothyroidism, unspecified hypothyroidism type     GERD (gastroesophageal reflux disease)     Prolactinoma (H)     Psychosis (H)     Perianal irritation     Acquired valgus deformity of both ankles     Flat feet, bilateral     Foot deformity, bilateral     Venous insufficiency of both lower extremities     Itching     Morbid obesity with BMI of 60.0-69.9, adult (H)     Prader-Willi syndrome     Intellectual disability     Nightmares     Bipolar affective disorder, current episode manic with psychotic symptoms (H)     Venous stasis ulcers of both lower extremities (H)     Prediabetes       Current Outpatient Medications:      ARIPiprazole (ABILIFY) 15 MG tablet, Take 15 mg by mouth at bedtime., Disp: , Rfl:      blood glucose  meter (GLUCOMETER), Use 1 each As Directed as needed. Dispense glucometer brand per patient's insurance at pharmacy discretion., Disp: , Rfl: 0     blood glucose test strips, Use 1 each As Directed as needed. Dispense brand per patient's insurance at pharmacy discretion., Disp: , Rfl: 0     cabergoline (DOSTINEX) 0.5 mg tablet, Take 0.5 mg by mouth 2 (two) times a week., Disp: , Rfl:      calcium citrate-vitamin D3 (CITRACAL + D) 315-250 mg-unit per tablet, Take 2 tablets by mouth daily., Disp: , Rfl: 3     CHOLECALCIFEROL 1,000 unit tablet, 2,000 Units., Disp: , Rfl: 1     doxycycline (VIBRAMYCIN) 100 MG capsule, Take 100 mg by mouth 2 (two) times a day., Disp: , Rfl:      dulaglutide (TRULICITY) 1.5 mg/0.5 mL PnIj, Inject 1.5 mg under the skin every 7 days., Disp: 4 Syringe, Rfl: 6     EPINEPHrine (EPIPEN) 0.3 mg/0.3 mL atIn, Inject 0.6 mL into the shoulder, thigh, or buttocks as needed. , Disp: , Rfl: 0     fenofibrate (TRICOR) 145 MG tablet, Take 1 tablet by mouth daily., Disp: , Rfl: 3     fluticasone (FLONASE) 50 mcg/actuation nasal spray, Apply 2 sprays into each nostril daily., Disp: , Rfl: 6     fluticasone-vilanterol (BREO ELLIPTA) 100-25 mcg/dose DsDv inhaler, Inhale 1 puff., Disp: , Rfl:      furosemide (LASIX) 40 MG tablet, Take 1 tablet (40 mg total) by mouth daily. For chronic edema, Disp: , Rfl: 0     generic lancets, Use 1 each As Directed as needed. Dispense brand per patient's insurance at pharmacy discretion., Disp: , Rfl: 0     hydrOXYzine pamoate (VISTARIL) 25 MG capsule, Take 1 capsule (25 mg total) by mouth 3 (three) times a day as needed for itching., Disp: 20 capsule, Rfl: 1     levothyroxine (SYNTHROID, LEVOTHROID) 112 MCG tablet, Take 1 tablet (112 mcg total) by mouth daily., Disp: 30 tablet, Rfl: 0     meclizine (ANTIVERT) 12.5 mg tablet, Take 1-2 tablets by mouth as needed., Disp: , Rfl: 0     medroxyPROGESTERone (PROVERA) 10 MG tablet, 1 tab(s), Disp: , Rfl:      MULTIVITAMIN tablet,  "Take 1 tablet by mouth daily., Disp: , Rfl: 3     NYAMYC powder, Apply 1 application topically 2 (two) times a day., Disp: , Rfl: 2     omeprazole (PRILOSEC) 40 MG capsule, Take 40 mg by mouth daily., Disp: , Rfl: 5     pen needle, diabetic 31 gauge x 5/16\" Ndle, Use 1 each As Directed daily. With daily Victoza injections., Disp: 100 each, Rfl: PRN     QUEtiapine (SEROQUEL) 100 MG tablet, Take 100 mg by mouth at bedtime., Disp: , Rfl:      valACYclovir (VALTREX) 1000 MG tablet, TAKE 2 TABLETS BY MOUTH TWICE DAILY FOR ONE DAY AT ONSET OF COLD SORE, Disp: , Rfl: 2     dulaglutide 1.5 mg/0.5 mL PnIj, Inject 1.5 mg under the skin once a week., Disp: , Rfl:       Interim: Since our last visit, she has continued losing weight and is now down73 lbs from her heaviest weight and down over 5% from her original intake weight of 350 lbs. She's tolerating and using her Trulicity well has noticed less snacking. Blood sugars are running 100, no higher than 140mg/dl. Doing lymphedema exercises/OT.    Plan:   1. Great work with averaging over 2 lbs of week the last 2 months, continue using good protein intake at 3 meals daily, avoiding soda pop and limiting your artificial sweetened drinks to 1-2 daily or less.    2. Continue your trulicity as it seems to be working well w/ your blood sugar and appetite.    3. Continue exercises regularly. If you can add 2-3 days of some strength training each week that would be helpful.  4. Goals: Is down 18% total body weight from her heaviest, 401 lbs and is down 22 lbs, 6.3% from her original 2015 intake weight. Doing well and our next longer term goal is to get weight below 300 lbs.       We discussed [facility]ariat[facility] Basics including:  -eating 3 meals daily  -eating protein first  -eating slowly, chewing food well  -avoiding/limiting calorie containing beverages  -We discussed the importance of restorative sleep and stress management in maintaining a healthy weight.  -We discussed the " National Weight Control Registry healthy weight maintenance strategies and ways to optimize metabolism.  -We discussed the importance of physical activity including cardiovascular and strength training in maintaining a healthier weight and explored viable options.    Most recent labs:  Lab Results   Component Value Date    WBC 12.2 (H) 02/05/2018    HGB 14.0 02/05/2018    HCT 41.6 02/05/2018     (H) 02/05/2018     02/05/2018     Lab Results   Component Value Date    CHOL 140 11/06/2018     Lab Results   Component Value Date    HDL 36 (L) 11/06/2018     Lab Results   Component Value Date    LDLCALC 75 11/06/2018     Lab Results   Component Value Date    TRIG 145 11/06/2018     No components found for: CHOLHDL  Lab Results   Component Value Date    ALT 44 11/06/2018    AST 32 11/06/2018    ALKPHOS 35 (L) 11/06/2018    BILITOT 0.4 11/06/2018     Lab Results   Component Value Date    HGBA1C 5.4 09/20/2016     Lab Results   Component Value Date    GQVVHHAH79 623 09/17/2015     Lab Results   Component Value Date    WNZIZENW10RB 24.2 (L) 11/06/2018     No results found for: FERRITIN  No results found for: PTH  Lab Results   Component Value Date    46604 218 (H) 09/17/2015     No results found for: 7597  Lab Results   Component Value Date    TSH 3.01 11/06/2018     No results found for: TESTOSTERONE    DIETARY HISTORY    Positive Changes Since Last Visit: contemplative about working out with her maggie  Struggling With: exercise. Making better food choices/protein intake.    Knowledgeable in Reading Food Labels: improving  Getting Adequate Protein: improving  Sleeping 7-8 hours/day ofte  Stress management good    PHYSICAL ACTIVITY PATTERNS:  Cardiovascular: walks some, does her lymphedema exercises regularly  Strength Training: lymph edema exercises only right now.    REVIEW OF SYSTEMS  GENERAL/CONSTITUTIONAL:  nl  HEENT:   nl  CARDIOVASCULAR:   nl  PULMONARY:    nl  GASTROINTESTINAL:  nl  UROLOGIC:  nl  NEUROLOGIC:  nl  PSYCHIATRIC:  nl  MUSCULOSKELETAL/RHEUMATOLOGIC   nl  ENDOCRINE:  nl  DERMATOLOGIC:  nl    PHYSICAL EXAM:  Vitals: /53 (Patient Site: Right Arm, Patient Position: Sitting, Cuff Size: Adult Regular)   Pulse 88   Ht 5' (1.524 m)   Wt (!) 328 lb (148.8 kg)   SpO2 98%   Breastfeeding? No   BMI 64.06 kg/m    Height: 5' (1.524 m) (1/10/2019  1:53 PM)  Initial Weight: 350 lbs (1/3/2019 10:00 AM)  Weight: (!) 328 lb (148.8 kg) (1/10/2019  1:53 PM)  Weight loss from initial: 17.4 (1/3/2019 10:00 AM)  % Weight loss: 4.97 % (1/3/2019 10:00 AM)  BMI (Calculated): 64.1 (1/10/2019  1:53 PM)  SpO2: 98 % (1/10/2019  1:53 PM)      GEN: Pleasant, well groomed, in no acute distress  HEENT: PEERL, EOMI, airway clear .  NECK: No swelling.  HEART: Rhythm regular, rate regular, no murmur   LUNGS: Clear without crackles or wheezes. No cough.  ABDOMEN: central adiposity.  EXTREMITIES: wraps in place on legs palpable peripheral pulses, 2 plus radial.  NEURO: Alert and Oriented X3, normal gait and speech.  SKIN: No visible rashes.        25 minutes was spent in direct consultation, with over 50% of it spent in counseling regarding their plan for excess weight reduction and health modification.  Carlos Bowling MD  [facility]  2:30 PM

## 2021-06-24 ENCOUNTER — COMMUNICATION - HEALTHEAST (OUTPATIENT)
Dept: SCHEDULING | Facility: CLINIC | Age: 41
End: 2021-06-24

## 2021-06-25 NOTE — TELEPHONE ENCOUNTER
Patient called back and had friend help with abdominal measurements.     5cm above navel:  147cm  25cm below navel:  155cm  At navel:  141cm    Patient states her current pump is from [employer].     Writer did notify her that Luciana is out of town until end of the month and so there will be a delay. Pt understands.

## 2021-06-25 NOTE — PROGRESS NOTES
Progress Notes by Temitope Odell MD at 3/9/2017  1:20 PM     Author: Temitope Odell MD Service: -- Author Type: Physician    Filed: 3/9/2017  1:26 PM Encounter Date: 3/9/2017 Status: Signed    : Temitope Odell MD (Physician)       Date of Service:  03/09/17    Date last seen:   02/19/17    PCP: Tae Garcia MD    Impression:   1. Leg swelling bilaterally   2. Venous stasis/insufficiency (ulcerations healed)  3. Acquired lymphedema   4. Recurrent cellulitis of legs- (3/5/15, 2/17/15, 5/15, 12/14/15, 3/16, 7/12/16, 8/18/2016)  5. Bilateral foot deformities  6. Complicated by morbid obesity   7.  Small abdominal skin wall ulceration-healed  8.  Left breast ulcer-improved    Plan:   1.  Questions were answered.   2.  Continue Flexitouch.  Doing very well.  3.  Has new shoes.  Doing well.  4.  Continue compression.  Doing well.   5.  After permission was obtained 2% Lidocaine HCL jelly was applied, under clean conditions, the left breast ulceration(s) were debrided using currette.  Devitalized and non viable tissue, along with any fibrin and slough, was removed to improve granulation tissue formation, stimulate wound healing, decrease overall bacteria load, disrupt biofilm formation and decrease edge senescence.  Total excisional debridement was 0.16 sq cm from the epidermis/dermis area.   Ulcer was improved afterwards and .  Measures were as noted on the flow sheet .  5.  Breast Ulcer:  Bacitracin then foam with silicon border-as before   6.  Follow up with me 1 month or when needed to check breast ulcer.   7.  Will ask nursing to coordinate follows up with bariatrics.    Time spent with patient 15 minutes with greater than 50% time in consultation, education and coordination of care.   ---------------------------------------------------------------------------------------------------------------------     Chief Complaint: Bilateral leg swelling     History of Present Illness:  "  Bushra Buck returns to the [facility] for follow up of left breast ulcer.  I have previously seeing her for bilateral leg swelling due to severe venous hypertension, insufficieny with secondary lymphedema and recurrent cellulitis resulting from morbid obesity.  She developed a recurring left breast ulcer.  I started her on new dressing with bacitracin and foam with silicone border.  She ran out of the foam and has now \"kept it open to air.  There has been no new numbness, tingling, weakness, masses, rashes, shortness of breath or chest pain. She continues on the daily doxycycline.   Pain is rated a 0 on a 10 point scale.   She still has not followed up with bariatrics.   She does the flexitouch daily M-F.  She is excellent about wearing her compression.  She has not run a fever.    Past Medical History:   Diagnosis Date   ? Bipolar 1 disorder    ? Dyslipidemia    ? GERD (gastroesophageal reflux disease)    ? Hypothyroid    ? Lymphedema of lower extremity 2008   ? Post traumatic stress disorder (PTSD)    ? Prolactinoma transsphenoid approach 2000.   ? Schizoaffective disorder, bipolar type        Past Surgical History:   Procedure Laterality Date   ? ABDOMINAL SURGERY  2000    fat removed tp plug dura when had a pituitary surgery.   ? anorectal fissure repair  2004, 2005   ? BRAIN SURGERY  2000    pituitary surgery   ? CARPAL TUNNEL RELEASE Bilateral    ? GANGLION CYST EXCISION      left arm   ? PITUITARY SURGERY  2014   ? SEPTOPLASTY      x 2   ? SINUS SURGERY Right 8/21/2015    Procedure: RIGHT MICHELLE BULLOSA;  Surgeon: Daniel Landeros MD;  Location: [facility];  Service:    ? TONSILLECTOMY         Current Outpatient Prescriptions   Medication Sig Dispense Refill   ? cabergoline (DOSTINEX) 0.5 mg tablet Take 0.5 mg by mouth.     ? calcium-vitamin D (CALCIUM-VITAMIN D) 500 mg(1,250mg) -200 unit per tablet Take 1 tablet by mouth daily. For osteopenia  0   ? divalproex (DEPAKOTE) " 500 MG 24 hr tablet   2   ? furosemide (LASIX) 40 MG tablet Take 1 tablet (40 mg total) by mouth daily. For chronic edema  0   ? levothyroxine (SYNTHROID, LEVOTHROID) 125 MCG tablet Take 1 tablet (125 mcg total) by mouth bedtime. For hypothyroidism  0   ? loxapine (LOXITANE) 25 MG capsule Take 1 capsule (25 mg total) by mouth 2 (two) times a day. (Patient taking differently: Take 25 mg by mouth daily. ) 60 capsule 0   ? omeprazole (PRILOSEC) 20 MG capsule Take 1 capsule (20 mg total) by mouth Daily before breakfast. For GERD  0     No current facility-administered medications for this visit.        Allergies   Allergen Reactions   ? Fiorinal [Butalbital-Aspirin-Caffeine] Anaphylaxis and Swelling   ? Clindamycin Rash     Rash on neck - not raised    ? Ancef [Cefazolin]    ? Aspirin (Tartrazine Only) Other (See Comments)     Eyes swell shut   ? Ativan [Lorazepam] Swelling     arms   ? Carbamazepine Other (See Comments)      (tegretol) Arm swelling   ? Codeine Swelling   ? Delsym Hives   ? Ibuprofen Other (See Comments)     swelling   ? Latex Swelling   ? Latex Swelling   ? Lithium Analogues Swelling     Red/swollen arms   ? Other Allergy (See Comments) Unknown     Eyes swell   ? Oxcarbazepine Other (See Comments)     (trileptal) Arm swelling   ? Venom-Honey Bee Swelling     Unknown   ? Augmentin [Amoxicillin-Pot Clavulanate] Rash     Rash on neck - not raised   ? Cefdinir Swelling     (omnicef) Arm swelling  Tolerated cefazolin 5/25/15, and tolerated a course of Keflex on discharge     ? Topiramate Swelling and Rash     topamax       Social History     Social History   ? Marital status: Single     Spouse name: N/A   ? Number of children: N/A   ? Years of education: N/A     Occupational History   ? Not on file.     Social History Main Topics   ? Smoking status: Never Smoker   ? Smokeless tobacco: Never Used   ? Alcohol use No   ? Drug use: No   ? Sexual activity: No     Other Topics Concern   ? Not on file     Social  History Narrative        .  Not working.   No kids.       Family History   Problem Relation Age of Onset   ? Diabetes Mother    ? Hypothyroidism Mother    ? Cancer Mother    ? Sleep apnea Mother    ? Snoring Mother    ? Heart disease Father    ? Diabetes Sister    ? Edema Sister    ? No Medical Problems Sister    ? Diabetes Maternal Grandfather        Review of Systems:  Bushra Buck no new numbess, tingling or weakness, redness or rashes, fevers, new masses, abdominal bloating or discomfort, unexplained weight loss, increased pain, new ulcers, shortness of breath and chest pain  Full 12 point review of systems was completed.    Imaging:  US VENOUS LEG RIGHT  4/3/2016 1:58 AM  INDICATION: Increasing peripheral leg pain and edema  TECHNIQUE: Routine exam without and with compression, augmentation, and duplex utilizing 2D gray-scale imaging, Doppler interrogation with color-flow and spectral waveform analysis.  COMPARISON: None.  FINDINGS: The common femoral, femoral, popliteal, and segmentally visualized calf veins were evaluated. The opposite CFV was also included in the evaluation.  Right leg veins are negative for deep venous thrombosis. No popliteal cysts.  IMPRESSION:   CONCLUSION:  1. Right leg veins are negative for DVT although exam is limited due to body habitus and edema.    XR FOOT RIGHT 3 OR MORE VWS  4/3/2016 2:04 AM  INDICATION: Foot pain.   COMPARISON: None.  FINDINGS: Hammertoe deformity. Plantar heel spur. No fracture.    Physical Exam:  Vitals:    03/09/17 1305   BP: 120/66   Pulse: 66   Resp: 20   Temp: 97.6  F (36.4  C)    There is no height or weight on file to calculate BMI.    Circumferential measures:    Vasc Edema 8/9/2016 8/17/2016 8/29/2016 11/10/2016 2/9/2017   Right just above MTP 30 27.5 27.2 27 26.8   Right Ankle 42 45 38.3 36.4 36.4   Right Widest Calf 96 90 78.8 71 66   Right Thigh Up 10cm 91 - 74 74 74   Left - just above MTP 32.5 31.5 29.7 31.5 30.5   Left Ankle 40.5  42.5 41.5 39 36   Left Widest Calf 90.4 85 75 73 65   Left Thigh Up 10cm 89 - 74.5 74 74     Measures are much improved.     General:  36 y.o. female in no apparent distress.  Alert and oriented x 3.  Cooperative. Affect normal.     Integumentary: The ulceration on the medial upper quadrant of the left breast.  It is fairly superficial with improved granulation tissue and laura wound epithelization.   There is no calor.  There is no drainage or pain to palpation.  There is no nipple drainage.  Ulcer measures 0.6 X 03 cm with 0.1 cm depth.  See photo.                Left breast RUQ 2/9/17       Left breast 3/9/17    Temitope Odell MD, ABWMS, FACCWS, [facility]  Medical Director Wound Care and Lymphedema  [facility]  [phone]

## 2021-06-25 NOTE — PROGRESS NOTES
Progress Notes by Temitope Odell MD at 2/9/2017  1:20 PM     Author: Temitope Odell MD Service: -- Author Type: Physician    Filed: 2/9/2017  4:14 PM Encounter Date: 2/9/2017 Status: Signed    : Temitope Odell MD (Physician)       Date of Service:  02/09/17    Date last seen:   11/10/16    PCP: Tae Garcia MD    Impression:   1. Leg swelling bilaterally   2. Venous stasis/insufficiency (ulcerations healed)  3. Acquired lymphedema   4. Recurrent cellulitis of legs- (3/5/15, 2/17/15, 5/15, 12/14/15, 3/16, 7/12/16, 8/18/2016)  5. Bilateral foot deformities  6. Complicated by morbid obesity   7.  Small abdominal skin wall ulceration-healed  8.  Left breast ulcer for one week.  Recurs.    Plan:   1.  Questions were answered.   2.  Continue Flexitouch.  Doing very well.  3.  Has new shoes.  Doing well.  4.  Continue compression.  Doing well.   5.  Breast Ulcer:  Bacitracin then foam with silicon border.    6.  Follow up with me 1 month or when needed to check breast ulcer.   7.  Will be sure she follows up with bariatrics.    Time spent with patient 15 minutes with greater than 50% time in consultation, education and coordination of care.   ---------------------------------------------------------------------------------------------------------------------     Chief Complaint: Bilateral leg swelling     History of Present Illness:   Bushra Buck returns to the [facility] for follow up of bilateral leg swelling due to severe venous hypertension, insufficieny with secondary lymphedema and recurrent cellulitis resulting from morbid obesity.  Last time I saw her she also had a small ulceration on the abdominal wall which was treated with Adaptic and foam with silicon border.  She comes in for follow-up today.  She was supposed to get new shoes.  She was to continue using her Flexitouch and also use her lower extremity compression garments.  She is here with one of her  caregivers.   The patient's previous treatment has included MLD, education, compression bandaging, lymphatic exercise, range of motion work, exercise and elevation when able.  With her multiple medical issues, weight and cogitative issues she has difficulty following through with her cares.  She has worked with Dr. Bowling in bariatrics and she is not a candidate for the surgeries done there, but may be a possible candidate for the balloon procedure done a [place].  She has been working on this with Dr. Bowling.  There has been no new numbness, tingling, weakness, masses, rashes, shortness of breath or chest pain. She continues on the daily doxycycline.   Pain is rated a 0 on a 10 point scale.   She has lost 56 pounds since 7/13/2016.  She and her caregiver are here and report she is doing much better.  The swelling is down.  She has had no recurrent infections.  She does the flexitouch daily M-F.  She is excellent about wearing her compression.  She also reports she got bit by a bee this summer and the sore is still not healed. It itches, but there is no pain.      Past Medical History:   Diagnosis Date   ? Bipolar 1 disorder    ? Dyslipidemia    ? GERD (gastroesophageal reflux disease)    ? Hypothyroid    ? Lymphedema of lower extremity 2008   ? Post traumatic stress disorder (PTSD)    ? Prolactinoma transsphenoid approach 2000.   ? Schizoaffective disorder, bipolar type        Past Surgical History:   Procedure Laterality Date   ? ABDOMINAL SURGERY  2000    fat removed tp plug dura when had a pituitary surgery.   ? anorectal fissure repair  2004, 2005   ? BRAIN SURGERY  2000    pituitary surgery   ? CARPAL TUNNEL RELEASE Bilateral    ? GANGLION CYST EXCISION      left arm   ? PITUITARY SURGERY  2014   ? SEPTOPLASTY      x 2   ? SINUS SURGERY Right 8/21/2015    Procedure: RIGHT MICHELLE BULLOSA;  Surgeon: Daniel Landeros MD;  Location: [facility] OR;  Service:    ? TONSILLECTOMY         Current Outpatient  Prescriptions   Medication Sig Dispense Refill   ? cabergoline (DOSTINEX) 0.5 mg tablet Take 0.5 mg by mouth.     ? calcium-vitamin D (CALCIUM-VITAMIN D) 500 mg(1,250mg) -200 unit per tablet Take 1 tablet by mouth daily. For osteopenia  0   ? divalproex (DEPAKOTE) 500 MG 24 hr tablet   2   ? furosemide (LASIX) 40 MG tablet Take 1 tablet (40 mg total) by mouth daily. For chronic edema  0   ? levETIRAcetam (KEPPRA) 500 MG tablet Take 1 tablet (500 mg total) by mouth 2 (two) times a day. 60 tablet 0   ? levothyroxine (SYNTHROID, LEVOTHROID) 125 MCG tablet Take 1 tablet (125 mcg total) by mouth bedtime. For hypothyroidism  0   ? loxapine (LOXITANE) 25 MG capsule Take 1 capsule (25 mg total) by mouth 2 (two) times a day. 60 capsule 0   ? omeprazole (PRILOSEC) 20 MG capsule Take 1 capsule (20 mg total) by mouth Daily before breakfast. For GERD  0     No current facility-administered medications for this visit.        Allergies   Allergen Reactions   ? Fiorinal [Butalbital-Aspirin-Caffeine] Anaphylaxis and Swelling   ? Clindamycin Rash     Rash on neck - not raised    ? Ancef [Cefazolin]    ? Aspirin (Tartrazine Only) Other (See Comments)     Eyes swell shut   ? Ativan [Lorazepam] Swelling     arms   ? Carbamazepine Other (See Comments)      (tegretol) Arm swelling   ? Codeine Swelling   ? Delsym Hives   ? Ibuprofen Other (See Comments)     swelling   ? Latex Swelling   ? Latex Swelling   ? Lithium Analogues Swelling     Red/swollen arms   ? Oxcarbazepine Other (See Comments)     (trileptal) Arm swelling   ? Venom-Honey Bee Swelling     Unknown   ? Augmentin [Amoxicillin-Pot Clavulanate] Rash     Rash on neck - not raised   ? Cefdinir Swelling     (omnicef) Arm swelling  Tolerated cefazolin 5/25/15, and tolerated a course of Keflex on discharge     ? Topiramate Swelling and Rash     topamax       Social History     Social History   ? Marital status: Single     Spouse name: N/A   ? Number of children: N/A   ? Years of  education: N/A     Occupational History   ? Not on file.     Social History Main Topics   ? Smoking status: Never Smoker   ? Smokeless tobacco: Never Used   ? Alcohol use No   ? Drug use: No   ? Sexual activity: No     Other Topics Concern   ? Not on file     Social History Narrative        .  Not working.   No kids.       Family History   Problem Relation Age of Onset   ? Diabetes Mother    ? Hypothyroidism Mother    ? Cancer Mother    ? Sleep apnea Mother    ? Snoring Mother    ? Heart disease Father    ? Diabetes Sister    ? Edema Sister    ? No Medical Problems Sister    ? Diabetes Maternal Grandfather        Review of Systems:  Bushra Buck no new numbess, tingling or weakness, redness or rashes, fevers, new masses, abdominal bloating or discomfort, unexplained weight loss, increased pain, new ulcers, shortness of breath and chest pain  Full 12 point review of systems was completed.    Imaging:  US VENOUS LEG RIGHT  4/3/2016 1:58 AM  INDICATION: Increasing peripheral leg pain and edema  TECHNIQUE: Routine exam without and with compression, augmentation, and duplex utilizing 2D gray-scale imaging, Doppler interrogation with color-flow and spectral waveform analysis.  COMPARISON: None.  FINDINGS: The common femoral, femoral, popliteal, and segmentally visualized calf veins were evaluated. The opposite CFV was also included in the evaluation.  Right leg veins are negative for deep venous thrombosis. No popliteal cysts.  IMPRESSION:   CONCLUSION:  1. Right leg veins are negative for DVT although exam is limited due to body habitus and edema.    XR FOOT RIGHT 3 OR MORE VWS  4/3/2016 2:04 AM  INDICATION: Foot pain.   COMPARISON: None.  FINDINGS: Hammertoe deformity. Plantar heel spur. No fracture.    Physical Exam:  Vitals:    02/09/17 1302   BP: 110/68   Pulse: 68   Resp: 20   Temp: 99  F (37.2  C)    There is no height or weight on file to calculate BMI.    Circumferential measures:    Vasc Edema  8/9/2016 8/17/2016 8/29/2016 11/10/2016 2/9/2017   Right just above MTP 30 27.5 27.2 27 26.8   Right Ankle 42 45 38.3 36.4 36.4   Right Widest Calf 96 90 78.8 71 66   Right Thigh Up 10cm 91 - 74 74 74   Left - just above MTP 32.5 31.5 29.7 31.5 30.5   Left Ankle 40.5 42.5 41.5 39 36   Left Widest Calf 90.4 85 75 73 65   Left Thigh Up 10cm 89 - 74.5 74 74     Measures are much improved.     General:  36 y.o. female in no apparent distress.  Alert and oriented x 3.  Cooperative. Affect normal.     Musculoskeletal:  Normal range of  knees and ankles bilaterally throughout .  There is no active joint synovitis, erythema, swelling or joint laxity.      Neurological:  Sensation is intact to pin prick and light touch in both legs.  Strength testing is normal ankle dorsiflexion and great toe extension bilaterally.       Vascular:  There is normal capillary refill.     Integumentary: Skin of the lower legs continues to be soft without ulcerations.  The is a small ulceration on the mid right abdominal wall in the skin has healed.  She has fibrosis with positive Stemmer's sign in the second toes bilaterally.  Feet are deformed secondary to fibrosis and swelling with valgus ankles and flat feet bilaterally.  Her shoes are now looking good.   There is an ulceration on the medial upper quadrant of the left breast.  It is fairly superficial and very dry with surrounding erythema.  There is no calor.  There is no drainage or pain to palpation.  There is no nipple drainage.  Ulcer measures 1.3 X 1.2 cm with 0.1 cm depth.  See photo.        Left breast RUQ 2/9/17    Temitope Odell MD, ABWMS, FACCWS, [facility]  Medical Director Wound Care and Lymphedema  [facility]  [phone]

## 2021-06-26 NOTE — TELEPHONE ENCOUNTER
Patient is fully vaccinated and has had second vaccine in May, Patient says she was with someone that was coughing and was positive for covid a year ago. Patient is concerned she was infected. Patient says she may be having some diarrhea.  Triage guidelines recommend to home care.  COVID 19 Nurse Triage Plan/Patient Instructions    Please be aware that novel coronavirus (COVID-19) may be circulating in the community. If you develop symptoms such as fever, cough, or SOB or if you have concerns about the presence of another infection including coronavirus (COVID-19), please contact your health care provider or visit  [email or web address].    Disposition/Instructions    Home care recommended. Follow home care protocol based instructions.    Thank you for taking steps to prevent the spread of this virus.  o Limit your contact with others.  o Wear a simple mask to cover your cough.  o Wash your hands well and often.    Resources    [facility]: About COVID-19: [email or web address]    CDC: What to Do If You're Sick: www.cdc.gov/coronavirus/2019-ncov/about/steps-when-sick.html    CDC: Ending Home Isolation: www.cdc.gov/coronavirus/2019-ncov/hcp/disposition-in-home-patients.html     CDC: Caring for Someone: www.cdc.gov/coronavirus/2019-ncov/if-you-are-sick/care-for-someone.html     [facility]: Interim Guidance for Hospital Discharge to Home: www.health.[facility].[place]/diseases/coronavirus/hcp/hospdischarge.pdf    [facility] clinical trials (COVID-19 research studies): [email or web address]-trials     Below are the COVID-19 hotlines at the [place] Department of Health ([facility]). Interpreters are available.   o For health questions: Call 985-221-9710 or 1-608.662.2726 (7 a.m. to 7 p.m.)  o For questions about schools and childcare: Call 480-891-6528 or 1-648.571.8922 (7 a.m. to 7 p.m.)     Reason for Disposition    COVID-19 vaccine and fully vaccinated (What can I do now?),  Frequently Asked Questions (FAQs)    Additional Information    Negative: [1] Difficulty breathing or swallowing AND [2] starts within 2 hours after injection    Negative: Sounds like a life-threatening emergency to the triager    Negative: [1] Has NOT completed COVID-19 vaccine series AND [2]  COVID-19 exposure AND [3] no symptoms    Negative: [1] Has NOT completed COVID-19 vaccine series AND [2] COVID-19 exposure AND [2] AND [3] typical COVID-19 symptoms    Negative: [1] COVID-19 vaccine series completed (fully vaccinated) in past 3 months AND [2] new-onset of COVID-19 symptoms BUT [3] no known exposure    Negative: [1] Typical COVID-19 symptoms AND [2] symptoms that are NOT expected from vaccine (e.g., cough, difficulty breathing, loss of taste or smell, runny nose, sore throat)    Negative: [1] Typical COVID-19 symptoms AND [2] started > 3 days after getting vaccine    Negative: Fever > 104 F (40 C)    Negative: Sounds like a severe, unusual reaction to the triager    Negative: [1] Redness or red streak around the injection site AND [2] started > 48 hours after getting vaccine AND [3] fever    Negative: [1] Fever > 101 F (38.3 C) AND [2] age > 60 years AND [3] started > 48 hours after getting vaccine    Negative: [1] Fever > 100.0 F (37.8 C) AND [2] bedridden (e.g., nursing home patient, CVA, chronic illness, recovering from surgery) AND [3] started > 48 hours after getting vaccine    Negative: [1] Fever > 100.0 F (37.8 C) AND [2] diabetes mellitus or weak immune system (e.g., HIV positive, cancer chemo, splenectomy, organ transplant, chronic steroids) AND [3] started > 48 hours after getting vaccine    Negative: [1] Redness or red streak around the injection site AND [2] started > 48 hours after getting vaccine AND [3] no fever  (Exception: red area < 1 inch or 2.5 cm wide)    Negative: [1] Pain, tenderness, or swelling at the injection site AND [2] over 3 days (72 hours) since vaccine AND [3] getting worse     Negative: Fever > 100.0 F (37.8 C) present > 3 days (72 hours)    Negative: [1] Fever > 100.0 F (37.8 C) AND [2] healthcare worker    Negative: [1] Pain, tenderness, or swelling at the injection site AND [2] lasts > 7 days    Negative: [1] Lymph node swelling (i.e., armpit or neck on side of vaccine) AND [2] lasts > 3 weeks    Negative: [1] Requesting COVID-19 vaccine AND [2] healthcare worker (e.g., EMS first responders, doctors, nurses)    Negative: [1] Requesting COVID-19 vaccine AND [2] resident of a long-term care facility (e.g., nursing home)    Negative: [1] Requesting COVID-19 vaccine AND [2] vaccine available in the community for this patient group    Negative: COVID-19 vaccine, injection site reaction (e.g., pain, redness, swelling), question about    Negative: COVID-19 vaccine, systemic reactions (e.g., fatigue, fever, muscle aches), questions about    Negative: COVID-19 vaccine, Frequently Asked Questions (FAQs)    Protocols used: CORONAVIRUS (COVID-19) VACCINE QUESTIONS AND [id number] 3.25.21

## 2021-06-29 ENCOUNTER — RECORDS - HEALTHEAST (OUTPATIENT)
Dept: LAB | Facility: CLINIC | Age: 41
End: 2021-06-29

## 2021-06-29 ENCOUNTER — COMMUNICATION - HEALTHEAST (OUTPATIENT)
Dept: VASCULAR SURGERY | Facility: CLINIC | Age: 41
End: 2021-06-29

## 2021-06-29 NOTE — PROGRESS NOTES
Progress Notes by Temitope Odell MD at 6/1/2020 11:15 AM     Author: Temitope Odell MD Service: -- Author Type: Physician    Filed: 6/1/2020  4:07 PM Encounter Date: 6/1/2020 Status: Signed    : Temitope Odell MD (Physician)       Date of Service:  06/01/20    Date last seen:  10/28/19    PCP: Erasto Azul MD    Impression:   1. Leg swelling bilaterally-increased  2. Venous stasis/insufficiency with history of ulcerations-with recurrent new ulceration distal calves  3. Acquired lymphedema   4. Recurrent cellulitis of legs- (3/5/15, 2/17/15, 5/15, 12/14/15, 3/16, 7/12/16, 8/18/2016)-no recurrence.  5. Bilateral foot deformities with valgus ankles  6. Complicated by morbid obesity Prader Willi.   7. Type 2 DM    Plan:   1. Questions were answered.   2. Continue compression and exercise.   3. Lymphedema therapy to better control swelling combined with wound care.  Orders written.   4. After discussion of risk factors and consent obtained 2% Lidocaine HCL jelly was applied, under clean conditions, the bilateral, calf, venous stasis/insufficiency with venous hypertension and diabetic ulceration(s) were debrided using currette.  Devitalized and non viable tissue, along with any fibrin and slough, was removed to improve granulation tissue formation, stimulate wound healing, decrease overall bacteria load, disrupt biofilm formation and decrease edge senescence.  Total excisional debridement was 2 sq cm from the epidermis/dermis area.   Ulcers were  improved afterwards and .  Measures were as noted on the flow sheet.  5. Wound treatment will include irrigation and dressings to promote autolytic debridement and will be:  hibiclens wash then alginate with silver then abd then two layer.  Change twice a week.   Therapy can incorporate their wraps when they start.    6. Continue to use diabetic shoes and insoles.    7. Discussed importance of and how to exercise on a regular basis.   Modifications reviewed.  Use of Internet and multiple resources for exercise classes reviewed.  8. Follow up 1 month, or when needed.  The patient will watch for any increased redness, pain, temperature, drainage and/or any new ulcerations in the leg(s).  If they have any questions or concerns they are to contact the clinic.    Time spent with patient 25 minutes with greater than 50% time in consultation, education and coordination of care.   ---------------------------------------------------------------------------------------------------------------------     Chief Complaint: Bilateral leg swelling     History of Present Illness:   Bushra PRYOR Cielo returns to the [facility] for follow up of her bilateral leg swelling due to severe venous hypertension, insufficiency with secondary lymphedema and recurrent cellulitis resulting from morbid obesity.  At her last visit she reported that she was using the Flexitouch 5-7 weekdays, wearing her compression which was Velcro, no longer using doxycycline daily and she had not had an infection since August 2016.  She uses the flexitouch pump 5 out of 7 weekdays wears her compression daily.   She has not had an infection since August 2016.    She was continuing to work with her counselor and psychiatric professionals for her schizophrenia.Dr. Azul is her PCP.  More recently however she was discharged from the hospital for exacerbation of her schizoaffective disorder.  She has been having significant difficulty with the COVID pandemic.  She now is feeling much better.  However, she admits she is gaining weight and she is not exercising the way she should.  About a week ago she started getting new ulcerations with weeping is from the legs.  She is concerned about this has she had problems with infection in the past.  She is here with her friend.  There is no new numbness, tingling or weakness.  She has not had any fevers.  She has had  no new shortness of breath or chest pain.  There is been no irregular bleeding.    Past Medical History:   Diagnosis Date   ? Allergic rhinitis    ? Bipolar 1 disorder (H)     followed by psych   ? Cellulitis, leg 2016   ? Dyslipidemia    ? Ganglion, left wrist    ? GERD (gastroesophageal reflux disease)    ? Growth retardation, mild developmental delay, and chronic hepatitis syndrome (H)    ? Herpes zoster    ? Hypertension    ? Hypothyroid    ? Lymphedema of lower extremity 2008   ? Morbid obesity (H)    ? Nightmares    ? Obstructive sleep apnea    ? Post traumatic stress disorder    ? Post traumatic stress disorder    ? Post traumatic stress disorder (PTSD)    ? Prader-Willi syndrome    ? Prolactinoma (H) transsphenoid approach 2000.   ? PTSD (post-traumatic stress disorder)    ? Schizoaffective disorder, bipolar type (H)    ? Type 2 diabetes mellitus without complication, with long-term current use of insulin (H)    ? Venous stasis    ? Vitamin D deficiency        Past Surgical History:   Procedure Laterality Date   ? ABDOMINAL SURGERY  2000    fat removed tp plug dura when had a pituitary surgery.   ? anorectal fissure repair  2004, 2005   ? BRAIN SURGERY  2000    pituitary surgery   ? CARPAL TUNNEL RELEASE Bilateral    ? COLONOSCOPY N/A 10/14/2019    Procedure: COLONOSCOPY;  Surgeon: Hugo Mattson MD;  Location: [facility] OR;  Service: Gastroenterology   ? GANGLION CYST EXCISION  2010    left arm   ? GANGLION CYST EXCISION Left 6/7/2018    Procedure: REMOVE RECURRENT GANGLION CYST LEFT VOLAR RADIAL WRIST;  Surgeon: Angel Lopez MD;  Location: [facility] OR;  Service:    ? GANGLION CYST EXCISION Left 11/18/2019    Procedure: EXCISION LEFT WRIST RECURRENT GANGLION CYST;  Surgeon: Angel Lopez MD;  Location: [facility] OR;  Service: Hand   ? pansinus revision Bilateral    ? PITUITARY SURGERY  2014   ? SEPTOPLASTY      x 2   ? SINUS SURGERY Right 8/21/2015    Procedure: RIGHT  MICHELLE BULLOSA;  Surgeon: Daniel Landeros MD;  Location: [facility];  Service:    ? TONSILLECTOMY         Current Outpatient Medications   Medication Sig Dispense Refill   ? blood glucose meter (GLUCOMETER) Use 1 each As Directed as needed. Dispense glucometer brand per patient's insurance at pharmacy discretion.  0   ? bumetanide (BUMEX) 2 MG tablet Take 1 tablet (2 mg total) by mouth daily. 30 tablet 0   ? cabergoline (DOSTINEX) 0.5 mg tablet Take 0.5 mg by mouth 2 (two) times a week. WED, SAT      ? calcium citrate-vitamin D3 (CITRACAL + D) 315 mg- 250 unit per tablet Take 2 tablets by mouth daily. 60 tablet 3   ? carBAMazepine (TEGRETOL  XR) 200 MG 12 hr tablet Take 1 tablet (200 mg total) by mouth 2 (two) times a day with meals. 60 tablet 0   ? cetirizine (ZYRTEC) 10 MG tablet Take 10 mg by mouth daily.     ? CHOLECALCIFEROL 25 mcg (1,000 unit) tablet Take 3 tablets (3,000 Units total) by mouth daily. 90 tablet 1   ? EPINEPHrine (EPIPEN/ADRENACLICK/AUVI-Q) 0.3 mg/0.3 mL injection Inject 0.6 mL (0.6 mg total) into the shoulder, thigh, or buttocks as needed. 2 Pre-filled Pen Syringe 0   ? fenofibrate (TRICOR) 145 MG tablet Take 1 tablet (145 mg total) by mouth daily. 30 tablet 3   ? levothyroxine (SYNTHROID, LEVOTHROID) 125 MCG tablet Take 1 tablet (125 mcg total) by mouth Daily at 6:00 am. 30 tablet 0   ? medroxyPROGESTERone (PROVERA) 10 MG tablet Take 10 mg by mouth daily. Take for the first five days of each month  3   ? MULTIVITAMIN tablet Take 1 tablet by mouth daily.  3   ? omeprazole (PRILOSEC) 40 MG capsule Take 40 mg by mouth daily.  5   ? polyethylene glycol (MIRALAX) 17 gram packet Take 1 packet (17 g total) by mouth daily as needed.  0   ? potassium chloride 20 mEq TbER Take 20 mEq by mouth daily. 30 tablet 0   ? TRULICITY 1.5 mg/0.5 mL PnIj Inject 1.5 mg under the skin every 7 days. 4 Syringe 6   ? ARIPiprazole (ABILIFY MAINTENA) 400 mg SERR Inject 400 mg under the skin every 30 (thirty)  days.     ? hydrOXYzine pamoate (VISTARIL) 25 MG capsule Take 1 capsule (25 mg total) by mouth 2 (two) times a day as needed for itching or other (additional pain control). Anxiety 30 capsule 0   ? melatonin 3 mg Tab tablet Take 1 tablet (3 mg total) by mouth at bedtime as needed. 30 tablet 0   ? OLANZapine (ZYPREXA) 20 MG tablet Take 1 tablet (20 mg total) by mouth at bedtime. 30 tablet 0     No current facility-administered medications for this visit.        Allergies   Allergen Reactions   ? Fiorinal [Butalbital-Aspirin-Caffeine] Anaphylaxis and Swelling   ? Clindamycin Rash     Rash on neck - not raised . Heartburn   ? Ancef [Cefazolin] Swelling     Arm swells   ? Aspirin (Tartrazine Only) Other (See Comments)     Eyes swell shut   ? Ativan [Lorazepam] Swelling     arms   ? Butalbital    ? Caffeine    ? Carbamazepine Other (See Comments)      (tegretol) Arm swelling   ? Codeine Swelling   ? Delsym Hives   ? Ibuprofen Other (See Comments)     Swelling, eyes swell shut   ? Latex Swelling   ? Lithium Analogues Diarrhea, Nausea And Vomiting and Swelling     Red/swollen arms   ? Oxcarbazepine Other (See Comments)     (trileptal) Arm swelling   ? Venom-Honey Bee Swelling     Unknown   ? Augmentin [Amoxicillin-Pot Clavulanate] Rash     Rash on neck - not raised   ? Cefdinir Swelling          ? Topiramate Swelling and Rash     topamax       Social History     Socioeconomic History   ? Marital status: Single     Spouse name: Not on file   ? Number of children: 0   ? Years of education: Not on file   ? Highest education level: Not on file   Occupational History   ? Occupation: Disability   Social Needs   ? Financial resource strain: Not on file   ? Food insecurity     Worry: Not on file     Inability: Not on file   ? Transportation needs     Medical: Not on file     Non-medical: Not on file   Tobacco Use   ? Smoking status: Never Smoker   ? Smokeless tobacco: Never Used   Substance and Sexual Activity   ? Alcohol use: No    ? Drug use: No   ? Sexual activity: Never     Partners: Male     Birth control/protection: Abstinence   Lifestyle   ? Physical activity     Days per week: Not on file     Minutes per session: Not on file   ? Stress: Not on file   Relationships   ? Social connections     Talks on phone: Not on file     Gets together: Not on file     Attends Yarsanism service: Not on file     Active member of club or organization: Not on file     Attends meetings of clubs or organizations: Not on file     Relationship status: Not on file   ? Intimate partner violence     Fear of current or ex partner: Not on file     Emotionally abused: Not on file     Physically abused: Not on file     Forced sexual activity: Not on file   Other Topics Concern   ? Not on file   Social History Narrative        .  Not working.   No kids.       Family History   Problem Relation Age of Onset   ? Diabetes Mother    ? Hypothyroidism Mother    ? Cancer Mother    ? Sleep apnea Mother    ? Snoring Mother    ? Heart disease Father    ? Diabetes Sister    ? Edema Sister    ? No Medical Problems Sister    ? Diabetes Maternal Grandfather        Review of Systems:    Bushra Buck no new numbess, tingling or weakness, redness or rashes, fevers, new masses, abdominal bloating or discomfort, unexplained weight loss, increased pain, new ulcers, shortness of breath and chest pain  Full 12 point review of systems was completed.    Imaging:    I personally reviewed the following imaging today and those on care everywhere, if indicated    EXAM: US VENOUS LEGS BILATERAL  LOCATION: [facility]  DATE/TIME: 2/16/2020 12:43 PM     INDICATION: bilaterl leg pain and edema  COMPARISON: 04/25/2019  TECHNIQUE: Venous Duplex ultrasound of bilateral lower extremities with and without compression, augmentation and duplex. Color flow and spectral Doppler with waveform analysis performed.     FINDINGS: Exam includes the common femoral, femoral, popliteal veins  as well as segmentally visualized deep calf veins and greater saphenous vein.      RIGHT: No deep vein thrombosis. No superficial thrombophlebitis. No popliteal cyst.     LEFT: No deep vein thrombosis. No superficial thrombophlebitis. No popliteal cyst.     IMPRESSION:   1.  No deep venous thrombosis in the bilateral lower extremities.    EXAM: CT ABDOMEN PELVIS WO ORAL W WO IV CONTRAST  LOCATION: [facility]  DATE/TIME: 4/30/2019 3:45 PM     INDICATION: Hematuria.  COMPARISON: None.  TECHNIQUE: Helical thin-section CT scan of the abdomen and pelvis was performed without contrast. Images were then obtained during and after injection of IV contrast, with delayed images through the renal collecting systems. Multiplanar reformats were   obtained. Dose reduction techniques were used.?  CONTRAST: Iohexol (Omni) 100 mL     FINDINGS:   LUNG BASES: Negative.     ABDOMEN: Both kidneys are negative with no renal stones and no hydronephrosis. Tiny cysts mid right kidney laterally. Kidneys and renal collecting systems otherwise negative.      No significant findings in the liver, spleen, pancreas, and adrenal glands. No lymphadenopathy.     PELVIS: Negative. No adnexal masses or free fluid.     MUSCULOSKELETAL: Negative.     IMPRESSION:   CONCLUSION:  1.  No significant findings in the kidneys or renal collecting systems.     2.  No significant incidental findings.    Labs:    I personally reviewed the following labs today and those on care everywhere, if indicated    Lab Results   Component Value Date    SEDRATE 54 (H) 08/01/2015         Lab Results   Component Value Date    CRP 0.4 04/26/2019           Lab Results   Component Value Date    CREATININE 0.83 04/13/2020      Lab Results   Component Value Date    HGBA1C 5.0 01/06/2020           Lab Results   Component Value Date    BUN 17 04/13/2020              Lab Results   Component Value Date    ALBUMIN 3.1 (L) 02/28/2020       Vitamin D, Total (25-Hydroxy)   Date  Value Ref Range Status   01/06/2020 32.3 30.0 - 80.0 ng/mL Final       Lab Results   Component Value Date    TSH 1.70 02/03/2020     Lab Results   Component Value Date    WBC 5.5 03/02/2020    HGB 11.8 (L) 03/02/2020    HCT 36.4 03/02/2020     (H) 03/02/2020     03/02/2020       Physical Exam:  Vitals:    06/01/20 1141   BP: 148/60   Pulse: (!) 108   Resp: 28   Temp: 99  F (37.2  C)     BMI 67.96 (increased) weight 348 (+17) pounds    Circumferential measures:    Vasc Edema 4/22/2019 5/6/2019 8/26/2019 10/28/2019 6/1/2020   Right just above MTP 31.4 27 27.1 26.5 26.5   Right Ankle 62.5 37 32.7 36.5 39.5   Right Widest Calf 81.4 63.8 66 68.2 67.3   Right Thigh Up 10cm - 81 76.5 72.5 77.6   Left - just above MTP 28.2 28.6 28 28.5 29.6   Left Ankle 53.4 36.9 33 36.5 39   Left Widest Calf 89.4 69.5 71.1 71.3 71   Left Thigh Up 10cm - 79.2 38 79.5 77     Measures are essentially stable.    General:  40 y.o. female in no apparent distress.     Psych:  Alert and oriented x 3.  Cooperative.     HEENT: Atraumatic, normocephalic    Lungs: Clear to auscultation throughout with full inspiration.    Abdominal:  Normal bowel sounds.  No masses, tenderness, guarding or rigidity.  No inguinal lymphadenopathy or fullness palpated.  Compromised by morbid obesity.    CV: Normal S1 and S2, without murmurs, gallops, or rubs.  No audible carotid bruits. Regular rhythm.    Integumentary: Legs show skin with erythema, + Stemmer's sign, with slight erythema and anterior bilaterally distal calf multiple ulcerations with slough and fibrosis has increased in the left distal calf.  There is a ledge at eadh ankle.  No odor.   No pain to palpation. Moderate serous discharge.      Sensation: Intact to pinprick and light touch in the legs bilaterally.    Strength:  Normal strength in hip flexion, knee flexion/knee extension, ankle dorsiflexion and great toe extension bilaterally.     Vascular: Dorsalis pedis and posterior tib pulses  strong and equal bilaterally.  Venous varicosities noted in both anterior shins.           6/1/2020 L lower leg                   R lower leg    Temitope Odell MD, FABWMS, FACCWS, FAAPMR  Medical Director Wound Care and Lymphedema  [facility]  [phone]

## 2021-06-30 LAB
ALBUMIN SERPL-MCNC: 4.1 G/DL (ref 3.5–5)
ALP SERPL-CCNC: 42 U/L (ref 45–120)
ALT SERPL W P-5'-P-CCNC: 37 U/L (ref 0–45)
ANION GAP SERPL CALCULATED.3IONS-SCNC: 11 MMOL/L (ref 5–18)
AST SERPL W P-5'-P-CCNC: 36 U/L (ref 0–40)
BILIRUB SERPL-MCNC: 0.4 MG/DL (ref 0–1)
BUN SERPL-MCNC: 19 MG/DL (ref 8–22)
CALCIUM SERPL-MCNC: 9.7 MG/DL (ref 8.5–10.5)
CHLORIDE BLD-SCNC: 106 MMOL/L (ref 98–107)
CHOLEST SERPL-MCNC: 112 MG/DL
CO2 SERPL-SCNC: 23 MMOL/L (ref 22–31)
CREAT SERPL-MCNC: 0.94 MG/DL (ref 0.6–1.1)
FASTING STATUS PATIENT QL REPORTED: ABNORMAL
GFR SERPL CREATININE-BSD FRML MDRD: >60 ML/MIN/1.73M2
GLUCOSE BLD-MCNC: 66 MG/DL (ref 70–125)
HDLC SERPL-MCNC: 33 MG/DL
LDLC SERPL CALC-MCNC: 47 MG/DL
POTASSIUM BLD-SCNC: 4.4 MMOL/L (ref 3.5–5)
PROT SERPL-MCNC: 6.9 G/DL (ref 6–8)
SODIUM SERPL-SCNC: 140 MMOL/L (ref 136–145)
TRIGL SERPL-MCNC: 160 MG/DL
TSH SERPL DL<=0.005 MIU/L-ACNC: 2.53 UIU/ML (ref 0.3–5)

## 2021-06-30 NOTE — PROGRESS NOTES
"Progress Notes by Luciana Neal NP at 4/19/2021  9:20 AM     Author: Luciana Neal NP Service: -- Author Type: Nurse Practitioner    Filed: 6/30/2021  7:28 AM Encounter Date: 4/19/2021 Status: Addendum    : Luciana Neal NP (Nurse Practitioner)    Related Notes: Original Note by Luciana Neal NP (Nurse Practitioner) filed at 4/19/2021 10:32 AM                   Follow up Vascular Visit       Date of Service:4/19/2021    Date Last Seen: Visit date not found; 3/25/2021    Chief Complaint: BLE swelling; chronic; acute left leg wound        Pt returns to [facility] with regards to their BLE swelling; chronic and acute left shin ulcer. Was last seen by Dr. Odell 3 weeks ago.  At that time her swelling was worse there was concern for infection she was started on Bactrim empirically and then a culture was done this grew out MRSA and was resistant to Bactrim; was changed to doxycyline; she has completed this and tolerated well. They arrive today with her friend; Sabi who also assists the patient. They are currently using silvercel, ABD; rolled gauze to the wounds. This is being done by BRANDON or Sabi. They are using 4 layer on the left for compression; this was stopped; she was supposed to be transitioned into velcro; she did not wear these all weekend because she was \"not feeling good\". Then she noticed the redness start again in the left leg. They are feeling well today. Denies fevers, chills. No shortness of breath. She has diabetes fs running 150s; she is working on her weight; cut out pop and sugary beverages.      Allergies: Fiorinal [butalbital-aspirin-caffeine], Clindamycin, Ancef [cefazolin], Aspirin (tartrazine only), Ativan [lorazepam], Butalbital, Caffeine, Carbamazepine, Cat hair standardized allergenic extract, Codeine, Delsym, Ibuprofen, Latex, Lithium analogues, Oxcarbazepine, Venom-honey bee, Augmentin [amoxicillin-pot clavulanate], Cefdinir, and " Topiramate    Medications:   Current Outpatient Medications:   ?  ARIPiprazole (ABILIFY MAINTENA) 400 mg SERR, Inject 400 mg under the skin every 30 (thirty) days., Disp: , Rfl:   ?  aspirin 81 MG EC tablet, , Disp: , Rfl:   ?  atorvastatin (LIPITOR) 40 MG tablet, , Disp: , Rfl:   ?  atorvastatin (LIPITOR) 40 MG tablet, 1 tab(s), Disp: , Rfl:   ?  blood glucose meter (GLUCOMETER), Use 1 each As Directed as needed. Dispense glucometer brand per patient's insurance at pharmacy discretion., Disp: , Rfl: 0  ?  bumetanide (BUMEX) 2 MG tablet, Take 1 tablet (2 mg total) by mouth daily., Disp: 30 tablet, Rfl: 0  ?  cabergoline (DOSTINEX) 0.5 mg tablet, Take 0.5 mg by mouth 2 (two) times a week. WED, SAT , Disp: , Rfl:   ?  calcium citrate-vitamin D3 (CITRACAL + D) 315 mg- 250 unit per tablet, Take 2 tablets by mouth daily., Disp: 60 tablet, Rfl: 3  ?  carBAMazepine (TEGRETOL  XR) 200 MG 12 hr tablet, Take 1 tablet (200 mg total) by mouth 2 (two) times a day with meals., Disp: 60 tablet, Rfl: 0  ?  cetirizine (ZYRTEC) 10 MG tablet, Take 10 mg by mouth daily., Disp: , Rfl:   ?  CHOLECALCIFEROL 25 mcg (1,000 unit) tablet, Take 3 tablets (3,000 Units total) by mouth daily., Disp: 90 tablet, Rfl: 1  ?  EPINEPHrine (EPIPEN/ADRENACLICK/AUVI-Q) 0.3 mg/0.3 mL injection, Inject 0.6 mL (0.6 mg total) into the shoulder, thigh, or buttocks as needed., Disp: 2 Pre-filled Pen Syringe, Rfl: 0  ?  fenofibrate (TRICOR) 145 MG tablet, Take 1 tablet (145 mg total) by mouth daily., Disp: 30 tablet, Rfl: 3  ?  glimepiride (AMARYL) 2 MG tablet, , Disp: , Rfl:   ?  hydrOXYzine pamoate (VISTARIL) 25 MG capsule, Take 1 capsule (25 mg total) by mouth 2 (two) times a day as needed for itching or other (additional pain control). Anxiety, Disp: 30 capsule, Rfl: 0  ?  INGREZZA 80 mg cap capsule, Take 1 capsule by mouth daily., Disp: , Rfl:   ?  levothyroxine (SYNTHROID, LEVOTHROID) 125 MCG tablet, Take 1 tablet (125 mcg total) by mouth Daily at 6:00  am., Disp: 30 tablet, Rfl: 0  ?  medroxyPROGESTERone (PROVERA) 10 MG tablet, Take 10 mg by mouth daily. Take for the first five days of each month, Disp: , Rfl: 3  ?  melatonin 3 mg Tab tablet, Take 1 tablet (3 mg total) by mouth at bedtime as needed., Disp: 30 tablet, Rfl: 0  ?  metFORMIN (GLUCOPHAGE) 500 MG tablet, , Disp: , Rfl:   ?  MULTIVITAMIN tablet, Take 1 tablet by mouth daily., Disp: , Rfl: 3  ?  mupirocin (BACTROBAN) 2 % ointment, 1 application, Disp: , Rfl:   ?  mupirocin (BACTROBAN) 2 % ointment, , Disp: , Rfl:   ?  nystatin (MYCOSTATIN) cream, 1 application, Disp: , Rfl:   ?  OLANZapine (ZYPREXA) 20 MG tablet, Take 1 tablet (20 mg total) by mouth at bedtime., Disp: 30 tablet, Rfl: 0  ?  omeprazole (PRILOSEC) 40 MG capsule, Take 40 mg by mouth daily., Disp: , Rfl: 5  ?  polyethylene glycol (MIRALAX) 17 gram packet, Take 1 packet (17 g total) by mouth daily as needed., Disp: , Rfl: 0  ?  potassium chloride 20 mEq TbER, Take 20 mEq by mouth daily., Disp: 30 tablet, Rfl: 0  ?  TRUEPLUS LANCETS 30 gauge Misc, , Disp: , Rfl:   ?  TRULICITY 1.5 mg/0.5 mL PnIj, Inject 1.5 mg under the skin every 7 days., Disp: 4 Syringe, Rfl: 6  ?  amoxicillin-clavulanate (AUGMENTIN) 875-125 mg per tablet, Take 1 tablet by mouth 2 (two) times a day., Disp: , Rfl:     History:   Past Medical History:   Diagnosis Date   ? Allergic rhinitis    ? Bipolar 1 disorder (H)     followed by psych   ? Cellulitis, leg 2016   ? Dyslipidemia    ? Ganglion, left wrist    ? GERD (gastroesophageal reflux disease)    ? Growth retardation, mild developmental delay, and chronic hepatitis syndrome (H)    ? Herpes zoster    ? Hypertension    ? Hypothyroid    ? Lymphedema of lower extremity 2008   ? Morbid obesity (H)    ? Nightmares    ? Obstructive sleep apnea    ? Post traumatic stress disorder    ? Post traumatic stress disorder    ? Post traumatic stress disorder (PTSD)    ? Prader-Willi syndrome    ? Prolactinoma (H) transsphenoid approach  2000.   ? PTSD (post-traumatic stress disorder)    ? Schizoaffective disorder, bipolar type (H)    ? Type 2 diabetes mellitus without complication, with long-term current use of insulin (H)    ? Venous stasis    ? Vitamin D deficiency        Physical Exam:    /70   Pulse 76   Temp 97.2  F (36.2  C)   Resp 20   Wt (!) 365 lb 12.8 oz (165.9 kg)   BMI 71.44 kg/m      General:  Patient presents to clinic in no apparent distress.  Head: normocephalic atraumatic  Psychiatric:  Alert and oriented x3.   Respiratory: unlabored breathing; no cough  Integumentary:  Skin is uniformly warm, dry and pink.    Extremities: BLE with non-pitting edema; tissues are fairly soft and supple; the left medial shin with approx 8x8cm area of faint erythema; mild pain with palpation; no overt ulceration; blistering nor weeping; nails are elongated and thickened bilaterally.    Abdominal measures:  5 cm above the naval measures 147 cm   25 cm below the navel is 155 cm   and at the Navel is 141 cm       Circumferential volume measures:    Vasc Edema 6/1/2020 7/13/2020 12/3/2020 3/22/2021 4/19/2021   Right just above MTP 26.5 27 27.5 27.5 27.5   Right Ankle 39.5 37 42 52 47   Right Widest Calf 67.3 69.5 66.5 72.3 76   Right Thigh Up 10cm 77.6 77 76 78.2 -   Left - just above MTP 29.6 30.4 30.4 30.5 31.8   Left Ankle 39 38 38 55 60   Left Widest Calf 71 75 72 84.4 81   Left Thigh Up 10cm 77 76 76 75.3 -       Ulceration(s)/Wound(s):     VASC Wound 12/03/20 Right shin (Active)   Pre Size Length 0.6 12/03/20 1400   Pre Size Width 0.6 12/03/20 1400   Pre Size Depth 0.1 12/03/20 1400   Pre Total Sq cm 0.36 12/03/20 1400       VASC Wound 12/03/20 Left shin (Active)   Pre Size Length 2 03/22/21 1000   Pre Size Width 3 03/22/21 1000   Pre Size Depth 0.2 03/22/21 1000   Pre Total Sq cm 6 03/22/21 1000        Lab Values    Lab Results   Component Value Date    SEDRATE 54 (H) 08/01/2015     Lab Results   Component Value Date    CREATININE 0.66  03/15/2021     Lab Results   Component Value Date    HGBA1C 5.0 01/06/2020     Lab Results   Component Value Date    BUN 18 03/15/2021     Lab Results   Component Value Date    ALBUMIN 4.2 09/02/2020     Vitamin D, Total (25-Hydroxy)   Date Value Ref Range Status   01/06/2020 32.3 30.0 - 80.0 ng/mL Final             Impression:  1. Swelling of limb     2. Venous insufficiency of both lower extremities     3. Chronic venous hypertension (idiopathic) with ulcer and inflammation of bilateral lower extremity (H)     4. Lymphedema     5. Morbid obesity with BMI of 70 and over, adult (H)     6. DM type 2, controlled, with lower extremity ulcer (H)     7. Prader-Willi syndrome            3/22/2021 LLE             Are any of these wounds new today: No; Location: na    Assessment/Plan:          1. Debridement: na       2.  Wound treatment: wound treatment will include irrigation and dressings to promote autolytic debridement which will include:lotion daily; no dressings needed. Stable            3. Edema: her swelling is being undermanaged; she cannot find her lymphedema pump after she moved 1 year ago; she is trying to elevate more; stopped wearing her velcro over the weekend again stressed that she needs to wear every day; send referral for new garments; she is due for these in May. The compression wraps were applied today in clinic. Stable applied size J/K tubular compression as the liner; will order new lymphedema pump    Patient presents to clinic for evaluation of their lymphedema of BLE lower extremity, chronic venous insufficiency and bilateral lower extremity edema. Patient has tried conservative therapy for at least 4 weeks without significant improvement, which includes elevation, exercise and, compression sleeve, stockings and wraps. Would like to order pneumatic compression therapy.    Vasc Edema 6/1/2020 7/13/2020 12/3/2020 3/22/2021 4/19/2021   Right just above MTP 26.5 27 27.5 27.5 27.5   Right Ankle 39.5 37  42 52 47   Right Widest Calf 67.3 69.5 66.5 72.3 76   Right Thigh Up 10cm 77.6 77 76 78.2 -   Left - just above MTP 29.6 30.4 30.4 30.5 31.8   Left Ankle 39 38 38 55 60   Left Widest Calf 71 75 72 84.4 81   Left Thigh Up 10cm 77 76 76 75.3 -                4. Nutrition: continue to work on her weight; has Prader Chepe; eliminate sugary beverages; encouraged just water           5. Offloading: she reports her shoes and cracking and she needs new insoles; will send referral to have shoes checked and see if they can be repaired; and rx for insoles          6. Early Cellulitis: pt is very high risk for cellulitis; will empirically treat with doxycyline again         7. Nails: nails 1-5 bilaterally were debrided and filed smooth; tolerated well, no complications.       Patient will follow up with me in 10 weeks for reevaluation. They were instructed to call the clinic sooner with any signs or symptoms of infection or any further questions/concerns. Answered all questions.          Luciana Neal DNP, RN, CNP, CWOCN, CFCN, CLT  [facility]   [phone]        This note was electronically signed by Luciana Neal

## 2021-06-30 NOTE — PROGRESS NOTES
Progress Notes by Temitope Odell MD at 3/22/2021  8:45 AM     Author: Temitope Odell MD Service: -- Author Type: Physician    Filed: 3/22/2021 12:54 PM Encounter Date: 3/22/2021 Status: Signed    : Temitope Odell MD (Physician)                     Date of Service:  03/22/21    Date last seen:  12/03/2020    PCP: Erasto Azul MD    Impression:   1. Leg swelling bilaterally-increased swelling on left with some left lower leg pain  2. Venous stasis/insufficiency with history of ulcerations  3. New left anterior shin ulceration  4. New left anterior distal medial shin cellulitis-moderate to heavy exudate with full-thickness ulceration  5. Acquired lymphedema   6. Recurrent cellulitis of legs- (3/5/15, 2/17/15, 5/15, 12/14/15, 3/16, 7/12/16, 8/18/2016, 7/2020, 3/21)  7. Complicated by morbid obesity Prader Willi-note weight gain   8. Type 2 DM    Plan:   1. Questions were answered.   2. Continue compression, Flexitouch and exercise.  3. Continue to use diabetic shoes and insoles.   4. Continue to work with bariatrics.  She needs to reestablish care with them.  We will help her do this.  5. After discussion of risk factors and consent obtained 2% Lidocaine HCL jelly was applied, under clean conditions, the left, calf, venous stasis/insufficiency with venous hypertension and diabetic ulceration(s) were debrided using currette.  Devitalized and non viable tissue, along with any fibrin and slough, was removed to improve granulation tissue formation, stimulate wound healing, decrease overall bacteria load, disrupt biofilm formation and decrease edge senescence.  Total excisional debridement was  6 sq cm into the subcutaneous tissue.   Ulcers were  improved afterwards and .  Measures were as noted on the flow sheet .  6. Wound treatment will include irrigation and dressings to promote autolytic debridement and will be:  Left leg-hibiclens wash then NS rinse, then alginate with silver  then ABD then 4 layer wrap.  Change twice a week.  [facility].     7. Because of underlying concern of cellulitis and wound infection, Car swab culture technique was used to send off for aerobic Gram stain and culture from the left ant calf ulcer.  8. I am empirically starting her on Bactrim based on previous culture results.  Plan may change based on recent culture done today.  9. Follow up in 3 weeks with CNP,  or when needed.  The patient will watch for any increased redness, pain, temperature, drainage and/or any new ulcerations in the leg(s). If any questions or concerns she will contact the clinic.    On day of encounter time spent in chart review and with patient in consultation, exam, education and coordination of care:  41 minutes.  ---------------------------------------------------------------------------------------------------------------------     Chief Complaint: Bilateral leg swelling     History of Present Illness:   Bushra Buck returns to the [facility] for follow up of bilateral leg swelling due to severe venous hypertension, insufficiency with secondary lymphedema and recurrent cellulitis resulting from morbid obesity complicated by underlying schizophrenia and recurrent hospitalizations.  Her swelling has been treated with therapy and compression.  She has had ulcerations which closed with standard wound care.  She sees bariatrics for work with her weight. She was to continue doing her exercises, wearing her diabetic shoes and insoles, using the Flexitouch as needed and following bariatrics again. Today she reports she is doing well until about a week ago when she started developing ulceration of the left leg.  She had increased pain and some swelling.  She states ultrasound was done to rule out venous blood clot and this was negative. I am unable to find this, but she confirms it was just done.   She has not been doing any specific wound care.  She has  gained weight during the pandemic.  She has not seen bariatrics for a long time due to the pandemic.  She has not run a fever.  The left leg is weeping a lot.  It is getting slightly painful and red.  She continues to work with bariatrics.  There is no new numbness, tingling or weakness.  She has not had any fevers.  She has had no new shortness of breath or chest pain.  There is been no irregular bleeding.    Past Medical History:   Diagnosis Date   ? Allergic rhinitis    ? Bipolar 1 disorder (H)     followed by psych   ? Cellulitis, leg 2016   ? Dyslipidemia    ? Ganglion, left wrist    ? GERD (gastroesophageal reflux disease)    ? Growth retardation, mild developmental delay, and chronic hepatitis syndrome (H)    ? Herpes zoster    ? Hypertension    ? Hypothyroid    ? Lymphedema of lower extremity 2008   ? Morbid obesity (H)    ? Nightmares    ? Obstructive sleep apnea    ? Post traumatic stress disorder    ? Post traumatic stress disorder    ? Post traumatic stress disorder (PTSD)    ? Prader-Willi syndrome    ? Prolactinoma (H) transsphenoid approach 2000.   ? PTSD (post-traumatic stress disorder)    ? Schizoaffective disorder, bipolar type (H)    ? Type 2 diabetes mellitus without complication, with long-term current use of insulin (H)    ? Venous stasis    ? Vitamin D deficiency        Past Surgical History:   Procedure Laterality Date   ? ABDOMINAL SURGERY  2000    fat removed tp plug dura when had a pituitary surgery.   ? anorectal fissure repair  2004, 2005   ? BRAIN SURGERY  2000    pituitary surgery   ? CARPAL TUNNEL RELEASE Bilateral    ? COLONOSCOPY N/A 10/14/2019    Procedure: COLONOSCOPY;  Surgeon: Hugo Mattson MD;  [place] [facility];  Service: Gastroenterology   ? GANGLION CYST EXCISION  2010    left arm   ? GANGLION CYST EXCISION Left 6/7/2018    Procedure: REMOVE RECURRENT GANGLION CYST LEFT VOLAR RADIAL WRIST;  Surgeon: Angel Lopez MD;  Location: [facility] OR;   Service:    ? GANGLION CYST EXCISION Left 11/18/2019    Procedure: EXCISION LEFT WRIST RECURRENT GANGLION CYST;  Surgeon: Angel Lopez MD;  Location: [facility];  Service: Hand   ? pansinus revision Bilateral    ? PITUITARY SURGERY  2014   ? SEPTOPLASTY      x 2   ? SINUS SURGERY Right 8/21/2015    Procedure: RIGHT MICHELLE BULLOSA;  Surgeon: Daniel Landeros MD;  Location: [facility];  Service:    ? TONSILLECTOMY         Current Outpatient Medications   Medication Sig Dispense Refill   ? ARIPiprazole (ABILIFY MAINTENA) 400 mg SERR Inject 400 mg under the skin every 30 (thirty) days.     ? atorvastatin (LIPITOR) 40 MG tablet      ? blood glucose meter (GLUCOMETER) Use 1 each As Directed as needed. Dispense glucometer brand per patient's insurance at pharmacy discretion.  0   ? bumetanide (BUMEX) 2 MG tablet Take 1 tablet (2 mg total) by mouth daily. 30 tablet 0   ? cabergoline (DOSTINEX) 0.5 mg tablet Take 0.5 mg by mouth 2 (two) times a week. WED, SAT      ? calcium citrate-vitamin D3 (CITRACAL + D) 315 mg- 250 unit per tablet Take 2 tablets by mouth daily. 60 tablet 3   ? carBAMazepine (TEGRETOL  XR) 200 MG 12 hr tablet Take 1 tablet (200 mg total) by mouth 2 (two) times a day with meals. 60 tablet 0   ? cetirizine (ZYRTEC) 10 MG tablet Take 10 mg by mouth daily.     ? CHOLECALCIFEROL 25 mcg (1,000 unit) tablet Take 3 tablets (3,000 Units total) by mouth daily. 90 tablet 1   ? fenofibrate (TRICOR) 145 MG tablet Take 1 tablet (145 mg total) by mouth daily. 30 tablet 3   ? levothyroxine (SYNTHROID, LEVOTHROID) 125 MCG tablet Take 1 tablet (125 mcg total) by mouth Daily at 6:00 am. 30 tablet 0   ? medroxyPROGESTERone (PROVERA) 10 MG tablet Take 10 mg by mouth daily. Take for the first five days of each month  3   ? melatonin 3 mg Tab tablet Take 1 tablet (3 mg total) by mouth at bedtime as needed. 30 tablet 0   ? MULTIVITAMIN tablet Take 1 tablet by mouth daily.  3   ? omeprazole (PRILOSEC)  40 MG capsule Take 40 mg by mouth daily.  5   ? potassium chloride 20 mEq TbER Take 20 mEq by mouth daily. 30 tablet 0   ? TRUEPLUS LANCETS 30 gauge Misc      ? TRULICITY 1.5 mg/0.5 mL PnIj Inject 1.5 mg under the skin every 7 days. 4 Syringe 6   ? amoxicillin-clavulanate (AUGMENTIN) 875-125 mg per tablet Take 1 tablet by mouth 2 (two) times a day.     ? EPINEPHrine (EPIPEN/ADRENACLICK/AUVI-Q) 0.3 mg/0.3 mL injection Inject 0.6 mL (0.6 mg total) into the shoulder, thigh, or buttocks as needed. 2 Pre-filled Pen Syringe 0   ? glimepiride (AMARYL) 2 MG tablet      ? hydrOXYzine pamoate (VISTARIL) 25 MG capsule Take 1 capsule (25 mg total) by mouth 2 (two) times a day as needed for itching or other (additional pain control). Anxiety 30 capsule 0   ? INGREZZA 80 mg cap capsule Take 1 capsule by mouth daily.     ? metFORMIN (GLUCOPHAGE) 500 MG tablet      ? OLANZapine (ZYPREXA) 20 MG tablet Take 1 tablet (20 mg total) by mouth at bedtime. 30 tablet 0   ? polyethylene glycol (MIRALAX) 17 gram packet Take 1 packet (17 g total) by mouth daily as needed.  0     No current facility-administered medications for this visit.        Allergies   Allergen Reactions   ? Fiorinal [Butalbital-Aspirin-Caffeine] Anaphylaxis and Swelling   ? Clindamycin Rash     Rash on neck - not raised . Heartburn   ? Ancef [Cefazolin] Swelling     Arm swells   ? Aspirin (Tartrazine Only) Other (See Comments)     Eyes swell shut   ? Ativan [Lorazepam] Swelling     arms   ? Butalbital    ? Caffeine    ? Carbamazepine Other (See Comments)      (tegretol) Arm swelling   ? Codeine Swelling   ? Delsym Hives   ? Ibuprofen Other (See Comments)     Swelling, eyes swell shut   ? Latex Swelling   ? Lithium Analogues Diarrhea, Nausea And Vomiting and Swelling     Red/swollen arms   ? Oxcarbazepine Other (See Comments)     (trileptal) Arm swelling   ? Venom-Honey Bee Swelling     Unknown   ? Augmentin [Amoxicillin-Pot Clavulanate] Rash     Rash on neck - not raised    ? Cefdinir Swelling          ? Topiramate Swelling and Rash     topamax       Social History     Socioeconomic History   ? Marital status: Single     Spouse name: Not on file   ? Number of children: 0   ? Years of education: Not on file   ? Highest education level: Not on file   Occupational History   ? Occupation: Disability   Social Needs   ? Financial resource strain: Not on file   ? Food insecurity     Worry: Not on file     Inability: Not on file   ? Transportation needs     Medical: Not on file     Non-medical: Not on file   Tobacco Use   ? Smoking status: Never Smoker   ? Smokeless tobacco: Never Used   Substance and Sexual Activity   ? Alcohol use: No   ? Drug use: No   ? Sexual activity: Never     Partners: Male     Birth control/protection: Abstinence   Lifestyle   ? Physical activity     Days per week: Not on file     Minutes per session: Not on file   ? Stress: Not on file   Relationships   ? Social connections     Talks on phone: Not on file     Gets together: Not on file     Attends [employer] service: Not on file     Active member of club or organization: Not on file     Attends meetings of clubs or organizations: Not on file     Relationship status: Not on file   ? Intimate partner violence     Fear of current or ex partner: Not on file     Emotionally abused: Not on file     Physically abused: Not on file     Forced sexual activity: Not on file   Other Topics Concern   ? Not on file   Social History Narrative        .  Not working.   No kids.       Family History   Problem Relation Age of Onset   ? Diabetes Mother    ? Hypothyroidism Mother    ? Cancer Mother    ? Sleep apnea Mother    ? Snoring Mother    ? Heart disease Father    ? Diabetes Sister    ? Edema Sister    ? No Medical Problems Sister    ? Diabetes Maternal Grandfather        Review of Systems:    See HPI    Imaging:    I personally reviewed the following imaging results today and those on care everywhere, if indicated    EXAM:  XR CHEST 2 VIEWS  LOCATION: [facility]  DATE/TIME: 7/29/2020 7:13 PM     INDICATION: Shortness of breath  COMPARISON: 03/12/2020     IMPRESSION:   Negative chest.    EXAM: US VENOUS LEG LEFT  LOCATION: [facility]  DATE/TIME: 7/29/2020 6:31 PM     INDICATION: Swelling, Eval for DVT  COMPARISON: Ultrasound 02/16/2020  TECHNIQUE: Venous Duplex ultrasound of the left lower extremity with and without compression, augmentation and duplex. Color flow and spectral Doppler with waveform analysis performed.     FINDINGS: Exam includes the common femoral, femoral, popliteal, and contralateral common femoral veins as well as segmentally visualized deep calf veins and greater saphenous vein. Evaluation of the distal femoral and posterior tibial veins technically   challenging due to the patient's large body habitus.     LEFT: No deep vein thrombosis. No superficial thrombophlebitis. No popliteal cyst.     IMPRESSION:   1.  No deep venous thrombosis in the left lower extremity.    Labs:    I personally reviewed the following lab results today and those on care everywhere, if indicated    Lab Results   Component Value Date    SEDRATE 54 (H) 08/01/2015         Lab Results   Component Value Date    CRP 0.4 04/26/2019           Lab Results   Component Value Date    CREATININE 0.66 03/15/2021      Lab Results   Component Value Date    HGBA1C 5.0 01/06/2020           Lab Results   Component Value Date    BUN 18 03/15/2021              Lab Results   Component Value Date    ALBUMIN 4.2 09/02/2020       Vitamin D, Total (25-Hydroxy)   Date Value Ref Range Status   01/06/2020 32.3 30.0 - 80.0 ng/mL Final       Lab Results   Component Value Date    TSH 3.75 06/08/2020     Lab Results   Component Value Date    WBC 8.8 08/19/2020    HGB 13.0 08/19/2020    HCT 38.3 08/19/2020     (H) 08/19/2020     08/19/2020       Physical Exam:  Vitals:    03/22/21 0849   BP: 110/80   Pulse: 94   Resp: 24   Temp: 98  F (36.7   C)   SpO2: 97%     BMI 72.46  Weight 371 (+12) pounds    Circumferential measures:    Vasc Edema 10/28/2019 6/1/2020 7/13/2020 12/3/2020 3/22/2021   Right just above MTP 26.5 26.5 27 27.5 27.5   Right Ankle 36.5 39.5 37 42 52   Right Widest Calf 68.2 67.3 69.5 66.5 71   Right Thigh Up 10cm 72.5 77.6 77 76 78.2   Left - just above MTP 28.5 29.6 30.4 30.4 30.5   Left Ankle 36.5 39 38 38 55   Left Widest Calf 71.3 71 75 72 84.2   Left Thigh Up 10cm 79.5 77 76 76 75.3     Measures are stable.    General:  40 y.o. female in no apparent distress.     Psych:  Alert and oriented x 3.  Cooperative.     HEENT: Atraumatic, normocephalic    Lungs: Clear to auscultation throughout with full inspiration.    CV: Normal S1 and S2, without murmurs, gallops, or rubs.  No audible carotid bruits. Regular rhythm.    Abdominal:  Normal bowel sounds.  No masses, tenderness, guarding or rigidity.  No inguinal lymphadenopathy or fullness palpated.  Exam compromised by morbid obesity.    Sensation: Intact to pinprick and light touch in the legs bilaterally.     Strength:  Normal strength in hip flexion, knee flexion/knee extension, ankle dorsiflexion and great toe extension bilaterally.      Vascular: Dorsalis pedis and posterior tib pulses strong and equal bilaterally.  Venous varicosities noted in both anterior shins.    Integumentary: Legs looking good on the right.  However on the left there is slight erythema with calor and pain to palpation along the left distal medial calf.  There is a small ulceration on the anterior portion of the left anterior shin with significant slough and fibrin.  There is moderate to heavy serous drainage.  Ulceration is irregularly shaped.  Edges are very slightly elevated.  There is no odor.    + Stemmer's sign bilaterally continues with distal calf ledges.  Please see flow sheet for measures.  Please see photos below.        L calf ant     Temitope Odell MD, Founding Diplomate [facility], [facility],  FAAPMR  Medical Director Wound Care and Lymphedema  [facility]  [phone]

## 2021-06-30 NOTE — PROGRESS NOTES
Progress Notes by Temitope Odell MD at 5/3/2021  9:45 AM     Author: Temitope Odell MD Service: -- Author Type: Physician    Filed: 5/3/2021 10:38 AM Encounter Date: 5/3/2021 Status: Signed    : Temitope Odell MD (Physician)                     Date of Service:  05/03/21    Date last seen:  03/22/2021    PCP: Erasto Azul MD    Impression:   1. Leg swelling bilaterally-doing very well  2. Venous stasis/insufficiency with history of ulcerations-now closed-small excoriations persist  3. Acquired lymphedema   4. Recurrent cellulitis of legs- (3/5/15, 2/17/15, 5/15, 12/14/15, 3/16, 7/12/16, 8/18/2016, 7/2020, 3/21)  5. Complicated by morbid obesity Prader Willi-doing well-improving  6. Type 2 DM    Plan:   1. Questions were answered.   2. Continue compression, Flexitouch and exercise.  3. Continue to use diabetic shoes and insoles.   4. Continue to work with bariatrics.   5. Continue exercise.  Doing well.  6. Wound treatment will include irrigation and dressings to promote autolytic debridement and will be:  For leg excoriations use bleach baths dilute and then comfeel then Velcro compression. After healed, just continue Velcro.  If patient is coming in to clinic, nurses certified for wound debridement may debride the wounds.    7. My nurses will work with her to get her scheduled for the COVID vaccine.   8. Follow up in 6 months with Dr. Strong or me.  The patient will watch for any increased redness, pain, temperature, drainage and/or any new ulcerations in the leg(s). If any questions or concerns she will contact the clinic.    On day of encounter time spent in chart review and with patient in consultation, exam, education and coordination of care:  26 minutes.  ---------------------------------------------------------------------------------------------------------------------     Chief Complaint: Bilateral leg swelling     History of Present Illness:   Bushra Buck returns to  the [facility] for follow up of bilateral leg swelling due to severe venous hypertension, insufficiency with secondary lymphedema, recurrent ulcerations and recurrent cellulitis resulting from morbid obesity (Prader-Willi) complicated by underlying schizophrenia and recurrent hospitalizations.  Swelling was treated with therapy and compression.  Ulcerations closed with standard wound care when I last saw her she had new wounds on the left anterior shin.  There was increased pain.  I treated her with debridement followed by wound care dressings of.  With concern of underlying cellulitis a living swab culture showed MRSA positive staph with coag negative staph.  I have empirically put her on Bactrim and she was switched to doxycycline.  She was to follow-up with the CNP.  She last saw the CNP 4/19/2021.  Wounds had closed and compression with exercise and elevation was continued.  She was empirically started on doxycycline again as the legs were getting red because she had neglected to wear her compression for a day or so.  At her last visit with bariatrics on 4/19/21 she was down 36 pounds from her heaviest weight.  She had normal colonoscopy on 3/18/21.  At follow-up today she reports she is working to get the COVID 19 vaccine.   Today she reports she is doing well.  She has not run a fever.  The left leg is good.  She continues to work with [employer].  There is no new numbness, tingling or weakness.  She has not had any fevers.  She has had no new shortness of breath or chest pain.  There is been no irregular bleeding.  She has increased her walking with her friend.     Past Medical History:   Diagnosis Date   ? Allergic rhinitis    ? Bipolar 1 disorder (H)     followed by psych   ? Cellulitis, leg 2016   ? Dyslipidemia    ? Ganglion, left wrist    ? GERD (gastroesophageal reflux disease)    ? Growth retardation, mild developmental delay, and chronic hepatitis syndrome (H)     ? Herpes zoster    ? Hypertension    ? Hypothyroid    ? Lymphedema of lower extremity 2008   ? Morbid obesity (H)    ? Nightmares    ? Obstructive sleep apnea    ? Post traumatic stress disorder    ? Post traumatic stress disorder    ? Post traumatic stress disorder (PTSD)    ? Prader-Willi syndrome    ? Prolactinoma (H) transsphenoid approach 2000.   ? PTSD (post-traumatic stress disorder)    ? Schizoaffective disorder, bipolar type (H)    ? Type 2 diabetes mellitus without complication, with long-term current use of insulin (H)    ? Venous stasis    ? Vitamin D deficiency        Past Surgical History:   Procedure Laterality Date   ? ABDOMINAL SURGERY  2000    fat removed tp plug dura when had a pituitary surgery.   ? anorectal fissure repair  2004, 2005   ? BRAIN SURGERY  2000    pituitary surgery   ? CARPAL TUNNEL RELEASE Bilateral    ? COLONOSCOPY N/A 10/14/2019    Procedure: COLONOSCOPY;  Surgeon: Hugo Mattson MD;  Location: [facility];  Service: Gastroenterology   ? GANGLION CYST EXCISION  2010    left arm   ? GANGLION CYST EXCISION Left 6/7/2018    Procedure: REMOVE RECURRENT GANGLION CYST LEFT VOLAR RADIAL WRIST;  Surgeon: Angel Lopez MD;  Location: [facility] OR;  Service:    ? GANGLION CYST EXCISION Left 11/18/2019    Procedure: EXCISION LEFT WRIST RECURRENT GANGLION CYST;  Surgeon: Angel Lopez MD;  Location: [facility] OR;  Service: Hand   ? pansinus revision Bilateral    ? PITUITARY SURGERY  2014   ? SEPTOPLASTY      x 2   ? SINUS SURGERY Right 8/21/2015    Procedure: RIGHT MICHELLE BULLOSA;  Surgeon: Daniel Landeros MD;  Location: [facility] OR;  Service:    ? TONSILLECTOMY         Current Outpatient Medications   Medication Sig Dispense Refill   ? ARIPiprazole (ABILIFY MAINTENA) 400 mg SERR Inject 400 mg under the skin every 30 (thirty) days.     ? aspirin 81 MG EC tablet      ? atorvastatin (LIPITOR) 40 MG tablet      ? blood glucose meter (GLUCOMETER)  Use 1 each As Directed as needed. Dispense glucometer brand per patient's insurance at pharmacy discretion.  0   ? bumetanide (BUMEX) 2 MG tablet Take 1 tablet (2 mg total) by mouth daily. 30 tablet 0   ? cabergoline (DOSTINEX) 0.5 mg tablet Take 0.5 mg by mouth 2 (two) times a week. WED, SAT      ? calcium citrate-vitamin D3 (CITRACAL + D) 315 mg- 250 unit per tablet Take 2 tablets by mouth daily. 60 tablet 3   ? cetirizine (ZYRTEC) 10 MG tablet Take 10 mg by mouth daily.     ? CHOLECALCIFEROL 25 mcg (1,000 unit) tablet Take 3 tablets (3,000 Units total) by mouth daily. 90 tablet 1   ? fenofibrate (TRICOR) 145 MG tablet Take 1 tablet (145 mg total) by mouth daily. 30 tablet 3   ? glimepiride (AMARYL) 2 MG tablet      ? medroxyPROGESTERone (PROVERA) 10 MG tablet Take 10 mg by mouth daily. Take for the first five days of each month  3   ? MULTIVITAMIN tablet Take 1 tablet by mouth daily.  3   ? nystatin (MYCOSTATIN) cream 1 application     ? omeprazole (PRILOSEC) 40 MG capsule Take 40 mg by mouth daily.  5   ? potassium chloride 20 mEq TbER Take 20 mEq by mouth daily. 30 tablet 0   ? TRUEPLUS LANCETS 30 gauge Misc      ? TRULICITY 1.5 mg/0.5 mL PnIj Inject 1.5 mg under the skin every 7 days. 4 Syringe 6   ? amoxicillin-clavulanate (AUGMENTIN) 875-125 mg per tablet Take 1 tablet by mouth 2 (two) times a day.     ? carBAMazepine (TEGRETOL  XR) 200 MG 12 hr tablet Take 1 tablet (200 mg total) by mouth 2 (two) times a day with meals. 60 tablet 0   ? EPINEPHrine (EPIPEN/ADRENACLICK/AUVI-Q) 0.3 mg/0.3 mL injection Inject 0.6 mL (0.6 mg total) into the shoulder, thigh, or buttocks as needed. 2 Pre-filled Pen Syringe 0   ? hydrOXYzine pamoate (VISTARIL) 25 MG capsule Take 1 capsule (25 mg total) by mouth 2 (two) times a day as needed for itching or other (additional pain control). Anxiety 30 capsule 0   ? INGREZZA 80 mg cap capsule Take 1 capsule by mouth daily.     ? levothyroxine (SYNTHROID, LEVOTHROID) 125 MCG tablet Take  1 tablet (125 mcg total) by mouth Daily at 6:00 am. 30 tablet 0   ? melatonin 3 mg Tab tablet Take 1 tablet (3 mg total) by mouth at bedtime as needed. 30 tablet 0   ? metFORMIN (GLUCOPHAGE) 500 MG tablet      ? OLANZapine (ZYPREXA) 20 MG tablet Take 1 tablet (20 mg total) by mouth at bedtime. 30 tablet 0   ? polyethylene glycol (MIRALAX) 17 gram packet Take 1 packet (17 g total) by mouth daily as needed.  0     No current facility-administered medications for this visit.        Allergies   Allergen Reactions   ? Fiorinal [Butalbital-Aspirin-Caffeine] Anaphylaxis and Swelling   ? Clindamycin Rash     Rash on neck - not raised . Heartburn   ? Ancef [Cefazolin] Swelling     Arm swells   ? Aspirin (Tartrazine Only) Other (See Comments)     Eyes swell shut   ? Ativan [Lorazepam] Swelling     arms   ? Butalbital    ? Caffeine    ? Carbamazepine Other (See Comments)      (tegretol) Arm swelling   ? Cat Hair Standardized Allergenic Extract      Other reaction(s): Unknown   ? Codeine Swelling   ? Delsym Hives   ? Ibuprofen Other (See Comments)     Swelling, eyes swell shut   ? Latex Swelling   ? Lithium Analogues Diarrhea, Nausea And Vomiting and Swelling     Red/swollen arms   ? Oxcarbazepine Other (See Comments)     (trileptal) Arm swelling   ? Venom-Honey Bee Swelling     Unknown   ? Augmentin [Amoxicillin-Pot Clavulanate] Rash     Rash on neck - not raised   ? Cefdinir Swelling          ? Topiramate Swelling and Rash     topamax       Social History     Socioeconomic History   ? Marital status: Single     Spouse name: Not on file   ? Number of children: 0   ? Years of education: Not on file   ? Highest education level: Not on file   Occupational History   ? Occupation: Disability   Social Needs   ? Financial resource strain: Not on file   ? Food insecurity     Worry: Not on file     Inability: Not on file   ? Transportation needs     Medical: Not on file     Non-medical: Not on file   Tobacco Use   ? Smoking status:  Never Smoker   ? Smokeless tobacco: Never Used   Substance and Sexual Activity   ? Alcohol use: No   ? Drug use: No   ? Sexual activity: Never     Partners: Male     Birth control/protection: Abstinence   Lifestyle   ? Physical activity     Days per week: Not on file     Minutes per session: Not on file   ? Stress: Not on file   Relationships   ? Social connections     Talks on phone: Not on file     Gets together: Not on file     Attends Religion service: Not on file     Active member of club or organization: Not on file     Attends meetings of clubs or organizations: Not on file     Relationship status: Not on file   ? Intimate partner violence     Fear of current or ex partner: Not on file     Emotionally abused: Not on file     Physically abused: Not on file     Forced sexual activity: Not on file   Other Topics Concern   ? Not on file   Social History Narrative        .  Not working.   No kids.       Family History   Problem Relation Age of Onset   ? Diabetes Mother    ? Hypothyroidism Mother    ? Cancer Mother    ? Sleep apnea Mother    ? Snoring Mother    ? Heart disease Father    ? Diabetes Sister    ? Edema Sister    ? No Medical Problems Sister    ? Diabetes Maternal Grandfather        Review of Systems:    See HPI    Imaging:    I personally reviewed the following imaging results today and those on care everywhere, if indicated      EXAM: US VENOUS LEG LEFT  LOCATION: [facility]  DATE/TIME: 7/29/2020 6:31 PM     INDICATION: Swelling, Eval for DVT  COMPARISON: Ultrasound 02/16/2020  TECHNIQUE: Venous Duplex ultrasound of the left lower extremity with and without compression, augmentation and duplex. Color flow and spectral Doppler with waveform analysis performed.     FINDINGS: Exam includes the common femoral, femoral, popliteal, and contralateral common femoral veins as well as segmentally visualized deep calf veins and greater saphenous vein. Evaluation of the distal femoral and  posterior tibial veins technically   challenging due to the patient's large body habitus.     LEFT: No deep vein thrombosis. No superficial thrombophlebitis. No popliteal cyst.     IMPRESSION:   1.  No deep venous thrombosis in the left lower extremity.    Labs:    I personally reviewed the following lab results today and those on care everywhere, if indicated    Lab Results   Component Value Date    SEDRATE 54 (H) 08/01/2015         Lab Results   Component Value Date    CRP 0.4 04/26/2019           Lab Results   Component Value Date    CREATININE 0.66 03/15/2021      Lab Results   Component Value Date    HGBA1C 5.0 01/06/2020           Lab Results   Component Value Date    BUN 18 03/15/2021              Lab Results   Component Value Date    ALBUMIN 4.2 09/02/2020       Vitamin D, Total (25-Hydroxy)   Date Value Ref Range Status   01/06/2020 32.3 30.0 - 80.0 ng/mL Final       Lab Results   Component Value Date    TSH 3.75 06/08/2020     Lab Results   Component Value Date    WBC 8.8 08/19/2020    HGB 13.0 08/19/2020    HCT 38.3 08/19/2020     (H) 08/19/2020     08/19/2020     Status:  Final result   Visible to patient:  No (not released)   Next appt:  06/28/2021 at 03:00 PM in Vascular Surgery (Luciana Nela NP)   Dx:  Swelling of limb; Lymphedema; Left le...  Specimen Information: Swab        Culture 2+ MRSA Coagulase Positive StaphAbnormal     Methicillin resistant Staph aureus, patient may be an isolation risk.   1+ Coagulase negative StaphylococcusAbnormal               Gram Stain Result  No polymorphonuclear leukocytes seen      1+ Gram negative bacilli      1+ Gram positive cocci      1+ Gram positive bacilli            Resulting Agency: [facility]   Susceptibility     MRSA Coagulase Positive Staph     JHON     Cefazolin >16  Resistant     Clindamycin <=0.5  Sensitive     Daptomycin <=1  Sensitive     Doxycycline <=0.5  Sensitive     Linezolid 2  Sensitive     Oxacillin >2  Resistant     Trimethoprim +  Sulfamethoxazole >4/76  Resistant     Vancomycin <=0.5  Sensitive                 Specimen Collected: 03/22/21 09:46           3/18/21  Glu 105    3/18/21  Colon biopsy negative    Physical Exam:  Vitals:    05/03/21 0936   BP: 104/62   Pulse: 94   Resp: 22   Temp: 98.1  F (36.7  C)     BMI 68.94    Weight 353 (-18) pounds    Circumferential measures:    Vasc Edema 6/1/2020 7/13/2020 12/3/2020 3/22/2021 4/19/2021   Right just above MTP 26.5 27 27.5 27.5 27.5   Right Ankle 39.5 37 42 52 47   Right Widest Calf 67.3 69.5 66.5 72.3 76   Right Thigh Up 10cm 77.6 77 76 78.2 -   Left - just above MTP 29.6 30.4 30.4 30.5 31.8   Left Ankle 39 38 38 55 60   Left Widest Calf 71 75 72 84.4 81   Left Thigh Up 10cm 77 76 76 75.3 -     Measures have improved.    General:  41 y.o. female in no apparent distress.     Psych:  Alert and oriented x 3.  Cooperative. [place] in expression.    HEENT: Atraumatic, normocephalic    Sensation: Intact to pinprick and light touch in the legs bilaterally.     Strength:  Normal strength in hip flexion, knee flexion/knee extension, ankle dorsiflexion and great toe extension bilaterally.      Vascular: Dorsalis pedis and posterior tib pulses strong and equal bilaterally.  Venous varicosities noted in both anterior shins.    Integumentary: Legs looking good on both sides with no significant ulcerations (few small excoriations on anterior lower legs), erythema or pain to palpation.  + Stemmer's sign bilaterally continues with distal calf ledges.  Legs much softer.  Please see flow sheet for measures.    Temitope Odell MD, Founding Diplomate ABWMS, FACCWS, FAAPMR  Medical Director Wound Care and Lymphedema  [facility]  [phone]

## 2021-06-30 NOTE — PROGRESS NOTES
Progress Notes by Temitope Odell MD at 12/3/2020  1:00 PM     Author: Temitope Odell MD Service: -- Author Type: Physician    Filed: 12/3/2020  5:31 PM Encounter Date: 12/3/2020 Status: Signed    : Temitope Odell MD (Physician)                     Date of Service:  12/03/20    Date last seen:  07/13/2020    PCP: Erasto Azul MD    Impression:   1. Leg swelling bilaterally-stable  2. Venous stasis/insufficiency with history of ulcerations-slightly recurrent  3. Acquired lymphedema   4. Recurrent cellulitis of legs- (3/5/15, 2/17/15, 5/15, 12/14/15, 3/16, 7/12/16, 8/18/2016, 7/2020)  5. Bilateral foot deformities with valgus ankles  6. Complicated by morbid obesity Prader Willi.   7. Type 2 DM    Plan:   1. Questions were answered.   2. Continue compression, Flexitouch and exercise.  3. Continue to use diabetic shoes and insoles.  New insoles, shoes and compression written for.  4. Continue to work with bariatrics.   5. Wound treatment will include irrigation and dressings to promote autolytic debridement and will be:  Bilateral leg-wash with hibilens then NS then comfeel on ulcerations.  Change every 2-3 days until closed.  Continue velcro for compression. .  If patient is coming in to clinic, nurses certified for wound debridement may debride the wounds.     6. Follow up in 6 months, or when needed.  The patient will watch for any increased redness, pain, temperature, drainage and/or any new ulcerations in the leg(s). If wounds don't heal in about 1 month then to call.    If they have any questions or concerns they are to contact the clinic.    Time spent with patient 15 minutes with greater than 50% time in consultation, education and coordination of care.   ---------------------------------------------------------------------------------------------------------------------     Chief Complaint: Bilateral leg swelling     History of Present Illness:   Bushra Buck returns to  the [facility] for follow up of bilateral leg swelling due to severe venous hypertension, insufficiency with secondary lymphedema and recurrent cellulitis resulting from morbid obesity complicated by underlying schizophrenia and recurrent hospitalizations.  Her swelling has been treated with therapy and compression.  She has had ulcerations which closed with standard wound care.  She sees bariatrics for work with her weight. She was to continue doing her exercises, wearing her diabetic shoes and insoles, using the Flexitouch as needed and following bariatrics again. On 7/29/20 she was having some shortness of breath with increased swelling.  She was r/o'd for DVT and CXR was clear.  Today she reports she is doing well. She has had some open ulcers due to scratching and problems with donning her compression.  She has been diagnosed with Type 2 DM and is working with Dr. Azul.  She continues to work with bariatrics.  There is no new numbness, tingling or weakness.  She has not had any fevers.  She has had no new shortness of breath or chest pain.  There is been no irregular bleeding.    Past Medical History:   Diagnosis Date   ? Allergic rhinitis    ? Bipolar 1 disorder (H)     followed by psych   ? Cellulitis, leg 2016   ? Dyslipidemia    ? Ganglion, left wrist    ? GERD (gastroesophageal reflux disease)    ? Growth retardation, mild developmental delay, and chronic hepatitis syndrome (H)    ? Herpes zoster    ? Hypertension    ? Hypothyroid    ? Lymphedema of lower extremity 2008   ? Morbid obesity (H)    ? Nightmares    ? Obstructive sleep apnea    ? Post traumatic stress disorder    ? Post traumatic stress disorder    ? Post traumatic stress disorder (PTSD)    ? Prader-Willi syndrome    ? Prolactinoma (H) transsphenoid approach 2000.   ? PTSD (post-traumatic stress disorder)    ? Schizoaffective disorder, bipolar type (H)    ? Type 2 diabetes mellitus without complication,  with long-term current use of insulin (H)    ? Venous stasis    ? Vitamin D deficiency        Past Surgical History:   Procedure Laterality Date   ? ABDOMINAL SURGERY  2000    fat removed tp plug dura when had a pituitary surgery.   ? anorectal fissure repair  2004, 2005   ? BRAIN SURGERY  2000    pituitary surgery   ? CARPAL TUNNEL RELEASE Bilateral    ? COLONOSCOPY N/A 10/14/2019    Procedure: COLONOSCOPY;  Surgeon: Hugo Mattson MD;  Location: [facility] OR;  Service: Gastroenterology   ? GANGLION CYST EXCISION  2010    left arm   ? GANGLION CYST EXCISION Left 6/7/2018    Procedure: REMOVE RECURRENT GANGLION CYST LEFT VOLAR RADIAL WRIST;  Surgeon: Angel Lopez MD;  Location: [facility] OR;  Service:    ? GANGLION CYST EXCISION Left 11/18/2019    Procedure: EXCISION LEFT WRIST RECURRENT GANGLION CYST;  Surgeon: Angel Lopez MD;  Location: [facility] OR;  Service: Hand   ? pansinus revision Bilateral    ? PITUITARY SURGERY  2014   ? SEPTOPLASTY      x 2   ? SINUS SURGERY Right 8/21/2015    Procedure: RIGHT MICHELLE BULLOSA;  Surgeon: Daniel Landeros MD;  Location: [facility] OR;  Service:    ? TONSILLECTOMY         Current Outpatient Medications   Medication Sig Dispense Refill   ? ARIPiprazole (ABILIFY MAINTENA) 400 mg SERR Inject 400 mg under the skin every 30 (thirty) days.     ? atorvastatin (LIPITOR) 40 MG tablet      ? blood glucose meter (GLUCOMETER) Use 1 each As Directed as needed. Dispense glucometer brand per patient's insurance at pharmacy discretion.  0   ? bumetanide (BUMEX) 2 MG tablet Take 1 tablet (2 mg total) by mouth daily. 30 tablet 0   ? cabergoline (DOSTINEX) 0.5 mg tablet Take 0.5 mg by mouth 2 (two) times a week. WED, SAT      ? calcium citrate-vitamin D3 (CITRACAL + D) 315 mg- 250 unit per tablet Take 2 tablets by mouth daily. 60 tablet 3   ? carBAMazepine (TEGRETOL  XR) 200 MG 12 hr tablet Take 1 tablet (200 mg total) by mouth 2 (two) times a day  with meals. 60 tablet 0   ? cetirizine (ZYRTEC) 10 MG tablet Take 10 mg by mouth daily.     ? CHOLECALCIFEROL 25 mcg (1,000 unit) tablet Take 3 tablets (3,000 Units total) by mouth daily. 90 tablet 1   ? EPINEPHrine (EPIPEN/ADRENACLICK/AUVI-Q) 0.3 mg/0.3 mL injection Inject 0.6 mL (0.6 mg total) into the shoulder, thigh, or buttocks as needed. 2 Pre-filled Pen Syringe 0   ? fenofibrate (TRICOR) 145 MG tablet Take 1 tablet (145 mg total) by mouth daily. 30 tablet 3   ? hydrOXYzine pamoate (VISTARIL) 25 MG capsule Take 1 capsule (25 mg total) by mouth 2 (two) times a day as needed for itching or other (additional pain control). Anxiety 30 capsule 0   ? INGREZZA 80 mg cap capsule Take 1 capsule by mouth daily.     ? levothyroxine (SYNTHROID, LEVOTHROID) 125 MCG tablet Take 1 tablet (125 mcg total) by mouth Daily at 6:00 am. 30 tablet 0   ? medroxyPROGESTERone (PROVERA) 10 MG tablet Take 10 mg by mouth daily. Take for the first five days of each month  3   ? metFORMIN (GLUCOPHAGE) 500 MG tablet      ? MULTIVITAMIN tablet Take 1 tablet by mouth daily.  3   ? OLANZapine (ZYPREXA) 20 MG tablet Take 1 tablet (20 mg total) by mouth at bedtime. 30 tablet 0   ? omeprazole (PRILOSEC) 40 MG capsule Take 40 mg by mouth daily.  5   ? polyethylene glycol (MIRALAX) 17 gram packet Take 1 packet (17 g total) by mouth daily as needed.  0   ? potassium chloride 20 mEq TbER Take 20 mEq by mouth daily. 30 tablet 0   ? TRUEPLUS LANCETS 30 gauge Misc      ? TRULICITY 1.5 mg/0.5 mL PnIj Inject 1.5 mg under the skin every 7 days. 4 Syringe 6   ? amoxicillin-clavulanate (AUGMENTIN) 875-125 mg per tablet Take 1 tablet by mouth 2 (two) times a day.     ? melatonin 3 mg Tab tablet Take 1 tablet (3 mg total) by mouth at bedtime as needed. 30 tablet 0     No current facility-administered medications for this visit.        Allergies   Allergen Reactions   ? Fiorinal [Butalbital-Aspirin-Caffeine] Anaphylaxis and Swelling   ? Clindamycin Rash     Rash  on neck - not raised . Heartburn   ? Ancef [Cefazolin] Swelling     Arm swells   ? Aspirin (Tartrazine Only) Other (See Comments)     Eyes swell shut   ? Ativan [Lorazepam] Swelling     arms   ? Butalbital    ? Caffeine    ? Carbamazepine Other (See Comments)      (tegretol) Arm swelling   ? Codeine Swelling   ? Delsym Hives   ? Ibuprofen Other (See Comments)     Swelling, eyes swell shut   ? Latex Swelling   ? Lithium Analogues Diarrhea, Nausea And Vomiting and Swelling     Red/swollen arms   ? Oxcarbazepine Other (See Comments)     (trileptal) Arm swelling   ? Venom-Honey Bee Swelling     Unknown   ? Augmentin [Amoxicillin-Pot Clavulanate] Rash     Rash on neck - not raised   ? Cefdinir Swelling          ? Topiramate Swelling and Rash     topamax       Social History     Socioeconomic History   ? Marital status: Single     Spouse name: Not on file   ? Number of children: 0   ? Years of education: Not on file   ? Highest education level: Not on file   Occupational History   ? Occupation: Disability   Social Needs   ? Financial resource strain: Not on file   ? Food insecurity     Worry: Not on file     Inability: Not on file   ? Transportation needs     Medical: Not on file     Non-medical: Not on file   Tobacco Use   ? Smoking status: Never Smoker   ? Smokeless tobacco: Never Used   Substance and Sexual Activity   ? Alcohol use: No   ? Drug use: No   ? Sexual activity: Never     Partners: Male     Birth control/protection: Abstinence   Lifestyle   ? Physical activity     Days per week: Not on file     Minutes per session: Not on file   ? Stress: Not on file   Relationships   ? Social connections     Talks on phone: Not on file     Gets together: Not on file     Attends Hoahaoism service: Not on file     Active member of club or organization: Not on file     Attends meetings of clubs or organizations: Not on file     Relationship status: Not on file   ? Intimate partner violence     Fear of current or ex partner:  Not on file     Emotionally abused: Not on file     Physically abused: Not on file     Forced sexual activity: Not on file   Other Topics Concern   ? Not on file   Social History Narrative        .  Not working.   No kids.       Family History   Problem Relation Age of Onset   ? Diabetes Mother    ? Hypothyroidism Mother    ? Cancer Mother    ? Sleep apnea Mother    ? Snoring Mother    ? Heart disease Father    ? Diabetes Sister    ? Edema Sister    ? No Medical Problems Sister    ? Diabetes Maternal Grandfather        Review of Systems:    See HPI    Imaging:    I personally reviewed the following imaging results today and those on care everywhere, if indicated    EXAM: XR CHEST 2 VIEWS  LOCATION: [facility]  DATE/TIME: 7/29/2020 7:13 PM     INDICATION: Shortness of breath  COMPARISON: 03/12/2020     IMPRESSION:   Negative chest.  EXAM: US VENOUS LEG LEFT  LOCATION: [facility]  DATE/TIME: 7/29/2020 6:31 PM     INDICATION: Swelling, Eval for DVT  COMPARISON: Ultrasound 02/16/2020  TECHNIQUE: Venous Duplex ultrasound of the left lower extremity with and without compression, augmentation and duplex. Color flow and spectral Doppler with waveform analysis performed.     FINDINGS: Exam includes the common femoral, femoral, popliteal, and contralateral common femoral veins as well as segmentally visualized deep calf veins and greater saphenous vein. Evaluation of the distal femoral and posterior tibial veins technically   challenging due to the patient's large body habitus.     LEFT: No deep vein thrombosis. No superficial thrombophlebitis. No popliteal cyst.     IMPRESSION:   1.  No deep venous thrombosis in the left lower extremity.    Labs:    I personally reviewed the following lab results today and those on care everywhere, if indicated    Lab Results   Component Value Date    SEDRATE 54 (H) 08/01/2015         Lab Results   Component Value Date    CRP 0.4 04/26/2019           Lab Results    Component Value Date    CREATININE 0.79 10/14/2020      Lab Results   Component Value Date    HGBA1C 5.0 01/06/2020           Lab Results   Component Value Date    BUN 13 10/14/2020              Lab Results   Component Value Date    ALBUMIN 4.2 09/02/2020       Vitamin D, Total (25-Hydroxy)   Date Value Ref Range Status   01/06/2020 32.3 30.0 - 80.0 ng/mL Final       Lab Results   Component Value Date    TSH 3.75 06/08/2020     Lab Results   Component Value Date    WBC 8.8 08/19/2020    HGB 13.0 08/19/2020    HCT 38.3 08/19/2020     (H) 08/19/2020     08/19/2020       Physical Exam:  Vitals:    12/03/20 1315   BP: 120/80   Pulse: (!) 104   Resp: (!) 32   Temp: (!) 96.2  F (35.7  C)     BMI 70.11 Weight 359 (-1) pounds    Circumferential measures:    Vasc Edema 8/26/2019 10/28/2019 6/1/2020 7/13/2020 12/3/2020   Right just above MTP 27.1 26.5 26.5 27 27.5   Right Ankle 32.7 36.5 39.5 37 42   Right Widest Calf 66 68.2 67.3 69.5 66.5   Right Thigh Up 10cm 76.5 72.5 77.6 77 76   Left - just above MTP 28 28.5 29.6 30.4 30.4   Left Ankle 33 36.5 39 38 38   Left Widest Calf 71.1 71.3 71 75 72   Left Thigh Up 10cm 38 79.5 77 76 76     Measures are stable.    General:  40 y.o. female in no apparent distress.     Psych:  Alert and oriented x 3.  Cooperative.     HEENT: Atraumatic, normocephalic    Sensation: Intact to pinprick and light touch in the legs bilaterally.     Strength:  Normal strength in hip flexion, knee flexion/knee extension, ankle dorsiflexion and great toe extension bilaterally.      Vascular: Dorsalis pedis and posterior tib pulses strong and equal bilaterally.  Venous varicosities noted in both anterior shins.    Integumentary: Legs with resolution of erythema, but some small ulcerations on both shins. They are irregularly shaped.  No pain to palpation. There is moderate drainage, no odor or pain.  They are clean.    + Stemmer's sign bilaterally continues with distal calf ledges.  Skin  continues to be soft.     Temitope Odell MD, Founding Diplomate FATEMEH, FACCWS, FAAPMR  Medical Director Wound Care and Lymphedema  [facility]  [phone]

## 2021-07-01 ENCOUNTER — COMMUNICATION - HEALTHEAST (OUTPATIENT)
Dept: VASCULAR SURGERY | Facility: CLINIC | Age: 41
End: 2021-07-01

## 2021-07-01 LAB — 25(OH)D3 SERPL-MCNC: 27.2 NG/ML (ref 30–80)

## 2021-07-03 NOTE — ADDENDUM NOTE
Addendum Note by Kelly Barrera RN at 4/22/2019  1:40 PM     Author: Kelly Barrera RN Service: -- Author Type: --    Filed: 4/22/2019  2:34 PM Encounter Date: 4/22/2019 Status: Signed    : Kelly Barrera RN (Registered Nurse)    Addended by: KELLY BARRERA on: 4/22/2019 02:34 PM        Modules accepted: Orders

## 2021-07-04 NOTE — TELEPHONE ENCOUNTER
Telephone Encounter by Luciana Luke RN at 7/1/2021  4:07 PM     Author: Luciana Luke RN Service: -- Author Type: Registered Nurse    Filed: 7/1/2021  4:07 PM Encounter Date: 7/1/2021 Status: Signed    : Luciana Luke RN (Registered Nurse)       Patient will hand wash and drip dry.
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
## 2021-07-04 NOTE — ADDENDUM NOTE
Addendum Note by Addis Pantoja CMA at 4/19/2021  9:20 AM     Author: Addis Pantoja CMA Service: -- Author Type: Certified Medical Assistant    Filed: 4/19/2021 10:35 AM Encounter Date: 4/19/2021 Status: Signed    : Addis Pantoja CMA (Certified Medical Assistant)    Addended by: ADDIS PANTOJA on: 4/19/2021 10:35 AM        Modules accepted: Orders

## 2021-07-07 NOTE — LETTER
Letter by Luciana Neal NP at      Author: Luciana Neal NP Service: -- Author Type: --    Filed:  Encounter Date: 6/29/2021 Status: (Other)         06/29/21          Bushra KONSTANTIN Buck  [address]  [place] [address]    Dear Bushra,    Below you will find the details for your upcoming appointments with [facility]:      -On Monday July 12 arriving at 2:30 for your 2:40 appointment with Luciana Neal     We are located in the [facility] at: [address], [address], [place], [place], [address]   We ask that you do not bring any visitors with you to the clinic and that you wear a mask to your appointment. If you are having any symptoms of a cough, fever, rash, loss of taste and smell, or shortness of breath we ask that you please call and reschedule your appointment.  If you have any questions or concerns, please call our office at: [phone].    Sincerely,    [facility]

## 2021-07-12 ENCOUNTER — OFFICE VISIT (OUTPATIENT)
Dept: VASCULAR SURGERY | Facility: CLINIC | Age: 41
End: 2021-07-12
Payer: COMMERCIAL

## 2021-07-12 VITALS
BODY MASS INDEX: 70.31 KG/M2 | TEMPERATURE: 98.4 F | DIASTOLIC BLOOD PRESSURE: 76 MMHG | HEART RATE: 88 BPM | WEIGHT: 293 LBS | SYSTOLIC BLOOD PRESSURE: 128 MMHG | RESPIRATION RATE: 20 BRPM

## 2021-07-12 DIAGNOSIS — Q87.11 PRADER-WILLI SYNDROME: ICD-10-CM

## 2021-07-12 DIAGNOSIS — I89.0 LYMPHEDEMA: ICD-10-CM

## 2021-07-12 DIAGNOSIS — I87.333 CHRONIC VENOUS HYPERTENSION (IDIOPATHIC) WITH ULCER AND INFLAMMATION OF BILATERAL LOWER EXTREMITY (H): Primary | ICD-10-CM

## 2021-07-12 DIAGNOSIS — E11.9 TYPE 2 DIABETES MELLITUS WITHOUT COMPLICATION, WITHOUT LONG-TERM CURRENT USE OF INSULIN (H): ICD-10-CM

## 2021-07-12 DIAGNOSIS — I87.2 VENOUS INSUFFICIENCY OF BOTH LOWER EXTREMITIES: ICD-10-CM

## 2021-07-12 DIAGNOSIS — E66.01 MORBID OBESITY WITH BMI OF 60.0-69.9, ADULT (H): ICD-10-CM

## 2021-07-12 PROCEDURE — 11042 DBRDMT SUBQ TIS 1ST 20SQCM/<: CPT | Performed by: NURSE PRACTITIONER

## 2021-07-12 PROCEDURE — 250N000009 HC RX 250: Performed by: NURSE PRACTITIONER

## 2021-07-12 PROCEDURE — 11721 DEBRIDE NAIL 6 OR MORE: CPT | Mod: XS | Performed by: NURSE PRACTITIONER

## 2021-07-12 RX ORDER — LIDOCAINE HYDROCHLORIDE 20 MG/ML
JELLY TOPICAL PRN
Status: DISCONTINUED | OUTPATIENT
Start: 2021-07-12 | End: 2023-11-21

## 2021-07-12 RX ORDER — CHOLECALCIFEROL (VITAMIN D3) 25 MCG
3 TABLET ORAL DAILY
COMMUNITY
Start: 2021-06-16

## 2021-07-12 RX ORDER — NYSTATIN 100000 [USP'U]/G
1 POWDER TOPICAL PRN
COMMUNITY
Start: 2021-04-15

## 2021-07-12 RX ORDER — PSEUDOEPHED/ACETAMINOPH/DIPHEN 30MG-500MG
TABLET ORAL
COMMUNITY
Start: 2021-05-24

## 2021-07-12 RX ORDER — BUMETANIDE 2 MG/1
4 TABLET ORAL 3 TIMES DAILY
COMMUNITY
Start: 2021-07-07

## 2021-07-12 RX ORDER — ARIPIPRAZOLE 300 MG
KIT INTRAMUSCULAR
COMMUNITY
Start: 2021-06-23 | End: 2021-08-06

## 2021-07-12 RX ORDER — CETIRIZINE HYDROCHLORIDE 10 MG/1
10 TABLET ORAL DAILY
COMMUNITY
Start: 2021-06-22

## 2021-07-12 RX ORDER — POTASSIUM CHLORIDE 1500 MG/1
TABLET, EXTENDED RELEASE ORAL
COMMUNITY
Start: 2021-04-27 | End: 2023-11-21

## 2021-07-12 RX ORDER — ASPIRIN 81 MG/1
81 TABLET, COATED ORAL DAILY
COMMUNITY
Start: 2021-04-06

## 2021-07-12 RX ORDER — ATORVASTATIN CALCIUM 40 MG/1
40 TABLET, FILM COATED ORAL DAILY
COMMUNITY
Start: 2021-06-08

## 2021-07-12 RX ORDER — DIPHENHYDRAMINE HYDROCHLORIDE 25 MG/1
CAPSULE ORAL
COMMUNITY
Start: 2021-07-01 | End: 2021-08-06

## 2021-07-12 RX ADMIN — LIDOCAINE HYDROCHLORIDE: 20 JELLY TOPICAL at 15:27

## 2021-07-12 ASSESSMENT — PAIN SCALES - GENERAL: PAINLEVEL: NO PAIN (0)

## 2021-07-12 NOTE — PATIENT INSTRUCTIONS
Wound Care Instructions    Twice per week , Cleanse your bilateral leg and back wound(s) with Normal saline.    Pat Dry with non-sterile gauze    Apply skin prep    Primary Dressing: Apply thin hydrocolloid  into/onto the wounds    Compression: velcro wraps every day    It is not ok to get your wound wet in the bath or shower    SEEK MEDICAL CARE IF:    You have an increase in swelling, pain, or redness around the wound.    You have an increase in the amount of pus coming from the wound.    There is a bad smell coming from the wound.    The wound appears to be worsening/enlarging    You have a fever greater than 101.5 F      It is ok to continue current wound care treatment/products for the next 2-3 days until new wound care supplies are ordered and arrive. If longer than this please contact our office at [phone].

## 2021-07-12 NOTE — PROGRESS NOTES
Follow up Vascular Visit       Date of Service:07/12/21        Chief Complaint: BLE swelling; chronic        Pt returns to [facility] with regards to their BLE swelling; chronic.  They arrive today alone. They are currently using comfeel to the wounds. This is being done by lymphedema therapist 2-3 days per week; having issues with the dressing staying in the heat and humidity. They are using velcro wraps for compression. They are feeling well today. Denies fevers, chills. No shortness of breath.  She continues to work with bariatrics she is up 7 pounds from her last visit with Dr. Odell.  She is using her lymphedema pump regularly. She is having issues with her toe nails catching on her clothing and compression garments she would like this addressed today.          Allergies:   Allergies   Allergen Reactions     Fiorinal [Butalbital-Aspirin-Caffeine] Anaphylaxis and Swelling     Ativan [Lorazepam]      Bee Venom      Ibuprofen Sodium      Eyes swell     Keflex [Cephalexin-Fd&C Yellow #6]      Eyes swell     Latex Swelling     Trileptal      numbness     Topamax Rash       Medications:   Current Outpatient Medications:      ABILIFY MAINTENA 300 MG extended release inj syringe, , Disp: , Rfl:      ACETAMINOPHEN EXTRA STRENGTH 500 MG tablet, , Disp: , Rfl:      ASPIRIN LOW DOSE 81 MG EC tablet, , Disp: , Rfl:      atorvastatin (LIPITOR) 40 MG tablet, , Disp: , Rfl:      BANOPHEN 25 MG capsule, , Disp: , Rfl:      bumetanide (BUMEX) 2 MG tablet, , Disp: , Rfl:      cabergoline (DOSTINEX) 0.5 MG tablet, Take 1 tablet (0.5 mg) by mouth twice a week, Disp: 24 tablet, Rfl: 3     Calcium Citrate-Vitamin D3 315-6.25 MG-MCG TABS, TAKE 2 TABLETS BY MOUTH DAILY, Disp: 60 tablet, Rfl: 10     cetirizine (ZYRTEC) 10 MG tablet, , Disp: , Rfl:      dulaglutide (TRULICITY) 0.75 MG/0.5ML pen, Inject 0.75 mg Subcutaneous every 7 days, Disp: , Rfl:      FENOFIBRATE PO, Take 145 mg by mouth daily, Disp: ,  Rfl:      glimepiride (AMARYL) 2 MG tablet, Take 1 tablet (2 mg) by mouth every morning (before breakfast), Disp: 90 tablet, Rfl: 3     Hypertonic Nasal Wash (SINUS RINSE KIT) PACK, Spray 1 packet in nostril daily, Disp: 200 each, Rfl: 3     levothyroxine (SYNTHROID/LEVOTHROID) 112 MCG tablet, TAKE 1 TABLET (112MCG) BY MOUTH DAILY, Disp: 90 tablet, Rfl: 3     medroxyPROGESTERone (PROVERA) 10 MG tablet, TAKE 1 TABLET  10 MG  BY MOUTH ONCE DAILY FOR 5 DAYS EACH MONTH, Disp: , Rfl: 0     Multiple Vitamins-Minerals (CERTAVITE/ANTIOXIDANTS PO), , Disp: , Rfl:      NYAMYC [id number] UNIT/GM external powder, , Disp: , Rfl:      omeprazole (PRILOSEC) 40 MG capsule, Take 1 capsule by mouth daily., Disp: , Rfl:      potassium chloride ER (KLOR-CON M) 20 MEQ CR tablet, , Disp: , Rfl:      VITAMIN D3 25 MCG (1000 UT) tablet, , Disp: , Rfl:     Current Facility-Administered Medications:      lidocaine (XYLOCAINE) 2 % external gel, , Topical, PRN, Luciana Neal, NP, Given at 07/12/21 1527    History:   Past Medical History:   Diagnosis Date     Acne rosacea      Allergic rhinitis      Bipolar 1 disorder (H)     followed by psych     Bipolar disorder (H) 1992     Cellulitis, leg 2016     Dyslipidemia      Ganglion, left wrist      GERD (gastroesophageal reflux disease)      GERD (gastroesophageal reflux disease)      Growth retardation, mild developmental delay, and chronic hepatitis syndrome (H)      Herpes zoster      Hypertension      Hypothyroid      Irritable bowel syndrome      Lymphedema of lower extremity 2008     Morbid obesity (H)      Nightmares      Obstructive sleep apnea      Pituitary adenoma (H)     S/P trans nasal resection in 2000     Post traumatic stress disorder      Post traumatic stress disorder      Post traumatic stress disorder (PTSD)      Prader-Willi syndrome      Prolactinoma (H) transsphenoid approach 2000.     PTSD (post-traumatic stress disorder)      Schizoaffective disorder, bipolar type (H)      Type  2 diabetes mellitus without complication, with long-term current use of insulin (H)      Venous stasis      Vitamin D deficiency        Physical Exam:    /76   Pulse 88   Temp 98.4  F (36.9  C)   Resp 20   Wt (!) 360 lb (163.3 kg)   BMI 70.31 kg/m      General:  Patient presents to clinic in no apparent distress.  Head: normocephalic atraumatic  Psychiatric:  Alert and oriented x3.   Respiratory: unlabored breathing; no cough  Integumentary:  Skin is uniformly warm, dry and pink.    Wound #1 Location: left shin  Size: 0.3L x 0.4W x 0.1depth.  No sinus tract present, Wound base: red; viable   No undermining present. Wound is full thickness. There is small drainage. Periwound: no denudement, erythema, induration, maceration or warmth.      Wound #2 Location:abdomen Size: 0.7L x 0.7W x 0.1depth.  No sinus tract present, Wound base: red; viable   No undermining present. Wound is full thickness. There is small drainage. Periwound: no denudement, erythema, induration, maceration or warmth.          Wound # Location:back  Size: 0.3L x 0.4W x 0.1depth.  No sinus tract present, Wound base: red; viable   No undermining present. Wound is full thickness. There is small drainage. Periwound: no denudement, erythema, induration, maceration or warmth.        Her swelling measures are stable from last measures; no pitting; no erythema; no weeping; tissues are soft and supple    Nails 1-5 bilaterally were thickened; elongated, and discolored    Circumferential volume measures:    No flowsheet data found.          Labs:    I personally reviewed the following lab results today and those on care everywhere, if indicated     CRP   Date Value Ref Range Status   04/26/2019 0.4 0.0 - 0.8 mg/dL Final      No results found for: SED   Last Renal Panel:  Sodium   Date Value Ref Range Status   03/15/2021 141 136 - 145 mmol/L Final   03/23/2020 140 133 - 144 mmol/L Final     Potassium   Date Value Ref Range Status   03/15/2021 3.7 3.5  - 5.0 mmol/L Final   03/23/2020 4.0 3.4 - 5.3 mmol/L Final     Chloride   Date Value Ref Range Status   03/15/2021 107 98 - 107 mmol/L Final   03/23/2020 112 (H) 94 - 109 mmol/L Final     Carbon Dioxide   Date Value Ref Range Status   03/23/2020 22 20 - 32 mmol/L Final     Carbon Dioxide (CO2)   Date Value Ref Range Status   03/15/2021 22 22 - 31 mmol/L Final     Anion Gap   Date Value Ref Range Status   03/15/2021 12 5 - 18 mmol/L Final   03/23/2020 5 3 - 14 mmol/L Final     Glucose   Date Value Ref Range Status   03/15/2021 97 70 - 125 mg/dL Final   03/23/2020 102 (H) 70 - 99 mg/dL Final     Urea Nitrogen   Date Value Ref Range Status   03/15/2021 18 8 - 22 mg/dL Final   03/23/2020 16 7 - 30 mg/dL Final     Creatinine   Date Value Ref Range Status   03/15/2021 0.66 0.60 - 1.10 mg/dL Final   03/23/2020 0.70 0.52 - 1.04 mg/dL Final     GFR Estimate   Date Value Ref Range Status   03/15/2021 >60 >60 mL/min/1.73m2 Final   03/23/2020 >90 >60 mL/min/[1.73_m2] Final     Comment:     Non  GFR Calc  Starting 12/18/2018, serum creatinine based estimated GFR (eGFR) will be   calculated using the Chronic Kidney Disease Epidemiology Collaboration   (CKD-EPI) equation.       Calcium   Date Value Ref Range Status   03/15/2021 9.5 8.5 - 10.5 mg/dL Final   03/23/2020 8.9 8.5 - 10.1 mg/dL Final     Phosphorus   Date Value Ref Range Status   09/11/2012 4.1 2.5 - 4.5 mg/dL Final     Albumin   Date Value Ref Range Status   09/02/2020 4.2 3.5 - 5.0 g/dL Final   03/23/2020 3.5 3.4 - 5.0 g/dL Final      Lab Results   Component Value Date    WBC 8.8 08/19/2020    WBC 8.1 11/25/2011     Lab Results   Component Value Date    RBC 3.48 08/19/2020    RBC 3.84 11/25/2011     Lab Results   Component Value Date    HGB 13.0 08/19/2020    HGB 13.1 01/06/2016     Lab Results   Component Value Date    HCT 38.3 08/19/2020    HCT 41.0 11/25/2011     No components found for: MCT  Lab Results   Component Value Date     08/19/2020      11/25/2011     Lab Results   Component Value Date    MCH 37.4 08/19/2020    MCH 35.4 11/25/2011     Lab Results   Component Value Date    MCHC 33.9 08/19/2020    MCHC 33.2 11/25/2011     Lab Results   Component Value Date    RDW 11.9 08/19/2020    RDW 11.8 11/25/2011     Lab Results   Component Value Date     08/19/2020     11/25/2011      Lab Results   Component Value Date    A1C 5.3 03/23/2020    A1C 5.0 01/06/2020    A1C 5.4 08/26/2019    A1C 5.2 04/09/2019    A1C 5.4 12/31/2018    A1C 5.4 09/20/2016    A1C 5.7 01/06/2016    A1C 5.0 06/29/2014      TSH   Date Value Ref Range Status   04/07/2021 3.51 0.40 - 4.00 mU/L Final      No results found for: VITDT         Impression:  Encounter Diagnoses   Name Primary?     Chronic venous hypertension (idiopathic) with ulcer and inflammation of bilateral lower extremity (H) Yes     Lymphedema      Morbid obesity with BMI of 60.0-69.9, adult (H)      Type 2 diabetes mellitus without complication, without long-term current use of insulin (H)      Prader-Willi syndrome      Venous insufficiency of both lower extremities                        Are any of these wounds new today: No; Location: na    Assessment/Plan:          1. Debridement: After discussion of risk factors and verbal consent was obtained 2% Lidocaine HCL jelly was applied, under clean conditions, the legs; back and abdomen ulceration(s) were debrided using currette. Devitalized and nonviable tissue, along with any fibrin and slough, was removed to improve granulation tissue formation, stimulate wound healing, decrease overall bacteria load, disrupt biofilm formation and decrease edge senescence.  Total excisional debridement was 0.73 sq cm from the epidermis/dermis area and into the subcutaneous tissue with a depth of 0.1 cm.   Ulcers were improved afterwards and .  Measures were unchanged after debridement.       2.  Wound treatment: wound treatment will include irrigation and  dressings to promote autolytic debridement which will include:The wounds appear to be self-inflicted; picking lesions; will continue to cover with thin hydrocolloid to hopefully prevent further picks; change twice per week prn by therapist Stable            3. Edema: her swelling is stable; she is doing well in her velcro wraps; and lymphedema pump. The compression wraps were applied today in clinic. Stable            4. Nutrition: she continues to work with bariatrics; she is up some weight today but reports she was not able to take her daily walks when it was so hot out           5. Offloading: she has new diabetic shoes and insoles; these are going well           6. Nails: nails 1-5 bilaterally were debrided and filed smooth; tolerated well, no complications.        Patient will follow up with  weeks for reevaluation. They were instructed to call the clinic sooner with any signs or symptoms of infection or any further questions/concerns. Answered all questions.          Luciana Neal DNP, RN, CNP, CWOCN, CFCN, CLT  [facility]   [phone]        This note was electronically signed by Lcuiana Neal NP

## 2021-07-28 ENCOUNTER — LAB REQUISITION (OUTPATIENT)
Dept: LAB | Facility: CLINIC | Age: 41
End: 2021-07-28
Payer: COMMERCIAL

## 2021-07-28 DIAGNOSIS — L98.9 DISORDER OF THE SKIN AND SUBCUTANEOUS TISSUE, UNSPECIFIED: ICD-10-CM

## 2021-07-28 PROCEDURE — 87081 CULTURE SCREEN ONLY: CPT | Mod: ORL | Performed by: PHYSICIAN ASSISTANT

## 2021-07-29 LAB — BACTERIA SPEC CULT: NORMAL

## 2021-08-03 ENCOUNTER — HOSPITAL ENCOUNTER (OUTPATIENT)
Facility: CLINIC | Age: 41
End: 2021-08-03
Attending: ORTHOPAEDIC SURGERY | Admitting: ORTHOPAEDIC SURGERY
Payer: COMMERCIAL

## 2021-08-03 PROBLEM — I87.313 CHRONIC VENOUS HYPERTENSION (IDIOPATHIC) WITH ULCER OF BILATERAL LOWER EXTREMITY (H): Status: RESOLVED | Noted: 2017-10-25 | Resolved: 2018-04-11

## 2021-08-03 PROBLEM — L97.929 CHRONIC VENOUS HYPERTENSION (IDIOPATHIC) WITH ULCER OF BILATERAL LOWER EXTREMITY (H): Status: RESOLVED | Noted: 2017-10-25 | Resolved: 2018-04-11

## 2021-08-03 PROBLEM — L97.919 CHRONIC VENOUS HYPERTENSION (IDIOPATHIC) WITH ULCER OF BILATERAL LOWER EXTREMITY (H): Status: RESOLVED | Noted: 2017-10-25 | Resolved: 2018-04-11

## 2021-08-04 ENCOUNTER — LAB REQUISITION (OUTPATIENT)
Dept: LAB | Facility: CLINIC | Age: 41
End: 2021-08-04
Payer: COMMERCIAL

## 2021-08-04 DIAGNOSIS — R35.89 OTHER POLYURIA: ICD-10-CM

## 2021-08-04 PROCEDURE — 87086 URINE CULTURE/COLONY COUNT: CPT | Performed by: FAMILY MEDICINE

## 2021-08-06 ENCOUNTER — RECORDS - HEALTHEAST (OUTPATIENT)
Dept: ADMINISTRATIVE | Facility: OTHER | Age: 41
End: 2021-08-06

## 2021-08-06 ENCOUNTER — OFFICE VISIT (OUTPATIENT)
Dept: SURGERY | Facility: CLINIC | Age: 41
End: 2021-08-06
Payer: COMMERCIAL

## 2021-08-06 VITALS
BODY MASS INDEX: 57.52 KG/M2 | DIASTOLIC BLOOD PRESSURE: 74 MMHG | SYSTOLIC BLOOD PRESSURE: 134 MMHG | HEIGHT: 60 IN | WEIGHT: 293 LBS

## 2021-08-06 DIAGNOSIS — E66.01 MORBID OBESITY (H): ICD-10-CM

## 2021-08-06 DIAGNOSIS — L98.491 ULCER OF ABDOMEN WALL, LIMITED TO BREAKDOWN OF SKIN (H): Primary | ICD-10-CM

## 2021-08-06 LAB — BACTERIA UR CULT: NO GROWTH

## 2021-08-06 PROCEDURE — 99213 OFFICE O/P EST LOW 20 MIN: CPT | Performed by: EMERGENCY MEDICINE

## 2021-08-06 RX ORDER — BACITRACIN ZINC 500 [USP'U]/G
OINTMENT TOPICAL 2 TIMES DAILY
Qty: 30 G | Refills: 0 | Status: SHIPPED | OUTPATIENT
Start: 2021-08-06 | End: 2023-11-21 | Stop reason: DRUGHIGH

## 2021-08-06 RX ORDER — ARIPIPRAZOLE 5 MG/1
20 TABLET ORAL
COMMUNITY
End: 2021-11-18

## 2021-08-06 RX ORDER — DULAGLUTIDE 3 MG/.5ML
INJECTION, SOLUTION SUBCUTANEOUS WEEKLY
COMMUNITY
Start: 2021-02-18 | End: 2022-09-12 | Stop reason: DRUGHIGH

## 2021-08-06 ASSESSMENT — MIFFLIN-ST. JEOR: SCORE: 2226.72

## 2021-08-06 NOTE — PATIENT INSTRUCTIONS - HE
Patient Instructions by Carlos Bowling MD at 1/10/2019  2:00 PM     Author: Carlos Bowling MD Service: -- Author Type: Physician    Filed: 1/10/2019  2:37 PM Encounter Date: 1/10/2019 Status: Signed    : Carlos Bowling MD (Physician)       Plan:  1. Great work with averaging over 2 lbs of week the last 2 months, continue using good protein intake at 3 meals daily, avoiding soda pop and limiting your artificial sweetened drinks to 1-2 daily or less.    2. Continue your trulicity as it seems to be working well w/ your blood sugar and appetite.    3. Continue exercises regularly. If you can add 2-3 days of some strength training each week that would be helpful.              Exercise Guidance    Nearly everything that bothers us gets better when the proper amount of exercise can be done in the proper amounts.  Getting to that level safely and without injury is the key.  When it comes to weight loss, exercise is especially important in maintaining the weight loss as one of the harsh realities is that weight loss slows our metabolism.  Our brain always remembers our heaviest weight and seeks to return us to that level as it doesn't understand the concept of having too much energy, only not having enough.  As such, when we lose weight, the brain thinks we're ill or in a famine and dials back our metabolism to limit further weight loss.  This is why exercise is so important in keeping the weight off and is the main reason people have some weight regain from their low weight point after weight loss.  We have to make up that 10-20% of calories not being burned and because we can restrict our diet only so much, exercise becomes very important in our long term healthy weigh maintenance.   Generally, for every 5% body weight reduction in their weight loss season, a person needs to add  kilocalories of exercise in their daily routine to keep that weight off for the long term.  This is why it's vital to be  starting your fitness regimen during weight loss season, it becomes so vital for long term success in maintaining that weight loss.    Additionally, all sorts of good enzymes and genes turn on with exercise and our stress, sleep, mood and bodies feel better when we can get to the point of making ourselves a little sweaty and short of breath 35-50 minutes most days of the week.    Who isn't ready for exercise? Well, if you get severe dizziness/palpitations, chest pain or short of breath/faint with even minimal activity like walking across a room or you're having to pause while going up a flight of stairs, then getting your heart and/or lungs fully evaluated prior to starting an exercise regimen is recommended. Everyone else can probably start a program, but everyone may start at a different point:  Some can set a 5-10 minute walking goal and others will be able to ride their bike for an hour.  Start with where you're at and look to add 10% more each week until you're at that 150 inutes or more a week goal.    If you like to count steps, the 10,000 steps per day does correlate well with weight maintenance but try to make at least 20-25% of those steps at a brisk pace (like you are about to miss your bus).        Example Meal Plan for a 7515-3180 Calorie Diet:    In order to fuel your weight loss properly and avoid hunger-induced overeating later in the day, you will enjoy the most success by following the diet below or similar with adjustments based on your particular tastes and preferences.      I recommend getting into a meal routine and keeping it similar day to day in the beginning so you dont have to think too hard about what youre going to make/eat.  Keep snacks healthy, ideally containing protein.  Non-processed food is preferable to packaged items.  Eat at least a few crunchy green vegetables at snack times, which should be 2-3 hours after your mealtimes(prepare these ahead of time).  Drink 64 oz -96 oz of  water daily.  During a diet we often mistake thirst for hunger or just have some grazing habits we have to break ('bored/mindless eating') and a glass of water and reconsideration of our hunger is often all that is needed.      If youre having hunger problems, add a protein drink to your morning hours or afternoon snack with at least 20grams of protein and not too much sugar can limit this hunger.  If the urge to snack is overwhelming and not satiated, try going for a 10 minute walk/exercise, come home and drink a glass of water and if still hungry, have a 150 calorie snack (handful of raw/sprouted nuts, veggies and string cheese, protein bar, etc).      It is better to have a large breakfast, a moderate lunch and a smaller dinner to fuel your day.  People lose 10-15% more weight during their weight loss season with this strategy.    To make sure youre getting adequate vitamins and minerals during weight loss, I recommend one complete multivitamin a day of your choice.  Consider a probiotic and taking some vitamin D 2000 IU daily.      Example Meal Plan:  Breakfast: 450-475 Calories  1 egg cooked on low in olive oil:   calories.  5oz Greek Yogurt (Fage plain classic: ~150 alexsandra)  Handful of Berries of your choice (about a calorie per berry or 20-40cal per handful)    cup(cooked) of  old fashioned oatmeal or 1/2 cup(cooked) steel cut oats. (150 alexsandra)  Sprinkle amount of brown sugar and a pat of butter. (40 alexsandra)  Glass of  Water  Black coffee or unsweetened Tea (0calories).      2-3 hours Later Snack: (195 calories).  Glass of water  One string Cheese (80 calories) or 4 oz creamed cottage cheese (115 calories) with  Crunchy Celery sticks (less than 10 calories per large stalk) 2 stalks. (20 calories)    of a  Large Banana or   of a Large Apple (60 calories):  eat second half at lunch or afternoon snack.     Lunch:300 -350 calories   Chicken Breast  (baked/broiled/roasted/grilled)  4-6 oz.  (125-180 alexsandra), BBQ  sauce/hot sauce/mustard/seasoning is free. Just use a reasonable amount. Or a can of tuna with 1 tablespoon mayonnaise.  Salad: lettuce, any other veggies (cucumbers, green peppers/celery you like and a small drizzle of dressing to just flavor.  Go as big on the veggies as you like,  as they are practically calorie free.   A whole, 8 inch cucumber is 45 calories, a whole green pepper is 23 calories, a stalk of celery is 9 calories.  [place] Dressing is 60 calories per tablespoon..so moderate your desired dressing or do a drizzle of olive oil and splash of balsamic vinegar on top,  Total calories unlikely to be over 150 even with dressing.  Glass of Water.    Option for lunch is meal replacement protein drink/smoothie.  Need at least 20 grams of protein and eat the rest of your apple/banana from the morning snack.      Afternoon Snack: 150-200 calories   Cheese Stick or cottage cheese again  and a fresh fruit OR  Granola Bar (protein Bar acceptable if under 200 calories OR  Homemade smoothies:  8oz skim milk,  a handful of berries (fresh or frozen and a serving of protein powder such as BiPro or TarasWhey for example.  If you don't like dairy, make with 8oz water, one small banana, handful of berries and the protein powder, add any veggies you want as well:  roughly 200 calories.   Glass of Water    Dinner: 325 calories  4oz of fresh, Atlantic salmon.  Broiled (salt/pepper/dill) for about 8-8.5 minutes (200calories) or  4oz filet mignon steak or sirloin steak  Salad or vegetable sautéed lightly in olive oil or   Broccoli 1.5  cups chopped and steamed  or micro-waved in a little water (75 calories)  Glass of Water,    Cup of herbal tea (unsweetened, caffeine free)      Herbs and seasonings are free and encouraged to flavor your foods/vegetables.  Make your food delicious.    Tips for Success:  1.  Prepare proteins ahead of time (broil chicken breasts in bulk so you can grab and go), steel cut oats can be  "stored in casserole dish/bowl in the fridge for quick scoop in the morning and rewarm in microwave, make use of crock pot recipes (watch salt content).  Making meals that cover 3-4 future meals is an easy way to stay on track.  2.  Drink a 8-12 oz glass of water every 2-3 hours when awake.  We often mistake hunger for thirst, especially when losing weight.  3. Remember your Reward and Motivation when things get hard.  4.  Weigh yourself every morning and record, you'll stay on track better and learn how our biorhythms, diet and elimination patterns show up on the scale. Don't worry about 1 or 2 day patterns, but when on track you'll notice good trend downward of weight over 3-4 day segments.  Plateaus tend to resolve after 4-8 days in most cases if you stay consistent with your plan.  These are natural and part of weight loss, even if you're perfect with your plan execution.  5. Call if problems/concerns.  [employer] is a great tool to stay in touch and provide weekly outside accountability.   6.  Find a handful of meals/foods that keep you on track and feeling good and get into a routine that is sustainable for you.  7.  Take a complete multivitamin just to make sure all micronutrients are adequate during weight loss.  8. If losing hair/brittle nails it usually means you are not taking enough protein.  Minimum goal is 60 grams daily of protein for smaller women, 80 grams a day for men. Consider taking Biotin as supplement or a \"Hair and Nail\" multivitamin.      Weight Loss Shopping list:    Having the proper food on hand and ready to go is very important in losing weight.  Everybody has their own preferences/tastes so modify this list as you need but the following are foods that have been found to be helpful in following a calorie restricted diet.   If you are a person that doesn't \"like to eat my vegetables\", I recommend making smoothies and throwing the veggies into the  with some fruit and protein " "powder.      Fruits:  Generally limit to 2-3 whole fruits a day.  Do not buy Juice.  We depend on the fiber and chewing to slow us down and get phytonutrients/antioxidants available in the skins of fruits for health benefits.  Chewing also signals our stomach to send less hunger signals to our brains:    Apples (1-2 daily), Bananas (no more than one full banana a day), Grapes (2 handfuls a day), Clementines/oranges (one daily), berries (blue/rasp/straw:  2 handfuls a day). Watermelon (cubed for easy access, small bowl).    Vegetables:  Eating raw gets most nutrient and fiber benefits but cooked veggies are fine as well, using frozen for cooking or in smoothies is fine as well.  The more chewing/crunchiness the better the hunger control.  No limit to how many a day, but you should at least get 4 handfuls a day or more minimum.  Wash and cut up your vegetables into easy to carry, on the go sizes that are easy to put in plastic bags/pyrex and carry to work/school/etc.  Frozen veggies are just fine as well.     Broccoli, Carrots (no more than 3 a day large carrots or 2 handfuls of baby carrots, as they are very sweet), celery, cucumbers, green peppers, green beans (canned or fresh/frozen), Lettuce(all types), Beets(for roasting, will likely turn urine and stool a bit purple), asparagus.  Go crazy with the veggies, just wash well prior to eating.    Protein:  Women need at least  grams of protein a day and men at least  grams a day, more is fine.  Protein is our \"brain food\" and hunger suppressor and getting enough ensures maintenance of muscle mass during weight loss.  Vegetarians should look to balance their protein intake to get all essential amino acids and discuss with dietician if help is needed (mix of beans/soy/etc).  Protein powders or drinks count and can be a great way to control appetite during the day and easy to grab and go/carry to work/etc.  Premier Protein, BiPro, TarasWhey are all brands " with high quality whey protein. For whey sensitive people, rice protein is an option as well.    Canned tuna/sardines or anchovies.  Fresh fish/salmon the day you plan to make it.  Chicken breasts/thighs (skinless while losing weight), filet mignon steak (leaner but ) or marinade sirloin (wipe off before cooking). Eggs cooked in olive oil for breakfast is a good way to get protein and good fat in the a.m. (cook on low heat to prevent transformation of olive oil into less healthy fat).  Greek Yogurt with at least 20 grams of protein per serving and less than 11 grams of carbs/sugars.  String Cheese  Cottage Cheese: 2% or fat free per your preference.

## 2021-08-06 NOTE — PATIENT INSTRUCTIONS
Plan:  1. Great work with ongoing weight reduction of 41 lbs from your heaviest weight.  2. Continue mindful eating with 20g of lean protein per meal, have 2 meals with half a plate of vegetables. Eat your starches at the end of the meal.  3. Trulicity can continue at 3mg/week.  4. You find periodic follow ups helpful so continue to schedule every 3-6 months to help stay on track.   5. Aiming for 500kcal meals every 5-6 hours to help maintain good weight loss going forward and one mid afternoon or snack of about 100kcal and 5-10g of lean protein.  6. If overnight symptoms like sweatiness/shakiness then discuss medication adjustments with your primary clinic.      Example Meal Plan for a 4929-8122 Calorie Diet: add 10% to the portions below.  In order to fuel your weight loss properly and avoid hunger-induced overeating later in the day, for your height and weight, you will enjoy the most success by following the diet below or similar with adjustments based on your particular tastes and preferences.  Exercise may influence speed, amount of weight loss further.     I recommend getting into a meal routine and keeping it similar day to day in the beginning so you don t have to think too hard about what you re going to make/eat.  Keep snacks healthy, ideally containing protein and some vegetables.  Non-processed food is preferable to packaged items.  Eat at least a few crunchy green vegetables if having a snack, which should be 2-3 hours after your mealtimes(prepare these ahead of time for ease of use).  Drink 64 oz -80 oz of water daily for most, some of you will need more and we'll discuss it at your visit if that is the case.  When changing our diet and reducing our intake,  we can often mistake thirst for hunger or just have some grazing habits we have to break ('bored/mindless eating') and a glass of water and reconsideration of our hunger is often all that is needed.  Having the urge is not the problem, but  watching it pass by without acting on it is the goal.    If you re having hunger problems, add a protein drink/snack to your morning hours or afternoon snack with at least 20grams of protein and not too much sugar (under 10g).  A carton of higher protein/low sugar yogurt can work as well.  If the urge to snack is overwhelming and not satiated, try going for a 10 minute walk/exercise, come home and drink a glass of water and if still hungry, have a  calorie snack (handful of raw/sprouted nuts, veggies and string cheese, protein bar, etc).  Savor it.    It is better to have a large breakfast, a moderate lunch and a smaller dinner to fuel your day.  People lose 10-15% more weight during their weight loss season with this strategy. Optimizing your protein intake at each meal will further keep you more satisfied while eating less food overall.  Getting exercise in early has also been shown to offer the best results (before breakfast ideally but anytime is the right time to exercise if that is not an option for you).    To make sure you re getting adequate vitamins and minerals during weight loss, I recommend one complete multivitamin a day of your choice.  Consider a probiotic and taking some vitamin D 2000 IU daily.    Let supper be your last meal of the day and ideally try to have at least 12 hours between supper and breakfast the next day to tap into some beneficial overnight fasting dynamics.  Water in the evening is fine, unsweetened, non caffeinated herbal tea is helpful as well.  Consolidating your meals within a 8-12 hour period of your day will help tap into these additional metabolic benefits and tends to keep your appetite up for breakfast, further helping to stay on track.  For most of my patients I don't recommend an intermittent fasting style diet (many find it hard to fit in their lifestyle) but an overnight fast is very doable for most patients and helps regulate our hunger drives a little better.   This makes it very important to nail good intake at all three meals to feel satisfied/energized and still lose weight.  If evening snacking desires are high, consider a glass of fiber supplement for some additional fullness (metamucil or similar). Most of us don't get the 25-30 grams per day of fiber that promotes good gut health/satiety.  Benefiber, metamucil, citrucel are reasonable/affordable options for most people.  Inulin, chicory, psyllium husk are reasonable options but start slow and low in the dose to avoid gas/bloating until your gut gets acclimated (ramping up to 5-10 grams per day of supplemental fiber after 3-4 weeks if needed).      Example Meal Plan:  Breakfast: 450-475 Calories  1 egg cooked on low in olive oil:   calories.  5oz Greek Yogurt (Fage plain classic: ~150 alexsandra)  Handful of Berries of your choice (about a calorie per berry or 20-40cal per handful)    cup(cooked) of  old fashioned oatmeal or 1/2 cup(cooked) steel cut oats. (150 alexsandra)  Sprinkle amount of brown sugar and a pat of butter. (40 alexsandra)  Glass of  Water  Black coffee or unsweetened Tea (0calories).      2-3 hours Later Snack: (195 calories).  Glass of water  One string Cheese (80 calories) or 4 oz creamed cottage cheese (115 calories) with  Crunchy Celery sticks (less than 10 calories per large stalk) 2 stalks. (20 calories)    of a  Large Banana or   of a Large Apple (60 calories):  eat second half at lunch or afternoon snack.     Lunch:300 -350 calories   Chicken Breast  (baked/broiled/roasted/grilled)  4-6 oz.  (125-180 alexsandra), BBQ sauce/hot sauce/mustard/seasoning is free. Just use a reasonable amount. Or a can of tuna with 1 tablespoon mayonnaise.  Salad: lettuce, any other veggies (cucumbers, green peppers/celery you like and a small drizzle of dressing to just flavor.  Go as big on the veggies as you like,  as they are practically calorie free.   A whole, 8 inch cucumber is 45 calories, a whole green pepper is 23 calories,  a stalk of celery is 9 calories.  [place] Dressing is 60 calories per tablespoon..so moderate your desired dressing or do a drizzle of olive oil and splash of balsamic vinegar on top,  Total calories unlikely to be over 150 even with dressing.  Glass of Water.    Option for lunch is meal replacement protein drink/smoothie.  Need at least 20 grams of protein and eat the rest of your apple/banana from the morning snack.      Afternoon Snack: 150-200 calories   Cheese Stick or cottage cheese again  and a fresh fruit OR  Granola Bar (protein Bar acceptable if under 200 calories OR  Homemade smoothies:  8oz skim milk,  a handful of berries (fresh or frozen and a serving of protein powder such as BiPro or Sofia sWhey for example.  If you don't like dairy, make with 8oz water, one small banana, handful of berries and the protein powder, add any veggies you want as well:  roughly 200 calories.   Glass of Water    Dinner: 325 calories  4oz of fresh, Atlantic salmon.  Broiled (salt/pepper/dill) for about 8-8.5 minutes (200calories) or  4oz filet mignon steak or sirloin steak  Salad or vegetable sautéed lightly in olive oil or   Broccoli 1.5  cups chopped and steamed  or micro-waved in a little water (75 calories)  Glass of Water,    Cup of herbal tea (unsweetened, caffeine free)      Herbs and seasonings are encouraged to flavor your foods/vegetables.  Make your food delicious.      Tips for Success:  1.  Prepare proteins ahead of time (broil chicken breasts in bulk so you can grab and go), steel cut oats/lentils can be stored in casserole dish/bowl in the fridge for quick scoop in the morning and rewarm in microwave, make use of crock pot recipes (watch salt content).  Making meals that cover 3-4 future meals is an easy way to stay on track.  2.  Drink a 8-12 oz glass of water every 2-3 hours when awake.  We often mistake hunger for thirst, especially when losing weight.  3. Remember your Reward and Motivation when  "things get hard.  4.  Weigh yourself every morning and record, you'll stay on track better and learn how our biorhythms, diet and elimination patterns show up on the scale. Don't worry about 1 or 2 day patterns, but when on track you'll notice good trend downward of weight over 3-4 day segments.  Plateaus tend to resolve after 4-8 days in most cases if you stay consistent with your plan.  These are natural and part of weight loss, even if you're perfect with your plan execution.  5. Call if problems/concerns.  [employer] is a great tool to stay in touch and provide weekly outside accountability. Check in with questions or if you want to brag.  6.  Find a handful of meals/foods that keep you on track and feeling good and get into a routine that is sustainable for you.  It's OK to have a routine that works for you.  7.  Consider taking a complete multivitamin just to make sure all micronutrients are adequate during weight loss.  8. If losing hair/brittle nails it usually means you are not taking enough protein.  Minimum goal is 60 grams daily of protein for smaller women, 80 grams a day for men. Consider taking Biotin as supplement or a \"Hair and Nail\" multivitamin.      LEAN PROTEIN SOURCES  Getting 20-30 grams of protein, 3 meals daily, is appropriate for most people, some need more but more than about 40 grams per meal is not useful.  General rule is drinking one ounce of water per gram of protein eaten over the course of the day:  70 grams of protein each day, drink 70 oz of water.  Protein Source Portion Calories Grams of Protein                           Nonfat, plain Greek yogurt    (10 grams sugar or less) 3/4 cup (6 oz)  12-17   Light Yogurt (10 grams sugar or less) 3/4 cup (6 oz)  6-8   Protein Shake 1 shake 110-180 15-30   Skim/1% Milk or lactose-free milk 1 cup ( 8 oz)  8   Plain or light, flavored soymilk 1 cup  7-8   Plain or light, hemp milk 1 cup 110 6   Fat Free or 1% Cottage " Cheese 1/2 cup 90 15   Part skim ricotta cheese 1/2 cup 100 14   Part skim or reduced fat cheese slices 1 ounce 65-80 8     Mozzarella String Cheese 1 80 8   Canned tuna, chicken, crab or salmon  (canned in water)  1/2 cup 100 15-20   White fish (broiled, grilled, baked) 3 ounces 100 21   [place]/Tuna (broiled, grilled, baked) 3 ounces 150-180 21   Shrimp, Scallops, Lobster, Crab 3 ounces 100 21   Pork loin, Pork Tenderloin 3 ounces 150 21   Boneless, skinless chicken /turkey breast                          (broiled, grilled, baked) 3 ounces 120 21   [place], [place], [place], and Venison 3 ounces 120 21   Lean cuts of red meat and pork (sirloin,   round, tenderloin, flank, ground 93%-96%) 3 ounces 170 21   Lean or Extra Lean Ground Turkey 1/2 cup 150 20   90-95% Lean [place] Burger 1 abby 140-180 21   Low-fat casserole with lean meat 3/4 cup 200 17   Luncheon Meats                                                        (turkey, lean ham, roast beef, chicken) 3 ounces 100 21   Egg (boiled, poached, scrambled) 1 Egg 60 7   Egg Substitute 1/2 cup 70 10   Nuts (limit to 1 serving per day)  3 Tbsp. 150 7   Nut [place] (peanut, almond)  Limit to 1 serving or less daily 1 Tbsp. 90 4   Soy Burger (varies) 1  15   Garbanzo, Black, [place] Beans 1/2 cup 110 7   Refried Beans 1/2 cup 100 7   Kidney and Lima beans 1/2 cup 110 7   Tempeh 3 oz 175 18   Vegan crumbles 1/2 cup 100 14   Tofu 1/2 cup 110 14   Chili (beans and extra lean beef or turkey) 1 cup 200 23   Lentil Stew/Soup 1 cup 150 12   Black Bean Soup 1 cup 175 12

## 2021-08-06 NOTE — LETTER
8/6/2021         RE: Bushra Buck  [address]  [place] [address]        Dear Colleague,    Thank you for referring your patient, Bushra Buck, to the [facility] [place]. Please see a copy of my visit note below.    Bariatric Clinic Follow-Up Visit:    Bushra Buck is a 41 year old  female with Body mass index is 70.41 kg/m .  presenting here today for follow-up on non-surgical efforts for weight loss. Original Intake visit occurred on 917/15 with a weight of 350 (max of 401 after illness) lbs and BMI of 68.4.  Along with diet and behavior changes, she has been using Trulicity to assist her weight loss goals.  See her intake visit notes for details on identified contributors to weight gain in the past. Chart review shows Dietician calculated RMR of 2300kcal and protein intake goal of 80g/day.    Weight:   Wt Readings from Last 3 Encounters:   08/06/21 (!) 163.5 kg (360 lb 8 oz)   07/12/21 (!) 163.3 kg (360 lb)   05/03/21 (!) 160.1 kg (353 lb)    pounds      Comorbidities:  Patient Active Problem List   Diagnosis     Acne rosacea     Dermatitis     Stasis edema     Pituitary adenoma (H)     Abnormal weight gain     High triglycerides     Prolactinoma (H)     Acquired hypothyroidism       Current Outpatient Medications:      ACETAMINOPHEN EXTRA STRENGTH 500 MG tablet, , Disp: , Rfl:      ARIPiprazole (ABILIFY) 5 MG tablet, 20 mg, Disp: , Rfl:      ASPIRIN LOW DOSE 81 MG EC tablet, , Disp: , Rfl:      atorvastatin (LIPITOR) 40 MG tablet, , Disp: , Rfl:      bumetanide (BUMEX) 2 MG tablet, , Disp: , Rfl:      cabergoline (DOSTINEX) 0.5 MG tablet, Take 1 tablet (0.5 mg) by mouth twice a week, Disp: 24 tablet, Rfl: 3     Calcium Citrate-Vitamin D3 315-6.25 MG-MCG TABS, TAKE 2 TABLETS BY MOUTH DAILY, Disp: 60 tablet, Rfl: 10     cetirizine (ZYRTEC) 10 MG tablet, , Disp: , Rfl:      Dulaglutide (TRULICITY) 3 MG/0.5ML SOPN, 3mg, Disp: , Rfl:       FENOFIBRATE PO, Take 145 mg by mouth daily, Disp: , Rfl:      glimepiride (AMARYL) 2 MG tablet, Take 1 tablet (2 mg) by mouth every morning (before breakfast), Disp: 90 tablet, Rfl: 3     Hypertonic Nasal Wash (SINUS RINSE KIT) PACK, Spray 1 packet in nostril daily, Disp: 200 each, Rfl: 3     levothyroxine (SYNTHROID/LEVOTHROID) 112 MCG tablet, TAKE 1 TABLET (112MCG) BY MOUTH DAILY, Disp: 90 tablet, Rfl: 3     medroxyPROGESTERone (PROVERA) 10 MG tablet, TAKE 1 TABLET  10 MG  BY MOUTH ONCE DAILY FOR 5 DAYS EACH MONTH, Disp: , Rfl: 0     Multiple Vitamins-Minerals (CERTAVITE/ANTIOXIDANTS PO), , Disp: , Rfl:      omeprazole (PRILOSEC) 40 MG capsule, Take 1 capsule by mouth daily., Disp: , Rfl:      potassium chloride ER (KLOR-CON M) 20 MEQ CR tablet, , Disp: , Rfl:      VITAMIN D3 25 MCG (1000 UT) tablet, , Disp: , Rfl:      NYAMYC 510747 UNIT/GM external powder, , Disp: , Rfl:     Current Facility-Administered Medications:      lidocaine (XYLOCAINE) 2 % external gel, , Topical, PRN, Luciana Neal, NP, Given at 07/12/21 1527      Interim: Since our last visit, she has been tracking blood sugar, AM fasting ranging mostly between 120-150mg/dl on review of the last few months in her log. Denies midnight snacks. May have had one or two episodes that she wonders about being a low blood sugar (Glimiperide use).     Plan:   Plan:  1. Great work with ongoing weight reduction of 41 lbs from your heaviest weight.  2. Continue mindful eating with 20g of lean protein per meal, have 2 meals with half a plate of vegetables. Eat your starches at the end of the meal.  3. Trulicity can continue at 3mg/week.  4. You find periodic follow ups helpful so continue to schedule every 3-6 months to help stay on track.   5. Aiming for 500kcal meals every 5-6 hours to help maintain good weight loss going forward and one mid afternoon or snack of about 100kcal and 5-10g of lean protein.  6. If overnight symptoms like sweatiness/shakiness then  discuss medication adjustments with your primary clinic.    We discussed [facility]ariat[facility] Basics including:  -eating 3 meals daily  -reviewed metabolic needs for weight loss based on Resting Metabolic Rate  -protein goals supportive of healthy weight loss  -avoiding/limiting calorie containing beverages  -We discussed the importance of restorative sleep and stress management in maintaining a healthy weight.  -We discussed the National Weight Control Registry healthy weight maintenance strategies and ways to optimize metabolism.  -We discussed the importance of physical activity including cardiovascular and strength training in maintaining a healthier weight and explored viable options.    Most recent labs:  Lab Results   Component Value Date    WBC 8.8 08/19/2020    HGB 13.0 08/19/2020    HCT 38.3 08/19/2020     (H) 08/19/2020     08/19/2020     Lab Results   Component Value Date    CHOL 112 06/29/2021     Lab Results   Component Value Date    HDL 33 (L) 06/29/2021     No components found for: LDLCALC  Lab Results   Component Value Date    TRIG 160 (H) 06/29/2021     No components found for: CHOLHDL  Lab Results   Component Value Date    ALT 37 06/29/2021    AST 36 06/29/2021    ALKPHOS 42 (L) 06/29/2021     No results found for: HGBA1C  No components found for: [id number]  No components found for: [id number]  No components found for: FERRITIN  No components found for: PTH  No components found for: 90716  No components found for: 7597  Lab Results   Component Value Date    TSH 2.53 06/29/2021     No results found for: TESTOSTERONE    DIETARY HISTORY    Positive Changes Since Last Visit: more mindful about blood sugar.   Struggling With: summer heat    Knowledgeable in Reading Food Labels: yes  Getting Adequate Protein: reviewed goals  Sleeping 7-8 hours/day often  Stress management good    PHYSICAL ACTIVITY PATTERNS:  Cardiovascular: walking, can go about 4 blocks with a pause. Friend's house is  about 8 blocks.  Strength Training: none. Lymphedema exercises w/ therapist 3 days weekly.    REVIEW OF SYSTEMS  Feeling well. .  PHYSICAL EXAM:  Vitals: /74 (BP Location: Right arm, Patient Position: Sitting, Cuff Size: Adult Large)   Ht 1.524 m (5')   Wt (!) 163.5 kg (360 lb 8 oz)   BMI 70.41 kg/m    Weight:   Wt Readings from Last 3 Encounters:   08/06/21 (!) 163.5 kg (360 lb 8 oz)   07/12/21 (!) 163.3 kg (360 lb)   05/03/21 (!) 160.1 kg (353 lb)         GEN: Pleasant, well groomed, in no acute distress  HEENT:  normal .  NECK: No swelling.  HEART: RRR.  LUNGS: No respiratory difficulty noted. No cough. .  ABDOMEN: nontender. Small 1cm ulcer appears mildly irritated without cellultis.  EXTREMITIES: No tremor. Ambulation is independent..  NEURO: Alert and Oriented X3, fluent speech. .  SKIN: No visible rashes.    Medical Decision Making:  Interim study results: reviewed glucose log..      30 minutes spent on the date of the encounter doing chart review, history and exam, documentation and further activities per the note   Carlos Bowling MD  [facility]  9:45 AM  8/6/2021      Again, thank you for allowing me to participate in the care of your patient.        Sincerely,        Carlos Bowling MD

## 2021-08-06 NOTE — PROGRESS NOTES
Bariatric Clinic Follow-Up Visit:    Bushra Buck is a 41 year old  female with Body mass index is 70.41 kg/m .  presenting here today for follow-up on non-surgical efforts for weight loss. Original Intake visit occurred on 917/15 with a weight of 350 (max of 401 after illness) lbs and BMI of 68.4.  Along with diet and behavior changes, she has been using Trulicity to assist her weight loss goals.  See her intake visit notes for details on identified contributors to weight gain in the past. Chart review shows Dietician calculated RMR of 2300kcal and protein intake goal of 80g/day.    Weight:   Wt Readings from Last 3 Encounters:   08/06/21 (!) 163.5 kg (360 lb 8 oz)   07/12/21 (!) 163.3 kg (360 lb)   05/03/21 (!) 160.1 kg (353 lb)    pounds      Comorbidities:  Patient Active Problem List   Diagnosis     Acne rosacea     Dermatitis     Stasis edema     Pituitary adenoma (H)     Abnormal weight gain     High triglycerides     Prolactinoma (H)     Acquired hypothyroidism       Current Outpatient Medications:      ACETAMINOPHEN EXTRA STRENGTH 500 MG tablet, , Disp: , Rfl:      ARIPiprazole (ABILIFY) 5 MG tablet, 20 mg, Disp: , Rfl:      ASPIRIN LOW DOSE 81 MG EC tablet, , Disp: , Rfl:      atorvastatin (LIPITOR) 40 MG tablet, , Disp: , Rfl:      bumetanide (BUMEX) 2 MG tablet, , Disp: , Rfl:      cabergoline (DOSTINEX) 0.5 MG tablet, Take 1 tablet (0.5 mg) by mouth twice a week, Disp: 24 tablet, Rfl: 3     Calcium Citrate-Vitamin D3 315-6.25 MG-MCG TABS, TAKE 2 TABLETS BY MOUTH DAILY, Disp: 60 tablet, Rfl: 10     cetirizine (ZYRTEC) 10 MG tablet, , Disp: , Rfl:      Dulaglutide (TRULICITY) 3 MG/0.5ML SOPN, 3mg, Disp: , Rfl:      FENOFIBRATE PO, Take 145 mg by mouth daily, Disp: , Rfl:      glimepiride (AMARYL) 2 MG tablet, Take 1 tablet (2 mg) by mouth every morning (before breakfast), Disp: 90 tablet, Rfl: 3     Hypertonic Nasal Wash (SINUS RINSE KIT) PACK, Spray 1 packet in nostril daily, Disp: 200 each, Rfl:  3     levothyroxine (SYNTHROID/LEVOTHROID) 112 MCG tablet, TAKE 1 TABLET (112MCG) BY MOUTH DAILY, Disp: 90 tablet, Rfl: 3     medroxyPROGESTERone (PROVERA) 10 MG tablet, TAKE 1 TABLET  10 MG  BY MOUTH ONCE DAILY FOR 5 DAYS EACH MONTH, Disp: , Rfl: 0     Multiple Vitamins-Minerals (CERTAVITE/ANTIOXIDANTS PO), , Disp: , Rfl:      omeprazole (PRILOSEC) 40 MG capsule, Take 1 capsule by mouth daily., Disp: , Rfl:      potassium chloride ER (KLOR-CON M) 20 MEQ CR tablet, , Disp: , Rfl:      VITAMIN D3 25 MCG (1000 UT) tablet, , Disp: , Rfl:      NYAMYC 580959 UNIT/GM external powder, , Disp: , Rfl:     Current Facility-Administered Medications:      lidocaine (XYLOCAINE) 2 % external gel, , Topical, PRN, Luciana Neal, NP, Given at 07/12/21 4707      Interim: Since our last visit, she has been tracking blood sugar, AM fasting ranging mostly between 120-150mg/dl on review of the last few months in her log. Denies midnight snacks. May have had one or two episodes that she wonders about being a low blood sugar (Glimiperide use).     Plan:   Plan:  1. Great work with ongoing weight reduction of 41 lbs from your heaviest weight.  2. Continue mindful eating with 20g of lean protein per meal, have 2 meals with half a plate of vegetables. Eat your starches at the end of the meal.  3. Trulicity can continue at 3mg/week.  4. You find periodic follow ups helpful so continue to schedule every 3-6 months to help stay on track.   5. Aiming for 500kcal meals every 5-6 hours to help maintain good weight loss going forward and one mid afternoon or snack of about 100kcal and 5-10g of lean protein.  6. If overnight symptoms like sweatiness/shakiness then discuss medication adjustments with your primary clinic.    We discussed [facility] Bariatric Basics including:  -eating 3 meals daily  -reviewed metabolic needs for weight loss based on Resting Metabolic Rate  -protein goals supportive of healthy weight loss  -avoiding/limiting calorie  containing beverages  -We discussed the importance of restorative sleep and stress management in maintaining a healthy weight.  -We discussed the National Weight Control Registry healthy weight maintenance strategies and ways to optimize metabolism.  -We discussed the importance of physical activity including cardiovascular and strength training in maintaining a healthier weight and explored viable options.    Most recent labs:  Lab Results   Component Value Date    WBC 8.8 08/19/2020    HGB 13.0 08/19/2020    HCT 38.3 08/19/2020     (H) 08/19/2020     08/19/2020     Lab Results   Component Value Date    CHOL 112 06/29/2021     Lab Results   Component Value Date    HDL 33 (L) 06/29/2021     No components found for: LDLCALC  Lab Results   Component Value Date    TRIG 160 (H) 06/29/2021     No components found for: CHOLHDL  Lab Results   Component Value Date    ALT 37 06/29/2021    AST 36 06/29/2021    ALKPHOS 42 (L) 06/29/2021     No results found for: HGBA1C  No components found for: HLJNJJSY26  No components found for: [id number]  No components found for: FERRITIN  No components found for: PTH  No components found for: 80596  No components found for: 7597  Lab Results   Component Value Date    TSH 2.53 06/29/2021     No results found for: TESTOSTERONE    DIETARY HISTORY    Positive Changes Since Last Visit: more mindful about blood sugar.   Struggling With: summer heat    Knowledgeable in Reading Food Labels: yes  Getting Adequate Protein: reviewed goals  Sleeping 7-8 hours/day often  Stress management good    PHYSICAL ACTIVITY PATTERNS:  Cardiovascular: walking, can go about 4 blocks with a pause. Friend's house is about 8 blocks.  Strength Training: none. Lymphedema exercises w/ therapist 3 days weekly.    REVIEW OF SYSTEMS  Feeling well. .  PHYSICAL EXAM:  Vitals: /74 (BP Location: Right arm, Patient Position: Sitting, Cuff Size: Adult Large)   Ht 1.524 m (5')   Wt (!) 163.5 kg (360 lb 8  oz)   BMI 70.41 kg/m    Weight:   Wt Readings from Last 3 Encounters:   08/06/21 (!) 163.5 kg (360 lb 8 oz)   07/12/21 (!) 163.3 kg (360 lb)   05/03/21 (!) 160.1 kg (353 lb)         GEN: Pleasant, well groomed, in no acute distress  HEENT:  normal .  NECK: No swelling.  HEART: RRR.  LUNGS: No respiratory difficulty noted. No cough. .  ABDOMEN: nontender. Small 1cm ulcer appears mildly irritated without cellultis.  EXTREMITIES: No tremor. Ambulation is independent..  NEURO: Alert and Oriented X3, fluent speech. .  SKIN: No visible rashes.    Medical Decision Making:  Interim study results: reviewed glucose log..      30 minutes spent on the date of the encounter doing chart review, history and exam, documentation and further activities per the note   Carlos Bowling MD  [facility]  9:45 AM  8/6/2021

## 2021-08-08 DIAGNOSIS — Z11.59 ENCOUNTER FOR SCREENING FOR OTHER VIRAL DISEASES: ICD-10-CM

## 2021-08-10 DIAGNOSIS — D35.2 PITUITARY ADENOMA (H): ICD-10-CM

## 2021-08-10 DIAGNOSIS — D35.2 PROLACTINOMA (H): ICD-10-CM

## 2021-08-13 RX ORDER — CABERGOLINE 0.5 MG/1
0.5 TABLET ORAL
Qty: 24 TABLET | Refills: 1 | Status: SHIPPED | OUTPATIENT
Start: 2021-08-16 | End: 2022-07-28

## 2021-08-13 NOTE — TELEPHONE ENCOUNTER
CABERGOLINE 0.5 MG TABS 0.5 Tablet      Last Written Prescription Date:  9-21-20  Last Fill Quantity: 24,   # refills: 0  Last Office Visit : 3-17-20 ( RTC 1 Y)  Future Office visit:  none    Routing refill request to provider for review/approval because:  Med not on protocol

## 2021-09-07 ENCOUNTER — LAB REQUISITION (OUTPATIENT)
Dept: LAB | Facility: CLINIC | Age: 41
End: 2021-09-07
Payer: COMMERCIAL

## 2021-09-07 DIAGNOSIS — I89.0 LYMPHEDEMA, NOT ELSEWHERE CLASSIFIED: ICD-10-CM

## 2021-09-07 PROCEDURE — 83880 ASSAY OF NATRIURETIC PEPTIDE: CPT | Mod: ORL | Performed by: FAMILY MEDICINE

## 2021-09-07 PROCEDURE — 80048 BASIC METABOLIC PNL TOTAL CA: CPT | Performed by: FAMILY MEDICINE

## 2021-09-08 LAB
ANION GAP SERPL CALCULATED.3IONS-SCNC: 13 MMOL/L (ref 5–18)
BNP SERPL-MCNC: 10 PG/ML (ref 0–64)
BUN SERPL-MCNC: 15 MG/DL (ref 8–22)
CALCIUM SERPL-MCNC: 9.4 MG/DL (ref 8.5–10.5)
CHLORIDE BLD-SCNC: 108 MMOL/L (ref 98–107)
CO2 SERPL-SCNC: 20 MMOL/L (ref 22–31)
CREAT SERPL-MCNC: 0.78 MG/DL (ref 0.6–1.1)
GFR SERPL CREATININE-BSD FRML MDRD: >90 ML/MIN/1.73M2
GLUCOSE BLD-MCNC: 104 MG/DL (ref 70–125)
POTASSIUM BLD-SCNC: 3.7 MMOL/L (ref 3.5–5)
SODIUM SERPL-SCNC: 141 MMOL/L (ref 136–145)

## 2021-09-13 ENCOUNTER — TELEPHONE (OUTPATIENT)
Dept: VASCULAR SURGERY | Facility: CLINIC | Age: 41
End: 2021-09-13

## 2021-09-13 ENCOUNTER — MEDICAL CORRESPONDENCE (OUTPATIENT)
Dept: HEALTH INFORMATION MANAGEMENT | Facility: CLINIC | Age: 41
End: 2021-09-13

## 2021-09-13 ENCOUNTER — TRANSFERRED RECORDS (OUTPATIENT)
Dept: HEALTH INFORMATION MANAGEMENT | Facility: CLINIC | Age: 41
End: 2021-09-13

## 2021-09-13 NOTE — TELEPHONE ENCOUNTER
Julia is calling on be half of the patient who is currently with them.  Bushra is stating that the compression stocking are not fitting due to increased swelling. Bushra would like another pair,  she can be reached at [phone]

## 2021-09-13 NOTE — TELEPHONE ENCOUNTER
Spoke to patient. She will try and elevate her legs as much as possible.  She has not been using her lymphedema pump, but will try to start using it again.  She will call back if elevating and using the pump are not effective.

## 2021-09-14 ENCOUNTER — TRANSCRIBE ORDERS (OUTPATIENT)
Dept: VASCULAR SURGERY | Facility: CLINIC | Age: 41
End: 2021-09-14

## 2021-09-14 DIAGNOSIS — I89.0 LYMPHEDEMA: Primary | ICD-10-CM

## 2021-09-15 ENCOUNTER — HOSPITAL ENCOUNTER (EMERGENCY)
Facility: HOSPITAL | Age: 41
Discharge: HOME | End: 2021-09-15
Attending: FAMILY MEDICINE | Admitting: FAMILY MEDICINE
Payer: COMMERCIAL

## 2021-09-15 VITALS
DIASTOLIC BLOOD PRESSURE: 82 MMHG | HEART RATE: 98 BPM | RESPIRATION RATE: 22 BRPM | OXYGEN SATURATION: 98 % | WEIGHT: 293 LBS | BODY MASS INDEX: 70.31 KG/M2 | TEMPERATURE: 98 F | SYSTOLIC BLOOD PRESSURE: 140 MMHG

## 2021-09-15 DIAGNOSIS — L03.116 CELLULITIS OF LEFT LOWER LEG: ICD-10-CM

## 2021-09-15 PROCEDURE — 250N000013 HC RX MED GY IP 250 OP 250 PS 637: Performed by: FAMILY MEDICINE

## 2021-09-15 PROCEDURE — 99283 EMERGENCY DEPT VISIT LOW MDM: CPT

## 2021-09-15 RX ORDER — DOXYCYCLINE 100 MG/1
100 CAPSULE ORAL 2 TIMES DAILY
Qty: 20 CAPSULE | Refills: 0 | Status: SHIPPED | OUTPATIENT
Start: 2021-09-15 | End: 2021-09-25

## 2021-09-15 RX ORDER — DOXYCYCLINE 100 MG/1
100 CAPSULE ORAL ONCE
Status: COMPLETED | OUTPATIENT
Start: 2021-09-15 | End: 2021-09-15

## 2021-09-15 RX ORDER — AMOXICILLIN 250 MG/1
500 CAPSULE ORAL ONCE
Status: COMPLETED | OUTPATIENT
Start: 2021-09-15 | End: 2021-09-15

## 2021-09-15 RX ORDER — AMOXICILLIN 500 MG/1
500 CAPSULE ORAL 3 TIMES DAILY
Qty: 30 CAPSULE | Refills: 0 | Status: SHIPPED | OUTPATIENT
Start: 2021-09-15 | End: 2021-09-25

## 2021-09-15 RX ADMIN — DOXYCYCLINE 100 MG: 100 CAPSULE ORAL at 22:39

## 2021-09-15 RX ADMIN — AMOXICILLIN 500 MG: 250 CAPSULE ORAL at 22:40

## 2021-09-15 ASSESSMENT — ENCOUNTER SYMPTOMS
ABDOMINAL PAIN: 0
FEVER: 0
NAUSEA: 0
SHORTNESS OF BREATH: 0
DIARRHEA: 0
VOMITING: 0

## 2021-09-15 NOTE — ED TRIAGE NOTES
Patient arrives by private car for evaluation of left leg redness.  Patient has history of cellulitis.

## 2021-09-16 RX ORDER — ZOLPIDEM TARTRATE 10 MG/1
10 TABLET ORAL
COMMUNITY
End: 2021-09-17 | Stop reason: HOSPADM

## 2021-09-16 RX ORDER — TRAZODONE HYDROCHLORIDE 50 MG/1
50 TABLET, FILM COATED ORAL AT BEDTIME
COMMUNITY
End: 2021-09-17 | Stop reason: HOSPADM

## 2021-09-16 RX ORDER — SPIRONOLACTONE 50 MG/1
50 TABLET, FILM COATED ORAL DAILY
COMMUNITY
End: 2021-09-17 | Stop reason: HOSPADM
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
## 2021-09-16 NOTE — ED NOTES
Pt states no reaction after her antibiotic doses. Pt waiting for her ride home, ambulatory to lobby.

## 2021-09-16 NOTE — ED NOTES
Pt called to arrange her med-taxi home as it will take quite some time. Reviewed discharge and f/u instructions with pt.

## 2021-10-04 ENCOUNTER — LAB REQUISITION (OUTPATIENT)
Dept: LAB | Facility: CLINIC | Age: 41
End: 2021-10-04
Payer: COMMERCIAL

## 2021-10-04 DIAGNOSIS — L03.116 CELLULITIS OF LEFT LOWER LIMB: ICD-10-CM

## 2021-10-04 PROCEDURE — 87081 CULTURE SCREEN ONLY: CPT | Performed by: FAMILY MEDICINE

## 2021-10-05 LAB — BACTERIA SPEC CULT: NORMAL

## 2021-10-23 ENCOUNTER — LAB REQUISITION (OUTPATIENT)
Dept: LAB | Facility: CLINIC | Age: 41
End: 2021-10-23
Payer: COMMERCIAL

## 2021-10-23 DIAGNOSIS — L03.311 CELLULITIS OF ABDOMINAL WALL: ICD-10-CM

## 2021-10-23 LAB
C REACTIVE PROTEIN LHE: 1.1 MG/DL (ref 0–0.8)
ERYTHROCYTE [SEDIMENTATION RATE] IN BLOOD BY WESTERGREN METHOD: 60 MM/HR (ref 0–20)

## 2021-10-23 PROCEDURE — 86141 C-REACTIVE PROTEIN HS: CPT | Mod: ORL | Performed by: PHYSICIAN ASSISTANT

## 2021-10-23 PROCEDURE — 85652 RBC SED RATE AUTOMATED: CPT | Mod: ORL | Performed by: PHYSICIAN ASSISTANT

## 2021-10-24 ENCOUNTER — NURSE TRIAGE (OUTPATIENT)
Dept: NURSING | Facility: CLINIC | Age: 41
End: 2021-10-24

## 2021-10-24 ENCOUNTER — HEALTH MAINTENANCE LETTER (OUTPATIENT)
Age: 41
End: 2021-10-24
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
## 2021-11-18 ENCOUNTER — VIRTUAL VISIT (OUTPATIENT)
Dept: SURGERY | Facility: CLINIC | Age: 41
End: 2021-11-18
Payer: COMMERCIAL

## 2021-11-18 VITALS — WEIGHT: 293 LBS | HEIGHT: 60 IN | BODY MASS INDEX: 57.52 KG/M2

## 2021-11-18 DIAGNOSIS — R63.5 WEIGHT GAIN DUE TO MEDICATION: ICD-10-CM

## 2021-11-18 DIAGNOSIS — E11.9 TYPE 2 DIABETES MELLITUS WITHOUT COMPLICATION, WITHOUT LONG-TERM CURRENT USE OF INSULIN (H): Primary | ICD-10-CM

## 2021-11-18 DIAGNOSIS — T50.905A WEIGHT GAIN DUE TO MEDICATION: ICD-10-CM

## 2021-11-18 PROCEDURE — 99213 OFFICE O/P EST LOW 20 MIN: CPT | Mod: 95 | Performed by: EMERGENCY MEDICINE

## 2021-11-18 RX ORDER — ARIPIPRAZOLE 20 MG/1
1 TABLET ORAL
COMMUNITY
End: 2022-12-19

## 2021-11-18 RX ORDER — SPIRONOLACTONE 50 MG/1
50 TABLET, FILM COATED ORAL 2 TIMES DAILY
COMMUNITY
Start: 2021-10-29

## 2021-11-18 RX ORDER — FENOFIBRATE 145 MG/1
145 TABLET, COATED ORAL DAILY
COMMUNITY
Start: 2021-10-25

## 2021-11-18 ASSESSMENT — MIFFLIN-ST. JEOR: SCORE: 2219.91

## 2021-11-18 NOTE — PROGRESS NOTES
"  The patient has been notified of following:     \"This telephone visit will be conducted via a call between you and your physician/provider. We have found that certain health care needs can be provided without the need for a physical exam.  This service lets us provide the care you need with a short phone conversation.  If a prescription is necessary we can send it directly to your pharmacy.  If lab work is needed we can place an order for that and you can then stop by our lab to have the test done at a later time.    Telephone visits are billed at different rates depending on your insurance coverage. During this emergency period, for some insurers they may be billed the same as an in-person visit.  Please reach out to your insurance provider with any questions.    If during the course of the call the physician/provider feels a telephone visit is not appropriate, you will not be charged for this service.\"    Patient has given verbal consent to a Telephone visit? Yes    What phone number would you like to be contacted at? [phone]    Patient would like to receive their AVS by Jp    Additional provider notes:   Bariatric Clinic Follow-Up Visit:    Bushra Buck is a 41 year old  female with Body mass index is 70.11 kg/m .  presenting here today for follow-up on non-surgical efforts for weight loss. Original Intake visit occurred on 9/17/15 with a weight of 350 lbs BMI of 68.4, gained up to a max of 401lbs..  Along with diet and behavior changes, she has been using GLP1 agonist for diabetes and to assist her weight loss goals.  See her intake visit notes for details on identified contributors to weight gain in the past. Chart review shows Dietician calculated RMR of 2300 and protein intake goal of 80g/day.    Weight:   Wt Readings from Last 3 Encounters:   11/18/21 (!) 162.8 kg (359 lb)   09/15/21 (!) 163.3 kg (360 lb)   08/06/21 (!) 163.5 kg (360 lb 8 oz)    pounds      Comorbidities:  Patient Active " Problem List   Diagnosis     Acne rosacea     Dermatitis     Stasis edema     Pituitary adenoma (H)     Abnormal weight gain     High triglycerides     Prolactinoma (H)     Acquired hypothyroidism     Morbid obesity (H)       Current Outpatient Medications:      ACETAMINOPHEN EXTRA STRENGTH 500 MG tablet, , Disp: , Rfl:      ARIPiprazole (ABILIFY) 20 MG tablet, Take 1 tablet by mouth, Disp: , Rfl:      ASPIRIN LOW DOSE 81 MG EC tablet, , Disp: , Rfl:      atorvastatin (LIPITOR) 40 MG tablet, , Disp: , Rfl:      bacitracin 500 UNIT/GM external ointment, Apply topically 2 times daily Apply thin layer twice daily for 10-14 days to abdominal wound., Disp: 30 g, Rfl: 0     bumetanide (BUMEX) 2 MG tablet, , Disp: , Rfl:      cabergoline (DOSTINEX) 0.5 MG tablet, TAKE 1 TABLET (0.5 MG) BY MOUTH TWICE A WEEK, Disp: 24 tablet, Rfl: 1     Calcium Citrate-Vitamin D3 315-6.25 MG-MCG TABS, TAKE 2 TABLETS BY MOUTH DAILY, Disp: 60 tablet, Rfl: 10     cetirizine (ZYRTEC) 10 MG tablet, , Disp: , Rfl:      Dulaglutide (TRULICITY) 3 MG/0.5ML SOPN, once a week , Disp: , Rfl:      fenofibrate (TRICOR) 145 MG tablet, , Disp: , Rfl:      FENOFIBRATE PO, Take 145 mg by mouth daily, Disp: , Rfl:      glimepiride (AMARYL) 2 MG tablet, Take 1 tablet (2 mg) by mouth every morning (before breakfast), Disp: 90 tablet, Rfl: 3     Hypertonic Nasal Wash (SINUS RINSE KIT) PACK, Spray 1 packet in nostril daily, Disp: 200 each, Rfl: 3     levothyroxine (SYNTHROID/LEVOTHROID) 112 MCG tablet, TAKE 1 TABLET (112MCG) BY MOUTH DAILY, Disp: 90 tablet, Rfl: 3     medroxyPROGESTERone (PROVERA) 10 MG tablet, TAKE 1 TABLET  10 MG  BY MOUTH ONCE DAILY FOR 5 DAYS EACH MONTH, Disp: , Rfl: 0     Multiple Vitamins-Minerals (CERTAVITE/ANTIOXIDANTS PO), , Disp: , Rfl:      NYAMYC 727110 UNIT/GM external powder, , Disp: , Rfl:      omeprazole (PRILOSEC) 40 MG capsule, Take 1 capsule by mouth daily., Disp: , Rfl:      potassium chloride ER (KLOR-CON M) 20 MEQ CR tablet,  , Disp: , Rfl:      spironolactone (ALDACTONE) 50 MG tablet, , Disp: , Rfl:      VITAMIN D3 25 MCG (1000 UT) tablet, , Disp: , Rfl:     Current Facility-Administered Medications:      lidocaine (XYLOCAINE) 2 % external gel, , Topical, PRN, Luciana Neal, NP, Given at 07/12/21 1527      Interim: Since our last visit, she has been busy as heck after an abscess was drained off her stomach a few weeks ago, reports went pretty well.  Blood sugars running well over the last week:  132mg/dl, 133 yest, 106 2 days ago. 11/15: 117, 11/14: 131, 11/13: 129, 11/12: ear infection 343, down after glimiperide two hours later was 110.  Getting PT regularly 3 times weekly, cutting down to 2x weekly now as her wounds are doing well.     Gets 3 meals daily,breakfast around 8-9am,  lunch around noon, prepped meal. Apple/fruit snack in afternoon. Supper is 6pm: meat/veg. Friend helps her fix meals (her mom was DM).   Planning turkey thanksgiving.  Plan:   1.  Diet: continue aiming for about 500kcal meals every 5-6hours and a 200kcal mid afternoon snack to get through the evening in combination with your supper.   2. Exercise: continue PT exercises  3. Medication: Trulicity is going well (managed by PCP, Dr. Azul) at 3mg/week dose. Could consider increase to 4.5mg if tolerated for some additional metabolic/appetite support but current dose is giving very good blood sugar readings most days.  4. Aim for 60-80g of lean protein daily as a goal.   5. Goals: great work with the 42 lb weight reduction from heaviest weight back in 2016.       We discussed [facility]ariat[facility] Basics including:  -eating 3 meals daily  -reviewed metabolic needs for weight loss based on Resting Metabolic Rate  -protein goals supportive of healthy weight loss  -avoiding/limiting calorie containing beverages  -We discussed the importance of restorative sleep and stress management in maintaining a healthy weight.  -We discussed the National Weight Control Registry  healthy weight maintenance strategies and ways to optimize metabolism.  -We discussed the importance of physical activity including cardiovascular and strength training in maintaining a healthier weight and explored viable options.    Most recent labs:  Lab Results   Component Value Date    WBC 8.8 08/19/2020    HGB 13.0 08/19/2020    HCT 38.3 08/19/2020     (H) 08/19/2020     08/19/2020     Lab Results   Component Value Date    CHOL 112 06/29/2021     Lab Results   Component Value Date    HDL 33 (L) 06/29/2021     No components found for: LDLCALC  Lab Results   Component Value Date    TRIG 160 (H) 06/29/2021     No components found for: CHOLHDL  Lab Results   Component Value Date    ALT 37 06/29/2021    AST 36 06/29/2021    ALKPHOS 42 (L) 06/29/2021     No results found for: HGBA1C  No components found for: IJDYOSIJ45  No components found for: [id number]  No components found for: FERRITIN  No components found for: PTH  No components found for: 00140  No components found for: 7597  Lab Results   Component Value Date    TSH 2.53 06/29/2021     No results found for: TESTOSTERONE    DIETARY HISTORY    Positive Changes Since Last Visit: timing meals better, checking sugars daily.  Struggling With: some plateau in weight loss around this 350-360 weight.    Knowledgeable in [place] Food Labels: yes  Getting Adequate Protein: usually  Sleeping 7-8 hours/day often  Stress management good    PHYSICAL ACTIVITY PATTERNS:  Cardiovascular: PT  Strength Training: PT    REVIEW OF SYSTEMS  Feeling well this month, abscess reportedly improving on abdomen following drainage, broke a toe nail but no infection evident recently. Blood sugars reviewed above, tolerates Trulicity well. Appreciates periodic check ins for mindfulness on long term weight management still. .  PHYSICAL EXAM:  Vitals: Ht 1.524 m (5')   Wt (!) 162.8 kg (359 lb)   BMI 70.11 kg/m    Weight:   Wt Readings from Last 3 Encounters:   11/18/21 (!)  162.8 kg (359 lb)   09/15/21 (!) 163.3 kg (360 lb)   08/06/21 (!) 163.5 kg (360 lb 8 oz)         GEN: Pleasant on the phone. No cough/dyspnea noted. Affect is cheerful today. .    NEURO: Alert and Oriented X3, fluent speech. .    .      20 minutes spent on the date of the encounter doing chart review, history and exam, documentation and further activities per the note   Carlos Bowling MD  [facility]  9:53 AM  11/18/2021    Phone call duration: 20 minutes

## 2021-11-18 NOTE — LETTER
"    11/18/2021         RE: Bushra Buck  [address]  [place] [address]        Dear Colleague,    Thank you for referring your patient, Bushra Buck, to the [facility]. Please see a copy of my visit note below.      The patient has been notified of following:     \"This telephone visit will be conducted via a call between you and your physician/provider. We have found that certain health care needs can be provided without the need for a physical exam.  This service lets us provide the care you need with a short phone conversation.  If a prescription is necessary we can send it directly to your pharmacy.  If lab work is needed we can place an order for that and you can then stop by our lab to have the test done at a later time.    Telephone visits are billed at different rates depending on your insurance coverage. During this emergency period, for some insurers they may be billed the same as an in-person visit.  Please reach out to your insurance provider with any questions.    If during the course of the call the physician/provider feels a telephone visit is not appropriate, you will not be charged for this service.\"    Patient has given verbal consent to a Telephone visit? Yes    What phone number would you like to be contacted at? [phone]    Patient would like to receive their AVS by Jp    Additional provider notes:   Bariatric Clinic Follow-Up Visit:    Bushra Buck is a 41 year old  female with Body mass index is 70.11 kg/m .  presenting here today for follow-up on non-surgical efforts for weight loss. Original Intake visit occurred on 9/17/15 with a weight of 350 lbs BMI of 68.4, gained up to a max of 401lbs..  Along with diet and behavior changes, she has been using GLP1 agonist for diabetes and to assist her weight loss goals.  See her intake visit notes for details on identified contributors to weight gain in the " past. Chart review shows Dietician calculated RMR of 2300 and protein intake goal of 80g/day.    Weight:   Wt Readings from Last 3 Encounters:   11/18/21 (!) 162.8 kg (359 lb)   09/15/21 (!) 163.3 kg (360 lb)   08/06/21 (!) 163.5 kg (360 lb 8 oz)    pounds      Comorbidities:  Patient Active Problem List   Diagnosis     Acne rosacea     Dermatitis     Stasis edema     Pituitary adenoma (H)     Abnormal weight gain     High triglycerides     Prolactinoma (H)     Acquired hypothyroidism     Morbid obesity (H)       Current Outpatient Medications:      ACETAMINOPHEN EXTRA STRENGTH 500 MG tablet, , Disp: , Rfl:      ARIPiprazole (ABILIFY) 20 MG tablet, Take 1 tablet by mouth, Disp: , Rfl:      ASPIRIN LOW DOSE 81 MG EC tablet, , Disp: , Rfl:      atorvastatin (LIPITOR) 40 MG tablet, , Disp: , Rfl:      bacitracin 500 UNIT/GM external ointment, Apply topically 2 times daily Apply thin layer twice daily for 10-14 days to abdominal wound., Disp: 30 g, Rfl: 0     bumetanide (BUMEX) 2 MG tablet, , Disp: , Rfl:      cabergoline (DOSTINEX) 0.5 MG tablet, TAKE 1 TABLET (0.5 MG) BY MOUTH TWICE A WEEK, Disp: 24 tablet, Rfl: 1     Calcium Citrate-Vitamin D3 315-6.25 MG-MCG TABS, TAKE 2 TABLETS BY MOUTH DAILY, Disp: 60 tablet, Rfl: 10     cetirizine (ZYRTEC) 10 MG tablet, , Disp: , Rfl:      Dulaglutide (TRULICITY) 3 MG/0.5ML SOPN, once a week , Disp: , Rfl:      fenofibrate (TRICOR) 145 MG tablet, , Disp: , Rfl:      FENOFIBRATE PO, Take 145 mg by mouth daily, Disp: , Rfl:      glimepiride (AMARYL) 2 MG tablet, Take 1 tablet (2 mg) by mouth every morning (before breakfast), Disp: 90 tablet, Rfl: 3     Hypertonic Nasal Wash (SINUS RINSE KIT) PACK, Spray 1 packet in nostril daily, Disp: 200 each, Rfl: 3     levothyroxine (SYNTHROID/LEVOTHROID) 112 MCG tablet, TAKE 1 TABLET (112MCG) BY MOUTH DAILY, Disp: 90 tablet, Rfl: 3     medroxyPROGESTERone (PROVERA) 10 MG tablet, TAKE 1 TABLET  10 MG  BY MOUTH ONCE DAILY FOR 5 DAYS EACH MONTH,  Disp: , Rfl: 0     Multiple Vitamins-Minerals (CERTAVITE/ANTIOXIDANTS PO), , Disp: , Rfl:      NYAMYC 799789 UNIT/GM external powder, , Disp: , Rfl:      omeprazole (PRILOSEC) 40 MG capsule, Take 1 capsule by mouth daily., Disp: , Rfl:      potassium chloride ER (KLOR-CON M) 20 MEQ CR tablet, , Disp: , Rfl:      spironolactone (ALDACTONE) 50 MG tablet, , Disp: , Rfl:      VITAMIN D3 25 MCG (1000 UT) tablet, , Disp: , Rfl:     Current Facility-Administered Medications:      lidocaine (XYLOCAINE) 2 % external gel, , Topical, PRN, Luciana Neal, NP, Given at 07/12/21 1527      Interim: Since our last visit, she has been busy as heck after an abscess was drained off her stomach a few weeks ago, reports went pretty well.  Blood sugars running well over the last week:  132mg/dl, 133 yest, 106 2 days ago. 11/15: 117, 11/14: 131, 11/13: 129, 11/12: ear infection 343, down after glimiperide two hours later was 110.  Getting PT regularly 3 times weekly, cutting down to 2x weekly now as her wounds are doing well.     Gets 3 meals daily,breakfast around 8-9am,  lunch around noon, prepped meal. Apple/fruit snack in afternoon. Supper is 6pm: meat/veg. Friend helps her fix meals (her mom was DM).   Planning turkey thanksgiving.  Plan:   1.  Diet: continue aiming for about 500kcal meals every 5-6hours and a 200kcal mid afternoon snack to get through the evening in combination with your supper.   2. Exercise: continue PT exercises  3. Medication: Trulicity is going well (managed by PCP, Dr. Azul) at 3mg/week dose. Could consider increase to 4.5mg if tolerated for some additional metabolic/appetite support but current dose is giving very good blood sugar readings most days.  4. Aim for 60-80g of lean protein daily as a goal.   5. Goals: great work with the 42 lb weight reduction from heaviest weight back in 2016.       We discussed [facility] Basics including:  -eating 3 meals daily  -reviewed metabolic needs for weight  loss based on Resting Metabolic Rate  -protein goals supportive of healthy weight loss  -avoiding/limiting calorie containing beverages  -We discussed the importance of restorative sleep and stress management in maintaining a healthy weight.  -We discussed the National Weight Control Registry healthy weight maintenance strategies and ways to optimize metabolism.  -We discussed the importance of physical activity including cardiovascular and strength training in maintaining a healthier weight and explored viable options.    Most recent labs:  Lab Results   Component Value Date    WBC 8.8 08/19/2020    HGB 13.0 08/19/2020    HCT 38.3 08/19/2020     (H) 08/19/2020     08/19/2020     Lab Results   Component Value Date    CHOL 112 06/29/2021     Lab Results   Component Value Date    HDL 33 (L) 06/29/2021     No components found for: LDLCALC  Lab Results   Component Value Date    TRIG 160 (H) 06/29/2021     No components found for: CHOLHDL  Lab Results   Component Value Date    ALT 37 06/29/2021    AST 36 06/29/2021    ALKPHOS 42 (L) 06/29/2021     No results found for: HGBA1C  No components found for: [id number]  No components found for: [id number]  No components found for: FERRITIN  No components found for: PTH  No components found for: 74894  No components found for: 7597  Lab Results   Component Value Date    TSH 2.53 06/29/2021     No results found for: TESTOSTERONE    DIETARY HISTORY    Positive Changes Since Last Visit: timing meals better, checking sugars daily.  Struggling With: some plateau in weight loss around this 350-360 weight.    Knowledgeable in [place] Food Labels: yes  Getting Adequate Protein: usually  Sleeping 7-8 hours/day often  Stress management good    PHYSICAL ACTIVITY PATTERNS:  Cardiovascular: PT  Strength Training: PT    REVIEW OF SYSTEMS  Feeling well this month, abscess reportedly improving on abdomen following drainage, broke a toe nail but no infection evident recently.  Blood sugars reviewed above, tolerates Trulicity well. Appreciates periodic check ins for mindfulness on long term weight management still. .  PHYSICAL EXAM:  Vitals: Ht 1.524 m (5')   Wt (!) 162.8 kg (359 lb)   BMI 70.11 kg/m    Weight:   Wt Readings from Last 3 Encounters:   11/18/21 (!) 162.8 kg (359 lb)   09/15/21 (!) 163.3 kg (360 lb)   08/06/21 (!) 163.5 kg (360 lb 8 oz)         GEN: Pleasant on the phone. No cough/dyspnea noted. Affect is cheerful today. .    NEURO: Alert and Oriented X3, fluent speech. .    .      20 minutes spent on the date of the encounter doing chart review, history and exam, documentation and further activities per the note   Carlos Bowling MD  [facility]  9:53 AM  11/18/2021    Phone call duration: 20 minutes      Again, thank you for allowing me to participate in the care of your patient.        Sincerely,        Carlos Bowling MD

## 2021-11-18 NOTE — PATIENT INSTRUCTIONS
Plan:   1.  Diet: continue aiming for about 500kcal meals every 5-6hours and a 200kcal mid afternoon snack to get through the evening in combination with your supper.   2. Exercise: continue PT exercises  3. Medication: Trulicity is going well (managed by PCP, Dr. Azul) at 3mg/week dose. Could consider increase to 4.5mg if tolerated for some additional metabolic/appetite support but current dose is giving very good blood sugar readings most days.  4. Aim for 60-80g of lean protein daily as a goal.   5. Goals: great work with the 42 lb weight reduction from heaviest weight back in 2016.         LEAN PROTEIN SOURCES  Getting 20-30 grams of protein, 3 meals daily, is appropriate for most people, some need more but more than about 40 grams per meal is not useful.  General rule is drinking one ounce of water per gram of protein eaten over the course of the day:  70 grams of protein each day, drink 70 oz of water.  Protein Source Portion Calories Grams of Protein                           Nonfat, plain Greek yogurt    (10 grams sugar or less) 3/4 cup (6 oz)  12-17   Light Yogurt (10 grams sugar or less) 3/4 cup (6 oz)  6-8   Protein Shake 1 shake 110-180 15-30   Skim/1% Milk or lactose-free milk 1 cup ( 8 oz)  8   Plain or light, flavored soymilk 1 cup  7-8   Plain or light, hemp milk 1 cup 110 6   Fat Free or 1% Cottage Cheese 1/2 cup 90 15   Part skim ricotta cheese 1/2 cup 100 14   Part skim or reduced fat cheese slices 1 ounce 65-80 8     Mozzarella String Cheese 1 80 8   Canned tuna, chicken, crab or salmon  (canned in water)  1/2 cup 100 15-20   White fish (broiled, grilled, baked) 3 ounces 100 21   [place]/Tuna (broiled, grilled, baked) 3 ounces 150-180 21   Shrimp, Scallops, Lobster, Crab 3 ounces 100 21   Pork loin, Pork Tenderloin 3 ounces 150 21   Boneless, skinless chicken /turkey breast                          (broiled, grilled, baked) 3 ounces 120 21   [place], [place], [place], and  Venison 3 ounces 120 21   Lean cuts of red meat and pork (sirloin,   round, tenderloin, flank, ground 93%-96%) 3 ounces 170 21   Lean or Extra Lean Ground Turkey 1/2 cup 150 20   90-95% Lean [place] Burger 1 abby 140-180 21   Low-fat casserole with lean meat 3/4 cup 200 17   Luncheon Meats                                                        (turkey, lean ham, roast beef, chicken) 3 ounces 100 21   Egg (boiled, poached, scrambled) 1 Egg 60 7   Egg Substitute 1/2 cup 70 10   Nuts (limit to 1 serving per day)  3 Tbsp. 150 7   Nut [place] (peanut, almond)  Limit to 1 serving or less daily 1 Tbsp. 90 4   Soy Burger (varies) 1  15   Garbanzo, Black, [place] Beans 1/2 cup 110 7   Refried Beans 1/2 cup 100 7   Kidney and Lima beans 1/2 cup 110 7   Tempeh 3 oz 175 18   Vegan crumbles 1/2 cup 100 14   Tofu 1/2 cup 110 14   Chili (beans and extra lean beef or turkey) 1 cup 200 23   Lentil Stew/Soup 1 cup 150 12   Black Bean Soup 1 cup 175 12             Example Meal Plan for a 7701-0141 Calorie Diet:    In order to fuel your weight loss properly and avoid hunger-induced overeating later in the day, for your height and weight, you will enjoy the most success by following the diet below or similar with adjustments based on your particular tastes and preferences.  Exercise may influence speed, amount of weight loss further.     I recommend getting into a meal routine and keeping it similar day to day in the beginning so you don t have to think too hard about what you re going to make/eat.  Keep snacks healthy, ideally containing protein and some vegetables.  Non-processed food is preferable to packaged items.  Eat at least a few crunchy green vegetables if having a snack, which should be 2-3 hours after your mealtimes(prepare these ahead of time for ease of use).  Drink 64 oz -80 oz of water daily for most, some of you will need more and we'll discuss it at your visit if that is the case.  When changing our diet and  reducing our intake,  we can often mistake thirst for hunger or just have some grazing habits we have to break ('bored/mindless eating') and a glass of water and reconsideration of our hunger is often all that is needed.  Having the urge is not the problem, but watching it pass by without acting on it is the goal.    If you re having hunger problems, add a protein drink/snack to your morning hours or afternoon snack with at least 20grams of protein and not too much sugar (under 10g).  A carton of higher protein/low sugar yogurt can work as well.  If the urge to snack is overwhelming and not satiated, try going for a 10 minute walk/exercise, come home and drink a glass of water and if still hungry, have a  calorie snack (handful of raw/sprouted nuts, veggies and string cheese, protein bar, etc).  Savor it.    It is better to have a large breakfast, a moderate lunch and a smaller dinner to fuel your day.  People lose 10-15% more weight during their weight loss season with this strategy. Optimizing your protein intake at each meal will further keep you more satisfied while eating less food overall.  Getting exercise in early has also been shown to offer the best results (before breakfast ideally but anytime is the right time to exercise if that is not an option for you).    To make sure you re getting adequate vitamins and minerals during weight loss, I recommend one complete multivitamin a day of your choice.  Consider a probiotic and taking some vitamin D 2000 IU daily.    Let supper be your last meal of the day and ideally try to have at least 12 hours between supper and breakfast the next day to tap into some beneficial overnight fasting dynamics.  Water in the evening is fine, unsweetened, non caffeinated herbal tea is helpful as well.  Consolidating your meals within a 8-12 hour period of your day will help tap into these additional metabolic benefits and tends to keep your appetite up for breakfast,  further helping to stay on track.  For most of my patients I don't recommend an intermittent fasting style diet (many find it hard to fit in their lifestyle) but an overnight fast is very doable for most patients and helps regulate our hunger drives a little better.  This makes it very important to nail good intake at all three meals to feel satisfied/energized and still lose weight.  If evening snacking desires are high, consider a glass of fiber supplement for some additional fullness (metamucil or similar). Most of us don't get the 25-30 grams per day of fiber that promotes good gut health/satiety.  Benefiber, metamucil, citrucel are reasonable/affordable options for most people.  Inulin, chicory, psyllium husk are reasonable options but start slow and low in the dose to avoid gas/bloating until your gut gets acclimated (ramping up to 5-10 grams per day of supplemental fiber after 3-4 weeks if needed).      Example Meal Plan:  Breakfast: 450-475 Calories  1 egg cooked on low in olive oil:   calories.  5oz Greek Yogurt (Fage plain classic: ~150 alexsandra)  Handful of Berries of your choice (about a calorie per berry or 20-40cal per handful)    cup(cooked) of  old fashioned oatmeal or 1/2 cup(cooked) steel cut oats. (150 alexsandra)  Sprinkle amount of brown sugar and a pat of butter. (40 alexsandra)  Glass of  Water  Black coffee or unsweetened Tea (0calories).      2-3 hours Later Snack: (195 calories).  Glass of water  One string Cheese (80 calories) or 4 oz creamed cottage cheese (115 calories) with  Crunchy Celery sticks (less than 10 calories per large stalk) 2 stalks. (20 calories)    of a  Large Banana or   of a Large Apple (60 calories):  eat second half at lunch or afternoon snack.     Lunch:300 -350 calories   Chicken Breast  (baked/broiled/roasted/grilled)  4-6 oz.  (125-180 alexsandra), BBQ sauce/hot sauce/mustard/seasoning is free. Just use a reasonable amount. Or a can of tuna with 1 tablespoon mayonnaise.  Salad:  lettuce, any other veggies (cucumbers, green peppers/celery you like and a small drizzle of dressing to just flavor.  Go as big on the veggies as you like,  as they are practically calorie free.   A whole, 8 inch cucumber is 45 calories, a whole green pepper is 23 calories, a stalk of celery is 9 calories.  [place] Dressing is 60 calories per tablespoon..so moderate your desired dressing or do a drizzle of olive oil and splash of balsamic vinegar on top,  Total calories unlikely to be over 150 even with dressing.  Glass of Water.    Option for lunch is meal replacement protein drink/smoothie.  Need at least 20 grams of protein and eat the rest of your apple/banana from the morning snack.      Afternoon Snack: 150-200 calories   Cheese Stick or cottage cheese again  and a fresh fruit OR  Granola Bar (protein Bar acceptable if under 200 calories OR  Homemade smoothies:  8oz skim milk,  a handful of berries (fresh or frozen and a serving of protein powder such as BiPro or Sofia sWhey for example.  If you don't like dairy, make with 8oz water, one small banana, handful of berries and the protein powder, add any veggies you want as well:  roughly 200 calories.   Glass of Water    Dinner: 325 calories  4oz of fresh, [place] salmon.  Broiled (salt/pepper/dill) for about 8-8.5 minutes (200calories) or  4oz filet mignon steak or sirloin steak  Salad or vegetable sautéed lightly in olive oil or   Broccoli 1.5  cups chopped and steamed  or micro-waved in a little water (75 calories)  Glass of Water,    Cup of herbal tea (unsweetened, caffeine free)      Herbs and seasonings are encouraged to flavor your foods/vegetables.  Make your food delicious.      Tips for Success:  1.  Prepare proteins ahead of time (broil chicken breasts in bulk so you can grab and go), steel cut oats/lentils can be stored in casserole dish/bowl in the fridge for quick scoop in the morning and rewarm in microwave, make use of crock pot recipes  "(watch salt content).  Making meals that cover 3-4 future meals is an easy way to stay on track.  2.  Drink a 8-12 oz glass of water every 2-3 hours when awake.  We often mistake hunger for thirst, especially when losing weight.  3. Remember your Reward and Motivation when things get hard.  4.  Weigh yourself every morning and record, you'll stay on track better and learn how our biorhythms, diet and elimination patterns show up on the scale. Don't worry about 1 or 2 day patterns, but when on track you'll notice good trend downward of weight over 3-4 day segments.  Plateaus tend to resolve after 4-8 days in most cases if you stay consistent with your plan.  These are natural and part of weight loss, even if you're perfect with your plan execution.  5. Call if problems/concerns.  [employer] is a great tool to stay in touch and provide weekly outside accountability. Check in with questions or if you want to brag.  6.  Find a handful of meals/foods that keep you on track and feeling good and get into a routine that is sustainable for you.  It's OK to have a routine that works for you.  7.  Consider taking a complete multivitamin just to make sure all micronutrients are adequate during weight loss.  8. If losing hair/brittle nails it usually means you are not taking enough protein.  Minimum goal is 60 grams daily of protein for smaller women, 80 grams a day for men. Consider taking Biotin as supplement or a \"Hair and Nail\" multivitamin.    Bush[place], increase snack size just a bit to hit the 1700kcal goal each day compared to this recommendation.  "

## 2021-11-24 ENCOUNTER — IMMUNIZATION (OUTPATIENT)
Dept: NURSING | Facility: CLINIC | Age: 41
End: 2021-11-24
Payer: COMMERCIAL

## 2021-11-24 PROCEDURE — 0004A PR COVID VAC PFIZER DIL RECON 30 MCG/0.3 ML IM: CPT

## 2021-11-24 PROCEDURE — 91300 PR COVID VAC PFIZER DIL RECON 30 MCG/0.3 ML IM: CPT

## 2021-12-06 ENCOUNTER — OFFICE VISIT (OUTPATIENT)
Dept: VASCULAR SURGERY | Facility: CLINIC | Age: 41
End: 2021-12-06
Attending: NURSE PRACTITIONER
Payer: COMMERCIAL

## 2021-12-06 ENCOUNTER — TELEPHONE (OUTPATIENT)
Dept: VASCULAR SURGERY | Facility: CLINIC | Age: 41
End: 2021-12-06

## 2021-12-06 VITALS
RESPIRATION RATE: 22 BRPM | HEART RATE: 100 BPM | SYSTOLIC BLOOD PRESSURE: 118 MMHG | BODY MASS INDEX: 69.33 KG/M2 | TEMPERATURE: 97.6 F | DIASTOLIC BLOOD PRESSURE: 76 MMHG | WEIGHT: 293 LBS

## 2021-12-06 DIAGNOSIS — M21.071 ACQUIRED VALGUS DEFORMITY OF BOTH ANKLES: ICD-10-CM

## 2021-12-06 DIAGNOSIS — I89.0 LYMPHEDEMA: Primary | ICD-10-CM

## 2021-12-06 DIAGNOSIS — I87.333 CHRONIC VENOUS HYPERTENSION (IDIOPATHIC) WITH ULCER AND INFLAMMATION OF BILATERAL LOWER EXTREMITY (H): ICD-10-CM

## 2021-12-06 DIAGNOSIS — I87.2 VENOUS INSUFFICIENCY OF BOTH LOWER EXTREMITIES: ICD-10-CM

## 2021-12-06 DIAGNOSIS — E66.01 MORBID OBESITY WITH BMI OF 60.0-69.9, ADULT (H): ICD-10-CM

## 2021-12-06 DIAGNOSIS — B35.1 ONYCHOMYCOSIS: ICD-10-CM

## 2021-12-06 DIAGNOSIS — M79.89 SWELLING OF LIMB: ICD-10-CM

## 2021-12-06 DIAGNOSIS — Q87.11 PRADER-WILLI SYNDROME: ICD-10-CM

## 2021-12-06 DIAGNOSIS — Z87.2 HISTORY OF CELLULITIS: ICD-10-CM

## 2021-12-06 DIAGNOSIS — E11.9 TYPE 2 DIABETES MELLITUS WITHOUT COMPLICATION, WITHOUT LONG-TERM CURRENT USE OF INSULIN (H): ICD-10-CM

## 2021-12-06 DIAGNOSIS — I89.0 SECONDARY LYMPHEDEMA: ICD-10-CM

## 2021-12-06 DIAGNOSIS — M21.072 ACQUIRED VALGUS DEFORMITY OF BOTH ANKLES: ICD-10-CM

## 2021-12-06 PROCEDURE — 11721 DEBRIDE NAIL 6 OR MORE: CPT | Performed by: NURSE PRACTITIONER

## 2021-12-06 PROCEDURE — 11042 DBRDMT SUBQ TIS 1ST 20SQCM/<: CPT | Performed by: NURSE PRACTITIONER

## 2021-12-06 RX ORDER — SULFAMETHOXAZOLE/TRIMETHOPRIM 800-160 MG
TABLET ORAL
COMMUNITY
Start: 2021-11-29 | End: 2023-04-17

## 2021-12-06 RX ADMIN — LIDOCAINE HYDROCHLORIDE: 20 JELLY TOPICAL at 13:54

## 2021-12-06 ASSESSMENT — PAIN SCALES - GENERAL: PAINLEVEL: NO PAIN (0)

## 2021-12-06 NOTE — LETTER
2021    Patient Info:  Name: Bushra Buck  : 1980  Address: [address] [place] [address] [address]  [place] [address]    Insurance Info:  INSURER: Payor: MEDICA / Plan: MED[employer] ACCESSABILITY ENHANCED / Product Type: HMO /   Policy ID#:  [id number]  : 1980    Physician Info:   Name: Luciana Neal DNP   Dept Address/Phones:   [address] [place] [address], [address]  [facility] [address] [phone]  Fax: [phone]    Impression:   Encounter Diagnoses   Name Primary?     Lymphedema Yes     Chronic venous hypertension (idiopathic) with ulcer and inflammation of bilateral lower extremity (H)      Morbid obesity with BMI of 60.0-69.9, adult (H)      Type 2 diabetes mellitus without complication, without long-term current use of insulin (H)      Prader-Willi syndrome      Venous insufficiency of both lower extremities      Swelling of limb      History of cellulitis      Secondary lymphedema      Acquired valgus deformity of both ankles      Onychomycosis      Wound info:    VASC Wound ABD (Active)   Pre Size Length 0.8 21 1300   Pre Size Width 0.8 21 1300   Pre Size Depth 0.1 21 1300   Pre Total Sq cm 0.64 21 1300     Wound #1 Location: abdomen  Size: 0.8L x 0.8W x 0.1depth.  No sinus tract present, Wound base: slough  No undermining present. Wound is full thickness. There is moderate drainage. Periwound: no denudement, erythema, induration, maceration or warmth    Drainage: Moderate   Thickness:  Full  Duration of Need: 30 days  Days Supply: 30 days  Start Date: 2021  Qualifying wound/Debridement: yes/yes     Dressing Type Brand Size Number of pieces Frequency of change   Primary Silvercel   4.25''x4.25'' 3 sheets  3 times per week    Secondary  Mepilex  bordered adhesive bandage   3''x3'' 12 bandages 3 times per week     Square gauze   4''x4'' 2 loafs 3 times per week     Saline bottle  500mL bottle  1 bottle 3 times per week        Note: If total out of pocket is more than  $50.00 please contact the patient before processing order.     OK to forward to covered supplier.    Electronically Signed Physician: BARBARA YAN                         Date: 12/6/2021

## 2021-12-06 NOTE — PROGRESS NOTES
Follow up Vascular Visit       Date of Service:12/06/21      Chief Complaint: abdominal wound      Pt returns to [facility] with regards to their abdominal wound.  They arrive today alone. She has not been seen since July 2021. She developed abscess on her abdomen; in October; when to [facility] and had this lanced; it is slow to respond to treatment. They are currently using foam with tape to the wounds. This is being done by home care 2-3 times per week. They are feeling well today. Denies fevers, chills. No shortness of breath. She has diabetes; fs running low 100s. Last a1c was 5.3% done 3/3021. Pertinent history of morbid obesity, dermatitis, GERD, DANIEL, cellulitis, schizoaffective disorder, HTN, bipolar 1 disorder, mild developmental delay.She reports she needs new diabetic shoes and insoles. She reports she needs her nails debrided; she is unable to do this due to body habitus. She states she needs new velcro wraps.     Allergies:   Allergies   Allergen Reactions     Fiorinal [Butalbital-Aspirin-Caffeine] Anaphylaxis and Swelling     Ativan [Lorazepam]      Bee Venom      Cat Hair Extract      Other reaction(s): Unknown     Clindamycin      Other reaction(s): heartburn     Dextromethorphan      Other reaction(s): hives     Ibuprofen Sodium      Eyes swell     Keflex [Cephalexin-Fd&C Yellow #6]      Eyes swell     Latex Swelling     Lithium Swelling     Topiramate      Other reaction(s): edema/swelling     Trileptal      numbness     Topamax Rash       Medications:   Current Outpatient Medications:      ACETAMINOPHEN EXTRA STRENGTH 500 MG tablet, , Disp: , Rfl:      ARIPiprazole (ABILIFY) 20 MG tablet, Take 1 tablet by mouth, Disp: , Rfl:      ASPIRIN LOW DOSE 81 MG EC tablet, , Disp: , Rfl:      atorvastatin (LIPITOR) 40 MG tablet, , Disp: , Rfl:      bacitracin 500 UNIT/GM external ointment, Apply topically 2 times daily Apply thin layer twice daily for 10-14 days to abdominal wound.,  Disp: 30 g, Rfl: 0     bumetanide (BUMEX) 2 MG tablet, , Disp: , Rfl:      cabergoline (DOSTINEX) 0.5 MG tablet, TAKE 1 TABLET (0.5 MG) BY MOUTH TWICE A WEEK, Disp: 24 tablet, Rfl: 1     Calcium Citrate-Vitamin D3 315-6.25 MG-MCG TABS, TAKE 2 TABLETS BY MOUTH DAILY, Disp: 60 tablet, Rfl: 10     cetirizine (ZYRTEC) 10 MG tablet, , Disp: , Rfl:      Dulaglutide (TRULICITY) 3 MG/0.5ML SOPN, once a week , Disp: , Rfl:      fenofibrate (TRICOR) 145 MG tablet, , Disp: , Rfl:      FENOFIBRATE PO, Take 145 mg by mouth daily, Disp: , Rfl:      glimepiride (AMARYL) 2 MG tablet, Take 1 tablet (2 mg) by mouth every morning (before breakfast), Disp: 90 tablet, Rfl: 3     Hypertonic Nasal Wash (SINUS RINSE KIT) PACK, Spray 1 packet in nostril daily, Disp: 200 each, Rfl: 3     levothyroxine (SYNTHROID/LEVOTHROID) 112 MCG tablet, TAKE 1 TABLET (112MCG) BY MOUTH DAILY, Disp: 90 tablet, Rfl: 3     medroxyPROGESTERone (PROVERA) 10 MG tablet, TAKE 1 TABLET  10 MG  BY MOUTH ONCE DAILY FOR 5 DAYS EACH MONTH, Disp: , Rfl: 0     Multiple Vitamins-Minerals (CERTAVITE/ANTIOXIDANTS PO), , Disp: , Rfl:      NYAMYC 818925 UNIT/GM external powder, , Disp: , Rfl:      omeprazole (PRILOSEC) 40 MG capsule, Take 1 capsule by mouth daily., Disp: , Rfl:      potassium chloride ER (KLOR-CON M) 20 MEQ CR tablet, , Disp: , Rfl:      spironolactone (ALDACTONE) 50 MG tablet, , Disp: , Rfl:      VITAMIN D3 25 MCG (1000 UT) tablet, , Disp: , Rfl:      sulfamethoxazole-trimethoprim (BACTRIM DS) 800-160 MG tablet, , Disp: , Rfl:     Current Facility-Administered Medications:      lidocaine (XYLOCAINE) 2 % external gel, , Topical, PRN, Luciana Neal NP, Given at 12/06/21 1354    History:   Past Medical History:   Diagnosis Date     Acne rosacea      Allergic rhinitis      Bipolar 1 disorder (H)     followed by psych     Bipolar disorder (H) 1992     Cellulitis, leg 2016     Dyslipidemia      Ganglion, left wrist      GERD (gastroesophageal reflux disease)       GERD (gastroesophageal reflux disease)      Growth retardation, mild developmental delay, and chronic hepatitis syndrome (H)      Herpes zoster      Hypertension      Hypothyroid      Irritable bowel syndrome      Lymphedema of lower extremity 2008     Morbid obesity (H)      Nightmares      Obstructive sleep apnea      Pituitary adenoma (H)     S/P trans nasal resection in 2000     Post traumatic stress disorder      Post traumatic stress disorder      Post traumatic stress disorder (PTSD)      Prader-Willi syndrome      Prolactinoma (H) transsphenoid approach 2000.     PTSD (post-traumatic stress disorder)      Schizoaffective disorder, bipolar type (H)      Type 2 diabetes mellitus without complication, with long-term current use of insulin (H)      Venous stasis      Vitamin D deficiency        Physical Exam:    /76   Pulse 100   Temp 97.6  F (36.4  C)   Resp 22   Wt (!) 355 lb (161 kg)   BMI 69.33 kg/m      General:  Patient presents to clinic in no apparent distress.  Head: normocephalic atraumatic  Psychiatric:  Alert and oriented x3.   Respiratory: unlabored breathing; no cough  Integumentary:  Skin is uniformly warm, dry and pink.    Wound #1 Location: abdomen  Size: 0.8L x 0.8W x 0.1depth.  No sinus tract present, Wound base: slough  No undermining present. Wound is full thickness. There is moderate drainage. Periwound: no denudement, erythema, induration, maceration or warmth.      Nails 1-5 bilaterally were thickened; elongated, and discolored; hair growth to the toes; webbing clear; using a monofilament she was able to identify 7/7 sites on the right and left foot; no callous noted    Diabetic foot exam: normal DP and PT pulses, no trophic changes or ulcerative lesions, normal sensory exam and normal monofilament exam    Swelling is stable      Peripheral IV 01/14/16 Right Hand (Active)   Number of days: 2153       VASC Wound Lt shin (Active)   Number of days: 147       VASC Wound ABD  (Active)   Pre Size Length 0.8 12/06/21 1300   Pre Size Width 0.8 12/06/21 1300   Pre Size Depth 0.1 12/06/21 1300   Pre Total Sq cm 0.64 12/06/21 1300   Number of days: 147       VASC Wound LT MID BACK (Active)   Number of days: 147            Circumferential volume measures:      Circumferential Measures 12/3/2020 3/22/2021 4/19/2021 5/3/2021 7/12/2021   Right just above MTP 27.5 27.5 27.5 26.7 28.7   Right Ankle 42 52 47 46 37   Right Widest Calf 66.5 72.3 76 64 73   Right Thigh Up 10cm 76 78.2 - - -   Right Knee to Ankle - - 34 - -   Left - just above MTP 30.4 30.5 31.8 30.2 30.7   Left Ankle 38 55 60 62 39.5   Left Widest Calf 72 84.4 81 74 77   Left Thigh Up 10cm 76 75.3 - - -   Left Knee to Ankle - - 34 - -       Labs:    I personally reviewed the following lab results today and those on care everywhere    CRP   Date Value Ref Range Status   10/23/2021 1.1 (H) 0.0-<0.8 mg/dL Final      Erythrocyte Sedimentation Rate   Date Value Ref Range Status   10/23/2021 60 (H) 0 - 20 mm/hr Final      Last Renal Panel:  Sodium   Date Value Ref Range Status   09/07/2021 141 136 - 145 mmol/L Final   03/23/2020 140 133 - 144 mmol/L Final     Potassium   Date Value Ref Range Status   09/07/2021 3.7 3.5 - 5.0 mmol/L Final   03/23/2020 4.0 3.4 - 5.3 mmol/L Final     Chloride   Date Value Ref Range Status   09/07/2021 108 (H) 98 - 107 mmol/L Final   03/23/2020 112 (H) 94 - 109 mmol/L Final     Carbon Dioxide   Date Value Ref Range Status   03/23/2020 22 20 - 32 mmol/L Final     Carbon Dioxide (CO2)   Date Value Ref Range Status   09/07/2021 20 (L) 22 - 31 mmol/L Final     Anion Gap   Date Value Ref Range Status   09/07/2021 13 5 - 18 mmol/L Final   03/23/2020 5 3 - 14 mmol/L Final     Glucose   Date Value Ref Range Status   09/07/2021 104 70 - 125 mg/dL Final   03/23/2020 102 (H) 70 - 99 mg/dL Final     Urea Nitrogen   Date Value Ref Range Status   09/07/2021 15 8 - 22 mg/dL Final   03/23/2020 16 7 - 30 mg/dL Final      Creatinine   Date Value Ref Range Status   09/07/2021 0.78 0.60 - 1.10 mg/dL Final   03/23/2020 0.70 0.52 - 1.04 mg/dL Final     GFR Estimate   Date Value Ref Range Status   09/07/2021 >90 >60 mL/min/1.73m2 Final     Comment:     As of July 11, 2021, eGFR is calculated by the CKD-EPI creatinine equation, without race adjustment. eGFR can be influenced by muscle mass, exercise, and diet. The reported eGFR is an estimation only and is only applicable if the renal function is stable.   06/29/2021 >60 >60 mL/min/1.73m2 Final   03/23/2020 >90 >60 mL/min/[1.73_m2] Final     Comment:     Non  GFR Calc  Starting 12/18/2018, serum creatinine based estimated GFR (eGFR) will be   calculated using the Chronic Kidney Disease Epidemiology Collaboration   (CKD-EPI) equation.       Calcium   Date Value Ref Range Status   09/07/2021 9.4 8.5 - 10.5 mg/dL Final   03/23/2020 8.9 8.5 - 10.1 mg/dL Final     Phosphorus   Date Value Ref Range Status   09/11/2012 4.1 2.5 - 4.5 mg/dL Final     Albumin   Date Value Ref Range Status   06/29/2021 4.1 3.5 - 5.0 g/dL Final   03/23/2020 3.5 3.4 - 5.0 g/dL Final      Lab Results   Component Value Date    WBC 8.8 08/19/2020    WBC 8.1 11/25/2011     Lab Results   Component Value Date    RBC 3.48 08/19/2020    RBC 3.84 11/25/2011     Lab Results   Component Value Date    HGB 13.0 08/19/2020    HGB 13.1 01/06/2016     Lab Results   Component Value Date    HCT 38.3 08/19/2020    HCT 41.0 11/25/2011     No components found for: MCT  Lab Results   Component Value Date     08/19/2020     11/25/2011     Lab Results   Component Value Date    MCH 37.4 08/19/2020    MCH 35.4 11/25/2011     Lab Results   Component Value Date    MCHC 33.9 08/19/2020    MCHC 33.2 11/25/2011     Lab Results   Component Value Date    RDW 11.9 08/19/2020    RDW 11.8 11/25/2011     Lab Results   Component Value Date     08/19/2020     11/25/2011      Lab Results   Component Value Date     A1C 5.3 03/23/2020    A1C 5.0 01/06/2020    A1C 5.4 08/26/2019    A1C 5.2 04/09/2019    A1C 5.4 12/31/2018    A1C 5.4 09/20/2016    A1C 5.7 01/06/2016    A1C 5.0 06/29/2014      TSH   Date Value Ref Range Status   06/29/2021 2.53 0.30 - 5.00 uIU/mL Final   04/07/2021 3.51 0.40 - 4.00 mU/L Final      No results found for: VITDT                Impression:  Encounter Diagnoses   Name Primary?     Lymphedema Yes     Chronic venous hypertension (idiopathic) with ulcer and inflammation of bilateral lower extremity (H)      Morbid obesity with BMI of 60.0-69.9, adult (H)      Type 2 diabetes mellitus without complication, without long-term current use of insulin (H)      Prader-Willi syndrome      Venous insufficiency of both lower extremities      Swelling of limb           12/6/2021              Are any of these wounds new today: Yes; Location: abdomen    Assessment/Plan:          1. Debridement: After discussion of risk factors and verbal consent was obtained 2% Lidocaine HCL jelly was applied, under clean conditions, the abdominal ulceration(s) were debrided using currette. Devitalized and nonviable tissue, along with any fibrin and slough, was removed to improve granulation tissue formation, stimulate wound healing, decrease overall bacteria load, disrupt biofilm formation and decrease edge senescence.  Total excisional debridement was 0.64 sq cm from the epidermis/dermis area and into the subcutaneous tissue with a depth of 0.1 cm.   Ulcers were improved afterwards and .  Measures were unchanged after debridement.       2.  Wound treatment: wound treatment will include irrigation and dressings to promote autolytic debridement which will include:will use silvercel and bordered foam; change 3 times per week new wound           3. Edema: she is due for new velcro wraps; prescription written; she will need to make an appt with [facility]; provided phone number. The compression wraps were applied today in clinic.  Stable            4. Nutrition: weight is stable; continues to work with bariatrics; fs running low 100s           5. Offloading: she needs new shoes and insoles; prescription written for this.            6. Nails: nails 1-5 bilaterally were debrided and filed smooth; tolerated well, no complications.      Patient will follow up with me in 4 weeks for reevaluation. They were instructed to call the clinic sooner with any signs or symptoms of infection or any further questions/concerns. Answered all questions.          Luciana Neal DNP, RN, CNP, CWOCN, CFCN, CLT  [facility]   [phone]        This note was electronically signed by Luciana Neal NP

## 2021-12-06 NOTE — TELEPHONE ENCOUNTER
Faxed supply order to [facility] include [facility] beds at the [facility] per patient request

## 2021-12-06 NOTE — PATIENT INSTRUCTIONS
We will order shoes and insoles    We will order new velcro wraps    Please call [phone] to schedule an appt    We will order wound care supplies      Wound Care Instructions    3 TIMES PER WEEK and as needed, Cleanse your abdominal wound(s) with Normal saline.    Pat Dry with non-sterile gauze    Apply skin prep to skin surrounding the wound    Primary Dressing: Apply silvercel into/onto the wounds    Secondary dressing: Cover with mepilex border 3x3    Compression: velcro wraps bilaterally    It is ok to get your wound wet in the bath or shower    SEEK MEDICAL CARE IF:    You have an increase in swelling, pain, or redness around the wound.    You have an increase in the amount of pus coming from the wound.    There is a bad smell coming from the wound.    The wound appears to be worsening/enlarging    You have a fever greater than 101.5 F      It is ok to continue current wound care treatment/products for the next 2-3 days until new wound care supplies are ordered and arrive. If longer than this please contact our office at [phone].

## 2021-12-07 NOTE — TELEPHONE ENCOUNTER
Bushra is calling stating they spoke with [facility] and they haven't received the order yet

## 2021-12-13 ENCOUNTER — TELEPHONE (OUTPATIENT)
Dept: VASCULAR SURGERY | Facility: CLINIC | Age: 41
End: 2021-12-13
Payer: MEDICARE

## 2021-12-19 ENCOUNTER — HEALTH MAINTENANCE LETTER (OUTPATIENT)
Age: 41
End: 2021-12-19

## 2022-01-11 ENCOUNTER — TELEPHONE (OUTPATIENT)
Dept: VASCULAR SURGERY | Facility: CLINIC | Age: 42
End: 2022-01-11
Payer: COMMERCIAL

## 2022-01-11 NOTE — TELEPHONE ENCOUNTER
Sent office visit notes to [facility] as requested and authorized release from patient for flexitouch

## 2022-04-04 ENCOUNTER — LAB REQUISITION (OUTPATIENT)
Dept: LAB | Facility: CLINIC | Age: 42
End: 2022-04-04
Payer: COMMERCIAL

## 2022-04-04 DIAGNOSIS — E03.9 HYPOTHYROIDISM, UNSPECIFIED: ICD-10-CM

## 2022-04-04 DIAGNOSIS — I10 ESSENTIAL (PRIMARY) HYPERTENSION: ICD-10-CM

## 2022-04-04 DIAGNOSIS — F31.9 BIPOLAR DISORDER, UNSPECIFIED (H): ICD-10-CM

## 2022-04-04 DIAGNOSIS — E55.9 VITAMIN D DEFICIENCY, UNSPECIFIED: ICD-10-CM

## 2022-04-04 LAB
ALBUMIN SERPL-MCNC: 3.7 G/DL (ref 3.5–5)
ALP SERPL-CCNC: 55 U/L (ref 45–120)
ALT SERPL W P-5'-P-CCNC: 20 U/L (ref 0–45)
ANION GAP SERPL CALCULATED.3IONS-SCNC: 18 MMOL/L (ref 5–18)
AST SERPL W P-5'-P-CCNC: 27 U/L (ref 0–40)
BILIRUB SERPL-MCNC: 0.7 MG/DL (ref 0–1)
BUN SERPL-MCNC: 14 MG/DL (ref 8–22)
CALCIUM SERPL-MCNC: 9.4 MG/DL (ref 8.5–10.5)
CHLORIDE BLD-SCNC: 104 MMOL/L (ref 98–107)
CHOLEST SERPL-MCNC: 189 MG/DL
CO2 SERPL-SCNC: 21 MMOL/L (ref 22–31)
CREAT SERPL-MCNC: 0.93 MG/DL (ref 0.6–1.1)
GFR SERPL CREATININE-BSD FRML MDRD: 79 ML/MIN/1.73M2
GLUCOSE BLD-MCNC: 114 MG/DL (ref 70–125)
HDLC SERPL-MCNC: 9 MG/DL
LDLC SERPL CALC-MCNC: 124 MG/DL
LDLC SERPL CALC-MCNC: ABNORMAL MG/DL
POTASSIUM BLD-SCNC: 3.1 MMOL/L (ref 3.5–5)
PROT SERPL-MCNC: 7.3 G/DL (ref 6–8)
SODIUM SERPL-SCNC: 143 MMOL/L (ref 136–145)
TRIGL SERPL-MCNC: 530 MG/DL
TSH SERPL DL<=0.005 MIU/L-ACNC: 4.28 UIU/ML (ref 0.3–5)
VALPROATE SERPL-MCNC: 91.7 UG/ML

## 2022-04-04 PROCEDURE — 80053 COMPREHEN METABOLIC PANEL: CPT | Performed by: FAMILY MEDICINE

## 2022-04-04 PROCEDURE — 80165 DIPROPYLACETIC ACID FREE: CPT | Performed by: FAMILY MEDICINE

## 2022-04-04 PROCEDURE — 82306 VITAMIN D 25 HYDROXY: CPT | Mod: ORL | Performed by: FAMILY MEDICINE

## 2022-04-04 PROCEDURE — 84443 ASSAY THYROID STIM HORMONE: CPT | Mod: ORL | Performed by: FAMILY MEDICINE

## 2022-04-04 PROCEDURE — 83721 ASSAY OF BLOOD LIPOPROTEIN: CPT | Performed by: FAMILY MEDICINE

## 2022-04-04 PROCEDURE — 80061 LIPID PANEL: CPT | Mod: ORL | Performed by: FAMILY MEDICINE

## 2022-04-04 PROCEDURE — 80164 ASSAY DIPROPYLACETIC ACD TOT: CPT | Performed by: FAMILY MEDICINE

## 2022-04-05 LAB — DEPRECATED CALCIDIOL+CALCIFEROL SERPL-MC: <4 UG/L (ref 20–75)

## 2022-04-06 LAB
VALPROATE FREE MFR SERPL: 36 %
VALPROATE FREE SERPL-MCNC: 37 UG/ML
VALPROATE SERPL-MCNC: 104 UG/ML

## 2022-04-13 ENCOUNTER — LAB REQUISITION (OUTPATIENT)
Dept: LAB | Facility: CLINIC | Age: 42
End: 2022-04-13
Payer: COMMERCIAL

## 2022-04-13 DIAGNOSIS — E55.9 VITAMIN D DEFICIENCY, UNSPECIFIED: ICD-10-CM

## 2022-04-13 DIAGNOSIS — E78.2 MIXED HYPERLIPIDEMIA: ICD-10-CM

## 2022-04-13 DIAGNOSIS — F31.9 BIPOLAR DISORDER, UNSPECIFIED (H): ICD-10-CM

## 2022-04-13 PROCEDURE — 80076 HEPATIC FUNCTION PANEL: CPT | Mod: ORL | Performed by: FAMILY MEDICINE

## 2022-04-13 PROCEDURE — 80164 ASSAY DIPROPYLACETIC ACD TOT: CPT | Mod: ORL | Performed by: FAMILY MEDICINE

## 2022-04-13 PROCEDURE — 80061 LIPID PANEL: CPT | Performed by: FAMILY MEDICINE

## 2022-04-13 PROCEDURE — 82306 VITAMIN D 25 HYDROXY: CPT | Mod: ORL | Performed by: FAMILY MEDICINE

## 2022-04-13 PROCEDURE — 80165 DIPROPYLACETIC ACID FREE: CPT | Mod: ORL | Performed by: FAMILY MEDICINE

## 2022-04-13 PROCEDURE — 83721 ASSAY OF BLOOD LIPOPROTEIN: CPT | Mod: ORL | Performed by: FAMILY MEDICINE

## 2022-04-14 LAB
ALBUMIN SERPL-MCNC: 3.5 G/DL (ref 3.5–5)
ALP SERPL-CCNC: 51 U/L (ref 45–120)
ALT SERPL W P-5'-P-CCNC: 35 U/L (ref 0–45)
AST SERPL W P-5'-P-CCNC: 33 U/L (ref 0–40)
BILIRUB DIRECT SERPL-MCNC: 0.2 MG/DL
BILIRUB SERPL-MCNC: 0.4 MG/DL (ref 0–1)
CHOLEST SERPL-MCNC: 140 MG/DL
FASTING STATUS PATIENT QL REPORTED: ABNORMAL
HDLC SERPL-MCNC: 11 MG/DL
LDLC SERPL CALC-MCNC: ABNORMAL MG/DL
PROT SERPL-MCNC: 6.3 G/DL (ref 6–8)
TRIGL SERPL-MCNC: 523 MG/DL
VALPROATE SERPL-MCNC: <2 UG/ML

## 2022-04-15 LAB — LDLC SERPL CALC-MCNC: 82 MG/DL

## 2022-04-17 LAB
VALPROATE FREE MFR SERPL: ABNORMAL %
VALPROATE FREE SERPL-MCNC: <7 UG/ML
VALPROATE SERPL-MCNC: <7 UG/ML

## 2022-04-18 LAB — DEPRECATED CALCIDIOL+CALCIFEROL SERPL-MC: 27 UG/L (ref 20–75)

## 2022-04-22 ENCOUNTER — LAB REQUISITION (OUTPATIENT)
Dept: LAB | Facility: CLINIC | Age: 42
End: 2022-04-22
Payer: MEDICARE

## 2022-04-22 ENCOUNTER — LAB REQUISITION (OUTPATIENT)
Dept: LAB | Facility: CLINIC | Age: 42
End: 2022-04-22
Payer: COMMERCIAL

## 2022-04-22 DIAGNOSIS — Z72.51 HIGH RISK HETEROSEXUAL BEHAVIOR: ICD-10-CM

## 2022-04-22 PROCEDURE — 87491 CHLMYD TRACH DNA AMP PROBE: CPT | Mod: ORL | Performed by: PHYSICIAN ASSISTANT

## 2022-04-22 PROCEDURE — 86780 TREPONEMA PALLIDUM: CPT | Performed by: PHYSICIAN ASSISTANT

## 2022-04-22 PROCEDURE — 87389 HIV-1 AG W/HIV-1&-2 AB AG IA: CPT | Mod: ORL | Performed by: PHYSICIAN ASSISTANT

## 2022-04-23 LAB
HIV 1+2 AB+HIV1 P24 AG SERPL QL IA: NEGATIVE
T PALLIDUM AB SER QL: NONREACTIVE

## 2022-04-24 LAB
C TRACH DNA SPEC QL PROBE+SIG AMP: NEGATIVE
N GONORRHOEA DNA SPEC QL NAA+PROBE: NEGATIVE

## 2022-05-17 ENCOUNTER — LAB REQUISITION (OUTPATIENT)
Dept: LAB | Facility: CLINIC | Age: 42
End: 2022-05-17
Payer: COMMERCIAL

## 2022-05-17 DIAGNOSIS — F31.9 BIPOLAR DISORDER, UNSPECIFIED (H): ICD-10-CM

## 2022-05-17 LAB
ALBUMIN SERPL-MCNC: 4.1 G/DL (ref 3.5–5)
ALP SERPL-CCNC: 60 U/L (ref 45–120)
ALT SERPL W P-5'-P-CCNC: 24 U/L (ref 0–45)
ANION GAP SERPL CALCULATED.3IONS-SCNC: 13 MMOL/L (ref 5–18)
AST SERPL W P-5'-P-CCNC: 29 U/L (ref 0–40)
BILIRUB SERPL-MCNC: 0.5 MG/DL (ref 0–1)
BUN SERPL-MCNC: 17 MG/DL (ref 8–22)
CALCIUM SERPL-MCNC: 10 MG/DL (ref 8.5–10.5)
CHLORIDE BLD-SCNC: 100 MMOL/L (ref 98–107)
CO2 SERPL-SCNC: 26 MMOL/L (ref 22–31)
CREAT SERPL-MCNC: 1.17 MG/DL (ref 0.6–1.1)
GFR SERPL CREATININE-BSD FRML MDRD: 60 ML/MIN/1.73M2
GLUCOSE BLD-MCNC: 111 MG/DL (ref 70–125)
POTASSIUM BLD-SCNC: 4 MMOL/L (ref 3.5–5)
PROT SERPL-MCNC: 7.5 G/DL (ref 6–8)
SODIUM SERPL-SCNC: 139 MMOL/L (ref 136–145)
VALPROATE SERPL-MCNC: 62.7 UG/ML

## 2022-05-17 PROCEDURE — 80053 COMPREHEN METABOLIC PANEL: CPT | Performed by: FAMILY MEDICINE

## 2022-05-17 PROCEDURE — 82040 ASSAY OF SERUM ALBUMIN: CPT | Performed by: FAMILY MEDICINE

## 2022-05-17 PROCEDURE — 80164 ASSAY DIPROPYLACETIC ACD TOT: CPT | Mod: ORL | Performed by: FAMILY MEDICINE

## 2022-05-17 PROCEDURE — 80165 DIPROPYLACETIC ACID FREE: CPT | Mod: ORL | Performed by: FAMILY MEDICINE

## 2022-05-19 LAB
VALPROATE FREE MFR SERPL: 16 %
VALPROATE FREE SERPL-MCNC: 12 UG/ML
VALPROATE SERPL-MCNC: 77 UG/ML

## 2022-05-27 ENCOUNTER — TELEPHONE (OUTPATIENT)
Dept: VASCULAR SURGERY | Facility: CLINIC | Age: 42
End: 2022-05-27
Payer: COMMERCIAL

## 2022-05-27 DIAGNOSIS — E66.01 MORBID OBESITY WITH BMI OF 70 AND OVER, ADULT (H): ICD-10-CM

## 2022-05-27 DIAGNOSIS — I87.333 CHRONIC VENOUS HYPERTENSION (IDIOPATHIC) WITH ULCER AND INFLAMMATION OF BILATERAL LOWER EXTREMITY (H): ICD-10-CM

## 2022-05-27 DIAGNOSIS — E66.01 MORBID OBESITY WITH BMI OF 60.0-69.9, ADULT (H): ICD-10-CM

## 2022-05-27 DIAGNOSIS — M21.072 ACQUIRED VALGUS DEFORMITY OF BOTH ANKLES: ICD-10-CM

## 2022-05-27 DIAGNOSIS — M21.071 ACQUIRED VALGUS DEFORMITY OF BOTH ANKLES: ICD-10-CM

## 2022-05-27 DIAGNOSIS — E11.9 TYPE 2 DIABETES MELLITUS WITHOUT COMPLICATION, WITHOUT LONG-TERM CURRENT USE OF INSULIN (H): ICD-10-CM

## 2022-05-27 DIAGNOSIS — I89.0 LYMPHEDEMA: Primary | ICD-10-CM

## 2022-05-27 DIAGNOSIS — I87.2 VENOUS INSUFFICIENCY OF BOTH LOWER EXTREMITIES: ICD-10-CM

## 2022-05-27 DIAGNOSIS — Z87.2 HISTORY OF CELLULITIS: ICD-10-CM

## 2022-05-27 DIAGNOSIS — M79.89 SWELLING OF LIMB: ICD-10-CM

## 2022-05-27 DIAGNOSIS — B35.1 ONYCHOMYCOSIS: ICD-10-CM

## 2022-05-27 NOTE — TELEPHONE ENCOUNTER
Patient is calling requesting an order for Velcro wraps and orthotics and shoes.  Please advise if these can be ordered without an apt.

## 2022-05-31 NOTE — TELEPHONE ENCOUNTER
Pt was updated- I have written prescriptions for her shoes and velcro she will need to call [facility] to get an appt [phone].     She had no further questions.

## 2022-06-05 ENCOUNTER — HEALTH MAINTENANCE LETTER (OUTPATIENT)
Age: 42
End: 2022-06-05

## 2022-07-27 DIAGNOSIS — D35.2 PROLACTINOMA (H): ICD-10-CM

## 2022-07-27 DIAGNOSIS — D35.2 PITUITARY ADENOMA (H): ICD-10-CM

## 2022-07-28 RX ORDER — CABERGOLINE 0.5 MG/1
0.5 TABLET ORAL
Qty: 24 TABLET | Refills: 3 | Status: SHIPPED | OUTPATIENT
Start: 2022-07-28 | End: 2023-01-27

## 2022-07-28 NOTE — TELEPHONE ENCOUNTER
CABERGOLINE 0.5 MG TABS 0.5 Tablet      Last Written Prescription Date:  8/16/21  Last Fill Quantity: 24,   # refills: 1  Last Office Visit : 3/17/21 recommended 1 year follow up  Future Office visit:  1/11/23    Routing refill request to provider for review/approval because:  Drug not on endocrinology refill protocol

## 2022-07-31 ENCOUNTER — HEALTH MAINTENANCE LETTER (OUTPATIENT)
Age: 42
End: 2022-07-31

## 2022-09-12 ENCOUNTER — OFFICE VISIT (OUTPATIENT)
Dept: SURGERY | Facility: CLINIC | Age: 42
End: 2022-09-12
Payer: COMMERCIAL

## 2022-09-12 VITALS
WEIGHT: 293 LBS | BODY MASS INDEX: 57.52 KG/M2 | DIASTOLIC BLOOD PRESSURE: 80 MMHG | SYSTOLIC BLOOD PRESSURE: 130 MMHG | HEIGHT: 60 IN

## 2022-09-12 DIAGNOSIS — R63.5 WEIGHT GAIN DUE TO MEDICATION: ICD-10-CM

## 2022-09-12 DIAGNOSIS — T50.905A WEIGHT GAIN DUE TO MEDICATION: ICD-10-CM

## 2022-09-12 DIAGNOSIS — E11.65 TYPE 2 DIABETES MELLITUS WITH HYPERGLYCEMIA, WITHOUT LONG-TERM CURRENT USE OF INSULIN (H): Primary | ICD-10-CM

## 2022-09-12 DIAGNOSIS — E66.01 MORBID OBESITY WITH BMI OF 60.0-69.9, ADULT (H): ICD-10-CM

## 2022-09-12 PROCEDURE — 99214 OFFICE O/P EST MOD 30 MIN: CPT | Performed by: EMERGENCY MEDICINE

## 2022-09-12 RX ORDER — MULTIVITAMIN WITH FOLIC ACID 400 MCG
TABLET ORAL
COMMUNITY
Start: 2022-07-26 | End: 2023-11-21

## 2022-09-12 RX ORDER — DIVALPROEX SODIUM 500 MG/1
500 TABLET, EXTENDED RELEASE ORAL 3 TIMES DAILY
COMMUNITY
Start: 2022-09-06

## 2022-09-12 RX ORDER — BLOOD-GLUCOSE METER
EACH MISCELLANEOUS
COMMUNITY
Start: 2022-05-23 | End: 2023-11-21

## 2022-09-12 RX ORDER — MUPIROCIN 20 MG/G
OINTMENT TOPICAL
COMMUNITY
Start: 2022-05-17 | End: 2023-11-21

## 2022-09-12 RX ORDER — GLUCOSAM/CHON-MSM1/C/MANG/BOSW 500-416.6
TABLET ORAL
COMMUNITY
Start: 2022-07-27 | End: 2023-11-21

## 2022-09-12 RX ORDER — CLOTRIMAZOLE 1 G/ML
SOLUTION TOPICAL
COMMUNITY
Start: 2022-05-26 | End: 2023-11-21

## 2022-09-12 RX ORDER — BLOOD-GLUCOSE METER
EACH MISCELLANEOUS
COMMUNITY
Start: 2022-08-16 | End: 2023-11-21

## 2022-09-12 RX ORDER — CLOTRIMAZOLE 1 %
CREAM (GRAM) TOPICAL
COMMUNITY
Start: 2022-06-10 | End: 2023-11-21

## 2022-09-12 RX ORDER — PALIPERIDONE 9 MG/1
TABLET, EXTENDED RELEASE ORAL
COMMUNITY
Start: 2022-06-15 | End: 2023-11-21

## 2022-09-12 RX ORDER — LORAZEPAM 2 MG/1
TABLET ORAL
COMMUNITY
Start: 2022-03-21 | End: 2023-11-21

## 2022-09-12 RX ORDER — FLUCONAZOLE 150 MG/1
TABLET ORAL
COMMUNITY
Start: 2022-04-22 | End: 2023-11-21

## 2022-09-12 RX ORDER — PALIPERIDONE 6 MG/1
6 TABLET, EXTENDED RELEASE ORAL EVERY MORNING
COMMUNITY
Start: 2022-08-30

## 2022-09-12 RX ORDER — DIVALPROEX SODIUM 500 MG/1
TABLET, DELAYED RELEASE ORAL
COMMUNITY
Start: 2022-08-17 | End: 2023-04-17

## 2022-09-12 NOTE — PROGRESS NOTES
Bariatric Clinic Follow-Up Visit:    uBshra Buck is a 42 year old  female with There is no height or weight on file to calculate BMI.  presenting here today for follow-up on non-surgical efforts for weight loss. Original Intake visit occurred on 9/17/15 with a weight of 350 lbs BMI of 68.4, gained up to a max of 401lbs in 2016..  Along with diet and behavior changes, she has been previously using GLP1 agonist (Trulicity 3mg at our last visit in Nov 2021) for diabetes and to assist her weight loss goals.  See her intake visit notes for details on identified contributors to weight gain in the past. Chart review shows Dietician calculated RMR of 2300 and protein intake goal of 80g/day.  Weight:   Wt Readings from Last 5 Encounters:   12/06/21 (!) 161 kg (355 lb)   11/18/21 (!) 162.8 kg (359 lb)   09/15/21 (!) 163.3 kg (360 lb)   08/06/21 (!) 163.5 kg (360 lb 8 oz)   07/12/21 (!) 163.3 kg (360 lb)    pounds      Comorbidities:  Patient Active Problem List   Diagnosis     Acne rosacea     Dermatitis     Stasis edema     Pituitary adenoma (H)     Abnormal weight gain     High triglycerides     Prolactinoma (H)     Acquired hypothyroidism     Morbid obesity (H)       Current Outpatient Medications:      ACETAMINOPHEN EXTRA STRENGTH 500 MG tablet, , Disp: , Rfl:      ARIPiprazole (ABILIFY) 20 MG tablet, Take 1 tablet by mouth, Disp: , Rfl:      ASPIRIN LOW DOSE 81 MG EC tablet, , Disp: , Rfl:      atorvastatin (LIPITOR) 40 MG tablet, , Disp: , Rfl:      bacitracin 500 UNIT/GM external ointment, Apply topically 2 times daily Apply thin layer twice daily for 10-14 days to abdominal wound., Disp: 30 g, Rfl: 0     bumetanide (BUMEX) 2 MG tablet, , Disp: , Rfl:      cabergoline (DOSTINEX) 0.5 MG tablet, Take 1 tablet (0.5 mg) by mouth twice a week, Disp: 24 tablet, Rfl: 3     Calcium Citrate-Vitamin D3 315-6.25 MG-MCG TABS, TAKE 2 TABLETS BY MOUTH DAILY, Disp: 60 tablet, Rfl: 10     cetirizine (ZYRTEC) 10 MG tablet, ,  Disp: , Rfl:      Dulaglutide (TRULICITY) 3 MG/0.5ML SOPN, once a week , Disp: , Rfl:      fenofibrate (TRICOR) 145 MG tablet, , Disp: , Rfl:      FENOFIBRATE PO, Take 145 mg by mouth daily, Disp: , Rfl:      glimepiride (AMARYL) 2 MG tablet, Take 1 tablet (2 mg) by mouth every morning (before breakfast), Disp: 90 tablet, Rfl: 3     Hypertonic Nasal Wash (SINUS RINSE KIT) PACK, Spray 1 packet in nostril daily, Disp: 200 each, Rfl: 3     levothyroxine (SYNTHROID/LEVOTHROID) 112 MCG tablet, TAKE 1 TABLET (112MCG) BY MOUTH DAILY, Disp: 90 tablet, Rfl: 3     medroxyPROGESTERone (PROVERA) 10 MG tablet, TAKE 1 TABLET  10 MG  BY MOUTH ONCE DAILY FOR 5 DAYS EACH MONTH, Disp: , Rfl: 0     Multiple Vitamins-Minerals (CERTAVITE/ANTIOXIDANTS PO), , Disp: , Rfl:      NYAMYC [id number] UNIT/GM external powder, , Disp: , Rfl:      omeprazole (PRILOSEC) 40 MG capsule, Take 1 capsule by mouth daily., Disp: , Rfl:      potassium chloride ER (KLOR-CON M) 20 MEQ CR tablet, , Disp: , Rfl:      spironolactone (ALDACTONE) 50 MG tablet, , Disp: , Rfl:      sulfamethoxazole-trimethoprim (BACTRIM DS) 800-160 MG tablet, , Disp: , Rfl:      VITAMIN D3 25 MCG (1000 UT) tablet, , Disp: , Rfl:     Current Facility-Administered Medications:      lidocaine (XYLOCAINE) 2 % external gel, , Topical, PRN, Luciana Neal, NP, Given at 12/06/21 1354      Interim: Since our last visit, she has weight neutral. Walking  Regularly 8 blocks to Friend Sabi's house 2-3 days weekly. Blood sugar running high this AM for reasons likely related to some binge eating (milk) yesterday, 364mg/dl.  Otherwise the last 2 weeks blood sugar as been 178-204mg/dl per her record that we reviewed today.  Diet soda of two, 20oz bottles daily, discussed reducing intake as much as possible and limiting to no more than one daily with zero being the preferred amount.     She's tolerating 3mg/week Trulicity but some difficulty in both glycemic control she was interested in increase to  the 4.5mg/week dose.   Excess calories/binge eating and medication induced weight gain continue to plague her. She's maintaining 47 lbs of weight loss from her maximum weight of 401 lbs in 2016 and has been steady at 350 lbs now for several years. Given her increased blood sugars, good tolerance of Trulicity 3mg/week she was interested in trial of 4.5mg/week dose and well initiate therapy with reassessment later this Fall.       Plan:   1.  Diet: aiming for 1500kcal/day and 80-90 grams per day. Stay away from juice and hydrate well with 80oz of water daily.  Recent elevated blood sugar may be related to milk use, limit milk to one glass daily.    2. Exercise: continue good walking regimen.  3. Medication: Trulicity increase to 4.5mg/week.  You can follow up as planned in a couple weeks with Dr. Azul on check up.   4. Recheck in 3 months  5. Goals: continue protein focus and less liquid calories/ and continuing to avoid soda pop.  6. Continue leg edema wraps and moving regularly.       We discussed [facility] Basics including:  -eating 3 meals daily  -reviewed metabolic needs for weight loss based on Resting Metabolic Rate  -protein goals supportive of healthy weight loss  -avoiding/limiting calorie containing beverages  -We discussed the importance of restorative sleep and stress management in maintaining a healthy weight.  -We discussed the National Weight Control Registry healthy weight maintenance strategies and ways to optimize metabolism.  -We discussed the importance of physical activity including cardiovascular and strength training in maintaining a healthier weight and explored viable options.      Most recent labs:  Lab Results   Component Value Date    WBC 8.8 08/19/2020    HGB 13.0 08/19/2020    HCT 38.3 08/19/2020     (H) 08/19/2020     08/19/2020     Lab Results   Component Value Date    CHOL 140 04/13/2022     Lab Results   Component Value Date    HDL 11 (L) 04/13/2022     No  components found for: LDLCALC  Lab Results   Component Value Date    TRIG 523 (H) 04/13/2022     No results found for: CHOLHDL  Lab Results   Component Value Date    ALT 24 05/17/2022    AST 29 05/17/2022    ALKPHOS 60 05/17/2022     No results found for: HGBA1C  Lab Results   Component Value Date    B12 366 11/25/2011     No components found for: VITDT1  No results found for: EFRAIN  Lab Results   Component Value Date    PTHI 37 07/10/2012     No results found for: ZN  No results found for: VIB1WB  Lab Results   Component Value Date    TSH 4.28 04/04/2022     No results found for: TEST    DIETARY HISTORY    Positive Changes Since Last Visit: walking regularly this summer multiple days weekly  Struggling With: blood sugar increased this AM otherwise has been OK. Friend Sabi revealed some excess milk intake yesterday that likely drove. No infectious sx today: no fever/chills/cough/dysuria/rashes.    She continues to use progesterone therapy 5 days monthly per GYN, which can have appetite stimulant effects as can her Depakote.    Knowledgeable in [employer] Food Labels: reviewed protein sources today and toolbox approach to diet/mindfulness/medication.   Getting Adequate Protein: reviewed goals, 80g/day. Limited finances can limit intake  Sleeping 7-8 hours/day yes  Stress management good    PHYSICAL ACTIVITY PATTERNS:  Cardiovascular: walks. Interested in getting [employer] options.  Strength Training: n/a    REVIEW OF SYSTEMS  Feels well. Concerned about today's blood sugar. Planning to check w/ Dr. Azul later this month..  PHYSICAL EXAM:  Vitals: There were no vitals taken for this visit.  Weight:   Wt Readings from Last 3 Encounters:   12/06/21 (!) 161 kg (355 lb)   11/18/21 (!) 162.8 kg (359 lb)   09/15/21 (!) 163.3 kg (360 lb)         GEN: Pleasant, well groomed, in no acute distress, accompanied by her friend Sabi.   HEENT:  Normal facies. .  NECK: No swelling.  HEART: RRR.  LUNGS: No respiratory difficulty noted.  No cough. .  ABDOMEN: morbid obesity, nontender. .  EXTREMITIES: No tremor. Ambulation is independent. Stands surprisingly quickly/agily from seated position and ambulates without ataxia or visible tremor..  NEURO: Alert and Oriented X3, fluent speech. .  SKIN: No visible rashes. .    Interim study results: reviewed blood sugar log as noted above..      30 minutes spent on the date of the encounter doing chart review, history and exam, documentation and further activities per the note   Carlos Bowling MD  [facility]  8:17 AM  9/12/2022

## 2022-09-12 NOTE — PATIENT INSTRUCTIONS
Plan:   1.  Diet: aiming for 1500kcal/day and 80-90 grams per day. Stay away from juice and hydrate well with 80oz of water daily.  Recent elevated blood sugar may be related to milk use, limit milk to one glass daily.    2. Exercise: continue good walking regimen.  3. Medication: Trulicity increase to 4.5mg/week.  You can follow up as planned in a couple weeks with Dr. Azul on check up.   4. Recheck in 3 months  5. Goals: continue protein focus and less liquid calories/ and continuing to avoid soda pop.  6. Continue leg edema wraps and moving regularly.       Example Meal Plan for a 5655-9993 Calorie Diet:    In order to fuel your weight loss properly and avoid hunger-induced overeating later in the day, for your height and weight, you will enjoy the most success by following the diet below or similar with adjustments based on your particular tastes and preferences.  Exercise may influence speed, amount of weight loss further.     I recommend getting into a meal routine and keeping it similar day to day in the beginning so you don t have to think too hard about what you re going to make/eat.  Keep snacks healthy, ideally containing protein and some vegetables.  Non-processed food is preferable to packaged items.  Eat at least a few crunchy green vegetables if having a snack, which should be 2-3 hours after your mealtimes(prepare these ahead of time for ease of use).  Drink 64 oz -80 oz of water daily for most, some of you will need more and we'll discuss it at your visit if that is the case.      When changing our diet,  we can often mistake thirst for hunger or just have some distracted eating habits that we need to break free from ('bored/mindless eating', screen time,work, driving,etc).  A glass of water and reconsideration of our hunger is often all that is needed.  Having the urge is not the problem, but watching it pass by without acting on it is the goal.    If you re having hunger problems, add a protein  drink/snack to your morning hours or afternoon snack with at least 20grams of protein and not too much sugar (under 10g).  A carton of higher protein/low sugar yogurt can work as well.  If the urge to snack is overwhelming and not satiated, try going for a 10 minute walk/exercise, come home and drink a glass of water and if still hungry, have a  calorie snack (handful of raw/sprouted nuts, veggies and string cheese, protein bar, etc).  Savor it.    It is better to have a large breakfast, a moderate lunch and a smaller dinner to fuel your day.  People lose 10-15% more weight during their weight loss season with this strategy. Optimizing your protein intake at each meal will further keep you more satisfied while eating less food overall.  Getting exercise in early has also been shown to offer the best results (before breakfast ideally but anytime is the right time to exercise if that is not an option for you).    To make sure you re getting adequate vitamins and minerals during weight loss, I recommend one complete multivitamin a day of your choice.  Consider a probiotic and taking some vitamin D 2000 IU daily.    Let supper be your last meal of the day and ideally try to have at least 12 hours between supper and breakfast the next day to tap into some beneficial overnight fasting dynamics.  Midnight snacks need to go away. Water in the evening is fine, unsweetened, non caffeinated herbal tea is helpful as well.  Consolidating your meals within a 8-12 hour period of your day will help tap into these additional metabolic benefits and tends to keep your appetite up for breakfast, further helping to stay on track.  For most of my patients, I don't recommend an intermittent fasting style diet (many find it hard to fit in their lifestyle) but an overnight fast is very doable for most patients and helps regulate our hunger drives a little better.  This makes it very important to nail good intake at all three meals to  feel satisfied/energized and still lose weight.      If evening snacking desires are high, consider a glass of fiber supplement for some additional fullness (metamucil or similar). Most of us don't get the 25-30 grams per day of fiber that promotes good gut health/satiety.  Benefiber, metamucil, citrucel are reasonable/affordable options for most people.  Inulin, chicory, psyllium husk are reasonable options but start slow and low in the dose to avoid gas/bloating until your gut gets acclimated (ramping up to 5-10 grams per day of supplemental fiber after 3-4 weeks if needed).      Example Meal Plan:  Breakfast: 450-475 Calories  1 egg cooked on low in olive oil:   calories.  5oz Greek Yogurt (Fage plain classic: ~150 alexsandra)  Handful of Berries of your choice (about a calorie per berry or 20-40cal per handful)    cup(cooked) of  old fashioned oatmeal or 1/2 cup(cooked) steel cut oats. (150 alexsandra)  Sprinkle amount of brown sugar and a pat of butter. (40 alexsandra)  Glass of  Water  Black coffee or unsweetened Tea (0calories).      2-3 hours Later Snack: (195 calories).  Glass of water  One string Cheese (80 calories) or 4 oz creamed cottage cheese (115 calories) with  Crunchy Celery sticks (less than 10 calories per large stalk) 2 stalks. (20 calories)    of a  Large Banana or   of a Large Apple (60 calories):  eat second half at lunch or afternoon snack.     Lunch:300 -350 calories   Chicken Breast  (baked/broiled/roasted/grilled)  4-6 oz.  (125-180 aelxsandra), BBQ sauce/hot sauce/mustard/seasoning is free. Just use a reasonable amount. Or a can of tuna with 1 tablespoon mayonnaise.  Salad: lettuce, any other veggies (cucumbers, green peppers/celery you like and a small drizzle of dressing to just flavor.  Go as big on the veggies as you like,  as they are practically calorie free.   A whole, 8 inch cucumber is 45 calories, a whole green pepper is 23 calories, a stalk of celery is 9 calories.  [place] Dressing is 60  calories per tablespoon..so moderate your desired dressing or do a drizzle of olive oil and splash of balsamic vinegar on top,  Total calories unlikely to be over 150 even with dressing.  Glass of Water.    Option for lunch is meal replacement protein drink/smoothie.  Need at least 20 grams of protein and eat the rest of your apple/banana from the morning snack.      Afternoon Snack: 150-200 calories   Cheese Stick or cottage cheese again  and a fresh fruit OR  Granola Bar (protein Bar acceptable if under 200 calories OR  Homemade smoothies:  8oz skim milk,  a handful of berries (fresh or frozen and a serving of protein powder such as BiPro or Sofia sWhey for example.  If you don't like dairy, make with 8oz water, one small banana, handful of berries and the protein powder, add any veggies you want as well:  roughly 200 calories.   Glass of Water    Dinner: 325 calories  4oz of fresh, Atlantic salmon.  Broiled (salt/pepper/dill) for about 8-8.5 minutes (200calories) or  4oz filet mignon steak or sirloin steak  Salad or vegetable sautéed lightly in olive oil or   Broccoli 1.5  cups chopped and steamed  or micro-waved in a little water (75 calories)  Glass of Water,    Cup of herbal tea (unsweetened, caffeine free)      Herbs and seasonings are encouraged to flavor your foods/vegetables.  Make your food delicious.      Tips for Success:  1.  Prepare proteins ahead of time (broil chicken breasts in bulk so you can grab and go), steel cut oats/lentils can be stored in casserole dish/bowl in the fridge for quick scoop in the morning and rewarm in microwave, make use of crock pot recipes (watch salt content).  Making meals that cover 3-4 future meals is an easy way to stay on track.  2.  Drink a 8-12 oz glass of water every 2-3 hours when awake.  We often mistake hunger for thirst, especially when losing weight.  3. Remember your Reward and Motivation when things get hard.  4.  Weigh yourself every morning and record,  "you'll stay on track better and learn how our biorhythms, diet and elimination patterns show up on the scale. Don't worry about 1 or 2 day patterns, but when on track you'll notice good trend downward of weight over 3-4 day segments.  Plateaus tend to resolve after 4-8 days in most cases if you stay consistent with your plan.  These are natural and part of weight loss, even if you're perfect with your plan execution.  5. Call if problems/concerns.  [employer] is a great tool to stay in touch and provide weekly outside accountability. Check in with questions or if you want to brag.  6.  Find a handful of meals/foods that keep you on track and feeling good and get into a routine that is sustainable for you.  It's OK to have a routine that works for you.  7.  Consider taking a complete multivitamin just to make sure all micronutrients are adequate during weight loss.  8. If losing hair/brittle nails it usually means you are not taking enough protein.  Minimum goal is 60 grams daily of protein for smaller women, 80 grams a day for men. Consider taking Biotin as supplement or a \"Hair and Nail\" multivitamin.          LEAN PROTEIN SOURCES  Getting 20-30 grams of protein, 3 meals daily, is appropriate for most people, some need more but more than about 40 grams per meal is not useful.  General rule is drinking one ounce of water per gram of protein eaten over the course of the day:  70 grams of protein each day, drink 70 oz of water.  Protein Source Portion Calories Grams of Protein                           Nonfat, plain Greek yogurt    (10 grams sugar or less) 3/4 cup (6 oz)  12-17   Light Yogurt (10 grams sugar or less) 3/4 cup (6 oz)  6-8   Protein Shake 1 shake 110-180 15-30   Skim/1% Milk or lactose-free milk 1 cup ( 8 oz)  8   Plain or light, flavored soymilk 1 cup  7-8   Plain or light, hemp milk 1 cup 110 6   Fat Free or 1% Cottage Cheese 1/2 cup 90 15   Part skim ricotta cheese 1/2 cup 100 14 "   Part skim or reduced fat cheese slices 1 ounce 65-80 8     Mozzarella String Cheese 1 80 8   Canned tuna, chicken, crab or salmon  (canned in water)  1/2 cup 100 15-20   White fish (broiled, grilled, baked) 3 ounces 100 21   [place]/Tuna (broiled, grilled, baked) 3 ounces 150-180 21   Shrimp, Scallops, Lobster, Crab 3 ounces 100 21   Pork loin, Pork Tenderloin 3 ounces 150 21   Boneless, skinless chicken /turkey breast                          (broiled, grilled, baked) 3 ounces 120 21   [place], [place], [place], and Venison 3 ounces 120 21   Lean cuts of red meat and pork (sirloin,   round, tenderloin, flank, ground 93%-96%) 3 ounces 170 21   Lean or Extra Lean Ground Turkey 1/2 cup 150 20   90-95% Lean [place] Burger 1 abby 140-180 21   Low-fat casserole with lean meat 3/4 cup 200 17   Luncheon Meats                                                        (turkey, lean ham, roast beef, chicken) 3 ounces 100 21   Egg (boiled, poached, scrambled) 1 Egg 60 7   Egg Substitute 1/2 cup 70 10   Nuts (limit to 1 serving per day)  3 Tbsp. 150 7   Nut [place] (peanut, almond)  Limit to 1 serving or less daily 1 Tbsp. 90 4   Soy Burger (varies) 1  15   Garbanzo, Black, [place] Beans 1/2 cup 110 7   Refried Beans 1/2 cup 100 7   Kidney and Lima beans 1/2 cup 110 7   Tempeh 3 oz 175 18   Vegan crumbles 1/2 cup 100 14   Tofu 1/2 cup 110 14   Chili (beans and extra lean beef or turkey) 1 cup 200 23   Lentil Stew/Soup 1 cup 150 12   Black Bean Soup 1 cup 175 12           On-the-Go Breakfast Ideas  As of 2015, the latest research shows what a huge impact eating breakfast has on losing weight and feeling your best. People lose more weight when they make breakfast their biggest meal of the day compared to Dinner, but even if you cannot go to that degree, getting a breakfast that has at least 20 grams of protein and even a moderate amount of fat is ideal for maintaining good energy through the day and limits overeating in the evening  hours.  The following are some quick and easy suggestions for at least getting something of substance into your body in the morning.  Enjoy!    Eating breakfast within 90 minutes of waking up is an important part of taking care of your body.  After sleeping for hours, your body is in need of fuel.  An ideal breakfast is a combination of protein, whole-grain carbohydrates, or fruit.  Here s why:    -Protein digests very slowly in the body, helping you feel more satisfied.  -Whole grains provide dietary fiber, which also digests slowly and helps keep your gut clean.  -Fruit is a great source of vitamins, minerals, and fiber.      Each one of these breakfast combinations has between 200-300 calories and 15-20 grams of protein.  Feel free to mix and match!    Protein: Choose  -1/2 cup low-fat cottage cheese  -2 hard boiled eggs , or one cooked in olive oil (low/slow heat).  -1 low fat string cheese stick  -1 Tablespoon natural peanut butter  -[employer] vegetarian sausage abby (found in freezer section)  -1 slice lowfat cheese  -6 oz 2% or lowfat Greek yogurt, such as Fage or Oikos.    PLUS    Whole Grains:  Choose   -1 whole wheat English muffin  -1 whole wheat gordon, half  -1/2 Fiber One frozen muffin, thawed  -1/2 Fiber One toaster pastry  -1 whole wheat bagel thin  -1/2 cup Kashi cereal  -1 Kashi waffle (or other whole grain high-fiber waffle)    OR    Fruit: Choose  -1/3 cup blueberries  -1/2 banana (or a plantain- similar to a banana, yet smaller)  -1/2 cup cantaloupe cubes  -1 small apple  -1 small orange  -1/2 cup strawberries    *Adapted from [employer], Fall 20    Ten Breakfasts Under 250 calories    Ideally, getting between 350-600 calories  (depending on starting height and weight)for breakfast is ideal for avoiding hunger later in the day, adjust/add to the following accordingly:    One- 250 calories, 8.5 g protein  1 slice whole-grain toast   1 Tbsp peanut butter    banana    Two- 250 calories,  8 g protein    cup nonfat/lowfat yogurt  1/3rd cup diced no-sugar peaches  1/3rd cup cereal (like Special K, Cheerios, or bran flakes)    Three- 250 calories, 25 g protein  1 egg scrambled with 1 oz skim milk    cup shredded cheddar    whole grain English muffin  1 oz [place] weathers  1 tsp margarine spread    Four- 225 calories, 25 g protein  1/2 cup Kashi Go-Lean cereal    cup skim milk mixed with 1 scoop Bariatric Advantage protein powder    cup no-sugar diced pears    Five- 250 calories, 20 g protein    cup oatmeal prepared with skim milk, 1 scoop protein powder, and sugar-free maple syrup    Six- 200 calories, 5 g protein  1 whole grain waffle, toasted  1 tablespoon creamy peanut or almond butter    Seven-  250 calories, 19 g protein  Breakfast sandwich: 1 slice whole grain toast, cut in half.  Add 1 scrambled egg and one slice cheddar  cheese.    Eight-  250 calories, 15 g protein  2 eggs scrambled with 1/3 cup frozen spinach (heat before adding to eggs) and 2 tablespoons low fat cream cheese.    Nine-  150 calories, 15 g protein  2/3rd cup cottage cheese    cup cantaloupe    Ten- 200 calories, 20 g protein  Fruit smoothie made with 4 oz. nonfat Greek yogurt,   cup berries, 1 scoop protein powder, and 4 oz skim milk.    Ten Lunches Under 250 Calories    Aim for lunch to be around 300-400 calories a day when trying to lose weight and get that protein in!    One- 200 calories, 11 g protein  1/3 cup tuna salad made with light kennedy on 1 slice whole grain bread  1 small peeled apple    Two- 250 calories, 16 g protein  1/3 cup lowfat cottage cheese    cup cooked green beans    small fruit cocktail (in natural juice)    Three- 200 calories, 11 g protein    grilled cheese sandwich on whole grain bread with lowfat cheese  2/3rd cup of tomato soup    Four- 250 calories, 22 g protein  Deli wrap: 1 oz sliced turkey, 1 oz sliced ham, 1 oz sliced chicken rolled up with 1 slice low-fat cheese  1 small orange    Five- 250  calories, 28 g protein  2/3rd cup chili with 1 oz shredded cheese  4 saltine crackers    Six- 250 calories, 22 g protein  1 cup fresh spinach with 2 oz chicken, 1/3rd cup mandarin oranges, and 2 tablespoons sliced almonds with 1 tablespoon light vinaigrette dressing    Seven- 200 calories, 11 g protein  1 Tbsp sugar-free preserves and 1 Tbsp peanut butter on 1 slice whole grain toast    cup nonfat/lowfat Greek yogurt    Eight- 250 calories, 18 g protein  1 small soft-shell chicken taco with 1 oz shredded cheese, lettuce, tomato, salsa, and 1 Tbsp light sour cream    cup black beans    Nine- 225 calories, 13 g protein  2 ounces baked chicken  1/4 cup mashed potatoes    cup green beans    Ten- 200 calories, 21 g protein  Deli gordon: 2 oz roast beef or other deli meat with 1 tsp Don mayonnaise and sliced tomato, onion, and lettuce  1/3rd cup cottage cheese      Ten Dinners Under 300 calories    If you're eating a large breakfast and medium lunch, keep dinner small.  300-400 calories is ideal for most people depending on their caloric needs.    One- 300 calories, 12 g protein  1-inch thick slice of turkey meatloaf    cup baked butternut squash    Two- 200 calories, 9 g protein  Bread-less BLT: 3 slices turkey weathers, sliced tomato, wrapped in a large lettuce leaf    cup peeled fruit    Three- 275 calories, 36 g protein  3 oz roasted chicken    cup cooked broccoli    cup shredded cheddar cheese    cup unsweetened applesauce    Four- 200 calories, 25 g protein  3 oz baked tilapia  1/3rd cup cooked carrots    cup yogurt    Five- 250 calories, 20 g protein  Grilled ham  n  Swiss: spread 2 tsp light margarine on 1 slice of whole grain bread.  Cut bread in half, layer 2 oz deli ham with 1 piece of Swiss cheese and grill until cheese is melted.    cup cooked vegetables    Six- 250 calories, 18 g protein  Vegetarian cheeseburger: 1 [place] cheeseburger topped with lettuce, onion, tomato, and ketchup/mustard    cup sweet potato  fries    Seven- 250 calories, 18 g protein  Pork pot roast: 2 oz roasted pork loin, 1/3rd cup roasted carrots,   medium potato, cooked with   cup gravy    Eight- 300 calories, 25 g protein  2 oz meatballs (about 2 small meatballs)    cup spaghetti sauce  1/2 piece toast topped with 1 tsp light margarine and topped with garlic powder, toasted in oven    Nine- 250 calories, 16 g protein  Mexican pizza: one 8  corn tortilla topped with 2 oz chicken,   cup salsa, 2 tablespoons black beans, 2 tablespoons shredded cheese.  Bake until cheese is melted.    Ten- 250 calories, 22 g protein  Shrimp stir-mckenzie: 3 oz cooked shrimp, 1/6th onion,   pepper,   cup chopped carrots sautéed in 1 tablespoon olive oil, topped with 2 tablespoons stir mckenzie sauce and a pinch of sesame seeds

## 2022-09-12 NOTE — LETTER
9/12/2022         RE: Bushra Buck  [address]  [place] [address]        Dear Colleague,    Thank you for referring your patient, Bushra Buck, to the [facility] AND [facility] [place]. Please see a copy of my visit note below.    Bariatric Clinic Follow-Up Visit:    Bushra Buck is a 42 year old  female with There is no height or weight on file to calculate BMI.  presenting here today for follow-up on non-surgical efforts for weight loss. Original Intake visit occurred on 9/17/15 with a weight of 350 lbs BMI of 68.4, gained up to a max of 401lbs in 2016..  Along with diet and behavior changes, she has been previously using GLP1 agonist (Trulicity 3mg at our last visit in Nov 2021) for diabetes and to assist her weight loss goals.  See her intake visit notes for details on identified contributors to weight gain in the past. Chart review shows Dietician calculated RMR of 2300 and protein intake goal of 80g/day.  Weight:   Wt Readings from Last 5 Encounters:   12/06/21 (!) 161 kg (355 lb)   11/18/21 (!) 162.8 kg (359 lb)   09/15/21 (!) 163.3 kg (360 lb)   08/06/21 (!) 163.5 kg (360 lb 8 oz)   07/12/21 (!) 163.3 kg (360 lb)    pounds      Comorbidities:  Patient Active Problem List   Diagnosis     Acne rosacea     Dermatitis     Stasis edema     Pituitary adenoma (H)     Abnormal weight gain     High triglycerides     Prolactinoma (H)     Acquired hypothyroidism     Morbid obesity (H)       Current Outpatient Medications:      ACETAMINOPHEN EXTRA STRENGTH 500 MG tablet, , Disp: , Rfl:      ARIPiprazole (ABILIFY) 20 MG tablet, Take 1 tablet by mouth, Disp: , Rfl:      ASPIRIN LOW DOSE 81 MG EC tablet, , Disp: , Rfl:      atorvastatin (LIPITOR) 40 MG tablet, , Disp: , Rfl:      bacitracin 500 UNIT/GM external ointment, Apply topically 2 times daily Apply thin layer twice daily for 10-14 days to abdominal wound., Disp: 30 g, Rfl: 0     bumetanide (BUMEX) 2  MG tablet, , Disp: , Rfl:      cabergoline (DOSTINEX) 0.5 MG tablet, Take 1 tablet (0.5 mg) by mouth twice a week, Disp: 24 tablet, Rfl: 3     Calcium Citrate-Vitamin D3 315-6.25 MG-MCG TABS, TAKE 2 TABLETS BY MOUTH DAILY, Disp: 60 tablet, Rfl: 10     cetirizine (ZYRTEC) 10 MG tablet, , Disp: , Rfl:      Dulaglutide (TRULICITY) 3 MG/0.5ML SOPN, once a week , Disp: , Rfl:      fenofibrate (TRICOR) 145 MG tablet, , Disp: , Rfl:      FENOFIBRATE PO, Take 145 mg by mouth daily, Disp: , Rfl:      glimepiride (AMARYL) 2 MG tablet, Take 1 tablet (2 mg) by mouth every morning (before breakfast), Disp: 90 tablet, Rfl: 3     Hypertonic Nasal Wash (SINUS RINSE KIT) PACK, Spray 1 packet in nostril daily, Disp: 200 each, Rfl: 3     levothyroxine (SYNTHROID/LEVOTHROID) 112 MCG tablet, TAKE 1 TABLET (112MCG) BY MOUTH DAILY, Disp: 90 tablet, Rfl: 3     medroxyPROGESTERone (PROVERA) 10 MG tablet, TAKE 1 TABLET  10 MG  BY MOUTH ONCE DAILY FOR 5 DAYS EACH MONTH, Disp: , Rfl: 0     Multiple Vitamins-Minerals (CERTAVITE/ANTIOXIDANTS PO), , Disp: , Rfl:      NYAMYC 517561 UNIT/GM external powder, , Disp: , Rfl:      omeprazole (PRILOSEC) 40 MG capsule, Take 1 capsule by mouth daily., Disp: , Rfl:      potassium chloride ER (KLOR-CON M) 20 MEQ CR tablet, , Disp: , Rfl:      spironolactone (ALDACTONE) 50 MG tablet, , Disp: , Rfl:      sulfamethoxazole-trimethoprim (BACTRIM DS) 800-160 MG tablet, , Disp: , Rfl:      VITAMIN D3 25 MCG (1000 UT) tablet, , Disp: , Rfl:     Current Facility-Administered Medications:      lidocaine (XYLOCAINE) 2 % external gel, , Topical, PRN, Luciana Neal, NP, Given at 12/06/21 1354      Interim: Since our last visit, she has weight neutral. Walking  Regularly 8 blocks to Friend Sabi's house 2-3 days weekly. Blood sugar running high this AM for reasons likely related to some binge eating (milk) yesterday, 364mg/dl.  Otherwise the last 2 weeks blood sugar as been 178-204mg/dl per her record that we reviewed  today.  Diet soda of two, 20oz bottles daily, discussed reducing intake as much as possible and limiting to no more than one daily with zero being the preferred amount.     She's tolerating 3mg/week Trulicity but some difficulty in both glycemic control she was interested in increase to the 4.5mg/week dose.   Excess calories/binge eating and medication induced weight gain continue to plague her. She's maintaining 47 lbs of weight loss from her maximum weight of 401 lbs in 2016 and has been steady at 350 lbs now for several years. Given her increased blood sugars, good tolerance of Trulicity 3mg/week she was interested in trial of 4.5mg/week dose and well initiate therapy with reassessment later this Fall.       Plan:   1.  Diet: aiming for 1500kcal/day and 80-90 grams per day. Stay away from juice and hydrate well with 80oz of water daily.  Recent elevated blood sugar may be related to milk use, limit milk to one glass daily.    2. Exercise: continue good walking regimen.  3. Medication: Trulicity increase to 4.5mg/week.  You can follow up as planned in a couple weeks with Dr. Azul on check up.   4. Recheck in 3 months  5. Goals: continue protein focus and less liquid calories/ and continuing to avoid soda pop.  6. Continue leg edema wraps and moving regularly.       We discussed [facility] Bariat[facility] Basics including:  -eating 3 meals daily  -reviewed metabolic needs for weight loss based on Resting Metabolic Rate  -protein goals supportive of healthy weight loss  -avoiding/limiting calorie containing beverages  -We discussed the importance of restorative sleep and stress management in maintaining a healthy weight.  -We discussed the National Weight Control Registry healthy weight maintenance strategies and ways to optimize metabolism.  -We discussed the importance of physical activity including cardiovascular and strength training in maintaining a healthier weight and explored viable options.      Most recent  labs:  Lab Results   Component Value Date    WBC 8.8 08/19/2020    HGB 13.0 08/19/2020    HCT 38.3 08/19/2020     (H) 08/19/2020     08/19/2020     Lab Results   Component Value Date    CHOL 140 04/13/2022     Lab Results   Component Value Date    HDL 11 (L) 04/13/2022     No components found for: LDLCALC  Lab Results   Component Value Date    TRIG 523 (H) 04/13/2022     No results found for: CHOLHDL  Lab Results   Component Value Date    ALT 24 05/17/2022    AST 29 05/17/2022    ALKPHOS 60 05/17/2022     No results found for: HGBA1C  Lab Results   Component Value Date    B12 366 11/25/2011     No components found for: VITDT1  No results found for: EFRAIN  Lab Results   Component Value Date    PTHI 37 07/10/2012     No results found for: ZN  No results found for: VIB1WB  Lab Results   Component Value Date    TSH 4.28 04/04/2022     No results found for: TEST    DIETARY HISTORY    Positive Changes Since Last Visit: walking regularly this summer multiple days weekly  Struggling With: blood sugar increased this AM otherwise has been OK. Friend Sabi revealed some excess milk intake yesterday that likely drove. No infectious sx today: no fever/chills/cough/dysuria/rashes.    She continues to use progesterone therapy 5 days monthly per GYN, which can have appetite stimulant effects as can her Depakote.    Knowledgeable in [place] Food Labels: reviewed protein sources today and toolbox approach to diet/mindfulness/medication.   Getting Adequate Protein: reviewed goals, 80g/day. Limited finances can limit intake  Sleeping 7-8 hours/day yes  Stress management good    PHYSICAL ACTIVITY PATTERNS:  Cardiovascular: walks. Interested in getting [employer] options.  Strength Training: n/a    REVIEW OF SYSTEMS  Feels well. Concerned about today's blood sugar. Planning to check w/ Dr. Azul later this month..  PHYSICAL EXAM:  Vitals: There were no vitals taken for this visit.  Weight:   Wt Readings from Last 3 Encounters:    12/06/21 (!) 161 kg (355 lb)   11/18/21 (!) 162.8 kg (359 lb)   09/15/21 (!) 163.3 kg (360 lb)         GEN: Pleasant, well groomed, in no acute distress, accompanied by her friend Sabi.   HEENT:  Normal facies. .  NECK: No swelling.  HEART: RRR.  LUNGS: No respiratory difficulty noted. No cough. .  ABDOMEN: morbid obesity, nontender. .  EXTREMITIES: No tremor. Ambulation is independent. Stands surprisingly quickly/agily from seated position and ambulates without ataxia or visible tremor..  NEURO: Alert and Oriented X3, fluent speech. .  SKIN: No visible rashes. .    Interim study results: reviewed blood sugar log as noted above..      30 minutes spent on the date of the encounter doing chart review, history and exam, documentation and further activities per the note   Carlos Bowling MD  [facility]  8:17 AM  9/12/2022      Again, thank you for allowing me to participate in the care of your patient.        Sincerely,        Carols Bowling MD

## 2022-10-15 ENCOUNTER — HEALTH MAINTENANCE LETTER (OUTPATIENT)
Age: 42
End: 2022-10-15

## 2022-10-19 ENCOUNTER — LAB REQUISITION (OUTPATIENT)
Dept: LAB | Facility: CLINIC | Age: 42
End: 2022-10-19
Payer: COMMERCIAL

## 2022-10-19 DIAGNOSIS — L73.2 HIDRADENITIS SUPPURATIVA: ICD-10-CM

## 2022-10-19 PROCEDURE — 87077 CULTURE AEROBIC IDENTIFY: CPT | Mod: ORL | Performed by: FAMILY MEDICINE

## 2022-10-19 PROCEDURE — 87070 CULTURE OTHR SPECIMN AEROBIC: CPT | Performed by: FAMILY MEDICINE

## 2022-10-22 LAB — BACTERIA SKIN AEROBE CULT: ABNORMAL

## 2022-10-26 ENCOUNTER — LAB REQUISITION (OUTPATIENT)
Dept: LAB | Facility: CLINIC | Age: 42
End: 2022-10-26
Payer: COMMERCIAL

## 2022-10-26 DIAGNOSIS — I89.0 LYMPHEDEMA, NOT ELSEWHERE CLASSIFIED: ICD-10-CM

## 2022-10-26 PROCEDURE — 82435 ASSAY OF BLOOD CHLORIDE: CPT | Performed by: FAMILY MEDICINE

## 2022-10-27 LAB
ANION GAP SERPL CALCULATED.3IONS-SCNC: 19 MMOL/L (ref 7–15)
BUN SERPL-MCNC: 26.9 MG/DL (ref 6–20)
CALCIUM SERPL-MCNC: 9.8 MG/DL (ref 8.6–10)
CHLORIDE SERPL-SCNC: 95 MMOL/L (ref 98–107)
CREAT SERPL-MCNC: 0.99 MG/DL (ref 0.51–0.95)
DEPRECATED HCO3 PLAS-SCNC: 21 MMOL/L (ref 22–29)
GFR SERPL CREATININE-BSD FRML MDRD: 73 ML/MIN/1.73M2
GLUCOSE SERPL-MCNC: 273 MG/DL (ref 70–99)
POTASSIUM SERPL-SCNC: 3.9 MMOL/L (ref 3.4–5.3)
SODIUM SERPL-SCNC: 135 MMOL/L (ref 136–145)

## 2022-11-18 ENCOUNTER — HOSPITAL ENCOUNTER (OUTPATIENT)
Facility: CLINIC | Age: 42
Discharge: HOME | End: 2022-11-18
Admitting: OBSTETRICS & GYNECOLOGY
Payer: COMMERCIAL

## 2022-11-18 ENCOUNTER — LAB REQUISITION (OUTPATIENT)
Dept: LAB | Facility: CLINIC | Age: 42
End: 2022-11-18

## 2022-11-18 DIAGNOSIS — N92.6 IRREGULAR MENSTRUATION, UNSPECIFIED: ICD-10-CM

## 2022-11-18 DIAGNOSIS — N91.2 AMENORRHEA, UNSPECIFIED: ICD-10-CM

## 2022-11-18 PROCEDURE — 84443 ASSAY THYROID STIM HORMONE: CPT | Performed by: OBSTETRICS & GYNECOLOGY

## 2022-11-18 PROCEDURE — 84146 ASSAY OF PROLACTIN: CPT | Performed by: OBSTETRICS & GYNECOLOGY

## 2022-11-18 PROCEDURE — 82670 ASSAY OF TOTAL ESTRADIOL: CPT | Performed by: OBSTETRICS & GYNECOLOGY

## 2022-11-18 PROCEDURE — 83002 ASSAY OF GONADOTROPIN (LH): CPT | Performed by: OBSTETRICS & GYNECOLOGY

## 2022-11-18 PROCEDURE — 82627 DEHYDROEPIANDROSTERONE: CPT | Performed by: OBSTETRICS & GYNECOLOGY

## 2022-11-18 PROCEDURE — 83498 ASY HYDROXYPROGESTERONE 17-D: CPT | Performed by: OBSTETRICS & GYNECOLOGY

## 2022-11-18 PROCEDURE — 83001 ASSAY OF GONADOTROPIN (FSH): CPT | Performed by: OBSTETRICS & GYNECOLOGY

## 2022-11-18 PROCEDURE — 84403 ASSAY OF TOTAL TESTOSTERONE: CPT | Performed by: OBSTETRICS & GYNECOLOGY

## 2022-11-18 PROCEDURE — 83520 IMMUNOASSAY QUANT NOS NONAB: CPT | Performed by: OBSTETRICS & GYNECOLOGY

## 2022-11-19 LAB
ESTRADIOL SERPL-MCNC: 17 PG/ML
FSH SERPL IRP2-ACNC: 3.3 MIU/ML
LH SERPL-ACNC: 6.3 MIU/ML
MIS SERPL-MCNC: 1.22 NG/ML (ref 0.03–5.5)
PROLACTIN SERPL 3RD IS-MCNC: 104 NG/ML (ref 5–23)
TSH SERPL DL<=0.005 MIU/L-ACNC: 3.89 UIU/ML (ref 0.3–4.2)

## 2022-11-23 LAB
17OHP SERPL-MCNC: 17 NG/DL
TESTOST SERPL-MCNC: 6 NG/DL (ref 8–60)

## 2022-11-25 LAB — DHEA-S SERPL-MCNC: 34 UG/DL (ref 35–430)

## 2022-12-19 ENCOUNTER — OFFICE VISIT (OUTPATIENT)
Dept: SURGERY | Facility: CLINIC | Age: 42
End: 2022-12-19
Payer: COMMERCIAL

## 2022-12-19 VITALS — WEIGHT: 293 LBS | BODY MASS INDEX: 57.52 KG/M2 | HEIGHT: 60 IN

## 2022-12-19 DIAGNOSIS — R79.89 ELEVATED PROLACTIN LEVEL: ICD-10-CM

## 2022-12-19 DIAGNOSIS — E66.01 MORBID OBESITY WITH BMI OF 60.0-69.9, ADULT (H): Primary | ICD-10-CM

## 2022-12-19 DIAGNOSIS — E11.65 TYPE 2 DIABETES MELLITUS WITH HYPERGLYCEMIA, WITHOUT LONG-TERM CURRENT USE OF INSULIN (H): ICD-10-CM

## 2022-12-19 PROCEDURE — 99214 OFFICE O/P EST MOD 30 MIN: CPT | Performed by: EMERGENCY MEDICINE

## 2022-12-19 NOTE — PROGRESS NOTES
Bariatric Clinic Follow-Up Visit:    Bushra Buck is a 42 year old  female with Body mass index is 66.79 kg/m .  presenting here today for follow-up on non-surgical efforts for weight loss. Original Intake visit occurred on 9/17/15 with a weight of 350 lbs BMI of 68.4, gained up to a max of 401lbs in 2016..  Along with diet and behavior changes, she has been previously using GLP1 agonist (Trulicity 3mg at our visit in Nov 2021) for diabetes and to assist her weight loss goals.  Trulicity dose was increased to 4.5mg/week 9/12/22 at 354 lbs.  See her intake visit notes for details on identified contributors to weight gain in the past. Chart review shows Dietician calculated RMR of 2300 and protein intake goal of 80g/day.  Weight:   Wt Readings from Last 5 Encounters:   12/19/22 (!) 155.1 kg (342 lb)   09/12/22 (!) 160.6 kg (354 lb)   12/06/21 (!) 161 kg (355 lb)   11/18/21 (!) 162.8 kg (359 lb)   09/15/21 (!) 163.3 kg (360 lb)    pounds      Comorbidities:  Patient Active Problem List   Diagnosis     Acne rosacea     Dermatitis     Stasis edema     Pituitary adenoma (H)     Abnormal weight gain     High triglycerides     Prolactinoma (H)     Acquired hypothyroidism     Morbid obesity (H)       Current Outpatient Medications:      ACETAMINOPHEN EXTRA STRENGTH 500 MG tablet, , Disp: , Rfl:      ASPIRIN LOW DOSE 81 MG EC tablet, , Disp: , Rfl:      atorvastatin (LIPITOR) 40 MG tablet, , Disp: , Rfl:      bacitracin 500 UNIT/GM external ointment, Apply topically 2 times daily Apply thin layer twice daily for 10-14 days to abdominal wound., Disp: 30 g, Rfl: 0     Blood Glucose Monitoring Suppl ([employer] TALK MONITORING SYSTEM) w/Device KIT, , Disp: , Rfl:      bumetanide (BUMEX) 2 MG tablet, , Disp: , Rfl:      cabergoline (DOSTINEX) 0.5 MG tablet, Take 1 tablet (0.5 mg) by mouth twice a week, Disp: 24 tablet, Rfl: 3     calcium carbonate-vitamin D (OS-TAWNYA WITH D) 500-200 MG-UNIT tablet, , Disp: , Rfl:      Calcium  Citrate-Vitamin D3 315-6.25 MG-MCG TABS, TAKE 2 TABLETS BY MOUTH DAILY, Disp: 60 tablet, Rfl: 10     cetirizine (ZYRTEC) 10 MG tablet, , Disp: , Rfl:      clotrimazole (LOTRIMIN) 1 % external cream, , Disp: , Rfl:      clotrimazole (LOTRIMIN) 1 % external solution, , Disp: , Rfl:      divalproex sodium delayed-release (DEPAKOTE) 500 MG DR tablet, , Disp: , Rfl:      divalproex sodium extended-release (DEPAKOTE ER) 500 MG 24 hr tablet, , Disp: , Rfl:      Dulaglutide 4.5 MG/0.5ML SOPN, Inject 4.5 mg Subcutaneous every 7 days, Disp: 90 mL, Rfl: 3     EMBRACE TALK GLUCOSE TEST test strip, , Disp: , Rfl:      fenofibrate (TRICOR) 145 MG tablet, , Disp: , Rfl:      FENOFIBRATE PO, Take 145 mg by mouth daily, Disp: , Rfl:      fluconazole (DIFLUCAN) 150 MG tablet, , Disp: , Rfl:      glimepiride (AMARYL) 2 MG tablet, Take 1 tablet (2 mg) by mouth every morning (before breakfast), Disp: 90 tablet, Rfl: 3     Hypertonic Nasal Wash (SINUS RINSE KIT) PACK, Spray 1 packet in nostril daily, Disp: 200 each, Rfl: 3     levothyroxine (SYNTHROID/LEVOTHROID) 112 MCG tablet, TAKE 1 TABLET (112MCG) BY MOUTH DAILY, Disp: 90 tablet, Rfl: 3     LORazepam (ATIVAN) 2 MG tablet, , Disp: , Rfl:      medroxyPROGESTERone (PROVERA) 10 MG tablet, TAKE 1 TABLET  10 MG  BY MOUTH ONCE DAILY FOR 5 DAYS EACH MONTH, Disp: , Rfl: 0     Multiple Vitamin (TAB-A-NARESH) TABS, , Disp: , Rfl:      Multiple Vitamins-Minerals (CERTAVITE/ANTIOXIDANTS PO), , Disp: , Rfl:      mupirocin (BACTROBAN) 2 % external ointment, , Disp: , Rfl:      NYAMYC 747361 UNIT/GM external powder, , Disp: , Rfl:      omeprazole (PRILOSEC) 40 MG DR capsule, Take 1 capsule by mouth daily., Disp: , Rfl:      paliperidone ER (INVEGA) 6 MG 24 hr tablet, , Disp: , Rfl:      paliperidone ER (INVEGA) 9 MG 24 hr tablet, , Disp: , Rfl:      potassium chloride ER (KLOR-CON M) 20 MEQ CR tablet, , Disp: , Rfl:      spironolactone (ALDACTONE) 50 MG tablet, , Disp: , Rfl:       sulfamethoxazole-trimethoprim (BACTRIM DS) 800-160 MG tablet, , Disp: , Rfl:      [employer] Lancets 30G MISC, , Disp: , Rfl:      VITAMIN D3 25 MCG (1000 UT) tablet, , Disp: , Rfl:     Current Facility-Administered Medications:      lidocaine (XYLOCAINE) 2 % external gel, , Topical, PRN, Luciana Neal, NP, Given at 12/06/21 1354      Interim: Since our last visit, she has had good response to increased dose of Trulicity with blood sugars much improved and running between 94-139mg/dl the last week and no longer having regular sugars over 200 since the end of Sept/early Oct. Weight is down another 12 lbs since September.   walking more, regular .    Plan:  1. Great work with diet changes, protein intake and walking regularly. The blood sugar control is much improved based on your log today with most sugars in the morning being between 94-140mg/dl.     2. Continue Trulicity 4.5mg/week. Blood sugars are excellent at weight is coming down.    3. Continue walking and doing your leg exercises.    4. Great work with increase protein focus, bulk up meals with some vegetables and good and hydrate well with water through the day, aiming for 80oz-100oz/day.      We discussed [facility] Bariat[facility] Basics including:  -eating 3 meals daily  -reviewed metabolic needs for weight loss based on Resting Metabolic Rate  -protein goals supportive of healthy weight loss  -avoiding/limiting calorie containing beverages  -We discussed the importance of restorative sleep and stress management in maintaining a healthy weight.  -We discussed the National Weight Control Registry healthy weight maintenance strategies and ways to optimize metabolism.  -We discussed the importance of physical activity including cardiovascular and strength training in maintaining a healthier weight and explored viable options.      Most recent labs:  Lab Results   Component Value Date    WBC 8.8 08/19/2020    HGB 13.0 08/19/2020    HCT 38.3 08/19/2020     (H)  08/19/2020     08/19/2020     Lab Results   Component Value Date    CHOL 140 04/13/2022     Lab Results   Component Value Date    HDL 11 (L) 04/13/2022     No components found for: LDLCALC  Lab Results   Component Value Date    TRIG 523 (H) 04/13/2022     No results found for: CHOLHDL  Lab Results   Component Value Date    ALT 24 05/17/2022    AST 29 05/17/2022    ALKPHOS 60 05/17/2022     No results found for: HGBA1C  Lab Results   Component Value Date    B12 366 11/25/2011     No components found for: VITDT1  No results found for: EFRAIN  Lab Results   Component Value Date    PTHI 37 07/10/2012     No results found for: ZN  No results found for: VIB1WB  Lab Results   Component Value Date    TSH 3.89 11/18/2022     Following up elevated prolactin levels w/ Endocrinology in January. Level of 103.    MR BRAIN W/O & W CONTRAST 1/14/2017 4:34 PM     History:  Benign neopl    asm of pituitary gland, Benign neoplasm of  pituitary gland. Transsphenoidal resection of pituitary adenoma August 2000.     Comparison:  MRI brain 1/14/2016      Technique: Sagittal T1-weighted, coronal T2-weighted, coronal  T1-weighted images with focus on the sella were obtained without  intravenous contrast. Post intravenous contrast using gadolinium  coronal, sagittal and axial T1-weighted images were obtained . Dynamic  images after intravenous gadolinium contrast administration were also  performed.     Contrast: 15mL GADAVIST     Findings:  There are postsurgical changes from transsphenoidal  resection of pituitary adenoma. The right side of the gland is  asymmetrically thinned. The optic chiasm is unremarkable. The  pituitary stalk is deviated. The cavernous carotid flow voids appear  patent. There is a single nonspecific hyperintensity FLAIR right  centrum semiovale similar to prior study of uncertain clinical  significance. There is stable mild dural thickening and enhancement  over the convexities which is likely postsurgical in  nature.                                                                      Impression:    1. Postsurgical changes from transsphenoidal resection of pituitary  adenoma without evidence of recurrence.  2. Single hyperintensity on FLAIR in the right semiovale similar to  prior study of questionable clinical significance.  3. Stable mild dural thickening and enhancement over the convexities  which is most likely postsurgical in nature. This could also be  related to CSF leak or intracranial hypotension. Total     I have personally reviewed the examination and initial interpretation  and I agree with the findings.     LILLY BOLAÑOS MD  No results found for: TEST  CT scan 4/20/19 was normal of abdomen pelvis.      DIETARY HISTORY    Positive Changes Since Last Visit: feeling good about increased protein use in morning/more scrambled eggs and turkey since October. Off soda pop except rarely now  Struggling With: not much. Leg wraps aren't staying up regularly.     Knowledgeable in [place] Food Labels: improving. Her friend helps greatly  Getting Adequate Protein: she thinks she's on at least 60grams per day  Sleeping 7-8 hours/day yes  Stress management good    PHYSICAL ACTIVITY PATTERNS:  Cardiovascular: walking 8 blocks regularly to friends house  Strength Training: PT 2x weekly.wraps for legs and edema exercise and standing/balance exercises.    REVIEW OF SYSTEMS  No upset stomach on higher dose Trulcity. Tolerating well. Some itchiness issues at baseline and excoriations on abdomen..  PHYSICAL EXAM:  Vitals: Ht 1.524 m (5')   Wt (!) 155.1 kg (342 lb)   BMI 66.79 kg/m    Weight:   Wt Readings from Last 3 Encounters:   12/19/22 (!) 155.1 kg (342 lb)   09/12/22 (!) 160.6 kg (354 lb)   12/06/21 (!) 161 kg (355 lb)         GEN: Pleasant, well groomed, in no acute distress  HEENT:  Moist MMs .  NECK: No swelling.  HEART: RRR.  LUNGS: No respiratory difficulty noted. No cough. .  ABDOMEN: some  excoriations..  EXTREMITIES: No tremor. Ambulation is independent. ..  NEURO: Alert and Oriented X3, fluent speech. .  SKIN: No visible rashes. .    Interim study results: elevated prolactin. Blood sugar log reviewed..      30 minutes spent on the date of the encounter doing chart review, history and exam, documentation and further activities per the note   Carlos Bowling MD  [facility]  7:42 AM  12/19/2022

## 2022-12-19 NOTE — LETTER
12/19/2022         RE: Bushra Buck  [address]  [place] [address]        Dear Colleague,    Thank you for referring your patient, Bushra Buck, to the [facility] [place]. Please see a copy of my visit note below.    Bariatric Clinic Follow-Up Visit:    Bushra Buck is a 42 year old  female with Body mass index is 66.79 kg/m .  presenting here today for follow-up on non-surgical efforts for weight loss. Original Intake visit occurred on 9/17/15 with a weight of 350 lbs BMI of 68.4, gained up to a max of 401lbs in 2016..  Along with diet and behavior changes, she has been previously using GLP1 agonist (Trulicity 3mg at our visit in Nov 2021) for diabetes and to assist her weight loss goals.  Trulicity dose was increased to 4.5mg/week 9/12/22 at 354 lbs.  See her intake visit notes for details on identified contributors to weight gain in the past. Chart review shows Dietician calculated RMR of 2300 and protein intake goal of 80g/day.  Weight:   Wt Readings from Last 5 Encounters:   12/19/22 (!) 155.1 kg (342 lb)   09/12/22 (!) 160.6 kg (354 lb)   12/06/21 (!) 161 kg (355 lb)   11/18/21 (!) 162.8 kg (359 lb)   09/15/21 (!) 163.3 kg (360 lb)    pounds      Comorbidities:  Patient Active Problem List   Diagnosis     Acne rosacea     Dermatitis     Stasis edema     Pituitary adenoma (H)     Abnormal weight gain     High triglycerides     Prolactinoma (H)     Acquired hypothyroidism     Morbid obesity (H)       Current Outpatient Medications:      ACETAMINOPHEN EXTRA STRENGTH 500 MG tablet, , Disp: , Rfl:      ASPIRIN LOW DOSE 81 MG EC tablet, , Disp: , Rfl:      atorvastatin (LIPITOR) 40 MG tablet, , Disp: , Rfl:      bacitracin 500 UNIT/GM external ointment, Apply topically 2 times daily Apply thin layer twice daily for 10-14 days to abdominal wound., Disp: 30 g, Rfl: 0     Blood Glucose Monitoring Suppl ([employer] TALK MONITORING SYSTEM)  w/Device KIT, , Disp: , Rfl:      bumetanide (BUMEX) 2 MG tablet, , Disp: , Rfl:      cabergoline (DOSTINEX) 0.5 MG tablet, Take 1 tablet (0.5 mg) by mouth twice a week, Disp: 24 tablet, Rfl: 3     calcium carbonate-vitamin D (OS-TAWNYA WITH D) 500-200 MG-UNIT tablet, , Disp: , Rfl:      Calcium Citrate-Vitamin D3 315-6.25 MG-MCG TABS, TAKE 2 TABLETS BY MOUTH DAILY, Disp: 60 tablet, Rfl: 10     cetirizine (ZYRTEC) 10 MG tablet, , Disp: , Rfl:      clotrimazole (LOTRIMIN) 1 % external cream, , Disp: , Rfl:      clotrimazole (LOTRIMIN) 1 % external solution, , Disp: , Rfl:      divalproex sodium delayed-release (DEPAKOTE) 500 MG DR tablet, , Disp: , Rfl:      divalproex sodium extended-release (DEPAKOTE ER) 500 MG 24 hr tablet, , Disp: , Rfl:      Dulaglutide 4.5 MG/0.5ML SOPN, Inject 4.5 mg Subcutaneous every 7 days, Disp: 90 mL, Rfl: 3     EMBRACE TALK GLUCOSE TEST test strip, , Disp: , Rfl:      fenofibrate (TRICOR) 145 MG tablet, , Disp: , Rfl:      FENOFIBRATE PO, Take 145 mg by mouth daily, Disp: , Rfl:      fluconazole (DIFLUCAN) 150 MG tablet, , Disp: , Rfl:      glimepiride (AMARYL) 2 MG tablet, Take 1 tablet (2 mg) by mouth every morning (before breakfast), Disp: 90 tablet, Rfl: 3     Hypertonic Nasal Wash (SINUS RINSE KIT) PACK, Spray 1 packet in nostril daily, Disp: 200 each, Rfl: 3     levothyroxine (SYNTHROID/LEVOTHROID) 112 MCG tablet, TAKE 1 TABLET (112MCG) BY MOUTH DAILY, Disp: 90 tablet, Rfl: 3     LORazepam (ATIVAN) 2 MG tablet, , Disp: , Rfl:      medroxyPROGESTERone (PROVERA) 10 MG tablet, TAKE 1 TABLET  10 MG  BY MOUTH ONCE DAILY FOR 5 DAYS EACH MONTH, Disp: , Rfl: 0     Multiple Vitamin (TAB-A-NARESH) TABS, , Disp: , Rfl:      Multiple Vitamins-Minerals (CERTAVITE/ANTIOXIDANTS PO), , Disp: , Rfl:      mupirocin (BACTROBAN) 2 % external ointment, , Disp: , Rfl:      NYAMYC 501753 UNIT/GM external powder, , Disp: , Rfl:      omeprazole (PRILOSEC) 40 MG DR capsule, Take 1 capsule by mouth daily., Disp:  , Rfl:      paliperidone ER (INVEGA) 6 MG 24 hr tablet, , Disp: , Rfl:      paliperidone ER (INVEGA) 9 MG 24 hr tablet, , Disp: , Rfl:      potassium chloride ER (KLOR-CON M) 20 MEQ CR tablet, , Disp: , Rfl:      spironolactone (ALDACTONE) 50 MG tablet, , Disp: , Rfl:      sulfamethoxazole-trimethoprim (BACTRIM DS) 800-160 MG tablet, , Disp: , Rfl:      TRUEplus Lancets 30G MISC, , Disp: , Rfl:      VITAMIN D3 25 MCG (1000 UT) tablet, , Disp: , Rfl:     Current Facility-Administered Medications:      lidocaine (XYLOCAINE) 2 % external gel, , Topical, PRN, Luciana Neal, NP, Given at 12/06/21 1354      Interim: Since our last visit, she has had good response to increased dose of Trulicity with blood sugars much improved and running between 94-139mg/dl the last week and no longer having regular sugars over 200 since the end of Sept/early Oct. Weight is down another 12 lbs since September.   walking more, regular .    Plan:  1. Great work with diet changes, protein intake and walking regularly. The blood sugar control is much improved based on your log today with most sugars in the morning being between 94-140mg/dl.     2. Continue Trulicity 4.5mg/week. Blood sugars are excellent at weight is coming down.    3. Continue walking and doing your leg exercises.    4. Great work with increase protein focus, bulk up meals with some vegetables and good and hydrate well with water through the day, aiming for 80oz-100oz/day.      We discussed [facility] Basics including:  -eating 3 meals daily  -reviewed metabolic needs for weight loss based on Resting Metabolic Rate  -protein goals supportive of healthy weight loss  -avoiding/limiting calorie containing beverages  -We discussed the importance of restorative sleep and stress management in maintaining a healthy weight.  -We discussed the National Weight Control Registry healthy weight maintenance strategies and ways to optimize metabolism.  -We discussed the importance  of physical activity including cardiovascular and strength training in maintaining a healthier weight and explored viable options.      Most recent labs:  Lab Results   Component Value Date    WBC 8.8 08/19/2020    HGB 13.0 08/19/2020    HCT 38.3 08/19/2020     (H) 08/19/2020     08/19/2020     Lab Results   Component Value Date    CHOL 140 04/13/2022     Lab Results   Component Value Date    HDL 11 (L) 04/13/2022     No components found for: LDLCALC  Lab Results   Component Value Date    TRIG 523 (H) 04/13/2022     No results found for: CHOLHDL  Lab Results   Component Value Date    ALT 24 05/17/2022    AST 29 05/17/2022    ALKPHOS 60 05/17/2022     No results found for: HGBA1C  Lab Results   Component Value Date    B12 366 11/25/2011     No components found for: VITDT1  No results found for: EFRAIN  Lab Results   Component Value Date    PTHI 37 07/10/2012     No results found for: ZN  No results found for: VIB1WB  Lab Results   Component Value Date    TSH 3.89 11/18/2022     Following up elevated prolactin levels w/ Endocrinology in January. Level of 103.    MR BRAIN W/O & W CONTRAST 1/14/2017 4:34 PM     History:  Benign neopl    asm of pituitary gland, Benign neoplasm of  pituitary gland. Transsphenoidal resection of pituitary adenoma August 2000.     Comparison:  MRI brain 1/14/2016      Technique: Sagittal T1-weighted, coronal T2-weighted, coronal  T1-weighted images with focus on the sella were obtained without  intravenous contrast. Post intravenous contrast using gadolinium  coronal, sagittal and axial T1-weighted images were obtained . Dynamic  images after intravenous gadolinium contrast administration were also  performed.     Contrast: 15mL GADAVIST     Findings:  There are postsurgical changes from transsphenoidal  resection of pituitary adenoma. The right side of the gland is  asymmetrically thinned. The optic chiasm is unremarkable. The  pituitary stalk is deviated. The cavernous carotid  flow voids appear  patent. There is a single nonspecific hyperintensity FLAIR right  centrum semiovale similar to prior study of uncertain clinical  significance. There is stable mild dural thickening and enhancement  over the convexities which is likely postsurgical in nature.                                                                      Impression:    1. Postsurgical changes from transsphenoidal resection of pituitary  adenoma without evidence of recurrence.  2. Single hyperintensity on FLAIR in the right semiovale similar to  prior study of questionable clinical significance.  3. Stable mild dural thickening and enhancement over the convexities  which is most likely postsurgical in nature. This could also be  related to CSF leak or intracranial hypotension. Total     I have personally reviewed the examination and initial interpretation  and I agree with the findings.     LILLY BOLAÑOS MD  No results found for: TEST  CT scan 4/20/19 was normal of abdomen pelvis.      DIETARY HISTORY    Positive Changes Since Last Visit: feeling good about increased protein use in morning/more scrambled eggs and turkey since October. Off soda pop except rarely now  Struggling With: not much. Leg wraps aren't staying up regularly.     Knowledgeable in Reading [employer] Lab[employer]: improving. Her friend helps greatly  Getting Adequate Protein: she thinks she's on at least 60grams per day  Sleeping 7-8 hours/day yes  Stress management good    PHYSICAL ACTIVITY PATTERNS:  Cardiovascular: walking 8 blocks regularly to friends house  Strength Training: PT 2x weekly.wraps for legs and edema exercise and standing/balance exercises.    REVIEW OF SYSTEMS  No upset stomach on higher dose Trulcity. Tolerating well. Some itchiness issues at baseline and excoriations on abdomen..  PHYSICAL EXAM:  Vitals: Ht 1.524 m (5')   Wt (!) 155.1 kg (342 lb)   BMI 66.79 kg/m    Weight:   Wt Readings from Last 3 Encounters:   12/19/22 (!) 155.1 kg (342 lb)    09/12/22 (!) 160.6 kg (354 lb)   12/06/21 (!) 161 kg (355 lb)         GEN: Pleasant, well groomed, in no acute distress  HEENT:  Moist MMs .  NECK: No swelling.  HEART: RRR.  LUNGS: No respiratory difficulty noted. No cough. .  ABDOMEN: some excoriations..  EXTREMITIES: No tremor. Ambulation is independent. ..  NEURO: Alert and Oriented X3, fluent speech. .  SKIN: No visible rashes. .    Interim study results: elevated prolactin. Blood sugar log reviewed..      30 minutes spent on the date of the encounter doing chart review, history and exam, documentation and further activities per the note   Carlos Bowling MD  [facility]  7:42 AM  12/19/2022      Again, thank you for allowing me to participate in the care of your patient.        Sincerely,        Carlos Bowling MD

## 2022-12-19 NOTE — PATIENT INSTRUCTIONS
Plan:  Great work with diet changes, protein intake and walking regularly. The blood sugar control is much improved based on your log today with most sugars in the morning being between 94-140mg/dl.     2. Continue Trulicity 4.5mg/week. Blood sugars are excellent at weight is coming down.    3. Continue walking and doing your leg exercises.    4. Great work with increase protein focus, bulk up meals with some vegetables and good and hydrate well with water through the day, aiming for 80oz-100oz/day.        LEAN PROTEIN SOURCES  Getting 20-30 grams of protein, 3 meals daily, is appropriate for most people, some need more but more than about 40 grams per meal is not useful.  General rule is drinking one ounce of water per gram of protein eaten over the course of the day:  70 grams of protein each day, drink 70 oz of water.  Protein Source Portion Calories Grams of Protein                           Nonfat, plain Greek yogurt    (10 grams sugar or less) 3/4 cup (6 oz)  12-17   Light Yogurt (10 grams sugar or less) 3/4 cup (6 oz)  6-8   Protein Shake 1 shake 110-180 15-30   Skim/1% Milk or lactose-free milk 1 cup ( 8 oz)  8   Plain or light, flavored soymilk 1 cup  7-8   Plain or light, hemp milk 1 cup 110 6   Fat Free or 1% Cottage Cheese 1/2 cup 90 15   Part skim ricotta cheese 1/2 cup 100 14   Part skim or reduced fat cheese slices 1 ounce 65-80 8     Mozzarella String Cheese 1 80 8   Canned tuna, chicken, crab or salmon  (canned in water)  1/2 cup 100 15-20   White fish (broiled, grilled, baked) 3 ounces 100 21   [place]/Tuna (broiled, grilled, baked) 3 ounces 150-180 21   Shrimp, Scallops, Lobster, Crab 3 ounces 100 21   Pork loin, Pork Tenderloin 3 ounces 150 21   Boneless, skinless chicken /turkey breast                          (broiled, grilled, baked) 3 ounces 120 21   [place], [place], [place], and Venison 3 ounces 120 21   Lean cuts of red meat and pork (sirloin,   round, tenderloin, flank,  ground 93%-96%) 3 ounces 170 21   Lean or Extra Lean Ground Turkey 1/2 cup 150 20   90-95% Lean [place] Burger 1 abby 140-180 21   Low-fat casserole with lean meat 3/4 cup 200 17   Luncheon Meats                                                        (turkey, lean ham, roast beef, chicken) 3 ounces 100 21   Egg (boiled, poached, scrambled) 1 Egg 60 7   Egg Substitute 1/2 cup 70 10   Nuts (limit to 1 serving per day)  3 Tbsp. 150 7   Nut Bug Tussle (peanut, almond)  Limit to 1 serving or less daily 1 Tbsp. 90 4   Soy Burger (varies) 1  15   Garbanzo, Black, [place] Beans 1/2 cup 110 7   Refried Beans 1/2 cup 100 7   Kidney and Lima beans 1/2 cup 110 7   Tempeh 3 oz 175 18   Vegan crumbles 1/2 cup 100 14   Tofu 1/2 cup 110 14   Chili (beans and extra lean beef or turkey) 1 cup 200 23   Lentil Stew/Soup 1 cup 150 12   Black Bean Soup 1 cup 175 12           Example Meal Plan for a 6812-1550 Calorie Diet:    In order to fuel your weight loss properly and avoid hunger-induced overeating later in the day, for your height and weight, you will enjoy the most success by following the diet below or similar with adjustments based on your particular tastes and preferences.  Exercise may influence speed, amount of weight loss further.     I recommend getting into a meal routine and keeping it similar day to day in the beginning so you don t have to think too hard about what you re going to make/eat.  Keep snacks healthy, ideally containing protein and some vegetables.  Non-processed food is preferable to packaged items.  Eat at least a few crunchy green vegetables if having a snack, which should be 2-3 hours after your mealtimes(prepare these ahead of time for ease of use).  Drink 64 oz -80 oz of water daily for most, some of you will need more and we'll discuss it at your visit if that is the case.      When changing our diet,  we can often mistake thirst for hunger or just have some distracted eating habits that we need to  break free from ('bored/mindless eating', screen time,work, driving,etc).  A glass of water and reconsideration of our hunger is often all that is needed.  Having the urge is not the problem, but watching it pass by without acting on it is the goal.    If you re having hunger problems, add a protein drink/snack to your morning hours or afternoon snack with at least 20grams of protein and not too much sugar (under 10g).  A carton of higher protein/low sugar yogurt can work as well.  If the urge to snack is overwhelming and not satiated, try going for a 10 minute walk/exercise, come home and drink a glass of water and if still hungry, have a  calorie snack (handful of raw/sprouted nuts, veggies and string cheese, protein bar, etc).  Savor it.    It is better to have a large breakfast, a moderate lunch and a smaller dinner to fuel your day.  People lose 10-15% more weight during their weight loss season with this strategy. Optimizing your protein intake at each meal will further keep you more satisfied while eating less food overall.  Getting exercise in early has also been shown to offer the best results (before breakfast ideally but anytime is the right time to exercise if that is not an option for you).    To make sure you re getting adequate vitamins and minerals during weight loss, I recommend one complete multivitamin a day of your choice.  Consider a probiotic and taking some vitamin D 2000 IU daily.    Let supper be your last meal of the day and ideally try to have at least 12 hours between supper and breakfast the next day to tap into some beneficial overnight fasting dynamics.  Midnight snacks need to go away. Water in the evening is fine, unsweetened, non caffeinated herbal tea is helpful as well.  Consolidating your meals within a 8-12 hour period of your day will help tap into these additional metabolic benefits and tends to keep your appetite up for breakfast, further helping to stay on track.  For  most of my patients, I don't recommend an intermittent fasting style diet (many find it hard to fit in their lifestyle) but an overnight fast is very doable for most patients and helps regulate our hunger drives a little better.  This makes it very important to nail good intake at all three meals to feel satisfied/energized and still lose weight.      If evening snacking desires are high, consider a glass of fiber supplement for some additional fullness (metamucil or similar). Most of us don't get the 25-30 grams per day of fiber that promotes good gut health/satiety.  Benefiber, metamucil, citrucel are reasonable/affordable options for most people.  Inulin, chicory, psyllium husk are reasonable options but start slow and low in the dose to avoid gas/bloating until your gut gets acclimated (ramping up to 5-10 grams per day of supplemental fiber after 3-4 weeks if needed).      Example Meal Plan:  Breakfast: 450-475 Calories  1 egg cooked on low in olive oil:   calories.  5oz Greek Yogurt (Fage plain classic: ~150 alexsandra)  Handful of Berries of your choice (about a calorie per berry or 20-40cal per handful)    cup(cooked) of  old fashioned oatmeal or 1/2 cup(cooked) steel cut oats. (150 alexsandra)  Sprinkle amount of brown sugar and a pat of butter. (40 alexsandra)  Glass of  Water  Black coffee or unsweetened Tea (0calories).      2-3 hours Later Snack: (195 calories).  Glass of water  One string Cheese (80 calories) or 4 oz creamed cottage cheese (115 calories) with  Crunchy Celery sticks (less than 10 calories per large stalk) 2 stalks. (20 calories)    of a  Large Banana or   of a Large Apple (60 calories):  eat second half at lunch or afternoon snack.     Lunch:300 -350 calories   Chicken Breast  (baked/broiled/roasted/grilled)  4-6 oz.  (125-180 alexsandra), BBQ sauce/hot sauce/mustard/seasoning is free. Just use a reasonable amount. Or a can of tuna with 1 tablespoon mayonnaise.  Salad: lettuce, any other veggies (cucumbers,  green peppers/celery you like and a small drizzle of dressing to just flavor.  Go as big on the veggies as you like,  as they are practically calorie free.   A whole, 8 inch cucumber is 45 calories, a whole green pepper is 23 calories, a stalk of celery is 9 calories.  [place] Dressing is 60 calories per tablespoon..so moderate your desired dressing or do a drizzle of olive oil and splash of balsamic vinegar on top,  Total calories unlikely to be over 150 even with dressing.  Glass of Water.    Option for lunch is meal replacement protein drink/smoothie.  Need at least 20 grams of protein and eat the rest of your apple/banana from the morning snack.      Afternoon Snack: 150-200 calories   Cheese Stick or cottage cheese again  and a fresh fruit OR  Granola Bar (protein Bar acceptable if under 200 calories OR  Homemade smoothies:  8oz skim milk,  a handful of berries (fresh or frozen and a serving of protein powder such as BiPro or Sofia sWhey for example.  If you don't like dairy, make with 8oz water, one small banana, handful of berries and the protein powder, add any veggies you want as well:  roughly 200 calories.   Glass of Water    Dinner: 325 calories  4oz of fresh, [place] salmon.  Broiled (salt/pepper/dill) for about 8-8.5 minutes (200calories) or  4oz filet mignon steak or sirloin steak  Salad or vegetable sautéed lightly in olive oil or   Broccoli 1.5  cups chopped and steamed  or micro-waved in a little water (75 calories)  Glass of Water,    Cup of herbal tea (unsweetened, caffeine free)      Herbs and seasonings are encouraged to flavor your foods/vegetables.  Make your food delicious.      Tips for Success:  1.  Prepare proteins ahead of time (broil chicken breasts in bulk so you can grab and go), steel cut oats/lentils can be stored in casserole dish/bowl in the fridge for quick scoop in the morning and rewarm in microwave, make use of crock pot recipes (watch salt content).  Making meals that  "cover 3-4 future meals is an easy way to stay on track.  2.  Drink a 8-12 oz glass of water every 2-3 hours when awake.  We often mistake hunger for thirst, especially when losing weight.  3. Remember your Reward and Motivation when things get hard.  4.  Weigh yourself every morning and record, you'll stay on track better and learn how our biorhythms, diet and elimination patterns show up on the scale. Don't worry about 1 or 2 day patterns, but when on track you'll notice good trend downward of weight over 3-4 day segments.  Plateaus tend to resolve after 4-8 days in most cases if you stay consistent with your plan.  These are natural and part of weight loss, even if you're perfect with your plan execution.  5. Call if problems/concerns.  [employer] is a great tool to stay in touch and provide weekly outside accountability. Check in with questions or if you want to brag.  6.  Find a handful of meals/foods that keep you on track and feeling good and get into a routine that is sustainable for you.  It's OK to have a routine that works for you.  7.  Consider taking a complete multivitamin just to make sure all micronutrients are adequate during weight loss.  8. If losing hair/brittle nails it usually means you are not taking enough protein.  Minimum goal is 60 grams daily of protein for smaller women, 80 grams a day for men. Consider taking Biotin as supplement or a \"Hair and Nail\" multivitamin.      On-the-Go Breakfast Ideas  As of 2015, the latest research shows what a huge impact eating breakfast has on losing weight and feeling your best. People lose more weight when they make breakfast their biggest meal of the day compared to Dinner, but even if you cannot go to that degree, getting a breakfast that has at least 20 grams of protein and even a moderate amount of fat is ideal for maintaining good energy through the day and limits overeating in the evening hours.  The following are some quick and easy suggestions for " at least getting something of substance into your body in the morning.  Enjoy!    Eating breakfast within 90 minutes of waking up is an important part of taking care of your body on a restricted calorie diet plan.  After sleeping for hours, your body is in need of fuel.  An ideal breakfast is a combination of protein, whole-grain carbohydrates, or fruit.  Here s why:    -Protein digests very slowly in the body, helping you feel more satisfied.  -Whole grains provide dietary fiber, which also digests slowly and helps keep your gut clean.  -Fruit is a great source of vitamins, minerals, and fiber.     Each one of these breakfast combinations has between 200-300 calories and 15-20 grams of protein.  Feel free to mix and match!    Bone Broth (chicken bone broth or beef bone broth) is a great way to boost protein content. 8oz of bone broth will typically have 9-12grams of protein for 40kcal of energy.    Protein: Choose  -1/2 cup low-fat cottage cheese  -2 hard boiled eggs , or one cooked in olive oil (low/slow heat).  -1 low fat string cheese stick  -1 Tablespoon natural peanut butter  -[employer] vegetarian sausage abby (found in freezer section)  -1 slice lowfat cheese  -6 oz 2% or lowfat Greek yogurt, such as Fage or Oikos.    PLUS    Whole Grains:  Choose   -1 whole wheat English muffin  -1 whole wheat gordon, half  -1/2 Fiber One frozen muffin, thawed  -1/2 Fiber One toaster pastry  -1 whole wheat bagel thin  -1/2 cup Kashi cereal  -1 Kashi waffle (or other whole grain high-fiber waffle)  Aim for whole grain/sprouted breads with at least 3g of fiber/slice if having bread. [place] is one such brand.    OR    Fruit: Choose  -1/3 cup blueberries  -1/2 banana (or a plantain- similar to a banana, yet smaller)  -1/2 cup cantaloupe cubes  -1 small apple  -1 small orange  -1/2 cup strawberries  -handful raspberries/blackberries (each berry is about 1 calorie).    *Adapted from [employer], Fall 20    Ten  Breakfasts Under 250 calories    Ideally, getting between 350-600 calories  (depending on starting height and weight)for breakfast is ideal for avoiding hunger later in the day, adjust/add to the following accordingly:    One- 250 calories, 8.5 g protein  1 slice whole-grain toast   1 Tbsp peanut butter    banana    Two- 250 calories, 8 g protein    cup nonfat/lowfat yogurt  1/3rd cup diced no-sugar peaches  1/3rd cup cereal (like Special K, Cheerios, or bran flakes)    Three- 250 calories, 25 g protein  1 egg scrambled with 1 oz skim milk    cup shredded cheddar    whole grain English muffin  1 oz [place] weathers  1 tsp margarine spread    Four- 225 calories, 25 g protein  1/2 cup Kashi Go-Lean cereal    cup skim milk mixed with 1 scoop Bariatric Advantage protein powder    cup no-sugar diced pears    Five- 250 calories, 20 g protein    cup oatmeal prepared with skim milk, 1 scoop protein powder, and sugar-free maple syrup    Six- 200 calories, 5 g protein  1 whole grain waffle, toasted  1 tablespoon creamy peanut or almond butter    Seven-  250 calories, 19 g protein  Breakfast sandwich: 1 slice whole grain toast, cut in half.  Add 1 scrambled egg and one slice cheddar  cheese.    Eight-  250 calories, 15 g protein  2 eggs scrambled with 1/3 cup frozen spinach (heat before adding to eggs) and 2 tablespoons low fat cream cheese.    Nine-  150 calories, 15 g protein  2/3rd cup cottage cheese    cup cantaloupe    Ten- 200 calories, 20 g protein  Fruit smoothie made with 4 oz. nonfat Greek yogurt,   cup berries, 1 scoop protein powder, and 4 oz skim milk.    Ten Lunches Under 250 Calories    Aim for lunch to be around 300-400 calories a day when trying to lose weight and get that protein in!    One- 200 calories, 11 g protein  1/3 cup tuna salad made with light kennedy on 1 slice whole grain bread  1 small peeled apple    Two- 250 calories, 16 g protein  1/3 cup lowfat cottage cheese    cup cooked green beans    small  fruit cocktail (in natural juice)    Three- 200 calories, 11 g protein    grilled cheese sandwich on whole grain bread with lowfat cheese  2/3rd cup of tomato soup    Four- 250 calories, 22 g protein  Deli wrap: 1 oz sliced turkey, 1 oz sliced ham, 1 oz sliced chicken rolled up with 1 slice low-fat cheese  1 small orange    Five- 250 calories, 28 g protein  2/3rd cup chili with 1 oz shredded cheese  4 saltine crackers    Six- 250 calories, 22 g protein  1 cup fresh spinach with 2 oz chicken, 1/3rd cup mandarin oranges, and 2 tablespoons sliced almonds with 1 tablespoon  vinaigrette dressing    Seven- 200 calories, 11 g protein  1 Tbsp sugar-free preserves and 1 Tbsp peanut butter on 1 slice whole grain toast    cup nonfat/lowfat Greek yogurt    Eight- 250 calories, 18 g protein  1 small soft-shell chicken taco with 1 oz shredded cheese, lettuce, tomato, salsa, and 1 Tbsp light sour cream    cup black beans    Nine- 225 calories, 13 g protein  2 ounces baked chicken  1/4 cup mashed potatoes    cup green beans    Ten- 200 calories, 21 g protein  Deli gordon: 2 oz roast beef or other deli meat with 1 tsp [place] mayonnaise and sliced tomato, onion, and lettuce  1/3rd cup cottage cheese      Ten Dinners Under 300 calories    If you're eating a large breakfast and medium lunch, keep dinner small.  300-400 calories is ideal for most people depending on their caloric needs.    One- 300 calories, 12 g protein  1-inch thick slice of turkey meatloaf    cup baked butternut squash    Two- 200 calories, 9 g protein  Bread-less BLT: 3 slices turkey weathers, sliced tomato, wrapped in a large lettuce leaf    cup peeled fruit    Three- 275 calories, 36 g protein  3 oz roasted chicken    cup cooked broccoli    cup shredded cheddar cheese    cup unsweetened applesauce    Four- 200 calories, 25 g protein  3 oz baked tilapia  1/3rd cup cooked carrots    cup yogurt    Five- 250 calories, 20 g protein  Grilled ham  n  Swiss: spread 2 tsp ghee  or butter on 1 slice of whole grain bread.  Cut bread in half, layer 2 oz deli ham with 1 piece of Swiss cheese and grill until cheese is melted.    cup cooked vegetables    Six- 250 calories, 18 g protein  Vegetarian cheeseburger: 1 [place] cheeseburger topped with lettuce, onion, tomato, and ketchup/mustard    cup sweet potato fries    Seven- 250 calories, 18 g protein  Pork pot roast: 2 oz roasted pork loin, 1/3rd cup roasted carrots,   medium potato, cooked with   cup gravy    Eight- 330 calories, 25 g protein  2 oz meatballs (about 2 small meatballs)    cup spaghetti sauce  1/2 piece toast topped with 1 tsp ghee or butterand topped with garlic powder, toasted in oven    Nine- 250 calories, 16 g protein  Mexican pizza: one 8  corn tortilla topped with 2 oz chicken,   cup salsa, 2 tablespoons black beans, 2 tablespoons shredded cheese.  Bake until cheese is melted.    Ten- 250 calories, 22 g protein  Shrimp stir-mckenzie: 3 oz cooked shrimp, 1/6th onion,   pepper,   cup chopped carrots sautéed in 1 tablespoon olive oil, topped with 2 tablespoons stir mckenzie sauce and a pinch of sesame seeds        150 Calories or Less Snack Ideas   1 hardboiled egg with   cup berries  1 small apple with 1 hardboiled egg  10 almonds with   cup berries  2 clementines with 1 light string cheese  1 light string cheese with   sliced apple  1 light string cheese wrapped in 2 slices of turkey  4 100% whole wheat crackers (e.g. Triscuit) with 1 light string cheese    c. cottage cheese with   cup fruit and 1 Tbsp sunflower seeds     cup cottage cheese with   of an avocado     can tuna fish with 1 cup sliced cucumbers     cup roasted garbanzo beans with paprika and cayenne pepper    baked sweet potato with   cup chili beans or   cup cottage cheese  2 oz. nitrate free turkey slices with 1 cup carrots  1 container (6 oz) of low sugar (less than 10 grams of sugar) greek yogurt   3 Tablespoons of hummus with 1 cup sliced bell peppers   2 Tablespoons of  hummus with 15 baby carrots  4 Tablespoons ranch dip made with plain Greek Yogurt and 3 mini cucumbers  1/4 cup nuts (any kind)  1 Tablespoon peanut butter with 1 stalk celery   1 dill pickle wrapped in 1-2 slices of deli ham with 1 tsp of mayonnaise/mustard.

## 2023-01-11 ENCOUNTER — OFFICE VISIT (OUTPATIENT)
Dept: ENDOCRINOLOGY | Facility: CLINIC | Age: 43
End: 2023-01-11
Payer: COMMERCIAL

## 2023-01-11 VITALS
BODY MASS INDEX: 68.06 KG/M2 | DIASTOLIC BLOOD PRESSURE: 71 MMHG | WEIGHT: 293 LBS | SYSTOLIC BLOOD PRESSURE: 143 MMHG | HEART RATE: 108 BPM

## 2023-01-11 DIAGNOSIS — D35.2 PROLACTINOMA (H): Primary | ICD-10-CM

## 2023-01-11 PROCEDURE — 99214 OFFICE O/P EST MOD 30 MIN: CPT | Mod: GC | Performed by: INTERNAL MEDICINE

## 2023-01-11 ASSESSMENT — PAIN SCALES - GENERAL: PAINLEVEL: NO PAIN (0)

## 2023-01-11 NOTE — LETTER
"1/11/2023       RE: Bushra Buck  [address]  [place] [address]     Dear Colleague,    Thank you for referring your patient, Bushra Buck, to the [facility] [place] at [facility]. Please see a copy of my visit note below.    Endocrinology follow up    Reason for visit/consult: F/u on prolactinoma.    Subjective:   A 38 year old female with follow up prolactinoma, original diagnosis was made in 1997 s/p resection 2000 and subsequent treatment of cabergoline.  Patient had been seen by Dr. CLINTON Moran before (since 2002). In 2012, cabergoline was discontinued and her PRL became mildly elevated (PRL 50-60s), and she has had amenorrhea for 4-5 years. She is taking Provera, but this is no longer causing bleeding. Although official radiology report did not mention residual tumor, we assumed possible small residual, we resumed cabergoline 0.5 mg BIW, which she is currenlty taking. PRL level was 61 (3/2020).     She was diagnosed with type 2 diabetes in the summer of 2020     She also has psychiatry issues, however, currently has been improving, with tapering psychiatry medications (depakote as her \"levels were high\"). No recent hospitalization.      She also has primary hypothyroidism, which is being replaced with levothyroxine 112 mcg daily.     Following with bariatric clinic, on Dulaglutide for weight loss, 15 lb wt loss since 11/2021  Wt Readings from Last 4 Encounters:   01/11/23 (!) 158.1 kg (348 lb 8 oz)   12/19/22 (!) 155.1 kg (342 lb)   09/12/22 (!) 160.6 kg (354 lb)   12/06/21 (!) 161 kg (355 lb)       Interval history   Doing \"good\". Only concern is rising prolactin and she is questioning need for repeat brain MRI.     Sweating frequently, sleeping with windows open, getting hot flashes    Apartment is being condemned and she is looking for new place to live. She is most excited about the possibility of a place " "in [facility] that has a workout area included in rent.    PCP is retiring in 4/2023. Has a HHN who sets up medications and patient takes.     BG log books - , average 110-130  She is experiencing no breast tenderness, no galactorrhea, no headaches, but is experiencing ongoing amenorrhea despite Provera therapy. She has tried to incorporate more walking into her life, but she admits to still overeating at times.     Not missing any doses of medications. No HA, vision changes but does note \"pain in the eyes\".    Denies breast tenderness, galactorrhea, tremors, palpitations. Does note SANDS with stairs which is chronic.   She is still amenorrhea, been multiple years. Following with [facility] GARCIA - following with, last seen in 2022    ROS:  10 point negative except as mentioned in HPI    Current Outpatient Medications   Medication     ACETAMINOPHEN EXTRA STRENGTH 500 MG tablet     ASPIRIN LOW DOSE 81 MG EC tablet     atorvastatin (LIPITOR) 40 MG tablet     bacitracin 500 UNIT/GM external ointment     Blood Glucose Monitoring Suppl ([employer] MONITORING SYSTEM) w/Device KIT     bumetanide (BUMEX) 2 MG tablet     cabergoline (DOSTINEX) 0.5 MG tablet     calcium carbonate-vitamin D (OS-TAWNYA WITH D) 500-200 MG-UNIT tablet     Calcium Citrate-Vitamin D3 315-6.25 MG-MCG TABS     cetirizine (ZYRTEC) 10 MG tablet     clotrimazole (LOTRIMIN) 1 % external cream     clotrimazole (LOTRIMIN) 1 % external solution     divalproex sodium delayed-release (DEPAKOTE) 500 MG DR tablet     divalproex sodium extended-release (DEPAKOTE ER) 500 MG 24 hr tablet     Dulaglutide 4.5 MG/0.5ML SOPN     [employer] TALK GLUCOSE TEST test strip     fenofibrate (TRICOR) 145 MG tablet     FENOFIBRATE PO     fluconazole (DIFLUCAN) 150 MG tablet     glimepiride (AMARYL) 2 MG tablet     Hypertonic Nasal Wash (SINUS RINSE KIT) PACK     levothyroxine (SYNTHROID/LEVOTHROID) 112 MCG tablet     LORazepam (ATIVAN) 2 MG tablet     " medroxyPROGESTERone (PROVERA) 10 MG tablet     Multiple Vitamin (TAB-A-NARESH) TABS     Multiple Vitamins-Minerals (CERTAVITE/ANTIOXIDANTS PO)     mupirocin (BACTROBAN) 2 % external ointment     NYAMYC 242961 UNIT/GM external powder     omeprazole (PRILOSEC) 40 MG DR capsule     paliperidone ER (INVEGA) 6 MG 24 hr tablet     paliperidone ER (INVEGA) 9 MG 24 hr tablet     potassium chloride ER (KLOR-CON M) 20 MEQ CR tablet     spironolactone (ALDACTONE) 50 MG tablet     sulfamethoxazole-trimethoprim (BACTRIM DS) 800-160 MG tablet     TRUEplus Lancets 30G MISC     VITAMIN D3 25 MCG (1000 UT) tablet     Current Facility-Administered Medications   Medication     lidocaine (XYLOCAINE) 2 % external gel     Family history: mother has history of diabetes and thyroid disease    EXAM  not currently breastfeeding.  Gen: morbidly obese female in NAD, diaphoretic  HEENT: anicteric, no proptosis or lid lag  Visual field: intact  Resp: no acute distress  Neuro: A&Ox3, no tremor on outstretched arms  Psych: Normal affect and mood.      Labs/Imaging    TSH   Date Value Ref Range Status   11/18/2022 3.89 0.30 - 4.20 uIU/mL Final   04/04/2022 4.28 0.30 - 5.00 uIU/mL Final   06/29/2021 2.53 0.30 - 5.00 uIU/mL Final   04/07/2021 3.51 0.40 - 4.00 mU/L Final   06/08/2020 3.75 0.30 - 5.00 uIU/mL Final   03/23/2020 2.96 0.40 - 4.00 mU/L Final   02/03/2020 1.70 0.30 - 5.00 uIU/mL Final   08/19/2019 1.91 0.30 - 5.00 uIU/mL Final   12/31/2018 3.22 0.40 - 4.00 mU/L Final   02/28/2018 2.80 0.30 - 5.00 mcU/mL Final   01/10/2017 3.09 0.40 - 4.00 mU/L Final     T4 Free   Date Value Ref Range Status   04/07/2021 1.39 0.76 - 1.46 ng/dL Final     Lab Results   Component Value Date/Time    PROLACTIN 54 (H) 04/07/2021 08:30 AM    PROLACTIN 61 (H) 03/23/2020 10:55 AM    PROLACTIN 45 (H) 12/31/2018 09:14 AM    PROLACTIN 27.9 (A) 02/28/2018 12:00 AM    PROLACTIN 38 (H) 08/10/2017 09:49 AM     Brain MRI 1/2017  Impression:   1. Postsurgical changes from  "transsphenoidal resection of pituitary  adenoma without evidence of recurrence.  2. Single hyperintensity on FLAIR in the right semiovale similar to  prior study of questionable clinical significance.  3. Stable mild dural thickening and enhancement over the convexities  which is most likely postsurgical in nature. This could also be  related to CSF leak or intracranial hypotension. Total    ASSESSMENT / PLAN:  A 42 year old female with history of prolactinoma s/p resection 2000 and subsequent treatment of cabergoline, restarted in 2017 for persistent levels, amenorrhea, fatigue, weight gain. With persistent amenorrhea and DM II diagnosed in 2020.     # Prolactinoma  Post surgical resection 2000, no recurrence on last MRI 2017. Not currently experiencing any headaches or galactorrhea, is experiencing amenorrhea refractory to hormone stimulation.   Taking cabergoline 0.5 mg Wednesday and Saturday since 2017. PRL 61 (3/2020) > PRL up to 104 (11/2022)  - Increase cabergoline 0.5 mg three tablets per week  - repeat brain MRI this year  - recheck prolactin at time of MRI to monitor level     # Amenorrhea  Started Provera 5 days a month by OB GYN Dr. Khan since 2018, and initially had flows, but now has not for past 2-3 years.   - Recommend continued following with OB/GYN     # Hypothyroidism  On levothyroxine 112mcg. Labs 11/2022 euthryoid. Not experiencing any hypo or hyperthyroid symptoms other than weight and \"hot flashes\"  - Continue levothyroxine     # Obesity BMI 71  # Type 2 diabetes  Following with Bariatric clinic. No recent Hgb A1c on file - PCP at [facility]. Review of fasting FSBG today largely at goal.   -Continue Dulaglutide, glimepiride 2mg daily per Bariatric clinic/PCP  -Continue to watch diet and increase exercise     # Bone health  - calcium citrate plus D (630 mg Ca, D 500ID) for bone health    # Hypertriglyceridemia  Triglyceride levels >500, Has started taking fenofibrate, last triglycerides 530 in " 4/2022.   -Continue fenofibrate, management per PCP    RTC in one year with Dr. Farfan.     This patient was seen and discussed with the attending, Dr. Farfan.    Cezar Gonzalez MD  Internal Medicine Resident    Attending tie-in note  I saw the patient with resident Dr. Gonzalez and directly examined patient and discussed. Agree above note and plan.       35   minutes spent on the date of the encounter doing chart review, history and exam, documentation and further activities as noted above.    Mandy Farfan MD  Staff Physician  Endocrinology and Metabolism  [facility]  License: [id number]  Pager: [phone]

## 2023-01-11 NOTE — PROGRESS NOTES
"Endocrinology follow up    Reason for visit/consult: F/u on prolactinoma.    Subjective:   A 38 year old female with follow up prolactinoma, original diagnosis was made in 1997 s/p resection 2000 and subsequent treatment of cabergoline.  Patient had been seen by Dr. CLINTON Moran before (since 2002). In 2012, cabergoline was discontinued and her PRL became mildly elevated (PRL 50-60s), and she has had amenorrhea for 4-5 years. She is taking Provera, but this is no longer causing bleeding. Although official radiology report did not mention residual tumor, we assumed possible small residual, we resumed cabergoline 0.5 mg BIW, which she is currenlty taking. PRL level was 61 (3/2020).     She was diagnosed with type 2 diabetes in the summer of 2020     She also has psychiatry issues, however, currently has been improving, with tapering psychiatry medications (depakote as her \"levels were high\"). No recent hospitalization.      She also has primary hypothyroidism, which is being replaced with levothyroxine 112 mcg daily.     Following with bariatric clinic, on Dulaglutide for weight loss, 15 lb wt loss since 11/2021  Wt Readings from Last 4 Encounters:   01/11/23 (!) 158.1 kg (348 lb 8 oz)   12/19/22 (!) 155.1 kg (342 lb)   09/12/22 (!) 160.6 kg (354 lb)   12/06/21 (!) 161 kg (355 lb)       Interval history   Doing \"good\". Only concern is rising prolactin and she is questioning need for repeat brain MRI.     Sweating frequently, sleeping with windows open, getting hot flashes    Apartment is being condemned and she is looking for new place to live. She is most excited about the possibility of a place in [facility] that has a workout area included in rent.    PCP is retiring in 4/2023. Has a HHN who sets up medications and patient takes.     BG log books - , average 110-130  She is experiencing no breast tenderness, no galactorrhea, no headaches, but is experiencing ongoing amenorrhea despite Provera therapy. She " "has tried to incorporate more walking into her life, but she admits to still overeating at times.     Not missing any doses of medications. No HA, vision changes but does note \"pain in the eyes\".    Denies breast tenderness, galactorrhea, tremors, palpitations. Does note SANDS with stairs which is chronic.   She is still amenorrhea, been multiple years. Following with [facility] - following with, last seen in 2022    ROS:  10 point negative except as mentioned in HPI    Current Outpatient Medications   Medication     ACETAMINOPHEN EXTRA STRENGTH 500 MG tablet     ASPIRIN LOW DOSE 81 MG EC tablet     atorvastatin (LIPITOR) 40 MG tablet     bacitracin 500 UNIT/GM external ointment     Blood Glucose Monitoring Suppl ([employer] MONITORING SYSTEM) w/Device KIT     bumetanide (BUMEX) 2 MG tablet     cabergoline (DOSTINEX) 0.5 MG tablet     calcium carbonate-vitamin D (OS-TAWNYA WITH D) 500-200 MG-UNIT tablet     Calcium Citrate-Vitamin D3 315-6.25 MG-MCG TABS     cetirizine (ZYRTEC) 10 MG tablet     clotrimazole (LOTRIMIN) 1 % external cream     clotrimazole (LOTRIMIN) 1 % external solution     divalproex sodium delayed-release (DEPAKOTE) 500 MG DR tablet     divalproex sodium extended-release (DEPAKOTE ER) 500 MG 24 hr tablet     Dulaglutide 4.5 MG/0.5ML SOPN     [employer]Me GLUCOSE TEST test strip     fenofibrate (TRICOR) 145 MG tablet     FENOFIBRATE PO     fluconazole (DIFLUCAN) 150 MG tablet     glimepiride (AMARYL) 2 MG tablet     Hypertonic Nasal Wash (SINUS RINSE KIT) PACK     levothyroxine (SYNTHROID/LEVOTHROID) 112 MCG tablet     LORazepam (ATIVAN) 2 MG tablet     medroxyPROGESTERone (PROVERA) 10 MG tablet     Multiple Vitamin (TAB-A-NARESH) TABS     Multiple Vitamins-Minerals (CERTAVITE/ANTIOXIDANTS PO)     mupirocin (BACTROBAN) 2 % external ointment     NYAMYC [id number] UNIT/GM external powder     omeprazole (PRILOSEC) 40 MG DR capsule     paliperidone ER (INVEGA) 6 MG 24 hr tablet     paliperidone ER " (INVEGA) 9 MG 24 hr tablet     potassium chloride ER (KLOR-CON M) 20 MEQ CR tablet     spironolactone (ALDACTONE) 50 MG tablet     sulfamethoxazole-trimethoprim (BACTRIM DS) 800-160 MG tablet     TRUEplus Lancets 30G MISC     VITAMIN D3 25 MCG (1000 UT) tablet     Current Facility-Administered Medications   Medication     lidocaine (XYLOCAINE) 2 % external gel     Family history: mother has history of diabetes and thyroid disease    EXAM  not currently breastfeeding.  Gen: morbidly obese female in NAD, diaphoretic  HEENT: anicteric, no proptosis or lid lag  Visual field: intact  Resp: no acute distress  Neuro: A&Ox3, no tremor on outstretched arms  Psych: Normal affect and mood.      Labs/Imaging    TSH   Date Value Ref Range Status   11/18/2022 3.89 0.30 - 4.20 uIU/mL Final   04/04/2022 4.28 0.30 - 5.00 uIU/mL Final   06/29/2021 2.53 0.30 - 5.00 uIU/mL Final   04/07/2021 3.51 0.40 - 4.00 mU/L Final   06/08/2020 3.75 0.30 - 5.00 uIU/mL Final   03/23/2020 2.96 0.40 - 4.00 mU/L Final   02/03/2020 1.70 0.30 - 5.00 uIU/mL Final   08/19/2019 1.91 0.30 - 5.00 uIU/mL Final   12/31/2018 3.22 0.40 - 4.00 mU/L Final   02/28/2018 2.80 0.30 - 5.00 mcU/mL Final   01/10/2017 3.09 0.40 - 4.00 mU/L Final     T4 Free   Date Value Ref Range Status   04/07/2021 1.39 0.76 - 1.46 ng/dL Final     Lab Results   Component Value Date/Time    PROLACTIN 54 (H) 04/07/2021 08:30 AM    PROLACTIN 61 (H) 03/23/2020 10:55 AM    PROLACTIN 45 (H) 12/31/2018 09:14 AM    PROLACTIN 27.9 (A) 02/28/2018 12:00 AM    PROLACTIN 38 (H) 08/10/2017 09:49 AM     Brain MRI 1/2017  Impression:   1. Postsurgical changes from transsphenoidal resection of pituitary  adenoma without evidence of recurrence.  2. Single hyperintensity on FLAIR in the right semiovale similar to  prior study of questionable clinical significance.  3. Stable mild dural thickening and enhancement over the convexities  which is most likely postsurgical in nature. This could also be  related  "to CSF leak or intracranial hypotension. Total    ASSESSMENT / PLAN:  A 42 year old female with history of prolactinoma s/p resection 2000 and subsequent treatment of cabergoline, restarted in 2017 for persistent levels, amenorrhea, fatigue, weight gain. With persistent amenorrhea and DM II diagnosed in 2020.     # Prolactinoma  Post surgical resection 2000, no recurrence on last MRI 2017. Not currently experiencing any headaches or galactorrhea, is experiencing amenorrhea refractory to hormone stimulation.   Taking cabergoline 0.5 mg Wednesday and Saturday since 2017. PRL 61 (3/2020) > PRL up to 104 (11/2022)  - Increase cabergoline 0.5 mg three tablets per week  - repeat brain MRI this year  - recheck prolactin at time of MRI to monitor level     # Amenorrhea  Started Provera 5 days a month by OB GYN Dr. Khan since 2018, and initially had flows, but now has not for past 2-3 years.   - Recommend continued following with OB/GYN     # Hypothyroidism  On levothyroxine 112mcg. Labs 11/2022 euthryoid. Not experiencing any hypo or hyperthyroid symptoms other than weight and \"hot flashes\"  - Continue levothyroxine     # Obesity BMI 71  # Type 2 diabetes  Following with Bariatric clinic. No recent Hgb A1c on file - PCP at [facility]. Review of fasting FSBG today largely at goal.   -Continue Dulaglutide, glimepiride 2mg daily per Bariatric clinic/PCP  -Continue to watch diet and increase exercise     # Bone health  - calcium citrate plus D (630 mg Ca, D 500ID) for bone health    # Hypertriglyceridemia  Triglyceride levels >500, Has started taking fenofibrate, last triglycerides 530 in 4/2022.   -Continue fenofibrate, management per PCP    RTC in one year with Dr. Farfan.     This patient was seen and discussed with the attending, Dr. Farfan.    Cezar Gonzalez MD  Internal Medicine Resident    Attending tie-in note  I saw the patient with resident Dr. Gonzalez and directly examined patient and discussed. Agree above note and plan. "       35   minutes spent on the date of the encounter doing chart review, history and exam, documentation and further activities as noted above.    Mandy Farfan MD  Staff Physician  Endocrinology and Metabolism  [facility]  License: [id number]  Pager: [phone]

## 2023-01-24 DIAGNOSIS — D35.2 PROLACTINOMA (H): ICD-10-CM

## 2023-01-24 DIAGNOSIS — D35.2 PITUITARY ADENOMA (H): ICD-10-CM

## 2023-01-27 NOTE — TELEPHONE ENCOUNTER
CABERGOLINE 0.5 MG TABS 0.5 Tablet      Last Written Prescription Date:  7/28/22  Last Fill Quantity: 24,   # refills: 3  Last Office Visit : 1/11/23  Future Office visit:  1/24/24    Routing refill request to provider for review/approval because:  Drug not on the [facility], P or [facility] refill protocol or controlled substance

## 2023-01-29 RX ORDER — CABERGOLINE 0.5 MG/1
TABLET ORAL
Qty: 24 TABLET | Refills: 3 | Status: SHIPPED | OUTPATIENT
Start: 2023-01-29 | End: 2023-04-24

## 2023-03-26 ENCOUNTER — HEALTH MAINTENANCE LETTER (OUTPATIENT)
Age: 43
End: 2023-03-26

## 2023-04-08 ENCOUNTER — LAB (OUTPATIENT)
Dept: LAB | Facility: CLINIC | Age: 43
End: 2023-04-08
Payer: COMMERCIAL

## 2023-04-08 DIAGNOSIS — D35.2 PROLACTINOMA (H): ICD-10-CM

## 2023-04-08 LAB — PROLACTIN SERPL 3RD IS-MCNC: 149 NG/ML (ref 5–23)

## 2023-04-08 PROCEDURE — 84146 ASSAY OF PROLACTIN: CPT | Performed by: INTERNAL MEDICINE

## 2023-04-08 PROCEDURE — 36415 COLL VENOUS BLD VENIPUNCTURE: CPT | Performed by: PATHOLOGY

## 2023-04-10 ENCOUNTER — LAB REQUISITION (OUTPATIENT)
Dept: LAB | Facility: CLINIC | Age: 43
End: 2023-04-10
Payer: COMMERCIAL

## 2023-04-10 DIAGNOSIS — E11.9 TYPE 2 DIABETES MELLITUS WITHOUT COMPLICATIONS (H): ICD-10-CM

## 2023-04-10 DIAGNOSIS — I10 ESSENTIAL (PRIMARY) HYPERTENSION: ICD-10-CM

## 2023-04-10 DIAGNOSIS — E55.9 VITAMIN D DEFICIENCY, UNSPECIFIED: ICD-10-CM

## 2023-04-10 DIAGNOSIS — F31.9 BIPOLAR DISORDER, UNSPECIFIED (H): ICD-10-CM

## 2023-04-10 PROCEDURE — 80061 LIPID PANEL: CPT | Mod: ORL | Performed by: FAMILY MEDICINE

## 2023-04-10 PROCEDURE — 83721 ASSAY OF BLOOD LIPOPROTEIN: CPT | Mod: ORL | Performed by: FAMILY MEDICINE

## 2023-04-10 PROCEDURE — 80053 COMPREHEN METABOLIC PANEL: CPT | Mod: ORL | Performed by: FAMILY MEDICINE

## 2023-04-10 PROCEDURE — 82306 VITAMIN D 25 HYDROXY: CPT | Mod: ORL | Performed by: FAMILY MEDICINE

## 2023-04-10 PROCEDURE — 80164 ASSAY DIPROPYLACETIC ACD TOT: CPT | Performed by: FAMILY MEDICINE

## 2023-04-10 PROCEDURE — 84443 ASSAY THYROID STIM HORMONE: CPT | Mod: ORL | Performed by: FAMILY MEDICINE

## 2023-04-11 ENCOUNTER — LAB REQUISITION (OUTPATIENT)
Dept: LAB | Facility: CLINIC | Age: 43
End: 2023-04-11
Payer: COMMERCIAL

## 2023-04-11 ENCOUNTER — TRANSFERRED RECORDS (OUTPATIENT)
Dept: HEALTH INFORMATION MANAGEMENT | Facility: CLINIC | Age: 43
End: 2023-04-11

## 2023-04-11 DIAGNOSIS — I10 ESSENTIAL (PRIMARY) HYPERTENSION: ICD-10-CM

## 2023-04-11 LAB
ALBUMIN (URINE) MG/SPEC: 30 MG/L
ALBUMIN/CREATININE RATIO: <30 MG/G
CREATININE (URINE): 100 MG/DL (ref 10–300)
ERYTHROCYTE [DISTWIDTH] IN BLOOD BY AUTOMATED COUNT: 11.7 % (ref 10–15)
HCT VFR BLD AUTO: 42.3 % (ref 35–47)
HGB BLD-MCNC: 13.2 G/DL (ref 11.7–15.7)
MCH RBC QN AUTO: 36.2 PG (ref 26.5–33)
MCHC RBC AUTO-ENTMCNC: 31.2 G/DL (ref 31.5–36.5)
MCV RBC AUTO: 116 FL (ref 78–100)
PLATELET # BLD AUTO: 285 10E3/UL (ref 150–450)
RBC # BLD AUTO: 3.65 10E6/UL (ref 3.8–5.2)
WBC # BLD AUTO: 10.4 10E3/UL (ref 4–11)

## 2023-04-11 PROCEDURE — 85027 COMPLETE CBC AUTOMATED: CPT | Performed by: FAMILY MEDICINE

## 2023-04-12 ENCOUNTER — LAB REQUISITION (OUTPATIENT)
Dept: LAB | Facility: CLINIC | Age: 43
End: 2023-04-12
Payer: COMMERCIAL

## 2023-04-12 DIAGNOSIS — I10 ESSENTIAL (PRIMARY) HYPERTENSION: ICD-10-CM

## 2023-04-12 DIAGNOSIS — F31.9 BIPOLAR DISORDER, UNSPECIFIED (H): ICD-10-CM

## 2023-04-12 DIAGNOSIS — E11.9 TYPE 2 DIABETES MELLITUS WITHOUT COMPLICATIONS (H): ICD-10-CM

## 2023-04-12 DIAGNOSIS — E55.9 VITAMIN D DEFICIENCY, UNSPECIFIED: ICD-10-CM

## 2023-04-12 LAB
ALBUMIN SERPL BCG-MCNC: 4.3 G/DL (ref 3.5–5.2)
ALP SERPL-CCNC: 55 U/L (ref 35–104)
ALT SERPL W P-5'-P-CCNC: 33 U/L (ref 10–35)
ANION GAP SERPL CALCULATED.3IONS-SCNC: 15 MMOL/L (ref 7–15)
AST SERPL W P-5'-P-CCNC: 30 U/L (ref 10–35)
BILIRUB SERPL-MCNC: 0.3 MG/DL
BUN SERPL-MCNC: 20 MG/DL (ref 6–20)
CALCIUM SERPL-MCNC: 9.4 MG/DL (ref 8.6–10)
CHLORIDE SERPL-SCNC: 100 MMOL/L (ref 98–107)
CHOLEST SERPL-MCNC: 158 MG/DL
CREAT SERPL-MCNC: 1.07 MG/DL (ref 0.51–0.95)
DEPRECATED HCO3 PLAS-SCNC: 22 MMOL/L (ref 22–29)
GFR SERPL CREATININE-BSD FRML MDRD: 67 ML/MIN/1.73M2
GLUCOSE SERPL-MCNC: 126 MG/DL (ref 70–99)
HDLC SERPL-MCNC: 16 MG/DL
LDLC SERPL CALC-MCNC: ABNORMAL MG/DL
LDLC SERPL DIRECT ASSAY-MCNC: 73 MG/DL
NONHDLC SERPL-MCNC: 142 MG/DL
POTASSIUM SERPL-SCNC: 3.8 MMOL/L (ref 3.4–5.3)
PROT SERPL-MCNC: 6.8 G/DL (ref 6.4–8.3)
SODIUM SERPL-SCNC: 137 MMOL/L (ref 136–145)
TRIGL SERPL-MCNC: 410 MG/DL
TSH SERPL DL<=0.005 MIU/L-ACNC: 4.38 UIU/ML (ref 0.3–4.2)
VALPROATE SERPL-MCNC: 44.2 UG/ML

## 2023-04-13 LAB — DEPRECATED CALCIDIOL+CALCIFEROL SERPL-MC: 34 UG/L (ref 20–75)

## 2023-04-14 LAB
VALPROATE FREE MFR SERPL: ABNORMAL %
VALPROATE FREE SERPL-MCNC: <7 UG/ML
VALPROATE SERPL-MCNC: 45 UG/ML

## 2023-04-17 ENCOUNTER — VIRTUAL VISIT (OUTPATIENT)
Dept: SURGERY | Facility: CLINIC | Age: 43
End: 2023-04-17
Payer: COMMERCIAL

## 2023-04-17 VITALS — HEIGHT: 60 IN | BODY MASS INDEX: 57.52 KG/M2 | WEIGHT: 293 LBS

## 2023-04-17 DIAGNOSIS — E78.2 MIXED DYSLIPIDEMIA: ICD-10-CM

## 2023-04-17 DIAGNOSIS — E66.01 MORBID OBESITY WITH BMI OF 60.0-69.9, ADULT (H): Primary | ICD-10-CM

## 2023-04-17 DIAGNOSIS — E11.65 TYPE 2 DIABETES MELLITUS WITH HYPERGLYCEMIA, WITHOUT LONG-TERM CURRENT USE OF INSULIN (H): ICD-10-CM

## 2023-04-17 DIAGNOSIS — E78.6 LOW HDL (UNDER 40): ICD-10-CM

## 2023-04-17 DIAGNOSIS — E78.2 ELEVATED CHOLESTEROL WITH HIGH TRIGLYCERIDES: ICD-10-CM

## 2023-04-17 PROCEDURE — 99213 OFFICE O/P EST LOW 20 MIN: CPT | Mod: 93 | Performed by: EMERGENCY MEDICINE

## 2023-04-17 RX ORDER — METAPROTERENOL SULFATE 10 MG
1000 TABLET ORAL DAILY
Qty: 180 CAPSULE | Refills: 3 | Status: SHIPPED | OUTPATIENT
Start: 2023-04-17 | End: 2023-11-21

## 2023-04-17 NOTE — LETTER
"    4/17/2023         RE: Bushra Buck  [address]  [place] [address]        Dear Colleague,    Thank you for referring your patient, Bushra Buck, to the [facility] [place]. Please see a copy of my visit note below.      The patient has been notified of following:     \"This telephone visit will be conducted via a call between you and your physician/provider. We have found that certain health care needs can be provided without the need for a physical exam.  This service lets us provide the care you need with a short phone conversation.  If a prescription is necessary we can send it directly to your pharmacy.  If lab work is needed we can place an order for that and you can then stop by our lab to have the test done at a later time.    Telephone visits are billed at different rates depending on your insurance coverage. During this emergency period, for some insurers they may be billed the same as an in-person visit.  Please reach out to your insurance provider with any questions.    If during the course of the call the physician/provider feels a telephone visit is not appropriate, you will not be charged for this service.\"    Patient has given verbal consent to a Telephone visit? Yes    What phone number would you like to be contacted at? [phone]    Patient would like to receive their AVS by [email or web address]    Are there any specific questions or needs that you would like addressed at your visit today? No        Distant Location (provider location):  On-site    Additional provider notes:   Bariatric Clinic Follow-Up Visit:    Bushra Buck is a 43 year old  female with Body mass index is 67.57 kg/m .  presenting here today for follow-up on non-surgical efforts for weight loss. Original Intake visit occurred on 9/17/15 with a weight of 350 lbs BMI of 68.4, gained up to a max of 401lbs in 2016..  Along with diet and behavior changes, she has " been previously using GLP1 agonist (Trulicity 3mg at our visit in Nov 2021) for diabetes and to assist her weight loss goals.  Trulicity dose was increased to 4.5mg/week 9/12/22 at 354 lbs.  See her intake visit notes for details on identified contributors to weight gain in the past. Chart review shows Dietician calculated RMR of 2300 and protein intake goal of 80g/day.  Weight:   Wt Readings from Last 5 Encounters:   04/17/23 (!) 156.9 kg (346 lb)   01/11/23 (!) 158.1 kg (348 lb 8 oz)   12/19/22 (!) 155.1 kg (342 lb)   09/12/22 (!) 160.6 kg (354 lb)   12/06/21 (!) 161 kg (355 lb)    pounds      Comorbidities:  Patient Active Problem List   Diagnosis     Acne rosacea     Dermatitis     Stasis edema     Pituitary adenoma (H)     Abnormal weight gain     High triglycerides     Prolactinoma (H)     Acquired hypothyroidism     Morbid obesity (H)       Current Outpatient Medications:      ACETAMINOPHEN EXTRA STRENGTH 500 MG tablet, , Disp: , Rfl:      amoxicillin-clavulanate (AUGMENTIN) 875-125 MG tablet, Take 1 tablet by mouth 2 times daily, Disp: , Rfl:      ASPIRIN LOW DOSE 81 MG EC tablet, , Disp: , Rfl:      atorvastatin (LIPITOR) 40 MG tablet, , Disp: , Rfl:      bacitracin 500 UNIT/GM external ointment, Apply topically 2 times daily Apply thin layer twice daily for 10-14 days to abdominal wound., Disp: 30 g, Rfl: 0     Blood Glucose Monitoring Suppl ([employer] TALK MONITORING SYSTEM) w/Device KIT, , Disp: , Rfl:      bumetanide (BUMEX) 2 MG tablet, , Disp: , Rfl:      cabergoline (DOSTINEX) 0.5 MG tablet, TAKE 1 TABLET BY MOUTH TWICE A WEEK, Disp: 24 tablet, Rfl: 3     calcium carbonate-vitamin D (OS-TAWNYA WITH D) 500-200 MG-UNIT tablet, , Disp: , Rfl:      Calcium Citrate-Vitamin D3 315-6.25 MG-MCG TABS, TAKE 2 TABLETS BY MOUTH DAILY, Disp: 60 tablet, Rfl: 10     cetirizine (ZYRTEC) 10 MG tablet, , Disp: , Rfl:      clotrimazole (LOTRIMIN) 1 % external cream, , Disp: , Rfl:      clotrimazole (LOTRIMIN) 1 % external  solution, , Disp: , Rfl:      divalproex sodium extended-release (DEPAKOTE ER) 500 MG 24 hr tablet, , Disp: , Rfl:      Dulaglutide 4.5 MG/0.5ML SOPN, Inject 4.5 mg Subcutaneous every 7 days, Disp: 90 mL, Rfl: 3     EMBRACE TALK GLUCOSE TEST test strip, , Disp: , Rfl:      fenofibrate (TRICOR) 145 MG tablet, , Disp: , Rfl:      FENOFIBRATE PO, Take 145 mg by mouth daily, Disp: , Rfl:      fluconazole (DIFLUCAN) 150 MG tablet, , Disp: , Rfl:      glimepiride (AMARYL) 2 MG tablet, Take 1 tablet (2 mg) by mouth every morning (before breakfast), Disp: 90 tablet, Rfl: 3     Hypertonic Nasal Wash (SINUS RINSE KIT) PACK, Spray 1 packet in nostril daily, Disp: 200 each, Rfl: 3     levothyroxine (SYNTHROID/LEVOTHROID) 112 MCG tablet, TAKE 1 TABLET (112MCG) BY MOUTH DAILY, Disp: 90 tablet, Rfl: 3     LORazepam (ATIVAN) 2 MG tablet, , Disp: , Rfl:      medroxyPROGESTERone (PROVERA) 10 MG tablet, TAKE 1 TABLET  10 MG  BY MOUTH ONCE DAILY FOR 5 DAYS EACH MONTH, Disp: , Rfl: 0     Multiple Vitamin (TAB-A-NARESH) TABS, , Disp: , Rfl:      mupirocin (BACTROBAN) 2 % external ointment, , Disp: , Rfl:      NYAMYC 324537 UNIT/GM external powder, , Disp: , Rfl:      omeprazole (PRILOSEC) 40 MG DR capsule, Take 1 capsule by mouth daily., Disp: , Rfl:      paliperidone ER (INVEGA) 6 MG 24 hr tablet, , Disp: , Rfl:      paliperidone ER (INVEGA) 9 MG 24 hr tablet, , Disp: , Rfl:      potassium chloride ER (KLOR-CON M) 20 MEQ CR tablet, , Disp: , Rfl:      spironolactone (ALDACTONE) 50 MG tablet, , Disp: , Rfl:      TRUEplus Lancets 30G MISC, , Disp: , Rfl:      VITAMIN D3 25 MCG (1000 UT) tablet, , Disp: , Rfl:     Current Facility-Administered Medications:      lidocaine (XYLOCAINE) 2 % external gel, , Topical, PRN, Luciana Neal, NP, Given at 12/06/21 0091      Interim: Since our last visit, she has been tolerating 4.5mg Trulcity. Had blisters on leg and is on antibiotics last Tuesday. Birthday celebration last week. Eating fish nearly  every day for Lent.   Some low HDL, elevated triglycerides on recent lab tests 4/10/23.    Plan:   1.  Diet: aiming for 1800kcal/day and 80 grams of lean protein daily with 80-100oz of water daily for good continued success.   2. Exercise: do your therapy exercises daily   3. Medication: Trulcity tolerated well at 4.5mg/week dose. Continue. Recent blood glucose looked good. If affordable in the future you could look at transitioning to Ozempic or Mounjaro but this regimen of Trulicity is covered well and tolerated so we'll continue with it.  4. Elevated triglycerides and low HDL would benefit from 1-2 grams of fish oil daily. Start with one gram in the evenings.   5. Goals: maintaining weight in the 340s after dropping about 55 lbs years ago. Well done.       We discussed [facility] Bariat[facility] Basics including:  -eating 3 meals daily  -reviewed metabolic needs for weight loss based on Resting Metabolic Rate  -protein goals supportive of healthy weight loss  -avoiding/limiting calorie containing beverages  -We discussed the importance of restorative sleep and stress management in maintaining a healthy weight.  -We discussed the National Weight Control Registry healthy weight maintenance strategies and ways to optimize metabolism.  -We discussed the importance of physical activity including cardiovascular and strength training in maintaining a healthier weight and explored viable options.      Most recent labs:  Lab Results   Component Value Date    WBC 10.4 04/11/2023    HGB 13.2 04/11/2023    HCT 42.3 04/11/2023     (H) 04/11/2023     04/11/2023     Lab Results   Component Value Date    CHOL 158 04/10/2023     Lab Results   Component Value Date    HDL 16 (L) 04/10/2023     No components found for: LDLCALC  Lab Results   Component Value Date    TRIG 410 (H) 04/10/2023     No results found for: CHOLHDL  Lab Results   Component Value Date    ALT 33 04/10/2023    AST 30 04/10/2023    ALKPHOS 55 04/10/2023      No results found for: HGBA1C  Lab Results   Component Value Date    B12 366 11/25/2011     No components found for: VITDT1  No results found for: EFRAIN  Lab Results   Component Value Date    PTHI 37 07/10/2012     No results found for: ZN  No results found for: VIB1WB  Lab Results   Component Value Date    TSH 4.38 (H) 04/10/2023     No results found for: TEST    DIETARY HISTORY  Tracking technique: limited  Positive Changes Since Last Visit: positive mental health  Struggling With: mindfulness    Getting Adequate Protein: unsure  Sleep schedule: good.      PHYSICAL ACTIVITY PATTERNS:  Cardiovascular: some PT  Strength Training: limited    REVIEW OF SYSTEMS  No upset stomach w/ Trulicity. No rash. Notes she was fasting for labs last week. Trending better but still low HDL, elevated triglycerides.      Recent Labs   Lab Test 04/10/23  0947 04/13/22  1614   CHOL 158 140   HDL 16* 11*   LDL 73 82   TRIG 410* 523*     .  PHYSICAL EXAM:  Vitals: Ht 1.524 m (5')   Wt (!) 156.9 kg (346 lb)   BMI 67.57 kg/m    Weight:   Wt Readings from Last 3 Encounters:   04/17/23 (!) 156.9 kg (346 lb)   01/11/23 (!) 158.1 kg (348 lb 8 oz)   12/19/22 (!) 155.1 kg (342 lb)         GEN: Pleasant, well groomed, in no acute distress        20 minutes spent by me on the date of the encounter doing chart review, history and exam, documentation and further activities per the note   Carlos Bowling MD  [facility]  1:40 PM  4/17/2023    Phone call duration: 20 minutes        Again, thank you for allowing me to participate in the care of your patient.        Sincerely,        Carlos Bowling MD

## 2023-04-17 NOTE — PROGRESS NOTES
"  The patient has been notified of following:     \"This telephone visit will be conducted via a call between you and your physician/provider. We have found that certain health care needs can be provided without the need for a physical exam.  This service lets us provide the care you need with a short phone conversation.  If a prescription is necessary we can send it directly to your pharmacy.  If lab work is needed we can place an order for that and you can then stop by our lab to have the test done at a later time.    Telephone visits are billed at different rates depending on your insurance coverage. During this emergency period, for some insurers they may be billed the same as an in-person visit.  Please reach out to your insurance provider with any questions.    If during the course of the call the physician/provider feels a telephone visit is not appropriate, you will not be charged for this service.\"    Patient has given verbal consent to a Telephone visit? Yes    What phone number would you like to be contacted at? [phone]    Patient would like to receive their AVS by [employer]    Are there any specific questions or needs that you would like addressed at your visit today? No        Distant Location (provider location):  On-site    Additional provider notes:   Bariatric Clinic Follow-Up Visit:    Bushra Buck is a 43 year old  female with Body mass index is 67.57 kg/m .  presenting here today for follow-up on non-surgical efforts for weight loss. Original Intake visit occurred on 9/17/15 with a weight of 350 lbs BMI of 68.4, gained up to a max of 401lbs in 2016..  Along with diet and behavior changes, she has been previously using GLP1 agonist (Trulicity 3mg at our visit in Nov 2021) for diabetes and to assist her weight loss goals.  Trulicity dose was increased to 4.5mg/week 9/12/22 at 354 lbs.  See her intake visit notes for details on identified contributors to weight gain in the past. Chart " review shows Dietician calculated RMR of 2300 and protein intake goal of 80g/day.  Weight:   Wt Readings from Last 5 Encounters:   04/17/23 (!) 156.9 kg (346 lb)   01/11/23 (!) 158.1 kg (348 lb 8 oz)   12/19/22 (!) 155.1 kg (342 lb)   09/12/22 (!) 160.6 kg (354 lb)   12/06/21 (!) 161 kg (355 lb)    pounds      Comorbidities:  Patient Active Problem List   Diagnosis     Acne rosacea     Dermatitis     Stasis edema     Pituitary adenoma (H)     Abnormal weight gain     High triglycerides     Prolactinoma (H)     Acquired hypothyroidism     Morbid obesity (H)       Current Outpatient Medications:      ACETAMINOPHEN EXTRA STRENGTH 500 MG tablet, , Disp: , Rfl:      amoxicillin-clavulanate (AUGMENTIN) 875-125 MG tablet, Take 1 tablet by mouth 2 times daily, Disp: , Rfl:      ASPIRIN LOW DOSE 81 MG EC tablet, , Disp: , Rfl:      atorvastatin (LIPITOR) 40 MG tablet, , Disp: , Rfl:      bacitracin 500 UNIT/GM external ointment, Apply topically 2 times daily Apply thin layer twice daily for 10-14 days to abdominal wound., Disp: 30 g, Rfl: 0     Blood Glucose Monitoring Suppl ([employer] TALK MONITORING SYSTEM) w/Device KIT, , Disp: , Rfl:      bumetanide (BUMEX) 2 MG tablet, , Disp: , Rfl:      cabergoline (DOSTINEX) 0.5 MG tablet, TAKE 1 TABLET BY MOUTH TWICE A WEEK, Disp: 24 tablet, Rfl: 3     calcium carbonate-vitamin D (OS-TAWNYA WITH D) 500-200 MG-UNIT tablet, , Disp: , Rfl:      Calcium Citrate-Vitamin D3 315-6.25 MG-MCG TABS, TAKE 2 TABLETS BY MOUTH DAILY, Disp: 60 tablet, Rfl: 10     cetirizine (ZYRTEC) 10 MG tablet, , Disp: , Rfl:      clotrimazole (LOTRIMIN) 1 % external cream, , Disp: , Rfl:      clotrimazole (LOTRIMIN) 1 % external solution, , Disp: , Rfl:      divalproex sodium extended-release (DEPAKOTE ER) 500 MG 24 hr tablet, , Disp: , Rfl:      Dulaglutide 4.5 MG/0.5ML SOPN, Inject 4.5 mg Subcutaneous every 7 days, Disp: 90 mL, Rfl: 3     [employer] TALK GLUCOSE TEST test strip, , Disp: , Rfl:      fenofibrate  (TRICOR) 145 MG tablet, , Disp: , Rfl:      FENOFIBRATE PO, Take 145 mg by mouth daily, Disp: , Rfl:      fluconazole (DIFLUCAN) 150 MG tablet, , Disp: , Rfl:      glimepiride (AMARYL) 2 MG tablet, Take 1 tablet (2 mg) by mouth every morning (before breakfast), Disp: 90 tablet, Rfl: 3     Hypertonic Nasal Wash (SINUS RINSE KIT) PACK, Spray 1 packet in nostril daily, Disp: 200 each, Rfl: 3     levothyroxine (SYNTHROID/LEVOTHROID) 112 MCG tablet, TAKE 1 TABLET (112MCG) BY MOUTH DAILY, Disp: 90 tablet, Rfl: 3     LORazepam (ATIVAN) 2 MG tablet, , Disp: , Rfl:      medroxyPROGESTERone (PROVERA) 10 MG tablet, TAKE 1 TABLET  10 MG  BY MOUTH ONCE DAILY FOR 5 DAYS EACH MONTH, Disp: , Rfl: 0     Multiple Vitamin (TAB-A-NARESH) TABS, , Disp: , Rfl:      mupirocin (BACTROBAN) 2 % external ointment, , Disp: , Rfl:      NYAMYC 907498 UNIT/GM external powder, , Disp: , Rfl:      omeprazole (PRILOSEC) 40 MG DR capsule, Take 1 capsule by mouth daily., Disp: , Rfl:      paliperidone ER (INVEGA) 6 MG 24 hr tablet, , Disp: , Rfl:      paliperidone ER (INVEGA) 9 MG 24 hr tablet, , Disp: , Rfl:      potassium chloride ER (KLOR-CON M) 20 MEQ CR tablet, , Disp: , Rfl:      spironolactone (ALDACTONE) 50 MG tablet, , Disp: , Rfl:      TRUEplus Lancets 30G MISC, , Disp: , Rfl:      VITAMIN D3 25 MCG (1000 UT) tablet, , Disp: , Rfl:     Current Facility-Administered Medications:      lidocaine (XYLOCAINE) 2 % external gel, , Topical, PRN, Luciana Neal NP, Given at 12/06/21 1354      Interim: Since our last visit, she has been tolerating 4.5mg Trulcity. Had blisters on leg and is on antibiotics last Tuesday. Birthday celebration last week. Eating fish nearly every day for Lent.   Some low HDL, elevated triglycerides on recent lab tests 4/10/23.    Plan:   1.  Diet: aiming for 1800kcal/day and 80 grams of lean protein daily with 80-100oz of water daily for good continued success.   2. Exercise: do your therapy exercises daily   3. Medication:  Trulcity tolerated well at 4.5mg/week dose. Continue. Recent blood glucose looked good. If affordable in the future you could look at transitioning to Ozempic or Mounjaro but this regimen of Trulicity is covered well and tolerated so we'll continue with it.  4. Elevated triglycerides and low HDL would benefit from 1-2 grams of fish oil daily. Start with one gram in the evenings.   5. Goals: maintaining weight in the 340s after dropping about 55 lbs years ago. Well done.       We discussed [facility]ariat[facility] Basics including:  -eating 3 meals daily  -reviewed metabolic needs for weight loss based on Resting Metabolic Rate  -protein goals supportive of healthy weight loss  -avoiding/limiting calorie containing beverages  -We discussed the importance of restorative sleep and stress management in maintaining a healthy weight.  -We discussed the National Weight Control Registry healthy weight maintenance strategies and ways to optimize metabolism.  -We discussed the importance of physical activity including cardiovascular and strength training in maintaining a healthier weight and explored viable options.      Most recent labs:  Lab Results   Component Value Date    WBC 10.4 04/11/2023    HGB 13.2 04/11/2023    HCT 42.3 04/11/2023     (H) 04/11/2023     04/11/2023     Lab Results   Component Value Date    CHOL 158 04/10/2023     Lab Results   Component Value Date    HDL 16 (L) 04/10/2023     No components found for: LDLCALC  Lab Results   Component Value Date    TRIG 410 (H) 04/10/2023     No results found for: CHOLHDL  Lab Results   Component Value Date    ALT 33 04/10/2023    AST 30 04/10/2023    ALKPHOS 55 04/10/2023     No results found for: HGBA1C  Lab Results   Component Value Date    B12 366 11/25/2011     No components found for: VITDT1  No results found for: EFRAIN  Lab Results   Component Value Date    PTHI 37 07/10/2012     No results found for: ZN  No results found for: VIB1WB  Lab Results    Component Value Date    TSH 4.38 (H) 04/10/2023     No results found for: TEST    DIETARY HISTORY  Tracking technique: limited  Positive Changes Since Last Visit: positive mental health  Struggling With: mindfulness    Getting Adequate Protein: unsure  Sleep schedule: good.      PHYSICAL ACTIVITY PATTERNS:  Cardiovascular: some PT  Strength Training: limited    REVIEW OF SYSTEMS  No upset stomach w/ Trulicity. No rash. Notes she was fasting for labs last week. Trending better but still low HDL, elevated triglycerides.      Recent Labs   Lab Test 04/10/23  0947 04/13/22  1614   CHOL 158 140   HDL 16* 11*   LDL 73 82   TRIG 410* 523*     .  PHYSICAL EXAM:  Vitals: Ht 1.524 m (5')   Wt (!) 156.9 kg (346 lb)   BMI 67.57 kg/m    Weight:   Wt Readings from Last 3 Encounters:   04/17/23 (!) 156.9 kg (346 lb)   01/11/23 (!) 158.1 kg (348 lb 8 oz)   12/19/22 (!) 155.1 kg (342 lb)         GEN: Pleasant, well groomed, in no acute distress        20 minutes spent by me on the date of the encounter doing chart review, history and exam, documentation and further activities per the note   Carlos Bowling MD  [facility]  1:40 PM  4/17/2023    Phone call duration: 20 minutes

## 2023-04-17 NOTE — PATIENT INSTRUCTIONS
Plan:   1.  Diet: aiming for 1800kcal/day and 80 grams of lean protein daily with 80-100oz of water daily for good continued success.   2. Exercise: do your therapy exercises daily   3. Medication: Trulcity tolerated well at 4.5mg/week dose. Continue. Recent blood glucose looked good. If affordable in the future you could look at transitioning to Ozempic or Mounjaro but this regimen of Trulicity is covered well and tolerated so we'll continue with it.  4. Elevated triglycerides and low HDL would benefit from 1-2 grams of fish oil daily. Start with one gram in the evenings.   5. Goals: maintaining weight in the 340s after dropping about 55 lbs years ago. Well done.       On-the-Go Breakfast Ideas  As of 2015, the latest research shows what a huge impact eating breakfast has on losing weight and feeling your best. People lose more weight when they make breakfast their biggest meal of the day compared to Dinner, but even if you cannot go to that degree, getting a breakfast that has at least 20 grams of protein and even a moderate amount of fat is ideal for maintaining good energy through the day and limits overeating in the evening hours.  The following are some quick and easy suggestions for at least getting something of substance into your body in the morning.  Enjoy!    Eating breakfast within 90 minutes of waking up is an important part of taking care of your body on a restricted calorie diet plan.  After sleeping for hours, your body is in need of fuel.  An ideal breakfast is a combination of protein, whole-grain carbohydrates, or fruit.  Here s why:    -Protein digests very slowly in the body, helping you feel more satisfied.  -Whole grains provide dietary fiber, which also digests slowly and helps keep your gut clean.  -Fruit is a great source of vitamins, minerals, and fiber.     Each one of these breakfast combinations has between 200-300 calories and 15-20 grams of protein.  Feel free to mix and  match!    Bone Broth (chicken bone broth or beef bone broth) is a great way to boost protein content. 8oz of bone broth will typically have 9-12grams of protein for 40kcal of energy.    Protein: Choose  -1/2 cup low-fat cottage cheese  -2 hard boiled eggs , or one cooked in olive oil (low/slow heat).  -1 low fat string cheese stick  -1 Tablespoon natural peanut butter  -[facility] luma-id vegetarian sausage abby (found in freezer section)  -1 slice lowfat cheese  -6 oz 2% or lowfat Greek yogurt, such as Fage or Oikos.    PLUS    Whole Grains:  Choose   -1 whole wheat English muffin  -1 whole wheat gordon, half  -1/2 Fiber One frozen muffin, thawed  -1/2 Fiber One toaster pastry  -1 whole wheat bagel thin  -1/2 cup Kashi cereal  -1 Kashi waffle (or other whole grain high-fiber waffle)  Aim for whole grain/sprouted breads with at least 3g of fiber/slice if having bread. [place] is one such brand.    OR    Fruit: Choose  -1/3 cup blueberries  -1/2 banana (or a plantain- similar to a banana, yet smaller)  -1/2 cup cantaloupe cubes  -1 small apple  -1 small orange  -1/2 cup strawberries  -handful raspberries/blackberries (each berry is about 1 calorie).    *Adapted from [employer], Fall 20    Ten Breakfasts Under 250 calories    Ideally, getting between 350-600 calories  (depending on starting height and weight)for breakfast is ideal for avoiding hunger later in the day, adjust/add to the following accordingly:    One- 250 calories, 8.5 g protein  1 slice whole-grain toast   1 Tbsp peanut butter    banana    Two- 250 calories, 8 g protein    cup nonfat/lowfat yogurt  1/3rd cup diced no-sugar peaches  1/3rd cup cereal (like Special K, Cheerios, or bran flakes)    Three- 250 calories, 25 g protein  1 egg scrambled with 1 oz skim milk    cup shredded cheddar    whole grain English muffin  1 oz Fijian weathers  1 tsp margarine spread    Four- 225 calories, 25 g protein  1/2 cup Kashi Go-Lean cereal    cup skim milk  mixed with 1 scoop Bariatric Advantage protein powder    cup no-sugar diced pears    Five- 250 calories, 20 g protein    cup oatmeal prepared with skim milk, 1 scoop protein powder, and sugar-free maple syrup    Six- 200 calories, 5 g protein  1 whole grain waffle, toasted  1 tablespoon creamy peanut or almond butter    Seven-  250 calories, 19 g protein  Breakfast sandwich: 1 slice whole grain toast, cut in half.  Add 1 scrambled egg and one slice cheddar  cheese.    Eight-  250 calories, 15 g protein  2 eggs scrambled with 1/3 cup frozen spinach (heat before adding to eggs) and 2 tablespoons low fat cream cheese.    Nine-  150 calories, 15 g protein  2/3rd cup cottage cheese    cup cantaloupe    Ten- 200 calories, 20 g protein  Fruit smoothie made with 4 oz. nonfat Greek yogurt,   cup berries, 1 scoop protein powder, and 4 oz skim milk.    Ten Lunches Under 250 Calories    Aim for lunch to be around 300-400 calories a day when trying to lose weight and get that protein in!    One- 200 calories, 11 g protein  1/3 cup tuna salad made with light kennedy on 1 slice whole grain bread  1 small peeled apple    Two- 250 calories, 16 g protein  1/3 cup lowfat cottage cheese    cup cooked green beans    small fruit cocktail (in natural juice)    Three- 200 calories, 11 g protein    grilled cheese sandwich on whole grain bread with lowfat cheese  2/3rd cup of tomato soup    Four- 250 calories, 22 g protein  Deli wrap: 1 oz sliced turkey, 1 oz sliced ham, 1 oz sliced chicken rolled up with 1 slice low-fat cheese  1 small orange    Five- 250 calories, 28 g protein  2/3rd cup chili with 1 oz shredded cheese  4 saltine crackers    Six- 250 calories, 22 g protein  1 cup fresh spinach with 2 oz chicken, 1/3rd cup mandarin oranges, and 2 tablespoons sliced almonds with 1 tablespoon  vinaigrette dressing    Seven- 200 calories, 11 g protein  1 Tbsp sugar-free preserves and 1 Tbsp peanut butter on 1 slice whole grain toast    cup  nonfat/lowfat Greek yogurt    Eight- 250 calories, 18 g protein  1 small soft-shell chicken taco with 1 oz shredded cheese, lettuce, tomato, salsa, and 1 Tbsp light sour cream    cup black beans    Nine- 225 calories, 13 g protein  2 ounces baked chicken  1/4 cup mashed potatoes    cup green beans    Ten- 200 calories, 21 g protein  Deli gordon: 2 oz roast beef or other deli meat with 1 tsp Don mayonnaise and sliced tomato, onion, and lettuce  1/3rd cup cottage cheese      Ten Dinners Under 300 calories    If you're eating a large breakfast and medium lunch, keep dinner small.  300-400 calories is ideal for most people depending on their caloric needs.    One- 300 calories, 12 g protein  1-inch thick slice of turkey meatloaf    cup baked butternut squash    Two- 200 calories, 9 g protein  Bread-less BLT: 3 slices turkey weathers, sliced tomato, wrapped in a large lettuce leaf    cup peeled fruit    Three- 275 calories, 36 g protein  3 oz roasted chicken    cup cooked broccoli    cup shredded cheddar cheese    cup unsweetened applesauce    Four- 200 calories, 25 g protein  3 oz baked tilapia  1/3rd cup cooked carrots    cup yogurt    Five- 250 calories, 20 g protein  Grilled ham  n  Swiss: spread 2 tsp ghee or butter on 1 slice of whole grain bread.  Cut bread in half, layer 2 oz deli ham with 1 piece of Swiss cheese and grill until cheese is melted.    cup cooked vegetables    Six- 250 calories, 18 g protein  Vegetarian cheeseburger: 1 [place] cheeseburger topped with lettuce, onion, tomato, and ketchup/mustard    cup sweet potato fries    Seven- 250 calories, 18 g protein  Pork pot roast: 2 oz roasted pork loin, 1/3rd cup roasted carrots,   medium potato, cooked with   cup gravy    Eight- 330 calories, 25 g protein  2 oz meatballs (about 2 small meatballs)    cup spaghetti sauce  1/2 piece toast topped with 1 tsp ghee or butterand topped with garlic powder, toasted in oven    Nine- 250 calories, 16 g protein  Mexican  pizza: one 8  corn tortilla topped with 2 oz chicken,   cup salsa, 2 tablespoons black beans, 2 tablespoons shredded cheese.  Bake until cheese is melted.    Ten- 250 calories, 22 g protein  Shrimp stir-mckenzie: 3 oz cooked shrimp, 1/6th onion,   pepper,   cup chopped carrots sautéed in 1 tablespoon olive oil, topped with 2 tablespoons stir mckenzie sauce and a pinch of sesame seeds        150 Calories or Less Snack Ideas   1 hardboiled egg with   cup berries  1 small apple with 1 hardboiled egg  10 almonds with   cup berries  2 clementines with 1 light string cheese  1 light string cheese with   sliced apple  1 light string cheese wrapped in 2 slices of turkey  4 100% whole wheat crackers (e.g. Triscuit) with 1 light string cheese    c. cottage cheese with   cup fruit and 1 Tbsp sunflower seeds     cup cottage cheese with   of an avocado     can tuna fish with 1 cup sliced cucumbers     cup roasted garbanzo beans with paprika and cayenne pepper    baked sweet potato with   cup chili beans or   cup cottage cheese  2 oz. nitrate free turkey slices with 1 cup carrots  1 container (6 oz) of low sugar (less than 10 grams of sugar) greek yogurt   3 Tablespoons of hummus with 1 cup sliced bell peppers   2 Tablespoons of hummus with 15 baby carrots  4 Tablespoons ranch dip made with plain Greek Yogurt and 3 mini cucumbers  1/4 cup nuts (any kind)  1 Tablespoon peanut butter with 1 stalk celery   1 dill pickle wrapped in 1-2 slices of deli ham with 1 tsp of mayonnaise/mustard.  Example Meal Plan for a 1193-1080 Calorie Diet:    In order to fuel your weight loss properly and avoid hunger-induced overeating later in the day, for your height and weight, you will enjoy the most success by following the diet below or similar with adjustments based on your particular tastes and preferences.  Exercise may influence speed, amount of weight loss further.     I recommend getting into a meal routine and keeping it similar day to day in the  beginning so you don t have to think too hard about what you re going to make/eat.  Keep snacks healthy, ideally containing protein and some vegetables.  Non-processed food is preferable to packaged items.  Eat at least a few crunchy green vegetables if having a snack, which should be 2-3 hours after your mealtimes(prepare these ahead of time for ease of use).  Drink 64 oz -80 oz of water daily for most, some of you will need more and we'll discuss it at your visit if that is the case.      When changing our diet,  we can often mistake thirst for hunger or just have some distracted eating habits that we need to break free from ('bored/mindless eating', screen time,work, driving,etc).  A glass of water and reconsideration of our hunger is often all that is needed.  Having the urge is not the problem, but watching it pass by without acting on it is the goal.    If you re having hunger problems, add a protein drink/snack to your morning hours or afternoon snack with at least 20grams of protein and not too much sugar (under 10g).  A carton of higher protein/low sugar yogurt can work as well.  If the urge to snack is overwhelming and not satiated, try going for a 10 minute walk/exercise, come home and drink a glass of water and if still hungry, have a  calorie snack (handful of raw/sprouted nuts, veggies and string cheese, protein bar, etc).  Savor it.    It is better to have a large breakfast, a moderate lunch and a smaller dinner to fuel your day.  People lose 10-15% more weight during their weight loss season with this strategy. Optimizing your protein intake at each meal will further keep you more satisfied while eating less food overall.  Getting exercise in early has also been shown to offer the best results (before breakfast ideally but anytime is the right time to exercise if that is not an option for you).    To make sure you re getting adequate vitamins and minerals during weight loss, I recommend one  complete multivitamin a day of your choice.  Consider a probiotic and taking some vitamin D 2000 IU daily.    Let supper be your last meal of the day and ideally try to have at least 12 hours between supper and breakfast the next day to tap into some beneficial overnight fasting dynamics.  Midnight snacks need to go away. Water in the evening is fine, unsweetened, non caffeinated herbal tea is helpful as well.  Consolidating your meals within a 8-12 hour period of your day will help tap into these additional metabolic benefits and tends to keep your appetite up for breakfast, further helping to stay on track.  For most of my patients, I don't recommend an intermittent fasting style diet (many find it hard to fit in their lifestyle) but an overnight fast is very doable for most patients and helps regulate our hunger drives a little better.  This makes it very important to nail good intake at all three meals to feel satisfied/energized and still lose weight.      If evening snacking desires are high, consider a glass of fiber supplement for some additional fullness (metamucil or similar). Most of us don't get the 25-30 grams per day of fiber that promotes good gut health/satiety.  Benefiber, metamucil, citrucel are reasonable/affordable options for most people.  Inulin, chicory, psyllium husk are reasonable options but start slow and low in the dose to avoid gas/bloating until your gut gets acclimated (ramping up to 5-10 grams per day of supplemental fiber after 3-4 weeks if needed).      Example Meal Plan:  Breakfast: 450-475 Calories  1 egg cooked on low in olive oil:   calories.  5oz Greek Yogurt (Fage plain classic: ~150 alexsandra)  Handful of Berries of your choice (about a calorie per berry or 20-40cal per handful)    cup(cooked) of  old fashioned oatmeal or 1/2 cup(cooked) steel cut oats. (150 alexsandra)  Sprinkle amount of brown sugar and a pat of butter. (40 alexsandra)  Glass of  Water  Black coffee or unsweetened Tea  (0calories).      2-3 hours Later Snack: (195 calories).  Glass of water  One string Cheese (80 calories) or 4 oz creamed cottage cheese (115 calories) with  Crunchy Celery sticks (less than 10 calories per large stalk) 2 stalks. (20 calories)    of a  Large Banana or   of a Large Apple (60 calories):  eat second half at lunch or afternoon snack.     Lunch:300 -350 calories   Chicken Breast  (baked/broiled/roasted/grilled)  4-6 oz.  (125-180 alexsandra), BBQ sauce/hot sauce/mustard/seasoning is free. Just use a reasonable amount. Or a can of tuna with 1 tablespoon mayonnaise.  Salad: lettuce, any other veggies (cucumbers, green peppers/celery you like and a small drizzle of dressing to just flavor.  Go as big on the veggies as you like,  as they are practically calorie free.   A whole, 8 inch cucumber is 45 calories, a whole green pepper is 23 calories, a stalk of celery is 9 calories.  [place] Dressing is 60 calories per tablespoon..so moderate your desired dressing or do a drizzle of olive oil and splash of balsamic vinegar on top,  Total calories unlikely to be over 150 even with dressing.  Glass of Water.    Option for lunch is meal replacement protein drink/smoothie.  Need at least 20 grams of protein and eat the rest of your apple/banana from the morning snack.      Afternoon Snack: 150-200 calories   Cheese Stick or cottage cheese again  and a fresh fruit OR  Granola Bar (protein Bar acceptable if under 200 calories OR  Homemade smoothies:  8oz skim milk,  a handful of berries (fresh or frozen and a serving of protein powder such as BiPro or Sofia sWhey for example.  If you don't like dairy, make with 8oz water, one small banana, handful of berries and the protein powder, add any veggies you want as well:  roughly 200 calories.   Glass of Water    Dinner: 325 calories  4oz of fresh, Atlantic salmon.  Broiled (salt/pepper/dill) for about 8-8.5 minutes (200calories) or  4oz filet mignon steak or sirloin  steak  Salad or vegetable sautéed lightly in olive oil or   Broccoli 1.5  cups chopped and steamed  or micro-waved in a little water (75 calories)  Glass of Water,    Cup of herbal tea (unsweetened, caffeine free)      Herbs and seasonings are encouraged to flavor your foods/vegetables.  Make your food delicious.      Tips for Success:  1.  Prepare proteins ahead of time (broil chicken breasts in bulk so you can grab and go), steel cut oats/lentils can be stored in casserole dish/bowl in the fridge for quick scoop in the morning and rewarm in microwave, make use of crock pot recipes (watch salt content).  Making meals that cover 3-4 future meals is an easy way to stay on track.  2.  Drink a 8-12 oz glass of water every 2-3 hours when awake.  We often mistake hunger for thirst, especially when losing weight.  3. Remember your Reward and Motivation when things get hard.  4.  Weigh yourself every morning and record, you'll stay on track better and learn how our biorhythms, diet and elimination patterns show up on the scale. Don't worry about 1 or 2 day patterns, but when on track you'll notice good trend downward of weight over 3-4 day segments.  Plateaus tend to resolve after 4-8 days in most cases if you stay consistent with your plan.  These are natural and part of weight loss, even if you're perfect with your plan execution.  5. Call if problems/concerns.  [employer] is a great tool to stay in touch and provide weekly outside accountability. Check in with questions or if you want to brag.  6.  Find a handful of meals/foods that keep you on track and feeling good and get into a routine that is sustainable for you.  It's OK to have a routine that works for you.  7.  Consider taking a complete multivitamin just to make sure all micronutrients are adequate during weight loss.  8. If losing hair/brittle nails it usually means you are not taking enough protein.  Minimum goal is 60 grams daily of protein for smaller women,  "80 grams a day for men. Consider taking Biotin as supplement or a \"Hair and Nail\" multivitamin.      "

## 2023-04-24 DIAGNOSIS — D35.2 PITUITARY ADENOMA (H): ICD-10-CM

## 2023-04-24 DIAGNOSIS — D35.2 PROLACTINOMA (H): ICD-10-CM

## 2023-04-24 RX ORDER — CABERGOLINE 0.5 MG/1
0.5 TABLET ORAL
Qty: 36 TABLET | Refills: 3 | Status: SHIPPED | OUTPATIENT
Start: 2023-04-24 | End: 2023-10-05

## 2023-04-24 NOTE — RESULT ENCOUNTER NOTE
Dear Bushra     Here is your results. I apologize for the delay, your prolactin level has been bit high this time, my suggestion is to increase the frequency of cabergoline. Now is twice a week, we will increase 3 times a week We will also check the blood level of prolactin in 3 months.     Please call nursing line, if you are [facility] patient at [phone], if you are [facility] patient at [phone],  if you have any questions.    Please take care  Mandy Farfan MD

## 2023-04-25 ENCOUNTER — TELEPHONE (OUTPATIENT)
Dept: ENDOCRINOLOGY | Facility: CLINIC | Age: 43
End: 2023-04-25
Payer: COMMERCIAL

## 2023-04-25 DIAGNOSIS — D35.2 PROLACTINOMA (H): ICD-10-CM

## 2023-04-25 DIAGNOSIS — D35.2 PITUITARY ADENOMA (H): ICD-10-CM

## 2023-04-27 ENCOUNTER — TELEPHONE (OUTPATIENT)
Dept: VASCULAR SURGERY | Facility: CLINIC | Age: 43
End: 2023-04-27
Payer: COMMERCIAL

## 2023-04-27 NOTE — TELEPHONE ENCOUNTER
Left message for Bushra to call back to schedule with [facility] if compressions are needed otherwise reach out to PCP for orthotics

## 2023-04-27 NOTE — TELEPHONE ENCOUNTER
Caller: Bushra    Provider: CANDY Neal    Detailed reason for call: Bushra is calling wanting to schedule an apt in Aug to get the orthotic ordered.  She states she doesn't have wound. Please advise if an apt with [facility] is needed or if she should follow with her PCP    Best phone number to contact: [phone]    Best time to contact: any    Ok to leave a detailed message: Yes    Ok to speak to authorized person if needed: No      (Noted to patient if reason is related to wound or incision, to please send a photo via email or [facility].)

## 2023-05-02 ENCOUNTER — TELEPHONE (OUTPATIENT)
Dept: ENDOCRINOLOGY | Facility: CLINIC | Age: 43
End: 2023-05-02
Payer: COMMERCIAL

## 2023-05-02 NOTE — TELEPHONE ENCOUNTER
Review and recommendation from Dr Farfan sent via [employer].   Shell Howell RN on 5/2/2023 at 9:11 AM

## 2023-05-05 ENCOUNTER — LAB REQUISITION (OUTPATIENT)
Dept: LAB | Facility: CLINIC | Age: 43
End: 2023-05-05
Payer: COMMERCIAL

## 2023-05-05 ENCOUNTER — HOSPITAL ENCOUNTER (OUTPATIENT)
Facility: CLINIC | Age: 43
Discharge: HOME | End: 2023-05-05
Admitting: FAMILY MEDICINE
Payer: COMMERCIAL

## 2023-05-05 DIAGNOSIS — D64.9 ANEMIA, UNSPECIFIED: ICD-10-CM

## 2023-05-05 LAB — FOLATE SERPL-MCNC: 21.3 NG/ML (ref 4.6–34.8)

## 2023-05-05 PROCEDURE — 82746 ASSAY OF FOLIC ACID SERUM: CPT | Performed by: FAMILY MEDICINE

## 2023-05-05 PROCEDURE — 83921 ORGANIC ACID SINGLE QUANT: CPT | Mod: ORL | Performed by: FAMILY MEDICINE

## 2023-05-05 PROCEDURE — 83921 ORGANIC ACID SINGLE QUANT: CPT | Performed by: FAMILY MEDICINE

## 2023-05-05 PROCEDURE — 82607 VITAMIN B-12: CPT | Mod: ORL | Performed by: FAMILY MEDICINE

## 2023-05-06 LAB — VIT B12 SERPL-MCNC: 819 PG/ML (ref 232–1245)

## 2023-05-09 LAB — METHYLMALONATE SERPL-SCNC: 0.22 UMOL/L (ref 0–0.4)

## 2023-05-18 ENCOUNTER — ANCILLARY PROCEDURE (OUTPATIENT)
Dept: MRI IMAGING | Facility: CLINIC | Age: 43
End: 2023-05-18
Attending: INTERNAL MEDICINE
Payer: COMMERCIAL

## 2023-05-18 DIAGNOSIS — D35.2 PROLACTINOMA (H): ICD-10-CM

## 2023-05-18 PROCEDURE — A9585 GADOBUTROL INJECTION: HCPCS | Performed by: STUDENT IN AN ORGANIZED HEALTH CARE EDUCATION/TRAINING PROGRAM

## 2023-05-18 PROCEDURE — 70543 MRI ORBT/FAC/NCK W/O &W/DYE: CPT | Performed by: STUDENT IN AN ORGANIZED HEALTH CARE EDUCATION/TRAINING PROGRAM

## 2023-05-18 RX ORDER — GADOBUTROL 604.72 MG/ML
15 INJECTION INTRAVENOUS ONCE
Status: COMPLETED | OUTPATIENT
Start: 2023-05-18 | End: 2023-05-18

## 2023-05-18 RX ADMIN — GADOBUTROL 15 ML: 604.72 INJECTION INTRAVENOUS at 15:35

## 2023-05-18 NOTE — DISCHARGE INSTRUCTIONS
MRI Contrast Discharge Instructions    The IV contrast you received today will pass out of your body in your  urine. This will happen in the next 24 hours. You will not feel this process.  Your urine will not change color.    Drink at least 4 extra glasses of water or juice today (unless your doctor  has restricted your fluids). This reduces the stress on your kidneys.  You may take your regular medicines.    If you are on dialysis: It is best to have dialysis today.    If you have a reaction: Most reactions happen right away. If you have  any new symptoms after leaving the hospital (such as hives or swelling),  call your hospital at the correct number below. Or call your family doctor.  If you have breathing distress or wheezing, call 911.    Special instructions: ***    I have read and understand the above information.    Signature:______________________________________ Date:___________    Staff:__________________________________________ Date:___________     Time:__________    [facility] Departments:    ___[place]: [phone]  ___[facility]: [phone]  ___[place]: [phone] ___[facility]: [phone]  ___[facility]: [phone]  ___[facility]: [phone]  ___[phone]  ___[facility]: [phone]  ___[phone]

## 2023-05-24 ENCOUNTER — HOSPITAL ENCOUNTER (OUTPATIENT)
Facility: CLINIC | Age: 43
Discharge: HOME | End: 2023-05-24
Admitting: FAMILY MEDICINE
Payer: COMMERCIAL

## 2023-05-24 ENCOUNTER — LAB REQUISITION (OUTPATIENT)
Dept: LAB | Facility: CLINIC | Age: 43
End: 2023-05-24
Payer: COMMERCIAL

## 2023-05-24 DIAGNOSIS — E03.9 HYPOTHYROIDISM, UNSPECIFIED: ICD-10-CM

## 2023-05-24 DIAGNOSIS — I89.0 LYMPHEDEMA, NOT ELSEWHERE CLASSIFIED: ICD-10-CM

## 2023-05-24 PROCEDURE — 84443 ASSAY THYROID STIM HORMONE: CPT | Performed by: FAMILY MEDICINE

## 2023-05-24 PROCEDURE — 80048 BASIC METABOLIC PNL TOTAL CA: CPT | Performed by: FAMILY MEDICINE

## 2023-05-25 LAB
ANION GAP SERPL CALCULATED.3IONS-SCNC: 14 MMOL/L (ref 7–15)
BUN SERPL-MCNC: 9.9 MG/DL (ref 6–20)
CALCIUM SERPL-MCNC: 9 MG/DL (ref 8.6–10)
CHLORIDE SERPL-SCNC: 98 MMOL/L (ref 98–107)
CREAT SERPL-MCNC: 0.84 MG/DL (ref 0.51–0.95)
DEPRECATED HCO3 PLAS-SCNC: 25 MMOL/L (ref 22–29)
GFR SERPL CREATININE-BSD FRML MDRD: 88 ML/MIN/1.73M2
GLUCOSE SERPL-MCNC: 293 MG/DL (ref 70–99)
POTASSIUM SERPL-SCNC: 3.9 MMOL/L (ref 3.4–5.3)
SODIUM SERPL-SCNC: 137 MMOL/L (ref 136–145)
TSH SERPL DL<=0.005 MIU/L-ACNC: 3.76 UIU/ML (ref 0.3–4.2)

## 2023-05-26 ENCOUNTER — TELEPHONE (OUTPATIENT)
Dept: ENDOCRINOLOGY | Facility: CLINIC | Age: 43
End: 2023-05-26
Payer: COMMERCIAL

## 2023-05-26 ENCOUNTER — MYC MEDICAL ADVICE (OUTPATIENT)
Dept: ENDOCRINOLOGY | Facility: CLINIC | Age: 43
End: 2023-05-26
Payer: COMMERCIAL

## 2023-05-26 NOTE — TELEPHONE ENCOUNTER
"Spoke w/ Pt: states she has Take 1 tablet (0.5 mg) by mouth three times a week \"forever\" before she became my doctor - Tae Moran ordered the dose.   Provider notified.   Shell Howell RN on 5/26/2023 at 8:41 AM     "

## 2023-05-26 NOTE — RESULT ENCOUNTER NOTE
If she wants, ok phone result visit overbook.     ----------------  Dear Bushra     Here is your results. 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     Please call nursing line, if you are [facility] patient at [phone], if you are [facility] patient at [phone],  if you have any questions.    Please take care  Mandy Farfan MD

## 2023-05-26 NOTE — TELEPHONE ENCOUNTER
LVM and MyChart message sent asking Pt to confirm if she is taking the cabergoline as ordered.   Shell Howell RN on 5/26/2023 at 8:30 AM         Outpatient Medication Detail     Disp Refills Start End THOMAS   cabergoline (DOSTINEX) 0.5 MG tablet 36 tablet 3 4/24/2023  No   Sig - Route: Take 1 tablet (0.5 mg) by mouth three times a week - Oral   Sent to pharmacy as: Cabergoline 0.5 MG Oral Tablet (DOSTINEX)   Class: E-Prescribe   Order: [id number]   E-Prescribing Status: Receipt confirmed by pharmacy (4/24/2023  6:16 AM CDT)

## 2023-05-31 ENCOUNTER — LAB REQUISITION (OUTPATIENT)
Dept: LAB | Facility: CLINIC | Age: 43
End: 2023-05-31
Payer: COMMERCIAL

## 2023-05-31 ENCOUNTER — HOSPITAL ENCOUNTER (OUTPATIENT)
Facility: CLINIC | Age: 43
Discharge: HOME | End: 2023-05-31
Admitting: FAMILY MEDICINE
Payer: COMMERCIAL

## 2023-05-31 DIAGNOSIS — I89.0 LYMPHEDEMA, NOT ELSEWHERE CLASSIFIED: ICD-10-CM

## 2023-05-31 PROCEDURE — 80048 BASIC METABOLIC PNL TOTAL CA: CPT | Performed by: FAMILY MEDICINE

## 2023-06-01 LAB
ANION GAP SERPL CALCULATED.3IONS-SCNC: 13 MMOL/L (ref 7–15)
BUN SERPL-MCNC: 14.6 MG/DL (ref 6–20)
CALCIUM SERPL-MCNC: 9.1 MG/DL (ref 8.6–10)
CHLORIDE SERPL-SCNC: 101 MMOL/L (ref 98–107)
CREAT SERPL-MCNC: 0.8 MG/DL (ref 0.51–0.95)
DEPRECATED HCO3 PLAS-SCNC: 26 MMOL/L (ref 22–29)
GFR SERPL CREATININE-BSD FRML MDRD: >90 ML/MIN/1.73M2
GLUCOSE SERPL-MCNC: 152 MG/DL (ref 70–99)
POTASSIUM SERPL-SCNC: 3.5 MMOL/L (ref 3.4–5.3)
SODIUM SERPL-SCNC: 140 MMOL/L (ref 136–145)

## 2023-06-11 ENCOUNTER — HEALTH MAINTENANCE LETTER (OUTPATIENT)
Age: 43
End: 2023-06-11

## 2023-06-16 ENCOUNTER — TRANSFERRED RECORDS (OUTPATIENT)
Dept: MULTI SPECIALTY CLINIC | Facility: CLINIC | Age: 43
End: 2023-06-16

## 2023-06-16 LAB — RETINOPATHY: NORMAL

## 2023-06-21 ENCOUNTER — HOSPITAL ENCOUNTER (OUTPATIENT)
Facility: CLINIC | Age: 43
Discharge: HOME | End: 2023-06-21
Admitting: FAMILY MEDICINE
Payer: COMMERCIAL

## 2023-06-21 ENCOUNTER — LAB REQUISITION (OUTPATIENT)
Dept: LAB | Facility: CLINIC | Age: 43
End: 2023-06-21
Payer: COMMERCIAL

## 2023-06-21 DIAGNOSIS — E11.9 TYPE 2 DIABETES MELLITUS WITHOUT COMPLICATIONS (H): ICD-10-CM

## 2023-06-21 PROCEDURE — 80048 BASIC METABOLIC PNL TOTAL CA: CPT | Mod: ORL | Performed by: FAMILY MEDICINE

## 2023-06-22 LAB
ANION GAP SERPL CALCULATED.3IONS-SCNC: 15 MMOL/L (ref 7–15)
BUN SERPL-MCNC: 16 MG/DL (ref 6–20)
CALCIUM SERPL-MCNC: 9.6 MG/DL (ref 8.6–10)
CHLORIDE SERPL-SCNC: 98 MMOL/L (ref 98–107)
CREAT SERPL-MCNC: 0.92 MG/DL (ref 0.51–0.95)
DEPRECATED HCO3 PLAS-SCNC: 26 MMOL/L (ref 22–29)
GFR SERPL CREATININE-BSD FRML MDRD: 79 ML/MIN/1.73M2
GLUCOSE SERPL-MCNC: 117 MG/DL (ref 70–99)
POTASSIUM SERPL-SCNC: 3.6 MMOL/L (ref 3.4–5.3)
SODIUM SERPL-SCNC: 139 MMOL/L (ref 136–145)

## 2023-07-05 ENCOUNTER — TELEPHONE (OUTPATIENT)
Dept: ENDOCRINOLOGY | Facility: CLINIC | Age: 43
End: 2023-07-05
Payer: COMMERCIAL

## 2023-07-05 DIAGNOSIS — D35.2 PITUITARY ADENOMA (H): ICD-10-CM

## 2023-07-05 DIAGNOSIS — D35.2 PROLACTINOMA (H): ICD-10-CM

## 2023-07-05 NOTE — TELEPHONE ENCOUNTER
"LVM with message for clarification of cabergoline dose. Clinic number provided.   Shell Howell RN on 7/6/2023 at 8:14 AM            Message  Received: Yesterday  Mandy Farfan MD Miller, Deanne M RN  Caller: Unspecified (Yesterday,  8:45 AM)  Sorry let me take back.     Is the 1 tablet (0.5 mg ) 3 times a week (in the epic), but she is taking half (0.25 mg ) 3 times a week?     I just want to slight increase the dose, could you clarify?   I've been taking Cabergoline 3x a week. Pt would like clarification.     Provider notified.   flexible sigmoidoscopy       You routed this conversation to Mandy Farfan MD    [facility]    5/26/23  8:41 AM  Note  Spoke w/ Pt: states she has Take 1 tablet (0.5 mg) by mouth three times a week \"forever\" before she became my doctor - Tae Moran ordered the dose.   Provider notified.   Shell Howell RN on 5/26/2023 at 8:41 AM             RE    Dear Bushra      Here is your results. 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      Please call nursing line, if you are [facility] patient at [phone], if you are [place] patient at [phone],  if you have any questions.     Please take care  Mandy Farfan MD   Written by Mandy Farfan MD on 5/25/2023  9:59 PM CDT        Outpatient Medication Detail     Disp Refills Start End THOMAS   cabergoline (DOSTINEX) 0.5 MG tablet 36 tablet 3 4/24/2023  No   Sig - Route: Take 1 tablet (0.5 mg) by mouth three times a week - Oral   Sent to pharmacy as: Cabergoline 0.5 MG Oral Tablet (DOSTINEX)   Class: E-Prescribe   Order: [id number]   E-Prescribing Status: Receipt confirmed by pharmacy (4/24/2023  6:16 AM CDT)       [facility]    Phone Message    May a detailed message be left on voicemail: yes     Reason for Call: Other: MRI Results    Patient requesting a call back to discuss the results of her recent MRI 5/18/23. Patient states she would also like to know " when the provider recommends her next lab appt should be? Please review and advise. Thank you.     Action Taken: Message routed to:  [facility]    Travel Screening: Not Applicable

## 2023-07-06 ENCOUNTER — TELEPHONE (OUTPATIENT)
Dept: ENDOCRINOLOGY | Facility: CLINIC | Age: 43
End: 2023-07-06
Payer: COMMERCIAL

## 2023-07-06 NOTE — TELEPHONE ENCOUNTER
Confirms she is taking cabergoline 0.5mg 3x a week.   Asking if they can do the surgery again to get the rest of the tumor out?   Provider notified.   Shell Howell RN on 7/6/2023 at 9:02 AM

## 2023-07-12 ENCOUNTER — TELEPHONE (OUTPATIENT)
Dept: ENDOCRINOLOGY | Facility: CLINIC | Age: 43
End: 2023-07-12
Payer: COMMERCIAL

## 2023-07-12 NOTE — TELEPHONE ENCOUNTER
I spoke with patient and informed her that medication management is the best route versus surgery at this time per Dr Adolph Rangel, RN on 7/12/2023 at 2:28 PM  .

## 2023-07-12 NOTE — TELEPHONE ENCOUNTER
[facility] Call Center    Phone Message    May a detailed message be left on voicemail: yes     Reason for Call: Other: Per pt would like to talk about her surgery options for her tumor? Per pt claims she message the team some time ago but no one got back to her? Please call pt linda thank you!     Action Taken: Message routed to:  [facility] ENDO    Travel Screening: Not Applicable

## 2023-07-13 ENCOUNTER — TELEPHONE (OUTPATIENT)
Dept: ENDOCRINOLOGY | Facility: CLINIC | Age: 43
End: 2023-07-13
Payer: COMMERCIAL

## 2023-07-13 NOTE — TELEPHONE ENCOUNTER
Spoke w/ Pt: confirms understanding of review and recommendation from Dr Farfan. No questions at this time.   Shell Howell RN on 7/13/2023 at 1:50 PM           RE    Cabergoline dose  (Newest Message First)  View All Conversations on this Encounter   Mandy Farfan MD  You 16 hours ago (8:26 PM)     TA  I think this level of mild incrtement, better to stay as it is dose, considering risk benefit.   Could you expalin her?     Mandy       You  Mandy Farfan MD 2 days ago     DM  Did you want to change her medication dose?        Mandy Farfan MD  You 2 days ago     TA  Medcal management is the best for your case rather than surgery.        You routed conversation to Mandy Farfan MD 7 days ago     You 7 days ago     DM  Confirms she is taking cabergoline 0.5mg 3x a week.   Asking if they can do the surgery again to get the rest of the tumor out?   Provider notified.   Shell Howell RN on 7/6/2023 at 9:02 AM

## 2023-08-01 ENCOUNTER — OFFICE VISIT (OUTPATIENT)
Dept: VASCULAR SURGERY | Facility: CLINIC | Age: 43
End: 2023-08-01
Attending: NURSE PRACTITIONER
Payer: COMMERCIAL

## 2023-08-01 VITALS
BODY MASS INDEX: 67.96 KG/M2 | OXYGEN SATURATION: 99 % | TEMPERATURE: 98.3 F | DIASTOLIC BLOOD PRESSURE: 80 MMHG | SYSTOLIC BLOOD PRESSURE: 130 MMHG | WEIGHT: 293 LBS | HEART RATE: 94 BPM | RESPIRATION RATE: 15 BRPM

## 2023-08-01 DIAGNOSIS — E66.01 MORBID OBESITY WITH BMI OF 60.0-69.9, ADULT (H): ICD-10-CM

## 2023-08-01 DIAGNOSIS — I89.0 LYMPHEDEMA: Primary | ICD-10-CM

## 2023-08-01 DIAGNOSIS — E11.9 TYPE 2 DIABETES MELLITUS WITHOUT COMPLICATION, WITHOUT LONG-TERM CURRENT USE OF INSULIN (H): ICD-10-CM

## 2023-08-01 DIAGNOSIS — I87.333 CHRONIC VENOUS HYPERTENSION (IDIOPATHIC) WITH ULCER AND INFLAMMATION OF BILATERAL LOWER EXTREMITY (H): ICD-10-CM

## 2023-08-01 DIAGNOSIS — M79.89 SWELLING OF LIMB: ICD-10-CM

## 2023-08-01 DIAGNOSIS — Z87.2 HISTORY OF CELLULITIS: ICD-10-CM

## 2023-08-01 DIAGNOSIS — I87.2 VENOUS INSUFFICIENCY OF BOTH LOWER EXTREMITIES: ICD-10-CM

## 2023-08-01 PROCEDURE — G0463 HOSPITAL OUTPT CLINIC VISIT: HCPCS | Performed by: NURSE PRACTITIONER

## 2023-08-01 PROCEDURE — 99213 OFFICE O/P EST LOW 20 MIN: CPT | Performed by: NURSE PRACTITIONER

## 2023-08-01 RX ORDER — DULAGLUTIDE 1.5 MG/.5ML
INJECTION, SOLUTION SUBCUTANEOUS
COMMUNITY
Start: 2023-07-12 | End: 2023-11-21 | Stop reason: ALTCHOICE

## 2023-08-01 RX ORDER — SULFAMETHOXAZOLE/TRIMETHOPRIM 800-160 MG
1 TABLET ORAL
COMMUNITY
Start: 2023-05-11 | End: 2023-10-11

## 2023-08-01 RX ORDER — UBIQUINOL 100 MG
CAPSULE ORAL SEE ADMIN INSTRUCTIONS
COMMUNITY
Start: 2023-05-30

## 2023-08-01 RX ORDER — FLUTICASONE PROPIONATE 50 MCG
SPRAY, SUSPENSION (ML) NASAL
COMMUNITY
Start: 2023-07-11 | End: 2023-11-21

## 2023-08-01 RX ORDER — CLINDAMYCIN HCL 300 MG
CAPSULE ORAL
COMMUNITY
Start: 2023-07-28 | End: 2023-10-11

## 2023-08-01 RX ORDER — DULAGLUTIDE 3 MG/.5ML
INJECTION, SOLUTION SUBCUTANEOUS
COMMUNITY
Start: 2023-07-11 | End: 2023-11-21 | Stop reason: ALTCHOICE

## 2023-08-01 ASSESSMENT — PAIN SCALES - GENERAL: PAINLEVEL: NO PAIN (0)

## 2023-08-01 NOTE — PROGRESS NOTES
Follow up Vascular Visit       Date of Service:08/01/23      Chief Complaint: BLE swelling      Pt returns to [facility] with regards to their BLE swelling.  They arrive today with PCA. They are currently reporting no wounds. They are using velcro wraps for compression. She is needing a prescription for new garments today. They are feeling well today. Denies fevers, chills. No shortness of breath.     Allergies:   Allergies   Allergen Reactions    Fiorinal [Butalbital-Aspirin-Caffeine] Anaphylaxis and Swelling    Ativan [Lorazepam]     Bee Venom     Cat Hair Extract      Other reaction(s): Unknown    Clindamycin      Other reaction(s): heartburn    Dextromethorphan      Other reaction(s): hives    Ibuprofen Sodium      Eyes swell    Keflex [Cephalexin]      Eyes swell    Latex Swelling    Lithium Swelling    Oxcarbazepine      numbness    Topiramate      Other reaction(s): edema/swelling    Topamax Rash       Medications:   Current Outpatient Medications:     ACETAMINOPHEN EXTRA STRENGTH 500 MG tablet, , Disp: , Rfl:     Alcohol Swabs (ALCOHOL PREP) 70 % PADS, See Admin Instructions, Disp: , Rfl:     amoxicillin-clavulanate (AUGMENTIN) 875-125 MG tablet, Take 1 tablet by mouth 2 times daily, Disp: , Rfl:     ASPIRIN LOW DOSE 81 MG EC tablet, , Disp: , Rfl:     atorvastatin (LIPITOR) 40 MG tablet, , Disp: , Rfl:     bacitracin 500 UNIT/GM external ointment, Apply topically 2 times daily Apply thin layer twice daily for 10-14 days to abdominal wound., Disp: 30 g, Rfl: 0    Blood Glucose Monitoring Suppl (Tubaloo TALK MONITORING SYSTEM) w/Device KIT, , Disp: , Rfl:     bumetanide (BUMEX) 2 MG tablet, , Disp: , Rfl:     cabergoline (DOSTINEX) 0.5 MG tablet, Take 1 tablet (0.5 mg) by mouth three times a week, Disp: 36 tablet, Rfl: 3    calcium carbonate-vitamin D (OS-TAWNYA WITH D) 500-200 MG-UNIT tablet, , Disp: , Rfl:     Calcium Citrate-Vitamin D3 315-6.25 MG-MCG TABS, TAKE 2 TABLETS BY MOUTH  DAILY, Disp: 60 tablet, Rfl: 10    cetirizine (ZYRTEC) 10 MG tablet, , Disp: , Rfl:     clindamycin (CLEOCIN) 300 MG capsule, TAKE 2 CAPSULES BY MOUTH THREE TIMES DAILY FOR 7 DAYS, Disp: , Rfl:     clotrimazole (LOTRIMIN) 1 % external cream, , Disp: , Rfl:     divalproex sodium extended-release (DEPAKOTE ER) 500 MG 24 hr tablet, , Disp: , Rfl:     Dulaglutide 4.5 MG/0.5ML SOPN, Inject 4.5 mg Subcutaneous every 7 days, Disp: 90 mL, Rfl: 3    EMBRACE TALK GLUCOSE TEST test strip, , Disp: , Rfl:     fenofibrate (TRICOR) 145 MG tablet, , Disp: , Rfl:     FENOFIBRATE PO, Take 145 mg by mouth daily, Disp: , Rfl:     fluconazole (DIFLUCAN) 150 MG tablet, , Disp: , Rfl:     fluticasone (FLONASE) 50 MCG/ACT nasal spray, SHAKE LIQUID AND INSTILL 2 SPRAYS IN EACH NOSTRIL EVERY DAY (BULK), Disp: , Rfl:     glimepiride (AMARYL) 2 MG tablet, Take 1 tablet (2 mg) by mouth every morning (before breakfast), Disp: 90 tablet, Rfl: 3    Hypertonic Nasal Wash (SINUS RINSE KIT) PACK, Spray 1 packet in nostril daily, Disp: 200 each, Rfl: 3    levothyroxine (SYNTHROID/LEVOTHROID) 112 MCG tablet, TAKE 1 TABLET (112MCG) BY MOUTH DAILY, Disp: 90 tablet, Rfl: 3    LORazepam (ATIVAN) 2 MG tablet, , Disp: , Rfl:     medroxyPROGESTERone (PROVERA) 10 MG tablet, TAKE 1 TABLET  10 MG  BY MOUTH ONCE DAILY FOR 5 DAYS EACH MONTH, Disp: , Rfl: 0    Multiple Vitamin (TAB-A-NARESH) TABS, , Disp: , Rfl:     mupirocin (BACTROBAN) 2 % external ointment, , Disp: , Rfl:     NYAMYC 557116 UNIT/GM external powder, , Disp: , Rfl:     Omega-3 Fish Oil 500 MG capsule, Take 2 capsules (1,000 mg) by mouth daily, Disp: 180 capsule, Rfl: 3    omeprazole (PRILOSEC) 40 MG DR capsule, Take 1 capsule by mouth daily., Disp: , Rfl:     paliperidone ER (INVEGA) 6 MG 24 hr tablet, , Disp: , Rfl:     paliperidone ER (INVEGA) 9 MG 24 hr tablet, , Disp: , Rfl:     potassium chloride ER (KLOR-CON M) 20 MEQ CR tablet, , Disp: , Rfl:     spironolactone (ALDACTONE) 50 MG tablet, ,  Disp: , Rfl:     sulfamethoxazole-trimethoprim (BACTRIM DS) 800-160 MG tablet, Take 1 tablet by mouth 2 times daily, Disp: , Rfl:     TRUEplus Lancets 30G MISC, , Disp: , Rfl:     TRULICITY 1.5 MG/0.5ML pen, INJECT 1.5MG SUBCUTANEOUSLY EVERY WEEK ALONG WITH 3MG PEN = 4.5MG WEEKLY (BULK), Disp: , Rfl:     TRULICITY 3 MG/0.5ML SOPN, INJECT 3MG SUBCUTANEOUSLY EVERY WEEK (BULK), Disp: , Rfl:     VITAMIN D3 25 MCG (1000 UT) tablet, , Disp: , Rfl:     clotrimazole (LOTRIMIN) 1 % external solution, , Disp: , Rfl:     Current Facility-Administered Medications:     lidocaine (XYLOCAINE) 2 % external gel, , Topical, PRN, Luciana Neal, NP, Given at 12/06/21 1354    History:   Past Medical History:   Diagnosis Date    Acne rosacea     Allergic rhinitis     Bipolar 1 disorder (H)     followed by psych    Bipolar disorder (H) 1992    Cellulitis, leg 2016    Dyslipidemia     Ganglion, left wrist     GERD (gastroesophageal reflux disease)     GERD (gastroesophageal reflux disease)     Growth retardation, mild developmental delay, and chronic hepatitis syndrome     Herpes zoster     Hypertension     Hypothyroid     Irritable bowel syndrome     Lymphedema of lower extremity 2008    Morbid obesity (H)     Nightmares     Obstructive sleep apnea     Pituitary adenoma (H)     S/P trans nasal resection in 2000    Post traumatic stress disorder     Post traumatic stress disorder     Post traumatic stress disorder (PTSD)     Prader-Willi syndrome     Prolactinoma (H) transsphenoid approach 2000.    PTSD (post-traumatic stress disorder)     Schizoaffective disorder, bipolar type (H)     Type 2 diabetes mellitus without complication, with long-term current use of insulin (H)     Venous stasis     Vitamin D deficiency        Physical Exam:    /80   Pulse 94   Temp 98.3  F (36.8  C)   Resp 15   Wt (!) 348 lb (157.9 kg)   SpO2 99%   BMI 67.96 kg/m      General:  Patient presents to clinic in no apparent distress.  Head:  normocephalic atraumatic  Psychiatric:  Alert and oriented x3.   Respiratory: unlabored breathing; no cough  Integumentary:  Skin is uniformly warm, dry and pink.    Extremities: swelling is stable; no erythema; no pain with palpation; there are scattered multiple partial thickness ulcers to the shins; which measures 0.3x0.3x0.1cm; weeping small amount of serous fluid    Peripheral IV 01/14/16 Right Hand (Active)   Number of days: 2756       VASC Wound Lt shin (Active)   Pre Size Length 0.3 07/12/21 1400   Pre Size Width 0.4 07/12/21 1400   Pre Size Depth 0.1 07/12/21 1400   Pre Total Sq cm 0.12 07/12/21 1400   Number of days: 750       VASC Wound ABD (Active)   Pre Size Length 0.8 12/06/21 1300   Pre Size Width 0.8 12/06/21 1300   Pre Size Depth 0.1 12/06/21 1300   Pre Total Sq cm 0.64 12/06/21 1300   Number of days: 750       VASC Wound LT MID BACK (Active)   Pre Size Length 0.3 07/12/21 1400   Pre Size Width 0.4 07/12/21 1400   Pre Size Depth 0.1 07/12/21 1400   Pre Total Sq cm 0.12 07/12/21 1400   Number of days: 750            Circumferential volume measures:          3/22/2021     9:00 AM 4/19/2021     9:00 AM 5/3/2021     9:00 AM 7/12/2021     2:00 PM 8/1/2023     3:00 PM   Circumferential Measures   Right just above MTP 27.5 27.5 26.7 28.7 25.2   Right Ankle 52 47 46 37 35.3   Right Widest Calf 72.3 76 64 73 79   Right Thigh Up 10cm 78.2       Right Knee to Ankle  34      Left - just above MTP 30.5 31.8 30.2 30.7 23.8   Left Ankle 55 60 62 39.5 36   Left Widest Calf 84.4 81 74 77 78   Left Thigh Up 10cm 75.3       Left Knee to Ankle  34          Labs:    I personally reviewed the following lab results today and those on care everywhere    CRP   Date Value Ref Range Status   10/23/2021 1.1 (H) 0.0-<0.8 mg/dL Final      Erythrocyte Sedimentation Rate   Date Value Ref Range Status   10/23/2021 60 (H) 0 - 20 mm/hr Final      Last Renal Panel:  Sodium   Date Value Ref Range Status   06/21/2023 139 136 - 145  mmol/L Final   03/23/2020 140 133 - 144 mmol/L Final     Potassium   Date Value Ref Range Status   06/21/2023 3.6 3.4 - 5.3 mmol/L Final   05/17/2022 4.0 3.5 - 5.0 mmol/L Final   03/23/2020 4.0 3.4 - 5.3 mmol/L Final     Chloride   Date Value Ref Range Status   06/21/2023 98 98 - 107 mmol/L Final   05/17/2022 100 98 - 107 mmol/L Final   03/23/2020 112 (H) 94 - 109 mmol/L Final     Carbon Dioxide   Date Value Ref Range Status   03/23/2020 22 20 - 32 mmol/L Final     Carbon Dioxide (CO2)   Date Value Ref Range Status   06/21/2023 26 22 - 29 mmol/L Final   05/17/2022 26 22 - 31 mmol/L Final     Anion Gap   Date Value Ref Range Status   06/21/2023 15 7 - 15 mmol/L Final   05/17/2022 13 5 - 18 mmol/L Final   03/23/2020 5 3 - 14 mmol/L Final     Glucose   Date Value Ref Range Status   06/21/2023 117 (H) 70 - 99 mg/dL Final   05/17/2022 111 70 - 125 mg/dL Final   03/23/2020 102 (H) 70 - 99 mg/dL Final     Urea Nitrogen   Date Value Ref Range Status   06/21/2023 16.0 6.0 - 20.0 mg/dL Final   05/17/2022 17 8 - 22 mg/dL Final   03/23/2020 16 7 - 30 mg/dL Final     Creatinine   Date Value Ref Range Status   06/21/2023 0.92 0.51 - 0.95 mg/dL Final   03/23/2020 0.70 0.52 - 1.04 mg/dL Final     GFR Estimate   Date Value Ref Range Status   06/21/2023 79 >60 mL/min/1.73m2 Final   06/29/2021 >60 >60 mL/min/1.73m2 Final   03/23/2020 >90 >60 mL/min/[1.73_m2] Final     Comment:     Non  GFR Calc  Starting 12/18/2018, serum creatinine based estimated GFR (eGFR) will be   calculated using the Chronic Kidney Disease Epidemiology Collaboration   (CKD-EPI) equation.       Calcium   Date Value Ref Range Status   06/21/2023 9.6 8.6 - 10.0 mg/dL Final   03/23/2020 8.9 8.5 - 10.1 mg/dL Final     Phosphorus   Date Value Ref Range Status   09/11/2012 4.1 2.5 - 4.5 mg/dL Final     Albumin   Date Value Ref Range Status   04/10/2023 4.3 3.5 - 5.2 g/dL Final   05/17/2022 4.1 3.5 - 5.0 g/dL Final   03/23/2020 3.5 3.4 - 5.0 g/dL Final       Lab Results   Component Value Date    WBC 10.4 04/11/2023    WBC 8.1 11/25/2011     Lab Results   Component Value Date    RBC 3.65 04/11/2023    RBC 3.84 11/25/2011     Lab Results   Component Value Date    HGB 13.2 04/11/2023    HGB 13.1 01/06/2016     Lab Results   Component Value Date    HCT 42.3 04/11/2023    HCT 41.0 11/25/2011     No components found for: MCT  Lab Results   Component Value Date     04/11/2023     11/25/2011     Lab Results   Component Value Date    MCH 36.2 04/11/2023    MCH 35.4 11/25/2011     Lab Results   Component Value Date    MCHC 31.2 04/11/2023    MCHC 33.2 11/25/2011     Lab Results   Component Value Date    RDW 11.7 04/11/2023    RDW 11.8 11/25/2011     Lab Results   Component Value Date     04/11/2023     11/25/2011      Lab Results   Component Value Date    A1C 5.3 03/23/2020    A1C 5.0 01/06/2020    A1C 5.4 08/26/2019    A1C 5.2 04/09/2019    A1C 5.4 12/31/2018    A1C 5.4 09/20/2016    A1C 5.7 01/06/2016    A1C 5.0 06/29/2014      TSH   Date Value Ref Range Status   05/24/2023 3.76 0.30 - 4.20 uIU/mL Final   04/04/2022 4.28 0.30 - 5.00 uIU/mL Final   04/07/2021 3.51 0.40 - 4.00 mU/L Final      Lab Results   Component Value Date    VITDT 34 04/10/2023    VITDT 27 04/13/2022    VITDT <4 (L) 04/04/2022                   Impression:  Encounter Diagnoses   Name Primary?    Lymphedema Yes    Swelling of limb     Chronic venous hypertension (idiopathic) with ulcer and inflammation of bilateral lower extremity (H)     Morbid obesity with BMI of 60.0-69.9, adult (H)     Type 2 diabetes mellitus without complication, without long-term current use of insulin (H)     Venous insufficiency of both lower extremities     History of cellulitis                    Are any of these ulcers new today: No; Location: na    Assessment/Plan:          1. Debridement: na     2.  Ulcer treatment: ulcer treatment will include irrigation and dressings to promote autolytic  debridement which will include:will apply bordered foam to the shins; advised pt to not scratch or pick; apply lotion. If for some reason the patient is not able to get their dressing(s) changed as outlined above (due to illness, lack of supplies, lack of help) please do the following: remove old, soiled dressings; wash the ulcers with saline; pat dry; apply ABD pad or other absorbant pad and secure with rolled gauze; avoid tape directly on your skin; patient instructed to call the clinic as soon as possible to let us know what the current issues are in receiving ulcer care. Stable            3. Edema: apply velcro wraps; wear daily; sent prescription for new velcro; has appt tomorrow for fitting. The compression wraps were applied today in clinic.     If a 2 layer or 4 layer compression wrap is being used; these are safe to have on for ONLY 7 days. If for some reason the patient is not able to get the wrap(s) changed (due to illness; lack of supplies, lack of help, lack of transportation) please do the following: unwrap the old 2 or 4 layer compression wrap; avoid using scissors as you could cut your skin and cause ulcers; use tubular compression when available. Call to reschedule your home care or clinic visit appointment as soon as possible.  Stable            4. Nutrition: focus on weight los           5. Offloading: sent prescription for new diabetic shoes and inserts; has appt tomorrow for this     Patient will follow up with me in 1 year  for reevaluation. They were instructed to call the clinic sooner with any signs or symptoms of infection or any further questions/concerns. Answered all questions.          Luciana Neal DNP, RN, CNP, CWOCN, CFCN, CLT  [facility]   [phone]        This note was electronically signed by Luciana Neal NP

## 2023-08-01 NOTE — NURSING NOTE
"Compression Applied to Bilateral  Velcro\", Compression Stockings                                                                              "

## 2023-08-01 NOTE — PATIENT INSTRUCTIONS
We will order new velcro wraps and diabetic shoes and inserts    You have an appt tomorrow for fitting    We will apply bordered foam to the shins    Continue to wear your compression stockings or velcro wraps every day; put them on first thing in the morning and remove at bedtime    Replace your compression stockings every 3-4 months; these garments will lose their elasticity and become ineffective    Replace velcro wraps every 1-2 years    Elevate your legs periodically throughout the day, 30-60 minutes 1-3 times per day    Apply lotion to your legs 1-2 times per day; some good name brands are Cetaphil, Sarna, Aveeno, VaniCream, CeraVe    Continue to walk and exercise    If you are taking a diuretic continue to do so at the direction of your primary care provider    Make an appointment at the vascular clinic again if you have worsening swelling, need a prescription for new compression garments; and/or develop new wounds

## 2023-10-04 DIAGNOSIS — D35.2 PITUITARY ADENOMA (H): ICD-10-CM

## 2023-10-04 DIAGNOSIS — D35.2 PROLACTINOMA (H): ICD-10-CM

## 2023-10-05 RX ORDER — CABERGOLINE 0.5 MG/1
0.5 TABLET ORAL
Qty: 36 TABLET | Refills: 1 | Status: SHIPPED | OUTPATIENT
Start: 2023-10-06 | End: 2023-11-21

## 2023-10-05 NOTE — TELEPHONE ENCOUNTER
cabergoline (DOSTINEX) 0.5 MG tablet 36 tablet 3 4/24/2023       Change in pharmacy requested. Remaining refills sent to [facility]     Lindy Patel RN

## 2023-10-06 ENCOUNTER — TRANSFERRED RECORDS (OUTPATIENT)
Dept: HEALTH INFORMATION MANAGEMENT | Facility: CLINIC | Age: 43
End: 2023-10-06

## 2023-10-06 LAB — HBA1C MFR BLD: 9.1 % (ref 4.2–6.1)

## 2023-10-11 ENCOUNTER — VIRTUAL VISIT (OUTPATIENT)
Dept: SURGERY | Facility: CLINIC | Age: 43
End: 2023-10-11
Payer: COMMERCIAL

## 2023-10-11 VITALS — WEIGHT: 293 LBS | BODY MASS INDEX: 57.52 KG/M2 | HEIGHT: 60 IN

## 2023-10-11 DIAGNOSIS — E66.1 CLASS 3 DRUG-INDUCED OBESITY WITH BODY MASS INDEX (BMI) OF 60.0 TO 69.9 IN ADULT, UNSPECIFIED WHETHER SERIOUS COMORBIDITY PRESENT (H): Primary | ICD-10-CM

## 2023-10-11 DIAGNOSIS — E66.813 CLASS 3 DRUG-INDUCED OBESITY WITH BODY MASS INDEX (BMI) OF 60.0 TO 69.9 IN ADULT, UNSPECIFIED WHETHER SERIOUS COMORBIDITY PRESENT (H): Primary | ICD-10-CM

## 2023-10-11 DIAGNOSIS — T50.905A WEIGHT GAIN DUE TO MEDICATION: ICD-10-CM

## 2023-10-11 DIAGNOSIS — R63.5 WEIGHT GAIN DUE TO MEDICATION: ICD-10-CM

## 2023-10-11 DIAGNOSIS — E11.65 TYPE 2 DIABETES MELLITUS WITH HYPERGLYCEMIA, WITHOUT LONG-TERM CURRENT USE OF INSULIN (H): ICD-10-CM

## 2023-10-11 PROCEDURE — 99214 OFFICE O/P EST MOD 30 MIN: CPT | Mod: 93 | Performed by: EMERGENCY MEDICINE

## 2023-10-11 NOTE — PROGRESS NOTES
"  The patient has been notified of following:     \"This telephone visit will be conducted via a call between you and your physician/provider. We have found that certain health care needs can be provided without the need for a physical exam.  This service lets us provide the care you need with a short phone conversation.  If a prescription is necessary we can send it directly to your pharmacy.  If lab work is needed we can place an order for that and you can then stop by our lab to have the test done at a later time.    Telephone visits are billed at different rates depending on your insurance coverage. During this emergency period, for some insurers they may be billed the same as an in-person visit.  Please reach out to your insurance provider with any questions.    If during the course of the call the physician/provider feels a telephone visit is not appropriate, you will not be charged for this service.\"    Patient has given verbal consent to a Telephone visit? Yes    What phone number would you like to be contacted at? [phone]    Patient would like to receive their AVS by [employer]    Are there any specific questions or needs that you would like addressed at your visit today? No concerns for visit today.        Telephone Start Time: 2:01 PM  Bariatric Clinic Follow-Up Visit:    Bushra Buck is a 43 year old  female with Body mass index is 64.45 kg/m .  presenting here today for follow-up on non-surgical efforts for weight loss. Original Intake visit occurred on 9/17/15 with a weight of 350 lbs BMI of 68.4, gained up to a max of 401lbs in 2016..  Along with diet and behavior changes, she has been previously using GLP1 agonist (Trulicity 3mg at our visit in Nov 2021) for diabetes and to assist her weight loss goals.  Trulicity dose was increased to 4.5mg/week 9/12/22 at 354 lbs.  See her intake visit notes for details on identified contributors to weight gain in the past. Chart review shows Dietician " calculated RMR of 2300 and protein intake goal of 80g/day.   Down about 40 lbs from heaviest weight 2 years ago, a 10.8% body weight reduction.  Weight:   Wt Readings from Last 5 Encounters:   10/11/23 149.7 kg (330 lb)   08/01/23 (!) 157.9 kg (348 lb)   04/17/23 (!) 156.9 kg (346 lb)   01/11/23 (!) 158.1 kg (348 lb 8 oz)   12/19/22 (!) 155.1 kg (342 lb)    pounds      Comorbidities:  Patient Active Problem List   Diagnosis    Acne rosacea    Dermatitis    Stasis edema    Pituitary adenoma (H)    Abnormal weight gain    High triglycerides    Prolactinoma (H)    Acquired hypothyroidism    Morbid obesity (H)       Current Outpatient Medications:     ACETAMINOPHEN EXTRA STRENGTH 500 MG tablet, , Disp: , Rfl:     Alcohol Swabs (ALCOHOL PREP) 70 % PADS, See Admin Instructions, Disp: , Rfl:     ASPIRIN LOW DOSE 81 MG EC tablet, , Disp: , Rfl:     atorvastatin (LIPITOR) 40 MG tablet, , Disp: , Rfl:     bacitracin 500 UNIT/GM external ointment, Apply topically 2 times daily Apply thin layer twice daily for 10-14 days to abdominal wound., Disp: 30 g, Rfl: 0    Blood Glucose Monitoring Suppl (BuddyBounce MONITORING SYSTEM) w/Device KIT, , Disp: , Rfl:     bumetanide (BUMEX) 2 MG tablet, , Disp: , Rfl:     cabergoline (DOSTINEX) 0.5 MG tablet, Take 1 tablet (0.5 mg) by mouth three times a week Remaining refills sent to new pharmacy, Disp: 36 tablet, Rfl: 1    calcium carbonate-vitamin D (OS-TAWNYA WITH D) 500-200 MG-UNIT tablet, , Disp: , Rfl:     Calcium Citrate-Vitamin D3 315-6.25 MG-MCG TABS, TAKE 2 TABLETS BY MOUTH DAILY, Disp: 60 tablet, Rfl: 10    cetirizine (ZYRTEC) 10 MG tablet, , Disp: , Rfl:     clotrimazole (LOTRIMIN) 1 % external cream, , Disp: , Rfl:     divalproex sodium extended-release (DEPAKOTE ER) 500 MG 24 hr tablet, , Disp: , Rfl:     BuddyBounce GLUCOSE TEST test strip, , Disp: , Rfl:     fenofibrate (TRICOR) 145 MG tablet, , Disp: , Rfl:     FENOFIBRATE PO, Take 145 mg by mouth daily, Disp: , Rfl:      fluticasone (FLONASE) 50 MCG/ACT nasal spray, SHAKE LIQUID AND INSTILL 2 SPRAYS IN EACH NOSTRIL EVERY DAY (BULK), Disp: , Rfl:     glimepiride (AMARYL) 2 MG tablet, Take 1 tablet (2 mg) by mouth every morning (before breakfast), Disp: 90 tablet, Rfl: 3    levothyroxine (SYNTHROID/LEVOTHROID) 112 MCG tablet, TAKE 1 TABLET (112MCG) BY MOUTH DAILY, Disp: 90 tablet, Rfl: 3    medroxyPROGESTERone (PROVERA) 10 MG tablet, TAKE 1 TABLET  10 MG  BY MOUTH ONCE DAILY FOR 5 DAYS EACH MONTH, Disp: , Rfl: 0    Multiple Vitamin (TAB-A-NARESH) TABS, , Disp: , Rfl:     mupirocin (BACTROBAN) 2 % external ointment, , Disp: , Rfl:     NYAMYC 051978 UNIT/GM external powder, , Disp: , Rfl:     Omega-3 Fish Oil 500 MG capsule, Take 2 capsules (1,000 mg) by mouth daily, Disp: 180 capsule, Rfl: 3    omeprazole (PRILOSEC) 40 MG DR capsule, Take 1 capsule by mouth daily., Disp: , Rfl:     paliperidone ER (INVEGA) 6 MG 24 hr tablet, , Disp: , Rfl:     paliperidone ER (INVEGA) 9 MG 24 hr tablet, , Disp: , Rfl:     potassium chloride ER (KLOR-CON M) 20 MEQ CR tablet, , Disp: , Rfl:     spironolactone (ALDACTONE) 50 MG tablet, , Disp: , Rfl:     TRUEplus Lancets 30G MISC, , Disp: , Rfl:     TRULICITY 1.5 MG/0.5ML pen, INJECT 1.5MG SUBCUTANEOUSLY EVERY WEEK ALONG WITH 3MG PEN = 4.5MG WEEKLY (BULK), Disp: , Rfl:     TRULICITY 3 MG/0.5ML SOPN, INJECT 3MG SUBCUTANEOUSLY EVERY WEEK (BULK), Disp: , Rfl:     VITAMIN D3 25 MCG (1000 UT) tablet, , Disp: , Rfl:     clotrimazole (LOTRIMIN) 1 % external solution, , Disp: , Rfl:     fluconazole (DIFLUCAN) 150 MG tablet, , Disp: , Rfl:     Hypertonic Nasal Wash (SINUS RINSE KIT) PACK, Spray 1 packet in nostril daily (Patient not taking: Reported on 10/11/2023), Disp: 200 each, Rfl: 3    LORazepam (ATIVAN) 2 MG tablet, , Disp: , Rfl:     Current Facility-Administered Medications:     lidocaine (XYLOCAINE) 2 % external gel, , Topical, PRN, Luciana Neal, NP, Given at 12/06/21 1354      Interim: Since our last  "visit, she has had issues with elevated blood sugars, recurrent cellulitis issues this summer and Fall. Int he past was intolerant to metformin due to copious incontinent diarrhea.     Blood sugars log reviewed:   Today: 232mg/dL (AM fasting), 336mg/dL yesterday morning.  No midnight snacks.   Down 20 lbs this summer.     Trulicity is at 4.5mg/week (Wednesdays), divided doses due to shortages of the 4.5mg/week pens.  Blood sugars haven't been well controlled and \"chest cold\" today as well as cellulitis last month could have driven numbers up some. Given her hyperglycemia, if not improving as her chest cold resolves this week, she's planning to recheck with Dr. Cruz 10/16/23 and would be reasonable to try Mounjaro ramping from the 5mg/week dose for a month, then 7.5mg/week for a month then 10mg/week for a few months for considering full ramping to eventual 15mg/week dose; to see if responds better therapeutically for blood sugar management and as an insulin sparing regimen that would promote more rather than less weight loss as higher insulin needs tend to increase appetite and the good data coming out over the last year and a half with tirzepatide for weight management, independent of it's A1c lowering benefits.     Plan:   1.  Diet: aiming for protein rich meals every 5-6 hours, with 25 grams of lean protein per meal, then vegetables then complex carbohydrates at the end of the meal.  2. Exercise: recovering from current chest cold. Try to move a little every day if steady on your feet.   3. Medication: Just had injection of 4.5mg/week Trulicity. Given your upcoming visit with Dr. Cruz in the next week. You can check exam and blood sugars/A1c at that time. I'd advocate considering change to Mounjaro which may help both your blood sugar and weight related concerns better than the Trulicity.  Her reported home blood sugars are quite out of step with A1c history (see below)  5. Goals: down 40 lbs over the last 2 " "years, well done. Continue goal to stabililze weight under 300 lbs long term should reduce some risk for recurrent cellulitis problems and improve blood sugar control.   6. Diabetes/metabolic syndrome: weight management to allow for 45-70lb reduction would likely improve greatly and likely put diabetes into remission again.  Recent Labs   Lab Test 04/10/23  0947 04/13/22  1614   CHOL 158 140   HDL 16* 11*   LDL 73 82   TRIG 410* 523*         We discussed [facility]ariat[facility] Basics including:  -eating 3 meals daily  -reviewed metabolic needs for weight loss based on Resting Metabolic Rate  -protein goals supportive of healthy weight loss  -avoiding/limiting calorie containing beverages  -We discussed the importance of restorative sleep and stress management in maintaining a healthy weight.  -We discussed the National Weight Control Registry healthy weight maintenance strategies and ways to optimize metabolism.  -We discussed the importance of physical activity including cardiovascular and strength training in maintaining a healthier weight and explored viable options.      Most recent labs:  Lab Results   Component Value Date    WBC 10.4 04/11/2023    HGB 13.2 04/11/2023    HCT 42.3 04/11/2023     (H) 04/11/2023     04/11/2023     Lab Results   Component Value Date    CHOL 158 04/10/2023     Lab Results   Component Value Date    HDL 16 (L) 04/10/2023     No components found for: \"LDLCALC\"  Lab Results   Component Value Date    TRIG 410 (H) 04/10/2023     No results found for: \"CHOLHDL\"  Lab Results   Component Value Date    ALT 33 04/10/2023    AST 30 04/10/2023    ALKPHOS 55 04/10/2023     No results found for: \"HGBA1C\"  Lab Results   Component Value Date    B12 819 05/05/2023     No components found for: \"VITDT1\"  No results found for: \"EFRAIN\"  Lab Results   Component Value Date    PTHI 37 07/10/2012     No results found for: \"ZN\"  No results found for: \"VIB1WB\"  Lab Results   Component Value Date    " "TSH 3.76 05/24/2023     No results found for: \"TEST\"    DIETARY HISTORY  Tracking technique: limited  Positive Changes Since Last Visit: recovered from celluitis  Struggling With: blood sugar elevation. Limited protein budget    Getting Adequate Protein: unsure  Sleep schedule: OK.      PHYSICAL ACTIVITY PATTERNS:  Cardiovascular: limited walking  Strength Training: hx of PT exercises    REVIEW OF SYSTEMS  Mild cough/congestion recently. No fevers/chills. Redness in leg improved. .  PHYSICAL EXAM:  Vitals: Ht 1.524 m (5')   Wt 149.7 kg (330 lb)   BMI 64.45 kg/m    Weight:   Wt Readings from Last 3 Encounters:   10/11/23 149.7 kg (330 lb)   08/01/23 (!) 157.9 kg (348 lb)   04/17/23 (!) 156.9 kg (346 lb)         GEN: Pleasant but stuffy sounding voice on phone, lower energy c/w recent illness.   Interim study results: Last Comprehensive Metabolic Panel:  Sodium   Date Value Ref Range Status   06/21/2023 139 136 - 145 mmol/L Final   03/23/2020 140 133 - 144 mmol/L Final     Potassium   Date Value Ref Range Status   06/21/2023 3.6 3.4 - 5.3 mmol/L Final   05/17/2022 4.0 3.5 - 5.0 mmol/L Final   03/23/2020 4.0 3.4 - 5.3 mmol/L Final     Chloride   Date Value Ref Range Status   06/21/2023 98 98 - 107 mmol/L Final   05/17/2022 100 98 - 107 mmol/L Final   03/23/2020 112 (H) 94 - 109 mmol/L Final     Carbon Dioxide   Date Value Ref Range Status   03/23/2020 22 20 - 32 mmol/L Final     Carbon Dioxide (CO2)   Date Value Ref Range Status   06/21/2023 26 22 - 29 mmol/L Final   05/17/2022 26 22 - 31 mmol/L Final     Anion Gap   Date Value Ref Range Status   06/21/2023 15 7 - 15 mmol/L Final   05/17/2022 13 5 - 18 mmol/L Final   03/23/2020 5 3 - 14 mmol/L Final     Glucose   Date Value Ref Range Status   06/21/2023 117 (H) 70 - 99 mg/dL Final   05/17/2022 111 70 - 125 mg/dL Final   03/23/2020 102 (H) 70 - 99 mg/dL Final     Urea Nitrogen   Date Value Ref Range Status   06/21/2023 16.0 6.0 - 20.0 mg/dL Final   05/17/2022 17 8 - " 22 mg/dL Final   03/23/2020 16 7 - 30 mg/dL Final     Creatinine   Date Value Ref Range Status   06/21/2023 0.92 0.51 - 0.95 mg/dL Final   03/23/2020 0.70 0.52 - 1.04 mg/dL Final     GFR Estimate   Date Value Ref Range Status   06/21/2023 79 >60 mL/min/1.73m2 Final   06/29/2021 >60 >60 mL/min/1.73m2 Final   03/23/2020 >90 >60 mL/min/[1.73_m2] Final     Comment:     Non  GFR Calc  Starting 12/18/2018, serum creatinine based estimated GFR (eGFR) will be   calculated using the Chronic Kidney Disease Epidemiology Collaboration   (CKD-EPI) equation.       Calcium   Date Value Ref Range Status   06/21/2023 9.6 8.6 - 10.0 mg/dL Final   03/23/2020 8.9 8.5 - 10.1 mg/dL Final     Bilirubin Total   Date Value Ref Range Status   04/10/2023 0.3 <=1.2 mg/dL Final   03/23/2020 0.3 0.2 - 1.3 mg/dL Final     Alkaline Phosphatase   Date Value Ref Range Status   04/10/2023 55 35 - 104 U/L Final   03/23/2020 46 40 - 150 U/L Final     ALT   Date Value Ref Range Status   04/10/2023 33 10 - 35 U/L Final   03/23/2020 37 0 - 50 U/L Final     AST   Date Value Ref Range Status   04/10/2023 30 10 - 35 U/L Final   03/23/2020 20 0 - 45 U/L Final               Lab Results   Component Value Date    A1C 5.3 03/23/2020    A1C 5.0 01/06/2020    A1C 5.4 08/26/2019    A1C 5.2 04/09/2019    A1C 5.4 12/31/2018    A1C 5.4 09/20/2016    A1C 5.7 01/06/2016    A1C 5.0 06/29/2014     .      30 minutes spent by me on the date of the encounter doing chart review, history and exam, documentation and further activities per the note   Carlos Bowling MD  [facility]  2:01 PM  10/11/2023  Telephone End Time (time telephone stopped): 2:36 PM    Originating Patient Location (pt. Location): Home      Distant Location (provider location):  On-site    Distant Location (provider location):  [facility] SURGERY [facility] AND BARIATRICS [facility]

## 2023-10-11 NOTE — PATIENT INSTRUCTIONS
Plan:   1.  Diet: aiming for protein rich meals every 5-6 hours, with 25 grams of lean protein per meal, then vegetables then complex carbohydrates at the end of the meal.  2. Exercise: recovering from current chest cold. Try to move a little every day if steady on your feet.   3. Medication: Just had injection of 4.5mg/week Trulicity. Given your upcoming visit with Dr. Cruz in the next week. You can check exam and blood sugars/A1c at that time. I'd advocate considering change to Mounjaro which may help both your blood sugar and weight related concerns better than the Trulicity.   4. Goals: down 40 lbs over the last 2 years, well done. Continue goal to stabililze weight under 300 lbs long term should reduce some risk for recurrent cellulitis problems and improve blood sugar control.           Example Meal Plan for a 1876-9992 Calorie Diet:    In order to fuel your weight loss properly and avoid hunger-induced overeating later in the day, for your height and weight, you will enjoy the most success by following the diet below or similar with adjustments based on your particular tastes and preferences.  Exercise may influence speed, amount of weight loss further.     I recommend getting into a meal routine and keeping it similar day to day in the beginning so you don t have to think too hard about what you re going to make/eat.  Keep snacks healthy, ideally containing protein and some vegetables.  Non-processed food is preferable to packaged items.  Eat at least a few crunchy green vegetables if having a snack, which should be 2-3 hours after your mealtimes(prepare these ahead of time for ease of use).  Drink 64 oz -80 oz of water daily for most, some of you will need more and we'll discuss it at your visit if that is the case.      When changing our diet,  we can often mistake thirst for hunger or just have some distracted eating habits that we need to break free from ('bored/mindless eating', screen time,work,  driving,etc).  A glass of water and reconsideration of our hunger is often all that is needed.  Having the urge is not the problem, but watching it pass by without acting on it is the goal.    If you re having hunger problems, add a protein drink/snack to your morning hours or afternoon snack with at least 20grams of protein and not too much sugar (under 10g).  A carton of higher protein/low sugar yogurt can work as well.  If the urge to snack is overwhelming and not satiated, try going for a 10 minute walk/exercise, come home and drink a glass of water and if still hungry, have a  calorie snack (handful of raw/sprouted nuts, veggies and string cheese, protein bar, etc).  Savor it.    It is better to have a large breakfast, a moderate lunch and a smaller dinner to fuel your day.  People lose 10-15% more weight during their weight loss season with this strategy. Optimizing your protein intake at each meal will further keep you more satisfied while eating less food overall.  Getting exercise in early has also been shown to offer the best results (before breakfast ideally but anytime is the right time to exercise if that is not an option for you).    To make sure you re getting adequate vitamins and minerals during weight loss, I recommend one complete multivitamin a day of your choice.  Consider a probiotic and taking some vitamin D 2000 IU daily.    Let supper be your last meal of the day and ideally try to have at least 12 hours between supper and breakfast the next day to tap into some beneficial overnight fasting dynamics.  Midnight snacks need to go away. Water in the evening is fine, unsweetened, non caffeinated herbal tea is helpful as well.  Consolidating your meals within a 8-12 hour period of your day will help tap into these additional metabolic benefits and tends to keep your appetite up for breakfast, further helping to stay on track.  For most of my patients, I don't recommend an intermittent  fasting style diet (many find it hard to fit in their lifestyle) but an overnight fast is very doable for most patients and helps regulate our hunger drives a little better.  This makes it very important to nail good intake at all three meals to feel satisfied/energized and still lose weight.      If evening snacking desires are high, consider a glass of fiber supplement for some additional fullness (metamucil or similar). Most of us don't get the 25-30 grams per day of fiber that promotes good gut health/satiety.  Benefiber, metamucil, citrucel are reasonable/affordable options for most people.  Inulin, chicory, psyllium husk are reasonable options but start slow and low in the dose to avoid gas/bloating until your gut gets acclimated (ramping up to 5-10 grams per day of supplemental fiber after 3-4 weeks if needed).      Example Meal Plan:  Breakfast: 450-475 Calories  1 egg cooked on low in olive oil:   calories.  5oz Greek Yogurt (Fage plain classic: ~150 alexsandra)  Handful of Berries of your choice (about a calorie per berry or 20-40cal per handful)    cup(cooked) of  old fashioned oatmeal or 1/2 cup(cooked) steel cut oats. (150 alexsandra)  Sprinkle amount of brown sugar and a pat of butter. (40 alexsandra)  Glass of  Water  Black coffee or unsweetened Tea (0calories).      2-3 hours Later Snack: (195 calories).  Glass of water  One string Cheese (80 calories) or 4 oz creamed cottage cheese (115 calories) with  Crunchy Celery sticks (less than 10 calories per large stalk) 2 stalks. (20 calories)    of a  Large Banana or   of a Large Apple (60 calories):  eat second half at lunch or afternoon snack.     Lunch:300 -350 calories   Chicken Breast  (baked/broiled/roasted/grilled)  4-6 oz.  (125-180 alexsandra), BBQ sauce/hot sauce/mustard/seasoning is free. Just use a reasonable amount. Or a can of tuna with 1 tablespoon mayonnaise.  Salad: lettuce, any other veggies (cucumbers, green peppers/celery you like and a small drizzle of  dressing to just flavor.  Go as big on the veggies as you like,  as they are practically calorie free.   A whole, 8 inch cucumber is 45 calories, a whole green pepper is 23 calories, a stalk of celery is 9 calories.  [place] Dressing is 60 calories per tablespoon..so moderate your desired dressing or do a drizzle of olive oil and splash of balsamic vinegar on top,  Total calories unlikely to be over 150 even with dressing.  Glass of Water.    Option for lunch is meal replacement protein drink/smoothie.  Need at least 20 grams of protein and eat the rest of your apple/banana from the morning snack.      Afternoon Snack: 150-200 calories   Cheese Stick or cottage cheese again  and a fresh fruit OR  Granola Bar (protein Bar acceptable if under 200 calories OR  Homemade smoothies:  8oz skim milk,  a handful of berries (fresh or frozen and a serving of protein powder such as BiPro or Sofia sWhey for example.  If you don't like dairy, make with 8oz water, one small banana, handful of berries and the protein powder, add any veggies you want as well:  roughly 200 calories.   Glass of Water    Dinner: 325 calories  4oz of fresh, [place] salmon.  Broiled (salt/pepper/dill) for about 8-8.5 minutes (200calories) or  4oz filet mignon steak or sirloin steak  Salad or vegetable sautéed lightly in olive oil or   Broccoli 1.5  cups chopped and steamed  or micro-waved in a little water (75 calories)  Glass of Water,    Cup of herbal tea (unsweetened, caffeine free)      Herbs and seasonings are encouraged to flavor your foods/vegetables.  Make your food delicious.      Tips for Success:  1.  Prepare proteins ahead of time (broil chicken breasts in bulk so you can grab and go), steel cut oats/lentils can be stored in casserole dish/bowl in the fridge for quick scoop in the morning and rewarm in microwave, make use of crock pot recipes (watch salt content).  Making meals that cover 3-4 future meals is an easy way to stay on  "track.  2.  Drink a 8-12 oz glass of water every 2-3 hours when awake.  We often mistake hunger for thirst, especially when losing weight.  3. Remember your Reward and Motivation when things get hard.  4.  Weigh yourself every morning and record, you'll stay on track better and learn how our biorhythms, diet and elimination patterns show up on the scale. Don't worry about 1 or 2 day patterns, but when on track you'll notice good trend downward of weight over 3-4 day segments.  Plateaus tend to resolve after 4-8 days in most cases if you stay consistent with your plan.  These are natural and part of weight loss, even if you're perfect with your plan execution.  5. Call if problems/concerns.  [employer] is a great tool to stay in touch and provide weekly outside accountability. Check in with questions or if you want to brag.  6.  Find a handful of meals/foods that keep you on track and feeling good and get into a routine that is sustainable for you.  It's OK to have a routine that works for you.  7.  Consider taking a complete multivitamin just to make sure all micronutrients are adequate during weight loss.  8. If losing hair/brittle nails it usually means you are not taking enough protein.  Minimum goal is 60 grams daily of protein for smaller women, 80 grams a day for men. Consider taking Biotin as supplement or a \"Hair and Nail\" multivitamin.  On-the-Go Breakfast Ideas  As of 2015, the latest research shows what a huge impact eating breakfast has on losing weight and feeling your best. People lose more weight when they make breakfast their biggest meal of the day compared to Dinner, but even if you cannot go to that degree, getting a breakfast that has at least 20 grams of protein and even a moderate amount of fat is ideal for maintaining good energy through the day and limits overeating in the evening hours.  The following are some quick and easy suggestions for at least getting something of substance into your body " in the morning.  Enjoy!    Eating breakfast within 90 minutes of waking up is an important part of taking care of your body on a restricted calorie diet plan.  After sleeping for hours, your body is in need of fuel.  An ideal breakfast is a combination of protein, whole-grain carbohydrates, or fruit.  Here s why:    -Protein digests very slowly in the body, helping you feel more satisfied.  -Whole grains provide dietary fiber, which also digests slowly and helps keep your gut clean.  -Fruit is a great source of vitamins, minerals, and fiber.     Each one of these breakfast combinations has between 200-300 calories and 15-20 grams of protein.  Feel free to mix and match!    Bone Broth (chicken bone broth or beef bone broth) is a great way to boost protein content. 8oz of bone broth will typically have 9-12grams of protein for 40kcal of energy.    Protein: Choose  -1/2 cup low-fat cottage cheese  -2 hard boiled eggs , or one cooked in olive oil (low/slow heat).  -1 low fat string cheese stick  -1 TablesSplashupon natural peanut butter  -[employer] vegetarian sausage abby (found in freezer section)  -1 slice lowfat cheese  -6 oz 2% or lowfat Greek yogurt, such as Fage or Oikos.    PLUS    Whole Grains:  Choose   -1 whole wheat English muffin  -1 whole wheat gordon, half  -1/2 Fiber One frozen muffin, thawed  -1/2 Fiber One toaster pastry  -1 whole wheat bagel thin  -1/2 cup Kashi cereal  -1 Kashi waffle (or other whole grain high-fiber waffle)  Aim for whole grain/sprouted breads with at least 3g of fiber/slice if having bread. [place] is one such brand.    OR    Fruit: Choose  -1/3 cup blueberries  -1/2 banana (or a plantain- similar to a banana, yet smaller)  -1/2 cup cantaloupe cubes  -1 small apple  -1 small orange  -1/2 cup strawberries  -handful raspberries/blackberries (each berry is about 1 calorie).    *Adapted from [employer], Fall 20    Ten Breakfasts Under 250 calories    Ideally, getting between  350-600 calories  (depending on starting height and weight)for breakfast is ideal for avoiding hunger later in the day, adjust/add to the following accordingly:    One- 250 calories, 8.5 g protein  1 slice whole-grain toast   1 Tbsp peanut butter    banana    Two- 250 calories, 8 g protein    cup nonfat/lowfat yogurt  1/3rd cup diced no-sugar peaches  1/3rd cup cereal (like Special K, Cheerios, or bran flakes)    Three- 250 calories, 25 g protein  1 egg scrambled with 1 oz skim milk    cup shredded cheddar    whole grain English muffin  1 oz [place] weathers  1 tsp margarine spread    Four- 225 calories, 25 g protein  1/2 cup Kashi Go-Lean cereal    cup skim milk mixed with 1 scoop Bariatric Advantage protein powder    cup no-sugar diced pears    Five- 250 calories, 20 g protein    cup oatmeal prepared with skim milk, 1 scoop protein powder, and sugar-free maple syrup    Six- 200 calories, 5 g protein  1 whole grain waffle, toasted  1 tablespoon creamy peanut or almond butter    Seven-  250 calories, 19 g protein  Breakfast sandwich: 1 slice whole grain toast, cut in half.  Add 1 scrambled egg and one slice cheddar  cheese.    Eight-  250 calories, 15 g protein  2 eggs scrambled with 1/3 cup frozen spinach (heat before adding to eggs) and 2 tablespoons low fat cream cheese.    Nine-  150 calories, 15 g protein  2/3rd cup cottage cheese    cup cantaloupe    Ten- 200 calories, 20 g protein  Fruit smoothie made with 4 oz. nonfat Greek yogurt,   cup berries, 1 scoop protein powder, and 4 oz skim milk.    Ten Lunches Under 250 Calories    Aim for lunch to be around 300-400 calories a day when trying to lose weight and get that protein in!    One- 200 calories, 11 g protein  1/3 cup tuna salad made with light kennedy on 1 slice whole grain bread  1 small peeled apple    Two- 250 calories, 16 g protein  1/3 cup lowfat cottage cheese    cup cooked green beans    small fruit cocktail (in natural juice)    Three- 200 calories, 11  g protein    grilled cheese sandwich on whole grain bread with lowfat cheese  2/3rd cup of tomato soup    Four- 250 calories, 22 g protein  Deli wrap: 1 oz sliced turkey, 1 oz sliced ham, 1 oz sliced chicken rolled up with 1 slice low-fat cheese  1 small orange    Five- 250 calories, 28 g protein  2/3rd cup chili with 1 oz shredded cheese  4 saltine crackers    Six- 250 calories, 22 g protein  1 cup fresh spinach with 2 oz chicken, 1/3rd cup mandarin oranges, and 2 tablespoons sliced almonds with 1 tablespoon  vinaigrette dressing    Seven- 200 calories, 11 g protein  1 Tbsp sugar-free preserves and 1 Tbsp peanut butter on 1 slice whole grain toast    cup nonfat/lowfat Greek yogurt    Eight- 250 calories, 18 g protein  1 small soft-shell chicken taco with 1 oz shredded cheese, lettuce, tomato, salsa, and 1 Tbsp light sour cream    cup black beans    Nine- 225 calories, 13 g protein  2 ounces baked chicken  1/4 cup mashed potatoes    cup green beans    Ten- 200 calories, 21 g protein  Deli gordon: 2 oz roast beef or other deli meat with 1 tsp Don mayonnaise and sliced tomato, onion, and lettuce  1/3rd cup cottage cheese      Ten Dinners Under 300 calories    If you're eating a large breakfast and medium lunch, keep dinner small.  300-400 calories is ideal for most people depending on their caloric needs.    One- 300 calories, 12 g protein  1-inch thick slice of turkey meatloaf    cup baked butternut squash    Two- 200 calories, 9 g protein  Bread-less BLT: 3 slices turkey weathers, sliced tomato, wrapped in a large lettuce leaf    cup peeled fruit    Three- 275 calories, 36 g protein  3 oz roasted chicken    cup cooked broccoli    cup shredded cheddar cheese    cup unsweetened applesauce    Four- 200 calories, 25 g protein  3 oz baked tilapia  1/3rd cup cooked carrots    cup yogurt    Five- 250 calories, 20 g protein  Grilled ham  n  Swiss: spread 2 tsp ghee or butter on 1 slice of whole grain bread.  Cut bread in half,  layer 2 oz deli ham with 1 piece of Swiss cheese and grill until cheese is melted.    cup cooked vegetables    Six- 250 calories, 18 g protein  Vegetarian cheeseburger: 1 [place] cheeseburger topped with lettuce, onion, tomato, and ketchup/mustard    cup sweet potato fries    Seven- 250 calories, 18 g protein  Pork pot roast: 2 oz roasted pork loin, 1/3rd cup roasted carrots,   medium potato, cooked with   cup gravy    Eight- 330 calories, 25 g protein  2 oz meatballs (about 2 small meatballs)    cup spaghetti sauce  1/2 piece toast topped with 1 tsp ghee or butterand topped with garlic powder, toasted in oven    Nine- 250 calories, 16 g protein  Mexican pizza: one 8  corn tortilla topped with 2 oz chicken,   cup salsa, 2 tablespoons black beans, 2 tablespoons shredded cheese.  Bake until cheese is melted.    Ten- 250 calories, 22 g protein  Shrimp stir-mckenzie: 3 oz cooked shrimp, 1/6th onion,   pepper,   cup chopped carrots sautéed in 1 tablespoon olive oil, topped with 2 tablespoons stir mckenzie sauce and a pinch of sesame seeds        150 Calories or Less Snack Ideas   1 hardboiled egg with   cup berries  1 small apple with 1 hardboiled egg  10 almonds with   cup berries  2 clementines with 1 light string cheese  1 light string cheese with   sliced apple  1 light string cheese wrapped in 2 slices of turkey  4 100% whole wheat crackers (e.g. Triscuit) with 1 light string cheese    c. cottage cheese with   cup fruit and 1 Tbsp sunflower seeds     cup cottage cheese with   of an avocado     can tuna fish with 1 cup sliced cucumbers     cup roasted garbanzo beans with paprika and cayenne pepper    baked sweet potato with   cup chili beans or   cup cottage cheese  2 oz. nitrate free turkey slices with 1 cup carrots  1 container (6 oz) of low sugar (less than 10 grams of sugar) greek yogurt   3 Tablespoons of hummus with 1 cup sliced bell peppers   2 Tablespoons of hummus with 15 baby carrots  4 Tablespoons ranch dip made with  plain Greek Yogurt and 3 mini cucumbers  1/4 cup nuts (any kind)  1 Tablespoon peanut butter with 1 stalk celery   1 dill pickle wrapped in 1-2 slices of deli ham with 1 tsp of mayonnaise/mustard.    LEAN PROTEIN SOURCES  Getting 20-30 grams of protein, 3 meals daily, is appropriate for most people, some need more but more than about 40 grams per meal is not useful.  General rule is drinking one ounce of water per gram of protein eaten over the course of the day:  70 grams of protein each day, drink 70 oz of water.  Protein Source Portion Calories Grams of Protein                           Nonfat, plain Greek yogurt    (10 grams sugar or less) 3/4 cup (6 oz)  12-17   Light Yogurt (10 grams sugar or less) 3/4 cup (6 oz)  6-8   Protein Shake 1 shake 110-180 15-30   Skim/1% Milk or lactose-free milk 1 cup ( 8 oz)  8   Plain or light, flavored soymilk 1 cup  7-8   Plain or light, hemp milk 1 cup 110 6   Fat Free or 1% Cottage Cheese 1/2 cup 90 15   Part skim ricotta cheese 1/2 cup 100 14   Part skim or reduced fat cheese slices 1 ounce 65-80 8     Mozzarella String Cheese 1 80 8   Canned tuna, chicken, crab or salmon  (canned in water)  1/2 cup 100 15-20   White fish (broiled, grilled, baked) 3 ounces 100 21   [place]/Tuna (broiled, grilled, baked) 3 ounces 150-180 21   Shrimp, Scallops, Lobster, Crab 3 ounces 100 21   Pork loin, Pork Tenderloin 3 ounces 150 21   Boneless, skinless chicken /turkey breast                          (broiled, grilled, baked) 3 ounces 120 21   [place], [place], [place], and Venison 3 ounces 120 21   Lean cuts of red meat and pork (sirloin,   round, tenderloin, flank, ground 93%-96%) 3 ounces 170 21   Lean or Extra Lean Ground Turkey 1/2 cup 150 20   90-95% Lean [place] Burger 1 abby 140-180 21   Low-fat casserole with lean meat 3/4 cup 200 17   Luncheon Meats                                                        (turkey, lean ham, roast beef, chicken) 3 ounces 100 21   Egg  (boiled, poached, scrambled) 1 Egg 60 7   Egg Substitute 1/2 cup 70 10   Nuts (limit to 1 serving per day)  3 Tbsp. 150 7   [place] (peanut, almond)  Limit to 1 serving or less daily 1 Tbsp. 90 4   Soy Burger (varies) 1  15   Garbanzo, Black, [place] Beans 1/2 cup 110 7   Refried Beans 1/2 cup 100 7   Kidney and [place] beans 1/2 cup 110 7   Tempeh 3 oz 175 18   Vegan crumbles 1/2 cup 100 14   Tofu 1/2 cup 110 14   Chili (beans and extra lean beef or turkey) 1 cup 200 23   Lentil Stew/Soup 1 cup 150 12   Black Bean Soup 1 cup 175 12

## 2023-10-11 NOTE — LETTER
"    10/11/2023         RE: Bushra Buck  [address]  [place] [address]        Dear Colleague,    Thank you for referring your patient, Bushra Buck, to the [facility] SUR[facility] AND BARIATRICS CARE [place]. Please see a copy of my visit note below.      The patient has been notified of following:     \"This telephone visit will be conducted via a call between you and your physician/provider. We have found that certain health care needs can be provided without the need for a physical exam.  This service lets us provide the care you need with a short phone conversation.  If a prescription is necessary we can send it directly to your pharmacy.  If lab work is needed we can place an order for that and you can then stop by our lab to have the test done at a later time.    Telephone visits are billed at different rates depending on your insurance coverage. During this emergency period, for some insurers they may be billed the same as an in-person visit.  Please reach out to your insurance provider with any questions.    If during the course of the call the physician/provider feels a telephone visit is not appropriate, you will not be charged for this service.\"    Patient has given verbal consent to a Telephone visit? Yes    What phone number would you like to be contacted at? [phone]    Patient would like to receive their AVS by "[employer]"    Are there any specific questions or needs that you would like addressed at your visit today? No concerns for visit today.        Telephone Start Time: 2:01 PM  Bariatric Clinic Follow-Up Visit:    Bushra Buck is a 43 year old  female with Body mass index is 64.45 kg/m .  presenting here today for follow-up on non-surgical efforts for weight loss. Original Intake visit occurred on 9/17/15 with a weight of 350 lbs BMI of 68.4, gained up to a max of 401lbs in 2016..  Along with diet and behavior changes, she has been previously using " GLP1 agonist (Trulicity 3mg at our visit in Nov 2021) for diabetes and to assist her weight loss goals.  Trulicity dose was increased to 4.5mg/week 9/12/22 at 354 lbs.  See her intake visit notes for details on identified contributors to weight gain in the past. Chart review shows Dietician calculated RMR of 2300 and protein intake goal of 80g/day.   Down about 40 lbs from heaviest weight 2 years ago, a 10.8% body weight reduction.  Weight:   Wt Readings from Last 5 Encounters:   10/11/23 149.7 kg (330 lb)   08/01/23 (!) 157.9 kg (348 lb)   04/17/23 (!) 156.9 kg (346 lb)   01/11/23 (!) 158.1 kg (348 lb 8 oz)   12/19/22 (!) 155.1 kg (342 lb)    pounds      Comorbidities:  Patient Active Problem List   Diagnosis     Acne rosacea     Dermatitis     Stasis edema     Pituitary adenoma (H)     Abnormal weight gain     High triglycerides     Prolactinoma (H)     Acquired hypothyroidism     Morbid obesity (H)       Current Outpatient Medications:      ACETAMINOPHEN EXTRA STRENGTH 500 MG tablet, , Disp: , Rfl:      Alcohol Swabs (ALCOHOL PREP) 70 % PADS, See Admin Instructions, Disp: , Rfl:      ASPIRIN LOW DOSE 81 MG EC tablet, , Disp: , Rfl:      atorvastatin (LIPITOR) 40 MG tablet, , Disp: , Rfl:      bacitracin 500 UNIT/GM external ointment, Apply topically 2 times daily Apply thin layer twice daily for 10-14 days to abdominal wound., Disp: 30 g, Rfl: 0     Blood Glucose Monitoring Suppl (Inspherion TALK MONITORING SYSTEM) w/Device KIT, , Disp: , Rfl:      bumetanide (BUMEX) 2 MG tablet, , Disp: , Rfl:      cabergoline (DOSTINEX) 0.5 MG tablet, Take 1 tablet (0.5 mg) by mouth three times a week Remaining refills sent to new pharmacy, Disp: 36 tablet, Rfl: 1     calcium carbonate-vitamin D (OS-TAWNYA WITH D) 500-200 MG-UNIT tablet, , Disp: , Rfl:      Calcium Citrate-Vitamin D3 315-6.25 MG-MCG TABS, TAKE 2 TABLETS BY MOUTH DAILY, Disp: 60 tablet, Rfl: 10     cetirizine (ZYRTEC) 10 MG tablet, , Disp: , Rfl:      clotrimazole  (LOTRIMIN) 1 % external cream, , Disp: , Rfl:      divalproex sodium extended-release (DEPAKOTE ER) 500 MG 24 hr tablet, , Disp: , Rfl:      LEE TALK GLUCOSE TEST test strip, , Disp: , Rfl:      fenofibrate (TRICOR) 145 MG tablet, , Disp: , Rfl:      FENOFIBRATE PO, Take 145 mg by mouth daily, Disp: , Rfl:      fluticasone (FLONASE) 50 MCG/ACT nasal spray, SHAKE LIQUID AND INSTILL 2 SPRAYS IN EACH NOSTRIL EVERY DAY (BULK), Disp: , Rfl:      glimepiride (AMARYL) 2 MG tablet, Take 1 tablet (2 mg) by mouth every morning (before breakfast), Disp: 90 tablet, Rfl: 3     levothyroxine (SYNTHROID/LEVOTHROID) 112 MCG tablet, TAKE 1 TABLET (112MCG) BY MOUTH DAILY, Disp: 90 tablet, Rfl: 3     medroxyPROGESTERone (PROVERA) 10 MG tablet, TAKE 1 TABLET  10 MG  BY MOUTH ONCE DAILY FOR 5 DAYS EACH MONTH, Disp: , Rfl: 0     Multiple Vitamin (TAB-A-NARESH) TABS, , Disp: , Rfl:      mupirocin (BACTROBAN) 2 % external ointment, , Disp: , Rfl:      NYAMYC 750541 UNIT/GM external powder, , Disp: , Rfl:      Omega-3 Fish Oil 500 MG capsule, Take 2 capsules (1,000 mg) by mouth daily, Disp: 180 capsule, Rfl: 3     omeprazole (PRILOSEC) 40 MG DR capsule, Take 1 capsule by mouth daily., Disp: , Rfl:      paliperidone ER (INVEGA) 6 MG 24 hr tablet, , Disp: , Rfl:      paliperidone ER (INVEGA) 9 MG 24 hr tablet, , Disp: , Rfl:      potassium chloride ER (KLOR-CON M) 20 MEQ CR tablet, , Disp: , Rfl:      spironolactone (ALDACTONE) 50 MG tablet, , Disp: , Rfl:      TRUEplus Lancets 30G MISC, , Disp: , Rfl:      TRULICITY 1.5 MG/0.5ML pen, INJECT 1.5MG SUBCUTANEOUSLY EVERY WEEK ALONG WITH 3MG PEN = 4.5MG WEEKLY (BULK), Disp: , Rfl:      TRULICITY 3 MG/0.5ML SOPN, INJECT 3MG SUBCUTANEOUSLY EVERY WEEK (BULK), Disp: , Rfl:      VITAMIN D3 25 MCG (1000 UT) tablet, , Disp: , Rfl:      clotrimazole (LOTRIMIN) 1 % external solution, , Disp: , Rfl:      fluconazole (DIFLUCAN) 150 MG tablet, , Disp: , Rfl:      Hypertonic Nasal Wash (SINUS RINSE KIT)  "PACK, Spray 1 packet in nostril daily (Patient not taking: Reported on 10/11/2023), Disp: 200 each, Rfl: 3     LORazepam (ATIVAN) 2 MG tablet, , Disp: , Rfl:     Current Facility-Administered Medications:      lidocaine (XYLOCAINE) 2 % external gel, , Topical, PRN, Luciana Neal, NP, Given at 12/06/21 1354      Interim: Since our last visit, she has had issues with elevated blood sugars, recurrent cellulitis issues this summer and Fall. Int he past was intolerant to metformin due to copious incontinent diarrhea.     Blood sugars log reviewed:   Today: 232mg/dL (AM fasting), 336mg/dL yesterday morning.  No midnight snacks.   Down 20 lbs this summer.     Trulicity is at 4.5mg/week (Wednesdays), divided doses due to shortages of the 4.5mg/week pens.  Blood sugars haven't been well controlled and \"chest cold\" today as well as cellulitis last month could have driven numbers up some. Given her hyperglycemia, if not improving as her chest cold resolves this week, she's planning to recheck with Dr. Cruz 10/16/23 and would be reasonable to try Mounjaro ramping from the 5mg/week dose for a month, then 7.5mg/week for a month then 10mg/week for a few months for considering full ramping to eventual 15mg/week dose; to see if responds better therapeutically for blood sugar management and as an insulin sparing regimen that would promote more rather than less weight loss as higher insulin needs tend to increase appetite and the good data coming out over the last year and a half with tirzepatide for weight management, independent of it's A1c lowering benefits.     Plan:   1.  Diet: aiming for protein rich meals every 5-6 hours, with 25 grams of lean protein per meal, then vegetables then complex carbohydrates at the end of the meal.  2. Exercise: recovering from current chest cold. Try to move a little every day if steady on your feet.   3. Medication: Just had injection of 4.5mg/week Trulicity. Given your upcoming visit with Dr." "Nancy in the next week. You can check exam and blood sugars/A1c at that time. I'd advocate considering change to Mounjaro which may help both your blood sugar and weight related concerns better than the Trulicity.  Her reported home blood sugars are quite out of step with A1c history (see below)  5. Goals: down 40 lbs over the last 2 years, well done. Continue goal to stabililze weight under 300 lbs long term should reduce some risk for recurrent cellulitis problems and improve blood sugar control.   6. Diabetes/metabolic syndrome: weight management to allow for 45-70lb reduction would likely improve greatly and likely put diabetes into remission again.  Recent Labs   Lab Test 04/10/23  0947 04/13/22  1614   CHOL 158 140   HDL 16* 11*   LDL 73 82   TRIG 410* 523*         We discussed [facility] Bariatric Basics including:  -eating 3 meals daily  -reviewed metabolic needs for weight loss based on Resting Metabolic Rate  -protein goals supportive of healthy weight loss  -avoiding/limiting calorie containing beverages  -We discussed the importance of restorative sleep and stress management in maintaining a healthy weight.  -We discussed the National Weight Control Registry healthy weight maintenance strategies and ways to optimize metabolism.  -We discussed the importance of physical activity including cardiovascular and strength training in maintaining a healthier weight and explored viable options.      Most recent labs:  Lab Results   Component Value Date    WBC 10.4 04/11/2023    HGB 13.2 04/11/2023    HCT 42.3 04/11/2023     (H) 04/11/2023     04/11/2023     Lab Results   Component Value Date    CHOL 158 04/10/2023     Lab Results   Component Value Date    HDL 16 (L) 04/10/2023     No components found for: \"LDLCALC\"  Lab Results   Component Value Date    TRIG 410 (H) 04/10/2023     No results found for: \"CHOLHDL\"  Lab Results   Component Value Date    ALT 33 04/10/2023    AST 30 04/10/2023    ALKPHOS " "55 04/10/2023     No results found for: \"HGBA1C\"  Lab Results   Component Value Date    B12 819 05/05/2023     No components found for: \"VITDT1\"  No results found for: \"EFRAIN\"  Lab Results   Component Value Date    PTHI 37 07/10/2012     No results found for: \"ZN\"  No results found for: \"VIB1WB\"  Lab Results   Component Value Date    TSH 3.76 05/24/2023     No results found for: \"TEST\"    DIETARY HISTORY  Tracking technique: limited  Positive Changes Since Last Visit: recovered from celluitis  Struggling With: blood sugar elevation. Limited protein budget    Getting Adequate Protein: unsure  Sleep schedule: OK.      PHYSICAL ACTIVITY PATTERNS:  Cardiovascular: limited walking  Strength Training: hx of PT exercises    REVIEW OF SYSTEMS  Mild cough/congestion recently. No fevers/chills. Redness in leg improved. .  PHYSICAL EXAM:  Vitals: Ht 1.524 m (5')   Wt 149.7 kg (330 lb)   BMI 64.45 kg/m    Weight:   Wt Readings from Last 3 Encounters:   10/11/23 149.7 kg (330 lb)   08/01/23 (!) 157.9 kg (348 lb)   04/17/23 (!) 156.9 kg (346 lb)         GEN: Pleasant but stuffy sounding voice on phone, lower energy c/w recent illness.   Interim study results: Last Comprehensive Metabolic Panel:  Sodium   Date Value Ref Range Status   06/21/2023 139 136 - 145 mmol/L Final   03/23/2020 140 133 - 144 mmol/L Final     Potassium   Date Value Ref Range Status   06/21/2023 3.6 3.4 - 5.3 mmol/L Final   05/17/2022 4.0 3.5 - 5.0 mmol/L Final   03/23/2020 4.0 3.4 - 5.3 mmol/L Final     Chloride   Date Value Ref Range Status   06/21/2023 98 98 - 107 mmol/L Final   05/17/2022 100 98 - 107 mmol/L Final   03/23/2020 112 (H) 94 - 109 mmol/L Final     Carbon Dioxide   Date Value Ref Range Status   03/23/2020 22 20 - 32 mmol/L Final     Carbon Dioxide (CO2)   Date Value Ref Range Status   06/21/2023 26 22 - 29 mmol/L Final   05/17/2022 26 22 - 31 mmol/L Final     Anion Gap   Date Value Ref Range Status   06/21/2023 15 7 - 15 mmol/L Final "   05/17/2022 13 5 - 18 mmol/L Final   03/23/2020 5 3 - 14 mmol/L Final     Glucose   Date Value Ref Range Status   06/21/2023 117 (H) 70 - 99 mg/dL Final   05/17/2022 111 70 - 125 mg/dL Final   03/23/2020 102 (H) 70 - 99 mg/dL Final     Urea Nitrogen   Date Value Ref Range Status   06/21/2023 16.0 6.0 - 20.0 mg/dL Final   05/17/2022 17 8 - 22 mg/dL Final   03/23/2020 16 7 - 30 mg/dL Final     Creatinine   Date Value Ref Range Status   06/21/2023 0.92 0.51 - 0.95 mg/dL Final   03/23/2020 0.70 0.52 - 1.04 mg/dL Final     GFR Estimate   Date Value Ref Range Status   06/21/2023 79 >60 mL/min/1.73m2 Final   06/29/2021 >60 >60 mL/min/1.73m2 Final   03/23/2020 >90 >60 mL/min/[1.73_m2] Final     Comment:     Non  GFR Calc  Starting 12/18/2018, serum creatinine based estimated GFR (eGFR) will be   calculated using the Chronic Kidney Disease Epidemiology Collaboration   (CKD-EPI) equation.       Calcium   Date Value Ref Range Status   06/21/2023 9.6 8.6 - 10.0 mg/dL Final   03/23/2020 8.9 8.5 - 10.1 mg/dL Final     Bilirubin Total   Date Value Ref Range Status   04/10/2023 0.3 <=1.2 mg/dL Final   03/23/2020 0.3 0.2 - 1.3 mg/dL Final     Alkaline Phosphatase   Date Value Ref Range Status   04/10/2023 55 35 - 104 U/L Final   03/23/2020 46 40 - 150 U/L Final     ALT   Date Value Ref Range Status   04/10/2023 33 10 - 35 U/L Final   03/23/2020 37 0 - 50 U/L Final     AST   Date Value Ref Range Status   04/10/2023 30 10 - 35 U/L Final   03/23/2020 20 0 - 45 U/L Final               Lab Results   Component Value Date    A1C 5.3 03/23/2020    A1C 5.0 01/06/2020    A1C 5.4 08/26/2019    A1C 5.2 04/09/2019    A1C 5.4 12/31/2018    A1C 5.4 09/20/2016    A1C 5.7 01/06/2016    A1C 5.0 06/29/2014     .      30 minutes spent by me on the date of the encounter doing chart review, history and exam, documentation and further activities per the note   Carlos Bowling MD  [facility]  2:01  PM  10/11/2023  Telephone End Time (time telephone stopped): 2:36 PM    Originating Patient Location (pt. Location): Home      Distant Location (provider location):  On-site    Distant Location (provider location):  [facility] SURGERY [facility] AND BARIATRICS CARE [place]                  Again, thank you for allowing me to participate in the care of your patient.        Sincerely,        Carlos Bowling MD

## 2023-10-29 ENCOUNTER — HEALTH MAINTENANCE LETTER (OUTPATIENT)
Age: 43
End: 2023-10-29

## 2023-11-21 ENCOUNTER — OFFICE VISIT (OUTPATIENT)
Dept: PHARMACY | Facility: PHYSICIAN GROUP | Age: 43
End: 2023-11-21
Payer: COMMERCIAL

## 2023-11-21 VITALS
SYSTOLIC BLOOD PRESSURE: 110 MMHG | BODY MASS INDEX: 64.33 KG/M2 | HEART RATE: 99 BPM | DIASTOLIC BLOOD PRESSURE: 70 MMHG | WEIGHT: 293 LBS

## 2023-11-21 DIAGNOSIS — E66.01 MORBID OBESITY (H): ICD-10-CM

## 2023-11-21 DIAGNOSIS — E11.65 TYPE 2 DIABETES MELLITUS WITH HYPERGLYCEMIA, WITHOUT LONG-TERM CURRENT USE OF INSULIN (H): Primary | ICD-10-CM

## 2023-11-21 PROCEDURE — 99605 MTMS BY PHARM NP 15 MIN: CPT | Performed by: PHARMACIST

## 2023-11-21 PROCEDURE — 99607 MTMS BY PHARM ADDL 15 MIN: CPT | Performed by: PHARMACIST

## 2023-11-21 RX ORDER — LEVOTHYROXINE SODIUM 125 UG/1
1 TABLET ORAL DAILY
COMMUNITY
Start: 2023-11-15

## 2023-11-21 RX ORDER — GLIMEPIRIDE 4 MG/1
4 TABLET ORAL
COMMUNITY

## 2023-11-21 RX ORDER — TIRZEPATIDE 5 MG/.5ML
5 INJECTION, SOLUTION SUBCUTANEOUS
COMMUNITY
End: 2024-01-23 | Stop reason: DRUGHIGH

## 2023-11-21 RX ORDER — CABERGOLINE 0.5 MG/1
0.25 TABLET ORAL
COMMUNITY
End: 2024-05-17

## 2023-11-21 RX ORDER — MULTIPLE VITAMINS W/ MINERALS TAB 9MG-400MCG
1 TAB ORAL DAILY
COMMUNITY

## 2023-11-21 RX ORDER — DOXYCYCLINE 100 MG/1
100 CAPSULE ORAL 2 TIMES DAILY
COMMUNITY
Start: 2023-11-16 | End: 2024-01-23

## 2023-11-21 RX ORDER — METFORMIN HYDROCHLORIDE 500 MG/1
500 TABLET, FILM COATED, EXTENDED RELEASE ORAL
COMMUNITY
Start: 2023-09-15 | End: 2024-01-23 | Stop reason: SINTOL

## 2023-11-21 NOTE — PATIENT INSTRUCTIONS
"Recommendations from MTM Pharmacist visit:                                                    MTM (medication therapy management) is a service provided by a clinical pharmacist designed to help you get the most of out of your medicines.  You may be sent a phone or email survey evaluating today's visit.  Please provide feedback you have for the service he received today if you are able.    Start metformin  mg once daily with food.  When you are done with all of the Trulicity you have at home, the next Wednesday, you will start Mounjaro 5 mg weekly to replace this once weekly.    Follow-up: about 4 weeks with Yvette Carpenter [facility]       It was great speaking with you today.  I value your experience and would be very thankful for your time in providing feedback in our clinic survey. In the next few days, you may receive an email or text message from [employer] with a link to a survey related to your  clinical pharmacist.\"     To schedule another MTM appointment, please call the clinic directly at [phone]. You may also schedule with me at the clinic .     My Clinical Pharmacist's contact information:                                                      Please feel free to contact me with any questions or concerns you have.      Yvette Carpenter, Pharm D., MPH  MTM Pharmacist - [facility]  Secure Voicemail: [phone]  Days in the office: Monday - Thursday         "

## 2023-11-21 NOTE — LETTER
November 28, 2023  Bushra PRYOR Cielo  [address]  [place] [address]    Dear Ms. Douglassjuanis, [facility]     Thank you for talking with me on Nov 21, 2023 about your health and medications. As a follow-up to our conversation, I have included two documents:      Your Recommended To-Do List has steps you should take to get the best results from your medications.  Your Medication List will help you keep track of your medications and how to take them.    If you want to talk about these documents, please call Yvette Carpenter RPH at phone: [phone], Monday-Friday 8-4:30pm.    I look forward to working with you and your doctors to make sure your medications work well for you.    Sincerely,  Yvette Carpenter RPH  [facility] Pharmacist, [facility]

## 2023-11-21 NOTE — LETTER
"Recommended To-Do List      Prepared on: 11/28/2023       You can get the best results from your medications by completing the items on this \"To-Do List.\"      Bring your To-Do List when you go to your doctor. And, share it with your family or caregivers.    My To-Do List:  What we talked about: What I should do:   A medication that is not working    Change the medication you are taking from Trulicity to Mounjaro.    When you are done with all of the Trulicity you have at home, the next Wednesday, you will start Mounjaro 5 mg weekly to replace this once weekly.           What we talked about: What I should do:   A new medication that may be of benefit to you    Start taking Metformin  mg once daily with food.          What we talked about: What I should do:                       "

## 2023-11-21 NOTE — PROGRESS NOTES
Medication Therapy Management (MTM) Encounter    ASSESSMENT:                          -----------------------------------------  .    Medication Adherence/Access: No issues identified -provided updated medication list and reconciled medications between PCP and [facility] charts today.  We will continue to review 1 indication at the time with patient to prevent feeling overwhelmed.  -----------------------------------------  .    Diabetes /Obesity: A1c not at goal less than 7%.  And blood sugars not at goal  milligrams per deciliter fasting.  Recommend starting metformin ER with slow titration to prevent diarrhea and tolerability.  Recommend replacing Trulicity with Mounjaro to help improve blood sugar and weight management more effectively than Trulicity.  Due to cognitive do not recommend insulin at this time due to risk of hypoglycemia.  Also reportedly better tolerated in medications.  Patient assessed for ketoacidosis and hyperglycemia today -stable education provided on reducing sweets and increasing fluids and activity to help reduce blood sugars, especially when elevated and secondary to infection.  At goal on aspirin and statin.  ACE/ARB not indicated due to no Microalbuminuria.    PLAN:                              Start metformin  mg once daily with food.  When you are done with all of the Trulicity you have at home, the next Wednesday, you will start Mounjaro 5 mg weekly to replace this once weekly.    Follow-up: about 4 weeks with Yvette Carpenter [facility]     SUBJECTIVE/OBJECTIVE:                          Bushra Buck is a 43 year old female coming in for an initial visit. She was referred to me from Daniel Cruz MD .      Reason for visit: Medication Therapy Management - Diabetes.    Allergies/ADRs: Reviewed in chart  Past Medical History: Reviewed in chart  Tobacco: She reports that she has never smoked. She has never used smokeless tobacco.    Medication Adherence/Access: Patient  takes medications 2 time(s) per day.   Per patient, misses medication 0 times per week.   Medication barriers: Patient has home health nurse help come and fill pillbox weekly.  Feels helps patient manage medications well and never misses a dose.  Patient has intellectual disability -current system works well to help manage medications.  Patient does not know medications off the top of her head and is easily overwhelmed.  Request to talk about 1 medication at a time at each visit to help improve understanding.   -----------------------------------------  .    Diabetes /Obesity:  Glimepiride 4mg twice daily (since 9/15/23)  Trulicity 1.5 mg + 3 mg once weekly (4.5 mg total -9 due to shortage).  Aspirin 81mg daily    Patient is not experiencing side effects.  Patient has history of diarrhea with metformin IR -severe diarrhea.  History of hospitalization for hyperglycemia.  Blood sugar monitorin time(s) daily; Ranges: (from patient's glucose log) Fasting- 200-300s the past week due to infection -finishing up doxycycline currently to help improve symptoms.  Current diabetes symptoms: none  Diet/Exercise: Patient has difficulty managing appetite and eating patterns.  Following with bariatric clinic to help with tools to reduce binge eating and improve mindful eating.     Eye exam in the last 12 months? Yes- Date of last eye exam: 23,  Location: [facility]    Foot exam: due  A1c (10/6/23): 9.1% (Up from 6.8 in 2023)  Urine Albumin (2023): Normal    Last Comprehensive Metabolic Panel 23:  Lab Results   Component Value        POTASSIUM 3.6    CHLORIDE 98    CO2 26    ANIONGAP 15     (H)    BUN 16.0    CR 0.92    GFRESTIMATED 79    TAWNYA 9.6           Today's Vitals: /70   Pulse 99   Wt 329 lb 6.4 oz (149.4 kg)   BMI 64.33 kg/m    ----------------      I spent 45 minutes with this patient today. All changes were made via collaborative practice agreement with Daniel Sagastume  MD Nancy. A copy of the visit note was provided to the patient's provider(s).    A summary of these recommendations was given to the patient and was sent via [employer].    Yvette Carpenter, Pharm D., MPH  MTM Pharmacist - [facility]  Secure Voicemail: [phone]  In Office: Monday - Thursday             Medication Therapy Recommendations  Type 2 diabetes mellitus with hyperglycemia, without long-term current use of insulin (H)    Current Medication: TRULICITY 1.5 MG/0.5ML pen (Discontinued)   Rationale: More effective medication available - Ineffective medication - Effectiveness   Recommendation: Change Medication - start mounjaro   Status: Accepted per CPA          Current Medication: tirzepatide (MOUNJARO) 5 MG/0.5ML pen   Rationale: Synergistic therapy - Needs additional medication therapy - Indication   Recommendation: Start Medication - start metformin   Status: Accepted per CPA

## 2023-11-21 NOTE — LETTER
_  Medication List        Prepared on: 11/28/2023     Bring your Medication List when you go to the doctor, hospital, or   emergency room. And, share it with your family or caregivers.     Note any changes to how you take your medications.  Cross out medications when you no longer use them.    Medication How I take it Why I use it Prescriber   ACETAMINOPHEN EXTRA STRENGTH 500 MG tablet  1-2 tablets as needed up to 4 times daily as needed for pain pain Patient Reported   Alcohol Swabs (ALCOHOL PREP) 70 % PADS See Admin Instructions  diabetes Patient Reported   ASPIRIN LOW DOSE 81 MG EC tablet Take 81 mg by mouth daily [facility] Daniel Cruz MD    atorvastatin (LIPITOR) 40 MG tablet Take 40 mg by mouth daily  Cholesterol Daniel Cruz MD    bumetanide (BUMEX) 2 MG tablet Take 4 mg by mouth 3 times daily  Swelling and blood pressure  Cardiology   cabergoline (DOSTINEX) 0.5 MG tablet Take 0.25 mg by mouth twice a week  Hormone Balance Mandy Farfan MD    calcium carbonate-vitamin D (OS-TAWNYA WITH D) 500-200 MG-UNIT tablet Take 1 tablet by mouth 2 times daily  [facility] Daniel Cruz MD    cetirizine (ZYRTEC) 10 MG tablet Take 10 mg by mouth daily  Allergies Daniel Cruz MD    divalproex sodium extended-release (DEPAKOTE ER) 500 MG 24 hr tablet Take 500 mg by mouth 3 times daily  Seizure Prevention Daniel Cruz MD     doxycycline monohydrate (MONODOX) 100 MG capsule Take 100 mg by mouth 2 times daily  [facility]   fenofibrate (TRICOR) 145 MG tablet Take 145 mg by mouth daily  Cholesterol Daniel Cruz MD    glimepiride (AMARYL) 4 MG tablet Take 4 mg by mouth every morning (before breakfast)  Diabetes Daniel Cruz MD    levothyroxine (SYNTHROID/LEVOTHROID) 125 MCG tablet Take 1 tablet by mouth daily  Thyroid Daniel Cruz MD    medroxyPROGESTERone (PROVERA) 10 MG tablet TAKE 1 TABLET  10 MG  BY MOUTH ONCE DAILY FOR 5 DAYS EACH  MONTH  Menses Regulation Daniel Cruz MD    metFORMIN modified (GLUMETZA) 500 MG 24 hr tablet Take 500 mg by mouth daily with food  Diabetes Daniel Cruz MD    multivitamin w/minerals (THERA-VIT-M) tablet Take 1 tablet by mouth daily  General health Patient Reported   NYAMYC 734771 UNIT/GM external powder Apply 1 g topically as needed  Rash Daniel Cruz MD    omeprazole (PRILOSEC) 40 MG DR capsule Take 1 capsule by mouth daily.  Acid reflux Daniel Cruz MD    paliperidone ER (INVEGA) 6 MG 24 hr tablet Take 6 mg by mouth every morning  Schizophrenia Daniel Cruz MD    spironolactone (ALDACTONE) 50 MG tablet Take 50 mg by mouth 2 times daily  Blood pressure and swelling Daniel Cruz MD    tirzepatide (MOUNJARO) 5 MG/0.5ML pen Inject 5 mg Subcutaneous every 7 days  Diabetes  Daniel Cruz MD    VITAMIN D3 25 MCG (1000 UT) tablet Take 3 tablets by mouth daily  Bone [facility]  Daniel Cruz MD          Add new medications, over-the-counter drugs, herbals, vitamins, or  minerals in the blank rows below.    Medication How I take it Why I use it Prescriber                                      Allergies:      fiorinal [butalbital-aspirin-caffeine]; ativan [lorazepam]; bee venom; cat hair extract; clindamycin; dextromethorphan; ibuprofen sodium; keflex [cephalexin]; latex; lithium; oxcarbazepine; topiramate; topamax        Side effects I have had:               Other Information:              My notes and questions:

## 2024-01-08 PROBLEM — L03.116 LEFT LEG CELLULITIS: Status: ACTIVE | Noted: 2021-03-22

## 2024-01-08 PROBLEM — R06.02 SHORTNESS OF BREATH: Status: ACTIVE | Noted: 2024-01-08

## 2024-01-08 PROBLEM — J81.0 ACUTE PULMONARY EDEMA (H): Status: ACTIVE | Noted: 2024-01-08

## 2024-01-08 PROBLEM — D35.3 BENIGN NEOPLASM OF PITUITARY GLAND AND CRANIOPHARYNGEAL DUCT (H): Status: ACTIVE | Noted: 2020-02-29

## 2024-01-08 PROBLEM — K58.9 IBS (IRRITABLE BOWEL SYNDROME): Status: ACTIVE | Noted: 2024-01-08

## 2024-01-08 PROBLEM — R31.0 GROSS HEMATURIA: Status: ACTIVE | Noted: 2024-01-08

## 2024-01-08 PROBLEM — A49.9 ESBL (EXTENDED SPECTRUM BETA-LACTAMASE) PRODUCING BACTERIA INFECTION: Chronic | Status: ACTIVE | Noted: 2020-02-29

## 2024-01-08 PROBLEM — J10.1 INFLUENZA A: Chronic | Status: ACTIVE | Noted: 2024-01-08

## 2024-01-08 PROBLEM — L97.209 ULCER OF CALF (H): Status: ACTIVE | Noted: 2020-07-13

## 2024-01-08 PROBLEM — D75.89 MACROCYTOSIS: Status: ACTIVE | Noted: 2024-01-08

## 2024-01-08 PROBLEM — R07.89 ATYPICAL CHEST PAIN: Status: ACTIVE | Noted: 2020-02-15

## 2024-01-08 PROBLEM — L29.9 ITCHING: Status: ACTIVE | Noted: 2018-01-08

## 2024-01-08 PROBLEM — J32.4 CHRONIC PANSINUSITIS: Status: ACTIVE | Noted: 2020-02-29

## 2024-01-08 PROBLEM — Q87.11 PRADER-WILLI SYNDROME: Chronic | Status: ACTIVE | Noted: 2018-02-10

## 2024-01-08 PROBLEM — J18.9 HCAP (HEALTHCARE-ASSOCIATED PNEUMONIA): Chronic | Status: ACTIVE | Noted: 2020-02-29

## 2024-01-08 PROBLEM — R73.03 PREDIABETES: Status: ACTIVE | Noted: 2018-12-03

## 2024-01-08 PROBLEM — S31.109A OPEN ABDOMINAL WALL WOUND: Status: ACTIVE | Noted: 2021-12-31

## 2024-01-08 PROBLEM — E78.5 DYSLIPIDEMIA: Status: ACTIVE | Noted: 2024-01-08

## 2024-01-08 PROBLEM — Z16.12 ESBL (EXTENDED SPECTRUM BETA-LACTAMASE) PRODUCING BACTERIA INFECTION: Chronic | Status: ACTIVE | Noted: 2020-02-29

## 2024-01-08 PROBLEM — E11.622: Status: ACTIVE | Noted: 2020-12-03

## 2024-01-08 PROBLEM — F31.2 BIPOLAR AFFECTIVE DISORDER, CURRENT EPISODE MANIC WITH PSYCHOTIC SYMPTOMS (H): Status: ACTIVE | Noted: 2018-02-10

## 2024-01-08 PROBLEM — I83.893 SYMPTOMATIC VARICOSE VEINS OF BOTH LOWER EXTREMITIES: Status: ACTIVE | Noted: 2019-10-28

## 2024-01-08 PROBLEM — D72.829 LEUKOCYTOSIS: Status: ACTIVE | Noted: 2021-12-31

## 2024-01-08 PROBLEM — R82.90 ABNORMAL URINALYSIS: Status: ACTIVE | Noted: 2022-01-16

## 2024-01-08 PROBLEM — F51.5 NIGHTMARES: Status: ACTIVE | Noted: 2018-02-13

## 2024-01-08 PROBLEM — N39.3 STRESS INCONTINENCE OF URINE: Status: ACTIVE | Noted: 2022-01-05

## 2024-01-08 PROBLEM — D64.9 ANEMIA: Status: ACTIVE | Noted: 2024-01-08

## 2024-01-08 PROBLEM — L30.9 DERMATITIS OF PERIANAL REGION: Status: ACTIVE | Noted: 2024-01-08

## 2024-01-08 PROBLEM — G56.01 CARPAL TUNNEL SYNDROME OF RIGHT WRIST: Status: ACTIVE | Noted: 2024-01-08

## 2024-01-08 PROBLEM — I89.0 ELEPHANTIASIS: Chronic | Status: ACTIVE | Noted: 2020-02-15

## 2024-01-08 PROBLEM — R73.9 HYPERGLYCEMIA: Status: ACTIVE | Noted: 2022-02-17

## 2024-01-08 PROBLEM — G47.33 OBSTRUCTIVE SLEEP APNEA: Status: ACTIVE | Noted: 2024-01-08

## 2024-01-08 PROBLEM — L29.0 PERIANAL IRRITATION: Status: ACTIVE | Noted: 2024-01-08

## 2024-01-08 PROBLEM — F06.1 CATATONIA: Status: ACTIVE | Noted: 2021-12-31

## 2024-01-08 PROBLEM — R53.83 FATIGUE: Status: ACTIVE | Noted: 2024-01-08

## 2024-01-08 PROBLEM — F79 INTELLECTUAL DISABILITY: Status: ACTIVE | Noted: 2018-02-10

## 2024-01-08 PROBLEM — B37.2 CANDIDAL SKIN INFECTION: Status: ACTIVE | Noted: 2022-01-05

## 2024-01-08 PROBLEM — L97.909: Status: ACTIVE | Noted: 2020-12-03

## 2024-01-08 PROBLEM — I10 BENIGN ESSENTIAL HYPERTENSION: Status: ACTIVE | Noted: 2020-02-29

## 2024-01-08 PROBLEM — Z22.322 METHICILLIN RESISTANT STAPHYLOCOCCUS AUREUS CULTURE POSITIVE: Status: ACTIVE | Noted: 2020-02-29

## 2024-01-08 PROBLEM — D35.2 BENIGN NEOPLASM OF PITUITARY GLAND AND CRANIOPHARYNGEAL DUCT (H): Status: ACTIVE | Noted: 2020-02-29

## 2024-01-08 PROBLEM — I69.30 SEQUELAE OF CEREBRAL INFARCTION: Status: ACTIVE | Noted: 2020-02-29

## 2024-01-11 ENCOUNTER — VIRTUAL VISIT (OUTPATIENT)
Dept: SURGERY | Facility: CLINIC | Age: 44
End: 2024-01-11
Payer: COMMERCIAL

## 2024-01-11 VITALS — HEIGHT: 60 IN | BODY MASS INDEX: 57.52 KG/M2 | WEIGHT: 293 LBS

## 2024-01-11 DIAGNOSIS — T50.905A WEIGHT GAIN DUE TO MEDICATION: ICD-10-CM

## 2024-01-11 DIAGNOSIS — E11.65 TYPE 2 DIABETES MELLITUS WITH HYPERGLYCEMIA, WITHOUT LONG-TERM CURRENT USE OF INSULIN (H): ICD-10-CM

## 2024-01-11 DIAGNOSIS — E66.1 CLASS 3 DRUG-INDUCED OBESITY WITH BODY MASS INDEX (BMI) OF 60.0 TO 69.9 IN ADULT, UNSPECIFIED WHETHER SERIOUS COMORBIDITY PRESENT (H): Primary | ICD-10-CM

## 2024-01-11 DIAGNOSIS — R63.5 WEIGHT GAIN DUE TO MEDICATION: ICD-10-CM

## 2024-01-11 DIAGNOSIS — T14.8XXA SCRATCH: ICD-10-CM

## 2024-01-11 DIAGNOSIS — E66.813 CLASS 3 DRUG-INDUCED OBESITY WITH BODY MASS INDEX (BMI) OF 60.0 TO 69.9 IN ADULT, UNSPECIFIED WHETHER SERIOUS COMORBIDITY PRESENT (H): Primary | ICD-10-CM

## 2024-01-11 PROCEDURE — 99214 OFFICE O/P EST MOD 30 MIN: CPT | Mod: 93 | Performed by: EMERGENCY MEDICINE

## 2024-01-11 RX ORDER — MULTIVIT WITH MINERALS/LUTEIN
1000 TABLET ORAL DAILY
Qty: 21 TABLET | Refills: 0 | Status: SHIPPED | OUTPATIENT
Start: 2024-01-11 | End: 2024-02-01

## 2024-01-11 RX ORDER — ZINC GLUCONATE 50 MG
50 TABLET ORAL DAILY
Qty: 21 TABLET | Refills: 0 | Status: SHIPPED | OUTPATIENT
Start: 2024-01-11 | End: 2024-01-23

## 2024-01-11 ASSESSMENT — PAIN SCALES - GENERAL: PAINLEVEL: NO PAIN (0)

## 2024-01-11 NOTE — LETTER
1/11/2024         RE: Bushra Buck  [address]  [place] [address]        Dear Colleague,    Thank you for referring your patient, Bushra Buck, to the [facility] SUR[facility]ERY [facility] AND BARIATRICS CARE [place]. Please see a copy of my visit note below.    Bariatric Clinic Follow-Up Visit:    Bushra Buck is a 43 year old  female with Body mass index is 60.74 kg/m .  presenting here today for follow-up on non-surgical efforts for weight loss. Original Intake visit occurred on 9/17/15 with a weight of 350 lbs BMI of 68.4, gained up to a max of 401lbs in 2016..  Along with diet and behavior changes, she has been previously using GLP1 agonist (Trulicity 3mg at our visit in Nov 2021) for diabetes and to assist her weight loss goals.  Trulicity dose was increased to 4.5mg/week 9/12/22 at 354 lbs.  See her intake visit notes for details on identified contributors to weight gain in the past. Chart review shows Dietician calculated RMR of 2300 and protein intake goal of 80g/day.      Weight:   Wt Readings from Last 5 Encounters:   01/11/24 141.1 kg (311 lb)   11/21/23 149.4 kg (329 lb 6.4 oz)   10/11/23 149.7 kg (330 lb)   08/01/23 (!) 157.9 kg (348 lb)   04/17/23 (!) 156.9 kg (346 lb)    pounds      Comorbidities:  Patient Active Problem List   Diagnosis     Acne rosacea     Dermatitis     Stasis edema     Pituitary adenoma (H)     Abnormal weight gain     High triglycerides     Prolactinoma (H)     Acquired hypothyroidism     Morbid obesity (H)     Abnormal urinalysis     Acquired valgus deformity of both ankles     Acute pulmonary edema (H)     Allergic rhinitis     Anemia     Atypical chest pain     Benign essential hypertension     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)     Bipolar affective disorder, current episode manic with psychotic symptoms (H)     Candidal skin infection     Carpal tunnel syndrome of right wrist     Chronic pansinusitis      Catatonia     Dermatitis of perianal region     Dyslipidemia     ESBL (extended spectrum beta-lactamase) producing bacteria infection     Fatigue     Flat feet, bilateral     Foot deformity, bilateral     Gastroesophageal reflux disease     HCAP (healthcare-associated pneumonia)     Gross hematuria     History of pituitary tumor     Hyperglycemia     Hypertension     Symptomatic varicose veins of both lower extremities     Hypokalemia     IBS (irritable bowel syndrome)     Influenza A     Itching     Leukocytosis     Left leg cellulitis     Elephantiasis     Lymphedema     Methicillin resistant Staphylococcus aureus culture positive     Macrocytosis     Obstructive sleep apnea     Open abdominal wall wound     Perianal irritation     Pituitary tumor     Intellectual disability     Prader-Willi syndrome     Prediabetes     Nightmares     Prolonged Q-T interval on ECG     Scabies     Schizophrenia, schizoaffective, chronic with acute exacerbation (H)     Sequelae of cerebral infarction     Shortness of breath     Stress incontinence of urine     Swelling of limb     DM type 2, controlled, with lower extremity ulcer (H)     Ulcer of calf (H)     Vitamin D deficiency       Current Outpatient Medications:      ACETAMINOPHEN EXTRA STRENGTH 500 MG tablet, , Disp: , Rfl:      Alcohol Swabs (ALCOHOL PREP) 70 % PADS, See Admin Instructions, Disp: , Rfl:      ASPIRIN LOW DOSE 81 MG EC tablet, Take 81 mg by mouth daily, Disp: , Rfl:      atorvastatin (LIPITOR) 40 MG tablet, Take 40 mg by mouth daily, Disp: , Rfl:      bumetanide (BUMEX) 2 MG tablet, Take 4 mg by mouth 3 times daily, Disp: , Rfl:      cabergoline (DOSTINEX) 0.5 MG tablet, Take 0.25 mg by mouth twice a week, Disp: , Rfl:      calcium carbonate-vitamin D (OS-TAWNYA WITH D) 500-200 MG-UNIT tablet, Take 1 tablet by mouth 2 times daily, Disp: , Rfl:      cetirizine (ZYRTEC) 10 MG tablet, Take 10 mg by mouth daily, Disp: , Rfl:      divalproex sodium extended-release  "(DEPAKOTE ER) 500 MG 24 hr tablet, Take 500 mg by mouth 3 times daily, Disp: , Rfl:      doxycycline monohydrate (MONODOX) 100 MG capsule, Take 100 mg by mouth 2 times daily, Disp: , Rfl:      fenofibrate (TRICOR) 145 MG tablet, Take 145 mg by mouth daily, Disp: , Rfl:      glimepiride (AMARYL) 4 MG tablet, Take 4 mg by mouth every morning (before breakfast), Disp: , Rfl:      levothyroxine (SYNTHROID/LEVOTHROID) 125 MCG tablet, Take 1 tablet by mouth daily, Disp: , Rfl:      medroxyPROGESTERone (PROVERA) 10 MG tablet, TAKE 1 TABLET  10 MG  BY MOUTH ONCE DAILY FOR 5 DAYS EACH MONTH, Disp: , Rfl: 0     multivitamin w/minerals (THERA-VIT-M) tablet, Take 1 tablet by mouth daily, Disp: , Rfl:      NYAMYC 376608 UNIT/GM external powder, Apply 1 g topically as needed, Disp: , Rfl:      omeprazole (PRILOSEC) 40 MG DR capsule, Take 1 capsule by mouth daily., Disp: , Rfl:      paliperidone ER (INVEGA) 6 MG 24 hr tablet, Take 6 mg by mouth every morning, Disp: , Rfl:      spironolactone (ALDACTONE) 50 MG tablet, Take 50 mg by mouth 2 times daily, Disp: , Rfl:      tirzepatide (MOUNJARO) 5 MG/0.5ML pen, Inject 5 mg Subcutaneous every 7 days, Disp: , Rfl:      VITAMIN D3 25 MCG (1000 UT) tablet, Take 3 tablets by mouth daily, Disp: , Rfl:      metFORMIN modified (GLUMETZA) 500 MG 24 hr tablet, Take 500 mg by mouth daily with food (Patient not taking: Reported on 1/11/2024), Disp: , Rfl:       Interim: Since our last visit, she has transitioned to Mounjaro for her diabetes (had ER visit for hyperglycemia around onset of transition). Continues to track blood sugars.   228mg/dL, 267mg/dlyest, 315mg/dL, 292,mg/dL, 312, 318, 307.   Still drinkning soda pop which is driving some of her sugars, diet mtn dew mostly. Some night eating.   Plan:   1.  Diet: with a medication like Mounjaro that makes you feel \"full\" and slows how quickly your stomach empties, it's very helpful to get protein at the beginning of the meal, then veggies and " "finish with your complex carbohydrates. Increase water intake but limit how much you drink at meals, focusing on drinking water in between meal times to hit your 80oz/day goal.     Protein 20-27 grams per meal every 5-6 hours works best from feeling satiety/well nourished while on Mounjaro for continued good weight reduction.  Consider a protein shake or cottage cheese, plain greek yogurt or crock pot chicken for a \"softer\" protein that may feel better on your stomach while using Mounjaro.     2. Find 10 minutes daily to move your body, strength work can help those blood sugars come down as well. Building up endurance or using some chair yoga each morning ([employer] has excellent videos) can be a nice way to feel your best.  3. Medication: Mounjaro 5mg/week tolerated but blood sugars running high. Follow up as planned with Dr. Cruz in 2 weeks and I'd anticipate ramping up each month until up to maximum tolerated dose or 15mg/week. Most people can require a pause at the 10mg/week dose for a few months.    Avoid sweet drinks and stick with water/carbonated water (LeCroix for example) or unsweetened tea.   4. Follow up cat scratch as phone call cannot assess adequately.   5. Goals: weight down to 311 lbs about 90 lbs down from heaviest weight, well done.       We discussed [facility]ariat[facility] Basics including:  -eating 3 meals daily  -reviewed metabolic needs for weight loss based on Resting Metabolic Rate  -protein goals supportive of healthy weight loss  -avoiding/limiting calorie containing beverages  -We discussed the importance of restorative sleep and stress management in maintaining a healthy weight.  -We discussed the National Weight Control Registry healthy weight maintenance strategies and ways to optimize metabolism.  -We discussed the importance of physical activity including cardiovascular and strength training in maintaining a healthier weight and explored viable options.      Most recent labs:  Lab " "Results   Component Value Date    WBC 10.4 04/11/2023    HGB 13.2 04/11/2023    HCT 42.3 04/11/2023     (H) 04/11/2023     04/11/2023     Lab Results   Component Value Date    CHOL 158 04/10/2023     Lab Results   Component Value Date    HDL 16 (L) 04/10/2023     No components found for: \"LDLCALC\"  Lab Results   Component Value Date    TRIG 410 (H) 04/10/2023     No results found for: \"CHOLHDL\"  Lab Results   Component Value Date    ALT 33 04/10/2023    AST 30 04/10/2023    ALKPHOS 55 04/10/2023     No results found for: \"HGBA1C\"  Lab Results   Component Value Date    B12 819 05/05/2023     No components found for: \"VITDT1\"  No results found for: \"EFRAIN\"  Lab Results   Component Value Date    PTHI 37 07/10/2012     No results found for: \"ZN\"  No results found for: \"VIB1WB\"  Lab Results   Component Value Date    TSH 3.76 05/24/2023     No results found for: \"TEST\"    DIETARY HISTORY  Tracking technique: blood sugars/keeps log  Positive Changes Since Last Visit: losing weight  Struggling With: glycemic control/mindful diet    Getting Adequate Protein: unsure, can be financially hard for her  Sleep schedule: can wake sometimes hungry.      PHYSICAL ACTIVITY PATTERNS:  Cardiovascular: limited walking  Strength Training: limited to chores    REVIEW OF SYSTEMS  No fever. Has some \"sores\" on stomach that are slow to heal, could consider some vitamin C/zinc/protein optimization to help. .  PHYSICAL EXAM:  Vitals: Ht 1.524 m (5')   Wt 141.1 kg (311 lb)   BMI 60.74 kg/m    Weight:   Wt Readings from Last 3 Encounters:   01/11/24 141.1 kg (311 lb)   11/21/23 149.4 kg (329 lb 6.4 oz)   10/11/23 149.7 kg (330 lb)         GEN: Pleasant and coherent on phone call. No cough/dypsnea or affect issues noticeable.   Interim study results: ER visit for hyperglycemia last month. .      30 minutes spent by me on the date of the encounter doing chart review, history and exam, documentation and further activities per the note "   Carlos Bowling MD  [facility]  1:34 PM  1/11/2024    Virtual Visit Details    Type of service:  Telephone Visit   Phone call duration: 35 minutes       Again, thank you for allowing me to participate in the care of your patient.        Sincerely,        Carlos Bowling MD

## 2024-01-11 NOTE — Clinical Note
Dr. Cruz,  I checked in with Ms. Buck this afternoon and still having some hyperglycemia. Her diet is a bit reactive/all over the place but tolerating the 5mg/week mounjaro that you started her on well. For her weight needs, I'd advocate titration to 10mg/week over the spring and up to max dose 15mg/week if needed this year as her higher BMI suggests the highest doses will be needed. Monthly titration upwards is usually tolerated pretty well.  Our phone call today she'd mentioned some cat scratch issues that aren't healing that I couldn't assess adequately on the phone but they didn't sound to be red/draining so I gave her some nutritional guidance that may help with vit C/zinc/protein goals.   I plan to touch base with her in late April/early May in person and will keep you posted. Kind Regards, Carlos Bowling MD [facility]

## 2024-01-11 NOTE — PROGRESS NOTES
Bariatric Clinic Follow-Up Visit:    Bushra Buck is a 43 year old  female with Body mass index is 60.74 kg/m .  presenting here today for follow-up on non-surgical efforts for weight loss. Original Intake visit occurred on 9/17/15 with a weight of 350 lbs BMI of 68.4, gained up to a max of 401lbs in 2016..  Along with diet and behavior changes, she has been previously using GLP1 agonist (Trulicity 3mg at our visit in Nov 2021) for diabetes and to assist her weight loss goals.  Trulicity dose was increased to 4.5mg/week 9/12/22 at 354 lbs.  See her intake visit notes for details on identified contributors to weight gain in the past. Chart review shows Dietician calculated RMR of 2300 and protein intake goal of 80g/day.      Weight:   Wt Readings from Last 5 Encounters:   01/11/24 141.1 kg (311 lb)   11/21/23 149.4 kg (329 lb 6.4 oz)   10/11/23 149.7 kg (330 lb)   08/01/23 (!) 157.9 kg (348 lb)   04/17/23 (!) 156.9 kg (346 lb)    pounds      Comorbidities:  Patient Active Problem List   Diagnosis    Acne rosacea    Dermatitis    Stasis edema    Pituitary adenoma (H)    Abnormal weight gain    High triglycerides    Prolactinoma (H)    Acquired hypothyroidism    Morbid obesity (H)    Abnormal urinalysis    Acquired valgus deformity of both ankles    Acute pulmonary edema (H)    Allergic rhinitis    Anemia    Atypical chest pain    Benign essential hypertension    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)    Bipolar affective disorder, current episode manic with psychotic symptoms (H)    Candidal skin infection    Carpal tunnel syndrome of right wrist    Chronic pansinusitis    Catatonia    Dermatitis of perianal region    Dyslipidemia    ESBL (extended spectrum beta-lactamase) producing bacteria infection    Fatigue    Flat feet, bilateral    Foot deformity, bilateral    Gastroesophageal reflux disease    HCAP (healthcare-associated pneumonia)    Gross hematuria    History of pituitary  tumor    Hyperglycemia    Hypertension    Symptomatic varicose veins of both lower extremities    Hypokalemia    IBS (irritable bowel syndrome)    Influenza A    Itching    Leukocytosis    Left leg cellulitis    Elephantiasis    Lymphedema    Methicillin resistant Staphylococcus aureus culture positive    Macrocytosis    Obstructive sleep apnea    Open abdominal wall wound    Perianal irritation    Pituitary tumor    Intellectual disability    Prader-Willi syndrome    Prediabetes    Nightmares    Prolonged Q-T interval on ECG    Scabies    Schizophrenia, schizoaffective, chronic with acute exacerbation (H)    Sequelae of cerebral infarction    Shortness of breath    Stress incontinence of urine    Swelling of limb    DM type 2, controlled, with lower extremity ulcer (H)    Ulcer of calf (H)    Vitamin D deficiency       Current Outpatient Medications:     ACETAMINOPHEN EXTRA STRENGTH 500 MG tablet, , Disp: , Rfl:     Alcohol Swabs (ALCOHOL PREP) 70 % PADS, See Admin Instructions, Disp: , Rfl:     ASPIRIN LOW DOSE 81 MG EC tablet, Take 81 mg by mouth daily, Disp: , Rfl:     atorvastatin (LIPITOR) 40 MG tablet, Take 40 mg by mouth daily, Disp: , Rfl:     bumetanide (BUMEX) 2 MG tablet, Take 4 mg by mouth 3 times daily, Disp: , Rfl:     cabergoline (DOSTINEX) 0.5 MG tablet, Take 0.25 mg by mouth twice a week, Disp: , Rfl:     calcium carbonate-vitamin D (OS-TAWNYA WITH D) 500-200 MG-UNIT tablet, Take 1 tablet by mouth 2 times daily, Disp: , Rfl:     cetirizine (ZYRTEC) 10 MG tablet, Take 10 mg by mouth daily, Disp: , Rfl:     divalproex sodium extended-release (DEPAKOTE ER) 500 MG 24 hr tablet, Take 500 mg by mouth 3 times daily, Disp: , Rfl:     doxycycline monohydrate (MONODOX) 100 MG capsule, Take 100 mg by mouth 2 times daily, Disp: , Rfl:     fenofibrate (TRICOR) 145 MG tablet, Take 145 mg by mouth daily, Disp: , Rfl:     glimepiride (AMARYL) 4 MG tablet, Take 4 mg by mouth every morning (before breakfast), Disp: ,  "Rfl:     levothyroxine (SYNTHROID/LEVOTHROID) 125 MCG tablet, Take 1 tablet by mouth daily, Disp: , Rfl:     medroxyPROGESTERone (PROVERA) 10 MG tablet, TAKE 1 TABLET  10 MG  BY MOUTH ONCE DAILY FOR 5 DAYS EACH MONTH, Disp: , Rfl: 0    multivitamin w/minerals (THERA-VIT-M) tablet, Take 1 tablet by mouth daily, Disp: , Rfl:     NYAMYC [id number] UNIT/GM external powder, Apply 1 g topically as needed, Disp: , Rfl:     omeprazole (PRILOSEC) 40 MG DR capsule, Take 1 capsule by mouth daily., Disp: , Rfl:     paliperidone ER (INVEGA) 6 MG 24 hr tablet, Take 6 mg by mouth every morning, Disp: , Rfl:     spironolactone (ALDACTONE) 50 MG tablet, Take 50 mg by mouth 2 times daily, Disp: , Rfl:     tirzepatide (MOUNJARO) 5 MG/0.5ML pen, Inject 5 mg Subcutaneous every 7 days, Disp: , Rfl:     VITAMIN D3 25 MCG (1000 UT) tablet, Take 3 tablets by mouth daily, Disp: , Rfl:     metFORMIN modified (GLUMETZA) 500 MG 24 hr tablet, Take 500 mg by mouth daily with food (Patient not taking: Reported on 1/11/2024), Disp: , Rfl:       Interim: Since our last visit, she has transitioned to Mounjaro for her diabetes (had ER visit for hyperglycemia around onset of transition). Continues to track blood sugars.   228mg/dL, 267mg/dlyest, 315mg/dL, 292,mg/dL, 312, 318, 307.   Still drinkning soda pop which is driving some of her sugars, diet mtn dew mostly. Some night eating.   Plan:   1.  Diet: with a medication like Mounjaro that makes you feel \"full\" and slows how quickly your stomach empties, it's very helpful to get protein at the beginning of the meal, then veggies and finish with your complex carbohydrates. Increase water intake but limit how much you drink at meals, focusing on drinking water in between meal times to hit your 80oz/day goal.     Protein 20-27 grams per meal every 5-6 hours works best from feeling satiety/well nourished while on Mounjaro for continued good weight reduction.  Consider a protein shake or cottage cheese, plain " "greek yogurt or crock pot chicken for a \"softer\" protein that may feel better on your stomach while using Mounjaro.     2. Find 10 minutes daily to move your body, strength work can help those blood sugars come down as well. Building up endurance or using some chair yoga each morning ([employer] has excellent videos) can be a nice way to feel your best.  3. Medication: Mounjaro 5mg/week tolerated but blood sugars running high. Follow up as planned with Dr. Cruz in 2 weeks and I'd anticipate ramping up each month until up to maximum tolerated dose or 15mg/week. Most people can require a pause at the 10mg/week dose for a few months.    Avoid sweet drinks and stick with water/carbonated water (LeCroix for example) or unsweetened tea.   4. Follow up cat scratch as phone call cannot assess adequately.   5. Goals: weight down to 311 lbs about 90 lbs down from heaviest weight, well done.       We discussed [facility]ariat[facility] Basics including:  -eating 3 meals daily  -reviewed metabolic needs for weight loss based on Resting Metabolic Rate  -protein goals supportive of healthy weight loss  -avoiding/limiting calorie containing beverages  -We discussed the importance of restorative sleep and stress management in maintaining a healthy weight.  -We discussed the National Weight Control Registry healthy weight maintenance strategies and ways to optimize metabolism.  -We discussed the importance of physical activity including cardiovascular and strength training in maintaining a healthier weight and explored viable options.      Most recent labs:  Lab Results   Component Value Date    WBC 10.4 04/11/2023    HGB 13.2 04/11/2023    HCT 42.3 04/11/2023     (H) 04/11/2023     04/11/2023     Lab Results   Component Value Date    CHOL 158 04/10/2023     Lab Results   Component Value Date    HDL 16 (L) 04/10/2023     No components found for: \"LDLCALC\"  Lab Results   Component Value Date    TRIG 410 (H) 04/10/2023     No " "results found for: \"CHOLHDL\"  Lab Results   Component Value Date    ALT 33 04/10/2023    AST 30 04/10/2023    ALKPHOS 55 04/10/2023     No results found for: \"HGBA1C\"  Lab Results   Component Value Date    B12 819 05/05/2023     No components found for: \"VITDT1\"  No results found for: \"EFRAIN\"  Lab Results   Component Value Date    PTHI 37 07/10/2012     No results found for: \"ZN\"  No results found for: \"VIB1WB\"  Lab Results   Component Value Date    TSH 3.76 05/24/2023     No results found for: \"TEST\"    DIETARY HISTORY  Tracking technique: blood sugars/keeps log  Positive Changes Since Last Visit: losing weight  Struggling With: glycemic control/mindful diet    Getting Adequate Protein: unsure, can be financially hard for her  Sleep schedule: can wake sometimes hungry.      PHYSICAL ACTIVITY PATTERNS:  Cardiovascular: limited walking  Strength Training: limited to chores    REVIEW OF SYSTEMS  No fever. Has some \"sores\" on stomach that are slow to heal, could consider some vitamin C/zinc/protein optimization to help. .  PHYSICAL EXAM:  Vitals: Ht 1.524 m (5')   Wt 141.1 kg (311 lb)   BMI 60.74 kg/m    Weight:   Wt Readings from Last 3 Encounters:   01/11/24 141.1 kg (311 lb)   11/21/23 149.4 kg (329 lb 6.4 oz)   10/11/23 149.7 kg (330 lb)         GEN: Pleasant and coherent on phone call. No cough/dypsnea or affect issues noticeable.   Interim study results: ER visit for hyperglycemia last month. .      30 minutes spent by me on the date of the encounter doing chart review, history and exam, documentation and further activities per the note   Carlos Bowling MD  [facility]  1:34 PM  1/11/2024  "

## 2024-01-11 NOTE — NURSING NOTE
Is the patient currently in the state of [place]? YES    Visit mode:TELEPHONE    If the visit is dropped, the patient can be reconnected by: TELEPHONE VISIT: Phone number: [phone]    Will anyone else be joining the visit? NO  (If patient encounters technical issues they should call [phone])    How would you like to obtain your AVS? Mail a copy    Are changes needed to the allergy or medication list? Pt stated no changes to allergies and Pt stated no med changes  Please remove any meds marked not taking and any flagged for removal.    Reason for visit: RECHECK    Wt/ht other than 24 hrs:  estimation  Pain more than one location:  no  Melissa Delgadillo VVF     Pt stated she takes Mounjaro for weight loss, but wasn't sure.

## 2024-01-11 NOTE — PATIENT INSTRUCTIONS
"Plan:   1.  Diet: with a medication like Mounjaro that makes you feel \"full\" and slows how quickly your stomach empties, it's very helpful to get protein at the beginning of the meal, then veggies and finish with your complex carbohydrates. Increase water intake but limit how much you drink at meals, focusing on drinking water in between meal times to hit your 80oz/day goal.     Protein 20-27 grams per meal every 5-6 hours works best from feeling satiety/well nourished while on Mounjaro for continued good weight reduction.  Consider a protein shake or cottage cheese, plain greek yogurt or crock pot chicken for a \"softer\" protein that may feel better on your stomach while using Mounjaro.     2. Find 10 minutes daily to move your body, strength work can help those blood sugars come down as well. Building up endurance or using some chair yoga each morning ([employer] has excellent videos) can be a nice way to feel your best.  3. Medication: Mounjaro 5mg/week tolerated but blood sugars running high. Follow up as planned with Dr. Cruz in 2 weeks and I'd anticipate ramping up each month until up to maximum tolerated dose or 15mg/week. Most people can require a pause at the 10mg/week dose for a few months.    Avoid sweet drinks and stick with water/carbonated water (LeCroix for example) or unsweetened tea.   4. Follow up cat scratch as phone call cannot assess adequately.   5. Goals: weight down to 311 lbs about 90 lbs down from heaviest weight, well done.     Vitamin C 1000mg daily for 3 weeks and zinc gluconate 50mg/day for 3 weeks with 70-80 grams of lean protein daiily should help healing accelerate on your scratch. Check in with Dr. Cruz as planned.        LEAN PROTEIN SOURCES  Getting 20-30 grams of protein, 3 meals daily, is appropriate for most people, some need more but more than about 40 grams per meal is not useful.  General rule is drinking one ounce of water per gram of protein eaten over the course of the day: "  70 grams of protein each day, drink 70 oz of water.  Protein Source Portion Calories Grams of Protein                           Nonfat, plain Greek yogurt    (10 grams sugar or less) 3/4 cup (6 oz)  12-17   Light Yogurt (10 grams sugar or less) 3/4 cup (6 oz)  6-8   Protein Shake 1 shake 110-180 15-30   Skim/1% Milk or lactose-free milk 1 cup ( 8 oz)  8   Plain or light, flavored soymilk 1 cup  7-8   Plain or light, hemp milk 1 cup 110 6   Fat Free or 1% Cottage Cheese 1/2 cup 90 15   Part skim ricotta cheese 1/2 cup 100 14   Part skim or reduced fat cheese slices 1 ounce 65-80 8     Mozzarella String Cheese 1 80 8   Canned tuna, chicken, crab or salmon  (canned in water)  1/2 cup 100 15-20   White fish (broiled, grilled, baked) 3 ounces 100 21   [place]/Tuna (broiled, grilled, baked) 3 ounces 150-180 21   Shrimp, Scallops, Lobster, Crab 3 ounces 100 21   Pork loin, Pork Tenderloin 3 ounces 150 21   Boneless, skinless chicken /turkey breast                          (broiled, grilled, baked) 3 ounces 120 21   [place]o, [place], [place], and Venison 3 ounces 120 21   Lean cuts of red meat and pork (sirloin,   round, tenderloin, flank, ground 93%-96%) 3 ounces 170 21   Lean or Extra Lean Ground Turkey 1/2 cup 150 20   90-95% Lean Eucha Burger 1 abby 140-180 21   Low-fat casserole with lean meat 3/4 cup 200 17   Luncheon Meats                                                        (turkey, lean ham, roast beef, chicken) 3 ounces 100 21   Egg (boiled, poached, scrambled) 1 Egg 60 7   Egg Substitute 1/2 cup 70 10   Nuts (limit to 1 serving per day)  3 Tbsp. 150 7   Nut [place] (peanut, almond)  Limit to 1 serving or less daily 1 Tbsp. 90 4   Soy Burger (varies) 1  15   Garbanzo, Black, [place] Beans 1/2 cup 110 7   Refried Beans 1/2 cup 100 7   Kidney and Lima beans 1/2 cup 110 7   Tempeh 3 oz 175 18   Vegan crumbles 1/2 cup 100 14   Tofu 1/2 cup 110 14   Chili (beans and extra lean beef or turkey)  1 cup 200 23   Lentil Stew/Soup 1 cup 150 12   Black Bean Soup 1 cup 175 12           Example Meal Plan for a 3763-1409 Calorie Diet:    In order to fuel your weight loss properly and avoid hunger-induced overeating later in the day, for your height and weight, you will enjoy the most success by following the diet below or similar with adjustments based on your particular tastes and preferences.  Exercise may influence speed, amount of weight loss further.     I recommend getting into a meal routine and keeping it similar day to day in the beginning so you don t have to think too hard about what you re going to make/eat.  Keep snacks healthy, ideally containing protein and some vegetables.  Non-processed food is preferable to packaged items.  Eat at least a few crunchy green vegetables if having a snack, which should be 2-3 hours after your mealtimes(prepare these ahead of time for ease of use).  Drink 64 oz -80 oz of water daily for most, some of you will need more and we'll discuss it at your visit if that is the case.      When changing our diet,  we can often mistake thirst for hunger or just have some distracted eating habits that we need to break free from ('bored/mindless eating', screen time,work, driving,etc).  A glass of water and reconsideration of our hunger is often all that is needed.  Having the urge is not the problem, but watching it pass by without acting on it is the goal.    If you re having hunger problems, add a protein drink/snack to your morning hours or afternoon snack with at least 20grams of protein and not too much sugar (under 10g).  A carton of higher protein/low sugar yogurt can work as well.  If the urge to snack is overwhelming and not satiated, try going for a 10 minute walk/exercise, come home and drink a glass of water and if still hungry, have a  calorie snack (handful of raw/sprouted nuts, veggies and string cheese, protein bar, etc).  Savor it.    It is better to have a  large breakfast, a moderate lunch and a smaller dinner to fuel your day.  People lose 10-15% more weight during their weight loss season with this strategy. Optimizing your protein intake at each meal will further keep you more satisfied while eating less food overall.  Getting exercise in early has also been shown to offer the best results (before breakfast ideally but anytime is the right time to exercise if that is not an option for you).    To make sure you re getting adequate vitamins and minerals during weight loss, I recommend one complete multivitamin a day of your choice.  Consider a probiotic and taking some vitamin D 2000 IU daily.    Let supper be your last meal of the day and ideally try to have at least 12 hours between supper and breakfast the next day to tap into some beneficial overnight fasting dynamics.  Midnight snacks need to go away. Water in the evening is fine, unsweetened, non caffeinated herbal tea is helpful as well.  Consolidating your meals within a 8-12 hour period of your day will help tap into these additional metabolic benefits and tends to keep your appetite up for breakfast, further helping to stay on track.  For most of my patients, I don't recommend an intermittent fasting style diet (many find it hard to fit in their lifestyle) but an overnight fast is very doable for most patients and helps regulate our hunger drives a little better.  This makes it very important to nail good intake at all three meals to feel satisfied/energized and still lose weight.      If evening snacking desires are high, consider a glass of fiber supplement for some additional fullness (metamucil or similar). Most of us don't get the 25-30 grams per day of fiber that promotes good gut health/satiety.  Benefiber, metamucil, citrucel are reasonable/affordable options for most people.  Inulin, chicory, psyllium husk are reasonable options but start slow and low in the dose to avoid gas/bloating until your  gut gets acclimated (ramping up to 5-10 grams per day of supplemental fiber after 3-4 weeks if needed).      Example Meal Plan:  Breakfast: 450-475 Calories  1 egg cooked on low in olive oil:   calories.  5oz Greek Yogurt (Fage plain classic: ~150 alexsandra)  Handful of Berries of your choice (about a calorie per berry or 20-40cal per handful)    cup(cooked) of  old fashioned oatmeal or 1/2 cup(cooked) steel cut oats. (150 alexsandra)  Sprinkle amount of brown sugar and a pat of butter. (40 alexsandra)  Glass of  Water  Black coffee or unsweetened Tea (0calories).      2-3 hours Later Snack: (195 calories).  Glass of water  One string Cheese (80 calories) or 4 oz creamed cottage cheese (115 calories) with  Crunchy Celery sticks (less than 10 calories per large stalk) 2 stalks. (20 calories)    of a  Large Banana or   of a Large Apple (60 calories):  eat second half at lunch or afternoon snack.     Lunch:300 -350 calories   Chicken Breast  (baked/broiled/roasted/grilled)  4-6 oz.  (125-180 alexsandra), BBQ sauce/hot sauce/mustard/seasoning is free. Just use a reasonable amount. Or a can of tuna with 1 tablespoon mayonnaise.  Salad: lettuce, any other veggies (cucumbers, green peppers/celery you like and a small drizzle of dressing to just flavor.  Go as big on the veggies as you like,  as they are practically calorie free.   A whole, 8 inch cucumber is 45 calories, a whole green pepper is 23 calories, a stalk of celery is 9 calories.  [place] Dressing is 60 calories per tablespoon..so moderate your desired dressing or do a drizzle of olive oil and splash of balsamic vinegar on top,  Total calories unlikely to be over 150 even with dressing.  Glass of Water.    Option for lunch is meal replacement protein drink/smoothie.  Need at least 20 grams of protein and eat the rest of your apple/banana from the morning snack.      Afternoon Snack: 150-200 calories   Cheese Stick or cottage cheese again  and a fresh fruit OR  Granola Bar  (protein Bar acceptable if under 200 calories OR  Homemade smoothies:  8oz skim milk,  a handful of berries (fresh or frozen and a serving of protein powder such as BiPro or Sofia sWhey for example.  If you don't like dairy, make with 8oz water, one small banana, handful of berries and the protein powder, add any veggies you want as well:  roughly 200 calories.   Glass of Water    Dinner: 325 calories  4oz of fresh, Atlantic salmon.  Broiled (salt/pepper/dill) for about 8-8.5 minutes (200calories) or  4oz filet mignon steak or sirloin steak  Salad or vegetable sautéed lightly in olive oil or   Broccoli 1.5  cups chopped and steamed  or micro-waved in a little water (75 calories)  Glass of Water,    Cup of herbal tea (unsweetened, caffeine free)      Herbs and seasonings are encouraged to flavor your foods/vegetables.  Make your food delicious.      Tips for Success:  1.  Prepare proteins ahead of time (broil chicken breasts in bulk so you can grab and go), steel cut oats/lentils can be stored in casserole dish/bowl in the fridge for quick scoop in the morning and rewarm in microwave, make use of crock pot recipes (watch salt content).  Making meals that cover 3-4 future meals is an easy way to stay on track.  2.  Drink a 8-12 oz glass of water every 2-3 hours when awake.  We often mistake hunger for thirst, especially when losing weight.  3. Remember your Reward and Motivation when things get hard.  4.  Weigh yourself every morning and record, you'll stay on track better and learn how our biorhythms, diet and elimination patterns show up on the scale. Don't worry about 1 or 2 day patterns, but when on track you'll notice good trend downward of weight over 3-4 day segments.  Plateaus tend to resolve after 4-8 days in most cases if you stay consistent with your plan.  These are natural and part of weight loss, even if you're perfect with your plan execution.  5. Call if problems/concerns.  [employer] is a great tool to  "stay in touch and provide weekly outside accountability. Check in with questions or if you want to brag.  6.  Find a handful of meals/foods that keep you on track and feeling good and get into a routine that is sustainable for you.  It's OK to have a routine that works for you.  7.  Consider taking a complete multivitamin just to make sure all micronutrients are adequate during weight loss.  8. If losing hair/brittle nails it usually means you are not taking enough protein.  Minimum goal is 60 grams daily of protein for smaller women, 80 grams a day for men. Consider taking Biotin as supplement or a \"Hair and Nail\" multivitamin.    On-the-Go Breakfast Ideas  As of 2015, the latest research shows what a huge impact eating breakfast has on losing weight and feeling your best. People lose more weight when they make breakfast their biggest meal of the day compared to Dinner, but even if you cannot go to that degree, getting a breakfast that has at least 20 grams of protein and even a moderate amount of fat is ideal for maintaining good energy through the day and limits overeating in the evening hours.  The following are some quick and easy suggestions for at least getting something of substance into your body in the morning.  Enjoy!    Eating breakfast within 90 minutes of waking up is an important part of taking care of your body on a restricted calorie diet plan.  After sleeping for hours, your body is in need of fuel.  An ideal breakfast is a combination of protein, whole-grain carbohydrates, or fruit.  Here s why:    -Protein digests very slowly in the body, helping you feel more satisfied.  -Whole grains provide dietary fiber, which also digests slowly and helps keep your gut clean.  -Fruit is a great source of vitamins, minerals, and fiber.     Each one of these breakfast combinations has between 200-300 calories and 15-20 grams of protein.  Feel free to mix and match!    Bone Broth (chicken bone broth or beef " bone broth) is a great way to boost protein content. 8oz of bone broth will typically have 9-12grams of protein for 40kcal of energy.    Protein: Choose  -1/2 cup low-fat cottage cheese  -2 hard boiled eggs , or one cooked in olive oil (low/slow heat).  -1 low fat string cheese stick  -1 Tablespoon natural peanut butter  -[employer]eting vegetarian sausage abby (found in freezer section)  -1 slice lowfat cheese  -6 oz 2% or lowfat Greek yogurt, such as Fage or Oikos.    PLUS    Whole Grains:  Choose   -1 whole wheat English muffin  -1 whole wheat gordon, half  -1/2 Fiber One frozen muffin, thawed  -1/2 Fiber One toaster pastry  -1 whole wheat bagel thin  -1/2 cup Kashi cereal  -1 Kashi waffle (or other whole grain high-fiber waffle)  Aim for whole grain/sprouted breads with at least 3g of fiber/slice if having bread. [place] is one such brand.    OR    Fruit: Choose  -1/3 cup blueberries  -1/2 banana (or a plantain- similar to a banana, yet smaller)  -1/2 cup cantaloupe cubes  -1 small apple  -1 small orange  -1/2 cup strawberries  -handful raspberries/blackberries (each berry is about 1 calorie).    *Adapted from Diabetes Living, Fall 20    Ten Breakfasts Under 250 calories    Ideally, getting between 350-600 calories  (depending on starting height and weight)for breakfast is ideal for avoiding hunger later in the day, adjust/add to the following accordingly:    One- 250 calories, 8.5 g protein  1 slice whole-grain toast   1 Tbsp peanut butter    banana    Two- 250 calories, 8 g protein    cup nonfat/lowfat yogurt  1/3rd cup diced no-sugar peaches  1/3rd cup cereal (like Special K, Cheerios, or bran flakes)    Three- 250 calories, 25 g protein  1 egg scrambled with 1 oz skim milk    cup shredded cheddar    whole grain English muffin  1 oz [place] waethers  1 tsp margarine spread    Four- 225 calories, 25 g protein  1/2 cup Kashi Go-Lean cereal    cup skim milk mixed with 1 scoop Bariatric Advantage protein  powder    cup no-sugar diced pears    Five- 250 calories, 20 g protein    cup oatmeal prepared with skim milk, 1 scoop protein powder, and sugar-free maple syrup    Six- 200 calories, 5 g protein  1 whole grain waffle, toasted  1 tablespoon creamy peanut or almond butter    Seven-  250 calories, 19 g protein  Breakfast sandwich: 1 slice whole grain toast, cut in half.  Add 1 scrambled egg and one slice cheddar  cheese.    Eight-  250 calories, 15 g protein  2 eggs scrambled with 1/3 cup frozen spinach (heat before adding to eggs) and 2 tablespoons low fat cream cheese.    Nine-  150 calories, 15 g protein  2/3rd cup cottage cheese    cup cantaloupe    Ten- 200 calories, 20 g protein  Fruit smoothie made with 4 oz. nonfat Greek yogurt,   cup berries, 1 scoop protein powder, and 4 oz skim milk.    Ten Lunches Under 250 Calories    Aim for lunch to be around 300-400 calories a day when trying to lose weight and get that protein in!    One- 200 calories, 11 g protein  1/3 cup tuna salad made with light kennedy on 1 slice whole grain bread  1 small peeled apple    Two- 250 calories, 16 g protein  1/3 cup lowfat cottage cheese    cup cooked green beans    small fruit cocktail (in natural juice)    Three- 200 calories, 11 g protein    grilled cheese sandwich on whole grain bread with lowfat cheese  2/3rd cup of tomato soup    Four- 250 calories, 22 g protein  Deli wrap: 1 oz sliced turkey, 1 oz sliced ham, 1 oz sliced chicken rolled up with 1 slice low-fat cheese  1 small orange    Five- 250 calories, 28 g protein  2/3rd cup chili with 1 oz shredded cheese  4 saltine crackers    Six- 250 calories, 22 g protein  1 cup fresh spinach with 2 oz chicken, 1/3rd cup mandarin oranges, and 2 tablespoons sliced almonds with 1 tablespoon  vinaigrette dressing    Seven- 200 calories, 11 g protein  1 Tbsp sugar-free preserves and 1 Tbsp peanut butter on 1 slice whole grain toast    cup nonfat/lowfat Greek yogurt    Eight- 250 calories, 18  g protein  1 small soft-shell chicken taco with 1 oz shredded cheese, lettuce, tomato, salsa, and 1 Tbsp light sour cream    cup black beans    Nine- 225 calories, 13 g protein  2 ounces baked chicken  1/4 cup mashed potatoes    cup green beans    Ten- 200 calories, 21 g protein  Deli gordon: 2 oz roast beef or other deli meat with 1 tsp Don mayonnaise and sliced tomato, onion, and lettuce  1/3rd cup cottage cheese      Ten Dinners Under 300 calories    If you're eating a large breakfast and medium lunch, keep dinner small.  300-400 calories is ideal for most people depending on their caloric needs.    One- 300 calories, 12 g protein  1-inch thick slice of turkey meatloaf    cup baked butternut squash    Two- 200 calories, 9 g protein  Bread-less BLT: 3 slices turkey weathers, sliced tomato, wrapped in a large lettuce leaf    cup peeled fruit    Three- 275 calories, 36 g protein  3 oz roasted chicken    cup cooked broccoli    cup shredded cheddar cheese    cup unsweetened applesauce    Four- 200 calories, 25 g protein  3 oz baked tilapia  1/3rd cup cooked carrots    cup yogurt    Five- 250 calories, 20 g protein  Grilled ham  n  Swiss: spread 2 tsp ghee or butter on 1 slice of whole grain bread.  Cut bread in half, layer 2 oz deli ham with 1 piece of Swiss cheese and grill until cheese is melted.    cup cooked vegetables    Six- 250 calories, 18 g protein  Vegetarian cheeseburger: 1 [place] cheeseburger topped with lettuce, onion, tomato, and ketchup/mustard    cup sweet potato fries    Seven- 250 calories, 18 g protein  Pork pot roast: 2 oz roasted pork loin, 1/3rd cup roasted carrots,   medium potato, cooked with   cup gravy    Eight- 330 calories, 25 g protein  2 oz meatballs (about 2 small meatballs)    cup spaghetti sauce  1/2 piece toast topped with 1 tsp ghee or butterand topped with garlic powder, toasted in oven    Nine- 250 calories, 16 g protein  Mexican pizza: one 8  corn tortilla topped with 2 oz chicken,    cup salsa, 2 tablespoons black beans, 2 tablespoons shredded cheese.  Bake until cheese is melted.    Ten- 250 calories, 22 g protein  Shrimp stir-mckenzie: 3 oz cooked shrimp, 1/6th onion,   pepper,   cup chopped carrots sautéed in 1 tablespoon olive oil, topped with 2 tablespoons stir mckenzie sauce and a pinch of sesame seeds        150 Calories or Less Snack Ideas   1 hardboiled egg with   cup berries  1 small apple with 1 hardboiled egg  10 almonds with   cup berries  2 clementines with 1 light string cheese  1 light string cheese with   sliced apple  1 light string cheese wrapped in 2 slices of turkey  4 100% whole wheat crackers (e.g. Triscuit) with 1 light string cheese    c. cottage cheese with   cup fruit and 1 Tbsp sunflower seeds     cup cottage cheese with   of an avocado     can tuna fish with 1 cup sliced cucumbers     cup roasted garbanzo beans with paprika and cayenne pepper    baked sweet potato with   cup chili beans or   cup cottage cheese  2 oz. nitrate free turkey slices with 1 cup carrots  1 container (6 oz) of low sugar (less than 10 grams of sugar) greek yogurt   3 Tablespoons of hummus with 1 cup sliced bell peppers   2 Tablespoons of hummus with 15 baby carrots  4 Tablespoons ranch dip made with plain Greek Yogurt and 3 mini cucumbers  1/4 cup nuts (any kind)  1 Tablespoon peanut butter with 1 stalk celery   1 dill pickle wrapped in 1-2 slices of deli ham with 1 tsp of mayonnaise/mustard.

## 2024-01-22 ENCOUNTER — TRANSFERRED RECORDS (OUTPATIENT)
Dept: HEALTH INFORMATION MANAGEMENT | Facility: CLINIC | Age: 44
End: 2024-01-22
Payer: COMMERCIAL

## 2024-01-22 ENCOUNTER — HOSPITAL ENCOUNTER (OUTPATIENT)
Facility: CLINIC | Age: 44
Discharge: HOME | End: 2024-01-22
Admitting: FAMILY MEDICINE
Payer: COMMERCIAL

## 2024-01-22 ENCOUNTER — LAB REQUISITION (OUTPATIENT)
Dept: LAB | Facility: CLINIC | Age: 44
End: 2024-01-22
Payer: COMMERCIAL

## 2024-01-22 DIAGNOSIS — F25.9 SCHIZOAFFECTIVE DISORDER, UNSPECIFIED (H): ICD-10-CM

## 2024-01-22 DIAGNOSIS — E11.9 TYPE 2 DIABETES MELLITUS WITHOUT COMPLICATIONS (H): ICD-10-CM

## 2024-01-22 PROCEDURE — 80053 COMPREHEN METABOLIC PANEL: CPT | Mod: ORL | Performed by: FAMILY MEDICINE

## 2024-01-22 PROCEDURE — 80164 ASSAY DIPROPYLACETIC ACD TOT: CPT | Performed by: FAMILY MEDICINE

## 2024-01-23 ENCOUNTER — OFFICE VISIT (OUTPATIENT)
Dept: PHARMACY | Facility: PHYSICIAN GROUP | Age: 44
End: 2024-01-23
Payer: COMMERCIAL

## 2024-01-23 VITALS
WEIGHT: 293 LBS | DIASTOLIC BLOOD PRESSURE: 60 MMHG | SYSTOLIC BLOOD PRESSURE: 100 MMHG | HEART RATE: 95 BPM | BODY MASS INDEX: 62.22 KG/M2

## 2024-01-23 DIAGNOSIS — Z76.89 ENCOUNTER FOR WEIGHT MANAGEMENT: ICD-10-CM

## 2024-01-23 DIAGNOSIS — E11.65 TYPE 2 DIABETES MELLITUS WITH HYPERGLYCEMIA, WITHOUT LONG-TERM CURRENT USE OF INSULIN (H): Primary | ICD-10-CM

## 2024-01-23 LAB
ALBUMIN SERPL BCG-MCNC: 4.4 G/DL (ref 3.5–5.2)
ALP SERPL-CCNC: 79 U/L (ref 40–150)
ALT SERPL W P-5'-P-CCNC: 36 U/L (ref 0–50)
ANION GAP SERPL CALCULATED.3IONS-SCNC: 21 MMOL/L (ref 7–15)
AST SERPL W P-5'-P-CCNC: 47 U/L (ref 0–45)
BILIRUB SERPL-MCNC: 0.4 MG/DL
BUN SERPL-MCNC: 18.5 MG/DL (ref 6–20)
CALCIUM SERPL-MCNC: 9.9 MG/DL (ref 8.6–10)
CHLORIDE SERPL-SCNC: 91 MMOL/L (ref 98–107)
CREAT SERPL-MCNC: 0.8 MG/DL (ref 0.51–0.95)
DEPRECATED HCO3 PLAS-SCNC: 21 MMOL/L (ref 22–29)
EGFRCR SERPLBLD CKD-EPI 2021: >90 ML/MIN/1.73M2
GLUCOSE SERPL-MCNC: 444 MG/DL (ref 70–99)
POTASSIUM SERPL-SCNC: 3.4 MMOL/L (ref 3.4–5.3)
PROT SERPL-MCNC: 7.6 G/DL (ref 6.4–8.3)
SODIUM SERPL-SCNC: 133 MMOL/L (ref 135–145)
VALPROATE SERPL-MCNC: 64 UG/ML

## 2024-01-23 PROCEDURE — 99607 MTMS BY PHARM ADDL 15 MIN: CPT | Performed by: PHARMACIST

## 2024-01-23 PROCEDURE — 99605 MTMS BY PHARM NP 15 MIN: CPT | Performed by: PHARMACIST

## 2024-01-23 RX ORDER — INSULIN GLARGINE 100 [IU]/ML
10 INJECTION, SOLUTION SUBCUTANEOUS DAILY
COMMUNITY
End: 2024-01-24

## 2024-01-23 RX ORDER — BLOOD-GLUCOSE SENSOR
1 EACH MISCELLANEOUS
COMMUNITY
End: 2024-09-05

## 2024-01-23 RX ORDER — PEN NEEDLE, DIABETIC 32 GX 1/4"
1 NEEDLE, DISPOSABLE MISCELLANEOUS DAILY
COMMUNITY

## 2024-01-23 RX ORDER — KETOROLAC TROMETHAMINE 30 MG/ML
1 INJECTION, SOLUTION INTRAMUSCULAR; INTRAVENOUS DAILY
COMMUNITY
End: 2024-09-05

## 2024-01-23 RX ORDER — TIRZEPATIDE 7.5 MG/.5ML
7.5 INJECTION, SOLUTION SUBCUTANEOUS
COMMUNITY
End: 2024-02-14 | Stop reason: DRUGHIGH

## 2024-01-23 NOTE — PATIENT INSTRUCTIONS
"Recommendations from MTM Pharmacist visit:                                                    MTM (medication therapy management) is a service provided by a clinical pharmacist designed to help you get the most of out of your medicines.  You may be sent a phone or email survey evaluating today's visit.  Please provide feedback you have for the service he received today if you are able.    Increase Mounjaro to 7.5 mg weekly when you are done with your Mounjaro 5 mg pens. It is ok to inject this on the inside of your thigh.  Start Insulin 10 units once daily in your thigh.  We sent a prescription for a continuous glucose monitor (Freestyle Kwaku 3) to [facility]. This is a mail order diabetes supply company based out of [place]. They will likely be calling you to coordinate delivery from a [place] phone number, for your reference. If you miss their call or haven't heard from them in a day or two, you may call them at [phone] to check on the status of your order.  Work to replace all pop with Zero Sugar versions. Reduce snacking.    Follow up:   Follow up with Yvette at [facility]: 2/6 1pm, 2/7 9:30 am, 2/13 10:30 am or 11:30 am, 2/14 any time 12:30 - 3pm      It was great speaking with you today.  I value your experience and would be very thankful for your time in providing feedback in our clinic survey. In the next few days, you may receive an email or text message from [employer] with a link to a survey related to your  clinical pharmacist.\"     To schedule another MTM appointment, please call the clinic directly at [phone]. You may also schedule with me at the clinic .     My Clinical Pharmacist's contact information:                                                      Please feel free to contact me with any questions or concerns you have.      Yvette Carpenter, Pharm D., MPH  MTM Pharmacist - [facility]  Secure Voicemail: [phone]  Days in the office: " Monday - Thursday

## 2024-01-23 NOTE — LETTER
January 31, 2024  Bushra PRYOR Cielo  [address]  [place] [address]    Dear Ms. Douglassjuanis, [facility]     Thank you for talking with me on Jan 23, 2024 about your health and medications. As a follow-up to our conversation, I have included two documents:      Your Recommended To-Do List has steps you should take to get the best results from your medications.  Your Medication List will help you keep track of your medications and how to take them.    If you want to talk about these documents, please call Yvette Carpenter RPH at phone: [phone], Monday-Friday 8-4:30pm.    I look forward to working with you and your doctors to make sure your medications work well for you.    Sincerely,  Yvette Carpenter RPH  [facility] Pharmacist, [facility]

## 2024-01-23 NOTE — LETTER
"Recommended To-Do List      Prepared on: 01/31/2024       You can get the best results from your medications by completing the items on this \"To-Do List.\"      Bring your To-Do List when you go to your doctor. And, share it with your family or caregivers.    My To-Do List:  What we talked about: What I should do:   A medication that is not working    We sent a prescription for a continuous glucose monitor (Freestyle Kwaku 3) to [employer] [employer]. This is a mail order diabetes supply company based out of [place]. They will likely be calling you to coordinate delivery from a [place] phone number, for your reference. If you miss their call or haven't heard from them in a day or two, you may call them at [phone] to check on the status of your order.          What we talked about: What I should do:   Your medication dosage being too low    Increase your dosage of Mounjaro  to 7.5 mg weekly when you are done with your Mounjaro 5 mg pens. It is ok to inject this on the inside of your thigh.          What we talked about: What I should do:   A new medication that may be of benefit to you    Start taking insulin 10 units once daily in your thigh.          What we talked about: What I should do:                     "

## 2024-01-23 NOTE — Clinical Note
Mary Farfan, I just spoke to Bushra - she hasn't been able to get insulin due to pharmacy issues. I am working on this, but great news is that Fasting BG down to 198, 241, 256, 200, 206 with stopping all sweet drinks and sweet treats (had been 300-400s previously as you are aware). She started Mounjaro 7.5 mg weekly today.   Due to these improvements and expected increase in insulin sensitivity with higher dose Mounjaro, I suggested she take 10 units once daily and we have a follow up scheduled in 2 weeks to evaluate blood glucose trends (she also has a CGM arriving in the next few days). She was congratulated on her significant lifestyle improvements and will check in on this progress at our follow up visit 2/14.  I have updated chart to reflect all of this. Yvette

## 2024-01-23 NOTE — Clinical Note
"Recommended To-Do List      Prepared on: 01/31/2024       You can get the best results from your medications by completing the items on this \"To-Do List.\"      Bring your To-Do List when you go to your doctor. And, share it with your family or caregivers.    My To-Do List:  What we talked about: What I should do:                     "

## 2024-01-23 NOTE — PROGRESS NOTES
Medication Therapy Management (MTM) Encounter    ASSESSMENT:                          -----------------------------------------  .    Medication Adherence/Access: No issues identified -We will continue to review 1 indication at the time with patient to prevent feeling overwhelmed.  -----------------------------------------  .    Diabetes /Obesity: A1c not at goal less than 7%.  And blood sugars not at goal  milligrams per deciliter fasting.  Recommend continuing to increase Mounjaro to optimal 15 mg dose 2.5 mg monthly to help improve blood sugar and weight management more effectively.   .  Due to significant hyperglycemia and history of hospitalization for hyperglycemia, patient needs to start insulin as well today - Completed teaching of administration of insulin (subcutaneous thigh OK due to abdomen sensitivity- Mounjaro too). Discussed mechanism of action, side effects, warnings, and efficacy. Discussed appropriate storage and stability. Answered patient questions.   Patient assessed for ketoacidosis and hyperglycemia today -stable education provided on reducing sweets, carbs and increasing fluids and activity to help reduce blood sugars, especially when elevated.  .   Patient would benefit from CGM to help improve understanding of lifestyle impacts on blood glucose.  Educated provided to patient on CGM use and application. This included Freestyle Kwaku sensor: insertion technique, sensor site location and rotation, insulin administration in relation to sensor placement, sensor wear, reasons to remove sensor (MRI, CT, diathermy), Vitamin C & Aspirin effects on sensor and Kwaku [place]: frequency of scanning sensor, length of time data is visible.Education also provided on hypoglycemia awareness and treatment. Insurance coverage was discussed and patient agreeable to send prescription for CGM to DME supplier/pharmacy to evaluate cost  .    At goal on aspirin and statin.  ACE/ARB not indicated due to no  Microalbuminuria. May consider SGLT2 inhibitor or pioglitazone (hesitant due to weight gain risk) in the future once A1C <10%, for now - insulin indicated for severe hyperglycemia.    PLAN:                              Increase Mounjaro to 7.5 mg weekly when you are done with your Mounjaro 5 mg pens. It is ok to inject this on the inside of your thigh.  Start Insulin 10 units once daily in your thigh.  Increased to 20 units after discussion with endocrinology.  We sent a prescription for a continuous glucose monitor (Freestyle Kwaku 3) to [employer]. This is a mail order diabetes supply company based out of [place]. They will likely be calling you to coordinate delivery from a [place] phone number, for your reference. If you miss their call or haven't heard from them in a day or two, you may call them at [phone] to check on the status of your order.  Work to replace all pop with Zero Sugar versions. Reduce snacking.    Follow up with Yvette at [facility]: 2/6 1pm, 2/7 9:30 am, 2/13 10:30 am or 11:30 am, 2/14 any time 12:30 - 3pm    SUBJECTIVE/OBJECTIVE:                          Bushra Buck is a 43 year old female coming in for a follow up visit. She is accompanied by her PCA, Xuan. Today's visit is a follow up from 11/21/23.     Reason for visit: Medication Therapy Management - Diabetes; insulin start.    Allergies/ADRs: Reviewed in chart  Past Medical History: Reviewed in chart  Tobacco: She reports that she has never smoked. She has never used smokeless tobacco.    Medication Adherence/Access: Patient takes medications 2 time(s) per day.   Per patient, misses medication 0 times per week.   Medication barriers: Patient has home health nurse help come and fill pillbox weekly.  Feels helps patient manage medications well and never misses a dose.  Patient has intellectual disability -current system works well to help manage medications.  Patient does not know  medications off the top of her head and is easily overwhelmed.  Request to talk about 1 medical condition at a time at each visit to help improve understanding.   -----------------------------------------  .    Diabetes /Obesity:  Glimepiride 4mg twice daily   Mounjaro 5mg weekly x 3 months  Aspirin 81mg daily    Patient had visit with PCP yesterday - A1C significantly elevated, agreeable to insulin start, will educate today on insulin injection.  Patient is not experiencing side effects, however wonders if there is alternate site to inject Mounjaro and insulin due to abdomen sensitivity secondary to scratches from her cat.  Patient has history of severe diarrhea with both IR and ER metformin.  History of hospitalization for hyperglycemia.  Blood sugar monitorin time(s) daily; Ranges: (from patient's glucose log) Fasting- high 200-300s  Current diabetes symptoms: none  Diet/Exercise: Patient has difficulty managing appetite and eating patterns.  Following with bariatric clinic to help with tools to reduce binge eating and improve mindful eating. Recognizes that drinking a lot of full-sugar soda and snacking (chips specifically) are likely contributing to elevated blood glucose.     Eye exam in the last 12 months? Yes- Date of last eye exam: 23,  Location: [facility]    Foot exam: due  A1c (24): 11.2%  Urine Albumin (2023): Normal    Last Comprehensive Metabolic Panel: 24  Lab Results   Component Value     (L)    POTASSIUM 3.4    CHLORIDE 91 (L)    CO2 21 (L)    ANIONGAP 21 (H)     (H)    BUN 18.5    CR 0.80    GFRESTIMATED >90    TAWNYA 9.9     Hyperglycemia corrected Na: 139 mEq/L      Today's Vitals: /60   Pulse 95   Wt 318 lb 9.6 oz (144.5 kg)   BMI 62.22 kg/m    ----------------      I spent 60 minutes with this patient today. All changes were made via collaborative practice agreement with Daniel Cruz MD. A copy of the visit note was provided to the  patient's provider(s).    A summary of these recommendations was given to the patient and was sent via [employer].    Yvette Carpenter, Pharm D., MPH  MTM Pharmacist - [facility]  Secure Voicemail: [phone]  In Office: Monday - Thursday             Medication Therapy Recommendations  Type 2 diabetes mellitus with hyperglycemia, without long-term current use of insulin (H)    Current Medication: Continuous Blood Gluc  (FREESTYLE SUZY 3 READER) MIKY   Rationale: More effective medication available - Ineffective medication - Effectiveness   Recommendation: Change Medication Formulation  - replace glucometer with CGM   Status: Accepted per Provider          Current Medication: tirzepatide (MOUNJARO) 7.5 MG/0.5ML pen   Rationale: Dose too low - Dosage too low - Effectiveness   Recommendation: Increase Dose   Status: Accepted per CPA          Current Medication: tirzepatide (MOUNJARO) 7.5 MG/0.5ML pen   Rationale: Synergistic therapy - Needs additional medication therapy - Indication   Recommendation: Start Medication - start insulin glargine   Status: Accepted per CPA

## 2024-01-23 NOTE — LETTER
_  Medication List        Prepared on: 01/31/2024     Bring your Medication List when you go to the doctor, hospital, or   emergency room. And, share it with your family or caregivers.     Note any changes to how you take your medications.  Cross out medications when you no longer use them.    Medication How I take it Why I use it Prescriber   ACETAMINOPHEN EXTRA STRENGTH 500 MG tablet  Take 1-2 tabs by mouth as needed every 4-6 hours pain Patient Reported   Alcohol Swabs (ALCOHOL PREP) 70 % PADS See Admin Instructions injections Daniel Cruz MD    ASPIRIN LOW DOSE 81 MG EC tablet Take 81 mg by mouth daily Heart health Daniel Cruz MD    atorvastatin (LIPITOR) 40 MG tablet Take 40 mg by mouth daily cholesterol Daniel Cruz MD     bacitracin 500 UNIT/GM external ointment Apply topically 2 times daily Cellulitis of Umbilicus Mandy Farfan MD   bumetanide (BUMEX) 2 MG tablet Take 4 mg by mouth 3 times daily  Blood pressure and swelling Daniel Cruz MD    cabergoline (DOSTINEX) 0.5 MG tablet Take 0.25 mg by mouth twice a week  High prolactin Mandy Farfan MD   calcium carbonate-vitamin D (OS-TAWNYA WITH D) 500-200 MG-UNIT tablet Take 1 tablet by mouth 2 times daily  Bone Health Daniel Cruz MD     cetirizine (ZYRTEC) 10 MG tablet Take 10 mg by mouth daily  allergies Daniel Cruz MD    Continuous Blood Gluc  (FREESTYLE SUZY 3 READER) MIKY 1 each daily  Diabetes  Daniel Cruz MD    Continuous Blood Gluc Sensor (FREESTYLE SUZY 3 SENSOR) MISC 1 each every 14 days  Diabetes  Daniel Cruz MD    divalproex sodium extended-release (DEPAKOTE ER) 500 MG 24 hr tablet Take 500 mg by mouth 3 times daily  Mood Dr. Alverto Zimmerman   fenofibrate (TRICOR) 145 MG tablet Take 145 mg by mouth daily  Cholesterol Daniel Cruz MD    glimepiride (AMARYL) 4 MG tablet Take 4 mg by mouth every morning (before breakfast)  Diabetes  Daniel Cruz MD     Insulin Glargine Solostar 100 UNIT/ML SOPN Inject 10 Units Subcutaneous daily Type 2 diabetes mellitus with hyperglycemia, with long-term current use of insulin (H) Mandy Farfan MD   insulin pen needle (NOVOFINE PEN NEEDLE) 32G X 6 MM miscellaneous 1 each daily  dm Dr. Mandy Farfan   levothyroxine (SYNTHROID/LEVOTHROID) 125 MCG tablet Take 1 tablet by mouth daily  hypothyroidism Daniel Cruz MD    medroxyPROGESTERone (PROVERA) 10 MG tablet TAKE 1 TABLET  10 MG  BY MOUTH ONCE DAILY FOR 5 DAYS EACH MONTH  Irregular menses Daniel Cruz MD    multivitamin w/minerals (THERA-VIT-M) tablet Take 1 tablet by mouth daily  General Health Daniel Cruz MD    NYAMYC 113750 UNIT/GM external powder Apply 1 g topically as needed rash Daniel Cruz MD    omeprazole (PRILOSEC) 40 MG DR capsule Take 1 capsule by mouth daily.  Acid reflux Daniel Cruz MD     paliperidone ER (INVEGA) 6 MG 24 hr tablet Take 6 mg by mouth every morning  mood Dr. Alverto Zimmerman   spironolactone (ALDACTONE) 50 MG tablet Take 50 mg by mouth 2 times daily  Blood pressure  Daniel Cruz MD     tirzepatide (MOUNJARO) 7.5 MG/0.5ML pen Inject 7.5 mg Subcutaneous every 7 days  Diabetes  Daniel Cruz MD    vitamin C (ASCORBIC ACID) 1000 MG TABS Take 1 tablet (1,000 mg) by mouth daily for 21 days Scratch Carlos Bowling MD   VITAMIN D3 25 MCG (1000 UT) tablet Take 3 tablets by mouth daily  Mood and general health Daniel Cruz MD          Add new medications, over-the-counter drugs, herbals, vitamins, or  minerals in the blank rows below.    Medication How I take it Why I use it Prescriber                                      Allergies:      fiorinal [butalbital-aspirin-caffeine]; ativan [lorazepam]; bee venom; benzodiazepines; carbamazepine; cat hair extract; cephalosporins; clindamycin; codeine; dextromethorphan; ibuprofen sodium; keflex [cephalexin]; latex; lithium; oxcarbazepine;  topiramate; topamax        Side effects I have had:               Other Information:              My notes and questions:

## 2024-01-24 ENCOUNTER — OFFICE VISIT (OUTPATIENT)
Dept: ENDOCRINOLOGY | Facility: CLINIC | Age: 44
End: 2024-01-24
Payer: COMMERCIAL

## 2024-01-24 ENCOUNTER — LAB (OUTPATIENT)
Dept: LAB | Facility: CLINIC | Age: 44
End: 2024-01-24
Payer: COMMERCIAL

## 2024-01-24 VITALS
HEART RATE: 87 BPM | TEMPERATURE: 98.9 F | WEIGHT: 293 LBS | OXYGEN SATURATION: 96 % | SYSTOLIC BLOOD PRESSURE: 138 MMHG | BODY MASS INDEX: 61.91 KG/M2 | DIASTOLIC BLOOD PRESSURE: 83 MMHG

## 2024-01-24 DIAGNOSIS — L03.316 CELLULITIS OF UMBILICUS: ICD-10-CM

## 2024-01-24 DIAGNOSIS — D35.2 PROLACTINOMA (H): ICD-10-CM

## 2024-01-24 DIAGNOSIS — E11.65 TYPE 2 DIABETES MELLITUS WITH HYPERGLYCEMIA, WITH LONG-TERM CURRENT USE OF INSULIN (H): ICD-10-CM

## 2024-01-24 DIAGNOSIS — Z79.4 TYPE 2 DIABETES MELLITUS WITH HYPERGLYCEMIA, WITH LONG-TERM CURRENT USE OF INSULIN (H): ICD-10-CM

## 2024-01-24 DIAGNOSIS — D35.2 PROLACTINOMA (H): Primary | ICD-10-CM

## 2024-01-24 LAB
HBA1C MFR BLD: 11.7 %
PROLACTIN SERPL 3RD IS-MCNC: 75 NG/ML (ref 5–23)
T4 FREE SERPL-MCNC: 1.68 NG/DL (ref 0.9–1.7)
TSH SERPL DL<=0.005 MIU/L-ACNC: 2.77 UIU/ML (ref 0.3–4.2)

## 2024-01-24 PROCEDURE — 99000 SPECIMEN HANDLING OFFICE-LAB: CPT | Performed by: PATHOLOGY

## 2024-01-24 PROCEDURE — 84146 ASSAY OF PROLACTIN: CPT | Performed by: INTERNAL MEDICINE

## 2024-01-24 PROCEDURE — 84439 ASSAY OF FREE THYROXINE: CPT | Performed by: PATHOLOGY

## 2024-01-24 PROCEDURE — 36415 COLL VENOUS BLD VENIPUNCTURE: CPT | Performed by: PATHOLOGY

## 2024-01-24 PROCEDURE — G2211 COMPLEX E/M VISIT ADD ON: HCPCS | Performed by: INTERNAL MEDICINE

## 2024-01-24 PROCEDURE — 84443 ASSAY THYROID STIM HORMONE: CPT | Performed by: PATHOLOGY

## 2024-01-24 PROCEDURE — 99214 OFFICE O/P EST MOD 30 MIN: CPT | Performed by: INTERNAL MEDICINE

## 2024-01-24 PROCEDURE — 83036 HEMOGLOBIN GLYCOSYLATED A1C: CPT | Performed by: INTERNAL MEDICINE

## 2024-01-24 RX ORDER — INSULIN GLARGINE 100 [IU]/ML
20 INJECTION, SOLUTION SUBCUTANEOUS DAILY
Qty: 15 ML | Refills: 3 | Status: SHIPPED | OUTPATIENT
Start: 2024-01-24 | End: 2024-01-31 | Stop reason: DRUGHIGH

## 2024-01-24 RX ORDER — BACITRACIN ZINC 500 [USP'U]/G
OINTMENT TOPICAL 2 TIMES DAILY
Qty: 1 G | Refills: 5 | Status: SHIPPED | OUTPATIENT
Start: 2024-01-24

## 2024-01-24 ASSESSMENT — PAIN SCALES - GENERAL: PAINLEVEL: NO PAIN (0)

## 2024-01-24 NOTE — PROGRESS NOTES
"      Endocrinology follow up    Reason for visit/consult: F/u on prolactinoma.      ASSESSMENT / PLAN:  A 42 year old female with history of prolactinoma s/p resection 2000 and subsequent treatment of cabergoline, restarted in 2017 for persistent levels, amenorrhea, fatigue, weight gain. With persistent amenorrhea and DM II diagnosed in 2020.     # Prolactinoma  Post surgical resection 2000, no recurrence on last MRI 2017. Not currently experiencing any headaches or galactorrhea, is experiencing amenorrhea refractory to hormone stimulation.   Taking cabergoline 0.5 mg Wednesday and Saturday since 2017. PRL 61 (3/2020) > PRL up to 104 (11/2022). Brain MRI 2.4 mm on the left part of sella    - Continue cabergoline 0.5 mg three tablets per week    - recheck prolactin today    # Amenorrhea  Started Provera 5 days a month by OB GYN Dr. Khan since 2018, and initially had flows, but now has not for past 2-3 years.   - Recommend continued following with OB/GYN     # Hypothyroidism  On levothyroxine 112mcg. Labs 11/2022 euthryoid. Not experiencing any hypo or hyperthyroid symptoms other than weight and \"hot flashes\"  - Continue levothyroxine     # Obesity BMI 60    # Type 2 diabetes  Worsened, glucose has been 300-400 persistently.     - start Lantus 20 unit    - continue glimepiride 4 mg daily     - add on Jardiance 10 mg daily    - agree with Kwaku 3    - I will communicated with her pharm D      # Bone health  - calcium citrate plus D (630 mg Ca, D 500ID) for bone health    # Hypertriglyceridemia  Triglyceride levels >500, Has started taking fenofibrate, last triglycerides 530 in 4/2022.   -Continue fenofibrate, management per PCP    RTC in one year     Addendum   PRL today 75, reduced from 149, current medication 3 tabs week seems working and this is back to her baseline.     35   minutes spent on the date of the encounter doing chart review, history and exam, documentation and further activities as noted above.    The " "longitudinal plan of care for Bushra was addressed during this visit. Due to the added complexity in care, I will continue to support Bushra in the subsequent management of this condition(s) and with the ongoing continuity of care of this condition(s).     Mandy Farfan MD  Staff Physician  Endocrinology and Metabolism  [facility]  License: [id number]  Pager: [phone]    Subjective:   A 38 year old female with follow up prolactinoma, original diagnosis was made in 1997 s/p resection 2000 and subsequent treatment of cabergoline.  Patient had been seen by Dr. CLINTON Moran before (since 2002). In 2012, cabergoline was discontinued and her PRL became mildly elevated (PRL 50-60s), and she has had amenorrhea for 4-5 years. She is taking Provera, but this is no longer causing bleeding. Although official radiology report did not mention residual tumor, we assumed possible small residual, we resumed cabergoline 0.5 mg BIW, which she is currenlty taking. PRL level was 61 (3/2020).     She was diagnosed with type 2 diabetes in the summer of 2020     She also has psychiatry issues, however, currently has been improving, with tapering psychiatry medications (depakote as her \"levels were high\"). No recent hospitalization.      She also has primary hypothyroidism, which is being replaced with levothyroxine 112 mcg daily.     Following with bariatric clinic, on Dulaglutide for weight loss, 15 lb wt loss since 11/2021  Wt Readings from Last 4 Encounters:   01/24/24 143.8 kg (317 lb)   01/23/24 144.5 kg (318 lb 9.6 oz)   01/11/24 141.1 kg (311 lb)   11/21/23 149.4 kg (329 lb 6.4 oz)       Interval history   Doing \"good\". Only concern is rising prolactin and she is questioning need for repeat brain MRI.     Sweating frequently, sleeping with windows open, getting hot flashes    Apartment is being condemned and she is looking for new place to live. She is most excited about the possibility of a place in [facility]. " "[place] that has a workout area included in rent.    PCP is retiring in 4/2023. Has a HHN who sets up medications and patient takes.     BG log books - , average 110-130  She is experiencing no breast tenderness, no galactorrhea, no headaches, but is experiencing ongoing amenorrhea despite Provera therapy. She has tried to incorporate more walking into her life, but she admits to still overeating at times.     Not missing any doses of medications. No HA, vision changes but does note \"pain in the eyes\".    Denies breast tenderness, galactorrhea, tremors, palpitations. Does note SANDS with stairs which is chronic.   She is still amenorrhea, been multiple years. Following with [facility] - following with, last seen in 2022    ROS:  10 point negative except as mentioned in HPI    Current Outpatient Medications   Medication    ACETAMINOPHEN EXTRA STRENGTH 500 MG tablet    Alcohol Swabs (ALCOHOL PREP) 70 % PADS    ASPIRIN LOW DOSE 81 MG EC tablet    atorvastatin (LIPITOR) 40 MG tablet    bumetanide (BUMEX) 2 MG tablet    cabergoline (DOSTINEX) 0.5 MG tablet    calcium carbonate-vitamin D (OS-TAWNYA WITH D) 500-200 MG-UNIT tablet    cetirizine (ZYRTEC) 10 MG tablet    Continuous Blood Gluc  (FREESTYLE SUZY 3 READER) MIKY    Continuous Blood Gluc Sensor (FREESTYLE SUZY 3 SENSOR) MISC    divalproex sodium extended-release (DEPAKOTE ER) 500 MG 24 hr tablet    fenofibrate (TRICOR) 145 MG tablet    glimepiride (AMARYL) 4 MG tablet    Insulin Glargine Solostar 100 UNIT/ML SOPN    insulin pen needle (NOVOFINE PEN NEEDLE) 32G X 6 MM miscellaneous    levothyroxine (SYNTHROID/LEVOTHROID) 125 MCG tablet    medroxyPROGESTERone (PROVERA) 10 MG tablet    multivitamin w/minerals (THERA-VIT-M) tablet    NYAMYC 515006 UNIT/GM external powder    omeprazole (PRILOSEC) 40 MG DR capsule    paliperidone ER (INVEGA) 6 MG 24 hr tablet    spironolactone (ALDACTONE) 50 MG tablet    tirzepatide (MOUNJARO) 7.5 MG/0.5ML pen    vitamin " C (ASCORBIC ACID) 1000 MG TABS    VITAMIN D3 25 MCG (1000 UT) tablet     No current facility-administered medications for this visit.     Family history: mother has history of diabetes and thyroid disease    EXAM  not currently breastfeeding.  Gen: morbidly obese female in NAD, diaphoretic  HEENT: anicteric, no proptosis or lid lag  Visual field: intact  Resp: no acute distress  Neuro: A&Ox3, no tremor on outstretched arms  Psych: Normal affect and mood.      Labs/Imaging    TSH   Date Value Ref Range Status   05/24/2023 3.76 0.30 - 4.20 uIU/mL Final   04/10/2023 4.38 (H) 0.30 - 4.20 uIU/mL Final   11/18/2022 3.89 0.30 - 4.20 uIU/mL Final   04/04/2022 4.28 0.30 - 5.00 uIU/mL Final   06/29/2021 2.53 0.30 - 5.00 uIU/mL Final   04/07/2021 3.51 0.40 - 4.00 mU/L Final   06/08/2020 3.75 0.30 - 5.00 uIU/mL Final   03/23/2020 2.96 0.40 - 4.00 mU/L Final   02/03/2020 1.70 0.30 - 5.00 uIU/mL Final   08/19/2019 1.91 0.30 - 5.00 uIU/mL Final   12/31/2018 3.22 0.40 - 4.00 mU/L Final   02/28/2018 2.80 0.30 - 5.00 mcU/mL Final   01/10/2017 3.09 0.40 - 4.00 mU/L Final     [facility]   Date Value Ref Range Status   04/07/2021 1.39 0.76 - 1.46 ng/dL Final     Lab Results   Component Value Date/Time    PROLACTIN 54 (H) 04/07/2021 08:30 AM    PROLACTIN 61 (H) 03/23/2020 10:55 AM    PROLACTIN 45 (H) 12/31/2018 09:14 AM    PROLACTIN 27.9 (A) 02/28/2018 12:00 AM    PROLACTIN 38 (H) 08/10/2017 09:49 AM     Brain MRI 1/2017  Impression:   1. Postsurgical changes from transsphenoidal resection of pituitary  adenoma without evidence of recurrence.  2. Single hyperintensity on FLAIR in the right semiovale similar to  prior study of questionable clinical significance.  3. Stable mild dural thickening and enhancement over the convexities  which is most likely postsurgical in nature. This could also be  related to CSF leak or intracranial hypotension. Total

## 2024-01-24 NOTE — LETTER
"1/24/2024       RE: Bushra Buck  [address]  [place] [address]     Dear Colleague,    Thank you for referring your patient, Bushra Buck, to the [facility] ENDOCRINOLOG[facility] [place] [facility]. Please see a copy of my visit note below.                  Endocrinology follow up    Reason for visit/consult: F/u on prolactinoma.      ASSESSMENT / PLAN:  A 42 year old female with history of prolactinoma s/p resection 2000 and subsequent treatment of cabergoline, restarted in 2017 for persistent levels, amenorrhea, fatigue, weight gain. With persistent amenorrhea and DM II diagnosed in 2020.     # Prolactinoma  Post surgical resection 2000, no recurrence on last MRI 2017. Not currently experiencing any headaches or galactorrhea, is experiencing amenorrhea refractory to hormone stimulation.   Taking cabergoline 0.5 mg Wednesday and Saturday since 2017. PRL 61 (3/2020) > PRL up to 104 (11/2022). Brain MRI 2.4 mm on the left part of sella    - Continue cabergoline 0.5 mg three tablets per week    - recheck prolactin today    # Amenorrhea  Started Provera 5 days a month by OB GYN Dr. Khan since 2018, and initially had flows, but now has not for past 2-3 years.   - Recommend continued following with OB/GYN     # Hypothyroidism  On levothyroxine 112mcg. Labs 11/2022 euthryoid. Not experiencing any hypo or hyperthyroid symptoms other than weight and \"hot flashes\"  - Continue levothyroxine     # Obesity BMI 60    # Type 2 diabetes  Worsened, glucose has been 300-400 persistently.     - start Lantus 20 unit    - continue glimepiride 4 mg daily     - add on Jardiance 10 mg daily    - agree with Kwaku 3    - I will communicated with her pharm D      # Bone health  - calcium citrate plus D (630 mg Ca, D 500ID) for bone health    # Hypertriglyceridemia  Triglyceride levels >500, Has started taking fenofibrate, last triglycerides 530 in " "4/2022.   -Continue fenofibrate, management per PCP    RTC in one year     35   minutes spent on the date of the encounter doing chart review, history and exam, documentation and further activities as noted above.    Mandy Farfan MD  Staff Physician  Endocrinology and Metabolism  [facility]  License: [id number]  Pager: [phone]    Subjective:   A 38 year old female with follow up prolactinoma, original diagnosis was made in 1997 s/p resection 2000 and subsequent treatment of cabergoline.  Patient had been seen by Dr. CLINTON Moran before (since 2002). In 2012, cabergoline was discontinued and her PRL became mildly elevated (PRL 50-60s), and she has had amenorrhea for 4-5 years. She is taking Provera, but this is no longer causing bleeding. Although official radiology report did not mention residual tumor, we assumed possible small residual, we resumed cabergoline 0.5 mg BIW, which she is currenlty taking. PRL level was 61 (3/2020).     She was diagnosed with type 2 diabetes in the summer of 2020     She also has psychiatry issues, however, currently has been improving, with tapering psychiatry medications (depakote as her \"levels were high\"). No recent hospitalization.      She also has primary hypothyroidism, which is being replaced with levothyroxine 112 mcg daily.     Following with bariatric clinic, on Dulaglutide for weight loss, 15 lb wt loss since 11/2021  Wt Readings from Last 4 Encounters:   01/24/24 143.8 kg (317 lb)   01/23/24 144.5 kg (318 lb 9.6 oz)   01/11/24 141.1 kg (311 lb)   11/21/23 149.4 kg (329 lb 6.4 oz)       Interval history   Doing \"good\". Only concern is rising prolactin and she is questioning need for repeat brain MRI.     Sweating frequently, sleeping with windows open, getting hot flashes    Apartment is being condemned and she is looking for new place to live. She is most excited about the possibility of a place in [facility] that has a workout area included in " "eduardo.    PCP is retiring in 4/2023. Has a HHN who sets up medications and patient takes.     BG log books - , average 110-130  She is experiencing no breast tenderness, no galactorrhea, no headaches, but is experiencing ongoing amenorrhea despite Provera therapy. She has tried to incorporate more walking into her life, but she admits to still overeating at times.     Not missing any doses of medications. No HA, vision changes but does note \"pain in the eyes\".    Denies breast tenderness, galactorrhea, tremors, palpitations. Does note SANDS with stairs which is chronic.   She is still amenorrhea, been multiple years. Following with [facility] - following with, last seen in 2022    ROS:  10 point negative except as mentioned in HPI    Current Outpatient Medications   Medication    ACETAMINOPHEN EXTRA STRENGTH 500 MG tablet    Alcohol Swabs (ALCOHOL PREP) 70 % PADS    ASPIRIN LOW DOSE 81 MG EC tablet    atorvastatin (LIPITOR) 40 MG tablet    bumetanide (BUMEX) 2 MG tablet    cabergoline (DOSTINEX) 0.5 MG tablet    calcium carbonate-vitamin D (OS-TAWNYA WITH D) 500-200 MG-UNIT tablet    cetirizine (ZYRTEC) 10 MG tablet    Continuous Blood Gluc  (FREESTYLE SUZY 3 READER) MIKY    Continuous Blood Gluc Sensor (FREESTYLE SUZY 3 SENSOR) MISC    divalproex sodium extended-release (DEPAKOTE ER) 500 MG 24 hr tablet    fenofibrate (TRICOR) 145 MG tablet    glimepiride (AMARYL) 4 MG tablet    Insulin Glargine Solostar 100 UNIT/ML SOPN    insulin pen needle (NOVOFINE PEN NEEDLE) 32G X 6 MM miscellaneous    levothyroxine (SYNTHROID/LEVOTHROID) 125 MCG tablet    medroxyPROGESTERone (PROVERA) 10 MG tablet    multivitamin w/minerals (THERA-VIT-M) tablet    NYAMYC 621323 UNIT/GM external powder    omeprazole (PRILOSEC) 40 MG DR capsule    paliperidone ER (INVEGA) 6 MG 24 hr tablet    spironolactone (ALDACTONE) 50 MG tablet    tirzepatide (MOUNJARO) 7.5 MG/0.5ML pen    vitamin C (ASCORBIC ACID) 1000 MG TABS    " VITAMIN D3 25 MCG (1000 UT) tablet     No current facility-administered medications for this visit.     Family history: mother has history of diabetes and thyroid disease    EXAM  not currently breastfeeding.  Gen: morbidly obese female in NAD, diaphoretic  HEENT: anicteric, no proptosis or lid lag  Visual field: intact  Resp: no acute distress  Neuro: A&Ox3, no tremor on outstretched arms  Psych: Normal affect and mood.      Labs/Imaging    TSH   Date Value Ref Range Status   05/24/2023 3.76 0.30 - 4.20 uIU/mL Final   04/10/2023 4.38 (H) 0.30 - 4.20 uIU/mL Final   11/18/2022 3.89 0.30 - 4.20 uIU/mL Final   04/04/2022 4.28 0.30 - 5.00 uIU/mL Final   06/29/2021 2.53 0.30 - 5.00 uIU/mL Final   04/07/2021 3.51 0.40 - 4.00 mU/L Final   06/08/2020 3.75 0.30 - 5.00 uIU/mL Final   03/23/2020 2.96 0.40 - 4.00 mU/L Final   02/03/2020 1.70 0.30 - 5.00 uIU/mL Final   08/19/2019 1.91 0.30 - 5.00 uIU/mL Final   12/31/2018 3.22 0.40 - 4.00 mU/L Final   02/28/2018 2.80 0.30 - 5.00 mcU/mL Final   01/10/2017 3.09 0.40 - 4.00 mU/L Final     T4 Free   Date Value Ref Range Status   04/07/2021 1.39 0.76 - 1.46 ng/dL Final     Lab Results   Component Value Date/Time    PROLACTIN 54 (H) 04/07/2021 08:30 AM    PROLACTIN 61 (H) 03/23/2020 10:55 AM    PROLACTIN 45 (H) 12/31/2018 09:14 AM    PROLACTIN 27.9 (A) 02/28/2018 12:00 AM    PROLACTIN 38 (H) 08/10/2017 09:49 AM     Brain MRI 1/2017  Impression:   1. Postsurgical changes from transsphenoidal resection of pituitary  adenoma without evidence of recurrence.  2. Single hyperintensity on FLAIR in the right semiovale similar to  prior study of questionable clinical significance.  3. Stable mild dural thickening and enhancement over the convexities  which is most likely postsurgical in nature. This could also be  related to CSF leak or intracranial hypotension. Total        Mandy Farfan MD

## 2024-01-24 NOTE — NURSING NOTE
Chief Complaint   Patient presents with    Endocrine Problem     prolactioma     Vital signs:  Temp: 98.9  F (37.2  C) Temp src: Oral BP: 138/83 Pulse: 87     SpO2: 96 %       Weight: 143.8 kg (317 lb)  Estimated body mass index is 61.91 kg/m  as calculated from the following:    Height as of 1/11/24: 1.524 m (5').    Weight as of this encounter: 143.8 kg (317 lb).

## 2024-01-31 RX ORDER — INSULIN GLARGINE 100 [IU]/ML
15 INJECTION, SOLUTION SUBCUTANEOUS DAILY
COMMUNITY
End: 2024-09-05

## 2024-01-31 NOTE — PROGRESS NOTES
Clinical Pharmacy Note  Yvette Carpenter, PharmD 01/31/2024 10:14:04 AM > Called pt to verify that CGM has been shipped per ADS. She has not received yet, but was contacted by company - will receive in 5 days. Will have home health RN and PCa help place.   BG down to 198, 241, 256, 200, 206 with stopping all sweet drinks and sweet treats. She started Mounjaro 7.5 mg weekly today.  Has not received insulin yet from [employer]. Has pen needles only. Had talked about increasing insulin to 20 units with endocrine, now that bg much improved with diet, will keep at 10 units for now until MTM visit on 2/14 to prevent hypoglycemia.    Yvette Carpenter, Pharm D., MPH  MTM Pharmacist - [facility]  Secure Voicemail: [phone]  In Office: Monday - Thursday

## 2024-02-14 ENCOUNTER — OFFICE VISIT (OUTPATIENT)
Dept: PHARMACY | Facility: PHYSICIAN GROUP | Age: 44
End: 2024-02-14
Payer: COMMERCIAL

## 2024-02-14 VITALS
DIASTOLIC BLOOD PRESSURE: 64 MMHG | HEART RATE: 87 BPM | OXYGEN SATURATION: 98 % | WEIGHT: 293 LBS | BODY MASS INDEX: 61.71 KG/M2 | SYSTOLIC BLOOD PRESSURE: 110 MMHG

## 2024-02-14 DIAGNOSIS — Z76.89 ENCOUNTER FOR WEIGHT MANAGEMENT: ICD-10-CM

## 2024-02-14 DIAGNOSIS — E11.65 TYPE 2 DIABETES MELLITUS WITH HYPERGLYCEMIA, WITHOUT LONG-TERM CURRENT USE OF INSULIN (H): Primary | ICD-10-CM

## 2024-02-14 PROCEDURE — 99607 MTMS BY PHARM ADDL 15 MIN: CPT | Performed by: PHARMACIST

## 2024-02-14 PROCEDURE — 99606 MTMS BY PHARM EST 15 MIN: CPT | Performed by: PHARMACIST

## 2024-02-14 RX ORDER — TIRZEPATIDE 12.5 MG/.5ML
12.5 INJECTION, SOLUTION SUBCUTANEOUS
COMMUNITY
End: 2024-05-08

## 2024-02-14 RX ORDER — TIRZEPATIDE 15 MG/.5ML
15 INJECTION, SOLUTION SUBCUTANEOUS
COMMUNITY
End: 2024-05-08

## 2024-02-14 RX ORDER — TIRZEPATIDE 10 MG/.5ML
10 INJECTION, SOLUTION SUBCUTANEOUS
COMMUNITY
End: 2024-05-08

## 2024-02-14 NOTE — PATIENT INSTRUCTIONS
"Recommendations from MTM Pharmacist visit:                                                    MTM (medication therapy management) is a service provided by a clinical pharmacist designed to help you get the most of out of your medicines.  You may be sent a phone or email survey evaluating today's visit.  Please provide feedback you have for the service he received today if you are able.      Increase Mounjaro to 10 mg weekly when you are done with your Mounjaro 7.5 mg pens. It is ok to inject this on the inside of your thigh.  Continue Insulin 10 units once daily in your thigh for now.  Keep your Kwaku in your pocket during the day. Keep it plugged in by your bed at bedtime.  Keep doing a good job at replacing all pop with water and Zero Sugar versions. Reduce snacking.    Follow up with Yvette at [facility]: 2/28 at 2pm        It was great speaking with you today.  I value your experience and would be very thankful for your time in providing feedback in our clinic survey. In the next few days, you may receive an email or text message from "[employer] with a link to a survey related to your  clinical pharmacist.\"     To schedule another MTM appointment, please call the clinic directly at [phone]. You may also schedule with me at the clinic .     My Clinical Pharmacist's contact information:                                                      Please feel free to contact me with any questions or concerns you have.      Yvette Carpenter, Pharm D., MPH  MTM Pharmacist - [facility]  Secure Voicemail: [phone]  Days in the office: Monday - Thursday         "

## 2024-02-14 NOTE — PROGRESS NOTES
Medication Therapy Management (MTM) Encounter    ASSESSMENT:                          -----------------------------------------  .    Medication Adherence/Access: No issues identified -We will continue to review 1 indication at the time with patient to prevent feeling overwhelmed.  -----------------------------------------  .    Diabetes /Obesity: A1c not at goal less than 7%.  Significant improvement to blood glucose with reducing sugary foods and snacks. Insulin also helpful at this time. Recommend continuing to increase Mounjaro to optimal 15 mg dose 2.5 mg monthly to help improve blood sugar and weight management more effectively. Will follow closely as may be able to decrease insulin or glimepiride as significant improvement (down over 100 mg/dl in the past 2 weeks on average). Will pursue SGLT2 inhibitor after Mounjaro optimized. Further CGM education today provided on keeping reader within 20 feet and charging at bedtime to reduce lost message.   .    At goal on aspirin and statin.  ACE/ARB not indicated due to no Microalbuminuria.   PLAN:                              Increase Mounjaro to 10 mg weekly when you are done with your Mounjaro 7.5 mg pens. It is ok to inject this on the inside of your thigh.  Continue Insulin 10 units once daily in your thigh for now.  Keep your Kwaku in your pocket during the day. Keep it plugged in by your bed at bedtime.  Keep doing a good job at replacing all pop with water and Zero Sugar versions. Reduce snacking.    Follow up with Yvette at [facility]: 2/28 at 2pm    SUBJECTIVE/OBJECTIVE:                          Bushra Buck is a 43 year old female coming in for a follow up visit. She is accompanied by her PCA, Xuan. Today's visit is a follow up from 1/23/24     Reason for visit: Medication Therapy Management - Diabetes; insulin start.    Allergies/ADRs: Reviewed in chart  Past Medical History: Reviewed in chart  Tobacco: She reports that she has never  smoked. She has never used smokeless tobacco.    Medication Adherence/Access: Patient takes medications 2 time(s) per day.   Per patient, misses medication 0 times per week.   Medication barriers: Patient has home health nurse help come and fill pillbox weekly.  Feels helps patient manage medications well and never misses a dose.  Patient has intellectual disability -current system works well to help manage medications.  Patient does not know medications off the top of her head and is easily overwhelmed.  Request to talk about 1 medical condition at a time at each visit to help improve understanding.   -----------------------------------------  .    Diabetes /Obesity:  Glimepiride 4mg twice daily   Mounjaro 7.5mg weekly x 3 weeks  Lantus 10 units daily in the morning  Aspirin 81mg daily    Patient has been working hard to reduce snacks and sweet drinks. Started CGM yesterday. Likes this, except when leaves room and the reader beeps that she is out of range. Patient is not experiencing side effects. Patient has history of severe diarrhea with both IR and ER metformin.  History of hospitalization for hyperglycemia.  Blood sugar monitorin time(s) daily; Ranges: (from patient's glucose log) Fasting- 226, 201, 224, 198, 241, 256, 200, 206, 153, 147, 197, 231, 222, 179, 176, 166, 146, 211, 195, 183, 193, 168  Has not used CGM for long enough for full history from [facility].    Current diabetes symptoms: none  Diet/Exercise: Appetite improving with Mounjaro increase. Following with bariatric clinic to help with tools to reduce binge eating and improve mindful eating. Recognizes that drinking a lot of full-sugar soda and snacking (chips specifically) are likely contributing to elevated blood glucose.     Eye exam in the last 12 months? Yes- Date of last eye exam: 23,  Location: [facility]    Foot exam: due  A1c (24): 11.2%  Urine Albumin (2023): Normal    Last Comprehensive Metabolic Panel:  1/22/24  Lab Results   Component Value     (L)    POTASSIUM 3.4    CHLORIDE 91 (L)    CO2 21 (L)    ANIONGAP 21 (H)     (H)    BUN 18.5    CR 0.80    GFRESTIMATED >90    TAWNYA 9.9     Hyperglycemia corrected Na: 139 mEq/L      Today's Vitals: /64   Pulse 87   Wt 316 lb (143.3 kg)   SpO2 98%   BMI 61.71 kg/m    ----------------      I spent 30 minutes with this patient today. All changes were made via collaborative practice agreement with Daniel Cruz MD. A copy of the visit note was provided to the patient's provider(s).    A summary of these recommendations was given to the patient and was sent via [employer].    Yvette Carpenter, Pharm D., MPH  MTM Pharmacist - [facility]  Secure Voicemail: [phone]  In Office: Monday - Thursday             Medication Therapy Recommendations  Type 2 diabetes mellitus with hyperglycemia, without long-term current use of insulin (H)    Current Medication: Continuous Blood Gluc  (FREESTYLE SUZY 3 READER) MIKY   Rationale: Does not understand instructions - Adherence - Adherence   Recommendation: Provide Education   Status: Patient Agreed - Adherence/Education          Current Medication: tirzepatide (MOUNJARO) 7.5 MG/0.5ML pen (Discontinued)   Rationale: Dose too low - Dosage too low - Effectiveness   Recommendation: Increase Dose   Status: Accepted per CPA

## 2024-02-14 NOTE — Clinical Note
Mendez Galvan - I had to share this promising blood glucose data from Bushra. She has made some significant improvements in diet and drink. Mounjaro is helping -- so we increased that today and will continue all other meds for now. I'm seeing her again in 2 weeks to further evaluate blood glucose. I will keep you updated. Yvette

## 2024-02-21 DIAGNOSIS — D35.2 PROLACTINOMA (H): Primary | ICD-10-CM

## 2024-02-27 RX ORDER — MULTIVITAMIN WITH FOLIC ACID 400 MCG
1 TABLET ORAL DAILY
Qty: 90 TABLET | Refills: 3 | Status: SHIPPED | OUTPATIENT
Start: 2024-02-27

## 2024-03-17 ENCOUNTER — HEALTH MAINTENANCE LETTER (OUTPATIENT)
Age: 44
End: 2024-03-17

## 2024-04-12 ENCOUNTER — LAB REQUISITION (OUTPATIENT)
Dept: LAB | Facility: CLINIC | Age: 44
End: 2024-04-12
Payer: COMMERCIAL

## 2024-04-12 DIAGNOSIS — E11.65 TYPE 2 DIABETES MELLITUS WITH HYPERGLYCEMIA (H): ICD-10-CM

## 2024-04-12 PROBLEM — I89.0 LYMPHEDEMA OF BOTH LOWER EXTREMITIES: Status: ACTIVE | Noted: 2024-03-13

## 2024-04-12 PROBLEM — N91.2 AMENORRHEA: Status: ACTIVE | Noted: 2024-04-01

## 2024-04-12 PROCEDURE — 80053 COMPREHEN METABOLIC PANEL: CPT | Performed by: FAMILY MEDICINE

## 2024-04-12 PROCEDURE — 80061 LIPID PANEL: CPT | Mod: ORL | Performed by: FAMILY MEDICINE

## 2024-04-12 PROCEDURE — 82570 ASSAY OF URINE CREATININE: CPT | Performed by: FAMILY MEDICINE

## 2024-04-13 LAB
ALBUMIN SERPL BCG-MCNC: 4.5 G/DL (ref 3.5–5.2)
ALP SERPL-CCNC: 51 U/L (ref 40–150)
ALT SERPL W P-5'-P-CCNC: 26 U/L (ref 0–50)
ANION GAP SERPL CALCULATED.3IONS-SCNC: 15 MMOL/L (ref 7–15)
AST SERPL W P-5'-P-CCNC: 22 U/L (ref 0–45)
BILIRUB SERPL-MCNC: 0.3 MG/DL
BUN SERPL-MCNC: 23.5 MG/DL (ref 6–20)
CALCIUM SERPL-MCNC: 9.4 MG/DL (ref 8.6–10)
CHLORIDE SERPL-SCNC: 100 MMOL/L (ref 98–107)
CHOLEST SERPL-MCNC: 161 MG/DL
CREAT SERPL-MCNC: 0.81 MG/DL (ref 0.51–0.95)
CREAT UR-MCNC: 66.2 MG/DL
DEPRECATED HCO3 PLAS-SCNC: 21 MMOL/L (ref 22–29)
EGFRCR SERPLBLD CKD-EPI 2021: >90 ML/MIN/1.73M2
FASTING STATUS PATIENT QL REPORTED: ABNORMAL
GLUCOSE SERPL-MCNC: 97 MG/DL (ref 70–99)
HDLC SERPL-MCNC: 26 MG/DL
LDLC SERPL CALC-MCNC: 64 MG/DL
MICROALBUMIN UR-MCNC: <12 MG/L
MICROALBUMIN/CREAT UR: NORMAL MG/G{CREAT}
NONHDLC SERPL-MCNC: 135 MG/DL
POTASSIUM SERPL-SCNC: 3.8 MMOL/L (ref 3.4–5.3)
PROT SERPL-MCNC: 7.2 G/DL (ref 6.4–8.3)
SODIUM SERPL-SCNC: 136 MMOL/L (ref 135–145)
TRIGL SERPL-MCNC: 354 MG/DL

## 2024-04-18 ENCOUNTER — DOCUMENTATION ONLY (OUTPATIENT)
Dept: ENDOCRINOLOGY | Facility: CLINIC | Age: 44
End: 2024-04-18

## 2024-04-18 ENCOUNTER — TELEPHONE (OUTPATIENT)
Dept: ENDOCRINOLOGY | Facility: CLINIC | Age: 44
End: 2024-04-18

## 2024-04-18 NOTE — TELEPHONE ENCOUNTER
Patient confirmed scheduled appointment:  Date: 4/19   Time: 10 am   Visit type: return diabetes   Provider: Caro  Location: [facility]  Testing/imaging: NA   Additional notes: Spoke to pt and scheduled telephone call pt does not have virtual capabilities and did not want to come in person. Scheduled telephone call. Will reach out to nurses to verify that is okay.    Hospital  follow up needed. Please offer return visit with any PA any location ok to use [facility]. Thank you.       Tiffanie Jimenez on 4/18/2024 at 2:56 PM

## 2024-04-19 ENCOUNTER — VIRTUAL VISIT (OUTPATIENT)
Dept: ENDOCRINOLOGY | Facility: CLINIC | Age: 44
End: 2024-04-19
Payer: COMMERCIAL

## 2024-04-19 ENCOUNTER — TELEPHONE (OUTPATIENT)
Dept: ENDOCRINOLOGY | Facility: CLINIC | Age: 44
End: 2024-04-19

## 2024-04-19 DIAGNOSIS — E11.65 TYPE 2 DIABETES MELLITUS WITH HYPERGLYCEMIA, WITH LONG-TERM CURRENT USE OF INSULIN (H): Primary | ICD-10-CM

## 2024-04-19 DIAGNOSIS — Z79.4 TYPE 2 DIABETES MELLITUS WITH HYPERGLYCEMIA, WITH LONG-TERM CURRENT USE OF INSULIN (H): Primary | ICD-10-CM

## 2024-04-19 PROCEDURE — 99207 PR NO CHARGE LOS: CPT | Mod: 93 | Performed by: PHYSICIAN ASSISTANT

## 2024-04-19 RX ORDER — LORAZEPAM 0.5 MG/1
0.5 TABLET ORAL
COMMUNITY
Start: 2024-03-08

## 2024-04-19 ASSESSMENT — PAIN SCALES - GENERAL: PAINLEVEL: NO PAIN (0)

## 2024-04-19 NOTE — LETTER
4/19/2024       RE: Bushra Buck  [address]  [place] [address]     Dear Colleague,    Thank you for referring your patient, Bushra Buck, to the [facility] ENDOCRINOLOGY CLINIC [place] [facility]. Please see a copy of my visit note below.    UNABLE TO REACH PT VIA PHONE X 2.  LEFT MESSAGE.  NO CHARGE TODAY.  MADINA ELIZONDO PA-C          Outcome for 04/19/24 8:09 AM: Sn[employer]Feed message sent- from in clinic staff.   Georgina Hooper MA  Outcome for 04/19/24 8:10 AM: Left Voicemail   Georgina Hooper MA  Outcome for 04/19/24 8:43 AM: Data obtained via phone and located below  Gabrielle Dillon LPN         Virtual Visit Details    Type of service:  Telephone Visit   Phone call duration:  minutes   Originating Location (pt. Location):     Distant Location (provider location):        Again, thank you for allowing me to participate in the care of your patient.      Sincerely,    Madina Elizondo PA-C

## 2024-04-19 NOTE — NURSING NOTE
Is the patient currently in the state of [place]? YES    Visit mode:TELEPHONE    If the visit is dropped, the patient can be reconnected by: TELEPHONE VISIT: Phone number:   Telephone Information:   Mobile [phone]   Mobile Not on file.       Will anyone else be joining the visit? NO  (If patient encounters technical issues they should call [phone] :[phone])    How would you like to obtain your AVS? MyChart    Are changes needed to the allergy or medication list? No    Are refills needed on medications prescribed by this physician? NO    Reason for visit: RECHECK Shelby Kocher VVF

## 2024-04-19 NOTE — PROGRESS NOTES
Virtual Visit Details    Type of service:  Telephone Visit   Phone call duration:  minutes   Originating Location (pt. Location):     Distant Location (provider location):

## 2024-04-19 NOTE — PROGRESS NOTES
UNABLE TO REACH PT VIA PHONE X 2.  LEFT MESSAGE.  NO CHARGE TODAY.  MATT STALLINGS PA-C          Outcome for 04/19/24 8:09 AM: [employer] message sent- from in clinic staff.   Georgina Hooper MA  Outcome for 04/19/24 8:10 AM: Left Voicemail   Georgina Hooper MA  Outcome for 04/19/24 8:43 AM: Data obtained via phone and located below  Gabrielle Dillon LPN

## 2024-04-19 NOTE — TELEPHONE ENCOUNTER
Here are pt BS readings :         04/04 - 178   04/05 - 177   04/06 - 144   04/07 - 148   04/08 - 144   04/09 - 154   04/10 - 166   04/11 - 160   04/12 - 138  04/13 - 141   04/14 - 219   04/ 15- 144   04/16 - 159    04/18 - 181   04/19 - 195

## 2024-04-19 NOTE — TELEPHONE ENCOUNTER
Called patient and left voicemail. Patient has an appointment on  4/19/24 . Need to collect the last 14 days worth of blood sugar readings to prepare for patient's visit.   Georgina Hooper MA

## 2024-05-06 ENCOUNTER — TELEPHONE (OUTPATIENT)
Dept: ENDOCRINOLOGY | Facility: CLINIC | Age: 44
End: 2024-05-06
Payer: COMMERCIAL

## 2024-05-06 NOTE — TELEPHONE ENCOUNTER
Called patient and no answer. Unable to LVM. Patient has an appointment on  5/8/24 . Need to collect the last 14 days worth of blood sugar readings to prepare for patient's visit.   Georgina Hooper MA

## 2024-05-07 DIAGNOSIS — D35.2 PITUITARY ADENOMA (H): Primary | ICD-10-CM

## 2024-05-07 DIAGNOSIS — D35.2 PROLACTINOMA (H): ICD-10-CM

## 2024-05-07 NOTE — TELEPHONE ENCOUNTER
Called patient and no answer. Unable to LVM. VM full. Patient has an appointment on  5/8/24 . Need to collect the last 14 days worth of blood sugar readings to prepare for patient's visit.   Georgina Hooper MA

## 2024-05-08 ENCOUNTER — TELEPHONE (OUTPATIENT)
Dept: ENDOCRINOLOGY | Facility: CLINIC | Age: 44
End: 2024-05-08

## 2024-05-08 ENCOUNTER — VIRTUAL VISIT (OUTPATIENT)
Dept: ENDOCRINOLOGY | Facility: CLINIC | Age: 44
End: 2024-05-08
Payer: COMMERCIAL

## 2024-05-08 DIAGNOSIS — E11.65 TYPE 2 DIABETES MELLITUS WITH HYPERGLYCEMIA, WITH LONG-TERM CURRENT USE OF INSULIN (H): Primary | ICD-10-CM

## 2024-05-08 DIAGNOSIS — Z79.4 TYPE 2 DIABETES MELLITUS WITH HYPERGLYCEMIA, WITH LONG-TERM CURRENT USE OF INSULIN (H): Primary | ICD-10-CM

## 2024-05-08 PROCEDURE — 99442 PR PHYSICIAN TELEPHONE EVALUATION 11-20 MIN: CPT | Mod: 93 | Performed by: PHYSICIAN ASSISTANT

## 2024-05-08 NOTE — PROGRESS NOTES
Outcome for 05/08/24 2:58 PM: Data obtained via phone and located below  Gabrielle Dillon LPN     5/1  222     5/2  150     5/3  208     5/4  188     5/5  174     5/6  177     5/7  205     5/8  184

## 2024-05-08 NOTE — LETTER
5/8/2024       RE: Bushra Buck  [address]  [place] [address]     Dear Colleague,    Thank you for referring your patient, Bushra Buck, to the [facility] [place] at [facility]. Please see a copy of my visit note below.    Outcome for 05/08/24 2:58 PM: Data obtained via phone and located below  Gabrielle Dillon LPN     5/1  222     5/2  150     5/3  208     5/4  188     5/5  174     5/6  177     5/7  205     5/8  184    Time of start: 4:30 pm   Time of end: 4:46 pm   Total duration of telephone visit: 16 minutes.  Providers location: Off-site.  Patient location: [place].    HPI  Bushra Buck is a 44-year-old female with history of type 2 diabetes mellitus.  Patient is being seen today for her diabetes.  Patient last seen by Dr. Farfan in January 2024  This is patient's first visit with me today.  Patient recently admitted 4/1-4/2/2024 at [facility] for psychosis.  Patient has a history of prolactinoma s/p resection in 2000 and subsequent treatment with carbergoline restarted in 2017 for persistent levels, amenorrhea, fatigue and weight gain.  Type 2 diabetes mellitus diagnosed in 2020.  Patient's history also significant for ,morbid obesity, hypothyroidism dyslipidemia, bipolar, PTSD, schizophrenia, DANIEL, venous stasis with lymphedema lower extremities, hypertension, irritable bowel syndrome, GERD, leg cellulitis, MRSA + and vitamin D deficiency.  Patient had been taking Mounjaro which was discontinued.  She is unclear why the Mounjaro was discontinued.  She tells me that her Lantus was also discontinued.  For her diabetes, she states she is taking glimepiride 4 mg each a.m. and Jardiance 10 mg each a.m.  A1c was 7.2% on 4/2/2024.  Patient provided me with a few blood sugar readings today which I scanned in her note below.  Blood sugars have been in the high 100s to mid 200 range.  She uses a  freestyle kamryn 3 sensor to monitor her blood sugar.  On ROS today, fatigue.  Denies headaches, blurred vision, nausea, vomiting, shortness of breath at rest or fever.  No chest pain, abdominal pain or diarrhea.  Patient denies dysuria.  Bushra denies any symptoms of diabetic neuropathy and denies foot ulcers at this time.  She has lymphedema in the lower extremities.    Diabetes Care  Retinopathy: Patient denies history of diabetic retinopathy.  She states she was seen by ophthalmology in June 2023.  Nephropathy: Urine protein negative in April 2024.  Neuropathy: None per patient.  Foot Exam: No exam today.  Taking aspirin: Yes.  Lipids: LDL 64 in April 2024.  Patient taking Lipitor and fenofibrate.  Insulin: none. ADDING basal insulin today.  DM meds: Glimepiride and Jardiance.  Testing: Freestyle libre3 sensor.           ROS  See under history of present illness.    Allergies  Allergies   Allergen Reactions    Fiorinal [Butalbital-Aspirin-Caffeine] Anaphylaxis and Swelling    Aspirin Other (See Comments)     Eyes swell shut    Ativan [Lorazepam]     Bee Venom     Benzodiazepines Other (See Comments)     Swelling from allergies    Butalbital Other (See Comments)    Caffeine Other (See Comments)    Carbamazepine Other (See Comments)     Arm swelling    Cat Hair Extract      Other reaction(s): Unknown    Cephalosporins Angioedema    Clindamycin      Other reaction(s): heartburn    Codeine Angioedema    Dextromethorphan      Other reaction(s): hives    Ibuprofen Sodium Other (See Comments)     swelling    Keflex [Cephalexin]      Eyes swell    Latex Other (See Comments) and Swelling     Swelling    Lithium Swelling    Oxcarbazepine      numbness    Topiramate      Other reaction(s): edema/swelling    Wasp Venom Protein Unknown    Amoxicillin-Pot Clavulanate Rash     Rash on neck - not raised    Topamax Rash       Medications  Current Outpatient Medications   Medication Sig Dispense Refill    ACETAMINOPHEN EXTRA  STRENGTH 500 MG tablet       Alcohol Swabs (ALCOHOL PREP) 70 % PADS See Admin Instructions      ASPIRIN LOW DOSE 81 MG EC tablet Take 81 mg by mouth daily      atorvastatin (LIPITOR) 40 MG tablet Take 40 mg by mouth daily      bacitracin 500 UNIT/GM external ointment Apply topically 2 times daily 1 g 5    bumetanide (BUMEX) 2 MG tablet Take 4 mg by mouth 3 times daily      cabergoline (DOSTINEX) 0.5 MG tablet Take 0.25 mg by mouth twice a week      calcium carbonate-vitamin D (OS-TAWNYA WITH D) 500-200 MG-UNIT tablet Take 1 tablet by mouth 2 times daily      cetirizine (ZYRTEC) 10 MG tablet Take 10 mg by mouth daily      Continuous Blood Gluc  (FREESTYLE SUZY 3 READER) MIKY 1 each daily      Continuous Blood Gluc Sensor (FREESTYLE SUZY 3 SENSOR) MISC 1 each every 14 days      divalproex sodium extended-release (DEPAKOTE ER) 500 MG 24 hr tablet Take 500 mg by mouth 3 times daily      fenofibrate (TRICOR) 145 MG tablet Take 145 mg by mouth daily      glimepiride (AMARYL) 4 MG tablet Take 4 mg by mouth every morning (before breakfast)      Insulin Glargine Solostar 100 UNIT/ML SOPN Inject 10 Units Subcutaneous daily      insulin pen needle (NOVOFINE PEN NEEDLE) 32G X 6 MM miscellaneous 1 each daily      levothyroxine (SYNTHROID/LEVOTHROID) 125 MCG tablet Take 1 tablet by mouth daily      LORazepam (ATIVAN) 0.5 MG tablet Take 0.5 mg by mouth      medroxyPROGESTERone (PROVERA) 10 MG tablet TAKE 1 TABLET  10 MG  BY MOUTH ONCE DAILY FOR 5 DAYS EACH MONTH  0    melatonin 5 MG tablet Take 1 tablet by mouth daily at 2 pm      Multiple Vitamin (TAB-A-NARESH) TABS Take 1 tablet by mouth daily 90 tablet 3    multivitamin w/minerals (THERA-VIT-M) tablet Take 1 tablet by mouth daily      NYAMYC 256586 UNIT/GM external powder Apply 1 g topically as needed      omeprazole (PRILOSEC) 40 MG DR capsule Take 1 capsule by mouth daily.      paliperidone ER (INVEGA) 6 MG 24 hr tablet Take 6 mg by mouth every morning       spironolactone (ALDACTONE) 50 MG tablet Take 50 mg by mouth 2 times daily      VITAMIN D3 25 MCG (1000 UT) tablet Take 3 tablets by mouth daily         Family History  family history includes Cancer in her mother; Diabetes in her maternal grandfather, mother, and sister; Edema in her sister; Heart Disease in her father; Hypothyroidism in her mother; No Known Problems in her sister; Sleep Apnea in her mother; Snoring in her mother; Thyroid Disease in her mother.    Social History   reports that she has never smoked. She has never used smokeless tobacco. She reports that she does not drink alcohol and does not use drugs.     Past Medical History  Past Medical History:   Diagnosis Date    Abnormal weight gain 01/17/2013    Acne rosacea     Acquired hypothyroidism 07/06/2016    Allergic rhinitis     Bipolar 1 disorder (H)     followed by psych    Bipolar disorder (H) 01/01/1992    Cellulitis, leg 01/01/2016    Dermatitis 12/31/2011    Dyslipidemia     Ganglion, left wrist     GERD (gastroesophageal reflux disease)     GERD (gastroesophageal reflux disease)     Growth retardation, mild developmental delay, and chronic hepatitis syndrome     Herpes zoster     High triglycerides 01/23/2014    Hypertension     Hypothyroid     Irritable bowel syndrome     Lymphedema of lower extremity 01/01/2008    Morbid obesity (H)     Nightmares     Obstructive sleep apnea     Pituitary adenoma (H)     S/P trans nasal resection in 2000    Post traumatic stress disorder     Post traumatic stress disorder     Post traumatic stress disorder (PTSD)     Prader-Willi syndrome     Prolactinoma (H) transsphenoid approach 2000.    PTSD (post-traumatic stress disorder)     Schizoaffective disorder, bipolar type (H)     Stasis edema 12/31/2011    Type 2 diabetes mellitus without complication, with long-term current use of insulin (H)     Venous stasis     Vitamin D deficiency        Past Surgical History:   Procedure Laterality Date    ABDOMEN  SURGERY  2000    fat removed tp plug dura when had a pituitary surgery.    BRAIN SURGERY  2000    pituitary surgery    COLONOSCOPY N/A 10/14/2019    Procedure: COLONOSCOPY;  Surgeon: Hugo Mattson MD;  Location: [facility];  Service: Gastroenterology    EXCISE GANGLION WRIST  2010    left arm    EXCISE GANGLION WRIST Left 6/7/2018    Procedure: REMOVE RECURRENT GANGLION CYST LEFT VOLAR RADIAL WRIST;  Surgeon: Angel Lopez MD;  Location: [facility] OR;  Service:     EXCISE GANGLION WRIST Left 11/18/2019    Procedure: EXCISION LEFT WRIST RECURRENT GANGLION CYST;  Surgeon: Angel Lopez MD;  Location: [facility] OR;  Service: Hand    OTHER SURGICAL HISTORY  2004, 2005    anorectal fissure repair    OTHER SURGICAL HISTORY Bilateral     pansinus revision    PITUITARY SURGERY  2014    pituitary tumor removal  2000    at the [facility]    RELEASE CARPAL TUNNEL Bilateral     septal plasty  1998    SEPTOPLASTY      x 2    SINUS SURGERY Right 8/21/2015    Procedure: RIGHT MICHELLE BULLOSA;  Surgeon: Daniel Landeros MD;  Location: [facility];  Service:     TONSILLECTOMY  1998    TONSILLECTOMY         Physical Exam    No exam today.    RESULTS  Creatinine   Date Value Ref Range Status   04/12/2024 0.81 0.51 - 0.95 mg/dL Final   03/23/2020 0.70 0.52 - 1.04 mg/dL Final     GFR Estimate   Date Value Ref Range Status   04/12/2024 >90 >60 mL/min/1.73m2 Final   06/29/2021 >60 >60 mL/min/1.73m2 Final   03/23/2020 >90 >60 mL/min/[1.73_m2] Final     Comment:     Non  GFR Calc  Starting 12/18/2018, serum creatinine based estimated GFR (eGFR) will be   calculated using the Chronic Kidney Disease Epidemiology Collaboration   (CKD-EPI) equation.       Hemoglobin A1C   Date Value Ref Range Status   01/24/2024 11.7 (H) <5.7 % Final     Comment:     Normal <5.7%   Prediabetes 5.7-6.4%    Diabetes 6.5% or higher     Note: Adopted from ADA consensus guidelines.   03/23/2020 5.3 0 - 5.6 %  Final     Comment:     Normal <5.7% Prediabetes 5.7-6.4%  Diabetes 6.5% or higher - adopted from ADA   consensus guidelines.       Potassium   Date Value Ref Range Status   04/12/2024 3.8 3.4 - 5.3 mmol/L Final   05/17/2022 4.0 3.5 - 5.0 mmol/L Final   03/23/2020 4.0 3.4 - 5.3 mmol/L Final     ALT   Date Value Ref Range Status   04/12/2024 26 0 - 50 U/L Final     Comment:     Reference intervals for this test were updated on 6/12/2023 to more accurately reflect our healthy population. There may be differences in the flagging of prior results with similar values performed with this method. Interpretation of those prior results can be made in the context of the updated reference intervals.     03/23/2020 37 0 - 50 U/L Final     AST   Date Value Ref Range Status   04/12/2024 22 0 - 45 U/L Final     Comment:     Reference intervals for this test were updated on 6/12/2023 to more accurately reflect our healthy population. There may be differences in the flagging of prior results with similar values performed with this method. Interpretation of those prior results can be made in the context of the updated reference intervals.   03/23/2020 20 0 - 45 U/L Final     TSH   Date Value Ref Range Status   01/24/2024 2.77 0.30 - 4.20 uIU/mL Final   04/04/2022 4.28 0.30 - 5.00 uIU/mL Final   04/07/2021 3.51 0.40 - 4.00 mU/L Final     T4 Free   Date Value Ref Range Status   04/07/2021 1.39 0.76 - 1.46 ng/dL Final     Free T4   Date Value Ref Range Status   01/24/2024 1.68 0.90 - 1.70 ng/dL Final       Cholesterol   Date Value Ref Range Status   04/12/2024 161 <200 mg/dL Final   04/10/2023 158 <200 mg/dL Final   04/07/2021 115 <200 mg/dL Final   01/02/2015 212 (H) <200 mg/dL Final     Comment:     LDL Cholesterol is the primary guide to therapy.   The NCEP recommends further evaluation of: patients with cholesterol greater   than 200 mg/dL if additional risk factors are present, cholesterol greater   than   240 mg/dL, triglycerides  greater than 150 mg/dL, or HDL less than 40 mg/dL.       HDL Cholesterol   Date Value Ref Range Status   04/07/2021 35 (L) >49 mg/dL Final   01/02/2015 34 (L) >50 mg/dL Final     Direct Measure HDL   Date Value Ref Range Status   04/12/2024 26 (L) >=50 mg/dL Final   04/10/2023 16 (L) >=50 mg/dL Final     LDL Cholesterol Calculated   Date Value Ref Range Status   04/12/2024 64 <=100 mg/dL Final   04/10/2023   Final     Comment:     Cannot estimate LDL when triglyceride exceeds 400 mg/dL   04/07/2021 40 <100 mg/dL Final     Comment:     Desirable:       <100 mg/dl   01/02/2015 106 0 - 129 mg/dL Final     Comment:     LDL Cholesterol is the primary guide to therapy: LDL-cholesterol goal in high   risk patients is <100 mg/dL and in very high risk patients is <70 mg/dL.       LDL Cholesterol Direct   Date Value Ref Range Status   04/10/2023 73 <100 mg/dL Final     Comment:     Age 2-19 years:  Desirable: 0-110 mg/dL   Borderline high: 110-129 mg/dL   High: >= 130 mg/dL    Age 20 years and older:  Desirable: <100mg/dL  Above desirable: 100-129 mg/dL   Borderline high: 130-159 mg/dL   High: 160-189 mg/dL   Very high: >= 190 mg/dL   04/13/2022 82 <=129 mg/dL Final   04/04/2022 124 <=129 mg/dL Final     Triglycerides   Date Value Ref Range Status   04/12/2024 354 (H) <150 mg/dL Final   04/10/2023 410 (H) <150 mg/dL Final   04/07/2021 199 (H) <150 mg/dL Final     Comment:     Borderline high:  150-199 mg/dl  High:             200-499 mg/dl  Very high:       >499 mg/dl     01/02/2015 363 (H) 0 - 150 mg/dL Final     Cholesterol/HDL Ratio   Date Value Ref Range Status   01/02/2015 6.3 (H) 0.0 - 5.0 Final   01/17/2013 6.3 (H) 0.0 - 5.0 Final         ASSESSMENT/PLAN:     TYPE 2 DIABETES MELLITUS: Blood sugar values have been above target.  I asked patient to start Lantus 10 units daily.  She is to remain on her current dose of glimepiride and Jardiance.  Consider adding back Mounjaro.  Bushra is to check her blood sugar fasting  each am and before dinner daily.  I will have CDE/RD follow-up on patient's blood sugar readings next week and additional insulin adjustment will be made at that time if needed.  Patient states she was seen by ophthalmology in June 2023 without diabetic retinopathy.  Her urine microalbuminuria has been negative.  Bushra denies any symptoms of diabetic neuropathy.  She states she has no foot ulcers at this time.  Patient does have lymphedema of the lower extremities.  No vitals today.  HX OF PROLACTINOMA: Not addressed today.  Patient is followed by Dr. Farfan.  FOLLOW UP: With me or other provider in 3 months. Pt to see CDE/RD to follow-up on blood sugar values next week.    Time spent reviewing chart, labs and freestyle kamryn 3 sensor download data today=5 minutes.  Time for video visit today= 16 minutes.  Time for documentation today= 15 minutes.    Total time for visit today= 36 minutes.    Madina Elizondo PA-C

## 2024-05-08 NOTE — TELEPHONE ENCOUNTER
Could not LVM for the patient to call back and schedule the following:    Appointment type: return diabetes   Provider: Caro   Return date: 5/8 add on after the last pt or re-bernadette to next avail   Specialty phone number: [phone]  Additional appointment(s) needed: NA   Additonal Notes: VM full, MyC x1   Due to Debora's internet going out appt today 5/8 will have to be re-scheduled. Debora okayed this pt to be added onto the end of her schedule after the last pt today 5/8. If that does not work for the patient schedule next available and send a TE so we can reach out to Debora for a sooner appt. Thank you.    Madina Elizondo PA-C  [facility]; [facility]  Hi :  My internet is now up and working.  My 9 am, 9:30 am, 10 am, 10:30 am and 11 am patients will need to be rescheduled.  If they want to be seen later today after 3:30 pm that's ok with me and if you are not able to find a spot please let me know.  I may have to open Monday afternoon as a virtual clinic.    Tiffanie Jimenez on 5/8/2024 at 1:30 PM

## 2024-05-08 NOTE — PROGRESS NOTES
Time of start: 4:30 pm   Time of end: 4:46 pm   Total duration of telephone visit: 16 minutes.  Providers location: Off-site.  Patient location: [place].    HPI  Bushra Buck is a 44-year-old female with history of type 2 diabetes mellitus.  Patient is being seen today for her diabetes.  Patient last seen by Dr. Farfan in January 2024  This is patient's first visit with me today.  Patient recently admitted 4/1-4/2/2024 at [facility] for psychosis.  Patient has a history of prolactinoma s/p resection in 2000 and subsequent treatment with carbergoline restarted in 2017 for persistent levels, amenorrhea, fatigue and weight gain.  Type 2 diabetes mellitus diagnosed in 2020.  Patient's history also significant for ,morbid obesity, hypothyroidism dyslipidemia, bipolar, PTSD, schizophrenia, DANIEL, venous stasis with lymphedema lower extremities, hypertension, irritable bowel syndrome, GERD, leg cellulitis, MRSA + and vitamin D deficiency.  Patient had been taking Mounjaro which was discontinued.  She is unclear why the Mounjaro was discontinued.  She tells me that her Lantus was also discontinued.  For her diabetes, she states she is taking glimepiride 4 mg each a.m. and Jardiance 10 mg each a.m.  A1c was 7.2% on 4/2/2024.  Patient provided me with a few blood sugar readings today which I scanned in her note below.  Blood sugars have been in the high 100s to mid 200 range.  She uses a freestyle kamryn 3 sensor to monitor her blood sugar.  On ROS today, fatigue.  Denies headaches, blurred vision, nausea, vomiting, shortness of breath at rest or fever.  No chest pain, abdominal pain or diarrhea.  Patient denies dysuria.  Bushra denies any symptoms of diabetic neuropathy and denies foot ulcers at this time.  She has lymphedema in the lower extremities.    Diabetes Care  Retinopathy: Patient denies history of diabetic retinopathy.  She states she was seen by ophthalmology in June 2023.  Nephropathy: Urine protein  negative in April 2024.  Neuropathy: None per patient.  Foot Exam: No exam today.  Taking aspirin: Yes.  Lipids: LDL 64 in April 2024.  Patient taking Lipitor and fenofibrate.  Insulin: none. ADDING basal insulin today.  DM meds: Glimepiride and Jardiance.  Testing: Freestyle libre3 sensor.           ROS  See under history of present illness.    Allergies  Allergies   Allergen Reactions    Fiorinal [Butalbital-Aspirin-Caffeine] Anaphylaxis and Swelling    Aspirin Other (See Comments)     Eyes swell shut    Ativan [Lorazepam]     Bee Venom     Benzodiazepines Other (See Comments)     Swelling from allergies    Butalbital Other (See Comments)    Caffeine Other (See Comments)    Carbamazepine Other (See Comments)     Arm swelling    Cat Hair Extract      Other reaction(s): Unknown    Cephalosporins Angioedema    Clindamycin      Other reaction(s): heartburn    Codeine Angioedema    Dextromethorphan      Other reaction(s): hives    Ibuprofen Sodium Other (See Comments)     swelling    Keflex [Cephalexin]      Eyes swell    Latex Other (See Comments) and Swelling     Swelling    Lithium Swelling    Oxcarbazepine      numbness    Topiramate      Other reaction(s): edema/swelling    Wasp Venom Protein Unknown    Amoxicillin-Pot Clavulanate Rash     Rash on neck - not raised    Topamax Rash       Medications  Current Outpatient Medications   Medication Sig Dispense Refill    ACETAMINOPHEN EXTRA STRENGTH 500 MG tablet       Alcohol Swabs (ALCOHOL PREP) 70 % PADS See Admin Instructions      ASPIRIN LOW DOSE 81 MG EC tablet Take 81 mg by mouth daily      atorvastatin (LIPITOR) 40 MG tablet Take 40 mg by mouth daily      bacitracin 500 UNIT/GM external ointment Apply topically 2 times daily 1 g 5    bumetanide (BUMEX) 2 MG tablet Take 4 mg by mouth 3 times daily      cabergoline (DOSTINEX) 0.5 MG tablet Take 0.25 mg by mouth twice a week      calcium carbonate-vitamin D (OS-TAWNYA WITH D) 500-200 MG-UNIT tablet Take 1 tablet by  mouth 2 times daily      cetirizine (ZYRTEC) 10 MG tablet Take 10 mg by mouth daily      Continuous Blood Gluc  (FREESTYLE SUZY 3 READER) MIKY 1 each daily      Continuous Blood Gluc Sensor (FREESTYLE SUZY 3 SENSOR) MISC 1 each every 14 days      divalproex sodium extended-release (DEPAKOTE ER) 500 MG 24 hr tablet Take 500 mg by mouth 3 times daily      fenofibrate (TRICOR) 145 MG tablet Take 145 mg by mouth daily      glimepiride (AMARYL) 4 MG tablet Take 4 mg by mouth every morning (before breakfast)      Insulin Glargine Solostar 100 UNIT/ML SOPN Inject 10 Units Subcutaneous daily      insulin pen needle (NOVOFINE PEN NEEDLE) 32G X 6 MM miscellaneous 1 each daily      levothyroxine (SYNTHROID/LEVOTHROID) 125 MCG tablet Take 1 tablet by mouth daily      LORazepam (ATIVAN) 0.5 MG tablet Take 0.5 mg by mouth      medroxyPROGESTERone (PROVERA) 10 MG tablet TAKE 1 TABLET  10 MG  BY MOUTH ONCE DAILY FOR 5 DAYS EACH MONTH  0    melatonin 5 MG tablet Take 1 tablet by mouth daily at 2 pm      Multiple Vitamin (TAB-A-NARESH) TABS Take 1 tablet by mouth daily 90 tablet 3    multivitamin w/minerals (THERA-VIT-M) tablet Take 1 tablet by mouth daily      NYAMYC 925175 UNIT/GM external powder Apply 1 g topically as needed      omeprazole (PRILOSEC) 40 MG DR capsule Take 1 capsule by mouth daily.      paliperidone ER (INVEGA) 6 MG 24 hr tablet Take 6 mg by mouth every morning      spironolactone (ALDACTONE) 50 MG tablet Take 50 mg by mouth 2 times daily      VITAMIN D3 25 MCG (1000 UT) tablet Take 3 tablets by mouth daily         Family History  family history includes Cancer in her mother; Diabetes in her maternal grandfather, mother, and sister; Edema in her sister; Heart Disease in her father; Hypothyroidism in her mother; No Known Problems in her sister; Sleep Apnea in her mother; Snoring in her mother; Thyroid Disease in her mother.    Social History   reports that she has never smoked. She has never used smokeless  tobacco. She reports that she does not drink alcohol and does not use drugs.     Past Medical History  Past Medical History:   Diagnosis Date    Abnormal weight gain 01/17/2013    Acne rosacea     Acquired hypothyroidism 07/06/2016    Allergic rhinitis     Bipolar 1 disorder (H)     followed by psych    Bipolar disorder (H) 01/01/1992    Cellulitis, leg 01/01/2016    Dermatitis 12/31/2011    Dyslipidemia     Ganglion, left wrist     GERD (gastroesophageal reflux disease)     GERD (gastroesophageal reflux disease)     Growth retardation, mild developmental delay, and chronic hepatitis syndrome     Herpes zoster     High triglycerides 01/23/2014    Hypertension     Hypothyroid     Irritable bowel syndrome     Lymphedema of lower extremity 01/01/2008    Morbid obesity (H)     Nightmares     Obstructive sleep apnea     Pituitary adenoma (H)     S/P trans nasal resection in 2000    Post traumatic stress disorder     Post traumatic stress disorder     Post traumatic stress disorder (PTSD)     Prader-Willi syndrome     Prolactinoma (H) transsphenoid approach 2000.    PTSD (post-traumatic stress disorder)     Schizoaffective disorder, bipolar type (H)     Stasis edema 12/31/2011    Type 2 diabetes mellitus without complication, with long-term current use of insulin (H)     Venous stasis     Vitamin D deficiency        Past Surgical History:   Procedure Laterality Date    ABDOMEN SURGERY  2000    fat removed tp plug dura when had a pituitary surgery.    BRAIN SURGERY  2000    pituitary surgery    COLONOSCOPY N/A 10/14/2019    Procedure: COLONOSCOPY;  Surgeon: Hugo Mattson MD;  Location: [facility] OR;  Service: Gastroenterology    EXCISE GANGLION WRIST  2010    left arm    EXCISE GANGLION WRIST Left 6/7/2018    Procedure: REMOVE RECURRENT GANGLION CYST LEFT VOLAR RADIAL WRIST;  Surgeon: Angel Lopez MD;  Location: [facility] OR;  Service:     EXCISE GANGLION WRIST Left 11/18/2019    Procedure: EXCISION  LEFT WRIST RECURRENT GANGLION CYST;  Surgeon: Angel Lopez MD;  Location: [facility];  Service: Hand    OTHER SURGICAL HISTORY  2004, 2005    anorectal fissure repair    OTHER SURGICAL HISTORY Bilateral     pansinus revision    PITUITARY SURGERY  2014    pituitary tumor removal  2000    at the [facility]    RELEASE CARPAL TUNNEL Bilateral     septal plasty  1998    SEPTOPLASTY      x 2    SINUS SURGERY Right 8/21/2015    Procedure: RIGHT MICHELLE BULLOSA;  Surgeon: Daniel Landeros MD;  Location: [facility];  Service:     TONSILLECTOMY  1998    TONSILLECTOMY         Physical Exam    No exam today.    RESULTS  Creatinine   Date Value Ref Range Status   04/12/2024 0.81 0.51 - 0.95 mg/dL Final   03/23/2020 0.70 0.52 - 1.04 mg/dL Final     GFR Estimate   Date Value Ref Range Status   04/12/2024 >90 >60 mL/min/1.73m2 Final   06/29/2021 >60 >60 mL/min/1.73m2 Final   03/23/2020 >90 >60 mL/min/[1.73_m2] Final     Comment:     Non  GFR Calc  Starting 12/18/2018, serum creatinine based estimated GFR (eGFR) will be   calculated using the Chronic Kidney Disease Epidemiology Collaboration   (CKD-EPI) equation.       Hemoglobin A1C   Date Value Ref Range Status   01/24/2024 11.7 (H) <5.7 % Final     Comment:     Normal <5.7%   Prediabetes 5.7-6.4%    Diabetes 6.5% or higher     Note: Adopted from ADA consensus guidelines.   03/23/2020 5.3 0 - 5.6 % Final     Comment:     Normal <5.7% Prediabetes 5.7-6.4%  Diabetes 6.5% or higher - adopted from ADA   consensus guidelines.       Potassium   Date Value Ref Range Status   04/12/2024 3.8 3.4 - 5.3 mmol/L Final   05/17/2022 4.0 3.5 - 5.0 mmol/L Final   03/23/2020 4.0 3.4 - 5.3 mmol/L Final     ALT   Date Value Ref Range Status   04/12/2024 26 0 - 50 U/L Final     Comment:     Reference intervals for this test were updated on 6/12/2023 to more accurately reflect our healthy population. There may be differences in the flagging of prior results with  similar values performed with this method. Interpretation of those prior results can be made in the context of the updated reference intervals.     03/23/2020 37 0 - 50 U/L Final     AST   Date Value Ref Range Status   04/12/2024 22 0 - 45 U/L Final     Comment:     Reference intervals for this test were updated on 6/12/2023 to more accurately reflect our healthy population. There may be differences in the flagging of prior results with similar values performed with this method. Interpretation of those prior results can be made in the context of the updated reference intervals.   03/23/2020 20 0 - 45 U/L Final     TSH   Date Value Ref Range Status   01/24/2024 2.77 0.30 - 4.20 uIU/mL Final   04/04/2022 4.28 0.30 - 5.00 uIU/mL Final   04/07/2021 3.51 0.40 - 4.00 mU/L Final     T4 Free   Date Value Ref Range Status   04/07/2021 1.39 0.76 - 1.46 ng/dL Final     Free T4   Date Value Ref Range Status   01/24/2024 1.68 0.90 - 1.70 ng/dL Final       Cholesterol   Date Value Ref Range Status   04/12/2024 161 <200 mg/dL Final   04/10/2023 158 <200 mg/dL Final   04/07/2021 115 <200 mg/dL Final   01/02/2015 212 (H) <200 mg/dL Final     Comment:     LDL Cholesterol is the primary guide to therapy.   The NCEP recommends further evaluation of: patients with cholesterol greater   than 200 mg/dL if additional risk factors are present, cholesterol greater   than   240 mg/dL, triglycerides greater than 150 mg/dL, or HDL less than 40 mg/dL.       HDL Cholesterol   Date Value Ref Range Status   04/07/2021 35 (L) >49 mg/dL Final   01/02/2015 34 (L) >50 mg/dL Final     Direct Measure HDL   Date Value Ref Range Status   04/12/2024 26 (L) >=50 mg/dL Final   04/10/2023 16 (L) >=50 mg/dL Final     LDL Cholesterol Calculated   Date Value Ref Range Status   04/12/2024 64 <=100 mg/dL Final   04/10/2023   Final     Comment:     Cannot estimate LDL when triglyceride exceeds 400 mg/dL   04/07/2021 40 <100 mg/dL Final     Comment:     Desirable:        <100 mg/dl   01/02/2015 106 0 - 129 mg/dL Final     Comment:     LDL Cholesterol is the primary guide to therapy: LDL-cholesterol goal in high   risk patients is <100 mg/dL and in very high risk patients is <70 mg/dL.       LDL Cholesterol Direct   Date Value Ref Range Status   04/10/2023 73 <100 mg/dL Final     Comment:     Age 2-19 years:  Desirable: 0-110 mg/dL   Borderline high: 110-129 mg/dL   High: >= 130 mg/dL    Age 20 years and older:  Desirable: <100mg/dL  Above desirable: 100-129 mg/dL   Borderline high: 130-159 mg/dL   High: 160-189 mg/dL   Very high: >= 190 mg/dL   04/13/2022 82 <=129 mg/dL Final   04/04/2022 124 <=129 mg/dL Final     Triglycerides   Date Value Ref Range Status   04/12/2024 354 (H) <150 mg/dL Final   04/10/2023 410 (H) <150 mg/dL Final   04/07/2021 199 (H) <150 mg/dL Final     Comment:     Borderline high:  150-199 mg/dl  High:             200-499 mg/dl  Very high:       >499 mg/dl     01/02/2015 363 (H) 0 - 150 mg/dL Final     Cholesterol/HDL Ratio   Date Value Ref Range Status   01/02/2015 6.3 (H) 0.0 - 5.0 Final   01/17/2013 6.3 (H) 0.0 - 5.0 Final         ASSESSMENT/PLAN:     TYPE 2 DIABETES MELLITUS: Blood sugar values have been above target.  I asked patient to start Lantus 10 units daily.  She is to remain on her current dose of glimepiride and Jardiance.  Consider adding back Mounjaro.  Bushra is to check her blood sugar fasting each am and before dinner daily.  I will have CDE/RD follow-up on patient's blood sugar readings next week and additional insulin adjustment will be made at that time if needed.  Patient states she was seen by ophthalmology in June 2023 without diabetic retinopathy.  Her urine microalbuminuria has been negative.  Bushra denies any symptoms of diabetic neuropathy.  She states she has no foot ulcers at this time.  Patient does have lymphedema of the lower extremities.  No vitals today.  HX OF PROLACTINOMA: Not addressed today.  Patient is followed by  Dr. Farfan.  FOLLOW UP: With me or other provider in 3 months. Pt to see CDE/RD to follow-up on blood sugar values next week.    Time spent reviewing chart, labs and freestyle kamryn 3 sensor download data today=5 minutes.  Time for video visit today= 16 minutes.  Time for documentation today= 15 minutes.    Total time for visit today= 36 minutes.    Madina Elizondo PA-C

## 2024-05-08 NOTE — NURSING NOTE
Is the patient currently in the state of [place]? YES    Visit mode:TELEPHONE    If the visit is dropped, the patient can be reconnected by: TELEPHONE VISIT: Phone number:   Telephone Information:   Mobile [phone]           Will anyone else be joining the visit? NO  (If patient encounters technical issues they should call [phone])    How would you like to obtain your AVS? MyChart    Are changes needed to the allergy or medication list? No    Are refills needed on medications prescribed by this physician?     Reason for visit: RECHECK    Bonny VALADEZ

## 2024-05-08 NOTE — TELEPHONE ENCOUNTER
Patient confirmed scheduled appointment:  Date: 5/8   Time: 4:30 pm   Visit type: return diabetes   Provider: Caro   Location: [facility]  Testing/imaging: NA   Additional notes: Spoke to pt and scheduled per Debora below.   Madina Elizondo PA-C [facility]  Hi :  My internet is now up and working.  My 9 am, 9:30 am, 10 am, 10:30 am and 11 am patients will need to be rescheduled.  If they want to be seen later today after 3:30 pm that's ok with me and if you are not able to find a spot please let me know.  I may have to open Monday afternoon as a virtual clinic.    Tiffanie Jimenez on 5/8/2024 at 1:38 PM

## 2024-05-09 ENCOUNTER — TELEPHONE (OUTPATIENT)
Dept: EDUCATION SERVICES | Facility: CLINIC | Age: 44
End: 2024-05-09
Payer: COMMERCIAL

## 2024-05-09 NOTE — TELEPHONE ENCOUNTER
Patient confirmed scheduled appointment:  Date: 5/15   Time: 10:30 am   Visit type: Diabetes ed   Provider: Luis   Location: [facility]  Testing/imaging: NA   Additional notes: Spoke to pt and scheduled per below   [id number]  Hi -    Can you see if this patient is willing to schedule on a resource day next week (any educator is fine).    Thank you!    Tiffanie Jimenez on 5/9/2024 at 12:15 PM

## 2024-05-13 ENCOUNTER — VIRTUAL VISIT (OUTPATIENT)
Dept: SURGERY | Facility: CLINIC | Age: 44
End: 2024-05-13
Payer: COMMERCIAL

## 2024-05-13 VITALS — HEIGHT: 60 IN | BODY MASS INDEX: 57.52 KG/M2 | WEIGHT: 293 LBS

## 2024-05-13 DIAGNOSIS — E66.813 CLASS 3 DRUG-INDUCED OBESITY WITH BODY MASS INDEX (BMI) OF 60.0 TO 69.9 IN ADULT, UNSPECIFIED WHETHER SERIOUS COMORBIDITY PRESENT (H): Primary | ICD-10-CM

## 2024-05-13 DIAGNOSIS — E66.1 CLASS 3 DRUG-INDUCED OBESITY WITH BODY MASS INDEX (BMI) OF 60.0 TO 69.9 IN ADULT, UNSPECIFIED WHETHER SERIOUS COMORBIDITY PRESENT (H): Primary | ICD-10-CM

## 2024-05-13 DIAGNOSIS — E11.65 TYPE 2 DIABETES MELLITUS WITH HYPERGLYCEMIA, WITHOUT LONG-TERM CURRENT USE OF INSULIN (H): ICD-10-CM

## 2024-05-13 PROCEDURE — 99443 PR PHYSICIAN TELEPHONE EVALUATION 21-30 MIN: CPT | Mod: 93 | Performed by: EMERGENCY MEDICINE

## 2024-05-13 ASSESSMENT — PAIN SCALES - GENERAL: PAINLEVEL: NO PAIN (0)

## 2024-05-13 NOTE — LETTER
5/13/2024         RE: Bushra Buck  [address]  [place] [address]        Dear Colleague,    Thank you for referring your patient, Bushra Buck, to the [facility] [place]. Please see a copy of my visit note below.    Virtual Visit Details    Type of service:  Telephone Visit   Phone call duration:   30 minutes   Originating Location (pt. Location): Home    Distant Location (provider location):  On-site      Bariatric Clinic Follow-Up Visit:    Bushra Buck is a 44 year old  female with Body mass index is 61.71 kg/m .  presenting here today for follow-up on non-surgical efforts for weight loss. Original Intake visit occurred on 9/17/15 with a weight of 350 lbs BMI of 68.4, gained up to a max of 401lbs in 2016..  Along with diet and behavior changes, she has been previously using GLP1 agonist (Trulicity 3mg at our visit in Nov 2021) for diabetes and to assist her weight loss goals.  Trulicity dose was increased to 4.5mg/week 9/12/22 at 354 lbs.  She had transitioned in 2023 to Mounjaro for her diabetes/hyperglycemia issues as of our 1/11/24 visit. She was taken off this due to poor control of blood sugar/side effects and is on no appetite support medication or injectable diabetic regimens.  Her mental health makes phentermine/bupropion contraindicated. See her intake visit notes for details on identified contributors to weight gain in the past. Chart review shows Dietician calculated RMR of 2300 and protein intake goal of 80g/day.      Weight:   Wt Readings from Last 5 Encounters:   05/13/24 143.3 kg (316 lb)   02/14/24 143.3 kg (316 lb)   01/24/24 143.8 kg (317 lb)   01/23/24 144.5 kg (318 lb 9.6 oz)   01/11/24 141.1 kg (311 lb)    pounds    Lab Results   Component Value Date    A1C 11.7 01/24/2024    A1C 5.3 03/23/2020    A1C 5.0 01/06/2020    A1C 5.4 08/26/2019    A1C 5.2 04/09/2019    A1C 5.4 12/31/2018    A1C 5.4 09/20/2016     A1C 5.7 01/06/2016     Last Comprehensive Metabolic Panel:  Sodium   Date Value Ref Range Status   04/12/2024 136 135 - 145 mmol/L Final     Comment:     Reference intervals for this test were updated on 09/26/2023 to more accurately reflect our healthy population. There may be differences in the flagging of prior results with similar values performed with this method. Interpretation of those prior results can be made in the context of the updated reference intervals.    03/23/2020 140 133 - 144 mmol/L Final     Potassium   Date Value Ref Range Status   04/12/2024 3.8 3.4 - 5.3 mmol/L Final   05/17/2022 4.0 3.5 - 5.0 mmol/L Final   03/23/2020 4.0 3.4 - 5.3 mmol/L Final     Chloride   Date Value Ref Range Status   04/12/2024 100 98 - 107 mmol/L Final   05/17/2022 100 98 - 107 mmol/L Final   03/23/2020 112 (H) 94 - 109 mmol/L Final     Carbon Dioxide   Date Value Ref Range Status   03/23/2020 22 20 - 32 mmol/L Final     Carbon Dioxide (CO2)   Date Value Ref Range Status   04/12/2024 21 (L) 22 - 29 mmol/L Final   05/17/2022 26 22 - 31 mmol/L Final     Anion Gap   Date Value Ref Range Status   04/12/2024 15 7 - 15 mmol/L Final   05/17/2022 13 5 - 18 mmol/L Final   03/23/2020 5 3 - 14 mmol/L Final     Glucose   Date Value Ref Range Status   04/12/2024 97 70 - 99 mg/dL Final   05/17/2022 111 70 - 125 mg/dL Final   03/23/2020 102 (H) 70 - 99 mg/dL Final     Urea Nitrogen   Date Value Ref Range Status   04/12/2024 23.5 (H) 6.0 - 20.0 mg/dL Final   05/17/2022 17 8 - 22 mg/dL Final   03/23/2020 16 7 - 30 mg/dL Final     Creatinine   Date Value Ref Range Status   04/12/2024 0.81 0.51 - 0.95 mg/dL Final   03/23/2020 0.70 0.52 - 1.04 mg/dL Final     GFR Estimate   Date Value Ref Range Status   04/12/2024 >90 >60 mL/min/1.73m2 Final   06/29/2021 >60 >60 mL/min/1.73m2 Final   03/23/2020 >90 >60 mL/min/[1.73_m2] Final     Comment:     Non  GFR Calc  Starting 12/18/2018, serum creatinine based estimated GFR (eGFR)  will be   calculated using the Chronic Kidney Disease Epidemiology Collaboration   (CKD-EPI) equation.       Calcium   Date Value Ref Range Status   04/12/2024 9.4 8.6 - 10.0 mg/dL Final   03/23/2020 8.9 8.5 - 10.1 mg/dL Final     Bilirubin Total   Date Value Ref Range Status   04/12/2024 0.3 <=1.2 mg/dL Final   03/23/2020 0.3 0.2 - 1.3 mg/dL Final     Alkaline Phosphatase   Date Value Ref Range Status   04/12/2024 51 40 - 150 U/L Final     Comment:     Reference intervals for this test were updated on 11/14/2023 to more accurately reflect our healthy population. There may be differences in the flagging of prior results with similar values performed with this method. Interpretation of those prior results can be made in the context of the updated reference intervals.   03/23/2020 46 40 - 150 U/L Final     ALT   Date Value Ref Range Status   04/12/2024 26 0 - 50 U/L Final     Comment:     Reference intervals for this test were updated on 6/12/2023 to more accurately reflect our healthy population. There may be differences in the flagging of prior results with similar values performed with this method. Interpretation of those prior results can be made in the context of the updated reference intervals.     03/23/2020 37 0 - 50 U/L Final     AST   Date Value Ref Range Status   04/12/2024 22 0 - 45 U/L Final     Comment:     Reference intervals for this test were updated on 6/12/2023 to more accurately reflect our healthy population. There may be differences in the flagging of prior results with similar values performed with this method. Interpretation of those prior results can be made in the context of the updated reference intervals.   03/23/2020 20 0 - 45 U/L Final     Glucose much improved in April of 2024 as noted above.            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)     Bipolar affective disorder, current episode manic with psychotic symptoms (H)     Candidal skin infection     Carpal tunnel syndrome of right wrist     Chronic pansinusitis     Catatonia     Dermatitis of perianal region     Dyslipidemia     ESBL (extended spectrum beta-lactamase) producing bacteria infection     Fatigue     Flat feet, bilateral     Foot deformity, bilateral     Gastroesophageal reflux disease     HCAP (healthcare-associated pneumonia)     Gross hematuria     History of pituitary tumor     Hyperglycemia     Hypertension     Symptomatic varicose veins of both lower extremities     Hypokalemia     IBS (irritable bowel syndrome)     Influenza A     Itching     Leukocytosis     Left leg cellulitis     Elephantiasis     Lymphedema     Methicillin resistant Staphylococcus aureus culture positive     Macrocytosis     Obstructive sleep apnea     Open abdominal wall wound     Perianal irritation     Pituitary tumor     Intellectual disability     Prader-Willi syndrome     Prediabetes     Nightmares     Prolonged Q-T interval on ECG     Scabies     Schizophrenia, schizoaffective, chronic with acute exacerbation (H)     Sequelae of cerebral infarction     Shortness of breath     Stress incontinence of urine     Swelling of limb     DM type 2, controlled, with lower extremity ulcer (H)     Ulcer of calf (H)     Vitamin D deficiency     Amenorrhea     Lymphedema of both lower extremities       Current Outpatient Medications:      ACETAMINOPHEN EXTRA STRENGTH 500 MG tablet, , Disp: , Rfl:      Alcohol Swabs (ALCOHOL PREP) 70 % PADS, See Admin Instructions, Disp: , Rfl:      ASPIRIN LOW  DOSE 81 MG EC tablet, Take 81 mg by mouth daily, Disp: , Rfl:      atorvastatin (LIPITOR) 40 MG tablet, Take 40 mg by mouth daily, Disp: , Rfl:      bacitracin 500 UNIT/GM external ointment, Apply topically 2 times daily, Disp: 1 g, Rfl: 5     bumetanide (BUMEX) 2 MG tablet, Take 4 mg by mouth 3 times daily, Disp: , Rfl:      cabergoline (DOSTINEX) 0.5 MG tablet, Take 0.25 mg by mouth twice a week, Disp: , Rfl:      calcium carbonate-vitamin D (OS-TAWNYA WITH D) 500-200 MG-UNIT tablet, Take 1 tablet by mouth 2 times daily, Disp: , Rfl:      cetirizine (ZYRTEC) 10 MG tablet, Take 10 mg by mouth daily, Disp: , Rfl:      Continuous Blood Gluc  (FREESTYLE SUZY 3 READER) MIKY, 1 each daily, Disp: , Rfl:      Continuous Blood Gluc Sensor (FREESTYLE SUZY 3 SENSOR) MISC, 1 each every 14 days, Disp: , Rfl:      divalproex sodium extended-release (DEPAKOTE ER) 500 MG 24 hr tablet, Take 500 mg by mouth 3 times daily, Disp: , Rfl:      fenofibrate (TRICOR) 145 MG tablet, Take 145 mg by mouth daily, Disp: , Rfl:      glimepiride (AMARYL) 4 MG tablet, Take 4 mg by mouth every morning (before breakfast), Disp: , Rfl:      Insulin Glargine Solostar 100 UNIT/ML SOPN, Inject 10 Units Subcutaneous daily, Disp: , Rfl:      insulin pen needle (NOVOFINE PEN NEEDLE) 32G X 6 MM miscellaneous, 1 each daily, Disp: , Rfl:      levothyroxine (SYNTHROID/LEVOTHROID) 125 MCG tablet, Take 1 tablet by mouth daily, Disp: , Rfl:      LORazepam (ATIVAN) 0.5 MG tablet, Take 0.5 mg by mouth, Disp: , Rfl:      medroxyPROGESTERone (PROVERA) 10 MG tablet, TAKE 1 TABLET  10 MG  BY MOUTH ONCE DAILY FOR 5 DAYS EACH MONTH, Disp: , Rfl: 0     melatonin 5 MG tablet, Take 1 tablet by mouth daily at 2 pm, Disp: , Rfl:      Multiple Vitamin (TAB-A-NARESH) TABS, Take 1 tablet by mouth daily, Disp: 90 tablet, Rfl: 3     multivitamin w/minerals (THERA-VIT-M) tablet, Take 1 tablet by mouth daily, Disp: , Rfl:      NYAMYC 327367 UNIT/GM external powder, Apply 1 g  topically as needed, Disp: , Rfl:      omeprazole (PRILOSEC) 40 MG DR capsule, Take 1 capsule by mouth daily., Disp: , Rfl:      paliperidone ER (INVEGA) 6 MG 24 hr tablet, Take 6 mg by mouth every morning, Disp: , Rfl:      spironolactone (ALDACTONE) 50 MG tablet, Take 50 mg by mouth 2 times daily, Disp: , Rfl:      VITAMIN D3 25 MCG (1000 UT) tablet, Take 3 tablets by mouth daily, Disp: , Rfl:       Interim: Since our last visit, she has been using food shelf more often now. Goes about twice weekly to a different one. Food prices are so high that regular grocery stores.  Relies mostly on hamburger and pork chops.  Blood sugars occ high, 211mg/dL this AM, didn't take shot. Off her Mounjaro now.   Going for walks [place] more often.   Plan:   1.  Diet: good work finding affordable proteins at the food shelf. With your diabetes, eating protein first, vegetables second and grains/starches last helps lower after meal blood sugar surges some.  Continue hydrating well through the day and aim for 100 oz of water daily this summer.  2. Exercise: finding some exercise at the [facility] 2-3 days weekly would be an excellent goal. Pool based workouts would be idea.  3. Follow up diabetes care with Dr. Cruz as needed. Insulin working better for you than Trulicity/Mounjaro for now. It can increase appetite some so try to stay consistent with meal timing to avoid big swings in cravings/blood sugar levels.  4 check in once or twice yearly if looking to maintain some accountability on weight.   5. Goals: You've done well to maintain a 85 lb reduction from heaviest weight and over 34 lbs down from the level that you'd regulated at for a long time (350 lbs).  Aiming to get weight under 300 lbs this year with good routines.       We discussed [facility]ariat[facility] Basics including:  -eating 3 meals daily  -reviewed metabolic needs for weight loss based on Resting Metabolic Rate  -protein goals supportive of healthy weight  "loss  -avoiding/limiting calorie containing beverages  -We discussed the importance of restorative sleep and stress management in maintaining a healthy weight.  -We discussed the National Weight Control Registry healthy weight maintenance strategies and ways to optimize metabolism.  -We discussed the importance of physical activity including cardiovascular and strength training in maintaining a healthier weight and explored viable options.      Most recent labs:  Lab Results   Component Value Date    WBC 10.4 04/11/2023    HGB 13.2 04/11/2023    HCT 42.3 04/11/2023     (H) 04/11/2023     04/11/2023     Lab Results   Component Value Date    CHOL 161 04/12/2024     Lab Results   Component Value Date    HDL 26 (L) 04/12/2024     No components found for: \"LDLCALC\"  Lab Results   Component Value Date    TRIG 354 (H) 04/12/2024     No results found for: \"CHOLHDL\"  Lab Results   Component Value Date    ALT 26 04/12/2024    AST 22 04/12/2024    ALKPHOS 51 04/12/2024     No results found for: \"HGBA1C\"  Lab Results   Component Value Date    B12 819 05/05/2023     No components found for: \"VITDT1\"  No results found for: \"EFRAIN\"  Lab Results   Component Value Date    PTHI 37 07/10/2012     No results found for: \"ZN\"  No results found for: \"VIB1WB\"  Lab Results   Component Value Date    TSH 2.77 01/24/2024     No results found for: \"TEST\"    DIETARY HISTORY  See above. Having to rely on food shelf due to expenses recently.      PHYSICAL ACTIVITY PATTERNS:  Cardiovascular: limited walking.   Strength Training: has [employer] memebership and looking to get back to it but had been in hospital several times in the last year that affected her ability to get out and about as easily.    REVIEW OF SYSTEMS  Feels well. .  PHYSICAL EXAM:  Vitals: Ht 1.524 m (5')   Wt 143.3 kg (316 lb)   BMI 61.71 kg/m    Weight:   Wt Readings from Last 3 Encounters:   05/13/24 143.3 kg (316 lb)   02/14/24 143.3 kg (316 lb)   01/24/24 143.8 kg (317 " lb)         GEN: Pleasant on phone call. Normal recall/speech and appropriate/baseline cognition.  Interim study results: Last Comprehensive Metabolic Panel:  Sodium   Date Value Ref Range Status   04/12/2024 136 135 - 145 mmol/L Final     Comment:     Reference intervals for this test were updated on 09/26/2023 to more accurately reflect our healthy population. There may be differences in the flagging of prior results with similar values performed with this method. Interpretation of those prior results can be made in the context of the updated reference intervals.    03/23/2020 140 133 - 144 mmol/L Final     Potassium   Date Value Ref Range Status   04/12/2024 3.8 3.4 - 5.3 mmol/L Final   05/17/2022 4.0 3.5 - 5.0 mmol/L Final   03/23/2020 4.0 3.4 - 5.3 mmol/L Final     Chloride   Date Value Ref Range Status   04/12/2024 100 98 - 107 mmol/L Final   05/17/2022 100 98 - 107 mmol/L Final   03/23/2020 112 (H) 94 - 109 mmol/L Final     Carbon Dioxide   Date Value Ref Range Status   03/23/2020 22 20 - 32 mmol/L Final     Carbon Dioxide (CO2)   Date Value Ref Range Status   04/12/2024 21 (L) 22 - 29 mmol/L Final   05/17/2022 26 22 - 31 mmol/L Final     Anion Gap   Date Value Ref Range Status   04/12/2024 15 7 - 15 mmol/L Final   05/17/2022 13 5 - 18 mmol/L Final   03/23/2020 5 3 - 14 mmol/L Final     Glucose   Date Value Ref Range Status   04/12/2024 97 70 - 99 mg/dL Final   05/17/2022 111 70 - 125 mg/dL Final   03/23/2020 102 (H) 70 - 99 mg/dL Final     Urea Nitrogen   Date Value Ref Range Status   04/12/2024 23.5 (H) 6.0 - 20.0 mg/dL Final   05/17/2022 17 8 - 22 mg/dL Final   03/23/2020 16 7 - 30 mg/dL Final     Creatinine   Date Value Ref Range Status   04/12/2024 0.81 0.51 - 0.95 mg/dL Final   03/23/2020 0.70 0.52 - 1.04 mg/dL Final     GFR Estimate   Date Value Ref Range Status   04/12/2024 >90 >60 mL/min/1.73m2 Final   06/29/2021 >60 >60 mL/min/1.73m2 Final   03/23/2020 >90 >60 mL/min/[1.73_m2] Final     Comment:      Non  GFR Calc  Starting 12/18/2018, serum creatinine based estimated GFR (eGFR) will be   calculated using the Chronic Kidney Disease Epidemiology Collaboration   (CKD-EPI) equation.       Calcium   Date Value Ref Range Status   04/12/2024 9.4 8.6 - 10.0 mg/dL Final   03/23/2020 8.9 8.5 - 10.1 mg/dL Final     Bilirubin Total   Date Value Ref Range Status   04/12/2024 0.3 <=1.2 mg/dL Final   03/23/2020 0.3 0.2 - 1.3 mg/dL Final     Alkaline Phosphatase   Date Value Ref Range Status   04/12/2024 51 40 - 150 U/L Final     Comment:     Reference intervals for this test were updated on 11/14/2023 to more accurately reflect our healthy population. There may be differences in the flagging of prior results with similar values performed with this method. Interpretation of those prior results can be made in the context of the updated reference intervals.   03/23/2020 46 40 - 150 U/L Final     ALT   Date Value Ref Range Status   04/12/2024 26 0 - 50 U/L Final     Comment:     Reference intervals for this test were updated on 6/12/2023 to more accurately reflect our healthy population. There may be differences in the flagging of prior results with similar values performed with this method. Interpretation of those prior results can be made in the context of the updated reference intervals.     03/23/2020 37 0 - 50 U/L Final     AST   Date Value Ref Range Status   04/12/2024 22 0 - 45 U/L Final     Comment:     Reference intervals for this test were updated on 6/12/2023 to more accurately reflect our healthy population. There may be differences in the flagging of prior results with similar values performed with this method. Interpretation of those prior results can be made in the context of the updated reference intervals.   03/23/2020 20 0 - 45 U/L Final               .      30 minutes spent by me on the date of the encounter doing chart review, history and exam, documentation and further activities per the note    Carlos Bowling MD  [facility]  1:00 PM  5/13/2024      Again, thank you for allowing me to participate in the care of your patient.        Sincerely,        Carlos Bowling MD

## 2024-05-13 NOTE — PATIENT INSTRUCTIONS
Plan:   1.  Diet: good work finding affordable proteins at the food shelf. With your diabetes, eating protein first, vegetables second and grains/starches last helps lower after meal blood sugar surges some.  Continue hydrating well through the day and aim for 100 oz of water daily this summer.  2. Exercise: finding some exercise at the [facility] 2-3 days weekly would be an excellent goal. Pool based workouts would be idea.  3. Follow up diabetes care with Dr. Cruz as needed. Insulin working better for you than Trulicity/Mounjaro for now. It can increase appetite some so try to stay consistent with meal timing to avoid big swings in cravings/blood sugar levels.  4 check in once or twice yearly if looking to maintain some accountability on weight.   5. Goals: You've done well to maintain a 85 lb reduction from heaviest weight and over 34 lbs down from the level that you'd regulated at for a long time (350 lbs).  Aiming to get weight under 300 lbs this year with good routines      On-the-Go Breakfast Ideas  As of 2015, the latest research shows what a huge impact eating breakfast has on losing weight and feeling your best. People lose more weight when they make breakfast their biggest meal of the day compared to Dinner, but even if you cannot go to that degree, getting a breakfast that has at least 20 grams of protein and even a moderate amount of fat is ideal for maintaining good energy through the day and limits overeating in the evening hours.  The following are some quick and easy suggestions for at least getting something of substance into your body in the morning.  Enjoy!    Eating breakfast within 90 minutes of waking up is an important part of taking care of your body on a restricted calorie diet plan.  After sleeping for hours, your body is in need of fuel.  An ideal breakfast is a combination of protein, whole-grain carbohydrates, or fruit.  Here s why:    -Protein digests very slowly in the body, helping you  feel more satisfied.  -Whole grains provide dietary fiber, which also digests slowly and helps keep your gut clean.  -Fruit is a great source of vitamins, minerals, and fiber.     Each one of these breakfast combinations has between 200-300 calories and 15-20 grams of protein.  Feel free to mix and match!    Bone Broth (chicken bone broth or beef bone broth) is a great way to boost protein content. 8oz of bone broth will typically have 9-12grams of protein for 40kcal of energy.    Protein: Choose  -1/2 cup low-fat cottage cheese  -2 hard boiled eggs , or one cooked in olive oil (low/slow heat).  -1 low fat string cheese stick  -1 TablesTecMedon natural peanut butter  -[employer] vegetarian sausage abby (found in freezer section)  -1 slice lowfat cheese  -6 oz 2% or lowfat Greek yogurt, such as Fage or Oikos.    PLUS    Whole Grains:  Choose   -1 whole wheat English muffin  -1 whole wheat gordon, half  -1/2 Fiber One frozen muffin, thawed  -1/2 Fiber One toaster pastry  -1 whole wheat bagel thin  -1/2 cup Kashi cereal  -1 Kashi waffle (or other whole grain high-fiber waffle)  Aim for whole grain/sprouted breads with at least 3g of fiber/slice if having bread. [place] is one such brand.    OR    Fruit: Choose  -1/3 cup blueberries  -1/2 banana (or a plantain- similar to a banana, yet smaller)  -1/2 cup cantaloupe cubes  -1 small apple  -1 small orange  -1/2 cup strawberries  -handful raspberries/blackberries (each berry is about 1 calorie).    *Adapted from [employer], Fall 20    Ten Breakfasts Under 250 calories    Ideally, getting between 350-600 calories  (depending on starting height and weight)for breakfast is ideal for avoiding hunger later in the day, adjust/add to the following accordingly:    One- 250 calories, 8.5 g protein  1 slice whole-grain toast   1 Tbsp peanut butter    banana    Two- 250 calories, 8 g protein    cup nonfat/lowfat yogurt  1/3rd cup diced no-sugar peaches  1/3rd cup cereal  (like Special K, Cheerios, or bran flakes)    Three- 250 calories, 25 g protein  1 egg scrambled with 1 oz skim milk    cup shredded cheddar    whole grain English muffin  1 oz [place] weathers  1 tsp margarine spread    Four- 225 calories, 25 g protein  1/2 cup Kashi Go-Lean cereal    cup skim milk mixed with 1 scoop Bariatric Advantage protein powder    cup no-sugar diced pears    Five- 250 calories, 20 g protein    cup oatmeal prepared with skim milk, 1 scoop protein powder, and sugar-free maple syrup    Six- 200 calories, 5 g protein  1 whole grain waffle, toasted  1 tablespoon creamy peanut or almond butter    Seven-  250 calories, 19 g protein  Breakfast sandwich: 1 slice whole grain toast, cut in half.  Add 1 scrambled egg and one slice cheddar  cheese.    Eight-  250 calories, 15 g protein  2 eggs scrambled with 1/3 cup frozen spinach (heat before adding to eggs) and 2 tablespoons low fat cream cheese.    Nine-  150 calories, 15 g protein  2/3rd cup cottage cheese    cup cantaloupe    Ten- 200 calories, 20 g protein  Fruit smoothie made with 4 oz. nonfat Greek yogurt,   cup berries, 1 scoop protein powder, and 4 oz skim milk.    Ten Lunches Under 250 Calories    Aim for lunch to be around 300-400 calories a day when trying to lose weight and get that protein in!    One- 200 calories, 11 g protein  1/3 cup tuna salad made with light kennedy on 1 slice whole grain bread  1 small peeled apple    Two- 250 calories, 16 g protein  1/3 cup lowfat cottage cheese    cup cooked green beans    small fruit cocktail (in natural juice)    Three- 200 calories, 11 g protein    grilled cheese sandwich on whole grain bread with lowfat cheese  2/3rd cup of tomato soup    Four- 250 calories, 22 g protein  Deli wrap: 1 oz sliced turkey, 1 oz sliced ham, 1 oz sliced chicken rolled up with 1 slice low-fat cheese  1 small orange    Five- 250 calories, 28 g protein  2/3rd cup chili with 1 oz shredded cheese  4 saltine crackers    Six-  250 calories, 22 g protein  1 cup fresh spinach with 2 oz chicken, 1/3rd cup mandarin oranges, and 2 tablespoons sliced almonds with 1 tablespoon  vinaigrette dressing    Seven- 200 calories, 11 g protein  1 Tbsp sugar-free preserves and 1 Tbsp peanut butter on 1 slice whole grain toast    cup nonfat/lowfat Greek yogurt    Eight- 250 calories, 18 g protein  1 small soft-shell chicken taco with 1 oz shredded cheese, lettuce, tomato, salsa, and 1 Tbsp light sour cream    cup black beans    Nine- 225 calories, 13 g protein  2 ounces baked chicken  1/4 cup mashed potatoes    cup green beans    Ten- 200 calories, 21 g protein  Deli gordon: 2 oz roast beef or other deli meat with 1 tsp Don mayonnaise and sliced tomato, onion, and lettuce  1/3rd cup cottage cheese      Ten Dinners Under 300 calories    If you're eating a large breakfast and medium lunch, keep dinner small.  300-400 calories is ideal for most people depending on their caloric needs.    One- 300 calories, 12 g protein  1-inch thick slice of turkey meatloaf    cup baked butternut squash    Two- 200 calories, 9 g protein  Bread-less BLT: 3 slices turkey weathers, sliced tomato, wrapped in a large lettuce leaf    cup peeled fruit    Three- 275 calories, 36 g protein  3 oz roasted chicken    cup cooked broccoli    cup shredded cheddar cheese    cup unsweetened applesauce    Four- 200 calories, 25 g protein  3 oz baked tilapia  1/3rd cup cooked carrots    cup yogurt    Five- 250 calories, 20 g protein  Grilled ham  n  Swiss: spread 2 tsp ghee or butter on 1 slice of whole grain bread.  Cut bread in half, layer 2 oz deli ham with 1 piece of Swiss cheese and grill until cheese is melted.    cup cooked vegetables    Six- 250 calories, 18 g protein  Vegetarian cheeseburger: 1 [place] cheeseburger topped with lettuce, onion, tomato, and ketchup/mustard    cup sweet potato fries    Seven- 250 calories, 18 g protein  Pork pot roast: 2 oz roasted pork loin, 1/3rd cup roasted  carrots,   medium potato, cooked with   cup gravy    Eight- 330 calories, 25 g protein  2 oz meatballs (about 2 small meatballs)    cup spaghetti sauce  1/2 piece toast topped with 1 tsp ghee or butterand topped with garlic powder, toasted in oven    Nine- 250 calories, 16 g protein  Mexican pizza: one 8  corn tortilla topped with 2 oz chicken,   cup salsa, 2 tablespoons black beans, 2 tablespoons shredded cheese.  Bake until cheese is melted.    Ten- 250 calories, 22 g protein  Shrimp stir-mckenzie: 3 oz cooked shrimp, 1/6th onion,   pepper,   cup chopped carrots sautéed in 1 tablespoon olive oil, topped with 2 tablespoons stir mckenzie sauce and a pinch of sesame seeds        150 Calories or Less Snack Ideas   1 hardboiled egg with   cup berries  1 small apple with 1 hardboiled egg  10 almonds with   cup berries  2 clementines with 1 light string cheese  1 light string cheese with   sliced apple  1 light string cheese wrapped in 2 slices of turkey  4 100% whole wheat crackers (e.g. Triscuit) with 1 light string cheese    c. cottage cheese with   cup fruit and 1 Tbsp sunflower seeds     cup cottage cheese with   of an avocado     can tuna fish with 1 cup sliced cucumbers     cup roasted garbanzo beans with paprika and cayenne pepper    baked sweet potato with   cup chili beans or   cup cottage cheese  2 oz. nitrate free turkey slices with 1 cup carrots  1 container (6 oz) of low sugar (less than 10 grams of sugar) greek yogurt   3 Tablespoons of hummus with 1 cup sliced bell peppers   2 Tablespoons of hummus with 15 baby carrots  4 Tablespoons ranch dip made with plain Greek Yogurt and 3 mini cucumbers  1/4 cup nuts (any kind)  1 Tablespoon peanut butter with 1 stalk celery   1 dill pickle wrapped in 1-2 slices of deli ham with 1 tsp of mayonnaise/mustard.    LEAN PROTEIN SOURCES  Getting 20-30 grams of protein, 3 meals daily, is appropriate for most people, some need more but more than about 40 grams per meal is not  useful.  General rule is drinking one ounce of water per gram of protein eaten over the course of the day:  70 grams of protein each day, drink 70 oz of water.  Protein Source Portion Calories Grams of Protein                           Nonfat, plain Greek yogurt    (10 grams sugar or less) 3/4 cup (6 oz)  12-17   Light Yogurt (10 grams sugar or less) 3/4 cup (6 oz)  6-8   Protein Shake 1 shake 110-180 15-30   Skim/1% Milk or lactose-free milk 1 cup ( 8 oz)  8   Plain or light, flavored soymilk 1 cup  7-8   Plain or light, hemp milk 1 cup 110 6   Fat Free or 1% Cottage Cheese 1/2 cup 90 15   Part skim ricotta cheese 1/2 cup 100 14   Part skim or reduced fat cheese slices 1 ounce 65-80 8     Mozzarella String Cheese 1 80 8   Canned tuna, chicken, crab or salmon  (canned in water)  1/2 cup 100 15-20   White fish (broiled, grilled, baked) 3 ounces 100 21   Verner/Tuna (broiled, grilled, baked) 3 ounces 150-180 21   Shrimp, Scallops, Lobster, Crab 3 ounces 100 21   Pork loin, Pork Tenderloin 3 ounces 150 21   Boneless, skinless chicken /turkey breast                          (broiled, grilled, baked) 3 ounces 120 21   [place], [place], [place], and Venison 3 ounces 120 21   Lean cuts of red meat and pork (sirloin,   round, tenderloin, flank, ground 93%-96%) 3 ounces 170 21   Lean or Extra Lean Ground Turkey 1/2 cup 150 20   90-95% Lean Lindsay Burger 1 abby 140-180 21   Low-fat casserole with lean meat 3/4 cup 200 17   Luncheon Meats                                                        (turkey, lean ham, roast beef, chicken) 3 ounces 100 21   Egg (boiled, poached, scrambled) 1 Egg 60 7   Egg Substitute 1/2 cup 70 10   Nuts (limit to 1 serving per day)  3 Tbsp. 150 7   Nut [place] (peanut, almond)  Limit to 1 serving or less daily 1 Tbsp. 90 4   Soy Burger (varies) 1  15   Garbanzo, Black, [place] Beans 1/2 cup 110 7   Refried Beans 1/2 cup 100 7   Kidney and Lima beans 1/2 cup 110 7   Tempeh 3 oz  175 18   Vegan crumbles 1/2 cup 100 14   Tofu 1/2 cup 110 14   Chili (beans and extra lean beef or turkey) 1 cup 200 23   Lentil Stew/Soup 1 cup 150 12   Black Bean Soup 1 cup 175 12       Example Meal Plan for a 7495-9633 Calorie Diet:    In order to fuel your weight loss properly and avoid hunger-induced overeating later in the day, for your height and weight, you will enjoy the most success by following the diet below or similar with adjustments based on your particular tastes and preferences.  Exercise may influence speed, amount of weight loss further.     I recommend getting into a meal routine and keeping it similar day to day in the beginning so you don t have to think too hard about what you re going to make/eat.  Keep snacks healthy, ideally containing protein and some vegetables.  Non-processed food is preferable to packaged items.  Eat at least a few crunchy green vegetables if having a snack, which should be 2-3 hours after your mealtimes(prepare these ahead of time for ease of use).  Drink 64 oz -80 oz of water daily for most, some of you will need more and we'll discuss it at your visit if that is the case.      When changing our diet,  we can often mistake thirst for hunger or just have some distracted eating habits that we need to break free from ('bored/mindless eating', screen time,work, driving,etc).  A glass of water and reconsideration of our hunger is often all that is needed.  Having the urge is not the problem, but watching it pass by without acting on it is the goal.    If you re having hunger problems, add a protein drink/snack to your morning hours or afternoon snack with at least 20grams of protein and not too much sugar (under 10g).  A carton of higher protein/low sugar yogurt can work as well.  If the urge to snack is overwhelming and not satiated, try going for a 10 minute walk/exercise, come home and drink a glass of water and if still hungry, have a  calorie snack (handful of  raw/sprouted nuts, veggies and string cheese, protein bar, etc).  Savor it.    It is better to have a large breakfast, a moderate lunch and a smaller dinner to fuel your day.  People lose 10-15% more weight during their weight loss season with this strategy. Optimizing your protein intake at each meal will further keep you more satisfied while eating less food overall.  Getting exercise in early has also been shown to offer the best results (before breakfast ideally but anytime is the right time to exercise if that is not an option for you).    To make sure you re getting adequate vitamins and minerals during weight loss, I recommend one complete multivitamin a day of your choice.  Consider a probiotic and taking some vitamin D 2000 IU daily.    Let supper be your last meal of the day and ideally try to have at least 12 hours between supper and breakfast the next day to tap into some beneficial overnight fasting dynamics.  Midnight snacks need to go away. Water in the evening is fine, unsweetened, non caffeinated herbal tea is helpful as well.  Consolidating your meals within a 8-12 hour period of your day will help tap into these additional metabolic benefits and tends to keep your appetite up for breakfast, further helping to stay on track.  For most of my patients, I don't recommend an intermittent fasting style diet (many find it hard to fit in their lifestyle) but an overnight fast is very doable for most patients and helps regulate our hunger drives a little better.  This makes it very important to nail good intake at all three meals to feel satisfied/energized and still lose weight.      If evening snacking desires are high, consider a glass of fiber supplement for some additional fullness (metamucil or similar). Most of us don't get the 25-30 grams per day of fiber that promotes good gut health/satiety.  Benefiber, metamucil, citrucel are reasonable/affordable options for most people.  Inulin, chicory,  psyllium husk are reasonable options but start slow and low in the dose to avoid gas/bloating until your gut gets acclimated (ramping up to 5-10 grams per day of supplemental fiber after 3-4 weeks if needed).      Example Meal Plan:  Breakfast: 450-475 Calories  1 egg cooked on low in olive oil:   calories.  5oz Greek Yogurt (Fage plain classic: ~150 alexsandra)  Handful of Berries of your choice (about a calorie per berry or 20-40cal per handful)    cup(cooked) of  old fashioned oatmeal or 1/2 cup(cooked) steel cut oats. (150 alexsandra)  Sprinkle amount of brown sugar and a pat of butter. (40 alexsandra)  Glass of  Water  Black coffee or unsweetened Tea (0calories).      2-3 hours Later Snack: (195 calories).  Glass of water  One string Cheese (80 calories) or 4 oz creamed cottage cheese (115 calories) with  Crunchy Celery sticks (less than 10 calories per large stalk) 2 stalks. (20 calories)    of a  Large Banana or   of a Large Apple (60 calories):  eat second half at lunch or afternoon snack.     Lunch:300 -350 calories   Chicken Breast  (baked/broiled/roasted/grilled)  4-6 oz.  (125-180 alexsandra), BBQ sauce/hot sauce/mustard/seasoning is free. Just use a reasonable amount. Or a can of tuna with 1 tablespoon mayonnaise.  Salad: lettuce, any other veggies (cucumbers, green peppers/celery you like and a small drizzle of dressing to just flavor.  Go as big on the veggies as you like,  as they are practically calorie free.   A whole, 8 inch cucumber is 45 calories, a whole green pepper is 23 calories, a stalk of celery is 9 calories.  [place] Dressing is 60 calories per tablespoon..so moderate your desired dressing or do a drizzle of olive oil and splash of balsamic vinegar on top,  Total calories unlikely to be over 150 even with dressing.  Glass of Water.    Option for lunch is meal replacement protein drink/smoothie.  Need at least 20 grams of protein and eat the rest of your apple/banana from the morning  snack.      Afternoon Snack: 150-200 calories   Cheese Stick or cottage cheese again  and a fresh fruit OR  Granola Bar (protein Bar acceptable if under 200 calories OR  Homemade smoothies:  8oz skim milk,  a handful of berries (fresh or frozen and a serving of protein powder such as BiPro or Sofia sWhey for example.  If you don't like dairy, make with 8oz water, one small banana, handful of berries and the protein powder, add any veggies you want as well:  roughly 200 calories.   Glass of Water    Dinner: 325 calories  4oz of fresh, Atlantic salmon.  Broiled (salt/pepper/dill) for about 8-8.5 minutes (200calories) or  4oz filet mignon steak or sirloin steak  Salad or vegetable sautéed lightly in olive oil or   Broccoli 1.5  cups chopped and steamed  or micro-waved in a little water (75 calories)  Glass of Water,    Cup of herbal tea (unsweetened, caffeine free)      Herbs and seasonings are encouraged to flavor your foods/vegetables.  Make your food delicious.      Tips for Success:  1.  Prepare proteins ahead of time (broil chicken breasts in bulk so you can grab and go), steel cut oats/lentils can be stored in casserole dish/bowl in the fridge for quick scoop in the morning and rewarm in microwave, make use of crock pot recipes (watch salt content).  Making meals that cover 3-4 future meals is an easy way to stay on track.  2.  Drink a 8-12 oz glass of water every 2-3 hours when awake.  We often mistake hunger for thirst, especially when losing weight.  3. Remember your Reward and Motivation when things get hard.  4.  Weigh yourself every morning and record, you'll stay on track better and learn how our biorhythms, diet and elimination patterns show up on the scale. Don't worry about 1 or 2 day patterns, but when on track you'll notice good trend downward of weight over 3-4 day segments.  Plateaus tend to resolve after 4-8 days in most cases if you stay consistent with your plan.  These are natural and part of  "weight loss, even if you're perfect with your plan execution.  5. Call if problems/concerns.  [employer] is a great tool to stay in touch and provide weekly outside accountability. Check in with questions or if you want to brag.  6.  Find a handful of meals/foods that keep you on track and feeling good and get into a routine that is sustainable for you.  It's OK to have a routine that works for you.  7.  Consider taking a complete multivitamin just to make sure all micronutrients are adequate during weight loss.  8. If losing hair/brittle nails it usually means you are not taking enough protein.  Minimum goal is 60 grams daily of protein for smaller women, 80 grams a day for men. Consider taking Biotin as supplement or a \"Hair and Nail\" multivitamin.    "

## 2024-05-13 NOTE — NURSING NOTE
Is the patient currently in the state of [place]? YES    Visit mode:TELEPHONE    If the visit is dropped, the patient can be reconnected by: TELEPHONE VISIT: Phone number:   Telephone Information:   Mobile [phone]   Mobile Not on file.       Will anyone else be joining the visit? NO  (If patient encounters technical issues they should call [phone])    How would you like to obtain your AVS? MyChart    Are changes needed to the allergy or medication list? No    Are refills needed on medications prescribed by this physician? NO    Reason for visit: RECHECK    Karime VALADEZ

## 2024-05-13 NOTE — PROGRESS NOTES
Virtual Visit Details    Type of service:  Telephone Visit   Phone call duration:   30 minutes   Originating Location (pt. Location): Home    Distant Location (provider location):  On-site      Bariatric Clinic Follow-Up Visit:    Bushra Buck is a 44 year old  female with Body mass index is 61.71 kg/m .  presenting here today for follow-up on non-surgical efforts for weight loss. Original Intake visit occurred on 9/17/15 with a weight of 350 lbs BMI of 68.4, gained up to a max of 401lbs in 2016..  Along with diet and behavior changes, she has been previously using GLP1 agonist (Trulicity 3mg at our visit in Nov 2021) for diabetes and to assist her weight loss goals.  Trulicity dose was increased to 4.5mg/week 9/12/22 at 354 lbs.  She had transitioned in 2023 to Mounjaro for her diabetes/hyperglycemia issues as of our 1/11/24 visit. She was taken off this due to poor control of blood sugar/side effects and is on no appetite support medication or injectable diabetic regimens.  Her mental health makes phentermine/bupropion contraindicated. See her intake visit notes for details on identified contributors to weight gain in the past. Chart review shows Dietician calculated RMR of 2300 and protein intake goal of 80g/day.      Weight:   Wt Readings from Last 5 Encounters:   05/13/24 143.3 kg (316 lb)   02/14/24 143.3 kg (316 lb)   01/24/24 143.8 kg (317 lb)   01/23/24 144.5 kg (318 lb 9.6 oz)   01/11/24 141.1 kg (311 lb)    pounds    Lab Results   Component Value Date    A1C 11.7 01/24/2024    A1C 5.3 03/23/2020    A1C 5.0 01/06/2020    A1C 5.4 08/26/2019    A1C 5.2 04/09/2019    A1C 5.4 12/31/2018    A1C 5.4 09/20/2016    A1C 5.7 01/06/2016     Last Comprehensive Metabolic Panel:  Sodium   Date Value Ref Range Status   04/12/2024 136 135 - 145 mmol/L Final     Comment:     Reference intervals for this test were updated on 09/26/2023 to more accurately reflect our healthy population. There may be differences in the  flagging of prior results with similar values performed with this method. Interpretation of those prior results can be made in the context of the updated reference intervals.    03/23/2020 140 133 - 144 mmol/L Final     Potassium   Date Value Ref Range Status   04/12/2024 3.8 3.4 - 5.3 mmol/L Final   05/17/2022 4.0 3.5 - 5.0 mmol/L Final   03/23/2020 4.0 3.4 - 5.3 mmol/L Final     Chloride   Date Value Ref Range Status   04/12/2024 100 98 - 107 mmol/L Final   05/17/2022 100 98 - 107 mmol/L Final   03/23/2020 112 (H) 94 - 109 mmol/L Final     Carbon Dioxide   Date Value Ref Range Status   03/23/2020 22 20 - 32 mmol/L Final     Carbon Dioxide (CO2)   Date Value Ref Range Status   04/12/2024 21 (L) 22 - 29 mmol/L Final   05/17/2022 26 22 - 31 mmol/L Final     Anion Gap   Date Value Ref Range Status   04/12/2024 15 7 - 15 mmol/L Final   05/17/2022 13 5 - 18 mmol/L Final   03/23/2020 5 3 - 14 mmol/L Final     Glucose   Date Value Ref Range Status   04/12/2024 97 70 - 99 mg/dL Final   05/17/2022 111 70 - 125 mg/dL Final   03/23/2020 102 (H) 70 - 99 mg/dL Final     Urea Nitrogen   Date Value Ref Range Status   04/12/2024 23.5 (H) 6.0 - 20.0 mg/dL Final   05/17/2022 17 8 - 22 mg/dL Final   03/23/2020 16 7 - 30 mg/dL Final     Creatinine   Date Value Ref Range Status   04/12/2024 0.81 0.51 - 0.95 mg/dL Final   03/23/2020 0.70 0.52 - 1.04 mg/dL Final     GFR Estimate   Date Value Ref Range Status   04/12/2024 >90 >60 mL/min/1.73m2 Final   06/29/2021 >60 >60 mL/min/1.73m2 Final   03/23/2020 >90 >60 mL/min/[1.73_m2] Final     Comment:     Non  GFR Calc  Starting 12/18/2018, serum creatinine based estimated GFR (eGFR) will be   calculated using the Chronic Kidney Disease Epidemiology Collaboration   (CKD-EPI) equation.       Calcium   Date Value Ref Range Status   04/12/2024 9.4 8.6 - 10.0 mg/dL Final   03/23/2020 8.9 8.5 - 10.1 mg/dL Final     Bilirubin Total   Date Value Ref Range Status   04/12/2024 0.3 <=1.2  mg/dL Final   03/23/2020 0.3 0.2 - 1.3 mg/dL Final     Alkaline Phosphatase   Date Value Ref Range Status   04/12/2024 51 40 - 150 U/L Final     Comment:     Reference intervals for this test were updated on 11/14/2023 to more accurately reflect our healthy population. There may be differences in the flagging of prior results with similar values performed with this method. Interpretation of those prior results can be made in the context of the updated reference intervals.   03/23/2020 46 40 - 150 U/L Final     ALT   Date Value Ref Range Status   04/12/2024 26 0 - 50 U/L Final     Comment:     Reference intervals for this test were updated on 6/12/2023 to more accurately reflect our healthy population. There may be differences in the flagging of prior results with similar values performed with this method. Interpretation of those prior results can be made in the context of the updated reference intervals.     03/23/2020 37 0 - 50 U/L Final     AST   Date Value Ref Range Status   04/12/2024 22 0 - 45 U/L Final     Comment:     Reference intervals for this test were updated on 6/12/2023 to more accurately reflect our healthy population. There may be differences in the flagging of prior results with similar values performed with this method. Interpretation of those prior results can be made in the context of the updated reference intervals.   03/23/2020 20 0 - 45 U/L Final     Glucose much improved in April of 2024 as noted above.            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)    Bipolar affective disorder, current episode manic with psychotic symptoms (H)    Candidal skin infection    Carpal tunnel syndrome of right wrist    Chronic pansinusitis    Catatonia    Dermatitis of perianal region    Dyslipidemia    ESBL (extended spectrum beta-lactamase) producing bacteria infection    Fatigue    Flat feet, bilateral    Foot deformity, bilateral    Gastroesophageal reflux disease    HCAP (healthcare-associated pneumonia)    Gross hematuria    History of pituitary tumor    Hyperglycemia    Hypertension    Symptomatic varicose veins of both lower extremities    Hypokalemia    IBS (irritable bowel syndrome)    Influenza A    Itching    Leukocytosis    Left leg cellulitis    Elephantiasis    Lymphedema    Methicillin resistant Staphylococcus aureus culture positive    Macrocytosis    Obstructive sleep apnea    Open abdominal wall wound    Perianal irritation    Pituitary tumor    Intellectual disability    Prader-Willi syndrome    Prediabetes    Nightmares    Prolonged Q-T interval on ECG    Scabies    Schizophrenia, schizoaffective, chronic with acute exacerbation (H)    Sequelae of cerebral infarction    Shortness of breath    Stress incontinence of urine    Swelling of limb    DM type 2, controlled, with lower extremity ulcer (H)    Ulcer of calf (H)    Vitamin D deficiency    Amenorrhea    Lymphedema of both lower extremities       Current Outpatient Medications:     ACETAMINOPHEN EXTRA STRENGTH 500 MG tablet, , Disp: , Rfl:     Alcohol Swabs (ALCOHOL PREP) 70 % PADS, See Admin Instructions, Disp: , Rfl:     ASPIRIN LOW DOSE 81 MG EC tablet, Take 81 mg by mouth daily, Disp: , Rfl:     atorvastatin (LIPITOR) 40 MG tablet, Take 40 mg by mouth daily, Disp: , Rfl:     bacitracin 500 UNIT/GM external ointment, Apply topically 2 times daily, Disp: 1 g, Rfl: 5    bumetanide (BUMEX) 2 MG tablet, Take 4 mg by mouth 3 times daily, Disp: , Rfl:     cabergoline (DOSTINEX) 0.5 MG tablet, Take 0.25  mg by mouth twice a week, Disp: , Rfl:     calcium carbonate-vitamin D (OS-TAWNYA WITH D) 500-200 MG-UNIT tablet, Take 1 tablet by mouth 2 times daily, Disp: , Rfl:     cetirizine (ZYRTEC) 10 MG tablet, Take 10 mg by mouth daily, Disp: , Rfl:     Continuous Blood Gluc  (FREESTYLE SUZY 3 READER) MIKY, 1 each daily, Disp: , Rfl:     Continuous Blood Gluc Sensor (FREESTYLE SUZY 3 SENSOR) MISC, 1 each every 14 days, Disp: , Rfl:     divalproex sodium extended-release (DEPAKOTE ER) 500 MG 24 hr tablet, Take 500 mg by mouth 3 times daily, Disp: , Rfl:     fenofibrate (TRICOR) 145 MG tablet, Take 145 mg by mouth daily, Disp: , Rfl:     glimepiride (AMARYL) 4 MG tablet, Take 4 mg by mouth every morning (before breakfast), Disp: , Rfl:     Insulin Glargine Solostar 100 UNIT/ML SOPN, Inject 10 Units Subcutaneous daily, Disp: , Rfl:     insulin pen needle (NOVOFINE PEN NEEDLE) 32G X 6 MM miscellaneous, 1 each daily, Disp: , Rfl:     levothyroxine (SYNTHROID/LEVOTHROID) 125 MCG tablet, Take 1 tablet by mouth daily, Disp: , Rfl:     LORazepam (ATIVAN) 0.5 MG tablet, Take 0.5 mg by mouth, Disp: , Rfl:     medroxyPROGESTERone (PROVERA) 10 MG tablet, TAKE 1 TABLET  10 MG  BY MOUTH ONCE DAILY FOR 5 DAYS EACH MONTH, Disp: , Rfl: 0    melatonin 5 MG tablet, Take 1 tablet by mouth daily at 2 pm, Disp: , Rfl:     Multiple Vitamin (TAB-A-NARESH) TABS, Take 1 tablet by mouth daily, Disp: 90 tablet, Rfl: 3    multivitamin w/minerals (THERA-VIT-M) tablet, Take 1 tablet by mouth daily, Disp: , Rfl:     NYAMYC 910035 UNIT/GM external powder, Apply 1 g topically as needed, Disp: , Rfl:     omeprazole (PRILOSEC) 40 MG DR capsule, Take 1 capsule by mouth daily., Disp: , Rfl:     paliperidone ER (INVEGA) 6 MG 24 hr tablet, Take 6 mg by mouth every morning, Disp: , Rfl:     spironolactone (ALDACTONE) 50 MG tablet, Take 50 mg by mouth 2 times daily, Disp: , Rfl:     VITAMIN D3 25 MCG (1000 UT) tablet, Take 3 tablets by mouth daily, Disp: ,  Rfl:       Interim: Since our last visit, she has been using food shelf more often now. Goes about twice weekly to a different one. Food prices are so high that regular grocery stores.  Relies mostly on hamburger and pork chops.  Blood sugars occ high, 211mg/dL this AM, didn't take shot. Off her Mounjaro now.   Going for walks to [employer] more often.   Plan:   1.  Diet: good work finding affordable proteins at the food shelf. With your diabetes, eating protein first, vegetables second and grains/starches last helps lower after meal blood sugar surges some.  Continue hydrating well through the day and aim for 100 oz of water daily this summer.  2. Exercise: finding some exercise at the [facility] 2-3 days weekly would be an excellent goal. Pool based workouts would be idea.  3. Follow up diabetes care with Dr. Cruz as needed. Insulin working better for you than Trulicity/Mounjaro for now. It can increase appetite some so try to stay consistent with meal timing to avoid big swings in cravings/blood sugar levels.  4 check in once or twice yearly if looking to maintain some accountability on weight.   5. Goals: You've done well to maintain a 85 lb reduction from heaviest weight and over 34 lbs down from the level that you'd regulated at for a long time (350 lbs).  Aiming to get weight under 300 lbs this year with good routines.       We discussed [facility] Bariat[facility] Basics including:  -eating 3 meals daily  -reviewed metabolic needs for weight loss based on Resting Metabolic Rate  -protein goals supportive of healthy weight loss  -avoiding/limiting calorie containing beverages  -We discussed the importance of restorative sleep and stress management in maintaining a healthy weight.  -We discussed the National Weight Control Registry healthy weight maintenance strategies and ways to optimize metabolism.  -We discussed the importance of physical activity including cardiovascular and strength training in maintaining a  "healthier weight and explored viable options.      Most recent labs:  Lab Results   Component Value Date    WBC 10.4 04/11/2023    HGB 13.2 04/11/2023    HCT 42.3 04/11/2023     (H) 04/11/2023     04/11/2023     Lab Results   Component Value Date    CHOL 161 04/12/2024     Lab Results   Component Value Date    HDL 26 (L) 04/12/2024     No components found for: \"LDLCALC\"  Lab Results   Component Value Date    TRIG 354 (H) 04/12/2024     No results found for: \"CHOLHDL\"  Lab Results   Component Value Date    ALT 26 04/12/2024    AST 22 04/12/2024    ALKPHOS 51 04/12/2024     No results found for: \"HGBA1C\"  Lab Results   Component Value Date    B12 819 05/05/2023     No components found for: \"VITDT1\"  No results found for: \"EFRAIN\"  Lab Results   Component Value Date    PTHI 37 07/10/2012     No results found for: \"ZN\"  No results found for: \"VIB1WB\"  Lab Results   Component Value Date    TSH 2.77 01/24/2024     No results found for: \"TEST\"    DIETARY HISTORY  See above. Having to rely on food shelf due to expenses recently.      PHYSICAL ACTIVITY PATTERNS:  Cardiovascular: limited walking.   Strength Training: has [employer] memebership and looking to get back to it but had been in hospital several times in the last year that affected her ability to get out and about as easily.    REVIEW OF SYSTEMS  Feels well. .  PHYSICAL EXAM:  Vitals: Ht 1.524 m (5')   Wt 143.3 kg (316 lb)   BMI 61.71 kg/m    Weight:   Wt Readings from Last 3 Encounters:   05/13/24 143.3 kg (316 lb)   02/14/24 143.3 kg (316 lb)   01/24/24 143.8 kg (317 lb)         GEN: Pleasant on phone call. Normal recall/speech and appropriate/baseline cognition.  Interim study results: Last Comprehensive Metabolic Panel:  Sodium   Date Value Ref Range Status   04/12/2024 136 135 - 145 mmol/L Final     Comment:     Reference intervals for this test were updated on 09/26/2023 to more accurately reflect our healthy population. There may be differences in the " mg/dL Final   03/23/2020 0.3 0.2 - 1.3 mg/dL Final     Alkaline Phosphatase   Date Value Ref Range Status   04/12/2024 51 40 - 150 U/L Final     Comment:     Reference intervals for this test were updated on 11/14/2023 to more accurately reflect our healthy population. There may be differences in the flagging of prior results with similar values performed with this method. Interpretation of those prior results can be made in the context of the updated reference intervals.   03/23/2020 46 40 - 150 U/L Final     ALT   Date Value Ref Range Status   04/12/2024 26 0 - 50 U/L Final     Comment:     Reference intervals for this test were updated on 6/12/2023 to more accurately reflect our healthy population. There may be differences in the flagging of prior results with similar values performed with this method. Interpretation of those prior results can be made in the context of the updated reference intervals.     03/23/2020 37 0 - 50 U/L Final     AST   Date Value Ref Range Status   04/12/2024 22 0 - 45 U/L Final     Comment:     Reference intervals for this test were updated on 6/12/2023 to more accurately reflect our healthy population. There may be differences in the flagging of prior results with similar values performed with this method. Interpretation of those prior results can be made in the context of the updated reference intervals.   03/23/2020 20 0 - 45 U/L Final               .      30 minutes spent by me on the date of the encounter doing chart review, history and exam, documentation and further activities per the note   Carlos Bowling MD  [facility]  1:00 PM  5/13/2024

## 2024-05-15 ENCOUNTER — VIRTUAL VISIT (OUTPATIENT)
Dept: EDUCATION SERVICES | Facility: CLINIC | Age: 44
End: 2024-05-15
Attending: PHYSICIAN ASSISTANT
Payer: COMMERCIAL

## 2024-05-15 DIAGNOSIS — Z79.4 TYPE 2 DIABETES MELLITUS WITH HYPERGLYCEMIA, WITH LONG-TERM CURRENT USE OF INSULIN (H): ICD-10-CM

## 2024-05-15 DIAGNOSIS — E11.65 TYPE 2 DIABETES MELLITUS WITH HYPERGLYCEMIA, WITH LONG-TERM CURRENT USE OF INSULIN (H): ICD-10-CM

## 2024-05-15 PROCEDURE — 99207 PR NO CHARGE LOS: CPT | Mod: 93 | Performed by: REGISTERED NURSE

## 2024-05-15 NOTE — PROGRESS NOTES
"Virtual Visit Details    Type of service:  Telephone Visit   Phone call duration: 15 minutes   Originating Location (pt. Location): Home    Distant Location (provider location):  On-site    Diabetes Self-Management Education & Support Virtual Visit- Short    Bushra Buck contacted today for education related to Type 2 diabetes    Patient is being treated with:  Oral Agents, Exercise, and Insulin (less than 3 injections daily)    Year of Diagnosis:   It appears that the first A1C that met criteria for diabetes was 10/2023 at 9.1%    Referring Provider:  Madina Elizondo PA-C  Living Situation:       Lives alone   Employment:           Not employed/Disabled    Purpose of today's visit:  Bushra was re-started on Lantus insulin by Ms. Caro about a week ago.  Purpose of today's visit is to evaluate fasting blood glucoses and make insulin adjustments as needed.        Subjective/Objective:  Bushra used to have a Kwaku 3 CGM, but she states \"it broke.\"  She is currently doing fingerstick glucoses, fasting only.  Reports that she is taking 10 units of Lantus in the AM.  Her fasting glucoses for the past two days were 190 mg/dL and 237 mg/dL.  She states \"I wish I could be on Mounjaro again.\"  Reports that her weight is currently 322 lbs.      In reading through her history, it looks like she was first seen in our endocrinology clinic is 2011 following resection of a pituitary tumor.  At that time she was noted to have metabolic syndrome.  In 2023 her A1C jumped from being in the 5 to 6% range to 9.1%      She has been followed in the Weight Loss clinic since 2016.  She was taking Trulicity up to 4.5mg weekly, then switched to Mounjaro, but this was discontinued by the Weight Loss Clinic provider because it didn't seem to be helping with weight loss, and was not effective in controlling blood glucoses.  She states that she still has some in her refrigerator.  There is no information about what dose she was taking when " it was decided to stop it.      Assessment/Plan:  Fasting glucoses are above target.    Instructed to increase her dose of Lantus insulin from 10 units to 15 units.  Follow up appointment in one week.    In combination with insulin, Mounjaro or another GLP-1 agonist may be effective in lowering post-prandial glucoses and preventing weight gain with insulin.  Will discuss with Debora Elizondo.      Follow-up:    Appointment made in one week.       Time spent in this visit: 15  minutes.  No charge      Any diabetes medication dose changes were made via the CDE Protocol and Collaborative Practice Agreement. A copy of this encounter was provided to the patient's referring provider.

## 2024-05-15 NOTE — NURSING NOTE
Called, left an message with contact information asking dad call back if interested in trying to move up her appointment.   Is the patient currently in the state of [place]? YES    Visit mode:TELEPHONE    If the visit is dropped, the patient can be reconnected by: TELEPHONE VISIT: Phone number:   Telephone Information:   Mobile [phone]   Mobile Not on file.       Will anyone else be joining the visit? NO  (If patient encounters technical issues they should call [phone])    How would you like to obtain your AVS? MyChart    Are changes needed to the allergy or medication list? Pt stated no med changes    Are refills needed on medications prescribed by this physician? NO    Reason for visit: JUAN A ESPAÑAF

## 2024-05-16 NOTE — TELEPHONE ENCOUNTER
cabergoline (DOSTINEX) 0.5 MG tablet           Last Office Visit: 1/24/24  Future Office visit:   1/8/25      Routing refill request to provider for review/approval because:  Medication is reported/historical    Lindy Patel RN  P Red Flag Triage/MRT

## 2024-05-17 RX ORDER — CABERGOLINE 0.5 MG/1
TABLET ORAL
Qty: 36 TABLET | Refills: 1 | Status: SHIPPED | OUTPATIENT
Start: 2024-05-17 | End: 2024-06-20

## 2024-05-23 ENCOUNTER — VIRTUAL VISIT (OUTPATIENT)
Dept: EDUCATION SERVICES | Facility: CLINIC | Age: 44
End: 2024-05-23
Payer: COMMERCIAL

## 2024-05-23 VITALS — BODY MASS INDEX: 63.86 KG/M2 | WEIGHT: 293 LBS

## 2024-05-23 DIAGNOSIS — E11.65 TYPE 2 DIABETES MELLITUS WITH HYPERGLYCEMIA, WITH LONG-TERM CURRENT USE OF INSULIN (H): Primary | ICD-10-CM

## 2024-05-23 DIAGNOSIS — Z79.4 TYPE 2 DIABETES MELLITUS WITH HYPERGLYCEMIA, WITH LONG-TERM CURRENT USE OF INSULIN (H): Primary | ICD-10-CM

## 2024-05-23 PROCEDURE — 99207 PR NO CHARGE LOS: CPT | Mod: 93 | Performed by: REGISTERED NURSE

## 2024-05-23 NOTE — PROGRESS NOTES
Virtual Visit Details    Type of service:  Telephone Visit   Phone call duration: 16 minutes   Originating Location (pt. Location): Home    Distant Location (provider location):  Off-site    Diabetes Self-Management Education & Support Virtual Visit- Short    Bushra Buck contacted today for education related to Type 2 diabetes    Patient is being treated with:  Insulin (less than 3 injections daily)    Year of Diagnosis:   It appears that the first A1C that met criteria for diabetes was 10/2023 at 9.1%    Referring Provider:  Madina Elizondo PA-C  Living Situation:       Lives alone   Employment:           Not employed/Disabled    Purpose of today's visit:  Follow up insulin adjustment for type 2 diabetes.       Subjective/Objective:  Bushra states that her fasting blood glucoses are still all above target.   Last 4 days:  246, 266, 272, 276.    She verifies that she is taking 15 units of glargine insulin as prescribed.  She is wondering about taking Mounjaro.  She has a 5 mg pen in her fridge.    I had already talked to Debora Elizondo about restarting Mounjaro and she gave the order to go ahead and restart.  It was stopped when she was last hospitalized and had not been reordered on discharge.  Bushra states that she was doing much better on it.    She gets her medications from [facility], would like the order sent there.    Instructed her to go ahead and start the Mounjaro again.  She is going to start back on it next Wednesday.    She wanted me to wait until next week to place the order as she will be out of town for the Memorial Day weekend.    She states that she was tolerating the 5 mg dose without any side effects.        Increased her Lantus dose from 15 units to 25 units.    We set up another phone appointment for next week to re-evaluate.     Any diabetes medication dose changes were made via the CDE Protocol and Collaborative Practice Agreement. A copy of this encounter was provided to  the patient's referring provider.

## 2024-05-23 NOTE — NURSING NOTE
Is the patient currently in the state of [place]? YES    Visit mode:TELEPHONE    If the visit is dropped, the patient can be reconnected by: TELEPHONE VISIT: Phone number:   Telephone Information:   Mobile [phone]   Mobile Not on file.       Will anyone else be joining the visit? NO  (If patient encounters technical issues they should call [phone])    How would you like to obtain your AVS? MyChart    Are changes needed to the allergy or medication list? No    Are refills needed on medications prescribed by this physician? YES    Reason for visit: RECHECK    Bonny VALADEZ

## 2024-06-03 ENCOUNTER — LAB REQUISITION (OUTPATIENT)
Dept: LAB | Facility: CLINIC | Age: 44
End: 2024-06-03
Payer: COMMERCIAL

## 2024-06-03 DIAGNOSIS — Z79.899 OTHER LONG TERM (CURRENT) DRUG THERAPY: ICD-10-CM

## 2024-06-03 PROCEDURE — 80053 COMPREHEN METABOLIC PANEL: CPT | Performed by: FAMILY MEDICINE

## 2024-06-03 PROCEDURE — 80164 ASSAY DIPROPYLACETIC ACD TOT: CPT | Mod: ORL | Performed by: FAMILY MEDICINE

## 2024-06-03 PROCEDURE — 85025 COMPLETE CBC W/AUTO DIFF WBC: CPT | Performed by: FAMILY MEDICINE

## 2024-06-03 PROCEDURE — 80053 COMPREHEN METABOLIC PANEL: CPT | Mod: ORL | Performed by: FAMILY MEDICINE

## 2024-06-04 LAB
ALBUMIN SERPL BCG-MCNC: 4.4 G/DL (ref 3.5–5.2)
ALP SERPL-CCNC: 67 U/L (ref 40–150)
ALT SERPL W P-5'-P-CCNC: 38 U/L (ref 0–50)
ANION GAP SERPL CALCULATED.3IONS-SCNC: 18 MMOL/L (ref 7–15)
AST SERPL W P-5'-P-CCNC: 31 U/L (ref 0–45)
BILIRUB SERPL-MCNC: 0.3 MG/DL
BUN SERPL-MCNC: 27.1 MG/DL (ref 6–20)
CALCIUM SERPL-MCNC: 9.9 MG/DL (ref 8.6–10)
CHLORIDE SERPL-SCNC: 100 MMOL/L (ref 98–107)
CREAT SERPL-MCNC: 0.89 MG/DL (ref 0.51–0.95)
DEPRECATED HCO3 PLAS-SCNC: 21 MMOL/L (ref 22–29)
EGFRCR SERPLBLD CKD-EPI 2021: 82 ML/MIN/1.73M2
GLUCOSE SERPL-MCNC: 186 MG/DL (ref 70–99)
POTASSIUM SERPL-SCNC: 3.5 MMOL/L (ref 3.4–5.3)
PROT SERPL-MCNC: 7.2 G/DL (ref 6.4–8.3)
SODIUM SERPL-SCNC: 139 MMOL/L (ref 135–145)

## 2024-06-05 LAB
ACANTHOCYTES BLD QL SMEAR: NORMAL
AUER BODIES BLD QL SMEAR: NORMAL
BASO STIPL BLD QL SMEAR: NORMAL
BASOPHILS # BLD AUTO: 0.1 10E3/UL (ref 0–0.2)
BASOPHILS NFR BLD AUTO: 1 %
BITE CELLS BLD QL SMEAR: NORMAL
BLISTER CELLS BLD QL SMEAR: NORMAL
BURR CELLS BLD QL SMEAR: NORMAL
DACRYOCYTES BLD QL SMEAR: NORMAL
ELLIPTOCYTES BLD QL SMEAR: NORMAL
EOSINOPHIL # BLD AUTO: 0.2 10E3/UL (ref 0–0.7)
EOSINOPHIL NFR BLD AUTO: 2 %
ERYTHROCYTE [DISTWIDTH] IN BLOOD BY AUTOMATED COUNT: 12 % (ref 10–15)
FRAGMENTS BLD QL SMEAR: NORMAL
HCT VFR BLD AUTO: 42.7 % (ref 35–47)
HGB BLD-MCNC: 13.6 G/DL (ref 11.7–15.7)
HGB C CRYSTALS: NORMAL
HOWELL-JOLLY BOD BLD QL SMEAR: NORMAL
IMM GRANULOCYTES # BLD: 0.1 10E3/UL
IMM GRANULOCYTES NFR BLD: 1 %
LYMPHOCYTES # BLD AUTO: 4.5 10E3/UL (ref 0.8–5.3)
LYMPHOCYTES NFR BLD AUTO: 37 %
MCH RBC QN AUTO: 35.7 PG (ref 26.5–33)
MCHC RBC AUTO-ENTMCNC: 31.9 G/DL (ref 31.5–36.5)
MCV RBC AUTO: 112 FL (ref 78–100)
MONOCYTES # BLD AUTO: 1.2 10E3/UL (ref 0–1.3)
MONOCYTES NFR BLD AUTO: 10 %
NEUTROPHILS # BLD AUTO: 6 10E3/UL (ref 1.6–8.3)
NEUTROPHILS NFR BLD AUTO: 49 %
NEUTS HYPERSEG BLD QL SMEAR: NORMAL
NRBC # BLD AUTO: 0 10E3/UL
NRBC BLD AUTO-RTO: 0 /100
PLAT MORPH BLD: NORMAL
PLATELET # BLD AUTO: 265 10E3/UL (ref 150–450)
POLYCHROMASIA BLD QL SMEAR: NORMAL
RBC # BLD AUTO: 3.81 10E6/UL (ref 3.8–5.2)
RBC AGGLUT BLD QL: NORMAL
RBC MORPH BLD: NORMAL
ROULEAUX BLD QL SMEAR: NORMAL
SICKLE CELLS BLD QL SMEAR: NORMAL
SMUDGE CELLS BLD QL SMEAR: NORMAL
SPHEROCYTES BLD QL SMEAR: NORMAL
STOMATOCYTES BLD QL SMEAR: NORMAL
TARGETS BLD QL SMEAR: NORMAL
TOXIC GRANULES BLD QL SMEAR: NORMAL
VALPROATE SERPL-MCNC: 28 UG/ML
VARIANT LYMPHS BLD QL SMEAR: NORMAL
WBC # BLD AUTO: 12.1 10E3/UL (ref 4–11)

## 2024-06-11 ENCOUNTER — TELEPHONE (OUTPATIENT)
Dept: ENDOCRINOLOGY | Facility: CLINIC | Age: 44
End: 2024-06-11
Payer: COMMERCIAL

## 2024-06-11 NOTE — TELEPHONE ENCOUNTER
Spoke w/ Dr Daniel Cruz. Recommends all diabetes care be managed by our team and will defer her care to  our clinic for diabetes going forward.   Writer to notify Home Care Team via Fax.  Provider/CDEs notified.   Shell Howell RN on 6/12/2024 at 6:43 AM       Spoke angeline/ Deborah [phone]  Home Care Nurse Fax number  Attn: Deborah   For her diabetes, she states she is taking glimepiride 4 mg each a.m. and Jardiance 10 mg each a.m.   PCP Dr Cruz recently changed Madina Elizondo's orders to Mounjaro 10mg weekly Lantus 30 units daily via   Blood sugar results have been very high. Home Care team Will fax blood sugar results every 2 weeks.     Spoke w/ Nurse team for Provider Name:Daniel Cruz, 07/12/2024 03:30:00 PM, [address], [place], [id number], [phone] requesting clarification about their team changing insulin and GLP orders for patient. They will call writer Thursday June 13 with response.     Provider/CDE notified.   Shell Howell RN on 6/11/2024 at 11:50 AM                         [facility] Call Center    Phone Message    May a detailed message be left on voicemail: no    Reason for Call: Other: Deborah- MICAELA would like a call back for clarification on pt's medications. Please call [phone] to discuss.      Action Taken: Other: Endo    Travel Screening: Not Applicable

## 2024-06-13 ENCOUNTER — TELEPHONE (OUTPATIENT)
Dept: ENDOCRINOLOGY | Facility: CLINIC | Age: 44
End: 2024-06-13
Payer: COMMERCIAL

## 2024-06-13 DIAGNOSIS — E11.9 TYPE 2 DIABETES MELLITUS (H): Primary | ICD-10-CM

## 2024-06-13 DIAGNOSIS — D35.2 PROLACTINOMA (H): ICD-10-CM

## 2024-06-13 DIAGNOSIS — D35.2 PITUITARY ADENOMA (H): ICD-10-CM

## 2024-06-20 DIAGNOSIS — E11.65 TYPE 2 DIABETES MELLITUS WITH HYPERGLYCEMIA, WITH LONG-TERM CURRENT USE OF INSULIN (H): ICD-10-CM

## 2024-06-20 DIAGNOSIS — Z79.4 TYPE 2 DIABETES MELLITUS WITH HYPERGLYCEMIA, WITH LONG-TERM CURRENT USE OF INSULIN (H): ICD-10-CM

## 2024-06-20 RX ORDER — CABERGOLINE 0.5 MG/1
TABLET ORAL
Qty: 36 TABLET | Refills: 1 | Status: SHIPPED | OUTPATIENT
Start: 2024-06-20

## 2024-06-20 RX ORDER — EMPAGLIFLOZIN 10 MG/1
10 TABLET, FILM COATED ORAL DAILY
Qty: 90 TABLET | Refills: 3 | Status: SHIPPED | OUTPATIENT
Start: 2024-06-20

## 2024-06-20 RX ORDER — GLIMEPIRIDE 2 MG/1
TABLET ORAL
Qty: 180 TABLET | Refills: 1 | Status: SHIPPED | OUTPATIENT
Start: 2024-06-20

## 2024-06-20 NOTE — TELEPHONE ENCOUNTER
Orders faxed to Deborah at fax number provided.   Shell Howell RN on 6/20/2024 at 1:50 PM         [facility] Call Center    Phone Message    May a detailed message be left on voicemail: yes     Reason for Call: Medication Question or concern regarding medication   Prescription Clarification  Name of Medication:   lantus 30 units per day  glimepiride (AMARYL) 4 MG tablet  Prescribing Provider: Mandy Farfan MD    Pharmacy: [facility] [address] [place] [address]    What on the order needs clarification? Deborah was calling to speak with the care team to get clarification on these 2 medications, to find out which medications are active or if theres been any changes please follow up thank you.      Action Taken: Message routed to:  [facility] Endo    Travel Screening: Not Applicable     Date of Service:

## 2024-06-20 NOTE — TELEPHONE ENCOUNTER
Deborah, pt's home care nurse is calling to check on the status of pt's medication refills. She would also like to confirm some of the other medications on pt's med list. Please call Deborah at [phone] to discuss. Thank you.

## 2024-06-20 NOTE — LETTER
[facility] [address]  [address] [facility] [address]  Clinic  Fax: [phone] After Hours [phone]    FACSIMILE TRANSMITTAL sent 20 JUNE 11:20AM    TO:  MICAELA BLANCO   COMPANY: Home Care   FAX NUMBER: [phone]  PHONE NUMBER:      FROM:   PHONE:   DATE: 06/20/24  Bushra Buck  Female, 44 year old, 1980  MRN: [id number]  Phone: [phone]    NOTES/COMMENTS: Please see attached orders and instructions from Madina Elizondo, Endocrine Specialist, and PCP Dr Daniel Cruz who defers all further diabetic care to the [facility] Endocrine Team.     RE:   Spoke w/ Dr Daniel Cruz. Recommends all diabetes care be managed by our team and will defer her care to  our clinic for diabetes going forward.       Madina Elizondo PA-C         I think they can follow the most recent orders.  You can add her to my wait list to be seen sooner than Sept 2024.             Rx orders attached.   Shell Howell RN on 6/20/2024 at 11:18 AM                                 IF YOU DID NOT RECEIVE THE CORRECT NUMBER OF PAGES OR THE FAX DID NOT COME THROUGH CLEARLY, PLEASE CALL THE SENDER     CONFIDENTIALITY STATEMENT: Confidential information that may accompany this transmission contains protected health information under state and federal law and is legally privileged. This information is intended only for the use of the individual or entity named above and may be used only for carrying out treatment, payment or other healthcare operations. The recipient or person responsible for delivering this information is prohibited by law from disclosing this information without proper authorization to any other party, unless required to do so by law or regulation. If you are not the intended recipient, you are hereby notified that any review, dissemination, distribution, or copying of this message is strictly prohibited. If you have received this  communication in error, please destroy the materials and contact us immediately by calling the number listed above. No response indicates that the information was received by the appropriate authorized party

## 2024-06-20 NOTE — TELEPHONE ENCOUNTER
Rx orders and schedule request faxed to home care nurseDeborah.   Shell Howell RN on 6/20/2024 at 11:28 AM           Spoke misha Cabrera [phone]  Home Care Nurse Fax number  Attn: Deborah

## 2024-06-24 ENCOUNTER — TRANSFERRED RECORDS (OUTPATIENT)
Dept: HEALTH INFORMATION MANAGEMENT | Facility: CLINIC | Age: 44
End: 2024-06-24
Payer: COMMERCIAL

## 2024-06-24 LAB — RETINOPATHY: NEGATIVE

## 2024-07-08 ENCOUNTER — TELEPHONE (OUTPATIENT)
Dept: ENDOCRINOLOGY | Facility: CLINIC | Age: 44
End: 2024-07-08
Payer: COMMERCIAL

## 2024-07-08 NOTE — TELEPHONE ENCOUNTER
Good afternoon,  This is Flakita, a pharmacist at the [facility] call center. I am contacting you regarding Bushra's Mounjaro. We were setting up a delivery of her Mounjaro 5mg dose for this week and the patient stated she was unsure if 5mg should still be her current dose or not. She states she still has one week left of medication but would like for us to clarify her dosage with you. Would you consider clarifying the dose with us so that we may do so with the patient, or contacting the patient directly?  Thank you for your time and consideration,  Flakita BERNARD, PharmD  [phone] (call center)  [phone] (direct line to a pharmacist)

## 2024-07-09 ENCOUNTER — MYC MEDICAL ADVICE (OUTPATIENT)
Dept: ENDOCRINOLOGY | Facility: CLINIC | Age: 44
End: 2024-07-09
Payer: COMMERCIAL

## 2024-07-10 DIAGNOSIS — Z79.4 TYPE 2 DIABETES MELLITUS WITH HYPERGLYCEMIA, WITH LONG-TERM CURRENT USE OF INSULIN (H): Primary | ICD-10-CM

## 2024-07-10 DIAGNOSIS — E11.65 TYPE 2 DIABETES MELLITUS WITH HYPERGLYCEMIA, WITH LONG-TERM CURRENT USE OF INSULIN (H): Primary | ICD-10-CM

## 2024-07-10 RX ORDER — TIRZEPATIDE 7.5 MG/.5ML
7.5 INJECTION, SOLUTION SUBCUTANEOUS
Qty: 5 ML | Refills: 0 | Status: SHIPPED | OUTPATIENT
Start: 2024-07-10 | End: 2024-08-07

## 2024-07-10 NOTE — TELEPHONE ENCOUNTER
Current insulin dose 10 units of Lantus, but wants to know if she should be taking 32 units.     Pt is on her last pen of 5mg Mounjaro, wants to increase to the next dose (7.5mg)    Please call pt to address insulin concern.

## 2024-07-10 NOTE — TELEPHONE ENCOUNTER
[facility] Call Center    Phone Message    May a detailed message be left on voicemail: yes     Reason for Call: Patient calling and states that she  is tolerating it well and she is at 5 mg for the Mounjaro and she is ok to increase the dose. Please call to confirm, and she doesn't use my chart. She also needs to someone to discuss her doses of insulin as well.      Action Taken: Message routed to:  [facility] PCC    Travel Screening: Not Applicable     Date of Service:

## 2024-07-11 ENCOUNTER — TELEPHONE (OUTPATIENT)
Dept: ENDOCRINOLOGY | Facility: CLINIC | Age: 44
End: 2024-07-11
Payer: COMMERCIAL

## 2024-07-11 NOTE — TELEPHONE ENCOUNTER
LVM with review and recommendation from Madina Elizondo.   Shell Howell RN on 7/11/2024 at 7:43 AM         Madina Elizondo PA-C  You13 hours ago (5:48 PM)       Hi,  I spoke with pharmacy staff to increase patient's Mounjaro dose.  Since we are increasing the Mounjaro dose will continue current insulin doses at this time.  Thanks, Debora

## 2024-07-16 ENCOUNTER — OFFICE VISIT (OUTPATIENT)
Dept: VASCULAR SURGERY | Facility: CLINIC | Age: 44
End: 2024-07-16
Attending: NURSE PRACTITIONER
Payer: COMMERCIAL

## 2024-07-16 VITALS
DIASTOLIC BLOOD PRESSURE: 83 MMHG | RESPIRATION RATE: 18 BRPM | BODY MASS INDEX: 62.3 KG/M2 | SYSTOLIC BLOOD PRESSURE: 126 MMHG | WEIGHT: 293 LBS | TEMPERATURE: 98.3 F | HEART RATE: 94 BPM | OXYGEN SATURATION: 95 %

## 2024-07-16 DIAGNOSIS — I87.2 VENOUS INSUFFICIENCY OF BOTH LOWER EXTREMITIES: ICD-10-CM

## 2024-07-16 DIAGNOSIS — I89.0 LYMPHEDEMA: Primary | ICD-10-CM

## 2024-07-16 DIAGNOSIS — E66.01 MORBID OBESITY WITH BMI OF 60.0-69.9, ADULT (H): ICD-10-CM

## 2024-07-16 DIAGNOSIS — Z87.2 HISTORY OF CELLULITIS: ICD-10-CM

## 2024-07-16 DIAGNOSIS — I87.333 CHRONIC VENOUS HYPERTENSION (IDIOPATHIC) WITH ULCER AND INFLAMMATION OF BILATERAL LOWER EXTREMITY (H): ICD-10-CM

## 2024-07-16 DIAGNOSIS — M79.89 SWELLING OF LIMB: ICD-10-CM

## 2024-07-16 DIAGNOSIS — E11.9 TYPE 2 DIABETES MELLITUS WITHOUT COMPLICATION, WITHOUT LONG-TERM CURRENT USE OF INSULIN (H): ICD-10-CM

## 2024-07-16 PROCEDURE — G0463 HOSPITAL OUTPT CLINIC VISIT: HCPCS | Performed by: NURSE PRACTITIONER

## 2024-07-16 PROCEDURE — 99213 OFFICE O/P EST LOW 20 MIN: CPT | Performed by: NURSE PRACTITIONER

## 2024-07-16 RX ORDER — DOXYCYCLINE HYCLATE 100 MG
1 TABLET ORAL 2 TIMES DAILY
COMMUNITY
Start: 2024-07-12 | End: 2024-08-01

## 2024-07-16 ASSESSMENT — PAIN SCALES - GENERAL: PAINLEVEL: NO PAIN (0)

## 2024-07-16 NOTE — PATIENT INSTRUCTIONS
We will order new velcro wraps and diabetic shoes and inserts   Call [phone] for stockings  Call [phone] for shoes    You have an appt tomorrow for fitting    We will apply bordered foam to the shins    Continue to wear your compression stockings or velcro wraps every day; put them on first thing in the morning and remove at bedtime    Replace your compression stockings every 3-4 months; these garments will lose their elasticity and become ineffective    Replace velcro wraps every 1-2 years    Elevate your legs periodically throughout the day, 30-60 minutes 1-3 times per day    Apply lotion to your legs 1-2 times per day; some good name brands are Cetaphil, Sarna, Aveeno, VaniCream, CeraVe    Continue to walk and exercise    If you are taking a diuretic continue to do so at the direction of your primary care provider    Make an appointment at the vascular clinic again if you have worsening swelling, need a prescription for new compression garments; and/or develop new wounds

## 2024-07-16 NOTE — PROGRESS NOTES
Follow up Vascular Visit       Date of Service:07/16/24      Chief Complaint: BLE swelling      Pt returns to [facility] with regards to their BLE swelling; annual visit.  They arrive today with PCA. They are currently reporting no wounds. They are using velcro compression wraps for compression. She is needing a new prescription for these today.  They are feeling well today. Denies fevers, chills. No shortness of breath.  A1c was abnormal on the last check 11.7%. She is needing diabetic shoes; would like a prescription for these.    Allergies:   Allergies   Allergen Reactions    Fiorinal [Butalbital-Aspirin-Caffeine] Anaphylaxis and Swelling    Aspirin Other (See Comments)     Eyes swell shut    Ativan [Lorazepam]     Bee Venom     Benzodiazepines Other (See Comments)     Swelling from allergies    Butalbital Other (See Comments)    Caffeine Other (See Comments)    Carbamazepine Other (See Comments)     Arm swelling    Cat Hair Extract      Other reaction(s): Unknown    Cephalosporins Angioedema    Clindamycin      Other reaction(s): heartburn    Codeine Angioedema    Dextromethorphan      Other reaction(s): hives    Ibuprofen Sodium Other (See Comments)     swelling    Keflex [Cephalexin]      Eyes swell    Latex Other (See Comments) and Swelling     Swelling    Lithium Swelling    Oxcarbazepine      numbness    Topiramate      Other reaction(s): edema/swelling    Wasp Venom Protein Unknown    Amoxicillin-Pot Clavulanate Rash     Rash on neck - not raised    Topamax Rash       Medications:   Current Outpatient Medications:     ACETAMINOPHEN EXTRA STRENGTH 500 MG tablet, , Disp: , Rfl:     Alcohol Swabs (ALCOHOL PREP) 70 % PADS, See Admin Instructions, Disp: , Rfl:     ASPIRIN LOW DOSE 81 MG EC tablet, Take 81 mg by mouth daily, Disp: , Rfl:     atorvastatin (LIPITOR) 40 MG tablet, Take 40 mg by mouth daily, Disp: , Rfl:     bacitracin 500 UNIT/GM external ointment, Apply topically 2  times daily, Disp: 1 g, Rfl: 5    bumetanide (BUMEX) 2 MG tablet, Take 4 mg by mouth 3 times daily, Disp: , Rfl:     cabergoline (DOSTINEX) 0.5 MG tablet, TAKE 1 TABLET BY MOUTH THREE TIMES PER WEEK, Disp: 36 tablet, Rfl: 1    calcium carbonate-vitamin D (OS-TAWNYA WITH D) 500-200 MG-UNIT tablet, Take 1 tablet by mouth 2 times daily, Disp: , Rfl:     cetirizine (ZYRTEC) 10 MG tablet, Take 10 mg by mouth daily, Disp: , Rfl:     Continuous Blood Gluc  (FREESTYLE SUZY 3 READER) MIKY, 1 each daily, Disp: , Rfl:     Continuous Blood Gluc Sensor (FREESTYLE SUZY 3 SENSOR) MISC, 1 each every 14 days, Disp: , Rfl:     divalproex sodium extended-release (DEPAKOTE ER) 500 MG 24 hr tablet, Take 500 mg by mouth 3 times daily, Disp: , Rfl:     doxycycline hyclate (VIBRA-TABS) 100 MG tablet, Take 1 tablet by mouth 2 times daily, Disp: , Rfl:     empagliflozin (JARDIANCE) 10 MG TABS tablet, TAKE 1 TABLET(10 MG) BY MOUTH DAILY, Disp: 90 tablet, Rfl: 3    fenofibrate (TRICOR) 145 MG tablet, Take 145 mg by mouth daily, Disp: , Rfl:     glimepiride (AMARYL) 2 MG tablet, TAKE 2 TABLETS BY MOUTH DAILY WITH BREAKFAST OR FIRST MAIN MEAL OF THE DAY., Disp: 180 tablet, Rfl: 1    glimepiride (AMARYL) 4 MG tablet, Take 4 mg by mouth every morning (before breakfast), Disp: , Rfl:     insulin glargine (LANTUS PEN) 100 UNIT/ML pen, Inject 10 Units Subcutaneous daily, Disp: 15 mL, Rfl: 1    Insulin Glargine Solostar 100 UNIT/ML SOPN, Inject 15 Units Subcutaneous daily, Disp: , Rfl:     insulin pen needle (NOVOFINE PEN NEEDLE) 32G X 6 MM miscellaneous, 1 each daily, Disp: , Rfl:     levothyroxine (SYNTHROID/LEVOTHROID) 125 MCG tablet, Take 1 tablet by mouth daily, Disp: , Rfl:     LORazepam (ATIVAN) 0.5 MG tablet, Take 0.5 mg by mouth, Disp: , Rfl:     medroxyPROGESTERone (PROVERA) 10 MG tablet, TAKE 1 TABLET  10 MG  BY MOUTH ONCE DAILY FOR 5 DAYS EACH MONTH, Disp: , Rfl: 0    melatonin 5 MG tablet, Take 1 tablet by mouth daily at 2 pm,  Disp: , Rfl:     Multiple Vitamin (TAB-A-NARESH) TABS, Take 1 tablet by mouth daily, Disp: 90 tablet, Rfl: 3    multivitamin w/minerals (THERA-VIT-M) tablet, Take 1 tablet by mouth daily, Disp: , Rfl:     NYAMYC [id number] UNIT/GM external powder, Apply 1 g topically as needed, Disp: , Rfl:     omeprazole (PRILOSEC) 40 MG DR capsule, Take 1 capsule by mouth daily., Disp: , Rfl:     paliperidone ER (INVEGA) 6 MG 24 hr tablet, Take 6 mg by mouth every morning, Disp: , Rfl:     spironolactone (ALDACTONE) 50 MG tablet, Take 50 mg by mouth 2 times daily, Disp: , Rfl:     tirzepatide (MOUNJARO) 7.5 MG/0.5ML pen, Inject 7.5 mg subcutaneously every 7 days, Disp: 5 mL, Rfl: 0    VITAMIN D3 25 MCG (1000 UT) tablet, Take 3 tablets by mouth daily, Disp: , Rfl:     History:   Past Medical History:   Diagnosis Date    Abnormal weight gain 01/17/2013    Acne rosacea     Acquired hypothyroidism 07/06/2016    Allergic rhinitis     Bipolar 1 disorder (H)     followed by psych    Bipolar disorder (H) 01/01/1992    Cellulitis, leg 01/01/2016    Dermatitis 12/31/2011    Dyslipidemia     Ganglion, left wrist     GERD (gastroesophageal reflux disease)     GERD (gastroesophageal reflux disease)     Growth retardation, mild developmental delay, and chronic hepatitis syndrome     Herpes zoster     High triglycerides 01/23/2014    Hypertension     Hypothyroid     Irritable bowel syndrome     Lymphedema of lower extremity 01/01/2008    Morbid obesity (H)     Nightmares     Obstructive sleep apnea     Pituitary adenoma (H)     S/P trans nasal resection in 2000    Post traumatic stress disorder     Post traumatic stress disorder     Post traumatic stress disorder (PTSD)     Prader-Willi syndrome     Prolactinoma (H) transsphenoid approach 2000.    PTSD (post-traumatic stress disorder)     Schizoaffective disorder, bipolar type (H)     Stasis edema 12/31/2011    Type 2 diabetes mellitus without complication, with long-term current use of insulin (H)      Venous stasis     Vitamin D deficiency        Physical Exam:    There were no vitals taken for this visit.    General:  Patient presents to clinic in no apparent distress.  Head: normocephalic atraumatic  Psychiatric:  Alert and oriented x3.   Respiratory: unlabored breathing; no cough  Integumentary:  Skin is uniformly warm, dry and pink.    BLE: swelling well controlled; tissues are soft and supple; scarring on the shins but skin intact; no erythema; no warmth to the tissues; no pain with palpation    Peripheral IV 01/14/16 Right Hand (Active)   Number of days: 3106       VASC Wound LT MID BACK (Active)   Pre Size Length 0.3 07/12/21 1400   Pre Size Width 0.4 07/12/21 1400   Pre Size Depth 0.1 07/12/21 1400   Pre Total Sq cm 0.12 07/12/21 1400   Number of days: 1100            Circumferential volume measures:          4/19/2021     9:00 AM 5/3/2021     9:00 AM 7/12/2021     2:00 PM 8/1/2023     3:00 PM 7/16/2024     1:00 PM   Circumferential Measures   Right just above MTP 27.5 26.7 28.7 25.2 26.5   Right Ankle 47 46 37 35.3 33   Right Widest Calf 76 64 73 79 65   Right Knee to Ankle 34       Left - just above MTP 31.8 30.2 30.7 23.8 30   Left Ankle 60 62 39.5 36 35   Left Widest Calf 81 74 77 78 71.5   Left Knee to Ankle 34           Labs:    I personally reviewed the following lab results today and those on care everywhere    CRP   Date Value Ref Range Status   10/23/2021 1.1 (H) 0.0-<0.8 mg/dL Final      Erythrocyte Sedimentation Rate   Date Value Ref Range Status   10/23/2021 60 (H) 0 - 20 mm/hr Final      Last Renal Panel:  Sodium   Date Value Ref Range Status   06/03/2024 139 135 - 145 mmol/L Final     Comment:     Reference intervals for this test were updated on 09/26/2023 to more accurately reflect our healthy population. There may be differences in the flagging of prior results with similar values performed with this method. Interpretation of those prior results can be made in the context of the  updated reference intervals.    03/23/2020 140 133 - 144 mmol/L Final     Potassium   Date Value Ref Range Status   06/03/2024 3.5 3.4 - 5.3 mmol/L Final   05/17/2022 4.0 3.5 - 5.0 mmol/L Final   03/23/2020 4.0 3.4 - 5.3 mmol/L Final     Chloride   Date Value Ref Range Status   06/03/2024 100 98 - 107 mmol/L Final   05/17/2022 100 98 - 107 mmol/L Final   03/23/2020 112 (H) 94 - 109 mmol/L Final     Carbon Dioxide   Date Value Ref Range Status   03/23/2020 22 20 - 32 mmol/L Final     Carbon Dioxide (CO2)   Date Value Ref Range Status   06/03/2024 21 (L) 22 - 29 mmol/L Final   05/17/2022 26 22 - 31 mmol/L Final     Anion Gap   Date Value Ref Range Status   06/03/2024 18 (H) 7 - 15 mmol/L Final   05/17/2022 13 5 - 18 mmol/L Final   03/23/2020 5 3 - 14 mmol/L Final     Glucose   Date Value Ref Range Status   06/03/2024 186 (H) 70 - 99 mg/dL Final   05/17/2022 111 70 - 125 mg/dL Final   03/23/2020 102 (H) 70 - 99 mg/dL Final     Urea Nitrogen   Date Value Ref Range Status   06/03/2024 27.1 (H) 6.0 - 20.0 mg/dL Final   05/17/2022 17 8 - 22 mg/dL Final   03/23/2020 16 7 - 30 mg/dL Final     Creatinine   Date Value Ref Range Status   06/03/2024 0.89 0.51 - 0.95 mg/dL Final   03/23/2020 0.70 0.52 - 1.04 mg/dL Final     GFR Estimate   Date Value Ref Range Status   06/03/2024 82 >60 mL/min/1.73m2 Final   06/29/2021 >60 >60 mL/min/1.73m2 Final   03/23/2020 >90 >60 mL/min/[1.73_m2] Final     Comment:     Non  GFR Calc  Starting 12/18/2018, serum creatinine based estimated GFR (eGFR) will be   calculated using the Chronic Kidney Disease Epidemiology Collaboration   (CKD-EPI) equation.       Calcium   Date Value Ref Range Status   06/03/2024 9.9 8.6 - 10.0 mg/dL Final   03/23/2020 8.9 8.5 - 10.1 mg/dL Final     Phosphorus   Date Value Ref Range Status   09/11/2012 4.1 2.5 - 4.5 mg/dL Final     Albumin   Date Value Ref Range Status   06/03/2024 4.4 3.5 - 5.2 g/dL Final   05/17/2022 4.1 3.5 - 5.0 g/dL Final  "  03/23/2020 3.5 3.4 - 5.0 g/dL Final      Lab Results   Component Value Date    WBC 12.1 06/03/2024    WBC 8.1 11/25/2011     Lab Results   Component Value Date    RBC 3.81 06/03/2024    RBC 3.84 11/25/2011     Lab Results   Component Value Date    HGB 13.6 06/03/2024    HGB 13.1 01/06/2016     Lab Results   Component Value Date    HCT 42.7 06/03/2024    HCT 41.0 11/25/2011     No components found for: \"MCT\"  Lab Results   Component Value Date     06/03/2024     11/25/2011     Lab Results   Component Value Date    MCH 35.7 06/03/2024    MCH 35.4 11/25/2011     Lab Results   Component Value Date    MCHC 31.9 06/03/2024    MCHC 33.2 11/25/2011     Lab Results   Component Value Date    RDW 12.0 06/03/2024    RDW 11.8 11/25/2011     Lab Results   Component Value Date     06/03/2024     11/25/2011      Lab Results   Component Value Date    A1C 11.7 01/24/2024    A1C 5.3 03/23/2020    A1C 5.0 01/06/2020    A1C 5.4 08/26/2019    A1C 5.2 04/09/2019    A1C 5.4 12/31/2018    A1C 5.4 09/20/2016    A1C 5.7 01/06/2016      TSH   Date Value Ref Range Status   01/24/2024 2.77 0.30 - 4.20 uIU/mL Final   04/04/2022 4.28 0.30 - 5.00 uIU/mL Final   04/07/2021 3.51 0.40 - 4.00 mU/L Final      Lab Results   Component Value Date    VITDT 34 04/10/2023    VITDT 27 04/13/2022    VITDT <4 (L) 04/04/2022                   Impression:  Encounter Diagnoses   Name Primary?    Lymphedema Yes    Chronic venous hypertension (idiopathic) with ulcer and inflammation of bilateral lower extremity (H)     Morbid obesity with BMI of 60.0-69.9, adult (H)     Swelling of limb     Type 2 diabetes mellitus without complication, without long-term current use of insulin (H)     Venous insufficiency of both lower extremities     History of cellulitis                    Are any of these ulcers new today: No; Location: na    Assessment/Plan:          1. Debridement: na     2.  Ulcer treatment: ulcer treatment will include irrigation " and dressings to promote autolytic debridement which will include:no wounds; lotion daily   If for some reason the patient is not able to get their dressing(s) changed as outlined above (due to illness, lack of supplies, lack of help) please do the following: remove old, soiled dressings; wash the ulcers with saline; pat dry; apply ABD pad or other absorbant pad and secure with rolled gauze; avoid tape directly on your skin; patient instructed to call the clinic as soon as possible to let us know what the current issues are in receiving ulcer care. Stable            3. Edema: needs new velcro wraps; order sent; provided phone number to get scheduled for these. The compression wraps were applied today in clinic.     If a 2 layer or 4 layer compression wrap is being used; these are safe to have on for ONLY 7 days. If for some reason the patient is not able to get the wrap(s) changed (due to illness; lack of supplies, lack of help, lack of transportation) please do the following: unwrap the old 2 or 4 layer compression wrap; avoid using scissors as you could cut your skin and cause ulcers; use tubular compression when available. Call to reschedule your home care or clinic visit appointment as soon as possible.  Stable            4. Nutrition: focus on weight loss and diabetes management           5. Offloading: needs new diabetic shoes prescription written for this phone number provided to get scheduled          6. Wound Etiology: no wounds     Patient will follow up with me in 1 year for reevaluation. They were instructed to call the clinic sooner with any signs or symptoms of infection or any further questions/concerns. Answered all questions.          Luciana Neal DNP, RN, CNP, CWOCN, CFCN, CLT  [facility]   [phone]        This note was electronically signed by Luciana Neal NP

## 2024-07-24 ENCOUNTER — TELEPHONE (OUTPATIENT)
Dept: VASCULAR SURGERY | Facility: CLINIC | Age: 44
End: 2024-07-24
Payer: COMMERCIAL

## 2024-07-24 NOTE — TELEPHONE ENCOUNTER
"Pt states her left leg is \"acting up\" and red, thinks she has cellulitis and wondering if she should go to the ED. Based on pt description/recently prescribed ABX by PCP, instructed her to go to ED to have leg evaluated. She voiced understanding.    Vane Sams RN, CWOCN    "

## 2024-08-07 ENCOUNTER — TELEPHONE (OUTPATIENT)
Dept: ENDOCRINOLOGY | Facility: CLINIC | Age: 44
End: 2024-08-07
Payer: COMMERCIAL

## 2024-08-07 DIAGNOSIS — E11.65 TYPE 2 DIABETES MELLITUS WITH HYPERGLYCEMIA, WITH LONG-TERM CURRENT USE OF INSULIN (H): ICD-10-CM

## 2024-08-07 DIAGNOSIS — Z79.4 TYPE 2 DIABETES MELLITUS WITH HYPERGLYCEMIA, WITH LONG-TERM CURRENT USE OF INSULIN (H): ICD-10-CM

## 2024-08-07 RX ORDER — TIRZEPATIDE 7.5 MG/.5ML
7.5 INJECTION, SOLUTION SUBCUTANEOUS
Qty: 5 ML | Refills: 0 | Status: SHIPPED | OUTPATIENT
Start: 2024-08-07 | End: 2024-08-14 | Stop reason: DRUGHIGH

## 2024-08-07 NOTE — TELEPHONE ENCOUNTER
[facility] Call Center    Phone Message    May a detailed message be left on voicemail: no     Reason for Call: Other: Pharmacy looking for clarification on tirzepatide (MOUNJARO) script. Please review and call pharmacist at [phone] to discuss further. Thank you.      Action Taken: Other: Endo    Travel Screening: Not Applicable     Date of Service:

## 2024-08-13 ENCOUNTER — TELEPHONE (OUTPATIENT)
Dept: ENDOCRINOLOGY | Facility: CLINIC | Age: 44
End: 2024-08-13
Payer: COMMERCIAL

## 2024-08-13 DIAGNOSIS — L97.909: Primary | ICD-10-CM

## 2024-08-13 DIAGNOSIS — E11.622: Primary | ICD-10-CM

## 2024-08-13 NOTE — TELEPHONE ENCOUNTER
Order placed.   CCS notified to schedule CDE.   Shell Howell, RN on 8/15/2024 at 6:32 AM       Medication Question  (Newest Message First)  View All Conversations on this Encounter   Madina Elizondo PA-C  You13 hours ago (4:47 PM)       Please have pt increase Mounjaro 10 mg subcutaneous once a week.  She will need to see resource CDE/RD next week to have blood sugars checked to see if insulin doses need to be adjusted/decreased.  Thanks  Debora ELENA [facility] Call Center    Phone Message    May a detailed message be left on voicemail: yes     Reason for Call: Medication Question or concern regarding medication   Prescription Clarification  Name of Medication: tirzepatide (MOUNJARO) 7.5 MG/0.5ML pen   Prescribing Provider: Madina elizondo   Pharmacy:    What on the order needs clarification? Please call pharmacy back as they need clarification on medication. They called last week wondering if patient should go up to the 10's and if so, they need a new prescription. Should patient stay at the 7.5 and if so, then call patient to discuss as she's persistent on increasing it. Please call to clarify.

## 2024-08-14 DIAGNOSIS — Z79.4 TYPE 2 DIABETES MELLITUS WITH HYPERGLYCEMIA, WITH LONG-TERM CURRENT USE OF INSULIN (H): Primary | ICD-10-CM

## 2024-08-14 DIAGNOSIS — E11.65 TYPE 2 DIABETES MELLITUS WITH HYPERGLYCEMIA, WITH LONG-TERM CURRENT USE OF INSULIN (H): Primary | ICD-10-CM

## 2024-08-15 ENCOUNTER — TELEPHONE (OUTPATIENT)
Dept: EDUCATION SERVICES | Facility: CLINIC | Age: 44
End: 2024-08-15
Payer: COMMERCIAL

## 2024-08-15 NOTE — TELEPHONE ENCOUNTER
Left Voicemail (1st Attempt) for the patient to call back and schedule the following:    Appointment type: diabetes ed   Provider: Luis   Return date: 8/20 at 11:30 am virtual add on  Specialty phone number: [phone]   Additional appointment(s) needed: NA   Additonal Notes: LVM, No MyC x1   Peggy Smith, RN  Tony, Tiffanie REICH; T[facility] Certified Diabetes Educators [facility]  Please schedule her with me on Tuesday at 11:30 for a virtual visit for 60 minutes. Thanks. Peggy    *This is not a scheduled slot in Epic as it is already booked. You will have to manually add this patient onto the providers schedule by hitting select a time under the providers name when scheduling to add this patient by date, time, and location as stated above. Thank you*    Tiffanie Jimenez on 8/15/2024 at 9:30 AM

## 2024-09-03 ENCOUNTER — TELEPHONE (OUTPATIENT)
Dept: ENDOCRINOLOGY | Facility: CLINIC | Age: 44
End: 2024-09-03
Payer: COMMERCIAL

## 2024-09-03 NOTE — TELEPHONE ENCOUNTER
8/20/24  145    8/21/24  160    8/22/24  137    8/23/24  141    8/24/24  148    8/25/24  186    8/26/24  174    8/27/24  163    8/28/24  174    8/29/24  124    8/30/24  128    8/31/24  165    9/1/24  144    9/2/24  137    9/3/24  131

## 2024-09-03 NOTE — PROGRESS NOTES
Outcome for 09/03/24 10:33 AM: Data obtained via phone and located below- All BG is done fasting in the morning.   Georgina Hooper MA    Patient is showing 4/5 MNCM met. A1c not in range   Georgina Hooper MA    8/20/24  145    8/21/24  160    8/22/24  137    8/23/24  141    8/24/24  148    8/25/24  186    8/26/24  174    8/27/24  163    8/28/24  174    8/29/24  124    8/30/24  128    8/31/24  165    9/1/24  144    9/2/24  137    9/3/24  131

## 2024-09-05 ENCOUNTER — VIRTUAL VISIT (OUTPATIENT)
Dept: ENDOCRINOLOGY | Facility: CLINIC | Age: 44
End: 2024-09-05
Payer: COMMERCIAL

## 2024-09-05 DIAGNOSIS — Z79.4 TYPE 2 DIABETES MELLITUS WITH HYPERGLYCEMIA, WITH LONG-TERM CURRENT USE OF INSULIN (H): Primary | ICD-10-CM

## 2024-09-05 DIAGNOSIS — E11.65 TYPE 2 DIABETES MELLITUS WITH HYPERGLYCEMIA, WITH LONG-TERM CURRENT USE OF INSULIN (H): Primary | ICD-10-CM

## 2024-09-05 PROCEDURE — 99442 PR PHYSICIAN TELEPHONE EVALUATION 11-20 MIN: CPT | Mod: 93 | Performed by: PHYSICIAN ASSISTANT

## 2024-09-05 RX ORDER — BLOOD-GLUCOSE METER
1 EACH MISCELLANEOUS ONCE
Qty: 1 KIT | Refills: 0 | Status: SHIPPED | OUTPATIENT
Start: 2024-09-05 | End: 2024-09-05

## 2024-09-05 RX ORDER — BLOOD SUGAR DIAGNOSTIC
STRIP MISCELLANEOUS
Qty: 250 STRIP | Refills: 3 | Status: SHIPPED | OUTPATIENT
Start: 2024-09-05

## 2024-09-05 NOTE — LETTER
9/5/2024       RE: Bushra Buck  [address]   [address]  [place] [address]     Dear Colleague,    Thank you for referring your patient, Bushra Buck, to the [facility] [place] [facility]. Please see a copy of my visit note below.    Outcome for 09/03/24 10:33 AM: Data obtained via phone and located below- All BG is done fasting in the morning.   Georgina Hooper MA    Patient is showing 4/5 MNCM met. A1c not in range   Georgina LUCAS Hooper    8/20/24  145    8/21/24  160    8/22/24  137    8/23/24  141    8/24/24  148    8/25/24  186    8/26/24  174    8/27/24  163    8/28/24  174    8/29/24  124    8/30/24  128    8/31/24  165    9/1/24  144    9/2/24  137    9/3/24  131      Time of start: 10:30 AM  Time of end: 10:44 AM  Total duration of telephone visit: 14 minutes.  Providers location: Off-site.  Patient location: [place].    HPI  Bushra Buck is a 44-year-old female with history of type 2 diabetes mellitus.  Patient is being seen today for her diabetes.  I last saw Bushra in May 2024.  She has recently been seen by Peggy POOLE.  Patient seen by Dr. Farfan in January 2024.  Bushra was admitted 7/24/2024-7/29/2024 with left lower leg cellulitis which she states has improved.  Patient was admitted 4/1-4/2/2024 at [facility] for psychosis.  Patient has a history of prolactinoma s/p resection in 2000 and subsequent treatment with carbergoline restarted in 2017 for persistent levels, amenorrhea, fatigue and weight gain.  Type 2 diabetes mellitus diagnosed in 2020.  Patient's history also significant for ,morbid obesity, hypothyroidism dyslipidemia, bipolar, PTSD, schizophrenia, DANIEL, venous stasis with lymphedema lower extremities, hypertension, irritable bowel syndrome, GERD, leg cellulitis, MRSA + and vitamin D deficiency.  For her diabetes, she is taking Mounjaro 10 mg subcutaneous once a  week, Glimepiride 4 mg each am, Jardiance 10 mg each am and Lantus 10 units subcutaneous each am.  A1C was 7.2 % on 4/2/2024.  Patient provided me with a few blood sugar readings today which I scanned in her note below.  Blood sugars have improved.  She denies hypoglycemia.    Bushra did not like the freestyle kamryn sensor3 and is currently using a glucose meter.    On ROS today, fatigue.  Less hungry.  Denies headaches, blurred vision, nausea, vomiting, shortness of breath at rest or fever.  No chest pain, abdominal pain or diarrhea.  Patient denies dysuria.  Bushra denies any symptoms of diabetic neuropathy and denies foot ulcers at this time.  She has lymphedema in the lower extremities.  Bushra states left lower leg cellulitis has improved.    Diabetes Care  Retinopathy: Patient denies history of diabetic retinopathy.  She states she was seen by ophthalmology in June 2023.  Reminded patient to have annual diabetic eye exam done.  Nephropathy: Urine protein negative in April 2024.  Neuropathy: None per patient.  Foot Exam: No exam today.  Taking aspirin: Yes.  Lipids: LDL 64 in April 2024.  Patient taking Lipitor and fenofibrate.  Insulin: Basal insulin.  DM meds: Mounjaro, glimepiride and Jardiance.  Testing: Glucose meter.  Patient did not like the Freestyle libre3 sensor.           ROS  See under history of present illness.    Allergies  Allergies   Allergen Reactions     Fiorinal [Butalbital-Aspirin-Caffeine] Anaphylaxis and Swelling     Aspirin Other (See Comments)     Eyes swell shut     Ativan [Lorazepam]      Bee Venom      Benzodiazepines Other (See Comments)     Swelling from allergies     Butalbital Other (See Comments)     Caffeine Other (See Comments)     Carbamazepine Other (See Comments)     Arm swelling     Cat Hair Extract      Other reaction(s): Unknown     Cephalosporins Angioedema     Clindamycin      Other reaction(s): heartburn     Codeine Angioedema     Dextromethorphan      Other  reaction(s): hives     Ibuprofen Sodium Other (See Comments)     swelling     Keflex [Cephalexin]      Eyes swell     Latex Other (See Comments) and Swelling     Swelling     Lithium Swelling     Oxcarbazepine      numbness     Topiramate      Other reaction(s): edema/swelling     Wasp Venom Protein Unknown     Amoxicillin-Pot Clavulanate Rash     Rash on neck - not raised     Topamax Rash       Medications  Current Outpatient Medications   Medication Sig Dispense Refill     ACETAMINOPHEN EXTRA STRENGTH 500 MG tablet        Alcohol Swabs (ALCOHOL PREP) 70 % PADS See Admin Instructions       ASPIRIN LOW DOSE 81 MG EC tablet Take 81 mg by mouth daily       atorvastatin (LIPITOR) 40 MG tablet Take 40 mg by mouth daily       bacitracin 500 UNIT/GM external ointment Apply topically 2 times daily 1 g 5     bumetanide (BUMEX) 2 MG tablet Take 4 mg by mouth 3 times daily       cabergoline (DOSTINEX) 0.5 MG tablet TAKE 1 TABLET BY MOUTH THREE TIMES PER WEEK 36 tablet 1     calcium carbonate-vitamin D (OS-TAWNYA WITH D) 500-200 MG-UNIT tablet Take 1 tablet by mouth 2 times daily       cetirizine (ZYRTEC) 10 MG tablet Take 10 mg by mouth daily       Continuous Blood Gluc  (FREESTYLE SUZY 3 READER) MIKY 1 each daily       Continuous Blood Gluc Sensor (FREESTYLE SUZY 3 SENSOR) MISC 1 each every 14 days       divalproex sodium extended-release (DEPAKOTE ER) 500 MG 24 hr tablet Take 500 mg by mouth 3 times daily       empagliflozin (JARDIANCE) 10 MG TABS tablet TAKE 1 TABLET(10 MG) BY MOUTH DAILY 90 tablet 3     fenofibrate (TRICOR) 145 MG tablet Take 145 mg by mouth daily       glimepiride (AMARYL) 2 MG tablet TAKE 2 TABLETS BY MOUTH DAILY WITH BREAKFAST OR FIRST MAIN MEAL OF THE DAY. 180 tablet 1     glimepiride (AMARYL) 4 MG tablet Take 4 mg by mouth every morning (before breakfast)       insulin glargine (LANTUS PEN) 100 UNIT/ML pen Inject 10 Units Subcutaneous daily 15 mL 1     Insulin Glargine Solostar 100 UNIT/ML  SOPN Inject 15 Units Subcutaneous daily       insulin pen needle (NOVOFINE PEN NEEDLE) 32G X 6 MM miscellaneous 1 each daily       levothyroxine (SYNTHROID/LEVOTHROID) 125 MCG tablet Take 1 tablet by mouth daily       LORazepam (ATIVAN) 0.5 MG tablet Take 0.5 mg by mouth       medroxyPROGESTERone (PROVERA) 10 MG tablet TAKE 1 TABLET  10 MG  BY MOUTH ONCE DAILY FOR 5 DAYS EACH MONTH  0     melatonin 5 MG tablet Take 1 tablet by mouth daily at 2 pm       Multiple Vitamin (TAB-A-NARESH) TABS Take 1 tablet by mouth daily 90 tablet 3     multivitamin w/minerals (THERA-VIT-M) tablet Take 1 tablet by mouth daily       NYAMYC 672845 UNIT/GM external powder Apply 1 g topically as needed       omeprazole (PRILOSEC) 40 MG DR capsule Take 1 capsule by mouth daily.       paliperidone ER (INVEGA) 6 MG 24 hr tablet Take 6 mg by mouth every morning       spironolactone (ALDACTONE) 50 MG tablet Take 50 mg by mouth 2 times daily       tirzepatide (MOUNJARO) 10 MG/0.5ML pen Inject 10 mg subcutaneously every 7 days One month supply only. You will need to see Diabetes Education for further refills. 2 mL 0     tirzepatide (MOUNJARO) 10 MG/0.5ML pen Inject 10 mg subcutaneously every 7 days 5 mL 1     VITAMIN D3 25 MCG (1000 UT) tablet Take 3 tablets by mouth daily         Family History  family history includes Cancer in her mother; Diabetes in her maternal grandfather, mother, and sister; Edema in her sister; Heart Disease in her father; Hypothyroidism in her mother; No Known Problems in her sister; Sleep Apnea in her mother; Snoring in her mother; Thyroid Disease in her mother.    Social History   reports that she has never smoked. She has never used smokeless tobacco. She reports that she does not drink alcohol and does not use drugs.     Past Medical History  Past Medical History:   Diagnosis Date     Abnormal weight gain 01/17/2013     Acne rosacea      Acquired hypothyroidism 07/06/2016     Allergic rhinitis      Bipolar 1 disorder  (H)     followed by psych     Bipolar disorder (H) 01/01/1992     Cellulitis, leg 01/01/2016     Dermatitis 12/31/2011     Dyslipidemia      Ganglion, left wrist      GERD (gastroesophageal reflux disease)      GERD (gastroesophageal reflux disease)      Growth retardation, mild developmental delay, and chronic hepatitis syndrome      Herpes zoster      High triglycerides 01/23/2014     Hypertension      Hypothyroid      Irritable bowel syndrome      Lymphedema of lower extremity 01/01/2008     Morbid obesity (H)      Nightmares      Obstructive sleep apnea      Pituitary adenoma (H)     S/P trans nasal resection in 2000     Post traumatic stress disorder      Post traumatic stress disorder      Post traumatic stress disorder (PTSD)      Prader-Willi syndrome      Prolactinoma (H) transsphenoid approach 2000.     PTSD (post-traumatic stress disorder)      Schizoaffective disorder, bipolar type (H)      Stasis edema 12/31/2011     Type 2 diabetes mellitus without complication, with long-term current use of insulin (H)      Venous stasis      Vitamin D deficiency        Past Surgical History:   Procedure Laterality Date     ABDOMEN SURGERY  2000    fat removed tp plug dura when had a pituitary surgery.     BRAIN SURGERY  2000    pituitary surgery     COLONOSCOPY N/A 10/14/2019    Procedure: COLONOSCOPY;  Surgeon: Hugo Mattson MD;  Location: [facility];  Service: Gastroenterology     EXCISE GANGLION WRIST  2010    left arm     EXCISE GANGLION WRIST Left 6/7/2018    Procedure: REMOVE RECURRENT GANGLION CYST LEFT VOLAR RADIAL WRIST;  Surgeon: Angel Lopez MD;  Location: [facility] OR;  Service:      EXCISE GANGLION WRIST Left 11/18/2019    Procedure: EXCISION LEFT WRIST RECURRENT GANGLION CYST;  Surgeon: Angel Lopez MD;  Location: [facility] OR;  Service: Hand     OTHER SURGICAL HISTORY  2004, 2005    anorectal fissure repair     OTHER SURGICAL HISTORY Bilateral     pansinus revision      PITUITARY SURGERY  2014     pituitary tumor removal  2000    at the [facility]     RELEASE CARPAL TUNNEL Bilateral      septal plasty  1998     SEPTOPLASTY      x 2     SINUS SURGERY Right 8/21/2015    Procedure: RIGHT MICHELLE BULLOSA;  Surgeon: Daniel Landeros MD;  Location: [facility];  Service:      TONSILLECTOMY  1998     TONSILLECTOMY         Physical Exam    No exam today.    RESULTS  Creatinine   Date Value Ref Range Status   06/03/2024 0.89 0.51 - 0.95 mg/dL Final   03/23/2020 0.70 0.52 - 1.04 mg/dL Final     GFR Estimate   Date Value Ref Range Status   06/03/2024 82 >60 mL/min/1.73m2 Final   06/29/2021 >60 >60 mL/min/1.73m2 Final   03/23/2020 >90 >60 mL/min/[1.73_m2] Final     Comment:     Non  GFR Calc  Starting 12/18/2018, serum creatinine based estimated GFR (eGFR) will be   calculated using the Chronic Kidney Disease Epidemiology Collaboration   (CKD-EPI) equation.       Hemoglobin A1C   Date Value Ref Range Status   01/24/2024 11.7 (H) <5.7 % Final     Comment:     Normal <5.7%   Prediabetes 5.7-6.4%    Diabetes 6.5% or higher     Note: Adopted from ADA consensus guidelines.   03/23/2020 5.3 0 - 5.6 % Final     Comment:     Normal <5.7% Prediabetes 5.7-6.4%  Diabetes 6.5% or higher - adopted from ADA   consensus guidelines.       Potassium   Date Value Ref Range Status   06/03/2024 3.5 3.4 - 5.3 mmol/L Final   05/17/2022 4.0 3.5 - 5.0 mmol/L Final   03/23/2020 4.0 3.4 - 5.3 mmol/L Final     ALT   Date Value Ref Range Status   06/03/2024 38 0 - 50 U/L Final     Comment:     Reference intervals for this test were updated on 6/12/2023 to more accurately reflect our healthy population. There may be differences in the flagging of prior results with similar values performed with this method. Interpretation of those prior results can be made in the context of the updated reference intervals.     03/23/2020 37 0 - 50 U/L Final     AST   Date Value Ref Range Status   06/03/2024 31 0 -  45 U/L Final     Comment:     Reference intervals for this test were updated on 6/12/2023 to more accurately reflect our healthy population. There may be differences in the flagging of prior results with similar values performed with this method. Interpretation of those prior results can be made in the context of the updated reference intervals.   03/23/2020 20 0 - 45 U/L Final     TSH   Date Value Ref Range Status   01/24/2024 2.77 0.30 - 4.20 uIU/mL Final   04/04/2022 4.28 0.30 - 5.00 uIU/mL Final   04/07/2021 3.51 0.40 - 4.00 mU/L Final     T4 Free   Date Value Ref Range Status   04/07/2021 1.39 0.76 - 1.46 ng/dL Final     Free T4   Date Value Ref Range Status   01/24/2024 1.68 0.90 - 1.70 ng/dL Final       Cholesterol   Date Value Ref Range Status   04/12/2024 161 <200 mg/dL Final   04/10/2023 158 <200 mg/dL Final   04/07/2021 115 <200 mg/dL Final   01/02/2015 212 (H) <200 mg/dL Final     Comment:     LDL Cholesterol is the primary guide to therapy.   The NCEP recommends further evaluation of: patients with cholesterol greater   than 200 mg/dL if additional risk factors are present, cholesterol greater   than   240 mg/dL, triglycerides greater than 150 mg/dL, or HDL less than 40 mg/dL.       HDL Cholesterol   Date Value Ref Range Status   04/07/2021 35 (L) >49 mg/dL Final   01/02/2015 34 (L) >50 mg/dL Final     Direct Measure HDL   Date Value Ref Range Status   04/12/2024 26 (L) >=50 mg/dL Final   04/10/2023 16 (L) >=50 mg/dL Final     LDL Cholesterol Calculated   Date Value Ref Range Status   04/12/2024 64 <=100 mg/dL Final   04/10/2023   Final     Comment:     Cannot estimate LDL when triglyceride exceeds 400 mg/dL   04/07/2021 40 <100 mg/dL Final     Comment:     Desirable:       <100 mg/dl   01/02/2015 106 0 - 129 mg/dL Final     Comment:     LDL Cholesterol is the primary guide to therapy: LDL-cholesterol goal in high   risk patients is <100 mg/dL and in very high risk patients is <70 mg/dL.       LDL  Cholesterol Direct   Date Value Ref Range Status   04/10/2023 73 <100 mg/dL Final     Comment:     Age 2-19 years:  Desirable: 0-110 mg/dL   Borderline high: 110-129 mg/dL   High: >= 130 mg/dL    Age 20 years and older:  Desirable: <100mg/dL  Above desirable: 100-129 mg/dL   Borderline high: 130-159 mg/dL   High: 160-189 mg/dL   Very high: >= 190 mg/dL   04/13/2022 82 <=129 mg/dL Final   04/04/2022 124 <=129 mg/dL Final     Triglycerides   Date Value Ref Range Status   04/12/2024 354 (H) <150 mg/dL Final   04/10/2023 410 (H) <150 mg/dL Final   04/07/2021 199 (H) <150 mg/dL Final     Comment:     Borderline high:  150-199 mg/dl  High:             200-499 mg/dl  Very high:       >499 mg/dl     01/02/2015 363 (H) 0 - 150 mg/dL Final     Cholesterol/HDL Ratio   Date Value Ref Range Status   01/02/2015 6.3 (H) 0.0 - 5.0 Final   01/17/2013 6.3 (H) 0.0 - 5.0 Final         ASSESSMENT/PLAN:     TYPE 2 DIABETES MELLITUS: Blood sugar values have improved per patient.  I asked Bushra to check her fasting blood sugar each am and also check her blood sugar prelunch and predinner daily.  She did not like the freestyle libre3 sensor and is currently using a glucose meter.  I ordered a new Accu-Chek guide glucose meter and test strips today.  Bushra is tolerating Mounjaro well.  No nausea, vomiting or abdominal pain.  Mild constipation.  I suggest that she try MiraLAX if needed.  Patient is to remain on Jardiance 10 mg each am, Glimepiride 4 mg each am and Lantus 10 units subcutaneous each am.  Increase Mounjaro 12.5 mg subcutaneous once a week starting next week.  If she has any symptoms of hypoglycemia or low blood sugars <70 she is to notify us.  Bushra has follow-up with Peggy POOLE in 2 weeks.  Patient states she was seen by ophthalmology in June 2023 without diabetic retinopathy.  Reminded Bushra to have her annual diabetic eye exam done.  Her urine microalbuminuria was negative in April 2024.  Bushra denies any symptoms  of diabetic neuropathy.  She states she has no foot ulcers at this time.  Patient does have lymphedema of the lower extremities.  No vitals today.  HX OF PROLACTINOMA: Not addressed today.  Patient is followed by Dr. Farfan in January 2025.  MORBID OBESITY: Increase Mounjaro 12.5 mg subcutaneous once a week starting next week.  [facility]  FOLLOW UP: With me in 3 months and with Peggy POOLE on 9/17/2024.  Mounjaro 12.5 mg pen ordered today.  A1C ordered today-patient will have this done at her local clinic.    Time spent reviewing chart, labs and glucose download data today=5 minutes.  Time for video visit today= 14 minutes.  Time for documentation today= 15 minutes.    Total time for visit today= 34 minutes.    Madina Elizondo PA-C          Again, thank you for allowing me to participate in the care of your patient.      Sincerely,    Madina Elizondo PA-C

## 2024-09-05 NOTE — PROGRESS NOTES
Time of start: 10:30 AM  Time of end: 10:44 AM  Total duration of telephone visit: 14 minutes.  Providers location: Off-site.  Patient location: [place].    HPI  Bushra Buck is a 44-year-old female with history of type 2 diabetes mellitus.  Patient is being seen today for her diabetes.  I last saw Bushra in May 2024.  She has recently been seen by Peggy POOLE.  Patient seen by Dr. Farfan in January 2024.  Bushra was admitted 7/24/2024-7/29/2024 with left lower leg cellulitis which she states has improved.  Patient was admitted 4/1-4/2/2024 at [facility] for psychosis.  Patient has a history of prolactinoma s/p resection in 2000 and subsequent treatment with carbergoline restarted in 2017 for persistent levels, amenorrhea, fatigue and weight gain.  Type 2 diabetes mellitus diagnosed in 2020.  Patient's history also significant for ,morbid obesity, hypothyroidism dyslipidemia, bipolar, PTSD, schizophrenia, DANIEL, venous stasis with lymphedema lower extremities, hypertension, irritable bowel syndrome, GERD, leg cellulitis, MRSA + and vitamin D deficiency.  For her diabetes, she is taking Mounjaro 10 mg subcutaneous once a week, Glimepiride 4 mg each am, Jardiance 10 mg each am and Lantus 10 units subcutaneous each am.  A1C was 7.2 % on 4/2/2024.  Patient provided me with a few blood sugar readings today which I scanned in her note below.  Blood sugars have improved.  She denies hypoglycemia.    Bushra did not like the freestyle kamryn sensor3 and is currently using a glucose meter.    On ROS today, fatigue.  Less hungry.  Denies headaches, blurred vision, nausea, vomiting, shortness of breath at rest or fever.  No chest pain, abdominal pain or diarrhea.  Patient denies dysuria.  Bushra denies any symptoms of diabetic neuropathy and denies foot ulcers at this time.  She has lymphedema in the lower extremities.  Bushra states left lower leg cellulitis has improved.    Diabetes Care  Retinopathy:  Patient denies history of diabetic retinopathy.  She states she was seen by ophthalmology in June 2023.  Reminded patient to have annual diabetic eye exam done.  Nephropathy: Urine protein negative in April 2024.  Neuropathy: None per patient.  Foot Exam: No exam today.  Taking aspirin: Yes.  Lipids: LDL 64 in April 2024.  Patient taking Lipitor and fenofibrate.  Insulin: Basal insulin.  DM meds: Mounjaro, glimepiride and Jardiance.  Testing: Glucose meter.  Patient did not like the Freestyle libre3 sensor.           ROS  See under history of present illness.    Allergies  Allergies   Allergen Reactions    Fiorinal [Butalbital-Aspirin-Caffeine] Anaphylaxis and Swelling    Aspirin Other (See Comments)     Eyes swell shut    Ativan [Lorazepam]     Bee Venom     Benzodiazepines Other (See Comments)     Swelling from allergies    Butalbital Other (See Comments)    Caffeine Other (See Comments)    Carbamazepine Other (See Comments)     Arm swelling    Cat Hair Extract      Other reaction(s): Unknown    Cephalosporins Angioedema    Clindamycin      Other reaction(s): heartburn    Codeine Angioedema    Dextromethorphan      Other reaction(s): hives    Ibuprofen Sodium Other (See Comments)     swelling    Keflex [Cephalexin]      Eyes swell    Latex Other (See Comments) and Swelling     Swelling    Lithium Swelling    Oxcarbazepine      numbness    Topiramate      Other reaction(s): edema/swelling    Wasp Venom Protein Unknown    Amoxicillin-Pot Clavulanate Rash     Rash on neck - not raised    Topamax Rash       Medications  Current Outpatient Medications   Medication Sig Dispense Refill    ACETAMINOPHEN EXTRA STRENGTH 500 MG tablet       Alcohol Swabs (ALCOHOL PREP) 70 % PADS See Admin Instructions      ASPIRIN LOW DOSE 81 MG EC tablet Take 81 mg by mouth daily      atorvastatin (LIPITOR) 40 MG tablet Take 40 mg by mouth daily      bacitracin 500 UNIT/GM external ointment Apply topically 2 times daily 1 g 5    bumetanide  (BUMEX) 2 MG tablet Take 4 mg by mouth 3 times daily      cabergoline (DOSTINEX) 0.5 MG tablet TAKE 1 TABLET BY MOUTH THREE TIMES PER WEEK 36 tablet 1    calcium carbonate-vitamin D (OS-TAWNYA WITH D) 500-200 MG-UNIT tablet Take 1 tablet by mouth 2 times daily      cetirizine (ZYRTEC) 10 MG tablet Take 10 mg by mouth daily      Continuous Blood Gluc  (FREESTYLE SUZY 3 READER) MIKY 1 each daily      Continuous Blood Gluc Sensor (FREESTYLE SUZY 3 SENSOR) MISC 1 each every 14 days      divalproex sodium extended-release (DEPAKOTE ER) 500 MG 24 hr tablet Take 500 mg by mouth 3 times daily      empagliflozin (JARDIANCE) 10 MG TABS tablet TAKE 1 TABLET(10 MG) BY MOUTH DAILY 90 tablet 3    fenofibrate (TRICOR) 145 MG tablet Take 145 mg by mouth daily      glimepiride (AMARYL) 2 MG tablet TAKE 2 TABLETS BY MOUTH DAILY WITH BREAKFAST OR FIRST MAIN MEAL OF THE DAY. 180 tablet 1    glimepiride (AMARYL) 4 MG tablet Take 4 mg by mouth every morning (before breakfast)      insulin glargine (LANTUS PEN) 100 UNIT/ML pen Inject 10 Units Subcutaneous daily 15 mL 1    Insulin Glargine Solostar 100 UNIT/ML SOPN Inject 15 Units Subcutaneous daily      insulin pen needle (NOVOFINE PEN NEEDLE) 32G X 6 MM miscellaneous 1 each daily      levothyroxine (SYNTHROID/LEVOTHROID) 125 MCG tablet Take 1 tablet by mouth daily      LORazepam (ATIVAN) 0.5 MG tablet Take 0.5 mg by mouth      medroxyPROGESTERone (PROVERA) 10 MG tablet TAKE 1 TABLET  10 MG  BY MOUTH ONCE DAILY FOR 5 DAYS EACH MONTH  0    melatonin 5 MG tablet Take 1 tablet by mouth daily at 2 pm      Multiple Vitamin (TAB-A-NARESH) TABS Take 1 tablet by mouth daily 90 tablet 3    multivitamin w/minerals (THERA-VIT-M) tablet Take 1 tablet by mouth daily      NYAMYC 084770 UNIT/GM external powder Apply 1 g topically as needed      omeprazole (PRILOSEC) 40 MG DR capsule Take 1 capsule by mouth daily.      paliperidone ER (INVEGA) 6 MG 24 hr tablet Take 6 mg by mouth every morning       spironolactone (ALDACTONE) 50 MG tablet Take 50 mg by mouth 2 times daily      tirzepatide (MOUNJARO) 10 MG/0.5ML pen Inject 10 mg subcutaneously every 7 days One month supply only. You will need to see Diabetes Education for further refills. 2 mL 0    tirzepatide (MOUNJARO) 10 MG/0.5ML pen Inject 10 mg subcutaneously every 7 days 5 mL 1    VITAMIN D3 25 MCG (1000 UT) tablet Take 3 tablets by mouth daily         Family History  family history includes Cancer in her mother; Diabetes in her maternal grandfather, mother, and sister; Edema in her sister; Heart Disease in her father; Hypothyroidism in her mother; No Known Problems in her sister; Sleep Apnea in her mother; Snoring in her mother; Thyroid Disease in her mother.    Social History   reports that she has never smoked. She has never used smokeless tobacco. She reports that she does not drink alcohol and does not use drugs.     Past Medical History  Past Medical History:   Diagnosis Date    Abnormal weight gain 01/17/2013    Acne rosacea     Acquired hypothyroidism 07/06/2016    Allergic rhinitis     Bipolar 1 disorder (H)     followed by psych    Bipolar disorder (H) 01/01/1992    Cellulitis, leg 01/01/2016    Dermatitis 12/31/2011    Dyslipidemia     Ganglion, left wrist     GERD (gastroesophageal reflux disease)     GERD (gastroesophageal reflux disease)     Growth retardation, mild developmental delay, and chronic hepatitis syndrome     Herpes zoster     High triglycerides 01/23/2014    Hypertension     Hypothyroid     Irritable bowel syndrome     Lymphedema of lower extremity 01/01/2008    Morbid obesity (H)     Nightmares     Obstructive sleep apnea     Pituitary adenoma (H)     S/P trans nasal resection in 2000    Post traumatic stress disorder     Post traumatic stress disorder     Post traumatic stress disorder (PTSD)     Prader-Willi syndrome     Prolactinoma (H) transsphenoid approach 2000.    PTSD (post-traumatic stress disorder)     Schizoaffective  disorder, bipolar type (H)     Stasis edema 12/31/2011    Type 2 diabetes mellitus without complication, with long-term current use of insulin (H)     Venous stasis     Vitamin D deficiency        Past Surgical History:   Procedure Laterality Date    ABDOMEN SURGERY  2000    fat removed tp plug dura when had a pituitary surgery.    BRAIN SURGERY  2000    pituitary surgery    COLONOSCOPY N/A 10/14/2019    Procedure: COLONOSCOPY;  Surgeon: Hugo Mattson MD;  Location: [facility];  Service: Gastroenterology    EXCISE GANGLION WRIST  2010    left arm    EXCISE GANGLION WRIST Left 6/7/2018    Procedure: REMOVE RECURRENT GANGLION CYST LEFT VOLAR RADIAL WRIST;  Surgeon: Angel Lopez MD;  Location: [facility] OR;  Service:     EXCISE GANGLION WRIST Left 11/18/2019    Procedure: EXCISION LEFT WRIST RECURRENT GANGLION CYST;  Surgeon: Angel Lopez MD;  Location: [facility];  Service: Hand    OTHER SURGICAL HISTORY  2004, 2005    anorectal fissure repair    OTHER SURGICAL HISTORY Bilateral     pansinus revision    PITUITARY SURGERY  2014    pituitary tumor removal  2000    at the [facility]    RELEASE CARPAL TUNNEL Bilateral     septal plasty  1998    SEPTOPLASTY      x 2    SINUS SURGERY Right 8/21/2015    Procedure: RIGHT MICHELLE BULLOSA;  Surgeon: Daniel Landeros MD;  Location: [facility];  Service:     TONSILLECTOMY  1998    TONSILLECTOMY         Physical Exam    No exam today.    RESULTS  Creatinine   Date Value Ref Range Status   06/03/2024 0.89 0.51 - 0.95 mg/dL Final   03/23/2020 0.70 0.52 - 1.04 mg/dL Final     GFR Estimate   Date Value Ref Range Status   06/03/2024 82 >60 mL/min/1.73m2 Final   06/29/2021 >60 >60 mL/min/1.73m2 Final   03/23/2020 >90 >60 mL/min/[1.73_m2] Final     Comment:     Non  GFR Calc  Starting 12/18/2018, serum creatinine based estimated GFR (eGFR) will be   calculated using the Chronic Kidney Disease Epidemiology Collaboration    (CKD-EPI) equation.       Hemoglobin A1C   Date Value Ref Range Status   01/24/2024 11.7 (H) <5.7 % Final     Comment:     Normal <5.7%   Prediabetes 5.7-6.4%    Diabetes 6.5% or higher     Note: Adopted from ADA consensus guidelines.   03/23/2020 5.3 0 - 5.6 % Final     Comment:     Normal <5.7% Prediabetes 5.7-6.4%  Diabetes 6.5% or higher - adopted from ADA   consensus guidelines.       Potassium   Date Value Ref Range Status   06/03/2024 3.5 3.4 - 5.3 mmol/L Final   05/17/2022 4.0 3.5 - 5.0 mmol/L Final   03/23/2020 4.0 3.4 - 5.3 mmol/L Final     ALT   Date Value Ref Range Status   06/03/2024 38 0 - 50 U/L Final     Comment:     Reference intervals for this test were updated on 6/12/2023 to more accurately reflect our healthy population. There may be differences in the flagging of prior results with similar values performed with this method. Interpretation of those prior results can be made in the context of the updated reference intervals.     03/23/2020 37 0 - 50 U/L Final     AST   Date Value Ref Range Status   06/03/2024 31 0 - 45 U/L Final     Comment:     Reference intervals for this test were updated on 6/12/2023 to more accurately reflect our healthy population. There may be differences in the flagging of prior results with similar values performed with this method. Interpretation of those prior results can be made in the context of the updated reference intervals.   03/23/2020 20 0 - 45 U/L Final     TSH   Date Value Ref Range Status   01/24/2024 2.77 0.30 - 4.20 uIU/mL Final   04/04/2022 4.28 0.30 - 5.00 uIU/mL Final   04/07/2021 3.51 0.40 - 4.00 mU/L Final     T4 Free   Date Value Ref Range Status   04/07/2021 1.39 0.76 - 1.46 ng/dL Final     Free T4   Date Value Ref Range Status   01/24/2024 1.68 0.90 - 1.70 ng/dL Final       Cholesterol   Date Value Ref Range Status   04/12/2024 161 <200 mg/dL Final   04/10/2023 158 <200 mg/dL Final   04/07/2021 115 <200 mg/dL Final   01/02/2015 212 (H) <200 mg/dL  Final     Comment:     LDL Cholesterol is the primary guide to therapy.   The NCEP recommends further evaluation of: patients with cholesterol greater   than 200 mg/dL if additional risk factors are present, cholesterol greater   than   240 mg/dL, triglycerides greater than 150 mg/dL, or HDL less than 40 mg/dL.       HDL Cholesterol   Date Value Ref Range Status   04/07/2021 35 (L) >49 mg/dL Final   01/02/2015 34 (L) >50 mg/dL Final     Direct Measure HDL   Date Value Ref Range Status   04/12/2024 26 (L) >=50 mg/dL Final   04/10/2023 16 (L) >=50 mg/dL Final     LDL Cholesterol Calculated   Date Value Ref Range Status   04/12/2024 64 <=100 mg/dL Final   04/10/2023   Final     Comment:     Cannot estimate LDL when triglyceride exceeds 400 mg/dL   04/07/2021 40 <100 mg/dL Final     Comment:     Desirable:       <100 mg/dl   01/02/2015 106 0 - 129 mg/dL Final     Comment:     LDL Cholesterol is the primary guide to therapy: LDL-cholesterol goal in high   risk patients is <100 mg/dL and in very high risk patients is <70 mg/dL.       LDL Cholesterol Direct   Date Value Ref Range Status   04/10/2023 73 <100 mg/dL Final     Comment:     Age 2-19 years:  Desirable: 0-110 mg/dL   Borderline high: 110-129 mg/dL   High: >= 130 mg/dL    Age 20 years and older:  Desirable: <100mg/dL  Above desirable: 100-129 mg/dL   Borderline high: 130-159 mg/dL   High: 160-189 mg/dL   Very high: >= 190 mg/dL   04/13/2022 82 <=129 mg/dL Final   04/04/2022 124 <=129 mg/dL Final     Triglycerides   Date Value Ref Range Status   04/12/2024 354 (H) <150 mg/dL Final   04/10/2023 410 (H) <150 mg/dL Final   04/07/2021 199 (H) <150 mg/dL Final     Comment:     Borderline high:  150-199 mg/dl  High:             200-499 mg/dl  Very high:       >499 mg/dl     01/02/2015 363 (H) 0 - 150 mg/dL Final     Cholesterol/HDL Ratio   Date Value Ref Range Status   01/02/2015 6.3 (H) 0.0 - 5.0 Final   01/17/2013 6.3 (H) 0.0 - 5.0 Final         ASSESSMENT/PLAN:      TYPE 2 DIABETES MELLITUS: Blood sugar values have improved per patient.  I asked Bushra to check her fasting blood sugar each am and also check her blood sugar prelunch and predinner daily.  She did not like the freestyle libre3 sensor and is currently using a glucose meter.  I ordered a new Accu-Chek guide glucose meter and test strips today.  Bushra is tolerating Mounjaro well.  No nausea, vomiting or abdominal pain.  Mild constipation.  I suggest that she try MiraLAX if needed.  Patient is to remain on Jardiance 10 mg each am, Glimepiride 4 mg each am and Lantus 10 units subcutaneous each am.  Increase Mounjaro 12.5 mg subcutaneous once a week starting next week.  If she has any symptoms of hypoglycemia or low blood sugars <70 she is to notify us.  Bushra has follow-up with Peggy POOLE in 2 weeks.  Patient states she was seen by ophthalmology in June 2023 without diabetic retinopathy.  Reminded Bushra to have her annual diabetic eye exam done.  Her urine microalbuminuria was negative in April 2024.  Bushra denies any symptoms of diabetic neuropathy.  She states she has no foot ulcers at this time.  Patient does have lymphedema of the lower extremities.  No vitals today.  HX OF PROLACTINOMA: Not addressed today.  Patient is followed by Dr. Farfan in January 2025.  MORBID OBESITY: Increase Mounjaro 12.5 mg subcutaneous once a week starting next week.  [facility]  FOLLOW UP: With me in 3 months and with Peggy POOLE on 9/17/2024.  Mounjaro 12.5 mg pen ordered today.  A1C ordered today-patient will have this done at her local clinic.    Time spent reviewing chart, labs and glucose download data today=5 minutes.  Time for video visit today= 14 minutes.  Time for documentation today= 15 minutes.    Total time for visit today= 34 minutes.    Madina Elizondo PA-C

## 2024-09-05 NOTE — NURSING NOTE
Current patient location: [place]    Is the patient currently in the state of [place]? YES    Visit mode:TELEPHONE    If the visit is dropped, the patient can be reconnected by: TELEPHONE VISIT: Phone number: Reviewed by phone messages, see Encounter section    Will anyone else be joining the visit? NO  (If patient encounters technical issues they should call [phone])    How would you like to obtain your AVS? MyChart    Are changes needed to the allergy or medication list? No, Pt stated no changes to allergies, and Pt stated no med changes    Are refills needed on medications prescribed by this physician? YES    Yes, patient would like refill of Accu-Chek Guide monitor ( she isn't sure how to change the battery on her old one). She also wants to talk about Mounjaro dose.   If dose needs to be changed on Mounjaro, it goes through the [facility] mail order. If meter is ordered, it should be sent to [facility] in [place].    Rooming Documentation:  Not applicable      Reason for visit: JUAN A VALADEZ

## 2024-09-17 ENCOUNTER — VIRTUAL VISIT (OUTPATIENT)
Dept: EDUCATION SERVICES | Facility: CLINIC | Age: 44
End: 2024-09-17
Payer: COMMERCIAL

## 2024-09-17 DIAGNOSIS — E11.65 UNCONTROLLED TYPE 2 DIABETES MELLITUS WITH HYPERGLYCEMIA (H): Primary | ICD-10-CM

## 2024-09-17 PROCEDURE — 99207 PR NO BILLABLE SERVICE THIS VISIT: CPT | Mod: 93 | Performed by: REGISTERED NURSE

## 2024-09-17 NOTE — NURSING NOTE
Patient uses mail in pharmacy for Mounjaro Rx    Current patient location: [address]  [place] [address]    Is the patient currently in the state of [place]? YES    Visit mode:VIDEO    If the visit is dropped, the patient can be reconnected by: VIDEO VISIT: Text to cell phone:   Telephone Information:   Mobile [phone]   Mobile Not on file.       Will anyone else be joining the visit? NO  (If patient encounters technical issues they should call [phone])    How would you like to obtain your AVS? MyChart    Are changes needed to the allergy or medication list? No, Pt stated no changes to allergies, and Pt stated no med changes    Are refills needed on medications prescribed by this physician? NO    Rooming Documentation:  Not applicable      Reason for visit: Diabetes Education    Alejandra VALADEZ

## 2024-09-17 NOTE — PROGRESS NOTES
20Virtual Visit Details    Type of service:  Telephone Visit   Phone call duration: 20 minutes   Originating Location (pt. Location): Home    Distant Location (provider location):  Off-site    Diabetes Self-Management Education & Support Visit- Short    Bushra Buck presented today for education related to Type 2 diabetes    Patient is being treated with:  Oral Agents, Insulin (less than 3 injections daily), and GLP-1 Agonist    Year of Diagnosis:   It appears that the first A1C that met criteria for diabetes was 10/2023 at 9.1%    Referring Provider:  Madina Elizondo PA-C  Living Situation:       Lives alone   Employment:           Not employed/Disabled    Purpose of today's visit:  Follow up Mounjaro titration and insulin adjustment.     Subjective/Objective:    Visit conducted by phone.   Bushra is currently taking 10 mg of Mounjaro.  She has the 12.5 mg dose and intends to start using it in two days.    She wore a continuous glucose monitor ([employer] CGM) but it fell off and she got frustrated and stopped using it.    She is currently only checking her glucose once a day (fasting). Numbers appear below:     Date Fasting   9/10 153   9/11 175   9/12 170   9/13 170   9/14 199   9/15 169   9/16 195   9/17 232         Assessment/Plan:    Since the only numbers that we have to work with are her fasting values, and 100% of them are significantly over target, did not make any changes in her insulin doses or her other oral medicaitons.  It would be helpful to have Bushra come in for placement of a CGM at some point to get a better idea of what her overall control is, as she states that it is not possible for her to test using a blood glucose meter more often than once a day.      Encouraged her to continue to test once a day as she has been.    She has not had any episodes of hypoglycemia in the past month, she states.  She treats hypoglycemia with juice.        Follow-up:  Appointment in one week to see how she does  on the 12.5 mg dose of Mounjaro.       Time spent in this visit: Less than 30 minutes.  No charge.      Any diabetes medication dose changes were made via the CDE Protocol and Collaborative Practice Agreement. A copy of this encounter was provided to the patient's referring provider.

## 2024-09-24 ENCOUNTER — VIRTUAL VISIT (OUTPATIENT)
Dept: EDUCATION SERVICES | Facility: CLINIC | Age: 44
End: 2024-09-24
Payer: COMMERCIAL

## 2024-09-24 DIAGNOSIS — E11.65 TYPE 2 DIABETES MELLITUS WITH HYPERGLYCEMIA, WITH LONG-TERM CURRENT USE OF INSULIN (H): Primary | ICD-10-CM

## 2024-09-24 DIAGNOSIS — Z79.4 TYPE 2 DIABETES MELLITUS WITH HYPERGLYCEMIA, WITH LONG-TERM CURRENT USE OF INSULIN (H): Primary | ICD-10-CM

## 2024-09-24 PROCEDURE — 99207 PR NO CHARGE LOS: CPT | Mod: 93 | Performed by: REGISTERED NURSE

## 2024-09-24 NOTE — NURSING NOTE
Current patient location: [address] [place] [address] [address]  [place] [address]    Is the patient currently in the state of [place]? YES    Visit mode:TELEPHONE    If the visit is dropped, the patient can be reconnected by: TELEPHONE VISIT: Phone number:   Telephone Information:   Mobile [phone]   Mobile Not on file.       Will anyone else be joining the visit? NO  (If patient encounters technical issues they should call [phone]    How would you like to obtain your AVS? Mail a copy    Are changes needed to the allergy or medication list? No, Pt stated no changes to allergies, and Pt stated no med changes    Are refills needed on medications prescribed by this physician? NO    Rooming Documentation:  Not applicable    Reason for visit: Diabetes Education    Alejandra VALADEZ

## 2024-09-24 NOTE — PROGRESS NOTES
"Virtual Visit Details    Type of service:  Telephone Visit   Phone call duration: 15 minutes   Originating Location (pt. Location): Home    Distant Location (provider location):  On-site    Diabetes Self-Management Education & Support Visit- Short    Bushra PRYOR Cielo presented today for education related to Type 2 diabetes    Patient is being treated with:  Oral Agents, Insulin (less than 3 injections daily), and GLP-1 Agonist    Purpose of today's visit:  Ongoing DSME and insulin management while titrating Mounjaro      Subjective/Objective:  Current diabetes regimen:   Lantus insulin 10 units  Glimeperide:  4 mg daily in AM  Jardience:  10 mg daily  Mounjaro:   12.5 mg weekly.     She is monitoring her blood glucose once a day, fasting.  Her BG's appear below:     Date Fasting   9/15 169   9/16 195   9/17 232   9/18 220   9/19 187   9/10 172   9/21 174   9/22 166   9/23 190   9/24 171      Assessment/Plan:    We don't have a lot of data to go on, as Bushra states that it's not possible for her to check her glucose more frequently or to wear a sensor.  She tried one a few months ago and it fell off, so she doesn't want to wear one at all.  She denies having any hypoglycemia, states \"it's only high.\"  Based on her fasting glucoses and lack of hypoglycemia, I instructed Bushra to increase her Lantus dose from 10 units daily to 14 units daily.  She just started taking the 12.5 mg dose of Mounjaro and states she is not having any side effects from it.  Left other medications as is.      Follow-up:        Time spent in this visit: 15 minutes   Made an appointment next week to re-evaluate and possibly make additional changes to her insulin dose.      Any diabetes medication dose changes were made via the CDE Protocol and Collaborative Practice Agreement. A copy of this encounter was provided to the patient's referring provider.   "

## 2024-10-03 ENCOUNTER — VIRTUAL VISIT (OUTPATIENT)
Dept: EDUCATION SERVICES | Facility: CLINIC | Age: 44
End: 2024-10-03
Payer: COMMERCIAL

## 2024-10-03 DIAGNOSIS — D35.2 PROLACTINOMA (H): ICD-10-CM

## 2024-10-03 DIAGNOSIS — E11.9 TYPE 2 DIABETES MELLITUS (H): ICD-10-CM

## 2024-10-03 DIAGNOSIS — D35.2 PITUITARY ADENOMA (H): ICD-10-CM

## 2024-10-03 DIAGNOSIS — Z79.4 TYPE 2 DIABETES MELLITUS WITH HYPERGLYCEMIA, WITH LONG-TERM CURRENT USE OF INSULIN (H): ICD-10-CM

## 2024-10-03 DIAGNOSIS — E11.65 TYPE 2 DIABETES MELLITUS WITH HYPERGLYCEMIA, WITH LONG-TERM CURRENT USE OF INSULIN (H): ICD-10-CM

## 2024-10-03 PROCEDURE — 99207 PR NO CHARGE LOS: CPT | Mod: 93 | Performed by: REGISTERED NURSE

## 2024-10-03 ASSESSMENT — PAIN SCALES - GENERAL: PAINLEVEL: NO PAIN (0)

## 2024-10-03 NOTE — NURSING NOTE
Current patient location: [address] [place] [address] [address]  [place] [address]    Is the patient currently in the state of [place]? YES    Visit mode:PHONE    If the visit is dropped, the patient can be reconnected by: TELEPHONE VISIT: Phone number:   Telephone Information:   Mobile [phone]           Will anyone else be joining the visit? NO  (If patient encounters technical issues they should call [phone])    Are changes needed to the allergy or medication list? No, Pt declined med review, and Pt stated no med changes    Are refills needed on medications prescribed by this physician? YES, tirzepatide (MOUNJARO) 12.5 MG/0.5ML pen and pt requested any other refills needed be filled today    Rooming Documentation:  Not applicable    Reason for visit: RECHECK (DIABETES ED)    Julia VALADEZ

## 2024-10-03 NOTE — PROGRESS NOTES
Virtual Visit Details    Type of service:  Telephone Visit   Phone call duration: less than 30 minutes   Originating Location (pt. Location): Home    Distant Location (provider location):  Off-site    Diabetes Self-Management Education & Support Visit- Short    Bushra Buck presented today for education related to Type 2 diabetes    Patient is being treated with:      Lantus 14 units daily  Glimeperide:  8 mg daily  Jardiance:  10 mg daily  Mounjaro:  12.5 mg weekly.    Year of Diagnosis:   It appears that the first A1C that met criteria for diabetes was 10/2023 at 9.1%    Referring Provider:  Madina Elizondo PA-C  Living Situation:       Lives alone   Employment:           Not employed/Disabled    Purpose of today's visit:  Titrating up the dose of Mounjaro and adjusting insulin.        Visit conducted by phone.   Bushra is currently taking 12.5 mg of Mounjaro.  She has the 12.5 mg dose and intends to start using it in two days.    She wore a continuous glucose monitor (Kwaku CGM) but it fell off and she got frustrated and stopped using it.    She is currently only checking her glucose once a day (fasting). Numbers appear below:      Date Fasting   9/27 187   9/28 206   9/29 166   9/30 164   10/01 155   10/02 154   10/03 141              Assessment/Plan:     Since the only numbers that we have to work with are her fasting values, and 100% of them are still over target, did not make any changes in her insulin doses or her other oral medicaitons.  It would be helpful to have Bushra come in for placement of a CGM at some point to get a better idea of what her overall control is, as she states that it is not possible for her to test using a blood glucose meter more often than once a day.       Encouraged her to continue to test once a day as she has been.    She has not had any episodes of hypoglycemia in the past month, she states.  She treats hypoglycemia with juice.      Follow-up:    Scheduled in one  month.      Time spent in this visit: Less than 30 minutes     Any diabetes medication dose changes were made via the CDE Protocol and Collaborative Practice Agreement. A copy of this encounter was provided to the patient's referring provider.

## 2024-10-04 ENCOUNTER — LAB REQUISITION (OUTPATIENT)
Dept: LAB | Facility: CLINIC | Age: 44
End: 2024-10-04
Payer: COMMERCIAL

## 2024-10-04 DIAGNOSIS — E28.2 POLYCYSTIC OVARIAN SYNDROME: ICD-10-CM

## 2024-10-04 PROCEDURE — 82670 ASSAY OF TOTAL ESTRADIOL: CPT | Mod: ORL | Performed by: OBSTETRICS & GYNECOLOGY

## 2024-10-04 PROCEDURE — 83001 ASSAY OF GONADOTROPIN (FSH): CPT | Mod: ORL | Performed by: OBSTETRICS & GYNECOLOGY

## 2024-10-04 PROCEDURE — 83002 ASSAY OF GONADOTROPIN (LH): CPT | Mod: ORL | Performed by: OBSTETRICS & GYNECOLOGY

## 2024-10-05 LAB
ESTRADIOL SERPL-MCNC: 27 PG/ML
FSH SERPL IRP2-ACNC: 4.8 MIU/ML
LH SERPL-ACNC: 8.8 MIU/ML

## 2024-10-18 DIAGNOSIS — D35.2 PROLACTINOMA (H): ICD-10-CM

## 2024-10-21 ENCOUNTER — TELEPHONE (OUTPATIENT)
Dept: ENDOCRINOLOGY | Facility: CLINIC | Age: 44
End: 2024-10-21
Payer: COMMERCIAL

## 2024-10-21 NOTE — TELEPHONE ENCOUNTER
Patient confirmed scheduled appointment:  Date: 12/5   Time: 10 am   Visit type: return diabetes   Provider: Caro   Location: [facility] telephone call   Testing/imaging:   Additional notes: Spoke to pt to schedule per below message. Pt asked if labs could be sent to PCP. Sent message to front end team to fax orders to them. Pt has only done telephone calls, no in person availability in the month of December. DALE norwoodayed per Caitie due to time frame.    Check Out Comments from visit on 9/5:  Appt with me in 3 months.  Lab ordered.      Tiffanie Jimenez on 10/21/2024 at 2:27 PM

## 2024-10-21 NOTE — TELEPHONE ENCOUNTER
----- Message from Tiffanie TAYLOR sent at 10/21/2024  2:18 PM CDT -----  Regarding: Labs faxed to PCP  Hello,    From this patients last visit with Debora on 9/5/24 she wanted this patient to have a lab draw. I just spoke to her and she would like the orders faxed to her PCP's office. Provider Name: Daniel Cruz at [facility], address [address], [place] [id number]. Can someone further assist with this request? Thank you.

## 2024-10-24 RX ORDER — MULTIVITAMIN WITH FOLIC ACID 400 MCG
1 TABLET ORAL DAILY
Qty: 90 TABLET | Refills: 11 | OUTPATIENT
Start: 2024-10-24

## 2024-10-27 DIAGNOSIS — E03.9 HYPOTHYROIDISM, UNSPECIFIED TYPE: Primary | ICD-10-CM

## 2024-10-30 NOTE — TELEPHONE ENCOUNTER
levothyroxine (SYNTHROID/LEVOTHROID) 125 MCG tablet       Last Office Visit: 9/5/24  Future Office visit:   12/5/24    Routing refill request to provider for review/approval because:  Medication is reported/historical    Lindy Patel RN  [facility]/Red Flag Triage & Med Refill Team

## 2024-10-31 RX ORDER — LEVOTHYROXINE SODIUM 125 UG/1
TABLET ORAL DAILY
Qty: 90 TABLET | Refills: 3 | Status: SHIPPED | OUTPATIENT
Start: 2024-10-31

## 2024-11-13 ENCOUNTER — VIRTUAL VISIT (OUTPATIENT)
Dept: EDUCATION SERVICES | Facility: CLINIC | Age: 44
End: 2024-11-13
Payer: COMMERCIAL

## 2024-11-13 DIAGNOSIS — Z79.4 TYPE 2 DIABETES MELLITUS WITH HYPERGLYCEMIA, WITH LONG-TERM CURRENT USE OF INSULIN (H): Primary | ICD-10-CM

## 2024-11-13 DIAGNOSIS — E11.65 TYPE 2 DIABETES MELLITUS WITH HYPERGLYCEMIA, WITH LONG-TERM CURRENT USE OF INSULIN (H): Primary | ICD-10-CM

## 2024-11-14 ENCOUNTER — VIRTUAL VISIT (OUTPATIENT)
Dept: SURGERY | Facility: CLINIC | Age: 44
End: 2024-11-14
Payer: COMMERCIAL

## 2024-11-14 VITALS — WEIGHT: 293 LBS | BODY MASS INDEX: 57.52 KG/M2 | HEIGHT: 60 IN

## 2024-11-14 DIAGNOSIS — E11.65 TYPE 2 DIABETES MELLITUS WITH HYPERGLYCEMIA, WITHOUT LONG-TERM CURRENT USE OF INSULIN (H): ICD-10-CM

## 2024-11-14 DIAGNOSIS — E66.813 CLASS 3 DRUG-INDUCED OBESITY WITH BODY MASS INDEX (BMI) OF 60.0 TO 69.9 IN ADULT, UNSPECIFIED WHETHER SERIOUS COMORBIDITY PRESENT (H): ICD-10-CM

## 2024-11-14 DIAGNOSIS — E78.6 LOW HDL (UNDER 40): ICD-10-CM

## 2024-11-14 DIAGNOSIS — E66.1 CLASS 3 DRUG-INDUCED OBESITY WITH BODY MASS INDEX (BMI) OF 60.0 TO 69.9 IN ADULT, UNSPECIFIED WHETHER SERIOUS COMORBIDITY PRESENT (H): ICD-10-CM

## 2024-11-14 DIAGNOSIS — E78.2 MIXED DYSLIPIDEMIA: ICD-10-CM

## 2024-11-14 DIAGNOSIS — K59.03 DRUG INDUCED CONSTIPATION: Primary | ICD-10-CM

## 2024-11-14 PROCEDURE — 99442 PR PHYSICIAN TELEPHONE EVALUATION 11-20 MIN: CPT | Mod: 93 | Performed by: EMERGENCY MEDICINE

## 2024-11-14 RX ORDER — DOCUSATE SODIUM 100 MG/1
100 CAPSULE, LIQUID FILLED ORAL 2 TIMES DAILY PRN
Qty: 90 CAPSULE | Refills: 1 | Status: SHIPPED | OUTPATIENT
Start: 2024-11-14

## 2024-11-14 RX ORDER — POLYETHYLENE GLYCOL 3350 17 G/17G
1 POWDER, FOR SOLUTION ORAL DAILY
Qty: 510 G | Refills: 1 | Status: SHIPPED | OUTPATIENT
Start: 2024-11-14 | End: 2025-01-13

## 2024-11-14 NOTE — PROGRESS NOTES
Telephone Visit Details    Type of service:  Telephone Visit  Patient consented to telephone visit  Originating Location (pt. Location): Home  Distant Location (provider location):  [facility] DIABETES EDUCATION [place]      Diabetes Self-Management Education & Support Visit- Short    Bushra PRYOR Cielo presented today for education related to Type 2 diabetes    Patient is being treated with:  Oral Agents, Insulin (less than 3 injections daily), and GLP-1 Agonist    Lantus 14 units daily  Glimeperide:  4 mg daily  Jardiance:  10 mg daily  Mounjaro:   15 mg weekly.     Year of Diagnosis:   It appears that the first A1C that met criteria for diabetes was 10/2023 at 9.1%    Referring Provider:  Madina Elizondo PA-C  Living Situation:       Lives alone   Employment:           Not employed/Disabled    Purpose of today's visit:  Ongoing DSME and medication adjustment    Subjective/Objective:  Bushra checks her blood glucose using a meter once a day, fasting.  We have discussed having her check more frequently but she is resistant to this idea.    Her BG's for the past week appear below:     Date Fasting   11/8    11/9    11/10    11/11    11/12    11/13         Assessment/Plan:  ***    Follow-up:  ***      Time spent in this visit: *** minutes     Any diabetes medication dose changes were made via the CDE Protocol and Collaborative Practice Agreement. A copy of this encounter was provided to the patient's referring provider.    4 at 12:30pm.    If fasting glucoses have not improved, will increase Lantus dose slightly.        Time spent in this visit: less than 30 minutes     Any diabetes medication dose changes were made via the CDE Protocol and Collaborative Practice Agreement. A copy of this encounter was provided to the patient's referring provider.

## 2024-11-14 NOTE — LETTER
11/14/2024      Bushra Buck  [address]  [place] [address]      Dear Colleague,    Thank you for referring your patient, Bushra Buck, to the [facility] [place]. Please see a copy of my visit note below.    Virtual Visit Details  2:36 PM    Type of service:  Telephone Visit   Phone call duration: 20 minutes   Originating Location (pt. Location): Home    Distant Location (provider location):  On-site    Bariatric Clinic Follow-Up Visit:    Bushra Buck is a 44 year old  female with Body mass index is 60.74 kg/m .  presenting here today for follow-up on non-surgical efforts for weight loss.  Original Intake visit occurred on 9/17/15 with a weight of 350 lbs BMI of 68.4, gained up to a max of 401lbs in 2016..  Along with diet and behavior changes, she has been previously using GLP1 agonist (Trulicity 3mg at our visit in Nov 2021) for diabetes and to assist her weight loss goals.  Trulicity dose was increased to 4.5mg/week 9/12/22 at 354 lbs.  She had transitioned in 2023 to Mounjaro for her diabetes/hyperglycemia issues as of our 1/11/24 visit. She was taken off this due to poor control of blood sugar/side effects and is on no appetite support medication or injectable diabetic regimens.  She was back on insulin products as a result. Her endocrine clinic ramped back up her Mounjaro in 2024 and having good weight loss but constipation side effects.   Current list from Endocrinology includes:  Lantus 14 units daily  Glimeperide:  8 mg daily  Jardiance:  10 mg daily  Mounjaro:  12.5 mg weekly.She's done well to keep weight down as of our 11/14/24 visit.    Her mental health makes phentermine/bupropion contraindicated. See her intake visit notes for details on identified contributors to weight gain in the past. Chart review shows Dietician calculated RMR of 2300 and protein intake goal of 80g/day.      Weight:   Wt Readings from Last 5  Encounters:   11/14/24 141.1 kg (311 lb)   07/16/24 144.7 kg (319 lb)   05/23/24 148.3 kg (327 lb)   05/13/24 143.3 kg (316 lb)   02/14/24 143.3 kg (316 lb)    pounds      Comorbidities:  Patient Active Problem List   Diagnosis     Acne rosacea     Dermatitis     Stasis edema     Pituitary adenoma (H)     Abnormal weight gain     High triglycerides     Prolactinoma (H)     Acquired hypothyroidism     Morbid obesity (H)     Abnormal urinalysis     Acquired valgus deformity of both ankles     Acute pulmonary edema (H)     Allergic rhinitis     Anemia     Atypical chest pain     Benign essential hypertension     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)     Bipolar affective disorder, current episode manic with psychotic symptoms (H)     Candidal skin infection     Carpal tunnel syndrome of right wrist     Chronic pansinusitis     Catatonia     Dermatitis of perianal region     Dyslipidemia     ESBL (extended spectrum beta-lactamase) producing bacteria infection     Fatigue     Flat feet, bilateral     Foot deformity, bilateral     Gastroesophageal reflux disease     HCAP (healthcare-associated pneumonia)     Gross hematuria     History of pituitary tumor     Hyperglycemia     Hypertension     Symptomatic varicose veins of both lower extremities     Hypokalemia     IBS (irritable bowel syndrome)     Influenza A     Itching     Leukocytosis     Left leg cellulitis     Elephantiasis     Lymphedema     Methicillin resistant Staphylococcus aureus culture positive     Macrocytosis     Obstructive sleep apnea     Open abdominal wall wound     Perianal irritation     Pituitary tumor     Intellectual disability     Prader-Willi syndrome     Prediabetes     Nightmares     Prolonged Q-T interval on ECG     Scabies     Schizophrenia, schizoaffective, chronic with acute exacerbation (H)     Sequelae of cerebral infarction     Shortness of breath     Stress incontinence of urine     Swelling of limb     DM  type 2, controlled, with lower extremity ulcer (H)     Ulcer of calf (H)     Vitamin D deficiency     Amenorrhea     Lymphedema of both lower extremities       Current Outpatient Medications:      ACCU-CHEK GUIDE test strip, Use to test blood sugar 2-3 daily., Disp: 250 strip, Rfl: 3     ACETAMINOPHEN EXTRA STRENGTH 500 MG tablet, , Disp: , Rfl:      Alcohol Swabs (ALCOHOL PREP) 70 % PADS, See Admin Instructions, Disp: , Rfl:      ASPIRIN LOW DOSE 81 MG EC tablet, Take 81 mg by mouth daily, Disp: , Rfl:      atorvastatin (LIPITOR) 40 MG tablet, Take 40 mg by mouth daily, Disp: , Rfl:      bacitracin 500 UNIT/GM external ointment, Apply topically 2 times daily, Disp: 1 g, Rfl: 5     bumetanide (BUMEX) 2 MG tablet, Take 4 mg by mouth 3 times daily, Disp: , Rfl:      cabergoline (DOSTINEX) 0.5 MG tablet, TAKE 1 TABLET BY MOUTH THREE TIMES PER WEEK, Disp: 36 tablet, Rfl: 1     calcium carbonate-vitamin D (OS-TAWNYA WITH D) 500-200 MG-UNIT tablet, Take 1 tablet by mouth 2 times daily, Disp: , Rfl:      cetirizine (ZYRTEC) 10 MG tablet, Take 10 mg by mouth daily, Disp: , Rfl:      divalproex sodium extended-release (DEPAKOTE ER) 500 MG 24 hr tablet, Take 500 mg by mouth 3 times daily, Disp: , Rfl:      empagliflozin (JARDIANCE) 10 MG TABS tablet, TAKE 1 TABLET(10 MG) BY MOUTH DAILY, Disp: 90 tablet, Rfl: 3     fenofibrate (TRICOR) 145 MG tablet, Take 145 mg by mouth daily, Disp: , Rfl:      glimepiride (AMARYL) 2 MG tablet, TAKE 2 TABLETS BY MOUTH DAILY WITH BREAKFAST OR FIRST MAIN MEAL OF THE DAY., Disp: 180 tablet, Rfl: 1     glimepiride (AMARYL) 4 MG tablet, Take 4 mg by mouth every morning (before breakfast), Disp: , Rfl:      insulin glargine (LANTUS PEN) 100 UNIT/ML pen, Inject 14 Units subcutaneously daily., Disp: 15 mL, Rfl: 1     insulin pen needle (NOVOFINE PEN NEEDLE) 32G X 6 MM miscellaneous, 1 each daily, Disp: , Rfl:      levothyroxine (SYNTHROID/LEVOTHROID) 125 MCG tablet, TAKE 1 TABLET BY MOUTH DAILY, Disp:  90 tablet, Rfl: 3     LORazepam (ATIVAN) 0.5 MG tablet, Take 0.5 mg by mouth, Disp: , Rfl:      medroxyPROGESTERone (PROVERA) 10 MG tablet, TAKE 1 TABLET  10 MG  BY MOUTH ONCE DAILY FOR 5 DAYS EACH MONTH, Disp: , Rfl: 0     melatonin 5 MG tablet, Take 1 tablet by mouth daily at 2 pm, Disp: , Rfl:      Multiple Vitamin (TAB-A-NARESH) TABS, Take 1 tablet by mouth daily, Disp: 90 tablet, Rfl: 3     multivitamin w/minerals (THERA-VIT-M) tablet, Take 1 tablet by mouth daily, Disp: , Rfl:      NYAMYC 234981 UNIT/GM external powder, Apply 1 g topically as needed, Disp: , Rfl:      omeprazole (PRILOSEC) 40 MG DR capsule, Take 1 capsule by mouth daily., Disp: , Rfl:      paliperidone ER (INVEGA) 6 MG 24 hr tablet, Take 6 mg by mouth every morning, Disp: , Rfl:      spironolactone (ALDACTONE) 50 MG tablet, Take 50 mg by mouth 2 times daily, Disp: , Rfl:      tirzepatide (MOUNJARO) 12.5 MG/0.5ML pen, Inject 12.5 mg subcutaneously every 7 days., Disp: 2 mL, Rfl: 1     tirzepatide (MOUNJARO) 15 MG/0.5ML pen, Inject 15 mg subcutaneously every 7 days., Disp: 2 mL, Rfl: 11     VITAMIN D3 25 MCG (1000 UT) tablet, Take 3 tablets by mouth daily, Disp: , Rfl:       Interim: Since our last visit, she has restarted Mounjaro and doing well apart from constipation. Diabetic needs managed well at [facility] Endo clinic. Tolreating re-ramping of Mounjaro and I think we've exhausted the need for care in the Bariatric care clinic as she's not a surgery candidate and is getting good support with Endocrinology/back on Mounjaro and no need of other med management. She can follow up with dietician anytime if struggling but no need to follow up with me anymore.     Plan:   1.  Diet: Aiming for 1450-1600kcal/day with 80-110grams of lean protein daily and 100- oz of water daily to feel your best while on Mounjaro/losing weight. You now have good Endocrinology clinic help and no need to follow up with me anymore. If struggling with diet, you can see  "dietician anytime by contacting either your primary care or [phone] for our dieticians.     2. Constipation: hydrate well through the day to get adequate water. Consider some stool softener and if needed, you can empty out with Miralax. Boosting fiber intake to over 25-30grams/day can help if adequately hydrated. Psyllium husk (metamucil) can be helpful.  Rx for miralax and colace sent today given some recent discomfort.     3. You've done well to drop about 89 lbs from your peak weight a few years back and down nearly 40 lbs from our introductory visit all those years ago. Continue mindful diet and with your Mounjaro I suspect you'll continue to have a nice/gradual weight reduction.       We discussed [facility] Bariatric Basics including:  -eating 3 meals daily  -reviewed metabolic needs for weight loss based on Resting Metabolic Rate  -protein goals supportive of healthy weight loss  -avoiding/limiting calorie containing beverages  -We discussed the importance of restorative sleep and stress management in maintaining a healthy weight.  -We discussed the National Weight Control Registry healthy weight maintenance strategies and ways to optimize metabolism.  -We discussed the importance of physical activity including cardiovascular and strength training in maintaining a healthier weight and explored viable options.      Most recent labs:  Lab Results   Component Value Date    WBC 12.1 (H) 06/03/2024    HGB 13.6 06/03/2024    HCT 42.7 06/03/2024     (H) 06/03/2024     06/03/2024     Lab Results   Component Value Date    CHOL 161 04/12/2024     Lab Results   Component Value Date    HDL 26 (L) 04/12/2024     No components found for: \"LDLCALC\"  Lab Results   Component Value Date    TRIG 354 (H) 04/12/2024     No results found for: \"CHOLHDL\"  Lab Results   Component Value Date    ALT 38 06/03/2024    AST 31 06/03/2024    ALKPHOS 67 06/03/2024     No results found for: \"HGBA1C\"  Lab Results   Component " "Value Date    B12 819 05/05/2023     No components found for: \"VITDT1\"  No results found for: \"EFRAIN\"  Lab Results   Component Value Date    PTHI 37 07/10/2012     No results found for: \"ZN\"  No results found for: \"VIB1WB\"  Lab Results   Component Value Date    TSH 2.77 01/24/2024     No results found for: \"TEST\"    DIETARY HISTORY  Hydrating well with 64 oz mug that she's carrying around all day.       PHYSICAL ACTIVITY PATTERNS:  Cardiovascular: walks a bit.   Strength Training: limited capacity currently.     REVIEW OF SYSTEMS  .  PHYSICAL EXAM:  Vitals: Ht 1.524 m (5')   Wt 141.1 kg (311 lb)   BMI 60.74 kg/m    Weight:   Wt Readings from Last 3 Encounters:   11/14/24 141.1 kg (311 lb)   07/16/24 144.7 kg (319 lb)   05/23/24 148.3 kg (327 lb)         GEN: Pleasant, with normal affect, baseline slightly halting speech.     Interim study results: following A1c w/ PCP at [facility] today. Records not available. .    24 minutes spent by me on the date of the encounter doing chart review, history and exam, documentation and further activities per the note   Carlos Bowling MD  [facility]  2:36 PM  11/14/2024        Again, thank you for allowing me to participate in the care of your patient.        Sincerely,        Carlos Bowling MD  "

## 2024-11-14 NOTE — PATIENT INSTRUCTIONS
Plan:   1.  Diet: Aiming for 1450-1600kcal/day with 80-110grams of lean protein daily and 100- oz of water daily to feel your best while on Mounjaro/losing weight. You now have good Endocrinology clinic help and no need to follow up with me anymore. If struggling with diet, you can see dietician anytime by contacting either your primary care or [phone] for our dieticians.     2. Constipation: hydrate well through the day to get adequate water. Consider some stool softener and if needed, you can empty out with Miralax. Boosting fiber intake to over 25-30grams/day can help if adequately hydrated. Psyllium husk (metamucil) can be helpful.  Rx for miralax and colace sent today given some recent discomfort.     3. You've done well to drop about 89 lbs from your peak weight a few years back and down nearly 40 lbs from our introductory visit all those years ago. Continue mindful diet and with your Mounjaro I suspect you'll continue to have a nice/gradual weight reduction.

## 2024-11-14 NOTE — NURSING NOTE
Current patient location:  [place], [place] in the car    Is the patient currently in the state of [place]? YES    Visit mode:TELEPHONE    If the visit is dropped, the patient can be reconnected by: TELEPHONE VISIT: Phone number: [phone]    Will anyone else be joining the visit? NO  (If patient encounters technical issues they should call [phone] :[id number])    Are changes needed to the allergy or medication list? Pt stated no changes to allergies and Pt stated no med changes    Are refills needed on medications prescribed by this physician? Pt need to discuss with provider about refills    Reason for visit: RECHECK    Sarah Langston VVF    Pt wants miralax for constipation from the mounjaro

## 2024-11-14 NOTE — PROGRESS NOTES
Virtual Visit Details  2:36 PM    Type of service:  Telephone Visit   Phone call duration: 20 minutes   Originating Location (pt. Location): Home    Distant Location (provider location):  On-site    Bariatric Clinic Follow-Up Visit:    Bushra Buck is a 44 year old  female with Body mass index is 60.74 kg/m .  presenting here today for follow-up on non-surgical efforts for weight loss.  Original Intake visit occurred on 9/17/15 with a weight of 350 lbs BMI of 68.4, gained up to a max of 401lbs in 2016..  Along with diet and behavior changes, she has been previously using GLP1 agonist (Trulicity 3mg at our visit in Nov 2021) for diabetes and to assist her weight loss goals.  Trulicity dose was increased to 4.5mg/week 9/12/22 at 354 lbs.  She had transitioned in 2023 to Mounjaro for her diabetes/hyperglycemia issues as of our 1/11/24 visit. She was taken off this due to poor control of blood sugar/side effects and is on no appetite support medication or injectable diabetic regimens.  She was back on insulin products as a result. Her endocrine clinic ramped back up her Mounjaro in 2024 and having good weight loss but constipation side effects.   Current list from Endocrinology includes:  Lantus 14 units daily  Glimeperide:  8 mg daily  Jardiance:  10 mg daily  Mounjaro:  12.5 mg weekly.She's done well to keep weight down as of our 11/14/24 visit.    Her mental health makes phentermine/bupropion contraindicated. See her intake visit notes for details on identified contributors to weight gain in the past. Chart review shows Dietician calculated RMR of 2300 and protein intake goal of 80g/day.      Weight:   Wt Readings from Last 5 Encounters:   11/14/24 141.1 kg (311 lb)   07/16/24 144.7 kg (319 lb)   05/23/24 148.3 kg (327 lb)   05/13/24 143.3 kg (316 lb)   02/14/24 143.3 kg (316 lb)    pounds      Comorbidities:  Patient Active Problem List   Diagnosis    Acne rosacea    Dermatitis    Stasis edema    Pituitary  adenoma (H)    Abnormal weight gain    High triglycerides    Prolactinoma (H)    Acquired hypothyroidism    Morbid obesity (H)    Abnormal urinalysis    Acquired valgus deformity of both ankles    Acute pulmonary edema (H)    Allergic rhinitis    Anemia    Atypical chest pain    Benign essential hypertension    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)    Bipolar affective disorder, current episode manic with psychotic symptoms (H)    Candidal skin infection    Carpal tunnel syndrome of right wrist    Chronic pansinusitis    Catatonia    Dermatitis of perianal region    Dyslipidemia    ESBL (extended spectrum beta-lactamase) producing bacteria infection    Fatigue    Flat feet, bilateral    Foot deformity, bilateral    Gastroesophageal reflux disease    HCAP (healthcare-associated pneumonia)    Gross hematuria    History of pituitary tumor    Hyperglycemia    Hypertension    Symptomatic varicose veins of both lower extremities    Hypokalemia    IBS (irritable bowel syndrome)    Influenza A    Itching    Leukocytosis    Left leg cellulitis    Elephantiasis    Lymphedema    Methicillin resistant Staphylococcus aureus culture positive    Macrocytosis    Obstructive sleep apnea    Open abdominal wall wound    Perianal irritation    Pituitary tumor    Intellectual disability    Prader-Willi syndrome    Prediabetes    Nightmares    Prolonged Q-T interval on ECG    Scabies    Schizophrenia, schizoaffective, chronic with acute exacerbation (H)    Sequelae of cerebral infarction    Shortness of breath    Stress incontinence of urine    Swelling of limb    DM type 2, controlled, with lower extremity ulcer (H)    Ulcer of calf (H)    Vitamin D deficiency    Amenorrhea    Lymphedema of both lower extremities       Current Outpatient Medications:     ACCU-CHEK GUIDE test strip, Use to test blood sugar 2-3 daily., Disp: 250 strip, Rfl: 3    ACETAMINOPHEN EXTRA STRENGTH 500 MG tablet, , Disp: , Rfl:      Alcohol Swabs (ALCOHOL PREP) 70 % PADS, See Admin Instructions, Disp: , Rfl:     ASPIRIN LOW DOSE 81 MG EC tablet, Take 81 mg by mouth daily, Disp: , Rfl:     atorvastatin (LIPITOR) 40 MG tablet, Take 40 mg by mouth daily, Disp: , Rfl:     bacitracin 500 UNIT/GM external ointment, Apply topically 2 times daily, Disp: 1 g, Rfl: 5    bumetanide (BUMEX) 2 MG tablet, Take 4 mg by mouth 3 times daily, Disp: , Rfl:     cabergoline (DOSTINEX) 0.5 MG tablet, TAKE 1 TABLET BY MOUTH THREE TIMES PER WEEK, Disp: 36 tablet, Rfl: 1    calcium carbonate-vitamin D (OS-TAWNYA WITH D) 500-200 MG-UNIT tablet, Take 1 tablet by mouth 2 times daily, Disp: , Rfl:     cetirizine (ZYRTEC) 10 MG tablet, Take 10 mg by mouth daily, Disp: , Rfl:     divalproex sodium extended-release (DEPAKOTE ER) 500 MG 24 hr tablet, Take 500 mg by mouth 3 times daily, Disp: , Rfl:     empagliflozin (JARDIANCE) 10 MG TABS tablet, TAKE 1 TABLET(10 MG) BY MOUTH DAILY, Disp: 90 tablet, Rfl: 3    fenofibrate (TRICOR) 145 MG tablet, Take 145 mg by mouth daily, Disp: , Rfl:     glimepiride (AMARYL) 2 MG tablet, TAKE 2 TABLETS BY MOUTH DAILY WITH BREAKFAST OR FIRST MAIN MEAL OF THE DAY., Disp: 180 tablet, Rfl: 1    glimepiride (AMARYL) 4 MG tablet, Take 4 mg by mouth every morning (before breakfast), Disp: , Rfl:     insulin glargine (LANTUS PEN) 100 UNIT/ML pen, Inject 14 Units subcutaneously daily., Disp: 15 mL, Rfl: 1    insulin pen needle (NOVOFINE PEN NEEDLE) 32G X 6 MM miscellaneous, 1 each daily, Disp: , Rfl:     levothyroxine (SYNTHROID/LEVOTHROID) 125 MCG tablet, TAKE 1 TABLET BY MOUTH DAILY, Disp: 90 tablet, Rfl: 3    LORazepam (ATIVAN) 0.5 MG tablet, Take 0.5 mg by mouth, Disp: , Rfl:     medroxyPROGESTERone (PROVERA) 10 MG tablet, TAKE 1 TABLET  10 MG  BY MOUTH ONCE DAILY FOR 5 DAYS EACH MONTH, Disp: , Rfl: 0    melatonin 5 MG tablet, Take 1 tablet by mouth daily at 2 pm, Disp: , Rfl:     Multiple Vitamin (TAB-A-NARESH) TABS, Take 1 tablet by mouth daily,  Disp: 90 tablet, Rfl: 3    multivitamin w/minerals (THERA-VIT-M) tablet, Take 1 tablet by mouth daily, Disp: , Rfl:     NYAMYC 193749 UNIT/GM external powder, Apply 1 g topically as needed, Disp: , Rfl:     omeprazole (PRILOSEC) 40 MG DR capsule, Take 1 capsule by mouth daily., Disp: , Rfl:     paliperidone ER (INVEGA) 6 MG 24 hr tablet, Take 6 mg by mouth every morning, Disp: , Rfl:     spironolactone (ALDACTONE) 50 MG tablet, Take 50 mg by mouth 2 times daily, Disp: , Rfl:     tirzepatide (MOUNJARO) 12.5 MG/0.5ML pen, Inject 12.5 mg subcutaneously every 7 days., Disp: 2 mL, Rfl: 1    tirzepatide (MOUNJARO) 15 MG/0.5ML pen, Inject 15 mg subcutaneously every 7 days., Disp: 2 mL, Rfl: 11    VITAMIN D3 25 MCG (1000 UT) tablet, Take 3 tablets by mouth daily, Disp: , Rfl:       Interim: Since our last visit, she has restarted Mounjaro and doing well apart from constipation. Diabetic needs managed well at [facility] End[facility] clinic. Tolreating re-ramping of Mounjaro and I think we've exhausted the need for care in the Bariatric care clinic as she's not a surgery candidate and is getting good support with Endocrinology/back on Mounjaro and no need of other med management. She can follow up with dietician anytime if struggling but no need to follow up with me anymore.     Plan:   1.  Diet: Aiming for 1450-1600kcal/day with 80-110grams of lean protein daily and 100- oz of water daily to feel your best while on Mounjaro/losing weight. You now have good Endocrinology clinic help and no need to follow up with me anymore. If struggling with diet, you can see dietician anytime by contacting either your primary care or [phone] for our dieticians.     2. Constipation: hydrate well through the day to get adequate water. Consider some stool softener and if needed, you can empty out with Miralax. Boosting fiber intake to over 25-30grams/day can help if adequately hydrated. Psyllium husk (metamucil) can be helpful.  Rx for miralax and  "colace sent today given some recent discomfort.     3. You've done well to drop about 89 lbs from your peak weight a few years back and down nearly 40 lbs from our introductory visit all those years ago. Continue mindful diet and with your Mounjaro I suspect you'll continue to have a nice/gradual weight reduction.       We discussed [facility] Bariatric Basics including:  -eating 3 meals daily  -reviewed metabolic needs for weight loss based on Resting Metabolic Rate  -protein goals supportive of healthy weight loss  -avoiding/limiting calorie containing beverages  -We discussed the importance of restorative sleep and stress management in maintaining a healthy weight.  -We discussed the National Weight Control Registry healthy weight maintenance strategies and ways to optimize metabolism.  -We discussed the importance of physical activity including cardiovascular and strength training in maintaining a healthier weight and explored viable options.      Most recent labs:  Lab Results   Component Value Date    WBC 12.1 (H) 06/03/2024    HGB 13.6 06/03/2024    HCT 42.7 06/03/2024     (H) 06/03/2024     06/03/2024     Lab Results   Component Value Date    CHOL 161 04/12/2024     Lab Results   Component Value Date    HDL 26 (L) 04/12/2024     No components found for: \"LDLCALC\"  Lab Results   Component Value Date    TRIG 354 (H) 04/12/2024     No results found for: \"CHOLHDL\"  Lab Results   Component Value Date    ALT 38 06/03/2024    AST 31 06/03/2024    ALKPHOS 67 06/03/2024     No results found for: \"HGBA1C\"  Lab Results   Component Value Date    B12 819 05/05/2023     No components found for: \"VITDT1\"  No results found for: \"EFRAIN\"  Lab Results   Component Value Date    PTHI 37 07/10/2012     No results found for: \"ZN\"  No results found for: \"VIB1WB\"  Lab Results   Component Value Date    TSH 2.77 01/24/2024     No results found for: \"TEST\"    DIETARY HISTORY  Hydrating well with 64 oz mug that she's " carrying around all day.       PHYSICAL ACTIVITY PATTERNS:  Cardiovascular: walks a bit.   Strength Training: limited capacity currently.     REVIEW OF SYSTEMS  .  PHYSICAL EXAM:  Vitals: Ht 1.524 m (5')   Wt 141.1 kg (311 lb)   BMI 60.74 kg/m    Weight:   Wt Readings from Last 3 Encounters:   11/14/24 141.1 kg (311 lb)   07/16/24 144.7 kg (319 lb)   05/23/24 148.3 kg (327 lb)         GEN: Pleasant, with normal affect, baseline slightly halting speech.     Interim study results: following A1c w/ PCP at [facility] today. Records not available. .    24 minutes spent by me on the date of the encounter doing chart review, history and exam, documentation and further activities per the note   Carlos Bowling MD  [facility]  2:36 PM  11/14/2024

## 2024-11-14 NOTE — NURSING NOTE
Is the patient currently in the state of [place]? YES    Visit mode:TELEPHONE    If the visit is dropped, the patient can be reconnected by: TELEPHONE VISIT: Phone number:   Telephone Information:   Mobile [phone]   Mobile Not on file.       Will anyone else be joining the visit? NO  (If patient encounters technical issues they should call [phone])    How would you like to obtain your AVS? MyChart    Are changes needed to the allergy or medication list? No    Are refills needed on medications prescribed by this physician? NO    Reason for visit: Diabetes    Yadira ESPAÑAF

## 2024-11-22 DIAGNOSIS — D35.2 PROLACTINOMA (H): Primary | ICD-10-CM

## 2024-11-26 RX ORDER — MULTIVITAMIN WITH FOLIC ACID 400 MCG
1 TABLET ORAL DAILY
Qty: 90 TABLET | Refills: 3 | Status: SHIPPED | OUTPATIENT
Start: 2024-11-26

## 2024-12-03 ENCOUNTER — TELEPHONE (OUTPATIENT)
Dept: ENDOCRINOLOGY | Facility: CLINIC | Age: 44
End: 2024-12-03
Payer: COMMERCIAL

## 2024-12-03 NOTE — TELEPHONE ENCOUNTER
Blood Sugars:      11/20 - 166  11/21 - 152  11/22 - 148  11/23 - 142  11/24 - 150  11/25 - 136  11/26 - 138  11/27 - 150  11/28 -119  11/29 - 132  11/30 - 143  12/1 - 146  12/2 - 123  12/3 -  139    Also, A1C was checked, it was 6.3..

## 2024-12-03 NOTE — TELEPHONE ENCOUNTER
Called patient and left voicemail. Patient has an appointment on  12/5/24  . Need to collect the last 14 days worth of blood sugar readings to prepare for patient's visit.   Georgina Hooper MA

## 2024-12-05 ENCOUNTER — TELEPHONE (OUTPATIENT)
Dept: ENDOCRINOLOGY | Facility: CLINIC | Age: 44
End: 2024-12-05

## 2024-12-05 DIAGNOSIS — E11.9 TYPE 2 DIABETES MELLITUS (H): ICD-10-CM

## 2024-12-05 DIAGNOSIS — D49.7 PITUITARY TUMOR: Primary | ICD-10-CM

## 2024-12-05 DIAGNOSIS — D35.2 PROLACTINOMA (H): ICD-10-CM

## 2024-12-05 DIAGNOSIS — D35.2 PITUITARY ADENOMA (H): ICD-10-CM

## 2024-12-05 RX ORDER — CABERGOLINE 0.5 MG/1
TABLET ORAL
Qty: 36 TABLET | Refills: 1 | Status: SHIPPED | OUTPATIENT
Start: 2024-12-05

## 2024-12-05 NOTE — TELEPHONE ENCOUNTER
Message  Received: Today  Madina Elizondo PA-C Miller, Deanne M, RN; Margaret Rangel RN  Hi:  I had a follow up visit with Bushra for her diabetes.  She tells me she needs a refill for her cabergoline which Dr. Farfan prescribes.  Thanks.  Debora

## 2024-12-05 NOTE — TELEPHONE ENCOUNTER
----- Message from Madina Elizondo sent at 12/5/2024 10:18 AM CST -----  Hi:  I had a follow up visit with Bushra for her diabetes.  She tells me she needs a refill for her cabergoline which Dr. Farfan prescribes.  Thanks.  Debora

## 2025-01-08 ENCOUNTER — VIRTUAL VISIT (OUTPATIENT)
Dept: EDUCATION SERVICES | Facility: CLINIC | Age: 45
End: 2025-01-08
Payer: COMMERCIAL

## 2025-01-08 ENCOUNTER — OFFICE VISIT (OUTPATIENT)
Dept: ENDOCRINOLOGY | Facility: CLINIC | Age: 45
End: 2025-01-08
Payer: COMMERCIAL

## 2025-01-08 ENCOUNTER — LAB (OUTPATIENT)
Dept: LAB | Facility: CLINIC | Age: 45
End: 2025-01-08
Payer: COMMERCIAL

## 2025-01-08 VITALS
HEART RATE: 81 BPM | DIASTOLIC BLOOD PRESSURE: 76 MMHG | BODY MASS INDEX: 57.81 KG/M2 | OXYGEN SATURATION: 95 % | SYSTOLIC BLOOD PRESSURE: 140 MMHG | WEIGHT: 293 LBS | TEMPERATURE: 99.2 F

## 2025-01-08 DIAGNOSIS — E11.65 TYPE 2 DIABETES MELLITUS WITH HYPERGLYCEMIA, WITH LONG-TERM CURRENT USE OF INSULIN (H): Primary | ICD-10-CM

## 2025-01-08 DIAGNOSIS — D49.7 PITUITARY TUMOR: ICD-10-CM

## 2025-01-08 DIAGNOSIS — D35.2 PROLACTINOMA (H): ICD-10-CM

## 2025-01-08 DIAGNOSIS — D35.2 PITUITARY ADENOMA (H): ICD-10-CM

## 2025-01-08 DIAGNOSIS — E28.319 EARLY MENOPAUSE: ICD-10-CM

## 2025-01-08 DIAGNOSIS — D35.2 PROLACTINOMA (H): Primary | ICD-10-CM

## 2025-01-08 DIAGNOSIS — E11.65 TYPE 2 DIABETES MELLITUS WITH HYPERGLYCEMIA, WITH LONG-TERM CURRENT USE OF INSULIN (H): ICD-10-CM

## 2025-01-08 DIAGNOSIS — Z79.4 TYPE 2 DIABETES MELLITUS WITH HYPERGLYCEMIA, WITH LONG-TERM CURRENT USE OF INSULIN (H): ICD-10-CM

## 2025-01-08 DIAGNOSIS — Z79.4 TYPE 2 DIABETES MELLITUS WITH HYPERGLYCEMIA, WITH LONG-TERM CURRENT USE OF INSULIN (H): Primary | ICD-10-CM

## 2025-01-08 LAB
EST. AVERAGE GLUCOSE BLD GHB EST-MCNC: 128 MG/DL
ESTRADIOL SERPL-MCNC: 23 PG/ML
FSH SERPL IRP2-ACNC: 5.3 MIU/ML
HBA1C MFR BLD: 6.1 %
PROLACTIN SERPL 3RD IS-MCNC: 88 NG/ML (ref 5–23)
T4 FREE SERPL-MCNC: 1.83 NG/DL (ref 0.9–1.7)
TSH SERPL DL<=0.005 MIU/L-ACNC: 2.03 UIU/ML (ref 0.3–4.2)

## 2025-01-08 PROCEDURE — 84146 ASSAY OF PROLACTIN: CPT | Performed by: INTERNAL MEDICINE

## 2025-01-08 PROCEDURE — 99207 PR NO CHARGE LOS: CPT | Mod: 93 | Performed by: REGISTERED NURSE

## 2025-01-08 PROCEDURE — 83001 ASSAY OF GONADOTROPIN (FSH): CPT | Performed by: INTERNAL MEDICINE

## 2025-01-08 PROCEDURE — 82670 ASSAY OF TOTAL ESTRADIOL: CPT | Performed by: INTERNAL MEDICINE

## 2025-01-08 PROCEDURE — 84439 ASSAY OF FREE THYROXINE: CPT | Performed by: PATHOLOGY

## 2025-01-08 PROCEDURE — 83036 HEMOGLOBIN GLYCOSYLATED A1C: CPT | Performed by: PHYSICIAN ASSISTANT

## 2025-01-08 PROCEDURE — 99000 SPECIMEN HANDLING OFFICE-LAB: CPT | Performed by: PATHOLOGY

## 2025-01-08 PROCEDURE — 84443 ASSAY THYROID STIM HORMONE: CPT | Performed by: PATHOLOGY

## 2025-01-08 PROCEDURE — 36415 COLL VENOUS BLD VENIPUNCTURE: CPT | Performed by: PATHOLOGY

## 2025-01-08 RX ORDER — LANCETS
EACH MISCELLANEOUS
Qty: 100 EACH | Refills: 3 | Status: SHIPPED | OUTPATIENT
Start: 2025-01-08

## 2025-01-08 ASSESSMENT — PAIN SCALES - GENERAL: PAINLEVEL_OUTOF10: NO PAIN (0)

## 2025-01-08 NOTE — PROGRESS NOTES
Endocrinology follow up    Reason for visit/consult: F/u on prolactinoma.      ASSESSMENT / PLAN:  A 44 year old female with history of prolactinoma s/p resection 2000 and subsequent treatment of cabergoline, restarted in 2017 for persistent levels, amenorrhea, fatigue, weight gain. With persistent amenorrhea and DM II diagnosed in 2020.     # Prolactinoma  Post surgical resection 2000, no recurrence on last MRI 2017. Not currently experiencing any headaches or galactorrhea, is experiencing amenorrhea refractory to hormone stimulation.   Taking cabergoline 0.5 mg Wednesday and Saturday since 2017. PRL 61 (3/2020) > PRL up to 104 (11/2022). Brain MRI 2.4 mm on the left part of sella    - Continue cabergoline 0.5 mg three tablets per week    - recheck prolactin today    # Amenorrhea  Started Provera 5 days a month by OB GYN Dr. Khan since 2018, but past several years no menses. She has occasional mild hot flush.      - checking Estradiol and FSH level today, then we will consider to start estradiol and progesterone therapy, mammogram 2024 spring was negative per patient.     # Hypothyroidism    - Continue levothyroxine 125v mcg daily     # Obesity BMI 60  She lost weight with Mounjaro.       # Type 2 diabetes  Glucose much improved from 300-400 range to 120-150 range  Appreciate DM management by my colleague.     - checking A1c today    - continue Lantus 14 unit    - continue glimepiride 4 mg daily     - continue jardiance 10 mg daily      # Bone health  - calcium citrate plus D (630 mg Ca, D 500ID) for bone health    # Hypertriglyceridemia  Triglyceride levels >500, Has started taking fenofibrate, last triglycerides 530 in 4/2022.   -Continue fenofibrate, management per PCP    RTC in once a year       30  minutes spent on the date of the encounter doing chart review, history and exam, documentation and further activities as noted above.    The longitudinal plan of care for Bushra was addressed during this  "visit. Due to the added complexity in care, I will continue to support Bushra in the subsequent management of this condition(s) and with the ongoing continuity of care of this condition(s).     Mandy Farfan MD  Staff Physician  Endocrinology and Metabolism  [facility]  License: [id number]  Pager: [phone]    Interval History as of 1/8/2025 : Patient has been doing well. She lost significant amount of weight after starting Mounjaro. Her glucose control improved from 300-400s to 120-150s.  Medication compliance excellent.  Subjective:  A 38 year old female with follow up prolactinoma, original diagnosis was made in 1997 s/p resection 2000 and subsequent treatment of cabergoline.  Patient had been seen by Dr. CLINTON Moran before (since 2002). In 2012, cabergoline was discontinued and her PRL became mildly elevated (PRL 50-60s), and she has had amenorrhea for 4-5 years. She is taking Provera, but this is no longer causing bleeding. Although official radiology report did not mention residual tumor, we assumed possible small residual, we resumed cabergoline 0.5 mg BIW, which she is currenlty taking. PRL level was 61 (3/2020).     She was diagnosed with type 2 diabetes in the summer of 2020     She also has psychiatry issues, however, currently has been improving, with tapering psychiatry medications (depakote as her \"levels were high\"). No recent hospitalization.      She also has primary hypothyroidism, which is being replaced with levothyroxine 112 mcg daily.     Following with bariatric clinic, on Dulaglutide for weight loss, 15 lb wt loss since 11/2021  Wt Readings from Last 4 Encounters:   11/14/24 141.1 kg (311 lb)   07/16/24 144.7 kg (319 lb)   05/23/24 148.3 kg (327 lb)   05/13/24 143.3 kg (316 lb)       Interval history   Doing \"good\". Only concern is rising prolactin and she is questioning need for repeat brain MRI.     Sweating frequently, sleeping with windows open, getting hot " "flashes    Apartment is being condemned and she is looking for new place to live. She is most excited about the possibility of a place in [facility] that has a workout area included in rent.    PCP is retiring in 4/2023. Has a HHN who sets up medications and patient takes.      log books - , average 110-130  She is experiencing no breast tenderness, no galactorrhea, no headaches, but is experiencing ongoing amenorrhea despite Provera therapy. She has tried to incorporate more walking into her life, but she admits to still overeating at times.     Not missing any doses of medications. No HA, vision changes but does note \"pain in the eyes\".    Denies breast tenderness, galactorrhea, tremors, palpitations. Does note SANDS with stairs which is chronic.   She is still amenorrhea, been multiple years. Following with Suzan GARCIA - following with, last seen in 2022    ROS:  10 point negative except as mentioned in HPI    Current Outpatient Medications   Medication Sig Dispense Refill    ACCU-CHEK GUIDE test strip Use to test blood sugar 2-3 daily. 250 strip 3    ACETAMINOPHEN EXTRA STRENGTH 500 MG tablet       Alcohol Swabs (ALCOHOL PREP) 70 % PADS See Admin Instructions      ASPIRIN LOW DOSE 81 MG EC tablet Take 81 mg by mouth daily      atorvastatin (LIPITOR) 40 MG tablet Take 40 mg by mouth daily      bacitracin 500 UNIT/GM external ointment Apply topically 2 times daily 1 g 5    bumetanide (BUMEX) 2 MG tablet Take 4 mg by mouth 3 times daily      cabergoline (DOSTINEX) 0.5 MG tablet Take 1 tablet by mouth 3x per week. 36 tablet 1    calcium carbonate-vitamin D (OS-TAWNYA WITH D) 500-200 MG-UNIT tablet Take 1 tablet by mouth 2 times daily      cetirizine (ZYRTEC) 10 MG tablet Take 10 mg by mouth daily      divalproex sodium extended-release (DEPAKOTE ER) 500 MG 24 hr tablet Take 500 mg by mouth 3 times daily      docusate sodium (COLACE) 100 MG capsule Take 1 capsule (100 mg) by mouth 2 times daily as " needed for constipation. 90 capsule 1    empagliflozin (JARDIANCE) 10 MG TABS tablet TAKE 1 TABLET(10 MG) BY MOUTH DAILY 90 tablet 3    fenofibrate (TRICOR) 145 MG tablet Take 145 mg by mouth daily      glimepiride (AMARYL) 2 MG tablet TAKE 2 TABLETS BY MOUTH DAILY WITH BREAKFAST OR FIRST MAIN MEAL OF THE DAY. 180 tablet 1    insulin glargine (LANTUS PEN) 100 UNIT/ML pen Inject 14 Units subcutaneously daily. 15 mL 1    levothyroxine (SYNTHROID/LEVOTHROID) 125 MCG tablet TAKE 1 TABLET BY MOUTH DAILY 90 tablet 3    LORazepam (ATIVAN) 0.5 MG tablet Take 0.5 mg by mouth      medroxyPROGESTERone (PROVERA) 10 MG tablet TAKE 1 TABLET  10 MG  BY MOUTH ONCE DAILY FOR 5 DAYS EACH MONTH  0    melatonin 5 MG tablet Take 1 tablet by mouth daily at 2 pm      Multiple Vitamin (DAILY-NARESH MULTIVITAMIN) TABS Take 1 tablet by mouth daily. 90 tablet 3    multivitamin w/minerals (THERA-VIT-M) tablet Take 1 tablet by mouth daily      NYAMYC [id number] UNIT/GM external powder Apply 1 g topically as needed      omeprazole (PRILOSEC) 40 MG DR capsule Take 1 capsule by mouth daily.      paliperidone ER (INVEGA) 6 MG 24 hr tablet Take 6 mg by mouth every morning      polyethylene glycol (MIRALAX) 17 GM/Dose powder Take 17 g (1 Capful) by mouth daily. 510 g 1    spironolactone (ALDACTONE) 50 MG tablet Take 50 mg by mouth 2 times daily      tirzepatide (MOUNJARO) 15 MG/0.5ML pen Inject 15 mg subcutaneously every 7 days. 2 mL 11    VITAMIN D3 25 MCG (1000 UT) tablet Take 3 tablets by mouth daily       No current facility-administered medications for this visit.     Family history: mother has history of diabetes and thyroid disease    EXAM  not currently breastfeeding.  Gen: morbidly obese female in NAD, diaphoretic  HEENT: anicteric, no proptosis or lid lag  Visual field: intact  Resp: no acute distress  Neuro: A&Ox3, no tremor on outstretched arms  Psych: Normal affect and mood.      Labs/Imaging    TSH   Date Value Ref Range Status   01/24/2024  2.77 0.30 - 4.20 uIU/mL Final   05/24/2023 3.76 0.30 - 4.20 uIU/mL Final   04/10/2023 4.38 (H) 0.30 - 4.20 uIU/mL Final   11/18/2022 3.89 0.30 - 4.20 uIU/mL Final   04/04/2022 4.28 0.30 - 5.00 uIU/mL Final   06/29/2021 2.53 0.30 - 5.00 uIU/mL Final   04/07/2021 3.51 0.40 - 4.00 mU/L Final   06/08/2020 3.75 0.30 - 5.00 uIU/mL Final   03/23/2020 2.96 0.40 - 4.00 mU/L Final   02/03/2020 1.70 0.30 - 5.00 uIU/mL Final   08/19/2019 1.91 0.30 - 5.00 uIU/mL Final   12/31/2018 3.22 0.40 - 4.00 mU/L Final   02/28/2018 2.80 0.30 - 5.00 mcU/mL Final   01/10/2017 3.09 0.40 - 4.00 mU/L Final     T4 Free   Date Value Ref Range Status   04/07/2021 1.39 0.76 - 1.46 ng/dL Final     Free T4   Date Value Ref Range Status   01/24/2024 1.68 0.90 - 1.70 ng/dL Final     Lab Results   Component Value Date/Time    PROLACTIN 54 (H) 04/07/2021 08:30 AM    PROLACTIN 61 (H) 03/23/2020 10:55 AM    PROLACTIN 45 (H) 12/31/2018 09:14 AM    PROLACTIN 27.9 (A) 02/28/2018 12:00 AM    PROLACTIN 38 (H) 08/10/2017 09:49 AM     Brain MRI 1/2017  Impression:   1. Postsurgical changes from transsphenoidal resection of pituitary  adenoma without evidence of recurrence.  2. Single hyperintensity on FLAIR in the right semiovale similar to  prior study of questionable clinical significance.  3. Stable mild dural thickening and enhancement over the convexities  which is most likely postsurgical in nature. This could also be  related to CSF leak or intracranial hypotension. Total

## 2025-01-08 NOTE — NURSING NOTE
Chief Complaint   Patient presents with    Pituitary Problem     Vital signs:  Temp: 99.2  F (37.3  C) Temp src: Oral BP: (!) 140/76 Pulse: 81     SpO2: 95 %       Weight: 134.3 kg (296 lb)  Estimated body mass index is 57.81 kg/m  as calculated from the following:    Height as of 11/14/24: 1.524 m (5').    Weight as of this encounter: 134.3 kg (296 lb).

## 2025-01-08 NOTE — LETTER
1/8/2025       RE: Bushra Buck  [address]   [address]  [place] [address]     Dear Colleague,    Thank you for referring your patient, Bushra Buck, to the [facility]OLOG[facility] [place] [facility]. Please see a copy of my visit note below.          Endocrinology follow up    Reason for visit/consult: F/u on prolactinoma.      ASSESSMENT / PLAN:  A 44 year old female with history of prolactinoma s/p resection 2000 and subsequent treatment of cabergoline, restarted in 2017 for persistent levels, amenorrhea, fatigue, weight gain. With persistent amenorrhea and DM II diagnosed in 2020.     # Prolactinoma  Post surgical resection 2000, no recurrence on last MRI 2017. Not currently experiencing any headaches or galactorrhea, is experiencing amenorrhea refractory to hormone stimulation.   Taking cabergoline 0.5 mg Wednesday and Saturday since 2017. PRL 61 (3/2020) > PRL up to 104 (11/2022). Brain MRI 2.4 mm on the left part of sella    - Continue cabergoline 0.5 mg three tablets per week    - recheck prolactin today    # Amenorrhea  Started Provera 5 days a month by OB GYN Dr. Khan since 2018, but past several years no menses. She has occasional mild hot flush.      - checking Estradiol and FSH level today, then we will consider to start estradiol and progesterone therapy, mammogram 2024 spring was negative per patient.     # Hypothyroidism    - Continue levothyroxine 125v mcg daily     # Obesity BMI 60  She lost weight with Mounjaro.       # Type 2 diabetes  Glucose much improved from 300-400 range to 120-150 range  Appreciate DM management by my colleague.     - checking A1c today    - continue Lantus 14 unit    - continue glimepiride 4 mg daily     - continue jardiance 10 mg daily      # Bone health  - calcium citrate plus D (630 mg Ca, D 500ID) for bone health    # Hypertriglyceridemia  Triglyceride levels >500, Has started  "taking fenofibrate, last triglycerides 530 in 4/2022.   -Continue fenofibrate, management per PCP    RTC in once a year       30  minutes spent on the date of the encounter doing chart review, history and exam, documentation and further activities as noted above.    The longitudinal plan of care for Bushra was addressed during this visit. Due to the added complexity in care, I will continue to support Bushra in the subsequent management of this condition(s) and with the ongoing continuity of care of this condition(s).     Mandy Farfan MD  Staff Physician  Endocrinology and Metabolism  [facility]  License: [id number]  Pager: [phone]    Interval History as of 1/8/2025 : Patient has been doing well. She lost significant amount of weight after starting Mounjaro. Her glucose control improved from 300-400s to 120-150s.  Medication compliance excellent.  Subjective:  A 38 year old female with follow up prolactinoma, original diagnosis was made in 1997 s/p resection 2000 and subsequent treatment of cabergoline.  Patient had been seen by Dr. CLINTON Moran before (since 2002). In 2012, cabergoline was discontinued and her PRL became mildly elevated (PRL 50-60s), and she has had amenorrhea for 4-5 years. She is taking Provera, but this is no longer causing bleeding. Although official radiology report did not mention residual tumor, we assumed possible small residual, we resumed cabergoline 0.5 mg BIW, which she is currenlty taking. PRL level was 61 (3/2020).     She was diagnosed with type 2 diabetes in the summer of 2020     She also has psychiatry issues, however, currently has been improving, with tapering psychiatry medications (depakote as her \"levels were high\"). No recent hospitalization.      She also has primary hypothyroidism, which is being replaced with levothyroxine 112 mcg daily.     Following with bariatric clinic, on Dulaglutide for weight loss, 15 lb wt loss since 11/2021  Wt Readings from " "Last 4 Encounters:   11/14/24 141.1 kg (311 lb)   07/16/24 144.7 kg (319 lb)   05/23/24 148.3 kg (327 lb)   05/13/24 143.3 kg (316 lb)       Interval history   Doing \"good\". Only concern is rising prolactin and she is questioning need for repeat brain MRI.     Sweating frequently, sleeping with windows open, getting hot flashes    Apartment is being condemned and she is looking for new place to live. She is most excited about the possibility of a place in [facility] that has a workout area included in rent.    PCP is retiring in 4/2023. Has a HHN who sets up medications and patient takes.     BG log books - , average 110-130  She is experiencing no breast tenderness, no galactorrhea, no headaches, but is experiencing ongoing amenorrhea despite Provera therapy. She has tried to incorporate more walking into her life, but she admits to still overeating at times.     Not missing any doses of medications. No HA, vision changes but does note \"pain in the eyes\".    Denies breast tenderness, galactorrhea, tremors, palpitations. Does note SANDS with stairs which is chronic.   She is still amenorrhea, been multiple years. Following with Suzan GARCIA - following with, last seen in 2022    ROS:  10 point negative except as mentioned in HPI    Current Outpatient Medications   Medication Sig Dispense Refill     ACCU-CHEK GUIDE test strip Use to test blood sugar 2-3 daily. 250 strip 3     ACETAMINOPHEN EXTRA STRENGTH 500 MG tablet        Alcohol Swabs (ALCOHOL PREP) 70 % PADS See Admin Instructions       ASPIRIN LOW DOSE 81 MG EC tablet Take 81 mg by mouth daily       atorvastatin (LIPITOR) 40 MG tablet Take 40 mg by mouth daily       bacitracin 500 UNIT/GM external ointment Apply topically 2 times daily 1 g 5     bumetanide (BUMEX) 2 MG tablet Take 4 mg by mouth 3 times daily       cabergoline (DOSTINEX) 0.5 MG tablet Take 1 tablet by mouth 3x per week. 36 tablet 1     calcium carbonate-vitamin D (OS-TAWNYA WITH " D) 500-200 MG-UNIT tablet Take 1 tablet by mouth 2 times daily       cetirizine (ZYRTEC) 10 MG tablet Take 10 mg by mouth daily       divalproex sodium extended-release (DEPAKOTE ER) 500 MG 24 hr tablet Take 500 mg by mouth 3 times daily       docusate sodium (COLACE) 100 MG capsule Take 1 capsule (100 mg) by mouth 2 times daily as needed for constipation. 90 capsule 1     empagliflozin (JARDIANCE) 10 MG TABS tablet TAKE 1 TABLET(10 MG) BY MOUTH DAILY 90 tablet 3     fenofibrate (TRICOR) 145 MG tablet Take 145 mg by mouth daily       glimepiride (AMARYL) 2 MG tablet TAKE 2 TABLETS BY MOUTH DAILY WITH BREAKFAST OR FIRST MAIN MEAL OF THE DAY. 180 tablet 1     insulin glargine (LANTUS PEN) 100 UNIT/ML pen Inject 14 Units subcutaneously daily. 15 mL 1     levothyroxine (SYNTHROID/LEVOTHROID) 125 MCG tablet TAKE 1 TABLET BY MOUTH DAILY 90 tablet 3     LORazepam (ATIVAN) 0.5 MG tablet Take 0.5 mg by mouth       medroxyPROGESTERone (PROVERA) 10 MG tablet TAKE 1 TABLET  10 MG  BY MOUTH ONCE DAILY FOR 5 DAYS EACH MONTH  0     melatonin 5 MG tablet Take 1 tablet by mouth daily at 2 pm       Multiple Vitamin (DAILY-NARESH MULTIVITAMIN) TABS Take 1 tablet by mouth daily. 90 tablet 3     multivitamin w/minerals (THERA-VIT-M) tablet Take 1 tablet by mouth daily       NYAMYC 308504 UNIT/GM external powder Apply 1 g topically as needed       omeprazole (PRILOSEC) 40 MG DR capsule Take 1 capsule by mouth daily.       paliperidone ER (INVEGA) 6 MG 24 hr tablet Take 6 mg by mouth every morning       polyethylene glycol (MIRALAX) 17 GM/Dose powder Take 17 g (1 Capful) by mouth daily. 510 g 1     spironolactone (ALDACTONE) 50 MG tablet Take 50 mg by mouth 2 times daily       tirzepatide (MOUNJARO) 15 MG/0.5ML pen Inject 15 mg subcutaneously every 7 days. 2 mL 11     VITAMIN D3 25 MCG (1000 UT) tablet Take 3 tablets by mouth daily       No current facility-administered medications for this visit.     Family history: mother has history of  diabetes and thyroid disease    EXAM  not currently breastfeeding.  Gen: morbidly obese female in NAD, diaphoretic  HEENT: anicteric, no proptosis or lid lag  Visual field: intact  Resp: no acute distress  Neuro: A&Ox3, no tremor on outstretched arms  Psych: Normal affect and mood.      Labs/Imaging    TSH   Date Value Ref Range Status   01/24/2024 2.77 0.30 - 4.20 uIU/mL Final   05/24/2023 3.76 0.30 - 4.20 uIU/mL Final   04/10/2023 4.38 (H) 0.30 - 4.20 uIU/mL Final   11/18/2022 3.89 0.30 - 4.20 uIU/mL Final   04/04/2022 4.28 0.30 - 5.00 uIU/mL Final   06/29/2021 2.53 0.30 - 5.00 uIU/mL Final   04/07/2021 3.51 0.40 - 4.00 mU/L Final   06/08/2020 3.75 0.30 - 5.00 uIU/mL Final   03/23/2020 2.96 0.40 - 4.00 mU/L Final   02/03/2020 1.70 0.30 - 5.00 uIU/mL Final   08/19/2019 1.91 0.30 - 5.00 uIU/mL Final   12/31/2018 3.22 0.40 - 4.00 mU/L Final   02/28/2018 2.80 0.30 - 5.00 mcU/mL Final   01/10/2017 3.09 0.40 - 4.00 mU/L Final     T4 Free   Date Value Ref Range Status   04/07/2021 1.39 0.76 - 1.46 ng/dL Final     Free T4   Date Value Ref Range Status   01/24/2024 1.68 0.90 - 1.70 ng/dL Final     Lab Results   Component Value Date/Time    PROLACTIN 54 (H) 04/07/2021 08:30 AM    PROLACTIN 61 (H) 03/23/2020 10:55 AM    PROLACTIN 45 (H) 12/31/2018 09:14 AM    PROLACTIN 27.9 (A) 02/28/2018 12:00 AM    PROLACTIN 38 (H) 08/10/2017 09:49 AM     Brain MRI 1/2017  Impression:   1. Postsurgical changes from transsphenoidal resection of pituitary  adenoma without evidence of recurrence.  2. Single hyperintensity on FLAIR in the right semiovale similar to  prior study of questionable clinical significance.  3. Stable mild dural thickening and enhancement over the convexities  which is most likely postsurgical in nature. This could also be  related to CSF leak or intracranial hypotension. Total                  Again, thank you for allowing me to participate in the care of your patient.      Sincerely,    Mandy Farfan MD

## 2025-01-09 NOTE — PROGRESS NOTES
Diabetes Education Note:     Bushra came in for a scheduled appointment with Dr. Farfan and I saw her following this appointment.  Dr. Farfan has already reviewed her blood glucoses.   Bushra currently taking 15 mg Mounjaro weekly.   She checks her glucose once daily, fasting and has stated that she is not willing to check more often, nor to wear a continuous glucose sensor.      A1c has improved from 11.7% one year ago to 6.1% today.     She shows me her logbook of glucoses which indicate an average fasting glucose of 139 mg/dL.   Currently taking the following medications for diabetes:

## 2025-01-10 ENCOUNTER — LAB REQUISITION (OUTPATIENT)
Dept: LAB | Facility: CLINIC | Age: 45
End: 2025-01-10
Payer: COMMERCIAL

## 2025-01-10 DIAGNOSIS — Z79.899 OTHER LONG TERM (CURRENT) DRUG THERAPY: ICD-10-CM

## 2025-01-10 DIAGNOSIS — E78.2 MIXED HYPERLIPIDEMIA: ICD-10-CM

## 2025-01-10 PROCEDURE — 80164 ASSAY DIPROPYLACETIC ACD TOT: CPT | Performed by: FAMILY MEDICINE

## 2025-01-10 PROCEDURE — 80061 LIPID PANEL: CPT | Mod: ORL | Performed by: FAMILY MEDICINE

## 2025-01-10 PROCEDURE — 82247 BILIRUBIN TOTAL: CPT | Performed by: FAMILY MEDICINE

## 2025-01-11 LAB
ALBUMIN SERPL BCG-MCNC: 4.5 G/DL (ref 3.5–5.2)
ALP SERPL-CCNC: 64 U/L (ref 40–150)
ALT SERPL W P-5'-P-CCNC: 75 U/L (ref 0–50)
ANION GAP SERPL CALCULATED.3IONS-SCNC: 18 MMOL/L (ref 7–15)
AST SERPL W P-5'-P-CCNC: 49 U/L (ref 0–45)
BILIRUB SERPL-MCNC: 0.4 MG/DL
BUN SERPL-MCNC: 15.6 MG/DL (ref 6–20)
CALCIUM SERPL-MCNC: 10 MG/DL (ref 8.8–10.4)
CHLORIDE SERPL-SCNC: 100 MMOL/L (ref 98–107)
CHOLEST SERPL-MCNC: 129 MG/DL
CREAT SERPL-MCNC: 0.84 MG/DL (ref 0.51–0.95)
EGFRCR SERPLBLD CKD-EPI 2021: 88 ML/MIN/1.73M2
FASTING STATUS PATIENT QL REPORTED: ABNORMAL
FASTING STATUS PATIENT QL REPORTED: ABNORMAL
GLUCOSE SERPL-MCNC: 143 MG/DL (ref 70–99)
HCO3 SERPL-SCNC: 21 MMOL/L (ref 22–29)
HDLC SERPL-MCNC: 29 MG/DL
LDLC SERPL CALC-MCNC: 35 MG/DL
NONHDLC SERPL-MCNC: 100 MG/DL
POTASSIUM SERPL-SCNC: 2.9 MMOL/L (ref 3.4–5.3)
PROT SERPL-MCNC: 7.6 G/DL (ref 6.4–8.3)
SODIUM SERPL-SCNC: 139 MMOL/L (ref 135–145)
TRIGL SERPL-MCNC: 327 MG/DL
VALPROATE SERPL-MCNC: <2.8 UG/ML

## 2025-01-24 ENCOUNTER — LAB REQUISITION (OUTPATIENT)
Dept: LAB | Facility: CLINIC | Age: 45
End: 2025-01-24
Payer: COMMERCIAL

## 2025-01-24 DIAGNOSIS — E87.6 HYPOKALEMIA: ICD-10-CM

## 2025-01-24 PROCEDURE — 82310 ASSAY OF CALCIUM: CPT | Performed by: FAMILY MEDICINE

## 2025-01-24 PROCEDURE — 80048 BASIC METABOLIC PNL TOTAL CA: CPT | Mod: ORL | Performed by: FAMILY MEDICINE

## 2025-01-25 LAB
ANION GAP SERPL CALCULATED.3IONS-SCNC: 16 MMOL/L (ref 7–15)
BUN SERPL-MCNC: 16.4 MG/DL (ref 6–20)
CALCIUM SERPL-MCNC: 10.4 MG/DL (ref 8.8–10.4)
CHLORIDE SERPL-SCNC: 101 MMOL/L (ref 98–107)
CREAT SERPL-MCNC: 0.94 MG/DL (ref 0.51–0.95)
EGFRCR SERPLBLD CKD-EPI 2021: 77 ML/MIN/1.73M2
GLUCOSE SERPL-MCNC: 144 MG/DL (ref 70–99)
HCO3 SERPL-SCNC: 22 MMOL/L (ref 22–29)
POTASSIUM SERPL-SCNC: 3.4 MMOL/L (ref 3.4–5.3)
SODIUM SERPL-SCNC: 139 MMOL/L (ref 135–145)

## 2025-03-08 DIAGNOSIS — E11.9 TYPE 2 DIABETES MELLITUS (H): ICD-10-CM

## 2025-03-08 DIAGNOSIS — D35.2 PROLACTINOMA (H): ICD-10-CM

## 2025-03-08 DIAGNOSIS — D35.2 PITUITARY ADENOMA (H): ICD-10-CM

## 2025-03-08 DIAGNOSIS — D49.7 PITUITARY TUMOR: ICD-10-CM

## 2025-03-13 RX ORDER — CABERGOLINE 0.5 MG/1
TABLET ORAL
Qty: 36 TABLET | Refills: 1 | OUTPATIENT
Start: 2025-03-13

## 2025-03-13 NOTE — TELEPHONE ENCOUNTER
Last Written Prescription:     cabergoline (DOSTINEX) 0.5 MG tablet 36 tablet 1 12/5/2024 -- No   Sig: Take 1 tablet by mouth 3x per week.     [facility] - [address]      Too soon for refill. 12 weeks and 1 Rf sent 12/5/24.

## 2025-03-19 ENCOUNTER — TELEPHONE (OUTPATIENT)
Dept: ENDOCRINOLOGY | Facility: CLINIC | Age: 45
End: 2025-03-19
Payer: COMMERCIAL

## 2025-03-19 NOTE — TELEPHONE ENCOUNTER
Patient confirmed scheduled appointment:  Date: 6/25/25  Time: 1:00 pm  Visit type: RETURN DIABETES  Provider: Madina Elizondo PA-C  Location: [facility]  Testing/imaging: N/A  Additional notes:  Spoke to pt and rescheduled appointment on 4/4 due to changes in the providers schedule,  Pt will be in [place] for this visit.    Anuj Alexander on 3/19/2025 at 3:23 PM

## 2025-03-25 DIAGNOSIS — Z79.4 TYPE 2 DIABETES MELLITUS WITH HYPERGLYCEMIA, WITH LONG-TERM CURRENT USE OF INSULIN (H): ICD-10-CM

## 2025-03-25 DIAGNOSIS — E11.65 TYPE 2 DIABETES MELLITUS WITH HYPERGLYCEMIA, WITH LONG-TERM CURRENT USE OF INSULIN (H): ICD-10-CM

## 2025-03-25 NOTE — TELEPHONE ENCOUNTER
empagliflozin (JARDIANCE) 10 MG TABS tablet   Start: 06/20/2024   Disp-90 tablet, R-3   TAKE 1 TABLET(10 MG) BY MOUTH DAILY      Mandy Farfan MD  Endocrinology, Diabetes, and Metabolism  Lv 1/8/25  Nv  6/25/25    Refill decision: Medication refilled per  Medication Refill in Ambulatory Care  policy.      Request from pharmacy:  Requested Prescriptions   Pending Prescriptions Disp Refills    empagliflozin (JARDIANCE) 10 MG TABS tablet 90 tablet 3     Sig: Take 1 tablet (10 mg) by mouth daily.       Sodium Glucose Co-Transport Inhibitor Agents Passed - 3/25/2025  1:25 PM        Passed - Patient has documented A1c within the specified period of time.     If HgbA1C is 8 or greater, it needs to be on file within the past 3 months.  If less than 8, must be on file within the past 6 months.     Recent Labs   Lab Test 01/08/25  1112 01/24/24  1134 10/06/23  1342   A1C 6.1*   < >  --    75135  --   --  9.1*    < > = values in this interval not displayed.             Passed - Medication is active on med list and the sig matches. RN to manually verify dose and sig if red X/fail.     If the protocol passes (green check), you do not need to verify med dose and sig.    A prescription matches if they are the same clinical intention.    For Example: once daily and every morning are the same.    The protocol can not identify upper and lower case letters as matching and will fail.     For Example: Take 1 tablet (50 mg) by mouth daily     TAKE 1 TABLET (50 MG) BY MOUTH DAILY    For all fails (red x), verify dose and sig.    If the refill does match what is on file, the RN can still proceed to approve the refill request.       If they do not match, route to the appropriate provider.             Passed - Recent (6 mo) or future (90 days) visit within the authorizing provider's specialty     The patient must have completed an in-person or virtual visit within the past 6 months or has a future visit scheduled within the next 90 days  with the authorizing provider s specialty.  Urgent care and e-visits do not quality as an office visit for this protocol.          Passed - Medication indicated for associated diagnosis     Medication is associated with one or more of the following diagnoses:     Diabetic nephropathy, With Albuminuria - Type 2 diabetes mellitus     Disorder of cardiovascular system; Prophylaxis - Type 2 diabetes mellitus     Type 2 diabetes mellitus    Disorder of cardiovascular system; Prophylaxis - Heart failure   Chronic kidney disease, (At risk of progression) to reduce the risk of sustained   estimated GFR decline, end-stage kidney disease, cardiovascular death,   and hospitalization for heart failure     Heart failure, (NYHA class II to IV, reduced ejection fraction) to reduce risk of  cardiovascular death and hospitalization           Passed - Has GFR on file in past 12 months and most recent value is >30        Passed - Patient is age 18 or older        Passed - Patient is not pregnant        Passed - Patient has documented normal Potassium within the last 12 mos.     Recent Labs   Lab Test 01/24/25  1425   POTASSIUM 3.4             Passed - Patient has no positive pregnancy test within the past 12 mos.

## 2025-03-25 NOTE — TELEPHONE ENCOUNTER
Health Call Center    Phone Message    May a detailed message be left on voicemail: yes     Reason for Call: Medication Refill Request    Has the patient contacted the pharmacy for the refill? Yes   Name of medication being requested:   empagliflozin (JARDIANCE) 10 MG TABS tablet   Provider who prescribed the medication: Dr. Farfan  Pharmacy:   [id number] [address]     Date medication is needed: 4/1/25

## 2025-03-31 DIAGNOSIS — D35.2 PITUITARY ADENOMA (H): ICD-10-CM

## 2025-03-31 DIAGNOSIS — D49.7 PITUITARY TUMOR: ICD-10-CM

## 2025-03-31 DIAGNOSIS — E11.9 TYPE 2 DIABETES MELLITUS (H): ICD-10-CM

## 2025-03-31 DIAGNOSIS — D35.2 PROLACTINOMA (H): ICD-10-CM

## 2025-04-07 RX ORDER — CABERGOLINE 0.5 MG/1
TABLET ORAL
Qty: 36 TABLET | Refills: 1 | Status: SHIPPED | OUTPATIENT
Start: 2025-04-07

## 2025-04-10 NOTE — TELEPHONE ENCOUNTER
Last Written Prescription:   Disp Refills Start End THOMAS   cabergoline (DOSTINEX) 0.5 MG tablet 36 tablet 1 12/5/2024 -- No   Sig: Take 1 tablet by mouth 3x per week.     ----------------------  Last Visit Date: 1/8/2025  [facility]      Future Visit Date:    6/25/2025 1:00 PM (30 min)  [place]   Arrive by: 12:45 PM   RETURN DIABETES    [facility] ([facility])   Madina Elizondo PA-C     ----------------------    Refill decision: Medication unable to be refilled by RN due to:     Medication not on refill protocol.          Request from pharmacy:  Requested Prescriptions   Pending Prescriptions Disp Refills    cabergoline (DOSTINEX) 0.5 MG tablet 36 tablet 1     Sig: Take 1 tablet by mouth 3x per week.       There is no refill protocol information for this order          
Pharmacy calling for an update on this request. Please reach out to pharmacy to discuss at [phone] or notify pt of message below.   
---

## 2025-04-22 ENCOUNTER — LAB REQUISITION (OUTPATIENT)
Dept: LAB | Facility: CLINIC | Age: 45
End: 2025-04-22
Payer: COMMERCIAL

## 2025-04-22 DIAGNOSIS — E11.9 TYPE 2 DIABETES MELLITUS WITHOUT COMPLICATIONS (H): ICD-10-CM

## 2025-04-22 PROCEDURE — 82565 ASSAY OF CREATININE: CPT

## 2025-04-22 PROCEDURE — 80048 BASIC METABOLIC PNL TOTAL CA: CPT | Mod: ORL

## 2025-04-23 LAB
ANION GAP SERPL CALCULATED.3IONS-SCNC: 18 MMOL/L (ref 7–15)
BUN SERPL-MCNC: 17 MG/DL (ref 6–20)
CALCIUM SERPL-MCNC: 9.3 MG/DL (ref 8.8–10.4)
CHLORIDE SERPL-SCNC: 102 MMOL/L (ref 98–107)
CREAT SERPL-MCNC: 0.94 MG/DL (ref 0.51–0.95)
EGFRCR SERPLBLD CKD-EPI 2021: 76 ML/MIN/1.73M2
GLUCOSE SERPL-MCNC: 98 MG/DL (ref 70–99)
HCO3 SERPL-SCNC: 22 MMOL/L (ref 22–29)
POTASSIUM SERPL-SCNC: 3.2 MMOL/L (ref 3.4–5.3)
SODIUM SERPL-SCNC: 142 MMOL/L (ref 135–145)

## 2025-05-20 ENCOUNTER — TELEPHONE (OUTPATIENT)
Dept: ENDOCRINOLOGY | Facility: CLINIC | Age: 45
End: 2025-05-20
Payer: COMMERCIAL

## 2025-05-20 NOTE — TELEPHONE ENCOUNTER
Patient confirmed scheduled appointment:  Date: 6/10   Time: 11 am   Visit type: Labs   Provider: Adolph   [place] [facility] [place]   Testing/imaging:   Additional notes: Spoke to pt and bernadette per below message   Per Jovanna RN:  Please help schedule lab draw 1-2 weeks prior to JUNE visit with Madina Elizondo . Thank you.     Tiffanie Jimenez on 5/20/2025 at 1:59 PM

## 2025-06-10 ENCOUNTER — LAB (OUTPATIENT)
Dept: LAB | Facility: CLINIC | Age: 45
End: 2025-06-10
Payer: COMMERCIAL

## 2025-06-10 DIAGNOSIS — D49.7 PITUITARY TUMOR: ICD-10-CM

## 2025-06-10 DIAGNOSIS — D35.2 PROLACTINOMA (H): ICD-10-CM

## 2025-06-10 DIAGNOSIS — E11.9 TYPE 2 DIABETES MELLITUS (H): ICD-10-CM

## 2025-06-10 DIAGNOSIS — D35.2 PITUITARY ADENOMA (H): ICD-10-CM

## 2025-06-10 LAB
ALBUMIN SERPL BCG-MCNC: 4.6 G/DL (ref 3.5–5.2)
ALP SERPL-CCNC: 59 U/L (ref 40–150)
ALT SERPL W P-5'-P-CCNC: 43 U/L (ref 0–50)
ANION GAP SERPL CALCULATED.3IONS-SCNC: 14 MMOL/L (ref 7–15)
AST SERPL W P-5'-P-CCNC: 44 U/L (ref 0–45)
BILIRUB SERPL-MCNC: 0.6 MG/DL
BUN SERPL-MCNC: 14.6 MG/DL (ref 6–20)
CALCIUM SERPL-MCNC: 9.5 MG/DL (ref 8.8–10.4)
CHLORIDE SERPL-SCNC: 102 MMOL/L (ref 98–107)
CREAT SERPL-MCNC: 0.88 MG/DL (ref 0.51–0.95)
CREAT UR-MCNC: 56.3 MG/DL
EGFRCR SERPLBLD CKD-EPI 2021: 83 ML/MIN/1.73M2
EST. AVERAGE GLUCOSE BLD GHB EST-MCNC: 108 MG/DL
GLUCOSE SERPL-MCNC: 103 MG/DL (ref 70–99)
HBA1C MFR BLD: 5.4 %
HCO3 SERPL-SCNC: 23 MMOL/L (ref 22–29)
MICROALBUMIN UR-MCNC: <12 MG/L
MICROALBUMIN/CREAT UR: NORMAL MG/G{CREAT}
POTASSIUM SERPL-SCNC: 3.3 MMOL/L (ref 3.4–5.3)
PROLACTIN SERPL 3RD IS-MCNC: 103 NG/ML (ref 5–23)
PROT SERPL-MCNC: 7.7 G/DL (ref 6.4–8.3)
SODIUM SERPL-SCNC: 139 MMOL/L (ref 135–145)
T4 FREE SERPL-MCNC: 2.29 NG/DL (ref 0.9–1.7)
TSH SERPL DL<=0.005 MIU/L-ACNC: 1.29 UIU/ML (ref 0.3–4.2)

## 2025-06-10 PROCEDURE — 80053 COMPREHEN METABOLIC PANEL: CPT | Performed by: PATHOLOGY

## 2025-06-10 PROCEDURE — 84146 ASSAY OF PROLACTIN: CPT | Performed by: INTERNAL MEDICINE

## 2025-06-10 PROCEDURE — 99000 SPECIMEN HANDLING OFFICE-LAB: CPT | Performed by: PATHOLOGY

## 2025-06-10 PROCEDURE — 84443 ASSAY THYROID STIM HORMONE: CPT | Performed by: PATHOLOGY

## 2025-06-10 PROCEDURE — 84439 ASSAY OF FREE THYROXINE: CPT | Performed by: PATHOLOGY

## 2025-06-10 PROCEDURE — 82043 UR ALBUMIN QUANTITATIVE: CPT | Performed by: INTERNAL MEDICINE

## 2025-06-10 PROCEDURE — 36415 COLL VENOUS BLD VENIPUNCTURE: CPT | Performed by: PATHOLOGY

## 2025-06-10 PROCEDURE — 83036 HEMOGLOBIN GLYCOSYLATED A1C: CPT | Performed by: INTERNAL MEDICINE

## 2025-06-17 ENCOUNTER — RESULTS FOLLOW-UP (OUTPATIENT)
Dept: ENDOCRINOLOGY | Facility: CLINIC | Age: 45
End: 2025-06-17
Payer: COMMERCIAL

## 2025-06-17 ENCOUNTER — TELEPHONE (OUTPATIENT)
Dept: ENDOCRINOLOGY | Facility: CLINIC | Age: 45
End: 2025-06-17
Payer: COMMERCIAL

## 2025-06-17 NOTE — TELEPHONE ENCOUNTER
Please call her to schedule with Dr Farfan this summer to discuss cabergoline dosing. Its ok to use a sukhwinder Rangel RN on 6/17/2025 at 9:37 AM

## 2025-06-17 NOTE — TELEPHONE ENCOUNTER
Spoke to patient and she declined the 7/23 appointment because she will only do appointments after 11 am due to transportation and only Tuesday and Thursdays work well.  I told her I'd reach out to see if there are any possibilities with what she is requesting.  Sent message back to Margaret to seek guidance.    7/23 8 am ok if she's ok with Dr Adolph Rangel, RN on 6/17/2025 at 11:13 AM       Anuj Alexander on 6/17/2025 at 1:56 PM

## 2025-06-17 NOTE — TELEPHONE ENCOUNTER
----- Message from Mandy Farfan sent at 6/17/2025  4:52 AM CDT -----  Regarding: I need to correct letter information  Hi     I sent out result letter to increase cabergoline from 1 tab weekly to 2 times weekly however she is already 3 times weekly, so no change the dose for now and just need to confirm if she is taking, and please offer appt (usually once a year this patient but this time 6 motnsh, so this summer)    Thank you    Mandy

## 2025-06-17 NOTE — TELEPHONE ENCOUNTER
She has been taking cabergoline 3 times a week for forever . Not sure why the prolactin level is high .  She will schedule a summer visit but wonders if she should go back on Bromocriptine . Otherwise she will saty on 3 per week Margaret Rangel RN on 6/17/2025 at 11:46 AM

## 2025-06-17 NOTE — TELEPHONE ENCOUNTER
Please assist.  Patient not able to do virtual appts, so requires in-person.  Writer only able to find Return pituitary appt on 7/23/25 at 8 am for in-person,  Tried to reach out to backline for authorization to use with no answer.  Please assist.  Patient no scheduled.

## 2025-06-18 ENCOUNTER — TELEPHONE (OUTPATIENT)
Dept: ENDOCRINOLOGY | Facility: CLINIC | Age: 45
End: 2025-06-18
Payer: COMMERCIAL

## 2025-06-18 DIAGNOSIS — D49.7 PITUITARY TUMOR: ICD-10-CM

## 2025-06-18 DIAGNOSIS — D35.2 PITUITARY ADENOMA (H): ICD-10-CM

## 2025-06-18 DIAGNOSIS — E11.9 TYPE 2 DIABETES MELLITUS (H): ICD-10-CM

## 2025-06-18 DIAGNOSIS — D35.2 PROLACTINOMA (H): ICD-10-CM

## 2025-06-18 RX ORDER — CABERGOLINE 0.5 MG/1
1 TABLET ORAL
Qty: 36 TABLET | Refills: 5 | Status: SHIPPED | OUTPATIENT
Start: 2025-06-19

## 2025-06-18 NOTE — TELEPHONE ENCOUNTER
Spoke w/ Deborah, Home Care RN. Confirms understanding of cabergoline dosage change to 1mg 2x weekly.   Shell Howell, RN on 6/18/2025 at 11:17 AM                 RE    Can Dr Farfan's nurse call to Home Care Nurse.Cabergoline. [phone] Deborah, RN.     Provider notified to please confirm per Margaret. Shell Howell, RN on 6/18/2025 at 11:07 AM     LVM for home care Rn that we francisco contact her with dosage change as soon as we have confirmation.   Shell Howell, RN on 6/18/2025 at 11:10 AM         RE    She has been taking cabergoline 3 times a week for forever . Not sure why the prolactin level is high .  She will schedule a summer visit but wonders if she should go back on Bromocriptine . Otherwise she will saty on 3 per week Margaret Rangel, RN on 6/17/2025 at 11:46 AM

## 2025-06-23 ENCOUNTER — TELEPHONE (OUTPATIENT)
Dept: ENDOCRINOLOGY | Facility: CLINIC | Age: 45
End: 2025-06-23
Payer: COMMERCIAL

## 2025-06-23 NOTE — PROGRESS NOTES
Outcome for 06/23/25 1:55 PM: Data obtained via phone and located below  Georgina Hooper MA    Patient is showing 4/5 [place] met. BP out range   Georgina Hooper MA     6/9/25  135    6/10/25  109    6/11/25  126    6/12/25  116    6/13/25  99    6/14/25  135    6/15/25  124    6/16/25  128    6/17/25  130    6/18/25  137    6/19/25  139    6/20/25  123    6/21/25  131    6/22/25  117    6/23/25  122

## 2025-06-23 NOTE — TELEPHONE ENCOUNTER
6/9/25  135    6/10/25  109    6/11/25  126    6/12/25  116    6/13/25  99    6/14/25  135    6/15/25  124    6/16/25  128    6/17/25  130    6/18/25  137    6/19/25  139    6/20/25  123    6/21/25  131    6/22/25  117    6/23/25  122

## 2025-06-25 ENCOUNTER — VIRTUAL VISIT (OUTPATIENT)
Dept: ENDOCRINOLOGY | Facility: CLINIC | Age: 45
End: 2025-06-25
Payer: COMMERCIAL

## 2025-06-25 DIAGNOSIS — E11.65 TYPE 2 DIABETES MELLITUS WITH HYPERGLYCEMIA, WITH LONG-TERM CURRENT USE OF INSULIN (H): ICD-10-CM

## 2025-06-25 DIAGNOSIS — Z79.4 TYPE 2 DIABETES MELLITUS WITH HYPERGLYCEMIA, WITH LONG-TERM CURRENT USE OF INSULIN (H): ICD-10-CM

## 2025-06-25 DIAGNOSIS — D35.2 PROLACTINOMA (H): ICD-10-CM

## 2025-06-25 DIAGNOSIS — D35.2 PITUITARY ADENOMA (H): ICD-10-CM

## 2025-06-25 PROCEDURE — 98012 SYNCH AUDIO-ONLY EST SF 10: CPT | Performed by: PHYSICIAN ASSISTANT

## 2025-06-25 PROCEDURE — 1126F AMNT PAIN NOTED NONE PRSNT: CPT | Mod: 95 | Performed by: PHYSICIAN ASSISTANT

## 2025-06-25 RX ORDER — UBIQUINOL 100 MG
CAPSULE ORAL
Qty: 100 EACH | Refills: 2 | Status: SHIPPED | OUTPATIENT
Start: 2025-06-25

## 2025-06-25 RX ORDER — GLIMEPIRIDE 2 MG/1
2 TABLET ORAL
Qty: 180 TABLET | Refills: 1 | Status: SHIPPED | OUTPATIENT
Start: 2025-06-25

## 2025-06-25 ASSESSMENT — PAIN SCALES - GENERAL: PAINLEVEL_OUTOF10: NO PAIN (0)

## 2025-06-25 NOTE — NURSING NOTE
Current patient location: Patient declined to provide     Is the patient currently in the state of [place]? YES    Visit mode: VIDEO    If the visit is dropped, the patient can be reconnected by:VIDEO VISIT: Text to cell phone:   Telephone Information:   Mobile [phone]   Mobile Not on file.       Will anyone else be joining the visit? NO  (If patient encounters technical issues they should call [phone])    Are changes needed to the allergy or medication list? No    Are refills needed on medications prescribed by this physician? NO    Rooming Documentation:  Questionnaire(s) not done per department protocol    Reason for visit: RECHECK Shelby Kocher VVF

## 2025-06-25 NOTE — PROGRESS NOTES
Time of start: 1:07 pm  Time of end:  1:20 pm    Total duration of telephone visit: 13 minutes  Providers location: Off-site.  Patient location: [place].    [facility]  Bushra Buck is a 45-year-old female with history of type 2 diabetes mellitus.  Telephone visit today for diabetes follow-up.  I last saw Bushra in Dec 2024.  She was seen by Peggy POOLE in Dec 2024.  Patient also seen by Dr. Farfan in January 2024.  Bushra was admitted 7/24/2024-7/29/2024 with left lower leg cellulitis.   Patient was admitted 4/1-4/2/2024 at [facility] for psychosis.  Patient has a history of prolactinoma s/p resection in 2000 and subsequent treatment with carbergoline restarted in 2017 for persistent levels, amenorrhea, fatigue and weight gain.  Type 2 diabetes mellitus diagnosed in 2020.  Patient's history also significant for morbid obesity, hypothyroidism, dyslipidemia, bipolar, PTSD, schizophrenia, DANIEL, venous stasis with lymphedema lower extremities, hypertension, irritable bowel syndrome, GERD, leg cellulitis, MRSA + and vitamin D deficiency.  For her diabetes, she is taking Mounjaro 15 mg subcutaneous once a week, Glimepiride 4 mg each am, Jardiance 10 mg each am and Lantus 14 units subcutaneous each am.  Most recent A1C 5.4 % on 6/10/2025.   Previous A1C was 6.1 % in Jan 2025.  Patient provided me with a few blood sugar readings today which I scanned in her note below.  Blood sugars readings are good and patient denies hypoglycemia.  Bushra did not like the freestyle kamryn sensor3 and is currently using a glucose meter.  She states she needs a new glucose meter which I ordered today.  On ROS today, weight loss.    Her current weight is 289 pounds.  Patient states that her max weight was close to 400 pounds.  Increase energy.  Constipation, but not bothersome per patient.  Using stool softeners and Metamucil.  Reminded her to stay well-hydrated.  Denies headaches, blurred vision, nausea, vomiting, shortness of  breath at rest or fever.  No chest pain, abdominal pain or diarrhea.  Patient denies dysuria, hematuria or symptoms of vaginal yeast infection at this time.  Bushra denies any symptoms of diabetic neuropathy and denies foot ulcers at this time.  Lymphedema in the lower extremities.    Diabetes Care  Retinopathy: Patient denies history of diabetic retinopathy.  She states she was seen by ophthalmology in June 2024 with no diabetic retinopathy.  Patient has eye exam scheduled for July 2025.  Nephropathy: Urine protein negative in June 2025.  Neuropathy: None per patient.  Foot Exam: No exam today.  Taking aspirin: Yes.  Lipids: LDL 35 in Jan 2025.   Patient taking Lipitor and fenofibrate.  Insulin: Basal insulin.  DM meds: Mounjaro,Glimepiride and Jardiance.  Testing: Glucose meter.  Patient did not like the Freestyle libre3 sensor.           ROS  See under history of present illness.    Allergies  Allergies   Allergen Reactions    Fiorinal [Butalbital-Aspirin-Caffeine] Anaphylaxis and Swelling    Aspirin Other (See Comments)     Eyes swell shut    Ativan [Lorazepam]     Bee Venom     Benzodiazepines Other (See Comments)     Swelling from allergies    Butalbital Other (See Comments)    Caffeine Other (See Comments)    Carbamazepine Other (See Comments)     Arm swelling    Cat Dander      Other reaction(s): Unknown    Cephalosporins Angioedema    Clindamycin      Other reaction(s): heartburn    Codeine Angioedema    Dextromethorphan      Other reaction(s): hives    Ibuprofen Sodium Other (See Comments)     swelling    Keflex [Cephalexin]      Eyes swell    Latex Other (See Comments) and Swelling     Swelling    Lithium Swelling    Oxcarbazepine      numbness    Topiramate      Other reaction(s): edema/swelling    Wasp Venom Protein Unknown    Amoxicillin-Pot Clavulanate Rash     Rash on neck - not raised    Topamax Rash       Medications  Current Outpatient Medications   Medication Sig Dispense Refill    ACCU-CHEK  GUIDE test strip Use to test blood sugar 2-3 daily. 250 strip 3    ACETAMINOPHEN EXTRA STRENGTH 500 MG tablet       Alcohol Swabs (ALCOHOL PREP) 70 % PADS See Admin Instructions      ASPIRIN LOW DOSE 81 MG EC tablet Take 81 mg by mouth daily      atorvastatin (LIPITOR) 40 MG tablet Take 40 mg by mouth daily      bacitracin 500 UNIT/GM external ointment Apply topically 2 times daily 1 g 5    blood glucose monitoring (SOFTCLIX) lancets Use to test blood sugar 2 times daily or as directed. 100 each 3    bumetanide (BUMEX) 2 MG tablet Take 4 mg by mouth 3 times daily      cabergoline (DOSTINEX) 0.5 MG tablet Take 2 tablets (1 mg) by mouth twice a week. Take 1 mg tablet by mouth 2x weekly. 36 tablet 5    calcium carbonate-vitamin D (OS-TAWNYA WITH D) 500-200 MG-UNIT tablet Take 1 tablet by mouth 2 times daily      cetirizine (ZYRTEC) 10 MG tablet Take 10 mg by mouth daily      divalproex sodium extended-release (DEPAKOTE ER) 500 MG 24 hr tablet Take 500 mg by mouth 3 times daily      docusate sodium (COLACE) 100 MG capsule Take 1 capsule (100 mg) by mouth 2 times daily as needed for constipation. 90 capsule 1    empagliflozin (JARDIANCE) 10 MG TABS tablet Take 1 tablet (10 mg) by mouth daily. 90 tablet 1    fenofibrate (TRICOR) 145 MG tablet Take 145 mg by mouth daily      glimepiride (AMARYL) 2 MG tablet TAKE 2 TABLETS BY MOUTH DAILY WITH BREAKFAST OR FIRST MAIN MEAL OF THE DAY. 180 tablet 1    insulin glargine (LANTUS PEN) 100 UNIT/ML pen Inject 14 Units subcutaneously daily. 15 mL 1    levothyroxine (SYNTHROID/LEVOTHROID) 125 MCG tablet TAKE 1 TABLET BY MOUTH DAILY 90 tablet 3    LORazepam (ATIVAN) 0.5 MG tablet Take 0.5 mg by mouth      medroxyPROGESTERone (PROVERA) 10 MG tablet TAKE 1 TABLET  10 MG  BY MOUTH ONCE DAILY FOR 5 DAYS EACH MONTH  0    melatonin 5 MG tablet Take 1 tablet by mouth daily at 2 pm      Multiple Vitamin (DAILY-NARESH MULTIVITAMIN) TABS Take 1 tablet by mouth daily. 90 tablet 3    multivitamin  w/minerals (THERA-VIT-M) tablet Take 1 tablet by mouth daily      NYAMYC 200382 UNIT/GM external powder Apply 1 g topically as needed      omeprazole (PRILOSEC) 40 MG DR capsule Take 1 capsule by mouth daily.      paliperidone ER (INVEGA) 6 MG 24 hr tablet Take 6 mg by mouth every morning      spironolactone (ALDACTONE) 50 MG tablet Take 50 mg by mouth 2 times daily      tirzepatide (MOUNJARO) 15 MG/0.5ML pen Inject 15 mg subcutaneously every 7 days. 2 mL 11    VITAMIN D3 25 MCG (1000 UT) tablet Take 3 tablets by mouth daily         Family History  family history includes Cancer in her mother; Diabetes in her maternal grandfather, mother, and sister; Edema in her sister; Heart Disease in her father; Hypothyroidism in her mother; No Known Problems in her sister; Sleep Apnea in her mother; Snoring in her mother; Thyroid Disease in her mother.    Social History   reports that she has never smoked. She has never used smokeless tobacco. She reports that she does not drink alcohol and does not use drugs.     Past Medical History  Past Medical History:   Diagnosis Date    Abnormal weight gain 01/17/2013    Acne rosacea     Acquired hypothyroidism 07/06/2016    Allergic rhinitis     Bipolar 1 disorder (H)     followed by psych    Bipolar disorder (H) 01/01/1992    Cellulitis, leg 01/01/2016    Dermatitis 12/31/2011    Dyslipidemia     Ganglion, left wrist     GERD (gastroesophageal reflux disease)     GERD (gastroesophageal reflux disease)     Growth retardation, mild developmental delay, and chronic hepatitis syndrome     Herpes zoster     High triglycerides 01/23/2014    Hypertension     Hypothyroid     Irritable bowel syndrome     Lymphedema of lower extremity 01/01/2008    Morbid obesity (H)     Nightmares     Obstructive sleep apnea     Pituitary adenoma (H)     S/P trans nasal resection in 2000    Post traumatic stress disorder     Post traumatic stress disorder     Post traumatic stress disorder (PTSD)      Prader-Willi syndrome     Prolactinoma (H) transsphenoid approach 2000.    PTSD (post-traumatic stress disorder)     Schizoaffective disorder, bipolar type (H)     Stasis edema 12/31/2011    Type 2 diabetes mellitus without complication, with long-term current use of insulin (H)     Venous stasis     Vitamin D deficiency        Past Surgical History:   Procedure Laterality Date    ABDOMEN SURGERY  2000    fat removed tp plug dura when had a pituitary surgery.    BRAIN SURGERY  2000    pituitary surgery    COLONOSCOPY N/A 10/14/2019    Procedure: COLONOSCOPY;  Surgeon: Hugo Mattson MD;  Location: [facility];  Service: Gastroenterology    EXCISE GANGLION WRIST  2010    left arm    EXCISE GANGLION WRIST Left 6/7/2018    Procedure: REMOVE RECURRENT GANGLION CYST LEFT VOLAR RADIAL WRIST;  Surgeon: Angel Lopez MD;  Location: [facility] OR;  Service:     EXCISE GANGLION WRIST Left 11/18/2019    Procedure: EXCISION LEFT WRIST RECURRENT GANGLION CYST;  Surgeon: Angel Lopez MD;  Location: [facility] OR;  Service: Hand    OTHER SURGICAL HISTORY  2004, 2005    anorectal fissure repair    OTHER SURGICAL HISTORY Bilateral     pansinus revision    PITUITARY SURGERY  2014    pituitary tumor removal  2000    at the [facility]    RELEASE CARPAL TUNNEL Bilateral     septal plasty  1998    SEPTOPLASTY      x 2    SINUS SURGERY Right 8/21/2015    Procedure: RIGHT MICHELLE BULLOSA;  Surgeon: Daniel Landeros MD;  Location: [facility];  Service:     TONSILLECTOMY  1998    TONSILLECTOMY         Physical Exam    No exam today.    RESULTS  Creatinine   Date Value Ref Range Status   06/10/2025 0.88 0.51 - 0.95 mg/dL Final   03/23/2020 0.70 0.52 - 1.04 mg/dL Final     GFR Estimate   Date Value Ref Range Status   06/10/2025 83 >60 mL/min/1.73m2 Final     Comment:     eGFR calculated using 2021 CKD-EPI equation.   06/29/2021 >60 >60 mL/min/1.73m2 Final   03/23/2020 >90 >60 mL/min/[1.73_m2] Final      Comment:     Non  GFR Calc  Starting 12/18/2018, serum creatinine based estimated GFR (eGFR) will be   calculated using the Chronic Kidney Disease Epidemiology Collaboration   (CKD-EPI) equation.       Hemoglobin A1C   Date Value Ref Range Status   06/10/2025 5.4 <5.7 % Final     Comment:     Normal <5.7%   Prediabetes 5.7-6.4%    Diabetes 6.5% or higher     Note: Adopted from ADA consensus guidelines.   03/23/2020 5.3 0 - 5.6 % Final     Comment:     Normal <5.7% Prediabetes 5.7-6.4%  Diabetes 6.5% or higher - adopted from ADA   consensus guidelines.       Potassium   Date Value Ref Range Status   06/10/2025 3.3 (L) 3.4 - 5.3 mmol/L Final   05/17/2022 4.0 3.5 - 5.0 mmol/L Final   03/23/2020 4.0 3.4 - 5.3 mmol/L Final     ALT   Date Value Ref Range Status   06/10/2025 43 0 - 50 U/L Final   03/23/2020 37 0 - 50 U/L Final     AST   Date Value Ref Range Status   06/10/2025 44 0 - 45 U/L Final   03/23/2020 20 0 - 45 U/L Final     TSH   Date Value Ref Range Status   06/10/2025 1.29 0.30 - 4.20 uIU/mL Final   04/04/2022 4.28 0.30 - 5.00 uIU/mL Final   04/07/2021 3.51 0.40 - 4.00 mU/L Final     T4 Free   Date Value Ref Range Status   04/07/2021 1.39 0.76 - 1.46 ng/dL Final     Free T4   Date Value Ref Range Status   06/10/2025 2.29 (H) 0.90 - 1.70 ng/dL Final       Cholesterol   Date Value Ref Range Status   01/10/2025 129 <200 mg/dL Final   04/12/2024 161 <200 mg/dL Final   04/07/2021 115 <200 mg/dL Final   01/02/2015 212 (H) <200 mg/dL Final     Comment:     LDL Cholesterol is the primary guide to therapy.   The NCEP recommends further evaluation of: patients with cholesterol greater   than 200 mg/dL if additional risk factors are present, cholesterol greater   than   240 mg/dL, triglycerides greater than 150 mg/dL, or HDL less than 40 mg/dL.       HDL Cholesterol   Date Value Ref Range Status   04/07/2021 35 (L) >49 mg/dL Final   01/02/2015 34 (L) >50 mg/dL Final     Direct Measure HDL   Date Value Ref  Range Status   01/10/2025 29 (L) >=50 mg/dL Final   04/12/2024 26 (L) >=50 mg/dL Final     LDL Cholesterol Calculated   Date Value Ref Range Status   01/10/2025 35 <100 mg/dL Final   04/12/2024 64 <=100 mg/dL Final   04/07/2021 40 <100 mg/dL Final     Comment:     Desirable:       <100 mg/dl   01/02/2015 106 0 - 129 mg/dL Final     Comment:     LDL Cholesterol is the primary guide to therapy: LDL-cholesterol goal in high   risk patients is <100 mg/dL and in very high risk patients is <70 mg/dL.       LDL Cholesterol Direct   Date Value Ref Range Status   04/13/2022 82 <=129 mg/dL Final   04/04/2022 124 <=129 mg/dL Final     Triglycerides   Date Value Ref Range Status   01/10/2025 327 (H) <150 mg/dL Final   04/12/2024 354 (H) <150 mg/dL Final   04/07/2021 199 (H) <150 mg/dL Final     Comment:     Borderline high:  150-199 mg/dl  High:             200-499 mg/dl  Very high:       >499 mg/dl     01/02/2015 363 (H) 0 - 150 mg/dL Final     Cholesterol/HDL Ratio   Date Value Ref Range Status   01/02/2015 6.3 (H) 0.0 - 5.0 Final   01/17/2013 6.3 (H) 0.0 - 5.0 Final         ASSESSMENT/PLAN:     TYPE 2 DIABETES MELLITUS: Blood sugar values have improved with recent A1C 5.4 % on 6/10/2025.  I asked Bushra to check her fasting blood sugar each am and also check her blood sugar predinner daily.  She did not like the freestyle libre3 sensor and continues to use a glucose meter.  Bushra is tolerating Mounjaro well.  No nausea, vomiting or abdominal pain.   Mild constipation managed by taking stool softeners and Metamucil.  Continue Mounjaro 15 mg subcutaneous once a week, Jardiance 10 mg each am, Glimepiride 4 mg each am and Lantus 14 units subcutaneous each am.    If she has any symptoms of hypoglycemia or low blood sugars < 70 she is to notify us.   Patient states she was seen by Ophthalmology in June 2024 without diabetic retinopathy and has an eye exam scheduled for July 2025.    Her urine microalbuminuria was negative in June  2025.  Bushra denies any symptoms of diabetic neuropathy.  She states she has no foot ulcers at this time.  Patient does have lymphedema of the lower extremities.  No vitals today.  HX OF PROLACTINOMA: Not addressed today.  Managed by Dr. Farfan.    MORBID OBESITY: Continue Mounjaro.  Reminded patient to make healthy food choices.  HEALTH MAINTENANCE: See primary care provider for health maintenance.  FOLLOW UP: With Dr. Farfan in Jan 2026 and me in summer 2026.   Glucose meter, glucose test strips, alcohol swabs, insulin pen needles, Mounjaro, Jardiance, Lantus insulin Solostar pen and Glimepiride refilled today.    Time spent reviewing chart, labs and glucose download data today= 5 minutes.  Time for telephone visit today= 13  minutes.  Time for documentation today= 10 minutes.    Total time for visit today= 28 minutes.    Madina Elizondo PA-C

## 2025-06-25 NOTE — LETTER
2025    Bushra Buck   1980        To Whom it May Concern;    Please excuse Bushra Buck from work/school for a healthcare visit on 2025.    Sincerely,        Madina Elizondo PA-C

## 2025-06-25 NOTE — LETTER
6/25/2025       RE: Bushra Buck  [address]   [address]  [place] [address]     Dear Colleague,    Thank you for referring your patient, Bushra Buck, to the [facility] [place] [facility]. Please see a copy of my visit note below.    Outcome for 06/23/25 1:55 PM: Data obtained via phone and located below  Georgina Hooper MA    Patient is showing 4/5 MNCM met. BP out range   Georgina Hooper MA     6/9/25  135    6/10/25  109    6/11/25  126    6/12/25  116    6/13/25  99    6/14/25  135    6/15/25  124    6/16/25  128    6/17/25  130    6/18/25  137    6/19/25  139    6/20/25  123    6/21/25  131    6/22/25  117    6/23/25  122    Virtual Visit Details    Type of service:  Video Visit   Video Start Time:   Video End Time:    Originating Location (pt. Location):     Distant Location (provider location):    Platform used for Video Visit:       Time of start: 1:07 pm  Time of end:  1:20 pm    Total duration of telephone visit: 13 minutes  Providers location: Off-site.  Patient location: [place].    HPI  Bushra Buck is a 45-year-old female with history of type 2 diabetes mellitus.  Telephone visit today for diabetes follow-up.  I last saw Bushra in Dec 2024.  She was seen by Peggy POOLE in Dec 2024.  Patient also seen by Dr. Farfan in January 2024.  Bushra was admitted 7/24/2024-7/29/2024 with left lower leg cellulitis.   Patient was admitted 4/1-4/2/2024 at [facility] for psychosis.  Patient has a history of prolactinoma s/p resection in 2000 and subsequent treatment with carbergoline restarted in 2017 for persistent levels, amenorrhea, fatigue and weight gain.  Type 2 diabetes mellitus diagnosed in 2020.  Patient's history also significant for morbid obesity, hypothyroidism, dyslipidemia, bipolar, PTSD, schizophrenia, DANIEL, venous stasis with lymphedema lower extremities, hypertension, irritable  bowel syndrome, GERD, leg cellulitis, MRSA + and vitamin D deficiency.  For her diabetes, she is taking Mounjaro 15 mg subcutaneous once a week, Glimepiride 4 mg each am, Jardiance 10 mg each am and Lantus 14 units subcutaneous each am.  Most recent A1C 5.4 % on 6/10/2025.   Previous A1C was 6.1 % in Jan 2025.  Patient provided me with a few blood sugar readings today which I scanned in her note below.  Blood sugars readings are good and patient denies hypoglycemia.  Bushra did not like the freestyle kamryn sensor3 and is currently using a glucose meter.  She states she needs a new glucose meter which I ordered today.  On ROS today, weight loss.    Her current weight is 289 pounds.  Patient states that her max weight was close to 400 pounds.  Increase energy.  Constipation, but not bothersome per patient.  Using stool softeners and Metamucil.  Reminded her to stay well-hydrated.  Denies headaches, blurred vision, nausea, vomiting, shortness of breath at rest or fever.  No chest pain, abdominal pain or diarrhea.  Patient denies dysuria, hematuria or symptoms of vaginal yeast infection at this time.  Bushra denies any symptoms of diabetic neuropathy and denies foot ulcers at this time.  Lymphedema in the lower extremities.    Diabetes Care  Retinopathy: Patient denies history of diabetic retinopathy.  She states she was seen by ophthalmology in June 2024 with no diabetic retinopathy.  Patient has eye exam scheduled for July 2025.  Nephropathy: Urine protein negative in June 2025.  Neuropathy: None per patient.  Foot Exam: No exam today.  Taking aspirin: Yes.  Lipids: LDL 35 in Jan 2025.   Patient taking Lipitor and fenofibrate.  Insulin: Basal insulin.  DM meds: Mounjaro,Glimepiride and Jardiance.  Testing: Glucose meter.  Patient did not like the Freestyle libre3 sensor.           ROS  See under history of present illness.    Allergies  Allergies   Allergen Reactions     Fiorinal [Butalbital-Aspirin-Caffeine]  Anaphylaxis and Swelling     Aspirin Other (See Comments)     Eyes swell shut     Ativan [Lorazepam]      Bee Venom      Benzodiazepines Other (See Comments)     Swelling from allergies     Butalbital Other (See Comments)     Caffeine Other (See Comments)     Carbamazepine Other (See Comments)     Arm swelling     Cat Dander      Other reaction(s): Unknown     Cephalosporins Angioedema     Clindamycin      Other reaction(s): heartburn     Codeine Angioedema     Dextromethorphan      Other reaction(s): hives     Ibuprofen Sodium Other (See Comments)     swelling     Keflex [Cephalexin]      Eyes swell     Latex Other (See Comments) and Swelling     Swelling     Lithium Swelling     Oxcarbazepine      numbness     Topiramate      Other reaction(s): edema/swelling     Wasp Venom Protein Unknown     Amoxicillin-Pot Clavulanate Rash     Rash on neck - not raised     Topamax Rash       Medications  Current Outpatient Medications   Medication Sig Dispense Refill     ACCU-CHEK GUIDE test strip Use to test blood sugar 2-3 daily. 250 strip 3     ACETAMINOPHEN EXTRA STRENGTH 500 MG tablet        Alcohol Swabs (ALCOHOL PREP) 70 % PADS See Admin Instructions       ASPIRIN LOW DOSE 81 MG EC tablet Take 81 mg by mouth daily       atorvastatin (LIPITOR) 40 MG tablet Take 40 mg by mouth daily       bacitracin 500 UNIT/GM external ointment Apply topically 2 times daily 1 g 5     blood glucose monitoring (SOFTCLIX) lancets Use to test blood sugar 2 times daily or as directed. 100 each 3     bumetanide (BUMEX) 2 MG tablet Take 4 mg by mouth 3 times daily       cabergoline (DOSTINEX) 0.5 MG tablet Take 2 tablets (1 mg) by mouth twice a week. Take 1 mg tablet by mouth 2x weekly. 36 tablet 5     calcium carbonate-vitamin D (OS-TAWNYA WITH D) 500-200 MG-UNIT tablet Take 1 tablet by mouth 2 times daily       cetirizine (ZYRTEC) 10 MG tablet Take 10 mg by mouth daily       divalproex sodium extended-release (DEPAKOTE ER) 500 MG 24 hr tablet  Take 500 mg by mouth 3 times daily       docusate sodium (COLACE) 100 MG capsule Take 1 capsule (100 mg) by mouth 2 times daily as needed for constipation. 90 capsule 1     empagliflozin (JARDIANCE) 10 MG TABS tablet Take 1 tablet (10 mg) by mouth daily. 90 tablet 1     fenofibrate (TRICOR) 145 MG tablet Take 145 mg by mouth daily       glimepiride (AMARYL) 2 MG tablet TAKE 2 TABLETS BY MOUTH DAILY WITH BREAKFAST OR FIRST MAIN MEAL OF THE DAY. 180 tablet 1     insulin glargine (LANTUS PEN) 100 UNIT/ML pen Inject 14 Units subcutaneously daily. 15 mL 1     levothyroxine (SYNTHROID/LEVOTHROID) 125 MCG tablet TAKE 1 TABLET BY MOUTH DAILY 90 tablet 3     LORazepam (ATIVAN) 0.5 MG tablet Take 0.5 mg by mouth       medroxyPROGESTERone (PROVERA) 10 MG tablet TAKE 1 TABLET  10 MG  BY MOUTH ONCE DAILY FOR 5 DAYS EACH MONTH  0     melatonin 5 MG tablet Take 1 tablet by mouth daily at 2 pm       Multiple Vitamin (DAILY-NARESH MULTIVITAMIN) TABS Take 1 tablet by mouth daily. 90 tablet 3     multivitamin w/minerals (THERA-VIT-M) tablet Take 1 tablet by mouth daily       NYAMYC 426660 UNIT/GM external powder Apply 1 g topically as needed       omeprazole (PRILOSEC) 40 MG DR capsule Take 1 capsule by mouth daily.       paliperidone ER (INVEGA) 6 MG 24 hr tablet Take 6 mg by mouth every morning       spironolactone (ALDACTONE) 50 MG tablet Take 50 mg by mouth 2 times daily       tirzepatide (MOUNJARO) 15 MG/0.5ML pen Inject 15 mg subcutaneously every 7 days. 2 mL 11     VITAMIN D3 25 MCG (1000 UT) tablet Take 3 tablets by mouth daily         Family History  family history includes Cancer in her mother; Diabetes in her maternal grandfather, mother, and sister; Edema in her sister; Heart Disease in her father; Hypothyroidism in her mother; No Known Problems in her sister; Sleep Apnea in her mother; Snoring in her mother; Thyroid Disease in her mother.    Social History   reports that she has never smoked. She has never used smokeless  tobacco. She reports that she does not drink alcohol and does not use drugs.     Past Medical History  Past Medical History:   Diagnosis Date     Abnormal weight gain 01/17/2013     Acne rosacea      Acquired hypothyroidism 07/06/2016     Allergic rhinitis      Bipolar 1 disorder (H)     followed by psych     Bipolar disorder (H) 01/01/1992     Cellulitis, leg 01/01/2016     Dermatitis 12/31/2011     Dyslipidemia      Ganglion, left wrist      GERD (gastroesophageal reflux disease)      GERD (gastroesophageal reflux disease)      Growth retardation, mild developmental delay, and chronic hepatitis syndrome      Herpes zoster      High triglycerides 01/23/2014     Hypertension      Hypothyroid      Irritable bowel syndrome      Lymphedema of lower extremity 01/01/2008     Morbid obesity (H)      Nightmares      Obstructive sleep apnea      Pituitary adenoma (H)     S/P trans nasal resection in 2000     Post traumatic stress disorder      Post traumatic stress disorder      Post traumatic stress disorder (PTSD)      Prader-Willi syndrome      Prolactinoma (H) transsphenoid approach 2000.     PTSD (post-traumatic stress disorder)      Schizoaffective disorder, bipolar type (H)      Stasis edema 12/31/2011     Type 2 diabetes mellitus without complication, with long-term current use of insulin (H)      Venous stasis      Vitamin D deficiency        Past Surgical History:   Procedure Laterality Date     ABDOMEN SURGERY  2000    fat removed tp plug dura when had a pituitary surgery.     BRAIN SURGERY  2000    pituitary surgery     COLONOSCOPY N/A 10/14/2019    Procedure: COLONOSCOPY;  Surgeon: Hugo Mattson MD;  Location: [facility] OR;  Service: Gastroenterology     EXCISE GANGLION WRIST  2010    left arm     EXCISE GANGLION WRIST Left 6/7/2018    Procedure: REMOVE RECURRENT GANGLION CYST LEFT VOLAR RADIAL WRIST;  Surgeon: Angel Lopez MD;  Location: [facility] OR;  Service:      EXCISE GANGLION WRIST  Left 11/18/2019    Procedure: EXCISION LEFT WRIST RECURRENT GANGLION CYST;  Surgeon: Angel Lopez MD;  Location: [facility];  Service: Hand     OTHER SURGICAL HISTORY  2004, 2005    anorectal fissure repair     OTHER SURGICAL HISTORY Bilateral     pansinus revision     PITUITARY SURGERY  2014     pituitary tumor removal  2000    at the [facility]     RELEASE CARPAL TUNNEL Bilateral      septal plasty  1998     SEPTOPLASTY      x 2     SINUS SURGERY Right 8/21/2015    Procedure: RIGHT MICHELLE BULLOSA;  Surgeon: Daniel Landeros MD;  Location: [facility];  Service:      TONSILLECTOMY  1998     TONSILLECTOMY         Physical Exam    No exam today.    RESULTS  Creatinine   Date Value Ref Range Status   06/10/2025 0.88 0.51 - 0.95 mg/dL Final   03/23/2020 0.70 0.52 - 1.04 mg/dL Final     GFR Estimate   Date Value Ref Range Status   06/10/2025 83 >60 mL/min/1.73m2 Final     Comment:     eGFR calculated using 2021 CKD-EPI equation.   06/29/2021 >60 >60 mL/min/1.73m2 Final   03/23/2020 >90 >60 mL/min/[1.73_m2] Final     Comment:     Non  GFR Calc  Starting 12/18/2018, serum creatinine based estimated GFR (eGFR) will be   calculated using the Chronic Kidney Disease Epidemiology Collaboration   (CKD-EPI) equation.       Hemoglobin A1C   Date Value Ref Range Status   06/10/2025 5.4 <5.7 % Final     Comment:     Normal <5.7%   Prediabetes 5.7-6.4%    Diabetes 6.5% or higher     Note: Adopted from ADA consensus guidelines.   03/23/2020 5.3 0 - 5.6 % Final     Comment:     Normal <5.7% Prediabetes 5.7-6.4%  Diabetes 6.5% or higher - adopted from ADA   consensus guidelines.       Potassium   Date Value Ref Range Status   06/10/2025 3.3 (L) 3.4 - 5.3 mmol/L Final   05/17/2022 4.0 3.5 - 5.0 mmol/L Final   03/23/2020 4.0 3.4 - 5.3 mmol/L Final     ALT   Date Value Ref Range Status   06/10/2025 43 0 - 50 U/L Final   03/23/2020 37 0 - 50 U/L Final     AST   Date Value Ref Range Status   06/10/2025  44 0 - 45 U/L Final   03/23/2020 20 0 - 45 U/L Final     TSH   Date Value Ref Range Status   06/10/2025 1.29 0.30 - 4.20 uIU/mL Final   04/04/2022 4.28 0.30 - 5.00 uIU/mL Final   04/07/2021 3.51 0.40 - 4.00 mU/L Final     T4 Free   Date Value Ref Range Status   04/07/2021 1.39 0.76 - 1.46 ng/dL Final     Free T4   Date Value Ref Range Status   06/10/2025 2.29 (H) 0.90 - 1.70 ng/dL Final       Cholesterol   Date Value Ref Range Status   01/10/2025 129 <200 mg/dL Final   04/12/2024 161 <200 mg/dL Final   04/07/2021 115 <200 mg/dL Final   01/02/2015 212 (H) <200 mg/dL Final     Comment:     LDL Cholesterol is the primary guide to therapy.   The NCEP recommends further evaluation of: patients with cholesterol greater   than 200 mg/dL if additional risk factors are present, cholesterol greater   than   240 mg/dL, triglycerides greater than 150 mg/dL, or HDL less than 40 mg/dL.       HDL Cholesterol   Date Value Ref Range Status   04/07/2021 35 (L) >49 mg/dL Final   01/02/2015 34 (L) >50 mg/dL Final     Direct Measure HDL   Date Value Ref Range Status   01/10/2025 29 (L) >=50 mg/dL Final   04/12/2024 26 (L) >=50 mg/dL Final     LDL Cholesterol Calculated   Date Value Ref Range Status   01/10/2025 35 <100 mg/dL Final   04/12/2024 64 <=100 mg/dL Final   04/07/2021 40 <100 mg/dL Final     Comment:     Desirable:       <100 mg/dl   01/02/2015 106 0 - 129 mg/dL Final     Comment:     LDL Cholesterol is the primary guide to therapy: LDL-cholesterol goal in high   risk patients is <100 mg/dL and in very high risk patients is <70 mg/dL.       LDL Cholesterol Direct   Date Value Ref Range Status   04/13/2022 82 <=129 mg/dL Final   04/04/2022 124 <=129 mg/dL Final     Triglycerides   Date Value Ref Range Status   01/10/2025 327 (H) <150 mg/dL Final   04/12/2024 354 (H) <150 mg/dL Final   04/07/2021 199 (H) <150 mg/dL Final     Comment:     Borderline high:  150-199 mg/dl  High:             200-499 mg/dl  Very high:       >499  mg/dl     01/02/2015 363 (H) 0 - 150 mg/dL Final     Cholesterol/HDL Ratio   Date Value Ref Range Status   01/02/2015 6.3 (H) 0.0 - 5.0 Final   01/17/2013 6.3 (H) 0.0 - 5.0 Final         ASSESSMENT/PLAN:     TYPE 2 DIABETES MELLITUS: Blood sugar values have improved with recent A1C 5.4 % on 6/10/2025.  I asked Bushra to check her fasting blood sugar each am and also check her blood sugar predinner daily.  She did not like the freestyle libre3 sensor and continues to use a glucose meter.  Bushra is tolerating Mounjaro well.  No nausea, vomiting or abdominal pain.   Mild constipation managed by taking stool softeners and Metamucil.  Continue Mounjaro 15 mg subcutaneous once a week, Jardiance 10 mg each am, Glimepiride 4 mg each am and Lantus 14 units subcutaneous each am.    If she has any symptoms of hypoglycemia or low blood sugars < 70 she is to notify us.   Patient states she was seen by Ophthalmology in June 2024 without diabetic retinopathy and has an eye exam scheduled for July 2025.    Her urine microalbuminuria was negative in June 2025.  Bushra denies any symptoms of diabetic neuropathy.  She states she has no foot ulcers at this time.  Patient does have lymphedema of the lower extremities.  No vitals today.  HX OF PROLACTINOMA: Not addressed today.  Managed by Dr. Farfan.    MORBID OBESITY: Continue Mounjaro.  Reminded patient to make healthy food choices.  HEALTH MAINTENANCE: See primary care provider for health maintenance.  FOLLOW UP: With Dr. Farfan in Jan 2026 and me in summer 2026.   Glucose meter, glucose test strips, alcohol swabs, insulin pen needles, Mounjaro, Jardiance, Lantus insulin Solostar pen and Glimepiride refilled today.    Time spent reviewing chart, labs and glucose download data today= 5 minutes.  Time for telephone visit today= 13  minutes.  Time for documentation today= 10 minutes.    Total time for visit today= 28 minutes.    Madina Elizondo PA-C          Again, thank you for allowing me  to participate in the care of your patient.      Sincerely,    Madina Elizondo PA-C

## 2025-06-30 ENCOUNTER — TRANSFERRED RECORDS (OUTPATIENT)
Dept: HEALTH INFORMATION MANAGEMENT | Facility: CLINIC | Age: 45
End: 2025-06-30
Payer: COMMERCIAL

## 2025-06-30 LAB — RETINOPATHY: NEGATIVE

## 2025-07-27 DIAGNOSIS — E28.319 EARLY MENOPAUSE: ICD-10-CM

## 2025-07-27 DIAGNOSIS — L97.909: Primary | ICD-10-CM

## 2025-07-27 DIAGNOSIS — E11.622: Primary | ICD-10-CM

## 2025-07-29 RX ORDER — LANCETS
EACH MISCELLANEOUS
Qty: 200 EACH | Refills: 2 | Status: SHIPPED | OUTPATIENT
Start: 2025-07-29

## 2025-07-29 NOTE — TELEPHONE ENCOUNTER
Last Written Prescription:  blood glucose monitoring (SOFTCLIX) lancets 100 each 3 1/8/2025   Use to test blood sugar 2 times daily or as directed.     ----------------------  Last Visit Date: 6/25/25  Future Visit Date: 1/14/26  ----------------------    Refill decision:   [x] Medication refilled per  Medication Refill in Ambulatory Care  policy.      Request from pharmacy:  Requested Prescriptions   Pending Prescriptions Disp Refills    blood glucose monitoring (SOFTCLIX) lancets [Pharmacy Med Name: SOFTCLIX LANCETS]       Sig: USE TO TEST BLOOD SUGAR TWICE DAILY OR AS DIRECTED       Diabetic Supplies Protocol Failed - 7/29/2025  3:25 PM        Failed - Medication is active on med list and the sig matches. RN to manually verify dose and sig if red X/fail.     If the protocol passes (green check), you do not need to verify med dose and sig.    A prescription matches if they are the same clinical intention.    For Example: once daily and every morning are the same.    The protocol can not identify upper and lower case letters as matching and will fail.     For Example: Take 1 tablet (50 mg) by mouth daily     TAKE 1 TABLET (50 MG) BY MOUTH DAILY    For all fails (red x), verify dose and sig.    If the refill does match what is on file, the RN can still proceed to approve the refill request.       If they do not match, route to the appropriate provider.             Passed - Recent (12 month) or future (90 days) visit with authorizing provider's specialty (provided they have been seen in the past 15 months)     The patient must have completed an in-person or virtual visit within the past 12 months or has a future visit scheduled within the next 90 days with the authorizing provider s specialty.  Urgent care and e-visits do not qualify as an office visit for this protocol.          Passed - Medication indicated for associated diagnosis        Passed - Patient is 18 years of age or older

## 2025-08-13 ENCOUNTER — OFFICE VISIT (OUTPATIENT)
Dept: VASCULAR SURGERY | Facility: CLINIC | Age: 45
End: 2025-08-13
Attending: NURSE PRACTITIONER
Payer: COMMERCIAL

## 2025-08-13 VITALS
RESPIRATION RATE: 18 BRPM | TEMPERATURE: 98.1 F | SYSTOLIC BLOOD PRESSURE: 112 MMHG | BODY MASS INDEX: 56.25 KG/M2 | OXYGEN SATURATION: 98 % | WEIGHT: 288 LBS | DIASTOLIC BLOOD PRESSURE: 75 MMHG | HEART RATE: 86 BPM

## 2025-08-13 DIAGNOSIS — I89.0 LYMPHEDEMA: Primary | ICD-10-CM

## 2025-08-13 DIAGNOSIS — E11.9 TYPE 2 DIABETES MELLITUS WITHOUT COMPLICATION, WITHOUT LONG-TERM CURRENT USE OF INSULIN (H): ICD-10-CM

## 2025-08-13 DIAGNOSIS — M79.89 SWELLING OF LIMB: ICD-10-CM

## 2025-08-13 DIAGNOSIS — Z87.2 HISTORY OF CELLULITIS: ICD-10-CM

## 2025-08-13 DIAGNOSIS — E66.01 MORBID OBESITY WITH BMI OF 60.0-69.9, ADULT (H): ICD-10-CM

## 2025-08-13 DIAGNOSIS — I87.2 VENOUS INSUFFICIENCY OF BOTH LOWER EXTREMITIES: ICD-10-CM

## 2025-08-13 DIAGNOSIS — I87.333 CHRONIC VENOUS HYPERTENSION (IDIOPATHIC) WITH ULCER AND INFLAMMATION OF BILATERAL LOWER EXTREMITY (H): ICD-10-CM

## 2025-08-13 PROCEDURE — 3078F DIAST BP <80 MM HG: CPT | Performed by: NURSE PRACTITIONER

## 2025-08-13 PROCEDURE — 3074F SYST BP LT 130 MM HG: CPT | Performed by: NURSE PRACTITIONER

## 2025-08-13 PROCEDURE — 99213 OFFICE O/P EST LOW 20 MIN: CPT | Performed by: NURSE PRACTITIONER

## 2025-08-13 PROCEDURE — 1126F AMNT PAIN NOTED NONE PRSNT: CPT | Performed by: NURSE PRACTITIONER

## 2025-08-13 PROCEDURE — G2211 COMPLEX E/M VISIT ADD ON: HCPCS | Performed by: NURSE PRACTITIONER

## 2025-08-13 PROCEDURE — G0463 HOSPITAL OUTPT CLINIC VISIT: HCPCS | Performed by: NURSE PRACTITIONER

## 2025-08-13 ASSESSMENT — PAIN SCALES - GENERAL: PAINLEVEL_OUTOF10: NO PAIN (0)

## 2025-09-04 DIAGNOSIS — E11.65 TYPE 2 DIABETES MELLITUS WITH HYPERGLYCEMIA, WITH LONG-TERM CURRENT USE OF INSULIN (H): ICD-10-CM

## 2025-09-04 DIAGNOSIS — Z79.4 TYPE 2 DIABETES MELLITUS WITH HYPERGLYCEMIA, WITH LONG-TERM CURRENT USE OF INSULIN (H): ICD-10-CM
